# Patient Record
Sex: FEMALE | Race: BLACK OR AFRICAN AMERICAN | NOT HISPANIC OR LATINO | Employment: OTHER | URBAN - METROPOLITAN AREA
[De-identification: names, ages, dates, MRNs, and addresses within clinical notes are randomized per-mention and may not be internally consistent; named-entity substitution may affect disease eponyms.]

---

## 2017-02-06 RX ORDER — MIRTAZAPINE 15 MG/1
TABLET, FILM COATED ORAL
Qty: 30 TABLET | Refills: 5 | OUTPATIENT
Start: 2017-02-06

## 2017-02-24 ENCOUNTER — DOCUMENTATION ONLY (OUTPATIENT)
Dept: FAMILY MEDICINE | Facility: CLINIC | Age: 82
End: 2017-02-24

## 2017-02-24 NOTE — PROGRESS NOTES
Pre-Visit Chart Review  For Appointment Scheduled on 2/27/2017    There are no preventive care reminders to display for this patient.

## 2017-02-27 ENCOUNTER — OFFICE VISIT (OUTPATIENT)
Dept: FAMILY MEDICINE | Facility: CLINIC | Age: 82
End: 2017-02-27
Payer: MEDICARE

## 2017-02-27 VITALS
WEIGHT: 175.94 LBS | TEMPERATURE: 98 F | HEART RATE: 73 BPM | BODY MASS INDEX: 31.17 KG/M2 | SYSTOLIC BLOOD PRESSURE: 148 MMHG | DIASTOLIC BLOOD PRESSURE: 72 MMHG | HEIGHT: 63 IN

## 2017-02-27 DIAGNOSIS — G62.9 POLYNEUROPATHY: ICD-10-CM

## 2017-02-27 DIAGNOSIS — D75.89 MACROCYTOSIS: ICD-10-CM

## 2017-02-27 DIAGNOSIS — Z00.00 ENCOUNTER FOR PREVENTIVE HEALTH EXAMINATION: ICD-10-CM

## 2017-02-27 DIAGNOSIS — Z86.010 HX OF COLONIC POLYPS: Primary | ICD-10-CM

## 2017-02-27 DIAGNOSIS — E78.1 HYPERTRIGLYCERIDEMIA: ICD-10-CM

## 2017-02-27 DIAGNOSIS — Z85.038 HISTORY OF COLON CANCER: ICD-10-CM

## 2017-02-27 DIAGNOSIS — I70.0 ABDOMINAL AORTIC ATHEROSCLEROSIS: Primary | ICD-10-CM

## 2017-02-27 PROCEDURE — 99999 PR PBB SHADOW E&M-EST. PATIENT-LVL IV: CPT | Mod: PBBFAC,,, | Performed by: PHYSICIAN ASSISTANT

## 2017-02-27 PROCEDURE — 3078F DIAST BP <80 MM HG: CPT | Mod: S$GLB,,, | Performed by: PHYSICIAN ASSISTANT

## 2017-02-27 PROCEDURE — 99499 UNLISTED E&M SERVICE: CPT | Mod: S$GLB,,, | Performed by: PHYSICIAN ASSISTANT

## 2017-02-27 PROCEDURE — G0439 PPPS, SUBSEQ VISIT: HCPCS | Mod: S$GLB,,, | Performed by: PHYSICIAN ASSISTANT

## 2017-02-27 PROCEDURE — 3077F SYST BP >= 140 MM HG: CPT | Mod: S$GLB,,, | Performed by: PHYSICIAN ASSISTANT

## 2017-02-27 NOTE — MR AVS SNAPSHOT
[facility]  [address]  [place] [address]  Phone: [phone]  Fax: [phone]                  Kenyetta Andersen   2017 8:00 AM   Office Visit    Description:  Female : 1935   Provider:  TEOFILO Euceda   Department:  [facility]           Reason for Visit     Health Risk Assessment           Diagnoses this Visit        Comments    Abdominal aortic atherosclerosis    -  Primary     Encounter for preventive health examination         History of colon cancer                To Do List           Future Appointments        Provider Department Dept Phone    3/27/2017 8:00 AM Maty Sawant MD [facility] - Ophthalmology [phone]    2017 9:40 AM LAB, [facility] [phone]    2017 10:00 AM [facility] CT1 LIMIT 400 LBS [facility] [phone]    2017 10:00 AM Yessica Granado MD [place] - Hematology Oncology [phone]      Goals (5 Years of Data)     None      [facility] On Call     [facility] On Call Nurse Care Line -  Assistance  Registered nurses in the [facility] provide clinical advisement, health education, appointment booking, and other advisory services.  Call for this free service at [phone].             Medications           Message regarding Medications     Verify the changes and/or additions to your medication regime listed below are the same as discussed with your clinician today.  If any of these changes or additions are incorrect, please notify your healthcare provider.        STOP taking these medications     meclizine (ANTIVERT) 25 mg tablet Take 1 tablet (25 mg total) by mouth nightly as needed for Dizziness.    benzonatate (TESSALON) 100 MG capsule Take 1 capsule (100 mg total) by mouth 3 (three) times daily as needed for Cough.           Verify that the below list of medications is an accurate representation of the medications you are  currently taking.  If none reported, the list may be blank. If incorrect, please contact your healthcare provider. Carry this list with you in case of emergency.           Current Medications     alendronate (FOSAMAX) 35 MG tablet Take 1 tablet (35 mg total) by mouth every 7 days.    ascorbic acid (VITAMIN C) 1000 MG tablet Take 1,000 mg by mouth once daily.    b complex vitamins capsule Take 1 capsule by mouth once daily.    dorzolamide-timolol 2-0.5% (COSOPT) 22.3-6.8 mg/mL ophthalmic solution PLACE 1 DROP INTO BOTH EYES 2 (TWO) TIMES DAILY. TWICE A DAY    gabapentin (NEURONTIN) 300 MG capsule TAKE 6 CAPSULES (1,800 MG TOTAL) BY MOUTH NIGHTLY.    latanoprost 0.005 % ophthalmic solution PLACE 1 DROP INTO BOTH EYES EVERY EVENING.    mirtazapine (REMERON) 15 MG tablet TAKE ONE TABLET BY MOUTH AT BEDTIME TO HELP APPETITE    omeprazole (PRILOSEC) 40 MG capsule Take 40 mg by mouth once daily.    VITAMIN D2 50,000 unit capsule TAKE ONE CAPSULE BY MOUTH ONCE A WEEK           Clinical Reference Information           Your Vitals Were     BP                   148/72 (BP Location: Left arm, Patient Position: Sitting, BP Method: Automatic)           Blood Pressure          Most Recent Value    BP  (!)  148/72      Allergies as of 2/27/2017     Ace Inhibitors    Codeine      Immunizations Administered on Date of Encounter - 2/27/2017     None      Orders Placed During Today's Visit      Normal Orders This Visit    Ambulatory referral to Gastroenterology       Instructions      Walking for Fitness  Fitness walking has something for everyone, even people who are already fit. Walking is one of the safest ways to condition your body aerobically. It can boost energy, help you lose weight, and reduce stress.    Physical benefits  · Walking strengthens your heart and lungs, and tones your muscles.  · When walking, your feet land with less impact than in other sports. This reduces chances of muscle, bone, and joint injury.  · Regular  walking improves your cholesterol levels and lowers your risk of heart disease. And it helps you control your blood sugar if you have diabetes.  · Walking is a weight-bearing activity, which helps maintain bone density. This can help prevent osteoporosis.  Personal rewards  · Taking walks can help you relax and manage stress. And fitness walking may make you feel better about yourself.  · Walking can help you sleep better at night and make you less likely to be depressed.  · Regular walking may help maintain your memory as you get older.  · Walking is a great way to spend extra time with friends and family members. Be sure to invite your dog along!  Q&A about fitness walking  Q: Will walking keep me fit?  A: Yes. Regular walking at the right pace gives you all the benefits of other aerobic activities, such as jogging and swimming.  Q: Will walking help me lose weight and keep it off?  A: Yes. Per mile, walking can burn as many calories as jogging. Your health care provider can help work walking into your weight-loss plan.  Q: Is walking safe for my health?  A: Yes. Walking is safe if you have high blood pressure, diabetes, heart disease, or other conditions. Talk to your health care provider before you start.  Date Last Reviewed: 5/9/2015  © 1903-0633 [x+1]. [address] [place] [address], [place] [address]. All rights reserved. This information is not intended as a substitute for professional medical care. Always follow your healthcare professional's instructions.        Weight Management: Getting Started  Healthy bodies come in all shapes and sizes. Not all bodies are made to be thin. For some people, a healthy weight is higher than the average weight listed on weight charts. Your healthcare provider can help you decide on a healthy weight for you.    Reasons to lose weight  Losing weight can help with some health problems, such as high blood pressure, heart disease, diabetes, sleep apnea, and  arthritis. You may also feel more energy.  Set your long-term goal  Your goal doesn't even have to be a specific weight. You may decide on a fitness goal (such as being able to walk 10 miles a week), or a health goal (such as lowering your blood pressure). Choose a goal that is measurable and reasonable, so you know when you've reached it. A goal of reaching a BMI of less than 25 is not always reasonable (or possible).   Make an action plan  Habits dont change overnight. Setting your goals too high can leave you feeling discouraged if you cant reach them. Be realistic. Choose one or two small changes you can make now. Set an action plan for how you are going to make these changes. When you can stick to this plan, keep making a few more small changes. Taking small steps will help you stay on the path to success.  Track your progress  Write down your goals. Then, keep a daily record of your progress. Write down what you eat and how active you are. This record lets you look back on how much youve done. It may also help when youre feeling frustrated. Reward yourself for success. Even if you dont reach every goal, give yourself credit for what you do get done.  Get support  Encouragement from others can help make losing weight easier. Ask your family members and friends for support. They may even want to join you. Also look to your healthcare provider, registered dietitian, and  for help. Your local hospital can give you more information about nutrition, exercise, and weight loss.  Date Last Reviewed: 1/31/2016 © 2000-2016 [employer]. [address] [place] [address], [place] [address]. All rights reserved. This information is not intended as a substitute for professional medical care. Always follow your healthcare professional's instructions.        Counseling and Referral of Other Preventative  (Italic type indicates deductible and co-insurance are waived)    Patient Name: Kenyetta  Nathaly  Today's Date: 2/27/2017      SERVICE LIMITATIONS RECOMMENDATION    Vaccines    · Pneumococcal (once after 65)    · Influenza (annually)    · Hepatitis B (if medium/high risk)    · Prevnar 13      Hepatitis B medium/high risk factors:       - End-stage renal disease       - Hemophiliacs who received Factor VII or         IX concentrates       - Clients of institutions for the mentally             retarded       - Persons who live in the same house as          a HepB carrier       - Homosexual men       - Illicit injectable drug abusers     Pneumococcal: Done, no repeat necessary     Influenza: Done, repeat in one year     Hepatitis B: N/A .     Prevnar 13: N/A completed    Mammogram (biennial age 50-74)  Annually (age 40 or over)  Recommended to patient, declined    Pap (up to age 70 and after 70 if unknown history or abnormal study last 10 years)    N/A .     The USPSTF recommends against screening for cervical cancer in women who have had a hysterectomy with removal of the cervix and who do not have a history of a high-grade precancerous lesion (cervical intraepithelial neoplasia [MYRTLE] grade 2 or 3) or cervical cancer.     Colorectal cancer screening (to age 75)    · Fecal occult blood test (annual)  · Flexible sigmoidoscopy (5y)  · Screening colonoscopy (10y)  · Barium enema   Last done 3/24/14, recommend to repeat every 3  years    Diabetes self-management training (no USPSTF recommendations)  Requires referral by treating physician for patient with diabetes or renal disease. 10 hours of initial DSMT sessions of no less than 30 minutes each in a continuous 12-month period. 2 hours of follow-up DSMT in subsequent years.  Done this year, repeat every year    Bone mass measurements (age 65 & older, biennial)  Requires diagnosis related to osteoporosis or estrogen deficiency. Biennial benefit unless patient has history of long-term glucocorticoid  Recommended to patient, declined    Glaucoma screening (no  USPSTF recommendation)  Diabetes mellitus, family history   , age 50 or over    American, age 65 or over  Done this year, repeat every year    Medical nutrition therapy for diabetes or renal disease (no recommended schedule)  Requires referral by treating physician for patient with diabetes or renal disease or kidney transplant within the past 3 years.  Can be provided in same year as diabetes self-management training (DSMT), and CMS recommends medical nutrition therapy take place after DSMT. Up to 3 hours for initial year and 2 hours in subsequent years.  N/A .    Cardiovascular screening blood tests (every 5 years)  · Fasting lipid panel  Order as a panel if possible  Done this year, repeat every year    Diabetes screening tests (at least every 3 years, Medicare covers annually or at 6-month intervals for prediabetic patients)  · Fasting blood sugar (FBS) or glucose tolerance test (GTT)  Patient must be diagnosed with one of the following:       - Hypertension       - Dyslipidemia       - Obesity (BMI 30kg/m2)       - Previous elevated impaired FBS or GTT       ... or any two of the following:       - Overweight (BMI 25 but <30)       - Family history of diabetes       - Age 65 or older       - History of gestational diabetes or birth of baby weighing more than 9 pounds  Done this year, repeat every year    Abdominal aortic aneurysm screening (once)  · Sonogram   Limited to patients who meet one of the following criteria:       - Men who are 65-75 years old and have smoked more than 100 cigarette in their lifetime       - Anyone with a family history of abdominal aortic aneurysm       - Anyone recommended for screening by the USPSTF  N/A completed    HIV screening (annually for increased risk patients)  · HIV-1 and HIV-2 by EIA, or RON, rapid antibody test or oral mucosa transudate  Patients must be at increased risk for HIV infection per USPSTF guidelines or pregnant. Tests covered  annually for patient at increased risk or as requested by the patient. Pregnant patients may receive up to 3 tests during pregnancy.  Risks discussed, screening is not recommended    Smoking cessation counseling (up to 8 sessions per year)  Patients must be asymptomatic of tobacco-related conditions to receive as a preventative service.  patient wished to defer    Subsequent annual wellness visit  At least 12 months since last AWV  Return in one year     The following information is provided to all patients.  This information is to help you find resources for any of the problems found today that may be affecting your health:                Living healthy guide: www.[facility].[place].[facility]      Understanding Diabetes: www.diabetes.org      Eating healthy: www.cdc.gov/healthyweight      [facility][note 3] home safety checklist: www.cdc.gov/steadi/patient.html      Agency on Aging: www.goea.[place].[facility]      Alcoholics anonymous (AA): www.aa.org      Physical Activity: www.zulma.nih.gov/ew8mxeu      Tobacco use: www.quitwithusla.org          Smoking Cessation     If you would like to quit smoking:   You may be eligible for free services if you are a [place] resident and started smoking cigarettes before September 1, 1988.  Call the Smoking Cessation Trust (SCT) toll free at (228) 576-0524 or (332) 596-0344.   Call 1-800-QUIT-NOW if you do not meet the above criteria.            Language Assistance Services     ATTENTION: Language assistance services are available, free of charge. Please call [phone].      ATENCIÓN: Si habla español, tiene a corbin disposición servicios gratuitos de asistencia lingüística. Llame al [phone].     CHÚ Ý: N?u b?n nói Ti?ng Vi?t, có các d?ch v? h? tr? ngôn ng? mi?n phí dành cho b?n. G?i s? [phone].         [facility] complies with applicable Federal civil rights laws and does not discriminate on the basis of race, color, national origin, age, disability, or sex.

## 2017-02-27 NOTE — PATIENT INSTRUCTIONS
Walking for Fitness  Fitness walking has something for everyone, even people who are already fit. Walking is one of the safest ways to condition your body aerobically. It can boost energy, help you lose weight, and reduce stress.    Physical benefits  · Walking strengthens your heart and lungs, and tones your muscles.  · When walking, your feet land with less impact than in other sports. This reduces chances of muscle, bone, and joint injury.  · Regular walking improves your cholesterol levels and lowers your risk of heart disease. And it helps you control your blood sugar if you have diabetes.  · Walking is a weight-bearing activity, which helps maintain bone density. This can help prevent osteoporosis.  Personal rewards  · Taking walks can help you relax and manage stress. And fitness walking may make you feel better about yourself.  · Walking can help you sleep better at night and make you less likely to be depressed.  · Regular walking may help maintain your memory as you get older.  · Walking is a great way to spend extra time with friends and family members. Be sure to invite your dog along!  Q&A about fitness walking  Q: Will walking keep me fit?  A: Yes. Regular walking at the right pace gives you all the benefits of other aerobic activities, such as jogging and swimming.  Q: Will walking help me lose weight and keep it off?  A: Yes. Per mile, walking can burn as many calories as jogging. Your health care provider can help work walking into your weight-loss plan.  Q: Is walking safe for my health?  A: Yes. Walking is safe if you have high blood pressure, diabetes, heart disease, or other conditions. Talk to your health care provider before you start.  Date Last Reviewed: 5/9/2015  © 1457-8628 [employer]. [address] [place] [address], [place] [address]. All rights reserved. This information is not intended as a substitute for professional medical care. Always follow your healthcare professional's  instructions.        Weight Management: Getting Started  Healthy bodies come in all shapes and sizes. Not all bodies are made to be thin. For some people, a healthy weight is higher than the average weight listed on weight charts. Your healthcare provider can help you decide on a healthy weight for you.    Reasons to lose weight  Losing weight can help with some health problems, such as high blood pressure, heart disease, diabetes, sleep apnea, and arthritis. You may also feel more energy.  Set your long-term goal  Your goal doesn't even have to be a specific weight. You may decide on a fitness goal (such as being able to walk 10 miles a week), or a health goal (such as lowering your blood pressure). Choose a goal that is measurable and reasonable, so you know when you've reached it. A goal of reaching a BMI of less than 25 is not always reasonable (or possible).   Make an action plan  Habits dont change overnight. Setting your goals too high can leave you feeling discouraged if you cant reach them. Be realistic. Choose one or two small changes you can make now. Set an action plan for how you are going to make these changes. When you can stick to this plan, keep making a few more small changes. Taking small steps will help you stay on the path to success.  Track your progress  Write down your goals. Then, keep a daily record of your progress. Write down what you eat and how active you are. This record lets you look back on how much youve done. It may also help when youre feeling frustrated. Reward yourself for success. Even if you dont reach every goal, give yourself credit for what you do get done.  Get support  Encouragement from others can help make losing weight easier. Ask your family members and friends for support. They may even want to join you. Also look to your healthcare provider, registered dietitian, and  for help. Your local hospital can give you more information about  nutrition, exercise, and weight loss.  Date Last Reviewed: 1/31/2016  © 9157-8436 [employer]. [address] [place] [address], [place] [address]. All rights reserved. This information is not intended as a substitute for professional medical care. Always follow your healthcare professional's instructions.        Counseling and Referral of Other Preventative  (Italic type indicates deductible and co-insurance are waived)    Patient Name: Kenyetta Andersen  Today's Date: 2/27/2017      SERVICE LIMITATIONS RECOMMENDATION    Vaccines    · Pneumococcal (once after 65)    · Influenza (annually)    · Hepatitis B (if medium/high risk)    · Prevnar 13      Hepatitis B medium/high risk factors:       - End-stage renal disease       - Hemophiliacs who received Factor VII or         IX concentrates       - Clients of institutions for the mentally             retarded       - Persons who live in the same house as          a HepB carrier       - Homosexual men       - Illicit injectable drug abusers     Pneumococcal: Done, no repeat necessary     Influenza: Done, repeat in one year     Hepatitis B: N/A .     Prevnar 13: N/A completed    Mammogram (biennial age 50-74)  Annually (age 40 or over)  Recommended to patient, declined    Pap (up to age 70 and after 70 if unknown history or abnormal study last 10 years)    N/A .     The USPSTF recommends against screening for cervical cancer in women who have had a hysterectomy with removal of the cervix and who do not have a history of a high-grade precancerous lesion (cervical intraepithelial neoplasia [MYRTLE] grade 2 or 3) or cervical cancer.     Colorectal cancer screening (to age 75)    · Fecal occult blood test (annual)  · Flexible sigmoidoscopy (5y)  · Screening colonoscopy (10y)  · Barium enema   Last done 3/24/14, recommend to repeat every 3  years    Diabetes self-management training (no USPSTF recommendations)  Requires referral by treating physician for patient with diabetes  or renal disease. 10 hours of initial DSMT sessions of no less than 30 minutes each in a continuous 12-month period. 2 hours of follow-up DSMT in subsequent years.  Done this year, repeat every year    Bone mass measurements (age 65 & older, biennial)  Requires diagnosis related to osteoporosis or estrogen deficiency. Biennial benefit unless patient has history of long-term glucocorticoid  Recommended to patient, declined    Glaucoma screening (no USPSTF recommendation)  Diabetes mellitus, family history   , age 50 or over    American, age 65 or over  Done this year, repeat every year    Medical nutrition therapy for diabetes or renal disease (no recommended schedule)  Requires referral by treating physician for patient with diabetes or renal disease or kidney transplant within the past 3 years.  Can be provided in same year as diabetes self-management training (DSMT), and CMS recommends medical nutrition therapy take place after DSMT. Up to 3 hours for initial year and 2 hours in subsequent years.  N/A .    Cardiovascular screening blood tests (every 5 years)  · Fasting lipid panel  Order as a panel if possible  Done this year, repeat every year    Diabetes screening tests (at least every 3 years, Medicare covers annually or at 6-month intervals for prediabetic patients)  · Fasting blood sugar (FBS) or glucose tolerance test (GTT)  Patient must be diagnosed with one of the following:       - Hypertension       - Dyslipidemia       - Obesity (BMI 30kg/m2)       - Previous elevated impaired FBS or GTT       ... or any two of the following:       - Overweight (BMI 25 but <30)       - Family history of diabetes       - Age 65 or older       - History of gestational diabetes or birth of baby weighing more than 9 pounds  Done this year, repeat every year    Abdominal aortic aneurysm screening (once)  · Sonogram   Limited to patients who meet one of the following criteria:       - Men who are 65-75  years old and have smoked more than 100 cigarette in their lifetime       - Anyone with a family history of abdominal aortic aneurysm       - Anyone recommended for screening by the USPSTF  N/A completed    HIV screening (annually for increased risk patients)  · HIV-1 and HIV-2 by EIA, or RON, rapid antibody test or oral mucosa transudate  Patients must be at increased risk for HIV infection per USPSTF guidelines or pregnant. Tests covered annually for patient at increased risk or as requested by the patient. Pregnant patients may receive up to 3 tests during pregnancy.  Risks discussed, screening is not recommended    Smoking cessation counseling (up to 8 sessions per year)  Patients must be asymptomatic of tobacco-related conditions to receive as a preventative service.  patient wished to defer    Subsequent annual wellness visit  At least 12 months since last AWV  Return in one year     The following information is provided to all patients.  This information is to help you find resources for any of the problems found today that may be affecting your health:                Living healthy guide: www.[facility].[place].[facility]      Understanding Diabetes: www.diabetes.org      Eating healthy: www.cdc.gov/healthyweight      [facility][note 3] home safety checklist: www.cdc.gov/steadi/patient.html      Agency on Aging: www.goea.[place].[facility]      Alcoholics anonymous (AA): www.aa.org      Physical Activity: www.zulma.nih.gov/bu0kcin      Tobacco use: www.quitwithusla.org

## 2017-02-27 NOTE — Clinical Note
Primary Care Providers: Hever Arreola MD, MD (General)  Your patient was seen today for a HRA visit. Gap(s) in care (HEDIS gaps) have been identified during this visit that require additional testing and possible follow up.  Orders Placed This Encounter     Ambulatory referral to Gastroenterology         Referral Priority:Routine         Referral Type:Consultation         Referral Reason:Specialty Services Required         Referred to Provider:Killian Guerrier MD         Requested Specialty:Gastroenterology         Number of Visits Requested:1   These orders were placed using [facility] approved protocol and any results will be forwarded to your office for appropriate follow up. I have included a copy of my visit note; please review the note and feel free to contact me with any questions.   Thank you for allowing me to participate in the care of your patients. TEOFILO Khan

## 2017-02-27 NOTE — PROGRESS NOTES
"Kenyetta Andersen presented for a  Medicare AWV and comprehensive Health Risk Assessment today. The following components were reviewed and updated:    · Medical history  · Family History  · Social history  · Allergies and Current Medications  · Health Risk Assessment  · Health Maintenance  · Care Team     ** See Completed Assessments for Annual Wellness Visit within the encounter summary.**       The following assessments were completed:  · Living Situation  · CAGE  · Depression Screening  · Timed Get Up and Go  · Whisper Test  · Cognitive Function Screening  · Nutrition Screening  · ADL Screening  · PAQ Screening    Vitals:    02/27/17 0759   BP: (!) 148/72   BP Location: Left arm   Patient Position: Sitting   BP Method: Automatic   Pulse: 73   Temp: 98.3 °F (36.8 °C)   TempSrc: Oral   Weight: 79.8 kg (175 lb 14.8 oz)   Height: 5' 3" (1.6 m)     Body mass index is 31.16 kg/(m^2).  Physical Exam   Constitutional: She is oriented to person, place, and time. She appears well-developed and well-nourished.   HENT:   Head: Normocephalic and atraumatic.   Eyes: Conjunctivae are normal. Pupils are equal, round, and reactive to light.   Neck: Normal range of motion. Neck supple. No JVD present.   Cardiovascular: Normal rate and regular rhythm.  Exam reveals no gallop and no friction rub.    No murmur heard.  Pulmonary/Chest: Effort normal and breath sounds normal. No respiratory distress. She has no wheezes. She has no rales.   Neurological: She is alert and oriented to person, place, and time.   Skin: Skin is warm and dry.   Psychiatric: She has a normal mood and affect. Her behavior is normal. Judgment and thought content normal.               Diagnoses and health risks identified today and associated recommendations/orders:    Kenyetta was seen today for health risk assessment.    Diagnoses and all orders for this visit:    Abdominal aortic atherosclerosis  Comments:  stable, continue to monitor    Encounter for " preventive health examination    History of colon cancer  Comments:  patient due for follow up colonoscopy next month, referral placed  Orders:  -     Ambulatory referral to Gastroenterology    Hypertriglyceridemia  Comments:  controlled, continue meds    Macrocytosis  Comments:  stable, followed by hematology    Polyneuropathy  Comments:  stable, continue to monitor        Provided Kenyetta with a 5-10 year written screening schedule and personal prevention plan. Recommendations were developed using the USPSTF age appropriate recommendations. Education, counseling, and referrals were provided as needed. After Visit Summary printed and given to patient which includes a list of additional screenings\tests needed.    No Follow-up on file.    TEOFILO Kahn

## 2017-03-14 ENCOUNTER — HOSPITAL ENCOUNTER (OUTPATIENT)
Facility: HOSPITAL | Age: 82
Discharge: HOME | End: 2017-03-14
Attending: INTERNAL MEDICINE | Admitting: INTERNAL MEDICINE
Payer: MEDICARE

## 2017-03-14 ENCOUNTER — ANESTHESIA EVENT (OUTPATIENT)
Dept: ENDOSCOPY | Facility: HOSPITAL | Age: 82
End: 2017-03-14
Payer: MEDICARE

## 2017-03-14 ENCOUNTER — SURGERY (OUTPATIENT)
Age: 82
End: 2017-03-14

## 2017-03-14 ENCOUNTER — ANESTHESIA (OUTPATIENT)
Dept: ENDOSCOPY | Facility: HOSPITAL | Age: 82
End: 2017-03-14
Payer: MEDICARE

## 2017-03-14 VITALS
HEART RATE: 66 BPM | WEIGHT: 162 LBS | BODY MASS INDEX: 28.7 KG/M2 | SYSTOLIC BLOOD PRESSURE: 144 MMHG | RESPIRATION RATE: 20 BRPM | HEIGHT: 63 IN | TEMPERATURE: 97 F | OXYGEN SATURATION: 99 % | DIASTOLIC BLOOD PRESSURE: 67 MMHG

## 2017-03-14 VITALS — RESPIRATION RATE: 8 BRPM

## 2017-03-14 DIAGNOSIS — K63.5 COLON POLYPS: ICD-10-CM

## 2017-03-14 PROCEDURE — 63600175 PHARM REV CODE 636 W HCPCS: Performed by: NURSE ANESTHETIST, CERTIFIED REGISTERED

## 2017-03-14 PROCEDURE — D9220A PRA ANESTHESIA: Mod: PT,CRNA,, | Performed by: NURSE ANESTHETIST, CERTIFIED REGISTERED

## 2017-03-14 PROCEDURE — D9220A PRA ANESTHESIA: Mod: PT,ANES,, | Performed by: ANESTHESIOLOGY

## 2017-03-14 PROCEDURE — 37000008 HC ANESTHESIA 1ST 15 MINUTES: Performed by: INTERNAL MEDICINE

## 2017-03-14 PROCEDURE — 37000009 HC ANESTHESIA EA ADD 15 MINS: Performed by: INTERNAL MEDICINE

## 2017-03-14 PROCEDURE — 45380 COLONOSCOPY AND BIOPSY: CPT | Performed by: INTERNAL MEDICINE

## 2017-03-14 PROCEDURE — 27201012 HC FORCEPS, HOT/COLD, DISP: Performed by: INTERNAL MEDICINE

## 2017-03-14 PROCEDURE — 25000003 PHARM REV CODE 250: Performed by: INTERNAL MEDICINE

## 2017-03-14 PROCEDURE — 88305 TISSUE EXAM BY PATHOLOGIST: CPT | Performed by: PATHOLOGY

## 2017-03-14 PROCEDURE — 25000003 PHARM REV CODE 250: Performed by: NURSE ANESTHETIST, CERTIFIED REGISTERED

## 2017-03-14 PROCEDURE — 45380 COLONOSCOPY AND BIOPSY: CPT | Mod: PT,,, | Performed by: INTERNAL MEDICINE

## 2017-03-14 RX ORDER — PROPOFOL 10 MG/ML
INJECTION, EMULSION INTRAVENOUS
Status: DISCONTINUED
Start: 2017-03-14 | End: 2017-03-14 | Stop reason: HOSPADM

## 2017-03-14 RX ORDER — LIDOCAINE HYDROCHLORIDE 20 MG/ML
INJECTION, SOLUTION EPIDURAL; INFILTRATION; INTRACAUDAL; PERINEURAL
Status: DISCONTINUED
Start: 2017-03-14 | End: 2017-03-14 | Stop reason: HOSPADM

## 2017-03-14 RX ORDER — LIDOCAINE HCL/PF 100 MG/5ML
SYRINGE (ML) INTRAVENOUS
Status: DISCONTINUED | OUTPATIENT
Start: 2017-03-14 | End: 2017-03-14

## 2017-03-14 RX ORDER — PROPOFOL 10 MG/ML
VIAL (ML) INTRAVENOUS
Status: DISCONTINUED | OUTPATIENT
Start: 2017-03-14 | End: 2017-03-14

## 2017-03-14 RX ORDER — DEXTROMETHORPHAN/PSEUDOEPHED 2.5-7.5/.8
DROPS ORAL
Status: DISCONTINUED
Start: 2017-03-14 | End: 2017-03-14 | Stop reason: HOSPADM

## 2017-03-14 RX ORDER — SODIUM CHLORIDE 9 MG/ML
INJECTION, SOLUTION INTRAVENOUS CONTINUOUS
Status: DISCONTINUED | OUTPATIENT
Start: 2017-03-14 | End: 2017-03-14 | Stop reason: HOSPADM

## 2017-03-14 RX ADMIN — PROPOFOL 50 MG: 10 INJECTION, EMULSION INTRAVENOUS at 09:03

## 2017-03-14 RX ADMIN — LIDOCAINE HYDROCHLORIDE 100 MG: 20 INJECTION, SOLUTION INTRAVENOUS at 09:03

## 2017-03-14 RX ADMIN — SODIUM CHLORIDE 1000 ML: 0.9 INJECTION, SOLUTION INTRAVENOUS at 09:03

## 2017-03-14 NOTE — H&P
"[facility] Note    CC: history of colon cancer    HPI 82 y.o. female presents for surveillance colonoscopy due to a personal history of colon cancer.    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 No  BMI 28.7 kg/m2  General appearance: alert, cooperative, no distress  HENT: Normocephalic, atraumatic, neck symmetrical, no nasal discharge   CV: Normal heart sounds.  Abdomen: soft, non tender, non distended BS present  Extremities: extremities symmetric; no clubbing, cyanosis, or edema    Assessment:   82 y.o. female presents for surveillance colonoscopy due to a personal history of colon cancer..    Plan:  Surveillance colonoscopy today    Killian Guerrier MD  [facility]  [address], [address]  [place] [address]  Office: [phone]  Fax: [phone]      "

## 2017-03-14 NOTE — IP AVS SNAPSHOT
[address] [place] [address]  Phone: [phone]           Patient Discharge Instructions     Our goal is to set you up for success. This packet includes information on your condition, medications, and your home care. It will help you to care for yourself so you don't get sicker and need to go back to the hospital.     Please ask your nurse if you have any questions.        There are many details to remember when preparing to leave the hospital. Here is what you will need to do:    1. Take your medicine. If you are prescribed medications, review your Medication List in the following pages. You may have new medications to  at the pharmacy and others that you'll need to stop taking. Review the instructions for how and when to take your medications. Talk with your doctor or nurses if you are unsure of what to do.     2. Go to your follow-up appointments. Specific follow-up information is listed in the following pages. Your may be contacted by a transition nurse or clinical provider about future appointments. Be sure we have all of the phone numbers to reach you, if needed. Please contact your provider's office if you are unable to make an appointment.     3. Watch for warning signs. Your doctor or nurse will give you detailed warning signs to watch for and when to call for assistance. These instructions may also include educational information about your condition. If you experience any of warning signs to your health, call your doctor.               [facility]  Unless otherwise directed by your provider, please contact [facility], our nurse care line that is available for 24/7 assistance.     [phone] (toll-free)    Registered nurses in the [facility] provide clinical advisement, health education, appointment booking, and other advisory services.                    ** Verify the list of medication(s) below is accurate and up to date.  Carry this with you in case of emergency. If your medications have changed, please notify your healthcare provider.             Medication List      CONTINUE taking these medications        Additional Info                      alendronate 35 MG tablet   Commonly known as:  FOSAMAX   Quantity:  4 tablet   Refills:  11   Dose:  35 mg    Instructions:  Take 1 tablet (35 mg total) by mouth every 7 days.     Begin Date    AM    Noon    PM    Bedtime       b complex vitamins capsule   Refills:  0   Dose:  1 capsule    Instructions:  Take 1 capsule by mouth once daily.     Begin Date    AM    Noon    PM    Bedtime       dorzolamide-timolol 2-0.5% 22.3-6.8 mg/mL ophthalmic solution   Commonly known as:  COSOPT   Quantity:  10 mL   Refills:  11   Comments:  PATIENT IS REQUESTING MORE REFILLS    Instructions:  PLACE 1 DROP INTO BOTH EYES 2 (TWO) TIMES DAILY. TWICE A DAY     Begin Date    AM    Noon    PM    Bedtime       gabapentin 300 MG capsule   Commonly known as:  NEURONTIN   Quantity:  180 capsule   Refills:  6    Instructions:  TAKE 6 CAPSULES (1,800 MG TOTAL) BY MOUTH NIGHTLY.     Begin Date    AM    Noon    PM    Bedtime       latanoprost 0.005 % ophthalmic solution   Quantity:  2.5 mL   Refills:  11    Instructions:  PLACE 1 DROP INTO BOTH EYES EVERY EVENING.     Begin Date    AM    Noon    PM    Bedtime       mirtazapine 15 MG tablet   Commonly known as:  REMERON   Quantity:  30 tablet   Refills:  5    Instructions:  TAKE ONE TABLET BY MOUTH AT BEDTIME TO HELP APPETITE     Begin Date    AM    Noon    PM    Bedtime       omeprazole 40 MG capsule   Commonly known as:  PRILOSEC   Refills:  0   Dose:  40 mg    Instructions:  Take 40 mg by mouth once daily.     Begin Date    AM    Noon    PM    Bedtime       VITAMIN C 1000 MG tablet   Refills:  0   Dose:  1000 mg   Generic drug:  ascorbic acid (vitamin C)    Instructions:  Take 1,000 mg by mouth once daily.     Begin Date    AM    Noon    PM    Bedtime       VITAMIN D2  50,000 unit Cap   Quantity:  4 capsule   Refills:  0   Generic drug:  ergocalciferol    Instructions:  TAKE ONE CAPSULE BY MOUTH ONCE A WEEK     Begin Date    AM    Noon    PM    Bedtime                  Please bring to all follow up appointments:    1. A copy of your discharge instructions.  2. All medicines you are currently taking in their original bottles.  3. Identification and insurance card.    Please arrive 15 minutes ahead of scheduled appointment time.    Please call 24 hours in advance if you must reschedule your appointment and/or time.        Your Scheduled Appointments     Mar 27, 2017  8:00 AM CDT   [facility]    [address] [place] [address] [address]  [facility] [address]   [phone]            Apr 12, 2017  9:40 AM CDT   Non-Fasting Lab with LAB, [facility]   [facility] ([place] [facility]) [address] [place] [address]   [phone]            Apr 12, 2017 10:00 AM CDT   CT Chest with [facility] CT1 LIMIT 400 LBS   [facility] ([facility]) [address] [place] [address]   [phone]            Apr 19, 2017 10:00 AM CDT   Established Patient Visit with Yessica Granado MD   [place] - Hematology Oncology ([place] [address])    [address] [place] [address] [address]  [facility] [address]   [phone]                Discharge Instructions     Future Orders    Diet general     Questions:    Total calories:      Fat restriction, if any:      Protein restriction, if any:      Na restriction, if any:      Fluid restriction:      Additional restrictions:          Discharge Instructions       Discharge Instructions: After Your Surgery/Procedure  Youve just had surgery. During surgery you were given medicine called anesthesia to keep you relaxed and free of pain. After surgery you may have some pain or  "nausea. This is common. Here are some tips for feeling better and getting well after surgery.     Stay on schedule with your medication.   Going home  Your doctor or nurse will show you how to take care of yourself when you go home. He or she will also answer your questions. Have an adult family member or friend drive you home.      For your safety we recommend these precaution for the first 24 hours after your procedure:  · Do not drive or use heavy equipment.  · Do not make important decisions or sign legal papers.  · Do not drink alcohol.  · Have someone stay with you, if needed. He or she can watch for problems and help keep you safe.  · Your concentration, balance, coordination, and judgement may be impaired for many hours after anesthesia.  Use caution when ambulating or standing up.     · You may feel weak and "washed out" after anesthesia and surgery.      Subtle residual effects of general anesthesia or sedation with regional / local anesthesia can last more than 24 hours.  Rest for the remainder of the day or longer if your Doctor/Surgeon has advised you to do so.  Although you may feel normal within the first 24 hours, your reflexes and mental ability may be impaired without you realizing it.  You may feel dizzy, lightheaded or sleepy for 24 hours or longer.      Be sure to go to all follow-up visits with your doctor. And rest after your surgery for as long as your doctor tells you to.  Coping with pain  If you have pain after surgery, pain medicine will help you feel better. Take it as told, before pain becomes severe. Also, ask your doctor or pharmacist about other ways to control pain. This might be with heat, ice, or relaxation. And follow any other instructions your surgeon or nurse gives you.  Tips for taking pain medicine  To get the best relief possible, remember these points:  · Pain medicines can upset your stomach. Taking them with a little food may help.  · Most pain relievers taken by mouth " need at least 20 to 30 minutes to start to work.  · Taking medicine on a schedule can help you remember to take it. Try to time your medicine so that you can take it before starting an activity. This might be before you get dressed, go for a walk, or sit down for dinner.  · Constipation is a common side effect of pain medicines. Call your doctor before taking any medicines such as laxatives or stool softeners to help ease constipation. Also ask if you should skip any foods. Drinking lots of fluids and eating foods such as fruits and vegetables that are high in fiber can also help. Remember, do not take laxatives unless your surgeon has prescribed them.  · Drinking alcohol and taking pain medicine can cause dizziness and slow your breathing. It can even be deadly. Do not drink alcohol while taking pain medicine.  · Pain medicine can make you react more slowly to things. Do not drive or run machinery while taking pain medicine.  Your health care provider may tell you to take acetaminophen to help ease your pain. Ask him or her how much you are supposed to take each day. Acetaminophen or other pain relievers may interact with your prescription medicines or other over-the-counter (OTC) drugs. Some prescription medicines have acetaminophen and other ingredients. Using both prescription and OTC acetaminophen for pain can cause you to overdose. Read the labels on your OTC medicines with care. This will help you to clearly know the list of ingredients, how much to take, and any warnings. It may also help you not take too much acetaminophen. If you have questions or do not understand the information, ask your pharmacist or health care provider to explain it to you before you take the OTC medicine.  Managing nausea  Some people have an upset stomach after surgery. This is often because of anesthesia, pain, or pain medicine, or the stress of surgery. These tips will help you handle nausea and eat healthy foods as you get  better. If you were on a special food plan before surgery, ask your doctor if you should follow it while you get better. These tips may help:  · Do not push yourself to eat. Your body will tell you when to eat and how much.  · Start off with clear liquids and soup. They are easier to digest.  · Next try semi-solid foods, such as mashed potatoes, applesauce, and gelatin, as you feel ready.  · Slowly move to solid foods. Dont eat fatty, rich, or spicy foods at first.  · Do not force yourself to have 3 large meals a day. Instead eat smaller amounts more often.  · Take pain medicines with a small amount of solid food, such as crackers or toast, to avoid nausea.     Call your surgeon if  · You still have pain an hour after taking medicine. The medicine may not be strong enough.  · You feel too sleepy, dizzy, or groggy. The medicine may be too strong.  · You have side effects like nausea, vomiting, or skin changes, such as rash, itching, or hives.       If you have obstructive sleep apnea  You were given anesthesia medicine during surgery to keep you comfortable and free of pain. After surgery, you may have more apnea spells because of this medicine and other medicines you were given. The spells may last longer than usual.   At home:  · Keep using the continuous positive airway pressure (CPAP) device when you sleep. Unless your health care provider tells you not to, use it when you sleep, day or night. CPAP is a common device used to treat obstructive sleep apnea.  · Talk with your provider before taking any pain medicine, muscle relaxants, or sedatives. Your provider will tell you about the possible dangers of taking these medicines.  © 9872-3955 [employer]. [address] [place] [address], [place] [address]. All rights reserved. This information is not intended as a substitute for professional medical care. Always follow your healthcare professional's instructions.        Admission Information     Date & Time  "Provider Department CSN    3/14/2017  8:26 AM Killian Guerrier MD [facility] [id number]      Care Providers     Provider Role Specialty Primary office phone    Killian Guerrier MD Attending Provider Gastroenterology [phone]    Killian Guerrier MD Surgeon  Gastroenterology [phone]      Your Vitals Were     BP Pulse Temp Resp Height Weight    130/81 (Patient Position: Sitting) 79 97 °F (36.1 °C) 20 5' 3" (1.6 m) 73.5 kg (162 lb)    SpO2 BMI             95% 28.7 kg/m2         Recent Lab Values        3/19/2010                           9:01 AM           A1C 5.5                       Allergies as of 3/14/2017        Reactions    Ace Inhibitors     Other reaction(s): Angioedema    Codeine Hives      Advance Directives     An advance directive is a document which, in the event you are no longer able to make decisions for yourself, tells your healthcare team what kind of treatment you do or do not want to receive, or who you would like to make those decisions for you.  If you do not currently have an advance directive, [facility] encourages you to create one.  For more information call:  [phone]), [phone] ([phone]),  or log on to [email or web address] Just Shared.        Language Assistance Services     ATTENTION: Language assistance services are available, free of charge. Please call [phone].      ATENCIÓN: Si habla español, tiene a corbin disposición servicios gratuitos de asistencia lingüística. Llame al [phone].     CHÚ Ý: N?u b?n nói Ti?ng Vi?t, có các d?ch v? h? tr? ngôn ng? mi?n phí dành cho b?n. G?i s? [phone].         [facility] complies with applicable Federal civil rights laws and does not discriminate on the basis of race, color, national origin, age, disability, or sex.        "

## 2017-03-14 NOTE — ANESTHESIA POSTPROCEDURE EVALUATION
"Anesthesia Post Evaluation    Patient: Kenyetta Andersen    Procedure(s) Performed: Procedure(s) (LRB):  COLONOSCOPY (N/A)    Final Anesthesia Type: general  Patient location during evaluation: PACU  Patient participation: Yes- Able to Participate  Level of consciousness: awake and alert  Post-procedure vital signs: reviewed and stable  Pain management: adequate  Airway patency: patent  PONV status at discharge: No PONV  Anesthetic complications: no      Cardiovascular status: blood pressure returned to baseline and hemodynamically stable  Respiratory status: unassisted  Hydration status: euvolemic  Follow-up not needed.        Visit Vitals    BP (!) 113/54    Pulse 76    Temp 36.1 °C (97 °F)    Resp 20    Ht 5' 3" (1.6 m)    Wt 73.5 kg (162 lb)    SpO2 100%    Breastfeeding No    BMI 28.7 kg/m2       Pain/Amanda Score: Pain Assessment Performed: Yes (3/14/2017  8:54 AM)  Presence of Pain: denies (3/14/2017  8:54 AM)  Pain Rating Prior to Med Admin: 0 (3/14/2017  8:54 AM)      "

## 2017-03-14 NOTE — TELEPHONE ENCOUNTER
Taking 1800mg at bedtime.  Maximum single dose in 1200mg.  Meant to take three times a day.  I cannot refill this with this sig.  Would suggest 600mg tid

## 2017-03-14 NOTE — DISCHARGE INSTRUCTIONS

## 2017-03-14 NOTE — OR NURSING
Have you had a colonoscopy LESS THAN 3 years ago?   * If YES, answer these questions*: YES    1. Did patient have a prior colonic polyp in a previous surveillance/diagnostic colonoscopy and is 18 years or older on date of encounter? YES    2. Documentation of < 3 year interval since the patients last colonoscopy due to medical reasons (eg., last colonoscopy incomplete, last colonoscopy had inadequate prep, piecemeal removal of adenomas, or last colonoscopy found > 10 adenomas) ? Colon cancer

## 2017-03-14 NOTE — PLAN OF CARE
Pt AAO, passing flatus, states no pain at this time. Pt tolerated procedure well. Vital signs meets WDL. Pt ready for discharge, meets discharge criteria.

## 2017-03-14 NOTE — TRANSFER OF CARE
"Anesthesia Transfer of Care Note    Patient: Kenyetta Andersen    Procedure(s) Performed: Procedure(s) (LRB):  COLONOSCOPY (N/A)    Patient location: PACU    Anesthesia Type: general    Transport from OR: Transported from OR on room air with adequate spontaneous ventilation    Post pain: adequate analgesia    Post assessment: no apparent anesthetic complications and tolerated procedure well    Post vital signs: stable    Level of consciousness: sedated    Nausea/Vomiting: no nausea/vomiting    Complications: none          Last vitals:   Visit Vitals    BP (!) 113/54    Pulse 76    Temp 36.1 °C (97 °F)    Resp 20    Ht 5' 3" (1.6 m)    Wt 73.5 kg (162 lb)    SpO2 100%    Breastfeeding No    BMI 28.7 kg/m2     "

## 2017-03-14 NOTE — ANESTHESIA PREPROCEDURE EVALUATION
03/14/2017  Kenyetta Andersen is a 82 y.o., female.    [facility] Anesthesia Evaluation    I have reviewed the Patient Summary Reports.    I have reviewed the Nursing Notes.      Review of Systems  Anesthesia Hx:  No problems with previous Anesthesia    Hepatic/GI:   GERD, well controlled        Physical Exam  General:  Well nourished                 Anesthesia Plan  Type of Anesthesia, risks & benefits discussed:  Anesthesia Type:  general  Patient's Preference:   Intra-op Monitoring Plan:   Intra-op Monitoring Plan Comments:   Post Op Pain Control Plan:   Post Op Pain Control Plan Comments:   Induction:   IV  Beta Blocker:  Patient is not currently on a Beta-Blocker (No further documentation required).       Informed Consent: Patient understands risks and agrees with Anesthesia plan.  Questions answered. Anesthesia consent signed with patient.  ASA Score: 2     Day of Surgery Review of History & Physical:    H&P update referred to the surgeon.         Ready For Surgery From Anesthesia Perspective.

## 2017-03-15 RX ORDER — GABAPENTIN 300 MG/1
600 CAPSULE ORAL 3 TIMES DAILY
Qty: 180 CAPSULE | Refills: 6 | Status: SHIPPED | OUTPATIENT
Start: 2017-03-15 | End: 2017-11-10 | Stop reason: SDUPTHER

## 2017-03-21 ENCOUNTER — TELEPHONE (OUTPATIENT)
Dept: GASTROENTEROLOGY | Facility: CLINIC | Age: 82
End: 2017-03-21

## 2017-03-21 NOTE — TELEPHONE ENCOUNTER
----- Message from Killian Guerrier MD sent at 3/17/2017 10:10 AM CDT -----  Please let this patient know that the colon polyp(s) was benign and that this patient will need a repeat colonoscopy in 3 years.

## 2017-03-27 ENCOUNTER — OFFICE VISIT (OUTPATIENT)
Dept: OPHTHALMOLOGY | Facility: CLINIC | Age: 82
End: 2017-03-27
Payer: MEDICARE

## 2017-03-27 DIAGNOSIS — H40.2230 CHRONIC ANGLE-CLOSURE GLAUCOMA OF BOTH EYES, UNSPECIFIED GLAUCOMA STAGE: Primary | ICD-10-CM

## 2017-03-27 DIAGNOSIS — Z96.1 PSEUDOPHAKIA: ICD-10-CM

## 2017-03-27 DIAGNOSIS — H25.11 NUCLEAR SCLEROSIS, RIGHT: ICD-10-CM

## 2017-03-27 DIAGNOSIS — H40.2232 CHRONIC ANGLE-CLOSURE GLAUCOMA OF BOTH EYES, MODERATE STAGE: ICD-10-CM

## 2017-03-27 DIAGNOSIS — H52.7 REFRACTIVE ERROR: ICD-10-CM

## 2017-03-27 DIAGNOSIS — H40.033 ANATOMICAL NARROW ANGLE GLAUCOMA OF BOTH EYES WITH BORDERLINE INTRAOCULAR PRESSURE: ICD-10-CM

## 2017-03-27 PROCEDURE — 92014 COMPRE OPH EXAM EST PT 1/>: CPT | Mod: S$GLB,,, | Performed by: OPHTHALMOLOGY

## 2017-03-27 PROCEDURE — 92134 CPTRZ OPH DX IMG PST SGM RTA: CPT | Mod: S$GLB,,, | Performed by: OPHTHALMOLOGY

## 2017-03-27 PROCEDURE — 99499 UNLISTED E&M SERVICE: CPT | Mod: S$GLB,,, | Performed by: OPHTHALMOLOGY

## 2017-03-27 PROCEDURE — 99999 PR PBB SHADOW E&M-EST. PATIENT-LVL II: CPT | Mod: PBBFAC,,, | Performed by: OPHTHALMOLOGY

## 2017-03-27 RX ORDER — OMEPRAZOLE 40 MG/1
1 CAPSULE, DELAYED RELEASE ORAL DAILY
Refills: 1 | COMMUNITY
Start: 2017-03-01 | End: 2017-04-29 | Stop reason: SDUPTHER

## 2017-03-27 RX ORDER — DORZOLAMIDE HYDROCHLORIDE AND TIMOLOL MALEATE 20; 5 MG/ML; MG/ML
SOLUTION/ DROPS OPHTHALMIC
Qty: 10 ML | Refills: 11 | Status: SHIPPED | OUTPATIENT
Start: 2017-03-27 | End: 2018-02-13 | Stop reason: SDUPTHER

## 2017-03-27 NOTE — MR AVS SNAPSHOT
[facility] - [facility]hthal[facility]  [address] [place] [address] Drive [address]  [place] [address]  Phone: [phone]                  Kenyetta Andersen   3/27/2017 8:00 AM   Office Visit    Description:  Female : 1935   Provider:  Maty Sawant MD   Department:  [place] [facility] - Ophthalmology           Reason for Visit     Glaucoma           Diagnoses this Visit        Comments    Chronic angle-closure glaucoma of both eyes, unspecified glaucoma stage    -  Primary     Chronic angle-closure glaucoma of both eyes, moderate stage         Anatomical narrow angle glaucoma of both eyes with borderline intraocular pressure         Nuclear sclerosis, right         Pseudophakia         Refractive error                To Do List           Future Appointments        Provider Department Dept Phone    2017 9:40 AM LAB, [facility] [phone]    2017 10:00 AM [facility] CT1 LIMIT 400 LBS [facility] [phone]    2017 10:00 AM Yessica Granado MD [facility] - Hematology Oncology [phone]      Goals (5 Years of Data)     None      Follow-Up and Disposition     Return in about 3 months (around 2017) for IOP check.       These Medications        Disp Refills Start End    dorzolamide-timolol 2-0.5% (COSOPT) 22.3-6.8 mg/mL ophthalmic solution 10 mL 11 3/27/2017     PLACE 1 DROP INTO BOTH EYES 2 (TWO) TIMES DAILY. TWICE A DAY    Pharmacy: [facility]/pharmacy #[id number] - [place] - [address] Ph #: [phone]         [facility]     [facility] -  Assistance  Registered nurses in the [facility] provide clinical advisement, health education, appointment booking, and other advisory services.  Call for this free service at [phone].             Medications           Message regarding Medications     Verify the changes and/or additions to your medication regime listed below are the  same as discussed with your clinician today.  If any of these changes or additions are incorrect, please notify your healthcare provider.             Verify that the below list of medications is an accurate representation of the medications you are currently taking.  If none reported, the list may be blank. If incorrect, please contact your healthcare provider. Carry this list with you in case of emergency.           Current Medications     alendronate (FOSAMAX) 35 MG tablet Take 1 tablet (35 mg total) by mouth every 7 days.    ascorbic acid (VITAMIN C) 1000 MG tablet Take 1,000 mg by mouth once daily.    b complex vitamins capsule Take 1 capsule by mouth once daily.    dorzolamide-timolol 2-0.5% (COSOPT) 22.3-6.8 mg/mL ophthalmic solution PLACE 1 DROP INTO BOTH EYES 2 (TWO) TIMES DAILY. TWICE A DAY    gabapentin (NEURONTIN) 300 MG capsule Take 2 capsules (600 mg total) by mouth 3 (three) times daily.    latanoprost 0.005 % ophthalmic solution PLACE 1 DROP INTO BOTH EYES EVERY EVENING.    mirtazapine (REMERON) 15 MG tablet TAKE ONE TABLET BY MOUTH AT BEDTIME TO HELP APPETITE    omeprazole (PRILOSEC) 40 MG capsule Take 40 mg by mouth once daily.    omeprazole (PRILOSEC) 40 MG capsule Take 1 capsule by mouth once daily.    VITAMIN D2 50,000 unit capsule TAKE ONE CAPSULE BY MOUTH ONCE A WEEK           Clinical Reference Information           Allergies as of 3/27/2017     Ace Inhibitors    Codeine      Immunizations Administered on Date of Encounter - 3/27/2017     None      Orders Placed During Today's Visit      Normal Orders This Visit    [place] Retina Tomography (HRT) - OU - Both Eyes     Recurring Lab Work Interval Expires    [place] [facility] Tom[facility] (HRT) - OU - Both Eyes  Every 12 Months until 3/24/2018 3/24/2018      Instructions      What Are Cataracts?  A clear lens in the eye focuses light. This lets the eye see images sharply. With age, the lens slowly becomes cloudy. The cloudy lens is a cataract. A  cataract scatters light and makes it hard for the eye to focus. Cataracts often form in both eyes. But one lens may cloud faster than the other.      The aging of your lens  Your lens may cloud so slowly that you don`t notice any vision changes at first. But as the cataract gets worse, the eye has a harder time focusing. In early stages, glasses may help you see better. As the lens gets more cloudy, your doctor may recommend surgery to restore your vision.  Date Last Reviewed: 6/14/2015  © 5730-0575 [employer]. [address] [place] [address]. All rights reserved. This information is not intended as a substitute for professional medical care. Always follow your healthcare professional's instructions.        Small-Incision Cataract Surgery: Removing the Old Lens  You may be surprised by how little time small-incision cataract surgery takes.  The procedure  Your doctor uses a microscope and tiny tools to make the incision and remove the old lens. A special tool breaks apart the old lens with sound waves (ultrasound) and then takes out the pieces. This process is called phacoemulsification. The natural membrane (capsule) that held your lens is left in place.  Incision size  A smaller incision (top) means a shorter recovery time for you. The location of the incision will vary.         Date Last Reviewed: 6/14/2015  © 8740-6769 [employer]. [address] [place] [address]. All rights reserved. This information is not intended as a substitute for professional medical care. Always follow your healthcare professional's instructions.        Small-Incision Cataract Surgery: Implanting the New Lens  Once your old lens has been removed, your doctor slips the new lens (IOL or intraocular lens) in through the incision. The IOL is then placed in the capsule that held your old lens. With the new lens in place, your doctor is ready to close the incision. Some incisions may be closed  with stitches. Others are self-sealing. That means they will stay closed on their own without stitches. The IOL does much the same thing as your old lens did before it became cloudy. It focuses light, letting you see sharp images and vivid colors. The IOL normally lasts a lifetime.  How small is an IOL?        Flexible tabs hold the IOL in place in the eye's natural capsule.     Date Last Reviewed: 6/14/2015 © 2000-2016 [employer]. [address] [place] [address], [place] [address]. All rights reserved. This information is not intended as a substitute for professional medical care. Always follow your healthcare professional's instructions.        Before Small-Incision Cataract Surgery    Like any operation, small-incision cataract surgery requires preparation.  Your health history  Your eye doctor will review your health history. Based on that, he or she may order tests or talk to your other health care providers. Tell your eye doctor which medicines you take. That includes over-the-counter medicine such as aspirin.  Your eye exam  You will have a complete eye exam. Your eye doctor or a technician will use devices that measure the length and curve of your eye. These measurements let your doctor select the proper new lens (IOL or intraocular lens) for you.  The night before surgery  Dont eat or drink anything after midnight the night before your surgery. This includes water, coffee, chewing gum, and mints. If you have been told to continue your daily medicine, take it only with small sips of water. Make sure you follow any other instructions your doctor gives you.  The day of surgery  Have someone you know drive you to and from the outpatient surgery clinic or hospital. Plan to be there for about 2 to 3 hours. When you arrive, youll sign a consent form if you havent done so already. This form explains the risks of surgery. Just before surgery, your doctor will give you medicine that will relax you and keep  you from feeling pain. You may sleep lightly.  Risks and complications  · Your doctor may have to shift from a small incision to a larger incision.  · There is a small chance of bleeding, infection, or swelling.   Date Last Reviewed: 6/14/2015 © 2000-2016 [employer]. [address] [place] [address]. All rights reserved. This information is not intended as a substitute for professional medical care. Always follow your healthcare professional's instructions.        After Small-Incision Cataract Surgery: The First 24 Hours    After surgery, youll rest in a recovery area for about an hour. Even though you may feel fine, you should take it easy. Your doctor will let you know what you should and shouldnt do once you get home. You may need to wear eye protection the first day. Also remember to take any eye drops or other medicine your doctor prescribes.  Back at home  Spend your first day relaxing at home. Watch TV, read, or talk to a friend. Be careful to:  · Not rub your eye  · Not lift anything that makes you strain  · Not drink alcohol within the first 24 hours  What to expect  Its normal for your eye to be bruised or bloodshot at first. You may also feel itching or mild discomfort. You may have some short-term (temporary) fluid discharge. These wont last long.   Getting back in action  You may be able to get back to much of your routine on the first day. But with some tasks, your doctor may ask you to wait. Always follow your doctor's recommendations.  Here are some general guidelines:  · You can shower again in 2 to 3 days.  · You can cook again in 2 to 3 days.  · You can drive again in 5 to 7 days.  · You can exercise again in 14 days.  When to call your doctor  Call your doctor if:  · Your pain is not relieved by over-the-counter medicine.  · You have nausea or vomiting.  · Your vision suddenly becomes worse.   Date Last Reviewed: 6/14/2015 © 2000-2016 [employer]. [facility] [place] [address]. All rights reserved. This information is not intended as a substitute for professional medical care. Always follow your healthcare professional's instructions.             Smoking Cessation     If you would like to quit smoking:   You may be eligible for free services if you are a [place] resident and started smoking cigarettes before September 1, 1988.  Call the Smoking Cessation Trust (SCT) toll free at (879) 182-4133 or (690) 317-8835.   Call 1-800-QUIT-NOW if you do not meet the above criteria.            Language Assistance Services     ATTENTION: Language assistance services are available, free of charge. Please call [phone].      ATENCIÓN: Si habla espdavid, tiene a corbin disposición servicios gratuitos de asistencia lingüística. Llame al [phone].     CHÚ Ý: N?u b?n nói Ti?ng Vi?t, có các d?ch v? h? tr? ngôn ng? mi?n phí dành cho b?n. G?i s? [phone].         [facility] complies with applicable Federal civil rights laws and does not discriminate on the basis of race, color, national origin, age, disability, or sex.

## 2017-03-27 NOTE — PROGRESS NOTES
HPI     Glaucoma    Additional comments: 4 month iop ck with HRT           Comments   DLS: 11/21/16    Pt states has been having trouble seeing with present glasses since last   visit.     Gtts: Cosopt BID, Latanoprost QHS OU    Agree with above. Used drops this am. Not much trouble seeing TV, but   trouble with small print at near, occasionally needs to use magnifying   glass. Does not go out at night, has not noticed significant trouble with   glare.        Last edited by Maty Sawant MD on 3/27/2017  9:13 AM.   (History)        ROS     Positive for: Neurological (neuropathy since cancer surgery),   Musculoskeletal (rotator cuff repair, history of broken shoulder blade;   bilateral knee replacement), Eyes (CE OS, ERM OS, glaucoma OU),   Respiratory (history of bronchitis, denies SOB), Heme/Lymph (history of   colon cancer; denies blood thinners)    Negative for: Endocrine (denies DM or thyroid problems), Cardiovascular   (denies HTN)    Last edited by Maty Sawant MD on 3/27/2017  9:11 AM.   (History)        Assessment /Plan     For exam results, see Encounter Report.    Chronic angle-closure glaucoma of both eyes, moderate stage  -     [place] Retina Tomography (HRT) - OU - Both Eyes; Standing    Chronic angle-closure glaucoma of both eyes, moderate stage   -     [facility] Retina Tomography (HRT) - OU - Both Eyes; Standing    Anatomical narrow angle glaucoma of both eyes with borderline intraocular pressure    Nuclear sclerosis, right    Pseudophakia - Left Eye    Refractive error    Other orders  -     dorzolamide-timolol 2-0.5% (COSOPT) 22.3-6.8 mg/mL ophthalmic solution; PLACE 1 DROP INTO BOTH EYES 2 (TWO) TIMES DAILY. TWICE A DAY  Dispense: 10 mL; Refill: 11            1. Anatomical narrow angle glaucoma   IOP up to around 20 OU today, previously in teens. Possible sight changes on clinical exam on DFE today 3/27/17. HRT possible slight progression OS> OD.     IOP had improved  with Latanoprost added QHS OU (6/2013) in addition to Cosopt BID OU. Last HVF appeared to have hemianopsia - not present on repeat test - appears stable with priors - stable inferior arc OD, OS inferior arc seems to come and go - not present 2014, but was present exam before that.  Patent LPI OD.  History of post-CE iritis OS. Continue Cosopt BID OU and latanoprost QHS OU. Consider brimonidine if IOP remains ~20's. Last Gonio remains narrow but PAS stable from 2011. Observe closely. RTC 3 months for IOP check and gonio.     Occasional pain OD - check gonio next visit.      hemianopsia Last HVF unreliable - extremely high false negatives, also possible right debra defect? Not present on repeat exam.   2. Nuclear sclerosis  Approaching visual significance but stable. Pt denies complaints. History of post-CE iritis OS. May also be contributing to narrow angles, also cortical changes on inferior lens may be interfering with reading. Recommend Durezol with CE OD.   3. Pseudophakia  OS - stable   4. Astigmatism with presbyopia MRx given - do not fill if wanting cataract surgery.

## 2017-03-27 NOTE — PATIENT INSTRUCTIONS
What Are Cataracts?  A clear lens in the eye focuses light. This lets the eye see images sharply. With age, the lens slowly becomes cloudy. The cloudy lens is a cataract. A cataract scatters light and makes it hard for the eye to focus. Cataracts often form in both eyes. But one lens may cloud faster than the other.      The aging of your lens  Your lens may cloud so slowly that you don`t notice any vision changes at first. But as the cataract gets worse, the eye has a harder time focusing. In early stages, glasses may help you see better. As the lens gets more cloudy, your doctor may recommend surgery to restore your vision.  Date Last Reviewed: 6/14/2015  © 1371-7636 [employer]. [address] [place] [address]. All rights reserved. This information is not intended as a substitute for professional medical care. Always follow your healthcare professional's instructions.        Small-Incision Cataract Surgery: Removing the Old Lens  You may be surprised by how little time small-incision cataract surgery takes.  The procedure  Your doctor uses a microscope and tiny tools to make the incision and remove the old lens. A special tool breaks apart the old lens with sound waves (ultrasound) and then takes out the pieces. This process is called phacoemulsification. The natural membrane (capsule) that held your lens is left in place.  Incision size  A smaller incision (top) means a shorter recovery time for you. The location of the incision will vary.         Date Last Reviewed: 6/14/2015  © 4698-9250 [employer]. [address] [place] [address]. All rights reserved. This information is not intended as a substitute for professional medical care. Always follow your healthcare professional's instructions.        Small-Incision Cataract Surgery: Implanting the New Lens  Once your old lens has been removed, your doctor slips the new lens (IOL or intraocular lens) in through the  incision. The IOL is then placed in the capsule that held your old lens. With the new lens in place, your doctor is ready to close the incision. Some incisions may be closed with stitches. Others are self-sealing. That means they will stay closed on their own without stitches. The IOL does much the same thing as your old lens did before it became cloudy. It focuses light, letting you see sharp images and vivid colors. The IOL normally lasts a lifetime.  How small is an IOL?        Flexible tabs hold the IOL in place in the eye's natural capsule.     Date Last Reviewed: 6/14/2015  © 0240-8803 [employer]. [address] [place] [address], [place] [address]. All rights reserved. This information is not intended as a substitute for professional medical care. Always follow your healthcare professional's instructions.        Before Small-Incision Cataract Surgery    Like any operation, small-incision cataract surgery requires preparation.  Your health history  Your eye doctor will review your health history. Based on that, he or she may order tests or talk to your other health care providers. Tell your eye doctor which medicines you take. That includes over-the-counter medicine such as aspirin.  Your eye exam  You will have a complete eye exam. Your eye doctor or a technician will use devices that measure the length and curve of your eye. These measurements let your doctor select the proper new lens (IOL or intraocular lens) for you.  The night before surgery  Dont eat or drink anything after midnight the night before your surgery. This includes water, coffee, chewing gum, and mints. If you have been told to continue your daily medicine, take it only with small sips of water. Make sure you follow any other instructions your doctor gives you.  The day of surgery  Have someone you know drive you to and from the outpatient surgery clinic or hospital. Plan to be there for about 2 to 3 hours. When you arrive, youll sign  a consent form if you havent done so already. This form explains the risks of surgery. Just before surgery, your doctor will give you medicine that will relax you and keep you from feeling pain. You may sleep lightly.  Risks and complications  · Your doctor may have to shift from a small incision to a larger incision.  · There is a small chance of bleeding, infection, or swelling.   Date Last Reviewed: 6/14/2015 © 2000-2016 [employer]. [address] [place] [address], [place] [address]. All rights reserved. This information is not intended as a substitute for professional medical care. Always follow your healthcare professional's instructions.        After Small-Incision Cataract Surgery: The First 24 Hours    After surgery, youll rest in a recovery area for about an hour. Even though you may feel fine, you should take it easy. Your doctor will let you know what you should and shouldnt do once you get home. You may need to wear eye protection the first day. Also remember to take any eye drops or other medicine your doctor prescribes.  Back at home  Spend your first day relaxing at home. Watch TV, read, or talk to a friend. Be careful to:  · Not rub your eye  · Not lift anything that makes you strain  · Not drink alcohol within the first 24 hours  What to expect  Its normal for your eye to be bruised or bloodshot at first. You may also feel itching or mild discomfort. You may have some short-term (temporary) fluid discharge. These wont last long.   Getting back in action  You may be able to get back to much of your routine on the first day. But with some tasks, your doctor may ask you to wait. Always follow your doctor's recommendations.  Here are some general guidelines:  · You can shower again in 2 to 3 days.  · You can cook again in 2 to 3 days.  · You can drive again in 5 to 7 days.  · You can exercise again in 14 days.  When to call your doctor  Call your doctor if:  · Your pain is not relieved by  over-the-counter medicine.  · You have nausea or vomiting.  · Your vision suddenly becomes worse.   Date Last Reviewed: 6/14/2015  © 8882-9590 [employer]. [address] [place] [address], [place] [address]. All rights reserved. This information is not intended as a substitute for professional medical care. Always follow your healthcare professional's instructions.

## 2017-04-12 ENCOUNTER — HOSPITAL ENCOUNTER (OUTPATIENT)
Dept: RADIOLOGY | Facility: HOSPITAL | Age: 82
Discharge: HOME | End: 2017-04-12
Attending: INTERNAL MEDICINE
Payer: MEDICARE

## 2017-04-12 DIAGNOSIS — E53.8 B12 DEFICIENCY: ICD-10-CM

## 2017-04-12 DIAGNOSIS — C18.7 MALIGNANT NEOPLASM OF SIGMOID COLON: ICD-10-CM

## 2017-04-12 PROCEDURE — 71260 CT THORAX DX C+: CPT | Mod: TC

## 2017-04-12 PROCEDURE — 71260 CT THORAX DX C+: CPT | Mod: 26,,, | Performed by: RADIOLOGY

## 2017-04-12 PROCEDURE — 25500020 PHARM REV CODE 255

## 2017-04-12 RX ADMIN — IOHEXOL 75 ML: 350 INJECTION, SOLUTION INTRAVENOUS at 11:04

## 2017-04-25 ENCOUNTER — OFFICE VISIT (OUTPATIENT)
Dept: OPTOMETRY | Facility: CLINIC | Age: 82
End: 2017-04-25
Payer: MEDICARE

## 2017-04-25 DIAGNOSIS — H11.31 SUBCONJUNCTIVAL HEMORRHAGE, RIGHT: ICD-10-CM

## 2017-04-25 DIAGNOSIS — H57.89 EYE IRRITATION: Primary | ICD-10-CM

## 2017-04-25 PROCEDURE — 99999 PR PBB SHADOW E&M-EST. PATIENT-LVL I: CPT | Mod: PBBFAC,,, | Performed by: OPTOMETRIST

## 2017-04-25 PROCEDURE — 92012 INTRM OPH EXAM EST PATIENT: CPT | Mod: S$GLB,,, | Performed by: OPTOMETRIST

## 2017-04-25 NOTE — PROGRESS NOTES
HPI     CC: Pt here today for red irritated right eye x 3 days. Pt states woke up   this morning and eye feels scratched. Pt denies crust. Pt states pain   comes and go. Pain only last a second.     Gtts: Cosopt BID, Latanoprost QHS OU  (-) pain / (+) discomfort  (-) headaches  (-) diplopia   (-) flashes / (-) floaters          Last edited by Higinio Gross on 4/25/2017 10:12 AM.     ROS     Positive for: Eyes    Negative for: Constitutional, Gastrointestinal, Neurological, Skin,   Genitourinary, Musculoskeletal, HENT, Endocrine, Cardiovascular,   Respiratory, Psychiatric, Allergic/Imm, Heme/Lymph    Last edited by Dave Contreras, OD on 4/25/2017 11:57 AM. (History)        Assessment /Plan     For exam results, see Encounter Report.    Eye irritation    Subconjunctival hemorrhage, right      Eye irritation OD with subconj heme. Negative foreign body on upper and lower lid eversion. Discussed findings and treatment options. Start otc refresh gel drop four times daily OD, caution transient blurring of vision with gel drops. Continue glc medication as directed by Dr. Sawant. Continue f/u as directed by Dr. Sawant. Return with me if any worsening of symptoms.

## 2017-04-26 ENCOUNTER — OFFICE VISIT (OUTPATIENT)
Dept: HEMATOLOGY/ONCOLOGY | Facility: CLINIC | Age: 82
End: 2017-04-26
Payer: MEDICARE

## 2017-04-26 VITALS
RESPIRATION RATE: 18 BRPM | SYSTOLIC BLOOD PRESSURE: 131 MMHG | TEMPERATURE: 99 F | BODY MASS INDEX: 28.75 KG/M2 | HEIGHT: 63 IN | HEART RATE: 79 BPM | WEIGHT: 162.25 LBS | DIASTOLIC BLOOD PRESSURE: 59 MMHG

## 2017-04-26 DIAGNOSIS — D75.89 MACROCYTOSIS: ICD-10-CM

## 2017-04-26 DIAGNOSIS — E53.8 B12 DEFICIENCY: ICD-10-CM

## 2017-04-26 DIAGNOSIS — E78.1 HYPERTRIGLYCERIDEMIA: ICD-10-CM

## 2017-04-26 DIAGNOSIS — C18.2 MALIGNANT NEOPLASM OF ASCENDING COLON: Primary | ICD-10-CM

## 2017-04-26 PROBLEM — C18.9 COLON CANCER: Status: ACTIVE | Noted: 2017-04-26

## 2017-04-26 PROCEDURE — 99214 OFFICE O/P EST MOD 30 MIN: CPT | Mod: S$GLB,,, | Performed by: INTERNAL MEDICINE

## 2017-04-26 PROCEDURE — 3078F DIAST BP <80 MM HG: CPT | Mod: S$GLB,,, | Performed by: INTERNAL MEDICINE

## 2017-04-26 PROCEDURE — 1159F MED LIST DOCD IN RCRD: CPT | Mod: S$GLB,,, | Performed by: INTERNAL MEDICINE

## 2017-04-26 PROCEDURE — 1160F RVW MEDS BY RX/DR IN RCRD: CPT | Mod: S$GLB,,, | Performed by: INTERNAL MEDICINE

## 2017-04-26 PROCEDURE — 99499 UNLISTED E&M SERVICE: CPT | Mod: S$GLB,,, | Performed by: INTERNAL MEDICINE

## 2017-04-26 PROCEDURE — 99999 PR PBB SHADOW E&M-EST. PATIENT-LVL III: CPT | Mod: PBBFAC,,, | Performed by: INTERNAL MEDICINE

## 2017-04-26 PROCEDURE — 3075F SYST BP GE 130 - 139MM HG: CPT | Mod: S$GLB,,, | Performed by: INTERNAL MEDICINE

## 2017-04-26 NOTE — MR AVS SNAPSHOT
[facility]  [address], [address]  [place] [address]  Phone: [phone]                  Kenyetta ALVA Vilonia   2017 11:00 AM   Office Visit    Description:  Female : 1935   Provider:  Yessica Granado MD   Department:  [facility]           Diagnoses this Visit        Comments    Malignant neoplasm of ascending colon    -  Primary            To Do List           Future Appointments        Provider Department Dept Phone    2017 10:30 AM Maty Sawant MD [facility] - Ophthalmology [phone]      Goals (5 Years of Data)     None      [facility]     [facility] On [facility] Nurse Care Line -  Assistance  Unless otherwise directed by your provider, please contact [facility], our nurse care line that is available for  assistance.     Registered nurses in the [facility] provide: appointment scheduling, clinical advisement, health education, and other advisory services.  Call: [phone] (toll free)               Medications           Message regarding Medications     Verify the changes and/or additions to your medication regime listed below are the same as discussed with your clinician today.  If any of these changes or additions are incorrect, please notify your healthcare provider.             Verify that the below list of medications is an accurate representation of the medications you are currently taking.  If none reported, the list may be blank. If incorrect, please contact your healthcare provider. Carry this list with you in case of emergency.           Current Medications     alendronate (FOSAMAX) 35 MG tablet Take 1 tablet (35 mg total) by mouth every 7 days.    ascorbic acid (VITAMIN C) 1000 MG tablet Take 1,000 mg by mouth once daily.    b complex vitamins capsule Take 1 capsule by mouth once daily.    dorzolamide-timolol 2-0.5% (COSOPT) 22.3-6.8 mg/mL ophthalmic  "solution PLACE 1 DROP INTO BOTH EYES 2 (TWO) TIMES DAILY. TWICE A DAY    gabapentin (NEURONTIN) 300 MG capsule Take 2 capsules (600 mg total) by mouth 3 (three) times daily.    latanoprost 0.005 % ophthalmic solution PLACE 1 DROP INTO BOTH EYES EVERY EVENING.    mirtazapine (REMERON) 15 MG tablet TAKE ONE TABLET BY MOUTH AT BEDTIME TO HELP APPETITE    omeprazole (PRILOSEC) 40 MG capsule Take 40 mg by mouth once daily.    omeprazole (PRILOSEC) 40 MG capsule Take 1 capsule by mouth once daily.    VITAMIN D2 50,000 unit capsule TAKE ONE CAPSULE BY MOUTH ONCE A WEEK           Clinical Reference Information           Your Vitals Were     BP Pulse Temp Resp Height Weight    131/59 79 98.8 °F (37.1 °C) 18 5' 3" (1.6 m) 73.6 kg (162 lb 4.1 oz)    BMI                28.74 kg/m2          Blood Pressure          Most Recent Value    BP  (!)  131/59      Allergies as of 4/26/2017     Ace Inhibitors    Codeine      Immunizations Administered on Date of Encounter - 4/26/2017     None      Orders Placed During Today's Visit     Future Labs/Procedures Expected by Expires    CBC w/ DIFF  4/26/2017 6/25/2018    CEA  4/26/2017 6/25/2018    CMP  4/26/2017 6/25/2018      [facility] Sign-Up     Activating your [facility] account is as easy as 1-2-3!     1) Visit [email or web address], select Sign Up Now, enter this activation code and your date of birth, then select Next.  Activation code not generated  Current Patient Portal Status: Account disabled      2) Create a username and password to use when you visit [facility] in the future and select a security question in case you lose your password and select Next.    3) Enter your e-mail address and click Sign Up!    Additional Information  If you have questions, please e-mail [email or web address] or call [phone] to talk to our [facility] staff. Remember, [facility] is NOT to be used for urgent needs. For medical emergencies, dial 911.         Smoking Cessation     If you would like to quit " smoking:   You may be eligible for free services if you are a [place] resident and started smoking cigarettes before September 1, 1988.  Call the Smoking Cessation Trust ([employer]) toll free at (660) 354-0374 or (763) 129-1536.   Call 1-800-QUIT-NOW if you do not meet the above criteria.   Contact us via email: [email or web address]   View our website for more information: [email or web address]        Language Assistance Services     ATTENTION: Language assistance services are available, free of charge. Please call [phone].      ATENCIÓN: Si habla español, tiene a corbin disposición servicios gratuitos de asistencia lingüística. Llame al [phone].     CHÚ Ý: N?u b?n nói Ti?ng Vi?t, có các d?ch v? h? tr? ngôn ng? mi?n phí dành cho b?n. G?i s? [phone].         [facility] complies with applicable Federal civil rights laws and does not discriminate on the basis of race, color, national origin, age, disability, or sex.

## 2017-04-26 NOTE — PROGRESS NOTES
Subjective:       Patient ID: Kenyetta Andersen is a 82 y.o. female.    Chief Complaint: f/u  HPI:   Kenyetta Andersen, the patient known to me, comes for followup. The patient has    known macrocytosis. No other issues. The patient was treated with colorectal    carcinoma, FOLFOX therapy for stage III. She comes in with lab evaluations.    Scans have been postponed to once a year because of 2009 diagnosis. Voices no    Complaints.she had macrocytosis, and B!2 was in the 380 range , she is more compliant using b12 now has htn and is under good control. pcp is Dr. pedraza    REVIEW OF SYSTEMS:     CONSTITUTIONAL: The patient denies any weight change. There is no apparent    change in appetite, fever, night sweats, headaches, fatigue, dizziness, or    weakness.      SKIN: Denies rash, issues with nails, non-healing sores, bleeding, blotching    skin or abnormal bruising. Denies new moles or changes to existing moles.      BREASTS: There is no swelling around breasts or nipple discharge.    EYES: Denies eye pain, blurred vision, swelling, redness or discharge.      ENT AND MOUTH: Denies runny nose, stuffiness, sinus trouble or sores. Denies    nosebleeds. Denies, hoarseness, change in voice or swelling in front of the    neck.      CARDIOVASCULAR: Denies chest pain, discomfort or palpitations. Denies neck    swelling or episodes of passing out.      RESPIRATORY: Denies cough, sputum production, blood in sputum, and denies    shortness of breath.      GI: Denies trouble swallowing, indigestion, heartburn, abdominal pain, nausea,    vomiting, diarrhea, altered bowel habits, blood in stool, discoloration of    stools, change in nature of stool, bloating, increased abdominal girth.      GENITOURINARY: No discharge. No pelvic pain or lumps. No rash around groin or  lesions. No urinary frequency, hesitation, painful urination or blood in    urine. Denies incontinence. No problems with intercourse.      MUSCULOSKELETAL: Denies  neck or back pain. Denies weakness in arms or legs,    joint problems or distended inflamed veins in legs. Denies swelling or abnormal  glands.      NEUROLOGICAL: Denies tingling, numbness, altered mentation changes to nerve    function in the face, weakness to one or both of the body. Denies changes to    gait and denies multiple falls or accidents.      PHYSICAL EXAM:     Vitals:    04/26/17 1257   BP: (!) 131/59   Pulse: 79   Resp: 18   Temp: 98.8 °F (37.1 °C)       GENERAL: Comfortable looking patient. Patient is in no distress.  Awake, alert and oriented to time, person and place.  No anxiety, or agitation.      HEENT: Normal conjunctivae and eyelids. WNL.  PERRLA 3 to 4 mm. No icterus, no pallor, no congestion, and no discharge noted.     NECK:  Supple. Trachea is central.  No crepitus.  No JVD or masses.    RESPIRATORY:  No intercostal retractions.  No dullness to percussion.  Chest is clear to auscultation.  No rales, rhonchi or wheezes.  No crepitus.  Good air entry bilaterally.    CARDIOVASCULAR:  S1 and S2 are normally heard without murmurs or gallops.  All peripheral pulses are present.    ABDOMEN:  Normal abdomen.  No hepatosplenomegaly.  No free fluid.  Bowel sounds are present.  No hernia noted. No masses.  No rebound or tenderness.  No guarding or rigidity.  Umbilicus is midline.    LYMPHATICS:  No axillary, cervical, supraclavicular, submental, or inguinal lymphadenopathy.    SKIN/MUSCULOSKELETAL:  There is no evidence of excoriation marks or ecchmosis.  No rashes.  No cyanosis.  No clubbing.  No joint or skeletal deformities noted.  Normal range of motion.    NEUROLOGIC:  Higher functions are appropriate.  No cranial nerve deficits.  Normal ciara.  Normal strength.  Motor and sensory functions are normal.  Deep tendon reflexes are normal.    GENITAL/RECTAL:  Exams are deferred.      Laboratory:     CBC:  Lab Results   Component Value Date    WBC 7.60 04/12/2017    RBC 4.56 04/12/2017    HGB 14.1  04/12/2017    HCT 43.3 04/12/2017    MCV 95 04/12/2017    MCH 30.9 04/12/2017    MCHC 32.6 04/12/2017    RDW 12.5 04/12/2017     04/12/2017    MPV 8.2 (L) 04/12/2017    GRAN 4.4 04/12/2017    GRAN 57.8 04/12/2017    LYMPH 2.5 04/12/2017    LYMPH 32.4 04/12/2017    MONO 0.6 04/12/2017    MONO 8.0 04/12/2017    EOS 0.1 04/12/2017    BASO 0.10 04/12/2017    EOSINOPHIL 1.1 04/12/2017    BASOPHIL 0.7 04/12/2017       BMP: BMP  Lab Results   Component Value Date     04/12/2017    K 3.9 04/12/2017     04/12/2017    CO2 27 04/12/2017    BUN 12 04/12/2017    CREATININE 1.0 04/12/2017    CALCIUM 9.7 04/12/2017    ANIONGAP 8 04/12/2017    ESTGFRAFRICA >60 04/12/2017    EGFRNONAA 53 (A) 04/12/2017       LFT:   Lab Results   Component Value Date    ALT 25 04/12/2017    AST 25 04/12/2017    ALKPHOS 79 04/12/2017    BILITOT 0.3 04/12/2017     Lab Results   Component Value Date    XEEEKBHJ90 >2000 (H) 04/12/2017     Most recent colonoscopy 3/2017  Assessment/Plan:     colon ca stage III dx 2009. Ding well will repeat scans if kidney fn improves in 10/2017   small 4mm lung nodule has disppeared  1. Cont supplements daily  2. Scans doing well cont f/u small area in lung that needs f/u  4. Macrocytosis resolved with b12 supplements  Rpt chest ct cbc, b12, cmp and rtc 6 months   cont htn mx with Dr. Martinez

## 2017-04-29 ENCOUNTER — OFFICE VISIT (OUTPATIENT)
Dept: FAMILY MEDICINE | Facility: CLINIC | Age: 82
End: 2017-04-29
Payer: MEDICARE

## 2017-04-29 VITALS
WEIGHT: 162.25 LBS | SYSTOLIC BLOOD PRESSURE: 140 MMHG | BODY MASS INDEX: 28.75 KG/M2 | TEMPERATURE: 98 F | HEART RATE: 74 BPM | OXYGEN SATURATION: 95 % | RESPIRATION RATE: 16 BRPM | DIASTOLIC BLOOD PRESSURE: 79 MMHG | HEIGHT: 63 IN

## 2017-04-29 DIAGNOSIS — K21.00 GERD WITH ESOPHAGITIS: Primary | ICD-10-CM

## 2017-04-29 PROCEDURE — 1126F AMNT PAIN NOTED NONE PRSNT: CPT | Mod: S$GLB,,, | Performed by: FAMILY MEDICINE

## 2017-04-29 PROCEDURE — 99213 OFFICE O/P EST LOW 20 MIN: CPT | Mod: S$GLB,,, | Performed by: FAMILY MEDICINE

## 2017-04-29 PROCEDURE — 3078F DIAST BP <80 MM HG: CPT | Mod: S$GLB,,, | Performed by: FAMILY MEDICINE

## 2017-04-29 PROCEDURE — 1159F MED LIST DOCD IN RCRD: CPT | Mod: S$GLB,,, | Performed by: FAMILY MEDICINE

## 2017-04-29 PROCEDURE — 99999 PR PBB SHADOW E&M-EST. PATIENT-LVL III: CPT | Mod: PBBFAC,,, | Performed by: FAMILY MEDICINE

## 2017-04-29 PROCEDURE — 99499 UNLISTED E&M SERVICE: CPT | Mod: S$GLB,,, | Performed by: FAMILY MEDICINE

## 2017-04-29 PROCEDURE — 3077F SYST BP >= 140 MM HG: CPT | Mod: S$GLB,,, | Performed by: FAMILY MEDICINE

## 2017-04-29 PROCEDURE — 1160F RVW MEDS BY RX/DR IN RCRD: CPT | Mod: S$GLB,,, | Performed by: FAMILY MEDICINE

## 2017-04-29 RX ORDER — SUCRALFATE 1 G/1
1 TABLET ORAL
Qty: 60 TABLET | Refills: 1 | Status: SHIPPED | OUTPATIENT
Start: 2017-04-29 | End: 2017-06-02

## 2017-04-29 RX ORDER — PANTOPRAZOLE SODIUM 40 MG/1
40 TABLET, DELAYED RELEASE ORAL DAILY
Qty: 30 TABLET | Refills: 2 | Status: SHIPPED | OUTPATIENT
Start: 2017-04-29 | End: 2017-06-02 | Stop reason: SDUPTHER

## 2017-04-29 NOTE — MR AVS SNAPSHOT
[facility]  [address]  [place] [address]  Phone: [phone]  Fax: [phone]                  Kenyetta Andersen   2017 8:00 AM   Office Visit    Description:  Female : 1935   Provider:  Boom Duran Jr., MD   Department:  [facility]           Reason for Visit     Cough     Gastroesophageal Reflux           Diagnoses this Visit        Comments    GERD with esophagitis    -  Primary            To Do List           Future Appointments        Provider Department Dept Phone    5/15/2017 7:20 AM TEOFILO [facility] [phone]    2017 10:30 AM Maty Sawant MD [place] [id number] - Ophthalmology [phone]      Goals (5 Years of Data)     None      Follow-Up and Disposition     Return in about 2 weeks (around 2017).       These Medications        Disp Refills Start End    pantoprazole (PROTONIX) 40 MG tablet 30 tablet 2 2017    Take 1 tablet (40 mg total) by mouth once daily. - Oral    Pharmacy: [facility]/pharmacy #[id number] - [place], [place] - [address] Ph #: [phone]       sucralfate (CARAFATE) 1 gram tablet 60 tablet 1 2017     Take 1 tablet (1 g total) by mouth 4 (four) times daily before meals and nightly. - Oral    Pharmacy: [facility]/pharmacy #[id number] - [place], [place] - [address] Ph #: [phone]         [facility]     [facility] Nurse Care Line -  Assistance  Unless otherwise directed by your provider, please contact [facility], our nurse care line that is available for  assistance.     Registered nurses in the [facility] provide: appointment scheduling, clinical advisement, health education, and other advisory services.  Call: [phone] (toll free)               Medications           Message regarding Medications     Verify the changes and/or additions to your medication regime listed below are the same as discussed with your clinician today.   If any of these changes or additions are incorrect, please notify your healthcare provider.        START taking these NEW medications        Refills    pantoprazole (PROTONIX) 40 MG tablet 2    Sig: Take 1 tablet (40 mg total) by mouth once daily.    Class: Normal    Route: Oral    sucralfate (CARAFATE) 1 gram tablet 1    Sig: Take 1 tablet (1 g total) by mouth 4 (four) times daily before meals and nightly.    Class: Normal    Route: Oral      STOP taking these medications     omeprazole (PRILOSEC) 40 MG capsule Take 1 capsule by mouth once daily.    omeprazole (PRILOSEC) 40 MG capsule Take 40 mg by mouth once daily.           Verify that the below list of medications is an accurate representation of the medications you are currently taking.  If none reported, the list may be blank. If incorrect, please contact your healthcare provider. Carry this list with you in case of emergency.           Current Medications     alendronate (FOSAMAX) 35 MG tablet Take 1 tablet (35 mg total) by mouth every 7 days.    ascorbic acid (VITAMIN C) 1000 MG tablet Take 1,000 mg by mouth once daily.    b complex vitamins capsule Take 1 capsule by mouth once daily.    CARBOXYMETHYLCELLULOSE SODIUM (REFRESH LIQUIGEL OPHT) Apply to eye as needed.    dorzolamide-timolol 2-0.5% (COSOPT) 22.3-6.8 mg/mL ophthalmic solution PLACE 1 DROP INTO BOTH EYES 2 (TWO) TIMES DAILY. TWICE A DAY    gabapentin (NEURONTIN) 300 MG capsule Take 2 capsules (600 mg total) by mouth 3 (three) times daily.    latanoprost 0.005 % ophthalmic solution PLACE 1 DROP INTO BOTH EYES EVERY EVENING.    mirtazapine (REMERON) 15 MG tablet TAKE ONE TABLET BY MOUTH AT BEDTIME TO HELP APPETITE    VITAMIN D2 50,000 unit capsule TAKE ONE CAPSULE BY MOUTH ONCE A WEEK    pantoprazole (PROTONIX) 40 MG tablet Take 1 tablet (40 mg total) by mouth once daily.    sucralfate (CARAFATE) 1 gram tablet Take 1 tablet (1 g total) by mouth 4 (four) times daily before meals and nightly.          "  Clinical Reference Information           Your Vitals Were     BP Pulse Temp Resp Height Weight    140/79 (BP Location: Right arm, Patient Position: Sitting, BP Method: Automatic) 74 98.3 °F (36.8 °C) (Oral) 16 5' 3" (1.6 m) 73.6 kg (162 lb 4.1 oz)    SpO2 BMI             95% 28.74 kg/m2         Blood Pressure          Most Recent Value    BP  (!)  140/79      Allergies as of 4/29/2017     Ace Inhibitors    Codeine      Immunizations Administered on Date of Encounter - 4/29/2017     None      [facility] Sign-Up     Activating your [facility] account is as easy as 1-2-3!     1) Visit [email or web address], select Sign Up Now, enter this activation code and your date of birth, then select Next.  Activation code not generated  Current Patient Portal Status: Account disabled      2) Create a username and password to use when you visit [facility] in the future and select a security question in case you lose your password and select Next.    3) Enter your e-mail address and click Sign Up!    Additional Information  If you have questions, please e-mail [email or web address] or call [phone] to talk to our [facility] staff. Remember, [facility] is NOT to be used for urgent needs. For medical emergencies, dial 911.         Smoking Cessation     If you would like to quit smoking:   You may be eligible for free services if you are a [place] resident and started smoking cigarettes before September 1, 1988.  Call the Smoking Cessation Trust ([facility]) toll free at (105) 384-5094 or (247) 606-5094.   Call -350-QUIT-NOW if you do not meet the above criteria.   Contact us via email: [email or web address]   View our website for more information: [email or web address]        Language Assistance Services     ATTENTION: Language assistance services are available, free of charge. Please call [phone].      ATENCIÓN: Si habla español, tiene a corbin disposición servicios gratuitos de asistencia lingüística. Llame al " [phone].     CASSY Ý: N?u b?n nói Ti?ng Vi?t, có các d?ch v? h? tr? ngôn ng? mi?n phí dành cho b?n. G?i s? [phone].         [facility] complies with applicable Federal civil rights laws and does not discriminate on the basis of race, color, national origin, age, disability, or sex.

## 2017-04-29 NOTE — PROGRESS NOTES
Subjective:       Patient ID: Kenyetta Andersen is a 82 y.o. female.    Chief Complaint: Cough and Gastroesophageal Reflux (states medication is no longer working)    HPI Comments: Patient presents here with complaint of increased problems with reflux and a cough.  She states that she has been having some increased problems with indigestion and reflux for the last month.  She has been compliant with her dose of omeprazole 40 mg daily, but this does not completely control.  It helps during the day but she has increased indigestion during the night and in the morning when she wakes up.  She will take Tums and eventually this will help.  She is also having a cough which she is not sure if this is related to the reflux or not.  She denies any fever or chills and the cough is productive only of clear sputum.  She denies any shortness of breath.  She also denies any nausea, vomiting, abdominal pain, or blood in her stool.  She is not taking any anti-inflammatories but she is presently on alendronate for her osteoporosis.  She has been on this for some time.    Review of Systems   HENT: Positive for congestion and postnasal drip. Negative for sinus pressure and sore throat.    Respiratory: Positive for cough. Negative for shortness of breath and wheezing.    Cardiovascular: Negative for chest pain and palpitations.   Gastrointestinal: Positive for abdominal pain. Negative for blood in stool, diarrhea, nausea and vomiting.       Objective:      Physical Exam   HENT:   Head: Normocephalic.   Right Ear: External ear normal.   Left Ear: External ear normal.   Nose: Nose normal.   Mouth/Throat: Oropharynx is clear and moist.   Neck: Normal range of motion. Neck supple. No thyromegaly present.   Cardiovascular: Normal rate, regular rhythm and normal heart sounds.    No murmur heard.  Pulmonary/Chest: Effort normal and breath sounds normal. She has no wheezes. She has no rales.   Abdominal: Soft. There is tenderness (Epigastric  tenderness). There is no rebound and no guarding.   Lymphadenopathy:     She has no cervical adenopathy.   Vitals reviewed.      Assessment:       1. GERD with esophagitis        Plan:       1.  Discontinue omeprazole  2.  Pantoprazole 40 mg daily  3.  Sucralfate 1 g before meals and at bedtime for the next 2 weeks  4.  Hold alendronate for the time being as this may be aggravating the reflux  5.  Follow-up with Dr. Arreola or NANCY in 2 weeks for reevaluation

## 2017-05-12 ENCOUNTER — DOCUMENTATION ONLY (OUTPATIENT)
Dept: FAMILY MEDICINE | Facility: CLINIC | Age: 82
End: 2017-05-12

## 2017-05-12 NOTE — PROGRESS NOTES
Pre-Visit Chart Review  For Appointment Scheduled on 05/15/2017      There are no preventive care reminders to display for this patient.

## 2017-05-15 ENCOUNTER — OFFICE VISIT (OUTPATIENT)
Dept: FAMILY MEDICINE | Facility: CLINIC | Age: 82
End: 2017-05-15
Payer: MEDICARE

## 2017-05-15 VITALS
WEIGHT: 164.69 LBS | DIASTOLIC BLOOD PRESSURE: 69 MMHG | HEART RATE: 76 BPM | BODY MASS INDEX: 29.18 KG/M2 | HEIGHT: 63 IN | TEMPERATURE: 98 F | SYSTOLIC BLOOD PRESSURE: 138 MMHG

## 2017-05-15 DIAGNOSIS — K21.00 GERD WITH ESOPHAGITIS: Primary | ICD-10-CM

## 2017-05-15 PROBLEM — C18.9 COLON CANCER: Status: RESOLVED | Noted: 2017-04-26 | Resolved: 2017-05-15

## 2017-05-15 PROBLEM — K63.5 COLON POLYPS: Status: RESOLVED | Noted: 2017-03-14 | Resolved: 2017-05-15

## 2017-05-15 PROCEDURE — 99999 PR PBB SHADOW E&M-EST. PATIENT-LVL IV: CPT | Mod: PBBFAC,,, | Performed by: PHYSICIAN ASSISTANT

## 2017-05-15 PROCEDURE — 1159F MED LIST DOCD IN RCRD: CPT | Mod: S$GLB,,, | Performed by: PHYSICIAN ASSISTANT

## 2017-05-15 PROCEDURE — 3078F DIAST BP <80 MM HG: CPT | Mod: S$GLB,,, | Performed by: PHYSICIAN ASSISTANT

## 2017-05-15 PROCEDURE — 3075F SYST BP GE 130 - 139MM HG: CPT | Mod: S$GLB,,, | Performed by: PHYSICIAN ASSISTANT

## 2017-05-15 PROCEDURE — 99499 UNLISTED E&M SERVICE: CPT | Mod: S$GLB,,, | Performed by: PHYSICIAN ASSISTANT

## 2017-05-15 PROCEDURE — 1160F RVW MEDS BY RX/DR IN RCRD: CPT | Mod: S$GLB,,, | Performed by: PHYSICIAN ASSISTANT

## 2017-05-15 PROCEDURE — 99213 OFFICE O/P EST LOW 20 MIN: CPT | Mod: S$GLB,,, | Performed by: PHYSICIAN ASSISTANT

## 2017-05-15 PROCEDURE — 1126F AMNT PAIN NOTED NONE PRSNT: CPT | Mod: S$GLB,,, | Performed by: PHYSICIAN ASSISTANT

## 2017-05-15 NOTE — PROGRESS NOTES
Subjective:       Patient ID: Kenyetta Andersen is a 82 y.o. female.    Chief Complaint: Gastroesophageal Reflux    Gastroesophageal Reflux   She complains of abdominal pain, belching, dysphagia, heartburn and nausea. She reports no chest pain, no choking, no coughing, no early satiety, no globus sensation, no hoarse voice, no sore throat, no stridor, no tooth decay, no water brash or no wheezing. This is a chronic problem. The current episode started more than 1 year ago. The problem occurs constantly. The problem has been gradually worsening. The heartburn duration is more than one hour. The heartburn is located in the abdomen and substernum. The heartburn is of moderate intensity. The heartburn wakes her from sleep. The heartburn does not limit her activity. The heartburn doesn't change with position. The symptoms are aggravated by certain foods, lying down and tight clothes. Pertinent negatives include no anemia, fatigue, melena, muscle weakness, orthopnea or weight loss. Risk factors include smoking/tobacco exposure. She has tried a PPI, a diet change, an antacid, a histamine-2 antagonist and medication cessation for the symptoms. The treatment provided mild relief.     Review of Systems   Constitutional: Negative for activity change, appetite change, fatigue, fever, unexpected weight change and weight loss.   HENT: Negative for congestion, dental problem, hearing loss, hoarse voice, postnasal drip, rhinorrhea, sinus pressure, sore throat and trouble swallowing.    Eyes: Negative for photophobia, discharge and visual disturbance.   Respiratory: Negative for cough, choking, chest tightness, shortness of breath and wheezing.    Cardiovascular: Negative for chest pain, palpitations and leg swelling.   Gastrointestinal: Positive for abdominal pain, dysphagia, heartburn and nausea. Negative for blood in stool, constipation, diarrhea, melena and vomiting.   Genitourinary: Negative for difficulty urinating,  dyspareunia, dysuria, hematuria, menstrual problem, pelvic pain, vaginal bleeding, vaginal discharge and vaginal pain.   Musculoskeletal: Negative for arthralgias, back pain, joint swelling, muscle weakness and neck pain.   Skin: Negative for color change and rash.   Neurological: Negative for dizziness, seizures, weakness, light-headedness, numbness and headaches.   Hematological: Does not bruise/bleed easily.   Psychiatric/Behavioral: Negative for sleep disturbance and suicidal ideas. The patient is not nervous/anxious.        Objective:      Physical Exam   Constitutional: She is oriented to person, place, and time. She appears well-developed and well-nourished.   HENT:   Head: Normocephalic and atraumatic.   Eyes: Conjunctivae are normal. Pupils are equal, round, and reactive to light.   Neck: Normal range of motion. Neck supple. No JVD present.   Cardiovascular: Normal rate and regular rhythm.  Exam reveals no gallop and no friction rub.    No murmur heard.  Pulmonary/Chest: Effort normal and breath sounds normal. No respiratory distress. She has no wheezes. She has no rales.   Abdominal: Soft. Bowel sounds are normal. She exhibits no distension. There is tenderness in the epigastric area.   Neurological: She is alert and oriented to person, place, and time.   Skin: Skin is warm and dry.   Psychiatric: She has a normal mood and affect. Her behavior is normal. Judgment and thought content normal.       Assessment:       1. GERD with esophagitis        Plan:       Kenyetta was seen today for gastroesophageal reflux.    Diagnoses and all orders for this visit:    GERD with esophagitis  -     Ambulatory referral to Gastroenterology    Continue current meds, patient did not need refills

## 2017-05-15 NOTE — MR AVS SNAPSHOT
[facility]  [address]  [place] [address]  Phone: [phone]  Fax: [phone]                  Kenyetta Andersen   5/15/2017 7:20 AM   Office Visit    Description:  Female : 1935   Provider:  TEOFILO Euceda   Department:  [facility]           Reason for Visit     Gastroesophageal Reflux           Diagnoses this Visit        Comments    GERD with esophagitis    -  Primary            To Do List           Future Appointments        Provider Department Dept Phone    2017 8:30 AM Killian Guerrier MD [facility] - Gastroenterology [phone]    2017 10:30 AM Maty Sawant MD [place] [address] - Ophthalmology [phone]    10/20/2017 9:00 AM [facility] [phone]    10/25/2017 9:40 AM Yessica Granado MD [facility] - Hematology Oncology [phone]      Goals (5 Years of Data)     None      [facility]     [facility] Nurse Care Line -  Assistance  Unless otherwise directed by your provider, please contact [facility], our nurse care line that is available for  assistance.     Registered nurses in the [facility] provide: appointment scheduling, clinical advisement, health education, and other advisory services.  Call: [phone] (toll free)               Medications           Message regarding Medications     Verify the changes and/or additions to your medication regime listed below are the same as discussed with your clinician today.  If any of these changes or additions are incorrect, please notify your healthcare provider.             Verify that the below list of medications is an accurate representation of the medications you are currently taking.  If none reported, the list may be blank. If incorrect, please contact your healthcare provider. Carry this list with you in case of emergency.           Current Medications     alendronate (FOSAMAX)  "35 MG tablet Take 1 tablet (35 mg total) by mouth every 7 days.    ascorbic acid (VITAMIN C) 1000 MG tablet Take 1,000 mg by mouth once daily.    b complex vitamins capsule Take 1 capsule by mouth once daily.    CARBOXYMETHYLCELLULOSE SODIUM (REFRESH LIQUIGEL OPHT) Apply to eye as needed.    dorzolamide-timolol 2-0.5% (COSOPT) 22.3-6.8 mg/mL ophthalmic solution PLACE 1 DROP INTO BOTH EYES 2 (TWO) TIMES DAILY. TWICE A DAY    gabapentin (NEURONTIN) 300 MG capsule Take 2 capsules (600 mg total) by mouth 3 (three) times daily.    latanoprost 0.005 % ophthalmic solution PLACE 1 DROP INTO BOTH EYES EVERY EVENING.    mirtazapine (REMERON) 15 MG tablet TAKE ONE TABLET BY MOUTH AT BEDTIME TO HELP APPETITE    pantoprazole (PROTONIX) 40 MG tablet Take 1 tablet (40 mg total) by mouth once daily.    sucralfate (CARAFATE) 1 gram tablet Take 1 tablet (1 g total) by mouth 4 (four) times daily before meals and nightly.    VITAMIN D2 50,000 unit capsule TAKE ONE CAPSULE BY MOUTH ONCE A WEEK           Clinical Reference Information           Your Vitals Were     BP Pulse Temp Height Weight BMI    138/69 (BP Location: Left arm, Patient Position: Sitting, BP Method: Automatic) 76 97.7 °F (36.5 °C) (Oral) 5' 3" (1.6 m) 74.7 kg (164 lb 10.9 oz) 29.17 kg/m2      Blood Pressure          Most Recent Value    BP  138/69      Allergies as of 5/15/2017     Ace Inhibitors    Codeine      Immunizations Administered on Date of Encounter - 5/15/2017     None      Orders Placed During Today's Visit      Normal Orders This Visit    Ambulatory referral to Gastroenterology       [facility] Sign-Up     Activating your [facility] account is as easy as 1-2-3!     1) Visit [email or web address], select Sign Up Now, enter this activation code and your date of birth, then select Next.  Activation code not generated  Current Patient Portal Status: Account disabled      2) Create a username and password to use when you visit [facility] in the future and select a " security question in case you lose your password and select Next.    3) Enter your e-mail address and click Sign Up!    Additional Information  If you have questions, please e-mail [email or web address] or call [phone] to talk to our [facility] staff. Remember, [facility] is NOT to be used for urgent needs. For medical emergencies, dial 911.         Smoking Cessation     If you would like to quit smoking:   You may be eligible for free services if you are a [place] resident and started smoking cigarettes before September 1, 1988.  Call the Smoking Cessation Trust ([facility]) toll free at (880) 267-0769 or (838) 302-4680.   Call 2-714-QUIT-NOW if you do not meet the above criteria.   Contact us via email: [email or web address]   View our website for more information: [email or web address]        Language Assistance Services     ATTENTION: Language assistance services are available, free of charge. Please call [phone].      ATENCIÓN: Si habla marjdavid, tiene a corbin disposición servicios gratuitos de asistencia lingüística. Llame al [phone].     CHÚ Ý: N?u b?n nói Ti?ng Vi?t, có các d?ch v? h? tr? ngôn ng? mi?n phí dành cho b?n. G?i s? [phone].         [facility] complies with applicable Federal civil rights laws and does not discriminate on the basis of race, color, national origin, age, disability, or sex.

## 2017-06-02 ENCOUNTER — INITIAL CONSULT (OUTPATIENT)
Dept: GASTROENTEROLOGY | Facility: CLINIC | Age: 82
End: 2017-06-02
Payer: MEDICARE

## 2017-06-02 VITALS
SYSTOLIC BLOOD PRESSURE: 129 MMHG | RESPIRATION RATE: 20 BRPM | BODY MASS INDEX: 28.52 KG/M2 | HEIGHT: 63 IN | DIASTOLIC BLOOD PRESSURE: 70 MMHG | WEIGHT: 160.94 LBS | HEART RATE: 71 BPM

## 2017-06-02 DIAGNOSIS — Z85.038 HISTORY OF COLON CANCER: Primary | ICD-10-CM

## 2017-06-02 DIAGNOSIS — K21.9 GASTROESOPHAGEAL REFLUX DISEASE, ESOPHAGITIS PRESENCE NOT SPECIFIED: Primary | ICD-10-CM

## 2017-06-02 DIAGNOSIS — K21.00 GERD WITH ESOPHAGITIS: ICD-10-CM

## 2017-06-02 PROCEDURE — 99214 OFFICE O/P EST MOD 30 MIN: CPT | Mod: S$GLB,,, | Performed by: INTERNAL MEDICINE

## 2017-06-02 PROCEDURE — 99499 UNLISTED E&M SERVICE: CPT | Mod: S$GLB,,, | Performed by: INTERNAL MEDICINE

## 2017-06-02 PROCEDURE — 1159F MED LIST DOCD IN RCRD: CPT | Mod: S$GLB,,, | Performed by: INTERNAL MEDICINE

## 2017-06-02 PROCEDURE — 99999 PR PBB SHADOW E&M-EST. PATIENT-LVL III: CPT | Mod: PBBFAC,,, | Performed by: INTERNAL MEDICINE

## 2017-06-02 PROCEDURE — 1126F AMNT PAIN NOTED NONE PRSNT: CPT | Mod: S$GLB,,, | Performed by: INTERNAL MEDICINE

## 2017-06-02 RX ORDER — PANTOPRAZOLE SODIUM 40 MG/1
40 TABLET, DELAYED RELEASE ORAL
Qty: 60 TABLET | Refills: 2 | Status: SHIPPED | OUTPATIENT
Start: 2017-06-02 | End: 2018-01-12 | Stop reason: SDUPTHER

## 2017-06-02 NOTE — LETTER
June 2, 2017      Boom Duran Jr., MD  [address] [facility] [address]           [facility] - Gastroenterology  [address]  [facility] [address]  Phone: [phone]          Patient: Kenyetta Andersen   MR Number: [id number]   YOB: 1935   Date of Visit: 6/2/2017       Dear Dr. Boom Duran Jr.:    Thank you for referring Kenyetta Andersen to me for evaluation. Attached you will find relevant portions of my assessment and plan of care.    If you have questions, please do not hesitate to call me. I look forward to following Kenyetta Andersen along with you.    Sincerely,    Killian Guerrier MD    Enclosure  CC:  No Recipients    If you would like to receive this communication electronically, please contact [email or web address] or [phone] to request more information on [facility] access.    For providers and/or their staff who would like to refer a patient to [facility], please contact us through our one-stop-shop provider referral line, [facility] [place], at [phone].    If you feel you have received this communication in error or would no longer like to receive these types of communications, please e-mail [email or web address]

## 2017-06-02 NOTE — PROGRESS NOTES
"[facility]    CC: GERD    HPI 82 y.o. female presents to discuss her chronic moderate GERD.  She states that she has heartburn on most days - symptoms are worse at night and in the morning.  They are worse with certain food triggers.  She denies any dysphagia or odynophagia.  She has had GERD for several years - previously better controlled with once daily omeprazole.  Over the last few weeks her symptoms have worsened - she was started on protonix 40 mg daily and carafate 1 gram qid with some improvement.  She takes the protonix correctly first thing in AM on empty stomach.  No GI bleeding noted.  She has had an egd last in 3/16 with a mild benign appearing esophageal stenosis that was dilated.  Otherwise unremarkable exam.      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     Physical Examination  /70   Pulse 71   Resp 20   Ht 5' 3" (1.6 m)   Wt 73 kg (160 lb 15 oz)   BMI 28.51 kg/m²   General appearance: alert, cooperative, no distress  HENT: Normocephalic, atraumatic, neck symmetrical, no nasal discharge   Abdomen: soft, NT ND BS present; no organomegaly    Labs:  H/H 14/43    Imaging:  CT abdomen/pelvis 4/18/16 -    Prior right hemicolectomy without present evidence of recurrent tumor.  Prior sphincteroplasty.  Small cyst of each kidney.  Prior hysterectomy.         Assessment and Plan:  1. GERD  - Increase protonix to 40 mg BID  - Stop carafate  - Schedule EGD " to evaluate for worsening GERD    2. History of Colon cancer  - Repeat colonoscopy due 2020  - Continue to follow up with oncology as scheduled.    Killian Guerrier MD  [facility]  [address], [address]  [place] [address]  Office: [phone]  Fax: [phone]

## 2017-06-12 ENCOUNTER — ANESTHESIA EVENT (OUTPATIENT)
Dept: ENDOSCOPY | Facility: HOSPITAL | Age: 82
End: 2017-06-12
Payer: MEDICARE

## 2017-06-12 ENCOUNTER — HOSPITAL ENCOUNTER (OUTPATIENT)
Facility: HOSPITAL | Age: 82
Discharge: HOME | End: 2017-06-12
Attending: INTERNAL MEDICINE | Admitting: INTERNAL MEDICINE
Payer: MEDICARE

## 2017-06-12 ENCOUNTER — TELEPHONE (OUTPATIENT)
Dept: GASTROENTEROLOGY | Facility: CLINIC | Age: 82
End: 2017-06-12

## 2017-06-12 ENCOUNTER — ANESTHESIA (OUTPATIENT)
Dept: ENDOSCOPY | Facility: HOSPITAL | Age: 82
End: 2017-06-12
Payer: MEDICARE

## 2017-06-12 VITALS
TEMPERATURE: 98 F | DIASTOLIC BLOOD PRESSURE: 73 MMHG | WEIGHT: 156 LBS | RESPIRATION RATE: 20 BRPM | HEIGHT: 63 IN | HEART RATE: 73 BPM | SYSTOLIC BLOOD PRESSURE: 150 MMHG | BODY MASS INDEX: 27.64 KG/M2 | OXYGEN SATURATION: 94 % | RESPIRATION RATE: 74 BRPM

## 2017-06-12 DIAGNOSIS — K21.9 GERD (GASTROESOPHAGEAL REFLUX DISEASE): ICD-10-CM

## 2017-06-12 PROCEDURE — 37000008 HC ANESTHESIA 1ST 15 MINUTES: Performed by: INTERNAL MEDICINE

## 2017-06-12 PROCEDURE — 43239 EGD BIOPSY SINGLE/MULTIPLE: CPT | Mod: ,,, | Performed by: INTERNAL MEDICINE

## 2017-06-12 PROCEDURE — D9220A PRA ANESTHESIA: Mod: CRNA,,, | Performed by: NURSE ANESTHETIST, CERTIFIED REGISTERED

## 2017-06-12 PROCEDURE — 88342 IMHCHEM/IMCYTCHM 1ST ANTB: CPT | Mod: 26,,, | Performed by: PATHOLOGY

## 2017-06-12 PROCEDURE — 88305 TISSUE EXAM BY PATHOLOGIST: CPT | Performed by: PATHOLOGY

## 2017-06-12 PROCEDURE — 43239 EGD BIOPSY SINGLE/MULTIPLE: CPT | Performed by: INTERNAL MEDICINE

## 2017-06-12 PROCEDURE — 25000003 PHARM REV CODE 250: Performed by: NURSE ANESTHETIST, CERTIFIED REGISTERED

## 2017-06-12 PROCEDURE — 25000003 PHARM REV CODE 250: Performed by: INTERNAL MEDICINE

## 2017-06-12 PROCEDURE — 63600175 PHARM REV CODE 636 W HCPCS: Performed by: NURSE ANESTHETIST, CERTIFIED REGISTERED

## 2017-06-12 PROCEDURE — D9220A PRA ANESTHESIA: Mod: ANES,,, | Performed by: ANESTHESIOLOGY

## 2017-06-12 PROCEDURE — 27201012 HC FORCEPS, HOT/COLD, DISP: Performed by: INTERNAL MEDICINE

## 2017-06-12 PROCEDURE — 37000009 HC ANESTHESIA EA ADD 15 MINS: Performed by: INTERNAL MEDICINE

## 2017-06-12 RX ORDER — PROPOFOL 10 MG/ML
INJECTION, EMULSION INTRAVENOUS
Status: DISCONTINUED
Start: 2017-06-12 | End: 2017-06-12 | Stop reason: HOSPADM

## 2017-06-12 RX ORDER — LIDOCAINE HCL/PF 100 MG/5ML
SYRINGE (ML) INTRAVENOUS
Status: DISCONTINUED | OUTPATIENT
Start: 2017-06-12 | End: 2017-06-12

## 2017-06-12 RX ORDER — LIDOCAINE HYDROCHLORIDE 20 MG/ML
INJECTION, SOLUTION EPIDURAL; INFILTRATION; INTRACAUDAL; PERINEURAL
Status: DISCONTINUED
Start: 2017-06-12 | End: 2017-06-12 | Stop reason: HOSPADM

## 2017-06-12 RX ORDER — PROPOFOL 10 MG/ML
VIAL (ML) INTRAVENOUS
Status: DISCONTINUED | OUTPATIENT
Start: 2017-06-12 | End: 2017-06-12

## 2017-06-12 RX ORDER — SODIUM CHLORIDE 9 MG/ML
INJECTION, SOLUTION INTRAVENOUS CONTINUOUS
Status: DISCONTINUED | OUTPATIENT
Start: 2017-06-12 | End: 2017-06-12 | Stop reason: HOSPADM

## 2017-06-12 RX ADMIN — PROPOFOL 40 MG: 10 INJECTION, EMULSION INTRAVENOUS at 08:06

## 2017-06-12 RX ADMIN — SODIUM CHLORIDE 1000 ML: 0.9 INJECTION, SOLUTION INTRAVENOUS at 07:06

## 2017-06-12 RX ADMIN — PROPOFOL 50 MG: 10 INJECTION, EMULSION INTRAVENOUS at 08:06

## 2017-06-12 RX ADMIN — LIDOCAINE HYDROCHLORIDE 100 MG: 20 INJECTION, SOLUTION INTRAVENOUS at 08:06

## 2017-06-12 NOTE — DISCHARGE INSTRUCTIONS
Esophagitis     With esophagitis, the lining of the esophagus is inflamed.   Do you often have burning pain in your chest? You may have esophagitis. This is when the lining of the esophagus becomes red and swollen (inflamed). The esophagus is the tube that connects your throat to your stomach. This sheet tells you more about esophagitis. It also explains your treatment options.  Main types of esophagitis  Reflux esophagitis. This is the more common type. It is caused by GERD (gastroesophageal reflux disease). Stomach contents with stomach acid flow back up into the esophagus. This happens over and over. It leads to inflammation. Risk factors can include:  · Being overweight  · Asthma  · Smoking  · Pregnancy  · Frequent vomiting  · Certain medicines (such as aspirin and other anti-inflammatories)  · Hiatal hernia  Infectious esophagitis. This is caused by an infection. You are more at risk for this if you have a weakened immune system and poor nutrition. Antibiotic use can also be a factor. The infection is often due to the following:  · A type of fungus (typically candida)  · A virus, such as herpes simplex virus 1 (HSV-1) or cytomegalovirus (CMV)  Eosinophilic esophagitis. Foods or other things around you can give you an allergic reaction. This triggers an immune response and leads to esophagitis.  Pill-induced esophagitis. Certain types of medicines can cause inflammation and ulcers in the esophagus. These include doxycycline, aspirin, NSAIDs, alendronate, potassium, quinidine, iron.  Symptoms of esophagitis  The following symptoms can occur with esophagitis:  · Pain when swallowing, or trouble swallowing  · Pain behind your breastbone (heartburn)  · Acid regurgitation  · Chronic sore throat  · Gum Inflammation  · Cavities  · Bad breath  · Nausea  · Pain in your upper belly (abdomen)  · Bleeding (indicated by bright red vomit or black, tarry stool)  These symptoms occur more often with reflux  esophagitis:  · Coughing, wheezing, or asthma  · Hoarseness  Diagnosis of esophagitis  Your healthcare provider will ask about your health history and symptoms. Youll also be examined. Sometimes certain tests are needed. These may include:  · Upper endoscopy. A thin, flexible tube with a tiny light and camera is used. It is inserted through the mouth down into the esophagus. This lets the provider look for damage. A small sample of tissue (biopsy) may also be removed. The sample is sent to a lab for testing.  · Upper GI X-ray with barium. An X-ray is done after you drink a substance called barium. Barium may make problems in the esophagus easier to see on an x-ray.  · Esophageal pH. A soft, thin tube is passed into the esophagus through the nose or mouth for 24 hours. It measures the acid level in the esophagus.  · Esophageal manometry. A soft, thin tube is passed into the esophagus through the nose or mouth. It measures muscle contractions in the esophagus.  Treatment of esophagitis  Medicines. Different medicines can help treat esophagitis. The medicine used will depend on the type of esophagitis you have. Talk with your healthcare provider.  Lifestyle changes. Making the following changes can help reduce irritation and ease your symptoms:  · Avoid spicy foods (pepper, chili powder, fuller). Also avoid hard foods (nuts, crackers, raw vegetables) and acidic foods and drinks (tomatoes, citrus fruits and juices). Other problem foods include chocolate, peppermint, nutmeg, and foods high in fat.  · Until you can swallow without pain, follow a combined liquid and soft diet. Try foods such as cooked cereals, mashed potatoes, and soups.  · Take small bites and chew your food thoroughly.  · Avoid large meals and heavy evening meals. Don't lie down within 2 to 3 hours of eating.  · Get to or stay at a healthy weight.  · Avoid alcohol, caffeine, and smoking or tobacco products.  · Brush and floss your teeth  · Raise your  upper body by 4 to 6 inches when lying in bed. This can be done using a foam wedge. Or put blocks under the legs at the head of your bed.  Surgery. This may be needed for severe reflux esophagitis. Other noninvasive procedures to treat GERD and esophagitis are being studied. Your provider can tell you more.  Why treatment Is important  Without treatment, esophagitis can get worse. This is especially true with severe reflux esophagitis. For instance, continued symptoms can cause scarring of the esophagus. Over time, this can cause a narrowing the esophagus (stricture). This can make it hard to pass food down to the stomach. As symptoms go on they can also cause changes in the lining of the esophagus. These changes can put you at a slightly higher risk of cancer of the esophagus.   Date Last Reviewed: 7/1/2016 © 2000-2016 [employer]. [address] [place] [address]. All rights reserved. This information is not intended as a substitute for professional medical care. Always follow your healthcare professional's instructions.        Gastritis (Adult)    Gastritis is inflammation and irritation of the stomach lining. It can be present for a short time (acute) or be long lasting (chronic). Gastritis is often caused by infection with bacteria called H pylori. More than a third of people in the [place] have this bacteria in their bodies. In many cases, H pylori causes no problems or symptoms. In some people, though, the infection irritates the stomach lining and causes gastritis. Other causes of stomach irritation include drinking alcohol or taking pain-relieving medicines called NSAIDs (such as aspirin or ibuprofen).   Symptoms of gastritis can include:  · Abdominal pain or bloating  · Loss of appetite  · Nausea or vomiting  · Vomiting blood or having black stools  · Feeling more tired than usual  An inflamed and irritated stomach lining is more likely to develop a sore called an ulcer. To help prevent  this, gastritis should be treated.  Home care  If needed, medicines may be prescribed. If you have H pylori infection, treating it will likely relieve your symptoms. Other changes can help reduce stomach irritation and help it heal.  · If you have been prescribed medicines for H pylori infection, take them as directed. Take all of the medicine until it is finished or your healthcare provider tells you to stop, even if you feel better.  · Your healthcare provider may recommend avoiding NSAIDs. If you take daily aspirin for your heart or other medical reasons, do not stop without talking to your healthcare provider first.  · Avoid drinking alcohol.  · Stop smoking. Smoking can irritate the stomach and delay healing. As much as possible, stay away from second hand smoke.  Follow-up care  Follow up with your healthcare provider, or as advised by our staff. Testing may be needed to check for inflammation or an ulcer.  When to seek medical advice  Call your healthcare provider for any of the following:  · Stomach pain that gets worse or moves to the lower right abdomen (appendix area)  · Chest pain that appears or gets worse, or spreads to the back, neck, shoulder, or arm  · Frequent vomiting (cant keep down liquids)  · Blood in the stool or vomit (red or black in color)  · Feeling weak or dizzy  · Fever of 100.4ºF (38ºC) or higher, or as directed by your healthcare provider  Date Last Reviewed: 6/22/2015 © 2000-2016 [facility] [facility]. [address] [place] [address], [place] [address]. All rights reserved. This information is not intended as a substitute for professional medical care. Always follow your healthcare professional's instructions.

## 2017-06-12 NOTE — TRANSFER OF CARE
"Anesthesia Transfer of Care Note    Patient: Kenyetta Andersen    Procedure(s) Performed: Procedure(s) (LRB):  ESOPHAGOGASTRODUODENOSCOPY (EGD) (N/A)    Patient location: GI    Anesthesia Type: general    Transport from OR: Transported from OR on room air with adequate spontaneous ventilation    Post pain: adequate analgesia    Post vital signs: stable    Level of consciousness: awake    Nausea/Vomiting: no nausea/vomiting    Complications: none    Transfer of care protocol was followed      Last vitals:   Visit Vitals  BP (!) 187/103   Pulse 108   Temp 36.5 °C (97.7 °F)   Resp 17   Ht 5' 3" (1.6 m)   Wt 70.8 kg (156 lb)   SpO2 99%   Breastfeeding? No   BMI 27.63 kg/m²     "

## 2017-06-12 NOTE — ANESTHESIA POSTPROCEDURE EVALUATION
"Anesthesia Post Evaluation    Patient: Kenyetta Andersen    Procedure(s) Performed: Procedure(s) (LRB):  ESOPHAGOGASTRODUODENOSCOPY (EGD) (N/A)    Final Anesthesia Type: general  Patient location during evaluation: PACU  Patient participation: Yes- Able to Participate  Level of consciousness: awake and alert and oriented  Post-procedure vital signs: reviewed and stable  Pain management: adequate  Airway patency: patent  PONV status at discharge: No PONV  Anesthetic complications: no      Cardiovascular status: blood pressure returned to baseline  Respiratory status: unassisted, spontaneous ventilation and room air  Hydration status: euvolemic  Follow-up not needed.        Visit Vitals  BP (!) 150/73   Pulse 73   Temp 36.7 °C (98.1 °F) (Oral)   Resp 20   Ht 5' 3" (1.6 m)   Wt 70.8 kg (156 lb)   SpO2 (!) 94%   Breastfeeding? No   BMI 27.63 kg/m²       Pain/Amanda Score: Pain Assessment Performed: Yes (6/12/2017  8:40 AM)  Presence of Pain: denies (6/12/2017  8:40 AM)  Amanda Score: 10 (6/12/2017  8:40 AM)      "

## 2017-06-12 NOTE — ANESTHESIA PREPROCEDURE EVALUATION
06/12/2017  Kenyetta Andersen is a 82 y.o., female.    Anesthesia Evaluation    I have reviewed the Patient Summary Reports.    I have reviewed the Nursing Notes.   I have reviewed the Medications.     Review of Systems  Anesthesia Hx:  No problems with previous Anesthesia    Social:  Non-Smoker    Hepatic/GI:   GERD, poorly controlled    Neurological:   Neuromuscular Disease,        Physical Exam  General:  Well nourished    Airway/Jaw/Neck:  Airway Findings: Mouth Opening: Normal Tongue: Normal  General Airway Assessment: Adult, Good  Mallampati: I  TM Distance: 4 - 6 cm       Chest/Lungs:  Chest/Lungs Findings: Clear to auscultation, Normal Respiratory Rate     Heart/Vascular:  Heart Findings: Rate: Normal  Rhythm: Regular Rhythm  Sounds: Normal        Mental Status:  Mental Status Findings:  Alert and Oriented, Cooperative         Anesthesia Plan  Type of Anesthesia, risks & benefits discussed:  Anesthesia Type:  general  Patient's Preference:   Intra-op Monitoring Plan:   Intra-op Monitoring Plan Comments:   Post Op Pain Control Plan:   Post Op Pain Control Plan Comments:   Induction:   IV  Beta Blocker:  Patient is not currently on a Beta-Blocker (No further documentation required).       Informed Consent: Patient understands risks and agrees with Anesthesia plan.  Questions answered. Anesthesia consent signed with patient.  ASA Score: 2     Day of Surgery Review of History & Physical: I have interviewed and examined the patient. I have reviewed the patient's H&P dated:  There are no significant changes.          Ready For Surgery From Anesthesia Perspective.

## 2017-06-12 NOTE — TELEPHONE ENCOUNTER
----- Message from Arminda Clay sent at 6/12/2017  9:55 AM CDT -----  Contact: pt [phone]  Patient called and states she received a letter for a Follow Colonoscopy.

## 2017-06-14 NOTE — H&P (VIEW-ONLY)
"[facility]    CC: GERD    HPI 82 y.o. female presents to discuss her chronic moderate GERD.  She states that she has heartburn on most days - symptoms are worse at night and in the morning.  They are worse with certain food triggers.  She denies any dysphagia or odynophagia.  She has had GERD for several years - previously better controlled with once daily omeprazole.  Over the last few weeks her symptoms have worsened - she was started on protonix 40 mg daily and carafate 1 gram qid with some improvement.  She takes the protonix correctly first thing in AM on empty stomach.  No GI bleeding noted.  She has had an egd last in 3/16 with a mild benign appearing esophageal stenosis that was dilated.  Otherwise unremarkable exam.      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     Physical Examination  /70   Pulse 71   Resp 20   Ht 5' 3" (1.6 m)   Wt 73 kg (160 lb 15 oz)   BMI 28.51 kg/m²   General appearance: alert, cooperative, no distress  HENT: Normocephalic, atraumatic, neck symmetrical, no nasal discharge   Abdomen: soft, NT ND BS present; no organomegaly    Labs:  H/H 14/43    Imaging:  CT abdomen/pelvis 4/18/16 -    Prior right hemicolectomy without present evidence of recurrent tumor.  Prior sphincteroplasty.  Small cyst of each kidney.  Prior hysterectomy.         Assessment and Plan:  1. GERD  - Increase protonix to 40 mg BID  - Stop carafate  - Schedule EGD " to evaluate for worsening GERD    2. History of Colon cancer  - Repeat colonoscopy due 2020  - Continue to follow up with oncology as scheduled.    Killian Guerrier MD  [facility]  [address], [address]  [place] [address]  Office: [phone]  Fax: [phone]

## 2017-06-19 ENCOUNTER — TELEPHONE (OUTPATIENT)
Dept: GASTROENTEROLOGY | Facility: CLINIC | Age: 82
End: 2017-06-19

## 2017-06-19 DIAGNOSIS — K21.00 GERD WITH ESOPHAGITIS: Primary | ICD-10-CM

## 2017-06-19 NOTE — TELEPHONE ENCOUNTER
----- Message from Killian Guerrier MD sent at 6/14/2017 12:31 AM CDT -----  Please let this patient know that their gastric biopsies were negative for H. Pylori infection.  Her esophageal biopsies showed some inflammation.  She should take PPI twice daily as discussed and needs a repeat EGD in 8 weeks to assess healing of her esophagitis.

## 2017-06-20 DIAGNOSIS — K20.90 ESOPHAGITIS: ICD-10-CM

## 2017-06-20 DIAGNOSIS — K21.9 GASTROESOPHAGEAL REFLUX DISEASE, ESOPHAGITIS PRESENCE NOT SPECIFIED: Primary | ICD-10-CM

## 2017-07-06 ENCOUNTER — OFFICE VISIT (OUTPATIENT)
Dept: OPHTHALMOLOGY | Facility: CLINIC | Age: 82
End: 2017-07-06
Payer: MEDICARE

## 2017-07-06 DIAGNOSIS — H40.2230 CHRONIC ANGLE-CLOSURE GLAUCOMA OF BOTH EYES, UNSPECIFIED GLAUCOMA STAGE: ICD-10-CM

## 2017-07-06 DIAGNOSIS — Z96.1 PSEUDOPHAKIA: ICD-10-CM

## 2017-07-06 DIAGNOSIS — H40.033 ANATOMICAL NARROW ANGLE GLAUCOMA OF BOTH EYES WITH BORDERLINE INTRAOCULAR PRESSURE: ICD-10-CM

## 2017-07-06 DIAGNOSIS — H25.11 NUCLEAR SCLEROSIS, RIGHT: Primary | ICD-10-CM

## 2017-07-06 DIAGNOSIS — H52.7 REFRACTIVE ERROR: ICD-10-CM

## 2017-07-06 PROCEDURE — 92136 OPHTHALMIC BIOMETRY: CPT | Mod: RT,S$GLB,, | Performed by: OPHTHALMOLOGY

## 2017-07-06 PROCEDURE — 92012 INTRM OPH EXAM EST PATIENT: CPT | Mod: S$GLB,,, | Performed by: OPHTHALMOLOGY

## 2017-07-06 PROCEDURE — 92020 GONIOSCOPY: CPT | Mod: S$GLB,,, | Performed by: OPHTHALMOLOGY

## 2017-07-06 PROCEDURE — 99999 PR PBB SHADOW E&M-EST. PATIENT-LVL IV: CPT | Mod: PBBFAC,,, | Performed by: OPHTHALMOLOGY

## 2017-07-06 RX ORDER — BRIMONIDINE TARTRATE 2 MG/ML
1 SOLUTION/ DROPS OPHTHALMIC 3 TIMES DAILY
Qty: 10 ML | Refills: 11 | Status: SHIPPED | OUTPATIENT
Start: 2017-07-06 | End: 2018-06-07 | Stop reason: SDUPTHER

## 2017-07-06 RX ORDER — PROPARACAINE HYDROCHLORIDE 5 MG/ML
1 SOLUTION/ DROPS OPHTHALMIC
Status: CANCELLED | OUTPATIENT
Start: 2017-07-06

## 2017-07-06 RX ORDER — CYCLOPENTOLATE HYDROCHLORIDE 10 MG/ML
1 SOLUTION/ DROPS OPHTHALMIC
Status: CANCELLED | OUTPATIENT
Start: 2017-07-06

## 2017-07-06 RX ORDER — LEVOFLOXACIN 5 MG/ML
1 SOLUTION OPHTHALMIC 4 TIMES DAILY
Qty: 5 ML | Refills: 0 | Status: SHIPPED | OUTPATIENT
Start: 2017-08-15 | End: 2017-08-27

## 2017-07-06 RX ORDER — MOXIFLOXACIN 5 MG/ML
1 SOLUTION/ DROPS OPHTHALMIC
Status: CANCELLED | OUTPATIENT
Start: 2017-07-06 | End: 2017-07-06

## 2017-07-06 RX ORDER — PHENYLEPHRINE HYDROCHLORIDE 25 MG/ML
1 SOLUTION/ DROPS OPHTHALMIC
Status: CANCELLED | OUTPATIENT
Start: 2017-07-06

## 2017-07-06 RX ORDER — LIDOCAINE HYDROCHLORIDE 40 MG/ML
1 INJECTION, SOLUTION RETROBULBAR
Status: CANCELLED | OUTPATIENT
Start: 2017-07-06 | End: 2017-07-06

## 2017-07-06 RX ORDER — PHENYLEPHRINE HYDROCHLORIDE 100 MG/ML
1 SOLUTION/ DROPS OPHTHALMIC ONCE
Status: CANCELLED | OUTPATIENT
Start: 2017-07-06 | End: 2017-07-06

## 2017-07-06 RX ORDER — DIFLUPREDNATE OPHTHALMIC 0.5 MG/ML
1 EMULSION OPHTHALMIC 4 TIMES DAILY
Qty: 5 ML | Refills: 2 | Status: SHIPPED | OUTPATIENT
Start: 2017-08-16 | End: 2017-09-15

## 2017-07-06 RX ORDER — KETOROLAC TROMETHAMINE 5 MG/ML
1 SOLUTION OPHTHALMIC 4 TIMES DAILY
Qty: 5 ML | Refills: 2 | Status: SHIPPED | OUTPATIENT
Start: 2017-08-13 | End: 2017-09-25

## 2017-07-06 RX ORDER — SUCRALFATE 1 G/1
TABLET ORAL
Refills: 1 | COMMUNITY
Start: 2017-06-12 | End: 2017-07-17 | Stop reason: SDUPTHER

## 2017-07-06 RX ORDER — LIDOCAINE HYDROCHLORIDE 40 MG/ML
1 INJECTION, SOLUTION RETROBULBAR
Status: CANCELLED | OUTPATIENT
Start: 2017-07-06

## 2017-07-06 RX ORDER — TROPICAMIDE 10 MG/ML
1 SOLUTION/ DROPS OPHTHALMIC
Status: CANCELLED | OUTPATIENT
Start: 2017-07-06

## 2017-07-06 RX ORDER — PROPARACAINE HYDROCHLORIDE 5 MG/ML
1 SOLUTION/ DROPS OPHTHALMIC
Status: CANCELLED | OUTPATIENT
Start: 2017-07-06 | End: 2017-07-06

## 2017-07-06 NOTE — PATIENT INSTRUCTIONS
What Are Cataracts?  A clear lens in the eye focuses light. This lets the eye see images sharply. With age, the lens slowly becomes cloudy. The cloudy lens is a cataract. A cataract scatters light and makes it hard for the eye to focus. Cataracts often form in both eyes. But one lens may cloud faster than the other.      The aging of your lens  Your lens may cloud so slowly that you don`t notice any vision changes at first. But as the cataract gets worse, the eye has a harder time focusing. In early stages, glasses may help you see better. As the lens gets more cloudy, your doctor may recommend surgery to restore your vision.  Date Last Reviewed: 6/14/2015  © 6979-4872 [employer]. [address] [place] [address]. All rights reserved. This information is not intended as a substitute for professional medical care. Always follow your healthcare professional's instructions.        Small-Incision Cataract Surgery: Removing the Old Lens  You may be surprised by how little time small-incision cataract surgery takes.  The procedure  Your doctor uses a microscope and tiny tools to make the incision and remove the old lens. A special tool breaks apart the old lens with sound waves (ultrasound) and then takes out the pieces. This process is called phacoemulsification. The natural membrane (capsule) that held your lens is left in place.  Incision size  A smaller incision (top) means a shorter recovery time for you. The location of the incision will vary.         Date Last Reviewed: 6/14/2015  © 4017-3799 [employer]. [address] [place] [address]. All rights reserved. This information is not intended as a substitute for professional medical care. Always follow your healthcare professional's instructions.        Small-Incision Cataract Surgery: Implanting the New Lens  Once your old lens has been removed, your doctor slips the new lens (IOL or intraocular lens) in through the  incision. The IOL is then placed in the capsule that held your old lens. With the new lens in place, your doctor is ready to close the incision. Some incisions may be closed with stitches. Others are self-sealing. That means they will stay closed on their own without stitches. The IOL does much the same thing as your old lens did before it became cloudy. It focuses light, letting you see sharp images and vivid colors. The IOL normally lasts a lifetime.  How small is an IOL?        Flexible tabs hold the IOL in place in the eye's natural capsule.     Date Last Reviewed: 6/14/2015  © 6839-3600 [employer]. [address] [place] [address], [place] [address]. All rights reserved. This information is not intended as a substitute for professional medical care. Always follow your healthcare professional's instructions.        Before Small-Incision Cataract Surgery    Like any operation, small-incision cataract surgery requires preparation.  Your health history  Your eye doctor will review your health history. Based on that, he or she may order tests or talk to your other health care providers. Tell your eye doctor which medicines you take. That includes over-the-counter medicine such as aspirin.  Your eye exam  You will have a complete eye exam. Your eye doctor or a technician will use devices that measure the length and curve of your eye. These measurements let your doctor select the proper new lens (IOL or intraocular lens) for you.  The night before surgery  Dont eat or drink anything after midnight the night before your surgery. This includes water, coffee, chewing gum, and mints. If you have been told to continue your daily medicine, take it only with small sips of water. Make sure you follow any other instructions your doctor gives you.  The day of surgery  Have someone you know drive you to and from the outpatient surgery clinic or hospital. Plan to be there for about 2 to 3 hours. When you arrive, youll sign  a consent form if you havent done so already. This form explains the risks of surgery. Just before surgery, your doctor will give you medicine that will relax you and keep you from feeling pain. You may sleep lightly.  Risks and complications  · Your doctor may have to shift from a small incision to a larger incision.  · There is a small chance of bleeding, infection, or swelling.   Date Last Reviewed: 6/14/2015 © 2000-2016 [employer]. [address] [place] [address], [place] [address]. All rights reserved. This information is not intended as a substitute for professional medical care. Always follow your healthcare professional's instructions.        After Small-Incision Cataract Surgery: The First 24 Hours    After surgery, youll rest in a recovery area for about an hour. Even though you may feel fine, you should take it easy. Your doctor will let you know what you should and shouldnt do once you get home. You may need to wear eye protection the first day. Also remember to take any eye drops or other medicine your doctor prescribes.  Back at home  Spend your first day relaxing at home. Watch TV, read, or talk to a friend. Be careful to:  · Not rub your eye  · Not lift anything that makes you strain  · Not drink alcohol within the first 24 hours  What to expect  Its normal for your eye to be bruised or bloodshot at first. You may also feel itching or mild discomfort. You may have some short-term (temporary) fluid discharge. These wont last long.   Getting back in action  You may be able to get back to much of your routine on the first day. But with some tasks, your doctor may ask you to wait. Always follow your doctor's recommendations.  Here are some general guidelines:  · You can shower again in 2 to 3 days.  · You can cook again in 2 to 3 days.  · You can drive again in 5 to 7 days.  · You can exercise again in 14 days.  When to call your doctor  Call your doctor if:  · Your pain is not relieved by  over-the-counter medicine.  · You have nausea or vomiting.  · Your vision suddenly becomes worse.   Date Last Reviewed: 6/14/2015  © 5442-9260 [employer]. [address] [place] [address], [place] [address]. All rights reserved. This information is not intended as a substitute for professional medical care. Always follow your healthcare professional's instructions.

## 2017-07-06 NOTE — PROGRESS NOTES
Assessment /Plan     For exam results, see Encounter Report.    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 Anatomical narrow angle glaucoma   IOP still around 20 OU today, previously in teens. Possible sight changes on clinical exam on last DFE  3/27/17. HRT possible slight progression OS> OD.     IOP had improved with Latanoprost added QHS OU (6/2013) in addition to Cosopt BID OU. Last HVF appeared to have hemianopsia - not present on repeat test - appears stable with priors - stable inferior arc OD, OS inferior arc seems to come and go - not present 2014, but was present exam before that.  Patent LPI OD.  History of post-CE iritis OS.   Continue Cosopt BID OU and latanoprost QHS OU. Start brimonidine TID OU today 7/6/17 - re-evaluate after CE.   Gonio - CHARMAINE now OD with occasional pain, PAS stable from 2011.       hemianopsia Last HVF unreliable - extremely high false negatives, also possible right debra defect? Not present on repeat exam.   2. Nuclear sclerosis  Difficulty improving past 20/40 OD, angle very narrow, intermittent pain. Recommend CE OD  History of post-CE iritis OS. May also be contributing to narrow angles, also cortical changes on inferior lens may be interfering with reading. Recommend Durezol with CE OD.   3. Pseudophakia  OS - stable   4. Astigmatism with presbyopia Repeat MRx after CE OD

## 2017-07-15 DIAGNOSIS — K21.00 GERD WITH ESOPHAGITIS: ICD-10-CM

## 2017-07-17 RX ORDER — SUCRALFATE 1 G/1
1 TABLET ORAL
Qty: 60 TABLET | Refills: 1 | Status: SHIPPED | OUTPATIENT
Start: 2017-07-17 | End: 2018-01-12

## 2017-07-27 ENCOUNTER — ANESTHESIA (OUTPATIENT)
Dept: ENDOSCOPY | Facility: HOSPITAL | Age: 82
End: 2017-07-27
Payer: MEDICARE

## 2017-07-27 ENCOUNTER — SURGERY (OUTPATIENT)
Age: 82
End: 2017-07-27

## 2017-07-27 ENCOUNTER — HOSPITAL ENCOUNTER (OUTPATIENT)
Facility: HOSPITAL | Age: 82
Discharge: HOME | End: 2017-07-27
Attending: INTERNAL MEDICINE | Admitting: INTERNAL MEDICINE
Payer: MEDICARE

## 2017-07-27 ENCOUNTER — ANESTHESIA EVENT (OUTPATIENT)
Dept: ENDOSCOPY | Facility: HOSPITAL | Age: 82
End: 2017-07-27
Payer: MEDICARE

## 2017-07-27 VITALS
OXYGEN SATURATION: 94 % | RESPIRATION RATE: 21 BRPM | SYSTOLIC BLOOD PRESSURE: 166 MMHG | RESPIRATION RATE: 18 BRPM | WEIGHT: 160 LBS | TEMPERATURE: 98 F | DIASTOLIC BLOOD PRESSURE: 88 MMHG | BODY MASS INDEX: 28.35 KG/M2 | HEART RATE: 72 BPM | HEIGHT: 63 IN

## 2017-07-27 DIAGNOSIS — K21.9 GERD (GASTROESOPHAGEAL REFLUX DISEASE): ICD-10-CM

## 2017-07-27 PROCEDURE — 43235 EGD DIAGNOSTIC BRUSH WASH: CPT | Mod: ,,, | Performed by: INTERNAL MEDICINE

## 2017-07-27 PROCEDURE — 25000003 PHARM REV CODE 250: Performed by: INTERNAL MEDICINE

## 2017-07-27 PROCEDURE — 37000008 HC ANESTHESIA 1ST 15 MINUTES: Performed by: INTERNAL MEDICINE

## 2017-07-27 PROCEDURE — 43235 EGD DIAGNOSTIC BRUSH WASH: CPT | Performed by: INTERNAL MEDICINE

## 2017-07-27 PROCEDURE — D9220A PRA ANESTHESIA: Mod: CRNA,,, | Performed by: NURSE ANESTHETIST, CERTIFIED REGISTERED

## 2017-07-27 PROCEDURE — D9220A PRA ANESTHESIA: Mod: ANES,,, | Performed by: ANESTHESIOLOGY

## 2017-07-27 PROCEDURE — 63600175 PHARM REV CODE 636 W HCPCS: Performed by: NURSE ANESTHETIST, CERTIFIED REGISTERED

## 2017-07-27 RX ORDER — SODIUM CHLORIDE 9 MG/ML
INJECTION, SOLUTION INTRAVENOUS CONTINUOUS
Status: DISCONTINUED | OUTPATIENT
Start: 2017-07-27 | End: 2017-07-27 | Stop reason: HOSPADM

## 2017-07-27 RX ORDER — PROPOFOL 10 MG/ML
INJECTION, EMULSION INTRAVENOUS
Status: DISCONTINUED
Start: 2017-07-27 | End: 2017-07-27 | Stop reason: HOSPADM

## 2017-07-27 RX ORDER — LIDOCAINE HYDROCHLORIDE 20 MG/ML
INJECTION, SOLUTION EPIDURAL; INFILTRATION; INTRACAUDAL; PERINEURAL
Status: DISCONTINUED
Start: 2017-07-27 | End: 2017-07-27 | Stop reason: HOSPADM

## 2017-07-27 RX ORDER — LIDOCAINE HCL/PF 100 MG/5ML
SYRINGE (ML) INTRAVENOUS
Status: DISCONTINUED | OUTPATIENT
Start: 2017-07-27 | End: 2017-07-27

## 2017-07-27 RX ORDER — PROPOFOL 10 MG/ML
VIAL (ML) INTRAVENOUS
Status: DISCONTINUED | OUTPATIENT
Start: 2017-07-27 | End: 2017-07-27

## 2017-07-27 RX ADMIN — PROPOFOL 100 MG: 10 INJECTION, EMULSION INTRAVENOUS at 10:07

## 2017-07-27 RX ADMIN — SODIUM CHLORIDE 1000 ML: 0.9 INJECTION, SOLUTION INTRAVENOUS at 08:07

## 2017-07-27 RX ADMIN — LIDOCAINE HYDROCHLORIDE 100 MG: 20 INJECTION, SOLUTION INTRAVENOUS at 10:07

## 2017-07-27 NOTE — TRANSFER OF CARE
"Anesthesia Transfer of Care Note    Patient: Kenyetta Andersen    Procedure(s) Performed: Procedure(s) (LRB):  ESOPHAGOGASTRODUODENOSCOPY (EGD) (N/A)    Patient location: GI    Anesthesia Type: general    Transport from OR: Transported from OR on 2-3 L/min O2 by NC with adequate spontaneous ventilation    Post pain: adequate analgesia    Post assessment: no apparent anesthetic complications    Post vital signs: stable    Level of consciousness: awake, alert and oriented    Nausea/Vomiting: no nausea/vomiting    Complications: none    Transfer of care protocol was followed      Last vitals:   Visit Vitals  BP (!) 177/84   Pulse 96   Temp 36.5 °C (97.7 °F)   Resp 19   Ht 5' 3" (1.6 m)   Wt 72.6 kg (160 lb)   SpO2 (!) 93%   Breastfeeding? No   BMI 28.34 kg/m²     "

## 2017-07-27 NOTE — H&P
Endo HP    Patient here for follow up evaluation of esophagitis.  She has been taking PPI daily.  No complaints.    Past Medical History:   Diagnosis Date    Anxiety     Chronic hepatitis C     HTN (hypertension)     Hypothyroid     Spinal muscular atrophy 8/5/2016       /77   Pulse (!) 59   Temp 97 °F (36.1 °C) (Oral)   Resp 16   Ht 6' (1.829 m)   Wt 82.6 kg (182 lb)   SpO2 100%   BMI 24.68 kg/m²   Abdomen soft NT ND   Lungs clear  RRR      A/P    EGD today to re-evaluate esophagitis.  Further recs following endoscopy.

## 2017-07-27 NOTE — ANESTHESIA POSTPROCEDURE EVALUATION
"Anesthesia Post Evaluation    Patient: Kenyetta Andersen    Procedure(s) Performed: Procedure(s) (LRB):  ESOPHAGOGASTRODUODENOSCOPY (EGD) (N/A)    Final Anesthesia Type: general  Patient location during evaluation: PACU  Patient participation: Yes- Able to Participate  Level of consciousness: awake and alert and oriented  Post-procedure vital signs: reviewed and stable  Pain management: adequate  Airway patency: patent  PONV status at discharge: No PONV  Anesthetic complications: no      Cardiovascular status: blood pressure returned to baseline  Respiratory status: unassisted, spontaneous ventilation and room air  Hydration status: euvolemic  Follow-up not needed.        Visit Vitals  BP (!) 141/94   Pulse 89   Temp 36.5 °C (97.7 °F)   Resp 20   Ht 5' 3" (1.6 m)   Wt 72.6 kg (160 lb)   SpO2 95%   Breastfeeding? No   BMI 28.34 kg/m²       Pain/Amanda Score: Pain Assessment Performed: Yes (7/27/2017 10:25 AM)  Presence of Pain: denies (7/27/2017 10:25 AM)  Amanda Score: 10 (7/27/2017 10:25 AM)      "

## 2017-07-27 NOTE — ANESTHESIA PREPROCEDURE EVALUATION
07/27/2017  Kenyetta Andersen is a 82 y.o., female.    Pre-op Assessment    I have reviewed the Patient Summary Reports.     I have reviewed the Nursing Notes.   I have reviewed the Medications.     Review of Systems  Anesthesia Hx:  No problems with previous Anesthesia Denies Hx of Anesthetic complications    Social:  Non-Smoker    Cardiovascular:   Denies Hypertension.  Denies Valvular problems/Murmurs.  Denies Dysrhythmias.   Denies Angina.    Pulmonary:   Denies Asthma.  Denies Recent URI.    Renal/:   Denies Chronic Renal Disease.     Hepatic/GI:   GERD, poorly controlled Denies Liver Disease.    Neurological:   Neuromuscular Disease, Denies Seizures.    Endocrine:   Denies Diabetes. Denies Hypothyroidism.    Psych:   Denies Psychiatric History.          Physical Exam  General:  Well nourished    Airway/Jaw/Neck:  Airway Findings: Mouth Opening: Normal Tongue: Normal  General Airway Assessment: Adult, Good  Mallampati: I  TM Distance: 4 - 6 cm       Chest/Lungs:  Chest/Lungs Findings: Clear to auscultation, Normal Respiratory Rate     Heart/Vascular:  Heart Findings: Rate: Normal  Rhythm: Regular Rhythm  Sounds: Normal        Mental Status:  Mental Status Findings:  Alert and Oriented, Cooperative         Anesthesia Plan  Type of Anesthesia, risks & benefits discussed:  Anesthesia Type:  general  Patient's Preference:   Intra-op Monitoring Plan:   Intra-op Monitoring Plan Comments:   Post Op Pain Control Plan:   Post Op Pain Control Plan Comments:   Induction:   IV  Beta Blocker:  Patient is not currently on a Beta-Blocker (No further documentation required).       Informed Consent: Patient understands risks and agrees with Anesthesia plan.  Questions answered. Anesthesia consent signed with patient.  ASA Score: 2     Day of Surgery Review of History & Physical: I have interviewed and examined the  patient. I have reviewed the patient's H&P dated:  There are no significant changes.          Ready For Surgery From Anesthesia Perspective.

## 2017-07-27 NOTE — PLAN OF CARE
Pt AAO, states no pain or nausea. Pt IV removed, tip WNL. D/C instructions given to pt and niece. Both verbalized understanding. Pt meets criteria to discharge.

## 2017-08-04 ENCOUNTER — DOCUMENTATION ONLY (OUTPATIENT)
Dept: FAMILY MEDICINE | Facility: CLINIC | Age: 82
End: 2017-08-04

## 2017-08-04 NOTE — PROGRESS NOTES
Pre-Visit Chart Review  For Appointment Scheduled on 08/07/2017      Health Maintenance Due   Topic Date Due    Influenza Vaccine  08/01/2017

## 2017-08-07 ENCOUNTER — OFFICE VISIT (OUTPATIENT)
Dept: FAMILY MEDICINE | Facility: CLINIC | Age: 82
End: 2017-08-07
Payer: MEDICARE

## 2017-08-07 VITALS
TEMPERATURE: 98 F | WEIGHT: 159.63 LBS | HEIGHT: 63 IN | HEART RATE: 76 BPM | DIASTOLIC BLOOD PRESSURE: 68 MMHG | SYSTOLIC BLOOD PRESSURE: 127 MMHG | BODY MASS INDEX: 28.29 KG/M2

## 2017-08-07 DIAGNOSIS — Z01.818 PRE-OP EXAMINATION: Primary | ICD-10-CM

## 2017-08-07 PROCEDURE — 1126F AMNT PAIN NOTED NONE PRSNT: CPT | Mod: S$GLB,,, | Performed by: PHYSICIAN ASSISTANT

## 2017-08-07 PROCEDURE — 99999 PR PBB SHADOW E&M-EST. PATIENT-LVL IV: CPT | Mod: PBBFAC,,, | Performed by: PHYSICIAN ASSISTANT

## 2017-08-07 PROCEDURE — 1159F MED LIST DOCD IN RCRD: CPT | Mod: S$GLB,,, | Performed by: PHYSICIAN ASSISTANT

## 2017-08-07 PROCEDURE — 3008F BODY MASS INDEX DOCD: CPT | Mod: S$GLB,,, | Performed by: PHYSICIAN ASSISTANT

## 2017-08-07 PROCEDURE — 99214 OFFICE O/P EST MOD 30 MIN: CPT | Mod: S$GLB,,, | Performed by: PHYSICIAN ASSISTANT

## 2017-08-07 NOTE — PROGRESS NOTES
Subjective:       Patient ID: Kenyetta Andersen is a 82 y.o. female.    Chief Complaint: Pre-op Exam (cataract/Dr. Sawant)    Patient is here for pre-op clearance for Cataract Surgery.  Surgery is scheduled for 8/16/17 on the right eye. Denies any chest pain, shortness of breath, history of bleeding problems, or eye pain. No other complaints.      Review of Systems   Constitutional: Negative for activity change and appetite change.   HENT: Negative for nosebleeds.    Eyes: Negative for pain and visual disturbance.   Respiratory: Negative for chest tightness and shortness of breath.    Cardiovascular: Negative for chest pain, palpitations and leg swelling.   Genitourinary: Negative for difficulty urinating, dysuria and frequency.   Musculoskeletal: Negative for arthralgias, back pain, gait problem, joint swelling and neck pain.   Neurological: Positive for headaches. Negative for dizziness, tremors, weakness, light-headedness and numbness.       Objective:      Physical Exam   Constitutional: She is oriented to person, place, and time. She appears well-developed and well-nourished.   HENT:   Head: Normocephalic and atraumatic.   Eyes: Conjunctivae are normal. Pupils are equal, round, and reactive to light.   Neck: Normal range of motion. Neck supple. No JVD present.   Cardiovascular: Normal rate and regular rhythm.  Exam reveals no gallop and no friction rub.    No murmur heard.  Pulmonary/Chest: Effort normal and breath sounds normal. No respiratory distress. She has no wheezes. She has no rales.   Neurological: She is alert and oriented to person, place, and time.   Skin: Skin is warm and dry.   Psychiatric: She has a normal mood and affect. Her behavior is normal. Judgment and thought content normal.       Assessment:       1. Pre-op examination        Plan:     Patient is cleared for surgery.

## 2017-08-15 ENCOUNTER — ANESTHESIA EVENT (OUTPATIENT)
Dept: SURGERY | Facility: AMBULARY SURGERY CENTER | Age: 82
End: 2017-08-15
Payer: MEDICARE

## 2017-08-16 ENCOUNTER — HOSPITAL ENCOUNTER (OUTPATIENT)
Facility: AMBULARY SURGERY CENTER | Age: 82
Discharge: HOME | End: 2017-08-16
Attending: OPHTHALMOLOGY | Admitting: OPHTHALMOLOGY
Payer: MEDICARE

## 2017-08-16 ENCOUNTER — SURGERY (OUTPATIENT)
Age: 82
End: 2017-08-16

## 2017-08-16 ENCOUNTER — ANESTHESIA (OUTPATIENT)
Dept: SURGERY | Facility: AMBULARY SURGERY CENTER | Age: 82
End: 2017-08-16
Payer: MEDICARE

## 2017-08-16 DIAGNOSIS — H25.11 NUCLEAR SCLEROSIS, RIGHT: ICD-10-CM

## 2017-08-16 DIAGNOSIS — H40.2230 CHRONIC ANGLE-CLOSURE GLAUCOMA OF BOTH EYES, UNSPECIFIED GLAUCOMA STAGE: ICD-10-CM

## 2017-08-16 PROCEDURE — D9220A PRA ANESTHESIA: Mod: ANES,,, | Performed by: ANESTHESIOLOGY

## 2017-08-16 PROCEDURE — 66984 XCAPSL CTRC RMVL W/O ECP: CPT | Mod: RT,,, | Performed by: OPHTHALMOLOGY

## 2017-08-16 PROCEDURE — 66984 XCAPSL CTRC RMVL W/O ECP: CPT | Performed by: OPHTHALMOLOGY

## 2017-08-16 PROCEDURE — D9220A PRA ANESTHESIA: Mod: CRNA,,, | Performed by: NURSE ANESTHETIST, CERTIFIED REGISTERED

## 2017-08-16 DEVICE — LENS 20.5 ACRYSOF: Type: IMPLANTABLE DEVICE | Site: EYE | Status: FUNCTIONAL

## 2017-08-16 RX ORDER — ACETAMINOPHEN 325 MG/1
650 TABLET ORAL EVERY 6 HOURS PRN
Status: DISCONTINUED | OUTPATIENT
Start: 2017-08-16 | End: 2017-08-16 | Stop reason: HOSPADM

## 2017-08-16 RX ORDER — ONDANSETRON 2 MG/ML
INJECTION INTRAMUSCULAR; INTRAVENOUS
Status: DISCONTINUED | OUTPATIENT
Start: 2017-08-16 | End: 2017-08-16

## 2017-08-16 RX ORDER — MIDAZOLAM HYDROCHLORIDE 1 MG/ML
INJECTION, SOLUTION INTRAMUSCULAR; INTRAVENOUS
Status: DISCONTINUED | OUTPATIENT
Start: 2017-08-16 | End: 2017-08-16

## 2017-08-16 RX ORDER — PHENYLEPHRINE HYDROCHLORIDE 25 MG/ML
1 SOLUTION/ DROPS OPHTHALMIC
Status: DISCONTINUED | OUTPATIENT
Start: 2017-08-16 | End: 2017-08-16 | Stop reason: HOSPADM

## 2017-08-16 RX ORDER — PROPARACAINE HYDROCHLORIDE 5 MG/ML
1 SOLUTION/ DROPS OPHTHALMIC
Status: COMPLETED | OUTPATIENT
Start: 2017-08-16 | End: 2017-08-16

## 2017-08-16 RX ORDER — MIDAZOLAM HYDROCHLORIDE 1 MG/ML
INJECTION INTRAMUSCULAR; INTRAVENOUS
Status: DISCONTINUED
Start: 2017-08-16 | End: 2017-08-16 | Stop reason: HOSPADM

## 2017-08-16 RX ORDER — FENTANYL CITRATE 50 UG/ML
INJECTION, SOLUTION INTRAMUSCULAR; INTRAVENOUS
Status: DISCONTINUED | OUTPATIENT
Start: 2017-08-16 | End: 2017-08-16

## 2017-08-16 RX ORDER — SODIUM CHLORIDE 0.9 % (FLUSH) 0.9 %
3 SYRINGE (ML) INJECTION EVERY 8 HOURS
Status: DISCONTINUED | OUTPATIENT
Start: 2017-08-16 | End: 2017-08-16 | Stop reason: HOSPADM

## 2017-08-16 RX ORDER — TROPICAMIDE 10 MG/ML
1 SOLUTION/ DROPS OPHTHALMIC
Status: DISCONTINUED | OUTPATIENT
Start: 2017-08-16 | End: 2017-08-16 | Stop reason: HOSPADM

## 2017-08-16 RX ORDER — LIDOCAINE HYDROCHLORIDE 10 MG/ML
1 INJECTION, SOLUTION EPIDURAL; INFILTRATION; INTRACAUDAL; PERINEURAL ONCE
Status: COMPLETED | OUTPATIENT
Start: 2017-08-16 | End: 2017-08-16

## 2017-08-16 RX ORDER — LIDOCAINE HYDROCHLORIDE 40 MG/ML
SOLUTION TOPICAL
Status: DISCONTINUED | OUTPATIENT
Start: 2017-08-16 | End: 2017-08-16 | Stop reason: HOSPADM

## 2017-08-16 RX ORDER — LIDOCAINE HYDROCHLORIDE 10 MG/ML
INJECTION, SOLUTION EPIDURAL; INFILTRATION; INTRACAUDAL; PERINEURAL
Status: DISCONTINUED | OUTPATIENT
Start: 2017-08-16 | End: 2017-08-16 | Stop reason: HOSPADM

## 2017-08-16 RX ORDER — MOXIFLOXACIN 5 MG/ML
SOLUTION/ DROPS OPHTHALMIC
Status: DISCONTINUED | OUTPATIENT
Start: 2017-08-16 | End: 2017-08-16 | Stop reason: HOSPADM

## 2017-08-16 RX ORDER — PROPARACAINE HYDROCHLORIDE 5 MG/ML
SOLUTION/ DROPS OPHTHALMIC
Status: DISCONTINUED | OUTPATIENT
Start: 2017-08-16 | End: 2017-08-16 | Stop reason: HOSPADM

## 2017-08-16 RX ORDER — MOXIFLOXACIN 5 MG/ML
1 SOLUTION/ DROPS OPHTHALMIC
Status: COMPLETED | OUTPATIENT
Start: 2017-08-16 | End: 2017-08-16

## 2017-08-16 RX ORDER — SODIUM CHLORIDE 0.9 % (FLUSH) 0.9 %
3 SYRINGE (ML) INJECTION
Status: DISCONTINUED | OUTPATIENT
Start: 2017-08-16 | End: 2017-08-16 | Stop reason: HOSPADM

## 2017-08-16 RX ORDER — FENTANYL CITRATE 50 UG/ML
INJECTION, SOLUTION INTRAMUSCULAR; INTRAVENOUS
Status: DISCONTINUED
Start: 2017-08-16 | End: 2017-08-16 | Stop reason: HOSPADM

## 2017-08-16 RX ORDER — SODIUM CHLORIDE, SODIUM LACTATE, POTASSIUM CHLORIDE, CALCIUM CHLORIDE 600; 310; 30; 20 MG/100ML; MG/100ML; MG/100ML; MG/100ML
INJECTION, SOLUTION INTRAVENOUS CONTINUOUS
Status: DISCONTINUED | OUTPATIENT
Start: 2017-08-16 | End: 2017-08-16 | Stop reason: HOSPADM

## 2017-08-16 RX ORDER — PROPARACAINE HYDROCHLORIDE 5 MG/ML
1 SOLUTION/ DROPS OPHTHALMIC
Status: DISCONTINUED | OUTPATIENT
Start: 2017-08-16 | End: 2017-08-16 | Stop reason: HOSPADM

## 2017-08-16 RX ORDER — EPINEPHRINE 1 MG/ML
INJECTION, SOLUTION INTRACARDIAC; INTRAMUSCULAR; INTRAVENOUS; SUBCUTANEOUS
Status: DISCONTINUED | OUTPATIENT
Start: 2017-08-16 | End: 2017-08-16 | Stop reason: HOSPADM

## 2017-08-16 RX ORDER — LIDOCAINE HYDROCHLORIDE 40 MG/ML
1 INJECTION, SOLUTION RETROBULBAR
Status: COMPLETED | OUTPATIENT
Start: 2017-08-16 | End: 2017-08-16

## 2017-08-16 RX ORDER — PHENYLEPHRINE HYDROCHLORIDE 100 MG/ML
1 SOLUTION/ DROPS OPHTHALMIC ONCE
Status: DISCONTINUED | OUTPATIENT
Start: 2017-08-16 | End: 2017-08-16 | Stop reason: HOSPADM

## 2017-08-16 RX ORDER — LIDOCAINE HYDROCHLORIDE 40 MG/ML
1 INJECTION, SOLUTION RETROBULBAR
Status: DISCONTINUED | OUTPATIENT
Start: 2017-08-16 | End: 2017-08-16 | Stop reason: HOSPADM

## 2017-08-16 RX ORDER — ONDANSETRON 2 MG/ML
INJECTION INTRAMUSCULAR; INTRAVENOUS
Status: DISCONTINUED
Start: 2017-08-16 | End: 2017-08-16 | Stop reason: HOSPADM

## 2017-08-16 RX ORDER — CYCLOPENTOLATE HYDROCHLORIDE 10 MG/ML
1 SOLUTION/ DROPS OPHTHALMIC
Status: DISCONTINUED | OUTPATIENT
Start: 2017-08-16 | End: 2017-08-16 | Stop reason: HOSPADM

## 2017-08-16 RX ADMIN — PROPARACAINE HYDROCHLORIDE 1 DROP: 5 SOLUTION/ DROPS OPHTHALMIC at 07:08

## 2017-08-16 RX ADMIN — EPINEPHRINE 0.3 MG: 1 INJECTION, SOLUTION INTRACARDIAC; INTRAMUSCULAR; INTRAVENOUS; SUBCUTANEOUS at 08:08

## 2017-08-16 RX ADMIN — CYCLOPENTOLATE HYDROCHLORIDE 1 DROP: 10 SOLUTION/ DROPS OPHTHALMIC at 07:08

## 2017-08-16 RX ADMIN — MOXIFLOXACIN 1 DROP: 5 SOLUTION/ DROPS OPHTHALMIC at 08:08

## 2017-08-16 RX ADMIN — SODIUM CHLORIDE, SODIUM LACTATE, POTASSIUM CHLORIDE, CALCIUM CHLORIDE: 600; 310; 30; 20 INJECTION, SOLUTION INTRAVENOUS at 07:08

## 2017-08-16 RX ADMIN — ONDANSETRON 4 MG: 2 INJECTION INTRAMUSCULAR; INTRAVENOUS at 08:08

## 2017-08-16 RX ADMIN — MIDAZOLAM HYDROCHLORIDE 1 MG: 1 INJECTION, SOLUTION INTRAMUSCULAR; INTRAVENOUS at 08:08

## 2017-08-16 RX ADMIN — MOXIFLOXACIN 1 DROP: 5 SOLUTION/ DROPS OPHTHALMIC at 07:08

## 2017-08-16 RX ADMIN — LIDOCAINE HYDROCHLORIDE 4 MG: 40 INJECTION, SOLUTION RETROBULBAR at 08:08

## 2017-08-16 RX ADMIN — PROPARACAINE HYDROCHLORIDE 1 DROP: 5 SOLUTION/ DROPS OPHTHALMIC at 08:08

## 2017-08-16 RX ADMIN — LIDOCAINE HYDROCHLORIDE 4 ML: 40 SOLUTION TOPICAL at 08:08

## 2017-08-16 RX ADMIN — MIDAZOLAM HYDROCHLORIDE 2 MG: 1 INJECTION, SOLUTION INTRAMUSCULAR; INTRAVENOUS at 08:08

## 2017-08-16 RX ADMIN — PHENYLEPHRINE HYDROCHLORIDE 1 DROP: 25 SOLUTION/ DROPS OPHTHALMIC at 07:08

## 2017-08-16 RX ADMIN — FENTANYL CITRATE 25 MCG: 50 INJECTION, SOLUTION INTRAMUSCULAR; INTRAVENOUS at 08:08

## 2017-08-16 RX ADMIN — TROPICAMIDE 1 DROP: 10 SOLUTION/ DROPS OPHTHALMIC at 07:08

## 2017-08-16 RX ADMIN — LIDOCAINE HYDROCHLORIDE 10 MG: 10 INJECTION, SOLUTION EPIDURAL; INFILTRATION; INTRACAUDAL; PERINEURAL at 07:08

## 2017-08-16 RX ADMIN — LIDOCAINE HYDROCHLORIDE 1 ML: 10 INJECTION, SOLUTION EPIDURAL; INFILTRATION; INTRACAUDAL; PERINEURAL at 08:08

## 2017-08-16 NOTE — ANESTHESIA POSTPROCEDURE EVALUATION
"Anesthesia Post Evaluation    Patient: Kenyetta Andersen    Procedure(s) Performed: Procedure(s) (LRB):  phaco/pciol (Right)    Final Anesthesia Type: MAC  Patient location during evaluation: PACU  Patient participation: Yes- Able to Participate  Level of consciousness: awake and alert  Post-procedure vital signs: reviewed and stable  Pain management: adequate  Airway patency: patent  PONV status at discharge: No PONV  Anesthetic complications: no      Cardiovascular status: blood pressure returned to baseline  Respiratory status: unassisted  Hydration status: euvolemic  Follow-up not needed.        Visit Vitals  BP (!) 169/71   Pulse 63   Temp 36.3 °C (97.3 °F) (Skin)   Resp 16   Ht 5' 3" (1.6 m)   Wt 72.1 kg (159 lb)   SpO2 95%   Breastfeeding? No   BMI 28.17 kg/m²       Pain/Amanda Score: Pain Assessment Performed: Yes (8/16/2017  9:20 AM)  Presence of Pain: non-verbal indicators absent (resting) (8/16/2017  9:20 AM)  Amanda Score: 8 (8/16/2017  9:20 AM)      "

## 2017-08-16 NOTE — PROGRESS NOTES
Assessment /Plan     For exam results, see Encounter Report.    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 IOP up a bit, otherwise doing well. Continue drops QID, continue glaucoma drops, add Diamox PO TID until Sunday then stop. Precautions reviewed. RTC 1 week    1. Anatomical narrow angle glaucoma   IOP still around 20 OU last visit, previously in teens. Possible sight changes on clinical exam on last DFE  3/27/17. HRT possible slight progression OS> OD.     IOP had improved with Latanoprost added QHS OU (6/2013) in addition to Cosopt BID OU. Last HVF appeared to have hemianopsia - not present on repeat test - appears stable with priors - stable inferior arc OD, OS inferior arc seems to come and go - not present 2014, but was present exam before that.  Patent LPI OD.  History of post-CE iritis OS.   Continue Cosopt BID OU and latanoprost QHS OU. Started brimonidine TID OU on 7/6/17 - re-evaluate after CE.   Gonio - CHARMAINE pre-CE OD with occasional pain, PAS stable from 2011.       hemianopsia Last HVF unreliable - extremely high false negatives, also possible right debra defect? Not present on repeat exam.   2. Nuclear sclerosis  Difficulty improving past 20/40 OD, angle very narrow, intermittent pain. Recommend CE OD  History of post-CE iritis OS. May also be contributing to narrow angles, also cortical changes on inferior lens may be interfering with reading. Recommend Durezol with CE OD.   3. Pseudophakia  OS - stable   4. Astigmatism with presbyopia Repeat MRx after CE OD

## 2017-08-16 NOTE — BRIEF OP NOTE
Operative Note     SUMMARY     Surgery Date: 8/16/2017     Surgeon(s) and Role:     * Maty Sawant MD - Primary    Pre-op Diagnosis:  Nuclear sclerosis, right [H25.11]    Post-op Diagnosis:  Nuclear sclerosis, right [H25.11]    Procedure(s) (LRB):  phaco/pciol (Right)    Anesthesia: Local MAC    Findings/Key Components:  Same as post op diagnosis    Estimated Blood Loss: * No values recorded between 8/16/2017  8:25 AM and 8/16/2017  8:48 AM *         Specimens     None            Discharge Note      SUMMARY     Admit Date: 8/16/2017    Attending Physician: Maty Sawant*     Discharge Physician: Maty Sawant*    Discharge Date: 8/16/2017     Final Diagnosis: Nuclear sclerosis, right [H25.11]    Hospital Course: The patient was admitted for outpatient surgery and tolerated the procedure well. The patient was discharged home in stable condition the same day.    Diet: Advance as tolerated    Follow-Up: Follow up with Dr. Sawant, next day, as previously scheduled  Activity as tolerated.      Activity: Per Handout Instructions    Disposition: Home or self care    Patient Instructions:   Current Discharge Medication List      CONTINUE these medications which have NOT CHANGED    Details   alendronate (FOSAMAX) 35 MG tablet Take 1 tablet (35 mg total) by mouth every 7 days.  Qty: 4 tablet, Refills: 11    Associated Diagnoses: Osteopenia      ascorbic acid (VITAMIN C) 1000 MG tablet Take 1,000 mg by mouth once daily.      b complex vitamins capsule Take 1 capsule by mouth once daily.      brimonidine 0.2% (ALPHAGAN) 0.2 % Drop Place 1 drop into both eyes 3 (three) times daily.  Qty: 10 mL, Refills: 11      difluprednate (DUREZOL) 0.05 % Drop ophthalmic solution Place 1 drop into the right eye 4 (four) times daily. Start on day of surgery.  Qty: 5 mL, Refills: 2      dorzolamide-timolol 2-0.5% (COSOPT) 22.3-6.8 mg/mL ophthalmic solution PLACE 1 DROP INTO BOTH EYES 2 (TWO) TIMES DAILY.  TWICE A DAY  Qty: 10 mL, Refills: 11      gabapentin (NEURONTIN) 300 MG capsule Take 2 capsules (600 mg total) by mouth 3 (three) times daily.  Qty: 180 capsule, Refills: 6      latanoprost 0.005 % ophthalmic solution PLACE 1 DROP INTO BOTH EYES EVERY EVENING.  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 Start 1 day before cataract surgery.  Qty: 5 mL, Refills: 0      pantoprazole (PROTONIX) 40 MG tablet Take 1 tablet (40 mg total) by mouth 2 (two) times daily before meals.  Qty: 60 tablet, Refills: 2    Associated Diagnoses: GERD with esophagitis      sucralfate (CARAFATE) 1 gram tablet TAKE 1 TABLET (1 G TOTAL) BY MOUTH 4 (FOUR) TIMES DAILY BEFORE MEALS AND NIGHTLY.  Qty: 60 tablet, Refills: 1    Associated Diagnoses: GERD with esophagitis      VITAMIN D2 50,000 unit capsule TAKE ONE CAPSULE BY MOUTH ONCE A WEEK  Qty: 4 capsule, Refills: 0      ketorolac 0.5% (ACULAR) 0.5 % Drop Place 1 drop into the right eye 4 (four) times daily. Start 3 days before surgery.  Qty: 5 mL, Refills: 2             Discharge Procedure Orders (must include Diet, Follow-up, Activity)  No discharge procedures on file.

## 2017-08-16 NOTE — OP NOTE
Date of Surgery: 8/16/2017    Operative Report     Preoperative Diagnosis: Nuclear sclerosis, right eye    Postoperative Diagnosis: Nuclear sclerosis, right eye    Procedure: Phacoemulsification with posterior chamber intraocular lens, right eye    Surgeon: Maty Sawant M.D.    Anesthesia: MAC with topical    Procedure in detail:   Informed consent was obtained. Indications, risks and alternatives to the procedure were explained to the patient. The patient was given the opportunity to ask questions and consented to the procedure in writing. In the preoperative holding area, the patients identity and operative site were confirmed.  Topical anesthetic was administered, consisting of alternating 0.5% Proparacaine and 4% preservative-free Lidocaine Q 5 minutes times 3.  The patient was taken to the operative suite.  A time-out was performed.  The right eye was prepped with 5% Betadine and draped in sterile fashion.  A lid speculum was placed.  A paracentesis was made at the 10 oclock position. 1% preservative-free lidocaine was placed in the anterior chamber, followed by Viscoat.  A bi-planar clear corneal incision was made at the 8 oclock position.  The cystotome was used to begin the continuous curvilinear capsulorrhexis, which was completed with the [employer]trata forceps.  BSS on a blunt-tipped cannula was used to hydrodissect and hydrodelineate the lens nucleus until it was noted to rotate freely.  Phacoemulsification was used in a divide-and-conquer technique to remove the lens nucleus, followed by irrigation and aspiration of the lens cortex.  The capsular bag was inflated with Healon.  The lens, which was an [employer] Acrysoft IQ IOL, model SN60WF, power 20.5, was placed in the capsular bag and rotated into position.  The remaining viscoelastic material was removed by I&A.  The wound and paracentesis were hydrated.  The lens was centered.  The anterior chamber was deep.  The eye pressure was good.  The wounds were  checked and watertight.  The lid speculum and drape were removed.  A drop of Vigamox was placed in the eye.  A clear shield was taped in place over the eye.    TOTAL ULTRASOUND TIME:  52.8 seconds    PERCENTAGE OF TIME IN POSITION 3: 14.1 %    Estimated blood loss:  none    Specimens:   none  Complications:  none    Disposition:   stable to PACU

## 2017-08-16 NOTE — TRANSFER OF CARE
"Anesthesia Transfer of Care Note    Patient: Kenyetta Andersen    Procedure(s) Performed: Procedure(s) (LRB):  phaco/pciol (Right)    Patient location: PACU    Anesthesia Type: MAC    Transport from OR: Transported from OR on room air with adequate spontaneous ventilation    Post pain: adequate analgesia    Post assessment: no apparent anesthetic complications    Post vital signs: stable    Level of consciousness: awake, alert and oriented    Nausea/Vomiting: no nausea/vomiting    Complications: none    Transfer of care protocol was followed      Last vitals:   Visit Vitals  BP (!) 186/81 (BP Location: Right arm, Patient Position: Lying)   Pulse 81   Temp 36.6 °C (97.9 °F) (Oral)   Resp 18   Ht 5' 3" (1.6 m)   Wt 72.1 kg (159 lb)   SpO2 96%   Breastfeeding? No   BMI 28.17 kg/m²     "

## 2017-08-16 NOTE — DISCHARGE INSTRUCTIONS
Discharge Instructions for Cataract Surgery    DR. KERNS:  OFFICE [phone]  CELL [phone] PAGE [phone]  USE DROPS AS INSTRUCTED:    DUREZOL  ONE DROP 4 TIMES A DAY  LEVOFLOXACIN ONE DROP 4 TIMES A DAY  KETEROLAC ONE DROP 4 TIMES A DAY   WAIT 5 MINUTES BETWEEN DROPS     BRING ALL EYE DROPS TO YOUR CLINIC VISITS  YOU MAY TAKE TYLENOL EXTRA STRENGTH FOR PAIN. IF PAIN IS SEVERE AND TYLENOL DOES NOT HELP CALL THE DR.  If you use eye drops for glaucoma continue to use them.    A surgeon removed the cloudy lens in your eye and replaced it with a clear manmade lens. Be sure to have an adult family member or friend drive you home after surgery. Heres what you can expect following surgery and tips for a healthy recovery.  It is normal to have the following:  · Bruised or bloodshot eye for 7 days  · Scratchy, sandlike feeling in the eye for 2 weeks  · Tired feeling, especially during the first 24 hours  Activity  · No driving and Do not drink alcohol for at least 24 hours.  · No bending over past your waist,no strenuous activity, no straining, or lifting more than 10 pounds for the first 2 weeks.  · Relax for the first 24 hours after surgery. Watching TV and reading are OK and wont harm your eye but best if done in a sitting position  Eye Protection  · Do not rub your eye.  · Keep water out of the eye for the first 2 weeks.  · Sleep on your back or on your unoperated side and with head elevated on at least 2 pillows  · You may remove eye shield when you get home, but Wear your eye shield when sleeping for 2 weeks  · Wear sunglasses for comfort as needed.  Using Eyedrops  You may be given special eyedrops or ointment. Here is one way to use eyedrops:  · Tilt your head back.  · Pull your bottom eyelid down.  · Squeeze one drop into your eye. Do not touch your eye with the bottle tip.  · Close your eyes for a few seconds.  · If you need more than one drop, wait a few minutes before adding the next one.   Call  your doctor right away if you have any of the following:  · Bleeding or discharge from the eye  · Your vision suddenly becomes worse  · Pain not relieved by the pain reliever youre told to take  · Significant coughing, or Nausea/ vomiting  · Chills or fever over 101°F   © [id number] [employer] \[facility]\"", [address] [place] [address], [place] [address]. All rights reserved. This information is not intended as a substitute for professional medical care. Always follow your healthcare professional's instructions.

## 2017-08-16 NOTE — INTERVAL H&P NOTE
"  See PCP H&P. No changes noted. Heart and lungs per anesthesia. "This patient has been cleared for surgery in an ambulatory surgery center/facility."    Vitals:    08/16/17 0751   BP: (!) 186/81   Pulse:    Resp:    Temp:        "

## 2017-08-16 NOTE — ANESTHESIA PREPROCEDURE EVALUATION
08/16/2017  Kenyetta Andersen is a 82 y.o., female.    Pre-op Assessment    I have reviewed the Patient Summary Reports.     I have reviewed the Nursing Notes.   I have reviewed the Medications.     Review of Systems  Anesthesia Hx:  No problems with previous Anesthesia Denies Hx of Anesthetic complications    Social:  Non-Smoker    Cardiovascular:   Denies Hypertension.  Denies Valvular problems/Murmurs.  Denies Dysrhythmias.   Denies Angina.    Pulmonary:   Denies Asthma.  Denies Recent URI.    Renal/:   Denies Chronic Renal Disease.     Hepatic/GI:   GERD, poorly controlled Denies Liver Disease.    Neurological:   Neuromuscular Disease, Denies Seizures.    Endocrine:   Denies Diabetes. Denies Hypothyroidism.    Psych:   Denies Psychiatric History.          Physical Exam  General:  Well nourished    Airway/Jaw/Neck:  Airway Findings: Mouth Opening: Normal Tongue: Normal  General Airway Assessment: Adult, Good  Mallampati: I  TM Distance: 4 - 6 cm       Chest/Lungs:  Chest/Lungs Findings: Clear to auscultation, Normal Respiratory Rate     Heart/Vascular:  Heart Findings: Rate: Normal  Rhythm: Regular Rhythm  Sounds: Normal        Mental Status:  Mental Status Findings:  Alert and Oriented, Cooperative         Anesthesia Plan  Type of Anesthesia, risks & benefits discussed:  Anesthesia Type:  MAC  Patient's Preference:   Intra-op Monitoring Plan:   Intra-op Monitoring Plan Comments:   Post Op Pain Control Plan:   Post Op Pain Control Plan Comments:   Induction:   IV  Beta Blocker:  Patient is not currently on a Beta-Blocker (No further documentation required).       Informed Consent: Patient understands risks and agrees with Anesthesia plan.  Questions answered. Anesthesia consent signed with patient.  ASA Score: 2     Day of Surgery Review of History & Physical:    H&P update referred to the surgeon.          Ready For Surgery From Anesthesia Perspective.

## 2017-08-17 ENCOUNTER — OFFICE VISIT (OUTPATIENT)
Dept: OPHTHALMOLOGY | Facility: CLINIC | Age: 82
End: 2017-08-17
Payer: MEDICARE

## 2017-08-17 VITALS
SYSTOLIC BLOOD PRESSURE: 169 MMHG | TEMPERATURE: 97 F | HEART RATE: 86 BPM | HEIGHT: 63 IN | DIASTOLIC BLOOD PRESSURE: 79 MMHG | RESPIRATION RATE: 18 BRPM | WEIGHT: 159 LBS | OXYGEN SATURATION: 97 % | BODY MASS INDEX: 28.17 KG/M2

## 2017-08-17 DIAGNOSIS — H40.2232 CHRONIC ANGLE-CLOSURE GLAUCOMA OF BOTH EYES, MODERATE STAGE: ICD-10-CM

## 2017-08-17 DIAGNOSIS — Z98.890 POST-OPERATIVE STATE: Primary | ICD-10-CM

## 2017-08-17 DIAGNOSIS — Z96.1 PSEUDOPHAKIA, BOTH EYES: ICD-10-CM

## 2017-08-17 DIAGNOSIS — H52.7 REFRACTIVE ERROR: ICD-10-CM

## 2017-08-17 DIAGNOSIS — H40.033 ANATOMICAL NARROW ANGLE GLAUCOMA OF BOTH EYES WITH BORDERLINE INTRAOCULAR PRESSURE: ICD-10-CM

## 2017-08-17 PROCEDURE — 99024 POSTOP FOLLOW-UP VISIT: CPT | Mod: S$GLB,,, | Performed by: OPHTHALMOLOGY

## 2017-08-17 PROCEDURE — 99999 PR PBB SHADOW E&M-EST. PATIENT-LVL III: CPT | Mod: PBBFAC,,, | Performed by: OPHTHALMOLOGY

## 2017-08-17 RX ORDER — ACETAZOLAMIDE 250 MG/1
250 TABLET ORAL 3 TIMES DAILY
Qty: 30 TABLET | Refills: 1 | Status: SHIPPED | OUTPATIENT
Start: 2017-08-17 | End: 2018-01-12

## 2017-08-17 NOTE — PATIENT INSTRUCTIONS
Continue drops 4x per day (Levofloxacin, Prednisolone, Ketorolac).    Take the ACETAZOLAMIDE PILLS 3x per day until Sunday morning then stop.     Call MD immediately if signs of infection occur (pain, redness, blurred vision). Do not wait for next appointment.    Call MD immediately if you experience new floaters/shadows in your vision, flashes of light, or a curtain or veil appearing to in your vision.    Cell number [phone].

## 2017-08-21 ENCOUNTER — OFFICE VISIT (OUTPATIENT)
Dept: OPHTHALMOLOGY | Facility: CLINIC | Age: 82
End: 2017-08-21
Payer: MEDICARE

## 2017-08-21 DIAGNOSIS — H40.033 ANATOMICAL NARROW ANGLE GLAUCOMA OF BOTH EYES WITH BORDERLINE INTRAOCULAR PRESSURE: ICD-10-CM

## 2017-08-21 DIAGNOSIS — H52.7 REFRACTIVE ERROR: ICD-10-CM

## 2017-08-21 DIAGNOSIS — Z96.1 PSEUDOPHAKIA, BOTH EYES: ICD-10-CM

## 2017-08-21 DIAGNOSIS — H40.2232 CHRONIC ANGLE-CLOSURE GLAUCOMA OF BOTH EYES, MODERATE STAGE: ICD-10-CM

## 2017-08-21 DIAGNOSIS — Z98.890 POST-OPERATIVE STATE: Primary | ICD-10-CM

## 2017-08-21 PROCEDURE — 99024 POSTOP FOLLOW-UP VISIT: CPT | Mod: S$GLB,,, | Performed by: OPHTHALMOLOGY

## 2017-08-21 PROCEDURE — 99999 PR PBB SHADOW E&M-EST. PATIENT-LVL III: CPT | Mod: PBBFAC,,, | Performed by: OPHTHALMOLOGY

## 2017-08-21 NOTE — PATIENT INSTRUCTIONS
Decrease Durezol to 3x per day for 1 week, then 2x per day for 2 WEEKS, then once daily for 2 WEEKS.     Continue Ketorolac 4x per day.     Continue glaucoma drops.     Use Vigamox until gone.

## 2017-08-21 NOTE — PROGRESS NOTES
HPI     Post-op Evaluation    Additional comments: 1 wk s/p phaco iol OD 8/18           Comments   Pt states doing well no pain but occasional scratchy feeling. No flashes   or floaters.    Gtts: Ketorolac, Durezol, Levofloxacin QID OD, Latanoprost QHS,   Brimonidine TID, Cosopt BID OU        Last edited by Cheryle Quintana on 8/21/2017  8:09 AM. (History)            Assessment /Plan     For exam results, see Encounter Report.    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 IOP coming down a bit off diamox, otherwise doing well. Decrease Durezol to TID for 1 week, then BID for 2 WEEKS, then QD for 2 WEEKS. Continue Ketorolac QID, continue glaucoma drops. Use Vigamox until gone.    1. Anatomical narrow angle glaucoma   IOP still around 20 OU prior to CE, previously in teens. Possible sight changes on clinical exam on last DFE  3/27/17. HRT possible slight progression OS> OD.     IOP had improved with Latanoprost added QHS OU (6/2013) in addition to Cosopt BID OU. Last HVF appeared to have hemianopsia - not present on repeat test - appears stable with priors - stable inferior arc OD, OS inferior arc seems to come and go - not present 2014, but was present exam before that.  Patent LPI OD.  History of post-CE iritis OS.   Continue Cosopt BID OU and latanoprost QHS OU. Started brimonidine TID OU on 7/6/17 - re-evaluate after CE.   Gonio - CHARMAINE pre-CE OD with occasional pain, PAS stable from 2011.       hemianopsia Last HVF unreliable - extremely high false negatives, also possible right debra defect? Not present on repeat exam.   2. Nuclear sclerosis  Difficulty improving past 20/40 OD, angle very narrow, intermittent pain. Recommend CE OD  History of post-CE iritis OS. May also be contributing to narrow  angles, also cortical changes on inferior lens may be interfering with reading. Recommend Durezol with CE OD.   3. Pseudophakia  OS - stable   4. Astigmatism with presbyopia Repeat MRx after CE OD

## 2017-09-25 ENCOUNTER — OFFICE VISIT (OUTPATIENT)
Dept: OPHTHALMOLOGY | Facility: CLINIC | Age: 82
End: 2017-09-25
Payer: MEDICARE

## 2017-09-25 DIAGNOSIS — H40.033 ANATOMICAL NARROW ANGLE GLAUCOMA OF BOTH EYES WITH BORDERLINE INTRAOCULAR PRESSURE: ICD-10-CM

## 2017-09-25 DIAGNOSIS — Z96.1 PSEUDOPHAKIA, BOTH EYES: ICD-10-CM

## 2017-09-25 DIAGNOSIS — H35.372 EPIRETINAL MEMBRANE, LEFT: ICD-10-CM

## 2017-09-25 DIAGNOSIS — H52.7 REFRACTIVE ERROR: ICD-10-CM

## 2017-09-25 DIAGNOSIS — Z98.890 POST-OPERATIVE STATE: Primary | ICD-10-CM

## 2017-09-25 DIAGNOSIS — K21.00 GERD WITH ESOPHAGITIS: ICD-10-CM

## 2017-09-25 PROCEDURE — 99024 POSTOP FOLLOW-UP VISIT: CPT | Mod: S$GLB,,, | Performed by: OPHTHALMOLOGY

## 2017-09-25 PROCEDURE — 92134 CPTRZ OPH DX IMG PST SGM RTA: CPT | Mod: S$GLB,,, | Performed by: OPHTHALMOLOGY

## 2017-09-25 PROCEDURE — 99999 PR PBB SHADOW E&M-EST. PATIENT-LVL III: CPT | Mod: PBBFAC,,, | Performed by: OPHTHALMOLOGY

## 2017-09-25 RX ORDER — PANTOPRAZOLE SODIUM 40 MG/1
40 TABLET, DELAYED RELEASE ORAL DAILY
Qty: 30 TABLET | Refills: 2 | Status: SHIPPED | OUTPATIENT
Start: 2017-09-25 | End: 2018-03-10 | Stop reason: SDUPTHER

## 2017-09-25 NOTE — PROGRESS NOTES
HPI     Post-op Evaluation    Additional comments: od           Comments   1 month IOP check, denies eye pain , finished eye gtt taper as directed.   Using COSOPT, BRIMONIDINE ou BID, Latanoprost ou hs,     Every once in a while a little scratchy.        Last edited by Maty Sawant MD on 9/25/2017 10:07 AM.   (History)            Assessment /Plan     For exam results, see Encounter Report.    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 IOP 17 OU today, doing well off Durezol/Ketorolac.     1. Anatomical narrow angle glaucoma   IOP still around 20 OU prior to CE, previously in teens. Possible sight changes on clinical exam on last DFE  3/27/17. HRT possible slight progression OS> OD.     IOP had improved with Latanoprost added QHS OU (6/2013) in addition to Cosopt BID OU. Last HVF appeared to have hemianopsia - not present on repeat test - appears stable with priors - stable inferior arc OD, OS inferior arc seems to come and go - not present 2014, but was present exam before that.  Patent LPI OD.  History of post-CE iritis OS.   Continue Cosopt BID OU and latanoprost QHS OU. Started brimonidine TID OU on 7/6/17 - re-evaluate after CE.   Gonio - CHARMAINE pre-CE OD with occasional pain, PAS stable from 2011.       hemianopsia Last HVF unreliable - extremely high false negatives, also possible right debra defect? Not present on repeat exam.   2. Nuclear sclerosis  Now pseudopakic OU - OD 8/16/17, OS 11/6/08   3. Pseudophakia  OS - stable    Epiretinal membrane OS VA not as good today at 1 month post op OD as OS, however OCT shows ERM OS, OD WNL.    4. Astigmatism with presbyopia MRx given

## 2017-10-20 ENCOUNTER — LAB VISIT (OUTPATIENT)
Dept: LAB | Facility: HOSPITAL | Age: 82
End: 2017-10-20
Attending: INTERNAL MEDICINE
Payer: MEDICARE

## 2017-10-20 DIAGNOSIS — C18.2 MALIGNANT NEOPLASM OF ASCENDING COLON: ICD-10-CM

## 2017-10-20 LAB
ALBUMIN SERPL BCP-MCNC: 3.1 G/DL
ALP SERPL-CCNC: 91 U/L
ALT SERPL W/O P-5'-P-CCNC: 13 U/L
ANION GAP SERPL CALC-SCNC: 7 MMOL/L
AST SERPL-CCNC: 18 U/L
BASOPHILS # BLD AUTO: 0 K/UL
BASOPHILS NFR BLD: 0.4 %
BILIRUB SERPL-MCNC: 0.3 MG/DL
BUN SERPL-MCNC: 10 MG/DL
CALCIUM SERPL-MCNC: 9 MG/DL
CEA SERPL-MCNC: 2.5 NG/ML
CHLORIDE SERPL-SCNC: 106 MMOL/L
CO2 SERPL-SCNC: 30 MMOL/L
CREAT SERPL-MCNC: 0.9 MG/DL
DIFFERENTIAL METHOD: ABNORMAL
EOSINOPHIL # BLD AUTO: 0.2 K/UL
EOSINOPHIL NFR BLD: 2.3 %
ERYTHROCYTE [DISTWIDTH] IN BLOOD BY AUTOMATED COUNT: 12.9 %
EST. GFR  (AFRICAN AMERICAN): >60 ML/MIN/1.73 M^2
EST. GFR  (NON AFRICAN AMERICAN): >60 ML/MIN/1.73 M^2
GLUCOSE SERPL-MCNC: 95 MG/DL
HCT VFR BLD AUTO: 40.4 %
HGB BLD-MCNC: 13.6 G/DL
LYMPHOCYTES # BLD AUTO: 3.6 K/UL
LYMPHOCYTES NFR BLD: 38.2 %
MCH RBC QN AUTO: 32.2 PG
MCHC RBC AUTO-ENTMCNC: 33.6 G/DL
MCV RBC AUTO: 96 FL
MONOCYTES # BLD AUTO: 0.8 K/UL
MONOCYTES NFR BLD: 8.3 %
NEUTROPHILS # BLD AUTO: 4.8 K/UL
NEUTROPHILS NFR BLD: 50.8 %
PLATELET # BLD AUTO: 225 K/UL
PMV BLD AUTO: 7.7 FL
POTASSIUM SERPL-SCNC: 4 MMOL/L
PROT SERPL-MCNC: 6.4 G/DL
RBC # BLD AUTO: 4.22 M/UL
SODIUM SERPL-SCNC: 143 MMOL/L
WBC # BLD AUTO: 9.5 K/UL

## 2017-10-20 PROCEDURE — 82378 CARCINOEMBRYONIC ANTIGEN: CPT

## 2017-10-20 PROCEDURE — 80053 COMPREHEN METABOLIC PANEL: CPT

## 2017-10-20 PROCEDURE — 85025 COMPLETE CBC W/AUTO DIFF WBC: CPT

## 2017-10-20 PROCEDURE — 36415 COLL VENOUS BLD VENIPUNCTURE: CPT

## 2017-10-25 ENCOUNTER — OFFICE VISIT (OUTPATIENT)
Dept: HEMATOLOGY/ONCOLOGY | Facility: CLINIC | Age: 82
End: 2017-10-25
Payer: MEDICARE

## 2017-10-25 VITALS
DIASTOLIC BLOOD PRESSURE: 79 MMHG | WEIGHT: 162.69 LBS | HEIGHT: 63 IN | TEMPERATURE: 98 F | RESPIRATION RATE: 18 BRPM | BODY MASS INDEX: 28.82 KG/M2 | HEART RATE: 75 BPM | SYSTOLIC BLOOD PRESSURE: 177 MMHG

## 2017-10-25 DIAGNOSIS — D75.89 MACROCYTOSIS: ICD-10-CM

## 2017-10-25 DIAGNOSIS — E53.8 B12 DEFICIENCY: ICD-10-CM

## 2017-10-25 DIAGNOSIS — C18.2 MALIGNANT NEOPLASM OF ASCENDING COLON: Primary | ICD-10-CM

## 2017-10-25 DIAGNOSIS — C18.8 OVERLAPPING MALIGNANT NEOPLASM OF COLON: ICD-10-CM

## 2017-10-25 PROCEDURE — 99214 OFFICE O/P EST MOD 30 MIN: CPT | Mod: S$GLB,,, | Performed by: INTERNAL MEDICINE

## 2017-10-25 PROCEDURE — 99499 UNLISTED E&M SERVICE: CPT | Mod: S$GLB,,, | Performed by: INTERNAL MEDICINE

## 2017-10-25 PROCEDURE — 99999 PR PBB SHADOW E&M-EST. PATIENT-LVL IV: CPT | Mod: PBBFAC,,, | Performed by: INTERNAL MEDICINE

## 2017-10-25 NOTE — PROGRESS NOTES
Subjective:       Patient ID: Kenyetta Andersen is a 82 y.o. female.    Chief Complaint: f/u  HPI:   Kenyetta Andersen, the patient known to me, comes for followup. The patient has    known macrocytosis. No other issues. The patient was treated with colorectal    carcinoma, FOLFOX therapy for stage III. She comes in with lab evaluations.    Scans have been postponed to once a year because of 2009 diagnosis. Voices no    Complaints.she had macrocytosis, and B!2 was in the 380 range , she is more compliant using b12 now has htn and is under good control. pcp is Dr. pedraza    REVIEW OF SYSTEMS:     CONSTITUTIONAL: The patient denies any weight change. There is no apparent    change in appetite, fever, night sweats, headaches, fatigue, dizziness, or    weakness.      SKIN: Denies rash, issues with nails, non-healing sores, bleeding, blotching    skin or abnormal bruising. Denies new moles or changes to existing moles.      BREASTS: There is no swelling around breasts or nipple discharge.    EYES: Denies eye pain, blurred vision, swelling, redness or discharge.      ENT AND MOUTH: Denies runny nose, stuffiness, sinus trouble or sores. Denies    nosebleeds. Denies, hoarseness, change in voice or swelling in front of the    neck.      CARDIOVASCULAR: Denies chest pain, discomfort or palpitations. Denies neck    swelling or episodes of passing out.      RESPIRATORY: Denies cough, sputum production, blood in sputum, and denies    shortness of breath.      GI: Denies trouble swallowing, indigestion, heartburn, abdominal pain, nausea,    vomiting, diarrhea, altered bowel habits, blood in stool, discoloration of    stools, change in nature of stool, bloating, increased abdominal girth.      GENITOURINARY: No discharge. No pelvic pain or lumps. No rash around groin or  lesions. No urinary frequency, hesitation, painful urination or blood in    urine. Denies incontinence. No problems with intercourse.      MUSCULOSKELETAL: Denies  neck or back pain. Denies weakness in arms or legs,    joint problems or distended inflamed veins in legs. Denies swelling or abnormal  glands.      NEUROLOGICAL: Denies tingling, numbness, altered mentation changes to nerve    function in the face, weakness to one or both of the body. Denies changes to    gait and denies multiple falls or accidents.      PHYSICAL EXAM:     Wt Readings from Last 3 Encounters:   10/25/17 73.8 kg (162 lb 11.2 oz)   08/15/17 72.1 kg (159 lb)   08/07/17 72.4 kg (159 lb 9.8 oz)     Temp Readings from Last 3 Encounters:   10/25/17 97.8 °F (36.6 °C) (Oral)   08/16/17 97.3 °F (36.3 °C) (Skin)   08/07/17 98.3 °F (36.8 °C) (Oral)     BP Readings from Last 3 Encounters:   10/25/17 (!) 177/79   08/16/17 (!) 169/79   08/07/17 127/68     Pulse Readings from Last 3 Encounters:   10/25/17 75   08/16/17 86   08/07/17 76     GENERAL: Comfortable looking patient. Patient is in no distress.  Awake, alert and oriented to time, person and place.  No anxiety, or agitation.      HEENT: Normal conjunctivae and eyelids. WNL.  PERRLA 3 to 4 mm. No icterus, no pallor, no congestion, and no discharge noted.     NECK:  Supple. Trachea is central.  No crepitus.  No JVD or masses.    RESPIRATORY:  No intercostal retractions.  No dullness to percussion.  Chest is clear to auscultation.  No rales, rhonchi or wheezes.  No crepitus.  Good air entry bilaterally.    CARDIOVASCULAR:  S1 and S2 are normally heard without murmurs or gallops.  All peripheral pulses are present.    ABDOMEN:  Normal abdomen.  No hepatosplenomegaly.  No free fluid.  Bowel sounds are present.  No hernia noted. No masses.  No rebound or tenderness.  No guarding or rigidity.  Umbilicus is midline.    LYMPHATICS:  No axillary, cervical, supraclavicular, submental, or inguinal lymphadenopathy.    SKIN/MUSCULOSKELETAL:  There is no evidence of excoriation marks or ecchmosis.  No rashes.  No cyanosis.  No clubbing.  No joint or skeletal deformities  noted.  Normal range of motion.    NEUROLOGIC:  Higher functions are appropriate.  No cranial nerve deficits.  Normal ciara.  Normal strength.  Motor and sensory functions are normal.  Deep tendon reflexes are normal.    GENITAL/RECTAL:  Exams are deferred.      Laboratory:     CBC:  Lab Results   Component Value Date    WBC 9.50 10/20/2017    RBC 4.22 10/20/2017    HGB 13.6 10/20/2017    HCT 40.4 10/20/2017    MCV 96 10/20/2017    MCH 32.2 (H) 10/20/2017    MCHC 33.6 10/20/2017    RDW 12.9 10/20/2017     10/20/2017    MPV 7.7 (L) 10/20/2017    GRAN 4.8 10/20/2017    GRAN 50.8 10/20/2017    LYMPH 3.6 10/20/2017    LYMPH 38.2 10/20/2017    MONO 0.8 10/20/2017    MONO 8.3 10/20/2017    EOS 0.2 10/20/2017    BASO 0.00 10/20/2017    EOSINOPHIL 2.3 10/20/2017    BASOPHIL 0.4 10/20/2017       BMP: BMP  Lab Results   Component Value Date     10/20/2017    K 4.0 10/20/2017     10/20/2017    CO2 30 (H) 10/20/2017    BUN 10 10/20/2017    CREATININE 0.9 10/20/2017    CALCIUM 9.0 10/20/2017    ANIONGAP 7 (L) 10/20/2017    ESTGFRAFRICA >60 10/20/2017    EGFRNONAA >60 10/20/2017       LFT:   Lab Results   Component Value Date    ALT 13 10/20/2017    AST 18 10/20/2017    ALKPHOS 91 10/20/2017    BILITOT 0.3 10/20/2017     Lab Results   Component Value Date    HHHYJHYB39 >2000 (H) 04/12/2017     Most recent colonoscopy 3/2017  Assessment/Plan:     colon ca stage III dx 2009.  small 4mm lung nodule has disppeared  1. Cont supplements daily  2. Scans doing well cont f/u small area in lung that needs f/u  4. Macrocytosis resolved with b12 supplements  5.cbc, cmp, cea and ct of abd and pevis and chest  In 4/2018 then q yr if all stays the same add b12

## 2017-11-05 DIAGNOSIS — M85.80 OSTEOPENIA: ICD-10-CM

## 2017-11-06 RX ORDER — ALENDRONATE SODIUM 35 MG/1
35 TABLET ORAL
Qty: 4 TABLET | Refills: 0 | Status: SHIPPED | OUTPATIENT
Start: 2017-11-06 | End: 2017-12-30 | Stop reason: SDUPTHER

## 2017-11-10 RX ORDER — GABAPENTIN 300 MG/1
600 CAPSULE ORAL 3 TIMES DAILY
Qty: 540 CAPSULE | Refills: 1 | Status: SHIPPED | OUTPATIENT
Start: 2017-11-10 | End: 2018-06-02 | Stop reason: SDUPTHER

## 2017-12-21 ENCOUNTER — OFFICE VISIT (OUTPATIENT)
Dept: OPHTHALMOLOGY | Facility: CLINIC | Age: 82
End: 2017-12-21
Payer: MEDICARE

## 2017-12-21 DIAGNOSIS — H35.372 EPIRETINAL MEMBRANE, LEFT: ICD-10-CM

## 2017-12-21 DIAGNOSIS — H20.9 IRITIS OF RIGHT EYE: ICD-10-CM

## 2017-12-21 DIAGNOSIS — H40.033 ANATOMICAL NARROW ANGLE GLAUCOMA OF BOTH EYES WITH BORDERLINE INTRAOCULAR PRESSURE: ICD-10-CM

## 2017-12-21 DIAGNOSIS — Z96.1 PSEUDOPHAKIA, BOTH EYES: Primary | ICD-10-CM

## 2017-12-21 DIAGNOSIS — H52.7 REFRACTIVE ERROR: ICD-10-CM

## 2017-12-21 PROCEDURE — 99999 PR PBB SHADOW E&M-EST. PATIENT-LVL III: CPT | Mod: PBBFAC,,, | Performed by: OPHTHALMOLOGY

## 2017-12-21 PROCEDURE — 92012 INTRM OPH EXAM EST PATIENT: CPT | Mod: S$GLB,,, | Performed by: OPHTHALMOLOGY

## 2017-12-21 RX ORDER — LATANOPROST 50 UG/ML
SOLUTION/ DROPS OPHTHALMIC
Qty: 2.5 ML | Refills: 11 | Status: SHIPPED | OUTPATIENT
Start: 2017-12-21 | End: 2018-11-30 | Stop reason: SDUPTHER

## 2017-12-21 RX ORDER — PREDNISOLONE ACETATE 10 MG/ML
1 SUSPENSION/ DROPS OPHTHALMIC 3 TIMES DAILY
Qty: 5 ML | Refills: 0 | Status: SHIPPED | OUTPATIENT
Start: 2017-12-21 | End: 2018-05-07

## 2017-12-21 NOTE — PROGRESS NOTES
HPI     Presenting Complaint: Pt here today for 3 month IOP check.   Ophthalmic medication / drops:  (+) Cosopt 1 drop twice a day both eyes  (+) Latanoprost 1 drop at bedtime both eyes    (+) Pt states getting pain in right eye over the last week. Pt states    Bright lights hurt the right eye. Pt states both eyes have been tearing.   (-) headaches  (-) diplopia   (-) flashes / (-) floaters      Agree with above. OD tender to touch up top.    Last edited by Maty Sawant MD on 12/21/2017 11:01 AM.   (History)            Assessment /Plan     For exam results, see Encounter Report.    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 Through 12/25, then 2x per day until next visit.  Dispense: 5 mL; Refill: 0            4 month s/p Phaco/PCIOL OD on 8/16/17. IOP good, but now with some recent rebound iritis. Re-start Prednisolone TID, wean down on Monday to BID until next visit.     1. Anatomical narrow angle glaucoma   IOP great today, was still around 20 OU prior to CE, previously in teens. Possible sight changes on clinical exam on last DFE  3/27/17. Last HRT possible slight progression OS> OD.     IOP had improved with Latanoprost added QHS OU (6/2013) in addition to Cosopt BID OU. Last HVF appeared to have hemianopsia - not present on repeat test - appears stable with priors - stable inferior arc OD, OS inferior arc seems to come and go - not present 2014, but was present exam before that.  Patent LPI OD.  History of post-CE iritis OS.   Continue Cosopt BID OU and latanoprost QHS OU. Started brimonidine TID OU on 7/6/17 - re-evaluate after CE.   Gonio - CHARMAINE pre-CE OD with occasional pain, PAS stable from 2011.       hemianopsia Last HVF unreliable -  extremely high false negatives, also possible right debra defect? Not present on repeat exam.   2. Nuclear sclerosis  Now pseudopakic OU - OD 8/16/17, OS 11/6/08   3. Pseudophakia  OS - stable    Epiretinal membrane OS VA good today at 1 month post op OD as OS, however OCT shows ERM OS, OD WNL.    4. Astigmatism with presbyopia MRx given last viist

## 2017-12-30 DIAGNOSIS — M85.80 OSTEOPENIA: ICD-10-CM

## 2018-01-02 RX ORDER — ALENDRONATE SODIUM 35 MG/1
35 TABLET ORAL
Qty: 4 TABLET | Refills: 5 | Status: SHIPPED | OUTPATIENT
Start: 2018-01-02 | End: 2018-02-28

## 2018-01-09 ENCOUNTER — DOCUMENTATION ONLY (OUTPATIENT)
Dept: FAMILY MEDICINE | Facility: CLINIC | Age: 83
End: 2018-01-09

## 2018-01-09 NOTE — PROGRESS NOTES
Pre-Visit Chart Review  For Appointment Scheduled on 1-12-18    There are no preventive care reminders to display for this patient.

## 2018-01-12 ENCOUNTER — HOSPITAL ENCOUNTER (OUTPATIENT)
Dept: RADIOLOGY | Facility: CLINIC | Age: 83
Discharge: HOME | End: 2018-01-12
Attending: FAMILY MEDICINE
Payer: MEDICARE

## 2018-01-12 ENCOUNTER — OFFICE VISIT (OUTPATIENT)
Dept: FAMILY MEDICINE | Facility: CLINIC | Age: 83
End: 2018-01-12
Attending: FAMILY MEDICINE
Payer: MEDICARE

## 2018-01-12 VITALS
BODY MASS INDEX: 28.32 KG/M2 | OXYGEN SATURATION: 93 % | HEIGHT: 63 IN | WEIGHT: 159.81 LBS | RESPIRATION RATE: 20 BRPM | TEMPERATURE: 98 F | SYSTOLIC BLOOD PRESSURE: 128 MMHG | HEART RATE: 75 BPM | DIASTOLIC BLOOD PRESSURE: 78 MMHG

## 2018-01-12 DIAGNOSIS — J18.9 PNEUMONIA OF LEFT UPPER LOBE DUE TO INFECTIOUS ORGANISM: Primary | ICD-10-CM

## 2018-01-12 DIAGNOSIS — J18.9 PNEUMONIA OF LEFT UPPER LOBE DUE TO INFECTIOUS ORGANISM: ICD-10-CM

## 2018-01-12 DIAGNOSIS — J45.41 MODERATE PERSISTENT REACTIVE AIRWAY DISEASE WITH ACUTE EXACERBATION: ICD-10-CM

## 2018-01-12 PROCEDURE — 71046 X-RAY EXAM CHEST 2 VIEWS: CPT | Mod: 26,,, | Performed by: RADIOLOGY

## 2018-01-12 PROCEDURE — 99214 OFFICE O/P EST MOD 30 MIN: CPT | Mod: S$GLB,,, | Performed by: FAMILY MEDICINE

## 2018-01-12 PROCEDURE — 99999 PR PBB SHADOW E&M-EST. PATIENT-LVL IV: CPT | Mod: PBBFAC,,, | Performed by: FAMILY MEDICINE

## 2018-01-12 PROCEDURE — 71046 X-RAY EXAM CHEST 2 VIEWS: CPT | Mod: TC,FY,PO

## 2018-01-12 PROCEDURE — 99499 UNLISTED E&M SERVICE: CPT | Mod: S$GLB,,, | Performed by: FAMILY MEDICINE

## 2018-01-12 RX ORDER — ALBUTEROL SULFATE 90 UG/1
2 AEROSOL, METERED RESPIRATORY (INHALATION) EVERY 6 HOURS PRN
Qty: 8 G | Refills: 0 | Status: SHIPPED | OUTPATIENT
Start: 2018-01-12 | End: 2018-02-01

## 2018-01-12 RX ORDER — BUDESONIDE AND FORMOTEROL FUMARATE DIHYDRATE 160; 4.5 UG/1; UG/1
2 AEROSOL RESPIRATORY (INHALATION) EVERY 12 HOURS
Qty: 6 G | Refills: 0 | Status: SHIPPED | OUTPATIENT
Start: 2018-01-12 | End: 2018-02-08 | Stop reason: SDUPTHER

## 2018-01-12 RX ORDER — AZITHROMYCIN 250 MG/1
TABLET, FILM COATED ORAL
Qty: 6 TABLET | Refills: 0 | Status: SHIPPED | OUTPATIENT
Start: 2018-01-12 | End: 2018-01-17

## 2018-01-12 NOTE — PROGRESS NOTES
Subjective:       Patient ID: Kenyetta Andersen is a 82 y.o. female.    Chief Complaint: Cough (chest congestion) and Dizziness    82-year-old female comes in with a three-week history of persistent cold mainly describing chest congestion with some tightness and a persistent cough bringing up somewhat dark yellow sputum.  She has had some night sweats but has not noted any fever or chills.  She has some soreness in the right chest associated with cough but no pleuritic pain.  She did have some mild head congestion which persists.  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  No date: Anxiety  No date: Arthritis  No date: Blood transfusion  No date: Cataract      Comment: Done OU  No date: Colon cancer  3/14/2017: Colon polyps  No date: GERD (gastroesophageal reflux disease)  2/24/2012: Glaucoma  8/27/2012: Nuclear sclerosis  11/17/2015: Personal history of colon cancer  2/24/2012: Pseudophakia - Left Eye    Past Surgical History:  No date: APPENDECTOMY  No date: CATARACT EXTRACTION W/  INTRAOCULAR LENS IMPLA* Left  No date: CATARACT EXTRACTION W/  INTRAOCULAR LENS IMPLA* Right      Comment: Dr Sawant  No date: COLON SURGERY      Comment: resection  3/14/2017: COLONOSCOPY N/A      Comment: Procedure: COLONOSCOPY;  Surgeon: Killian Guerrier MD;  Location: [facility];  Service:                Endoscopy;  Laterality: N/A;  No date: ECTOPIC PREGNANCY SURGERY  No date: EYE SURGERY  No date: HYSTERECTOMY      Comment: complete  No date: JOINT REPLACEMENT      Comment: Liban Knee  No date: ROTATOR CUFF REPAIR Right  No date: TOE SURGERY      Comment: 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      Current Outpatient Prescriptions:     alendronate (FOSAMAX) 35 MG tablet, TAKE 1 TABLET (35 MG TOTAL) BY MOUTH EVERY 7 DAYS., Disp: 4 tablet, Rfl: 5    ascorbic acid (VITAMIN C) 1000 MG tablet, Take 1,000 mg by mouth once daily., Disp: ,  Rfl:     b complex vitamins capsule, Take 1 capsule by mouth once daily., Disp: , Rfl:     brimonidine 0.2% (ALPHAGAN) 0.2 % Drop, Place 1 drop into both eyes 3 (three) times daily., Disp: 10 mL, Rfl: 11    dorzolamide-timolol 2-0.5% (COSOPT) 22.3-6.8 mg/mL ophthalmic solution, PLACE 1 DROP INTO BOTH EYES 2 (TWO) TIMES DAILY. TWICE A DAY, Disp: 10 mL, Rfl: 11    gabapentin (NEURONTIN) 300 MG capsule, Take 2 capsules (600 mg total) by mouth 3 (three) times daily., Disp: 540 capsule, Rfl: 1    latanoprost 0.005 % ophthalmic solution, PLACE 1 DROP INTO BOTH EYES EVERY EVENING., Disp: 2.5 mL, Rfl: 11    pantoprazole (PROTONIX) 40 MG tablet, TAKE 1 TABLET (40 MG TOTAL) BY MOUTH ONCE DAILY., Disp: 30 tablet, Rfl: 2    prednisoLONE acetate (PRED FORTE) 1 % DrpS, Place 1 drop into the right eye 3 (three) times daily. Through 12/25, then 2x per day until next visit., Disp: 5 mL, Rfl: 0    VITAMIN D2 50,000 unit capsule, TAKE ONE CAPSULE BY MOUTH ONCE A WEEK, Disp: 4 capsule, Rfl: 0      There are no preventive care reminders to display for this patient.        Review of Systems   Constitutional: Positive for diaphoresis. Negative for chills and fatigue.   HENT: Positive for congestion, postnasal drip and rhinorrhea. Negative for ear pain, sinus pain and sinus pressure.    Respiratory: Positive for cough and stridor. Negative for chest tightness, shortness of breath and wheezing.    Cardiovascular: Negative for chest pain and palpitations.   Gastrointestinal: Negative for nausea and vomiting.       Objective:      Physical Exam   HENT:   Head: Normocephalic and atraumatic.   Right Ear: Hearing, tympanic membrane, external ear and ear canal normal.   Left Ear: Hearing, tympanic membrane, external ear and ear canal normal.   Nose: Mucosal edema and rhinorrhea present. Right sinus exhibits no maxillary sinus tenderness and no frontal sinus tenderness. Left sinus exhibits no maxillary sinus tenderness and no frontal sinus  tenderness.   Mouth/Throat: Posterior oropharyngeal erythema present. No oropharyngeal exudate, posterior oropharyngeal edema or tonsillar abscesses.   Eyes: EOM are normal. Pupils are equal, round, and reactive to light. No scleral icterus.   Neck: Normal range of motion. Neck supple. No JVD present. No tracheal deviation present. No thyromegaly present.   Cardiovascular: Normal rate, regular rhythm and normal heart sounds.  Exam reveals no gallop and no friction rub.    No murmur heard.  Pulmonary/Chest: No accessory muscle usage. Tachypnea noted. She has no decreased breath sounds. She has wheezes in the left upper field, the left middle field and the left lower field. She has rhonchi in the left upper field, the left middle field and the left lower field. She has no rales. Chest wall is dull to percussion (Slight dullness left scapular area relative to right). She exhibits crepitus (left scapular area). She exhibits no tenderness, no deformity and no retraction.   Lymphadenopathy:     She has no cervical adenopathy.       Assessment:       1. Pneumonia of left upper lobe due to infectious organism    2. Moderate persistent reactive airway disease with acute exacerbation        Plan:       1. Pneumonia of left upper lobe due to infectious organism  Chest x-ray negative per radiology and reviewed by me also is clear of infiltrates.  - X-Ray Chest PA And Lateral; Future  - azithromycin (Z-VASILE) 250 MG tablet; Take 2 tablets by mouth on day 1; Take 1 tablet by mouth on days 2-5  Dispense: 6 tablet; Refill: 0    2. Moderate persistent reactive airway disease with acute exacerbation  - azithromycin (Z-VASILE) 250 MG tablet; Take 2 tablets by mouth on day 1; Take 1 tablet by mouth on days 2-5  Dispense: 6 tablet; Refill: 0  - budesonide-formoterol 160-4.5 mcg (SYMBICORT) 160-4.5 mcg/actuation HFAA; Inhale 2 puffs into the lungs every 12 (twelve) hours. Controller  Dispense: 6 g; Refill: 0  - albuterol 90 mcg/actuation  inhaler; Inhale 2 puffs into the lungs every 6 (six) hours as needed (wheezing or coughing). Rescue  Dispense: 8 g; Refill: 0

## 2018-02-01 ENCOUNTER — OFFICE VISIT (OUTPATIENT)
Dept: OPHTHALMOLOGY | Facility: CLINIC | Age: 83
End: 2018-02-01
Payer: MEDICARE

## 2018-02-01 DIAGNOSIS — H52.7 REFRACTIVE ERROR: ICD-10-CM

## 2018-02-01 DIAGNOSIS — H40.033 ANATOMICAL NARROW ANGLE GLAUCOMA OF BOTH EYES WITH BORDERLINE INTRAOCULAR PRESSURE: ICD-10-CM

## 2018-02-01 DIAGNOSIS — Z96.1 PSEUDOPHAKIA, BOTH EYES: ICD-10-CM

## 2018-02-01 DIAGNOSIS — H20.9 IRITIS OF RIGHT EYE: Primary | ICD-10-CM

## 2018-02-01 DIAGNOSIS — H35.372 EPIRETINAL MEMBRANE, LEFT: ICD-10-CM

## 2018-02-01 PROCEDURE — 99999 PR PBB SHADOW E&M-EST. PATIENT-LVL III: CPT | Mod: PBBFAC,,, | Performed by: OPHTHALMOLOGY

## 2018-02-01 PROCEDURE — 92012 INTRM OPH EXAM EST PATIENT: CPT | Mod: S$GLB,,, | Performed by: OPHTHALMOLOGY

## 2018-02-01 NOTE — PROGRESS NOTES
HPI     F/u AC check, states she felt a ache in the right eye yesterday for a   while but it has since resolved. Using Pred OD BID, Latanoprost HS OU,   Brimonidine OU BID and Cosopt OU BID. States compliance. Having trouble   seeing to play cards with current specs.     Last edited by Jumana Cuba on 2/1/2018 10:28 AM. (History)            Assessment /Plan     For exam results, see Encounter Report.    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 IOP good, but with some recent rebound iritis, now resolved on Prednisolone BID. Decrease to QD for 1 month, then QOD for a bit. Call or RTC if symptoms recur.    1. Anatomical narrow angle glaucoma   IOP great today, was still around 20 OU prior to CE, previously in teens. Possible sight changes on clinical exam on last DFE  3/27/17. Last HRT possible slight progression OS> OD.     IOP had improved with Latanoprost added QHS OU (6/2013) in addition to Cosopt BID OU. Last HVF appeared to have hemianopsia - not present on repeat test - appears stable with priors - stable inferior arc OD, OS inferior arc seems to come and go - not present 2014, but was present exam before that.  Patent LPI OD.  History of post-CE iritis OS.   Continue Cosopt BID OU and latanoprost QHS OU. Started brimonidine TID OU on 7/6/17 - re-evaluate after CE.   Gonio - CHARMAINE pre-CE OD with occasional pain, PAS stable from 2011.     RTC 3 months HVF/HRT/DFE/IOP check.    hemianopsia Last HVF unreliable - extremely high false negatives, also possible right debra defect? Not present on repeat exam.   2. Nuclear sclerosis  Now pseudopakic OU - OD 8/16/17, OS 11/6/08   3. Pseudophakia  OS - stable    Epiretinal membrane OS VA good today at 1 month post op OD as OS, however OCT shows ERM OS, OD WNL.    4. Astigmatism with  presbyopia MRx given prior viist

## 2018-02-08 DIAGNOSIS — J45.41 MODERATE PERSISTENT REACTIVE AIRWAY DISEASE WITH ACUTE EXACERBATION: ICD-10-CM

## 2018-02-08 RX ORDER — BUDESONIDE AND FORMOTEROL FUMARATE DIHYDRATE 160; 4.5 UG/1; UG/1
2 AEROSOL RESPIRATORY (INHALATION) EVERY 12 HOURS
Qty: 10.2 INHALER | Refills: 5 | Status: SHIPPED | OUTPATIENT
Start: 2018-02-08 | End: 2018-03-13

## 2018-02-09 ENCOUNTER — TELEPHONE (OUTPATIENT)
Dept: HEMATOLOGY/ONCOLOGY | Facility: CLINIC | Age: 83
End: 2018-02-09

## 2018-02-09 NOTE — TELEPHONE ENCOUNTER
Message left instructing patient to call [phone] ext [phone] to r/s appointment on 2/26/18 humberto to provider being out.

## 2018-02-12 ENCOUNTER — PES CALL (OUTPATIENT)
Dept: ADMINISTRATIVE | Facility: CLINIC | Age: 83
End: 2018-02-12

## 2018-02-12 ENCOUNTER — TELEPHONE (OUTPATIENT)
Dept: HEMATOLOGY/ONCOLOGY | Facility: CLINIC | Age: 83
End: 2018-02-12

## 2018-02-12 RX ORDER — ERGOCALCIFEROL 1.25 MG/1
CAPSULE ORAL
Qty: 4 CAPSULE | Refills: 0 | OUTPATIENT
Start: 2018-02-12

## 2018-02-12 NOTE — TELEPHONE ENCOUNTER
----- Message from Debbie Naidu sent at 2/12/2018 10:39 AM CST -----  Patient returning nurse's call concerning rescheduling appointments/please call back at [phone].

## 2018-02-14 RX ORDER — DORZOLAMIDE HYDROCHLORIDE AND TIMOLOL MALEATE 20; 5 MG/ML; MG/ML
SOLUTION/ DROPS OPHTHALMIC
Qty: 10 ML | Refills: 11 | Status: SHIPPED | OUTPATIENT
Start: 2018-02-14 | End: 2018-12-12 | Stop reason: SDUPTHER

## 2018-02-19 ENCOUNTER — HOSPITAL ENCOUNTER (OUTPATIENT)
Dept: RADIOLOGY | Facility: HOSPITAL | Age: 83
Discharge: HOME | End: 2018-02-19
Attending: INTERNAL MEDICINE
Payer: MEDICARE

## 2018-02-19 DIAGNOSIS — C18.2 MALIGNANT NEOPLASM OF ASCENDING COLON: ICD-10-CM

## 2018-02-19 PROCEDURE — 74177 CT ABD & PELVIS W/CONTRAST: CPT | Mod: TC

## 2018-02-19 PROCEDURE — 71260 CT THORAX DX C+: CPT | Mod: 26,,, | Performed by: RADIOLOGY

## 2018-02-19 PROCEDURE — 25500020 PHARM REV CODE 255

## 2018-02-19 PROCEDURE — 74177 CT ABD & PELVIS W/CONTRAST: CPT | Mod: 26,,, | Performed by: RADIOLOGY

## 2018-02-19 RX ORDER — SODIUM CHLORIDE 9 MG/ML
INJECTION, SOLUTION INTRAVENOUS
Status: DISPENSED
Start: 2018-02-19 | End: 2018-02-19

## 2018-02-19 RX ADMIN — IOHEXOL 30 ML: 350 INJECTION, SOLUTION INTRAVENOUS at 08:02

## 2018-02-19 RX ADMIN — IOHEXOL 100 ML: 350 INJECTION, SOLUTION INTRAVENOUS at 08:02

## 2018-02-28 ENCOUNTER — OFFICE VISIT (OUTPATIENT)
Dept: HEMATOLOGY/ONCOLOGY | Facility: CLINIC | Age: 83
End: 2018-02-28
Payer: MEDICARE

## 2018-02-28 VITALS
DIASTOLIC BLOOD PRESSURE: 73 MMHG | HEART RATE: 75 BPM | BODY MASS INDEX: 28.08 KG/M2 | HEIGHT: 63 IN | TEMPERATURE: 98 F | RESPIRATION RATE: 20 BRPM | SYSTOLIC BLOOD PRESSURE: 155 MMHG | WEIGHT: 158.5 LBS

## 2018-02-28 DIAGNOSIS — C18.2 MALIGNANT NEOPLASM OF ASCENDING COLON: Primary | ICD-10-CM

## 2018-02-28 DIAGNOSIS — E53.8 B12 DEFICIENCY: ICD-10-CM

## 2018-02-28 DIAGNOSIS — D49.89 NEOPLASM OF ABDOMEN: ICD-10-CM

## 2018-02-28 DIAGNOSIS — G62.9 POLYNEUROPATHY: ICD-10-CM

## 2018-02-28 DIAGNOSIS — D75.89 MACROCYTOSIS: ICD-10-CM

## 2018-02-28 PROCEDURE — 99214 OFFICE O/P EST MOD 30 MIN: CPT | Mod: S$GLB,,, | Performed by: INTERNAL MEDICINE

## 2018-02-28 PROCEDURE — 1125F AMNT PAIN NOTED PAIN PRSNT: CPT | Mod: S$GLB,,, | Performed by: INTERNAL MEDICINE

## 2018-02-28 PROCEDURE — 99999 PR PBB SHADOW E&M-EST. PATIENT-LVL IV: CPT | Mod: PBBFAC,,, | Performed by: INTERNAL MEDICINE

## 2018-02-28 PROCEDURE — 3008F BODY MASS INDEX DOCD: CPT | Mod: S$GLB,,, | Performed by: INTERNAL MEDICINE

## 2018-02-28 PROCEDURE — 1159F MED LIST DOCD IN RCRD: CPT | Mod: S$GLB,,, | Performed by: INTERNAL MEDICINE

## 2018-02-28 NOTE — PROGRESS NOTES
Subjective:       Patient ID: Kenyetta Andersen is a 83 y.o. female.    Chief Complaint: f/u  HPI:   Kenyetta Andersen, the patient known to me, comes for followup. The patient has    known macrocytosis. No other issues. The patient was treated with colorectal    carcinoma, FOLFOX therapy for stage III. She comes in with lab evaluations.    Scans have been postponed to once a year because of 2009 diagnosis. Voices no    Complaints.she had macrocytosis, and B!2 was in the 380 range , she is more compliant using b12 now has htn and is under good control. pcp is Dr. pedraza    REVIEW OF SYSTEMS:     CONSTITUTIONAL: The patient denies any weight change. There is no apparent    change in appetite, fever, night sweats, headaches, fatigue, dizziness, or    weakness.      SKIN: Denies rash, issues with nails, non-healing sores, bleeding, blotching    skin or abnormal bruising. Denies new moles or changes to existing moles.      BREASTS: There is no swelling around breasts or nipple discharge.    EYES: Denies eye pain, blurred vision, swelling, redness or discharge.      ENT AND MOUTH: Denies runny nose, stuffiness, sinus trouble or sores. Denies    nosebleeds. Denies, hoarseness, change in voice or swelling in front of the    neck.      CARDIOVASCULAR: Denies chest pain, discomfort or palpitations. Denies neck    swelling or episodes of passing out.      RESPIRATORY: Denies cough, sputum production, blood in sputum, and denies    shortness of breath.      GI: Denies trouble swallowing, indigestion, heartburn, abdominal pain, nausea,    vomiting, diarrhea, altered bowel habits, blood in stool, discoloration of    stools, change in nature of stool, bloating, increased abdominal girth.      GENITOURINARY: No discharge. No pelvic pain or lumps. No rash around groin or  lesions. No urinary frequency, hesitation, painful urination or blood in    urine. Denies incontinence. No problems with intercourse.      MUSCULOSKELETAL: Denies  neck or back pain. Denies weakness in arms or legs,    joint problems or distended inflamed veins in legs. Denies swelling or abnormal  glands.      NEUROLOGICAL: Denies tingling, numbness, altered mentation changes to nerve    function in the face, weakness to one or both of the body. Denies changes to    gait and denies multiple falls or accidents.      PHYSICAL EXAM:     Wt Readings from Last 3 Encounters:   01/12/18 72.5 kg (159 lb 13.3 oz)   10/25/17 73.8 kg (162 lb 11.2 oz)   08/15/17 72.1 kg (159 lb)     Temp Readings from Last 3 Encounters:   01/12/18 98.4 °F (36.9 °C) (Oral)   10/25/17 97.8 °F (36.6 °C) (Oral)   08/16/17 97.3 °F (36.3 °C) (Skin)     BP Readings from Last 3 Encounters:   01/12/18 128/78   10/25/17 (!) 177/79   08/16/17 (!) 169/79     Pulse Readings from Last 3 Encounters:   01/12/18 75   10/25/17 75   08/16/17 86     GENERAL: Comfortable looking patient. Patient is in no distress.  Awake, alert and oriented to time, person and place.  No anxiety, or agitation.      HEENT: Normal conjunctivae and eyelids. WNL.  PERRLA 3 to 4 mm. No icterus, no pallor, no congestion, and no discharge noted.     NECK:  Supple. Trachea is central.  No crepitus.  No JVD or masses.    RESPIRATORY:  No intercostal retractions.  No dullness to percussion.  Chest is clear to auscultation.  No rales, rhonchi or wheezes.  No crepitus.  Good air entry bilaterally.    CARDIOVASCULAR:  S1 and S2 are normally heard without murmurs or gallops.  All peripheral pulses are present.    ABDOMEN:  Normal abdomen.  No hepatosplenomegaly.  No free fluid.  Bowel sounds are present.  No hernia noted. No masses.  No rebound or tenderness.  No guarding or rigidity.  Umbilicus is midline.    LYMPHATICS:  No axillary, cervical, supraclavicular, submental, or inguinal lymphadenopathy.    SKIN/MUSCULOSKELETAL:  There is no evidence of excoriation marks or ecchmosis.  No rashes.  No cyanosis.  No clubbing.  No joint or skeletal deformities  noted.  Normal range of motion.    NEUROLOGIC:  Higher functions are appropriate.  No cranial nerve deficits.  Normal ciara.  Normal strength.  Motor and sensory functions are normal.  Deep tendon reflexes are normal. Neuropathy+  GENITAL/RECTAL:  Exams are deferred.      Laboratory:     CBC:  Lab Results   Component Value Date    WBC 8.90 02/19/2018    RBC 4.26 02/19/2018    HGB 13.4 02/19/2018    HCT 40.9 02/19/2018    MCV 96 02/19/2018    MCH 31.5 (H) 02/19/2018    MCHC 32.8 02/19/2018    RDW 13.1 02/19/2018     02/19/2018    MPV 8.0 (L) 02/19/2018    GRAN 4.5 02/19/2018    GRAN 51.1 02/19/2018    LYMPH 3.4 02/19/2018    LYMPH 38.5 02/19/2018    MONO 0.7 02/19/2018    MONO 7.9 02/19/2018    EOS 0.2 02/19/2018    BASO 0.00 02/19/2018    EOSINOPHIL 2.1 02/19/2018    BASOPHIL 0.4 02/19/2018       BMP: BMP  Lab Results   Component Value Date     02/19/2018    K 4.1 02/19/2018     02/19/2018    CO2 28 02/19/2018    BUN 12 02/19/2018    CREATININE 1.1 02/19/2018    CREATININE 1.1 02/19/2018    CALCIUM 9.2 02/19/2018    ANIONGAP 5 (L) 02/19/2018    ESTGFRAFRICA 54 (A) 02/19/2018    ESTGFRAFRICA 54 (A) 02/19/2018    EGFRNONAA 47 (A) 02/19/2018    EGFRNONAA 47 (A) 02/19/2018       LFT:   Lab Results   Component Value Date    ALT 14 02/19/2018    AST 16 02/19/2018    ALKPHOS 74 02/19/2018    BILITOT 0.4 02/19/2018     Lab Results   Component Value Date    AEBOIBPE45 >2000 (H) 02/19/2018   Impression CT abd /pelvis/chest 2/2018        No findings suggesting metastatic disease within the chest, abdomen or pelvis.  Findings as detailed above including chronic interstitial basilar change in the chest, old granulomatous disease in the chest, renal cysts, pneumobilia suggesting prior sphincterotomy, prior right hemicolectomy.  Osseous degenerative change.         Most recent colonoscopy 3/2017  Assessment/Plan:     colon ca stage III dx 2009.  small 4mm lung nodule has disppeared  1. Cont supplements daily  2.  Scans doing well cont f/u small area in lung that needs f/u  4. Macrocytosis resolved with b12 supplements  5.cbc, cmp, cea and ct of abd and pevis and chest  In  then q yr if all stays the same add b12

## 2018-03-01 ENCOUNTER — TELEPHONE (OUTPATIENT)
Dept: HEMATOLOGY/ONCOLOGY | Facility: CLINIC | Age: 83
End: 2018-03-01

## 2018-03-01 DIAGNOSIS — N28.1 RENAL CYST: Primary | ICD-10-CM

## 2018-03-10 DIAGNOSIS — K21.00 GERD WITH ESOPHAGITIS: ICD-10-CM

## 2018-03-10 RX ORDER — PANTOPRAZOLE SODIUM 40 MG/1
40 TABLET, DELAYED RELEASE ORAL DAILY
Qty: 30 TABLET | Refills: 2 | Status: SHIPPED | OUTPATIENT
Start: 2018-03-10 | End: 2018-06-07 | Stop reason: SDUPTHER

## 2018-03-13 ENCOUNTER — OFFICE VISIT (OUTPATIENT)
Dept: NEPHROLOGY | Facility: CLINIC | Age: 83
End: 2018-03-13
Payer: MEDICARE

## 2018-03-13 VITALS
HEART RATE: 79 BPM | DIASTOLIC BLOOD PRESSURE: 78 MMHG | WEIGHT: 158.31 LBS | OXYGEN SATURATION: 95 % | HEIGHT: 63 IN | SYSTOLIC BLOOD PRESSURE: 152 MMHG | BODY MASS INDEX: 28.05 KG/M2

## 2018-03-13 DIAGNOSIS — R03.0 ELEVATED BLOOD PRESSURE READING: ICD-10-CM

## 2018-03-13 DIAGNOSIS — E78.1 HYPERTRIGLYCERIDEMIA: ICD-10-CM

## 2018-03-13 DIAGNOSIS — C18.8 OVERLAPPING MALIGNANT NEOPLASM OF COLON: ICD-10-CM

## 2018-03-13 DIAGNOSIS — N18.30 CKD (CHRONIC KIDNEY DISEASE) STAGE 3, GFR 30-59 ML/MIN: Primary | ICD-10-CM

## 2018-03-13 PROCEDURE — 3077F SYST BP >= 140 MM HG: CPT | Mod: CPTII,S$GLB,, | Performed by: INTERNAL MEDICINE

## 2018-03-13 PROCEDURE — 99203 OFFICE O/P NEW LOW 30 MIN: CPT | Mod: S$GLB,,, | Performed by: INTERNAL MEDICINE

## 2018-03-13 PROCEDURE — 3078F DIAST BP <80 MM HG: CPT | Mod: CPTII,S$GLB,, | Performed by: INTERNAL MEDICINE

## 2018-03-13 PROCEDURE — 99499 UNLISTED E&M SERVICE: CPT | Mod: S$GLB,,, | Performed by: INTERNAL MEDICINE

## 2018-03-13 PROCEDURE — 99999 PR PBB SHADOW E&M-EST. PATIENT-LVL III: CPT | Mod: PBBFAC,,, | Performed by: INTERNAL MEDICINE

## 2018-03-13 NOTE — LETTER
April 6, 2018      Yessica Granado MD  [address] [facility] [address]           [facility] [address] [facility] [address] [place] [address]  Phone: [phone]          Patient: Kenyetta Andersen   MR Number: [id number]   YOB: 1935   Date of Visit: 3/13/2018       Dear Dr. Yessica Granado:    Thank you for referring Kenyetta Andersen to me for evaluation. Attached you will find relevant portions of my assessment and plan of care.    If you have questions, please do not hesitate to call me. I look forward to following Kenyetta Andersen along with you.    Sincerely,    Michael Nieto MD    Enclosure  CC:  No Recipients    If you would like to receive this communication electronically, please contact [email or web address] or [phone] to request more information on [employer] access.    For providers and/or their staff who would like to refer a patient to [facility], please contact us through our one-stop-shop provider referral line, [facility], at [phone].    If you feel you have received this communication in error or would no longer like to receive these types of communications, please e-mail [email or web address]

## 2018-04-04 ENCOUNTER — OFFICE VISIT (OUTPATIENT)
Dept: FAMILY MEDICINE | Facility: CLINIC | Age: 83
End: 2018-04-04
Payer: MEDICARE

## 2018-04-04 ENCOUNTER — DOCUMENTATION ONLY (OUTPATIENT)
Dept: FAMILY MEDICINE | Facility: CLINIC | Age: 83
End: 2018-04-04

## 2018-04-04 ENCOUNTER — TELEPHONE (OUTPATIENT)
Dept: FAMILY MEDICINE | Facility: CLINIC | Age: 83
End: 2018-04-04

## 2018-04-04 VITALS
DIASTOLIC BLOOD PRESSURE: 79 MMHG | HEART RATE: 78 BPM | BODY MASS INDEX: 28.67 KG/M2 | WEIGHT: 161.81 LBS | HEIGHT: 63 IN | SYSTOLIC BLOOD PRESSURE: 192 MMHG

## 2018-04-04 DIAGNOSIS — Z00.00 ENCOUNTER FOR PREVENTIVE HEALTH EXAMINATION: Primary | ICD-10-CM

## 2018-04-04 DIAGNOSIS — E78.1 HYPERTRIGLYCERIDEMIA: ICD-10-CM

## 2018-04-04 DIAGNOSIS — H40.2230 CHRONIC ANGLE-CLOSURE GLAUCOMA OF BOTH EYES, UNSPECIFIED GLAUCOMA STAGE: ICD-10-CM

## 2018-04-04 DIAGNOSIS — G62.9 POLYNEUROPATHY: ICD-10-CM

## 2018-04-04 DIAGNOSIS — C18.8 OVERLAPPING MALIGNANT NEOPLASM OF COLON: ICD-10-CM

## 2018-04-04 DIAGNOSIS — R03.0 ELEVATED BLOOD PRESSURE READING: ICD-10-CM

## 2018-04-04 DIAGNOSIS — N18.30 CKD (CHRONIC KIDNEY DISEASE) STAGE 3, GFR 30-59 ML/MIN: ICD-10-CM

## 2018-04-04 DIAGNOSIS — I70.0 ABDOMINAL AORTIC ATHEROSCLEROSIS: ICD-10-CM

## 2018-04-04 PROCEDURE — 99999 PR PBB SHADOW E&M-EST. PATIENT-LVL V: CPT | Mod: PBBFAC,,, | Performed by: NURSE PRACTITIONER

## 2018-04-04 PROCEDURE — 99499 UNLISTED E&M SERVICE: CPT | Mod: S$PBB,,, | Performed by: NURSE PRACTITIONER

## 2018-04-04 PROCEDURE — G0439 PPPS, SUBSEQ VISIT: HCPCS | Mod: S$GLB,,, | Performed by: NURSE PRACTITIONER

## 2018-04-04 NOTE — Clinical Note
Primary Care Providers: Hever Arreola MD, MD (General) **Pt had abnormal cognitive screening**  Your patient was seen today for a HRA visit. Gap(s) in care (HEDIS gaps) have been identified during this visit that require additional testing and possible follow up.  No orders of the defined types were placed in this encounter.   These orders were placed using [facility] approved protocol and any results will be forwarded to your office for appropriate follow up. I have included a copy of my visit note; please review the note and feel free to contact me with any questions.   Thank you for allowing me to participate in the care of your patients. Lizzie Fairbanks, MY

## 2018-04-04 NOTE — PROGRESS NOTES
Patient presented in clinic today with elevated BP. Patient reports feeling very stressed over her car, which ran hot and started smoking on her way here this morning. She denies having any symptoms. Dr. Arreola is unavailable. I spoke with TEOFILO Manzanares and she advised that since patient is asymptomatic, to schedule her a follow up appointment to address her BP. Patient only wanted to see Dr. Arreola, so she has been scheduled for 4/16/18 with him. She was advised that is she begins to develop any symptoms that we discussed, she is to go straight to the ED. She verbalized understanding.

## 2018-04-04 NOTE — PROGRESS NOTES
Pre-Visit Chart Review  For Appointment Scheduled on 4/4/18    There are no preventive care reminders to display for this patient.

## 2018-04-04 NOTE — PATIENT INSTRUCTIONS
Counseling and Referral of Other Preventative  (Italic type indicates deductible and co-insurance are waived)    Patient Name: Kenyetta Andersen  Today's Date: 4/4/2018    Health Maintenance       Date Due Completion Date    DEXA SCAN 10/17/2019 10/17/2016    Colonoscopy 03/14/2020 3/14/2017    Override on 3/14/2017: Done (Dr. Chey Daily copy sent to scanning)    Lipid Panel 10/17/2021 10/17/2016    TETANUS VACCINE 09/13/2022 9/13/2012        No orders of the defined types were placed in this encounter.    The following information is provided to all patients.  This information is to help you find resources for any of the problems found today that may be affecting your health:                Living healthy guide: www.[facility].[place].gov      Understanding Diabetes: www.diabetes.org      Eating healthy: www.cdc.gov/healthyweight      [facility] home safety checklist: www.cdc.gov/steadi/patient.html      Agency on Aging: www.goea.[place].gov      Alcoholics anonymous (AA): www.aa.org      Physical Activity: www.zulma.nih.gov/ay4ptms      Tobacco use: www.quitwithusla.org

## 2018-04-04 NOTE — PROGRESS NOTES
"Kenyetta Andersen presented for a  Medicare AWV and comprehensive Health Risk Assessment today. The following components were reviewed and updated:    · Medical history  · Family History  · Social history  · Allergies and Current Medications  · Health Risk Assessment  · Health Maintenance  · Care Team     ** See Completed Assessments for Annual Wellness Visit within the encounter summary.**       The following assessments were completed:  · Living Situation  · CAGE  · Depression Screening  · Timed Get Up and Go  · Whisper Test  · Cognitive Function Screening  · Nutrition Screening  · ADL Screening  · PAQ Screening    Vitals:    04/04/18 0944 04/04/18 1000 04/04/18 1026   BP: (!) 188/87 (!) 192/89 (!) 192/79   Pulse: 78     Weight: 73.4 kg (161 lb 13.1 oz)     Height: 5' 3" (1.6 m)       Body mass index is 28.66 kg/m².  Physical Exam   Constitutional: She is oriented to person, place, and time. She appears well-developed and well-nourished.   HENT:   Head: Normocephalic and atraumatic.   Eyes: Pupils are equal, round, and reactive to light.   Neck: Normal range of motion.   Cardiovascular: Normal rate, regular rhythm and normal heart sounds.    No murmur heard.  Pulmonary/Chest: Effort normal and breath sounds normal.   Abdominal: Soft. Bowel sounds are normal. She exhibits no mass.   Musculoskeletal: Normal range of motion. She exhibits no edema.   Neurological: She is alert and oriented to person, place, and time. She exhibits normal muscle tone.   Psychiatric: She has a normal mood and affect. Her behavior is normal.   Nursing note and vitals reviewed.            Diagnoses and health risks identified today and associated recommendations/orders:    Encounter for preventive health examination    Abdominal aortic atherosclerosis  Comments:  stable; continue to monitor    CKD (chronic kidney disease) stage 3, GFR 30-59 ml/min  Comments:  stable; continue to monitor    Elevated blood pressure " reading  Comments:  uncontrolled; following up with PCP next week per PCP; pt asymptomatic    Overlapping malignant neoplasm of colon  Comments:  stable; pt followed by oncology    Hypertriglyceridemia  Comments:  stable; continue to monitor    Chronic angle-closure glaucoma of both eyes, unspecified glaucoma stage  Comments:  stable; continue to monitor    Polyneuropathy  Comments:  stable; continue to monitor        Provided Kenyetta with a 5-10 year written screening schedule and personal prevention plan. Recommendations were developed using the USPSTF age appropriate recommendations. Education, counseling, and referrals were provided as needed. After Visit Summary printed and given to patient which includes a list of additional screenings\tests needed.    Pt has appt with Dr. Arreola on 4/16/18 for BP evaluation.    Lizzie Fairbanks NP     Patient readiness: acceptance and barriers:none    During the course of the visit the patient was educated and counseled about the following:         Goals: Obesity: Reduce calorie intake and BMI    Did patient meet goals/outcomes: Yes    The following self management tools provided: declined    Patient Instructions (the written plan) was given to the patient/family.     Time spent with patient: 55 minutes    Barriers to medications present (no )    Adverse reactions to current medications (no)    Over the counter medications reviewed (Yes)

## 2018-04-04 NOTE — TELEPHONE ENCOUNTER
Patient in clinic this morning for her HRA. Initial BP at 0937 was 188/87, at 0945 it was 192/89, and at 1000 it is 192/79. Patient has no history of HTN and is not on any BP meds. Please advise on what you would like for patient to do. She is still here in clinic.

## 2018-04-10 ENCOUNTER — DOCUMENTATION ONLY (OUTPATIENT)
Dept: FAMILY MEDICINE | Facility: CLINIC | Age: 83
End: 2018-04-10

## 2018-04-10 NOTE — PROGRESS NOTES
Pre-Visit Chart Review  For Appointment Scheduled on 4-16-18    There are no preventive care reminders to display for this patient.

## 2018-04-16 ENCOUNTER — OFFICE VISIT (OUTPATIENT)
Dept: FAMILY MEDICINE | Facility: CLINIC | Age: 83
End: 2018-04-16
Attending: FAMILY MEDICINE
Payer: MEDICARE

## 2018-04-16 VITALS
TEMPERATURE: 98 F | BODY MASS INDEX: 28.16 KG/M2 | WEIGHT: 158.94 LBS | SYSTOLIC BLOOD PRESSURE: 126 MMHG | DIASTOLIC BLOOD PRESSURE: 74 MMHG | HEIGHT: 63 IN | HEART RATE: 79 BPM | RESPIRATION RATE: 18 BRPM

## 2018-04-16 DIAGNOSIS — R03.0 ELEVATED BLOOD PRESSURE READING IN OFFICE WITHOUT DIAGNOSIS OF HYPERTENSION: Primary | ICD-10-CM

## 2018-04-16 DIAGNOSIS — G62.9 POLYNEUROPATHY: ICD-10-CM

## 2018-04-16 DIAGNOSIS — G47.00 INSOMNIA, UNSPECIFIED TYPE: ICD-10-CM

## 2018-04-16 PROCEDURE — 99214 OFFICE O/P EST MOD 30 MIN: CPT | Mod: S$GLB,,, | Performed by: FAMILY MEDICINE

## 2018-04-16 PROCEDURE — 99999 PR PBB SHADOW E&M-EST. PATIENT-LVL III: CPT | Mod: PBBFAC,,, | Performed by: FAMILY MEDICINE

## 2018-04-16 PROCEDURE — 3078F DIAST BP <80 MM HG: CPT | Mod: CPTII,S$GLB,, | Performed by: FAMILY MEDICINE

## 2018-04-16 PROCEDURE — 3074F SYST BP LT 130 MM HG: CPT | Mod: CPTII,S$GLB,, | Performed by: FAMILY MEDICINE

## 2018-04-16 PROCEDURE — 99499 UNLISTED E&M SERVICE: CPT | Mod: S$PBB,,, | Performed by: FAMILY MEDICINE

## 2018-04-16 RX ORDER — TRAZODONE HYDROCHLORIDE 50 MG/1
50 TABLET ORAL NIGHTLY PRN
Qty: 30 TABLET | Refills: 5 | Status: SHIPPED | OUTPATIENT
Start: 2018-04-16 | End: 2019-04-16

## 2018-04-16 NOTE — PATIENT INSTRUCTIONS
Established High Blood Pressure    High blood pressure (hypertension) is a chronic disease. Often, healthcare providers dont know what causes it. But it can be caused by certain health conditions and medicines.  If you have high blood pressure, you may not have any symptoms. If you do have symptoms, they may include headache, dizziness, changes in your vision, chest pain, and shortness of breath. But even without symptoms, high blood pressure thats not treated raises your risk for heart attack and stroke. High blood pressure is a serious health risk and shouldnt be ignored.  A blood pressure reading is made up of two numbers: a higher number over a lower number. The top number is the systolic pressure. The bottom number is the diastolic pressure. A normal blood pressure is a systolic pressure of  less than 120 over a diastolic pressure of less than 80. You will see your blood pressure readings written together. For example, a person with a systolic pressure of 188 and a diastolic pressure of 78 will have 118/78 written in the medical record.  High blood pressure is when either the top number is 140 or higher, or the bottom number is 90 or higher. This must be the result when taking your blood pressure a number of times. The blood pressures between normal and high are called prehypertension.  Home care  If you have high blood pressure, you should do what is listed below to lower your blood pressure. If you are taking medicines for high blood pressure, these methods may reduce or end your need for medicines in the future.  · Begin a weight-loss program if you are overweight.  · Cut back on how much salt you get in your diet. Heres how to do this:  ¨ Dont eat foods that have a lot of salt. These include olives, pickles, smoked meats, and salted potato chips.  ¨ Dont add salt to your food at the table.  ¨ Use only small amounts of salt when cooking.  · Start an exercise program. Talk with your healthcare  provider about the type of exercise program that would be best for you. It doesn't have to be hard. Even brisk walking for 20 minutes 3 times a week is a good form of exercise.  · Dont take medicines that stimulate the heart. This includes many over-the-counter cold and sinus decongestant pills and sprays, as well as diet pills. Check the warnings about hypertension on the label. Before buying any over-the-counter medicines or supplements, always ask the pharmacist about the product's potential interaction with your high blood pressure and your high blood pressure medicines.  · Stimulants such as amphetamine or cocaine could be deadly for someone with high blood pressure. Never take these.  · Limit how much caffeine you get in your diet. Switch to caffeine-free products.  · Stop smoking. If you are a long-time smoker, this can be hard. Talk to your healthcare provider about medicines and nicotine replacement options to help you. Also, enroll in a stop-smoking program to make it more likely that you will quit for good.  · Learn how to handle stress. This is an important part of any program to lower blood pressure. Learn about relaxation methods like meditation, yoga, or biofeedback.  · If your provider prescribed medicines, take them exactly as directed. Missing doses may cause your blood pressure get out of control.  · If you miss a dose or doses, check with your healthcare provider or pharmacist about what to do.  · Consider buying an automatic blood pressure machine. Ask your provider for a recommendation. You can get one of these at most pharmacies.     The American Heart Association recommends the following guidelines for home blood pressure monitoring:  · Don't smoke or drink coffee for 30 minutes before taking your blood pressure.  · Go to the bathroom before the test.  · Relax for 5 minutes before taking the measurement.  · Sit with your back supported (don't sit on a couch or soft chair); keep your feet on  the floor uncrossed. Place your arm on a solid flat surface (like a table) with the upper part of the arm at heart level. Place the middle of the cuff directly above the eye of the elbow. Check the monitor's instruction manual for an illustration.  · Take multiple readings. When you measure, take 2 to 3 readings one minute apart and record all of the results.  · Take your blood pressure at the same time every day, or as your healthcare provider recommends.  · Record the date, time, and blood pressure reading.  · Take the record with you to your next medical appointment. If your blood pressure monitor has a built-in memory, simply take the monitor with you to your next appointment.  · Call your provider if you have several high readings. Don't be frightened by a single high blood pressure reading, but if you get several high readings, check in with your healthcare provider.  · Note: When blood pressure reaches a systolic (top number) of 180 or higher OR diastolic (bottom number) of 110 or higher, seek emergency medical treatment.  Follow-up care  You will need to see your healthcare provider regularly. This is to check your blood pressure and to make changes to your medicines. Make a follow-up appointment as directed. Bring the record of your home blood pressure readings to the appointment.  When to seek medical advice  Call your healthcare provider right away if any of these occur:  · Blood pressure reaches a systolic (upper number) of 180 or higher OR a diastolic (bottom number) of 110 or higher  · Chest pain or shortness of breath  · Severe headache  · Throbbing or rushing sound in the ears  · Nosebleed  · Sudden severe pain in your belly (abdomen)  · Extreme drowsiness, confusion, or fainting  · Dizziness or spinning sensation (vertigo)  · Weakness of an arm or leg or one side of the face  · You have problems speaking or seeing   Date Last Reviewed: 12/1/2016  © 9046-9090 [employer] [address] [place] [address]  [place] [address]. All rights reserved. This information is not intended as a substitute for professional medical care. Always follow your healthcare professional's instructions.

## 2018-04-16 NOTE — PROGRESS NOTES
Subjective:       Patient ID: Kenyetta Andersen is a 83 y.o. female.    Chief Complaint: Follow-up (elevated BP)    83-year-old female coming in to recheck blood pressure which apparently was found to be elevated on a recent screen.  She's been checking it at home and systolic rarely goes above 1:30 and diastolics usually in the 70s.  Her only complaint is insomnia which is been an ongoing problem since her   about 3 years ago.  She'll go to bed about 11:00 and fall asleep and wake up again at 2 AM and usually remain awake until 5 AM at which point she falls asleep again.  She is interested in a sleep aid.    Past Medical History:  2012: Anatomical narrow angle  No date: Anxiety  No date: Arthritis  No date: Blood transfusion  No date: Cataract      Comment: Done OU  No date: Colon cancer  3/14/2017: Colon polyps  No date: GERD (gastroesophageal reflux disease)  2012: Glaucoma  2012: Nuclear sclerosis  2015: Personal history of colon cancer  2012: Pseudophakia - Left Eye    Past Surgical History:  No date: APPENDECTOMY  No date: CATARACT EXTRACTION W/  INTRAOCULAR LENS IMPLA* Left  No date: CATARACT EXTRACTION W/  INTRAOCULAR LENS IMPLA* Right      Comment: Dr Sawant  No date: COLON SURGERY      Comment: resection  3/14/2017: COLONOSCOPY N/A      Comment: Procedure: COLONOSCOPY;  Surgeon: Killian Guerrier MD;  Location: [facility];  Service:                Endoscopy;  Laterality: N/A;  No date: ECTOPIC PREGNANCY SURGERY  No date: EYE SURGERY  No date: HYSTERECTOMY      Comment: complete  No date: JOINT REPLACEMENT      Comment: Liban Knee  No date: ROTATOR CUFF REPAIR Right  No date: TOE SURGERY      Comment: 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      Current Outpatient Prescriptions:     ascorbic acid (VITAMIN C) 1000 MG tablet, Take 1,000 mg by mouth once daily., Disp: , Rfl:     b complex vitamins capsule, Take 1 capsule by mouth once daily., Disp: ,  Rfl:     brimonidine 0.2% (ALPHAGAN) 0.2 % Drop, Place 1 drop into both eyes 3 (three) times daily., Disp: 10 mL, Rfl: 11    dorzolamide-timolol 2-0.5% (COSOPT) 22.3-6.8 mg/mL ophthalmic solution, PLACE 1 DROP INTO BOTH EYES 2 (TWO) TIMES DAILY. TWICE A DAY, Disp: 10 mL, Rfl: 11    gabapentin (NEURONTIN) 300 MG capsule, Take 2 capsules (600 mg total) by mouth 3 (three) times daily., Disp: 540 capsule, Rfl: 1    latanoprost 0.005 % ophthalmic solution, PLACE 1 DROP INTO BOTH EYES EVERY EVENING., Disp: 2.5 mL, Rfl: 11    pantoprazole (PROTONIX) 40 MG tablet, TAKE 1 TABLET (40 MG TOTAL) BY MOUTH ONCE DAILY., Disp: 30 tablet, Rfl: 2    prednisoLONE acetate (PRED FORTE) 1 % DrpS, Place 1 drop into the right eye 3 (three) times daily. Through 12/25, then 2x per day until next visit., Disp: 5 mL, Rfl: 0        Review of Systems   Respiratory: Negative for chest tightness and shortness of breath.    Cardiovascular: Negative for chest pain and palpitations.   Neurological: Negative for dizziness, light-headedness and headaches.   Psychiatric/Behavioral: Positive for sleep disturbance.       Objective:      Physical Exam   Constitutional: She is oriented to person, place, and time. She appears well-developed and well-nourished. No distress.   Initial blood pressure readings were elevated after a few minutes to sit and relax and with some relaxation exercises she dropped down to 126/74  Weight in the normal range for her age at BMI of 28.2.  She is down 9/10 of a pound from her previous visit January 12, 2018   Cardiovascular: Normal rate, regular rhythm and normal heart sounds.  Exam reveals no gallop and no friction rub.    No murmur heard.  Pulmonary/Chest: Effort normal and breath sounds normal.   Neurological: She is alert and oriented to person, place, and time.   Skin: She is not diaphoretic.   Psychiatric: She has a normal mood and affect. Her behavior is normal. Judgment and thought content normal.   Nursing  note and vitals reviewed.      Assessment:       1. Elevated blood pressure reading in office without diagnosis of hypertension    2. Insomnia, unspecified type    3. Polyneuropathy    4. BMI 28.0-28.9,adult        Plan:       1. Elevated blood pressure reading in office without diagnosis of hypertension  Continue to monitor but she does not need antihypertensives at this time    2. Insomnia, unspecified type  Suggested she try one half of a trazodone 50 mg, 25 mg.  Also suggested she get a body pillow and see if that helps her relax at night  - traZODone (DESYREL) 50 MG tablet; Take 1 tablet (50 mg total) by mouth nightly as needed for Insomnia.  Dispense: 30 tablet; Refill: 5    3. Polyneuropathy  Neuropathic pain controlled with gabapentin, she still has significant numbness    4. BMI 28.0-28.9,adult

## 2018-05-07 ENCOUNTER — CLINICAL SUPPORT (OUTPATIENT)
Dept: OPHTHALMOLOGY | Facility: CLINIC | Age: 83
End: 2018-05-07
Attending: OPHTHALMOLOGY
Payer: MEDICARE

## 2018-05-07 ENCOUNTER — OFFICE VISIT (OUTPATIENT)
Dept: OPHTHALMOLOGY | Facility: CLINIC | Age: 83
End: 2018-05-07
Payer: MEDICARE

## 2018-05-07 DIAGNOSIS — H40.033 ANATOMICAL NARROW ANGLE GLAUCOMA OF BOTH EYES WITH BORDERLINE INTRAOCULAR PRESSURE: ICD-10-CM

## 2018-05-07 DIAGNOSIS — H52.7 REFRACTIVE ERROR: ICD-10-CM

## 2018-05-07 DIAGNOSIS — H40.2232 CHRONIC ANGLE-CLOSURE GLAUCOMA OF BOTH EYES, MODERATE STAGE: ICD-10-CM

## 2018-05-07 DIAGNOSIS — H40.033 ANATOMICAL NARROW ANGLE GLAUCOMA OF BOTH EYES WITH BORDERLINE INTRAOCULAR PRESSURE: Primary | ICD-10-CM

## 2018-05-07 DIAGNOSIS — Z96.1 PSEUDOPHAKIA, BOTH EYES: ICD-10-CM

## 2018-05-07 DIAGNOSIS — H35.372 EPIRETINAL MEMBRANE, LEFT: ICD-10-CM

## 2018-05-07 DIAGNOSIS — H40.2230 CHRONIC ANGLE-CLOSURE GLAUCOMA OF BOTH EYES: ICD-10-CM

## 2018-05-07 PROCEDURE — 99999 PR PBB SHADOW E&M-EST. PATIENT-LVL II: CPT | Mod: PBBFAC,,, | Performed by: OPHTHALMOLOGY

## 2018-05-07 PROCEDURE — 92133 CPTRZD OPH DX IMG PST SGM ON: CPT | Mod: S$GLB,,, | Performed by: OPHTHALMOLOGY

## 2018-05-07 PROCEDURE — 92014 COMPRE OPH EXAM EST PT 1/>: CPT | Mod: S$GLB,,, | Performed by: OPHTHALMOLOGY

## 2018-05-07 PROCEDURE — 92083 EXTENDED VISUAL FIELD XM: CPT | Mod: S$GLB,,, | Performed by: OPHTHALMOLOGY

## 2018-06-04 RX ORDER — GABAPENTIN 300 MG/1
600 CAPSULE ORAL 3 TIMES DAILY
Qty: 540 CAPSULE | Refills: 1 | Status: SHIPPED | OUTPATIENT
Start: 2018-06-04 | End: 2019-02-06 | Stop reason: SDUPTHER

## 2018-06-07 DIAGNOSIS — K21.00 GERD WITH ESOPHAGITIS: ICD-10-CM

## 2018-06-07 RX ORDER — BRIMONIDINE TARTRATE 2 MG/ML
1 SOLUTION/ DROPS OPHTHALMIC 3 TIMES DAILY
Qty: 10 ML | Refills: 6 | Status: SHIPPED | OUTPATIENT
Start: 2018-06-07 | End: 2019-08-20 | Stop reason: SDUPTHER

## 2018-06-07 RX ORDER — PANTOPRAZOLE SODIUM 40 MG/1
40 TABLET, DELAYED RELEASE ORAL DAILY
Qty: 30 TABLET | Refills: 2 | Status: SHIPPED | OUTPATIENT
Start: 2018-06-07 | End: 2018-09-01 | Stop reason: SDUPTHER

## 2018-09-01 DIAGNOSIS — K21.00 GERD WITH ESOPHAGITIS: ICD-10-CM

## 2018-09-01 RX ORDER — PANTOPRAZOLE SODIUM 40 MG/1
40 TABLET, DELAYED RELEASE ORAL DAILY
Qty: 30 TABLET | Refills: 2 | Status: SHIPPED | OUTPATIENT
Start: 2018-09-01 | End: 2018-11-30 | Stop reason: SDUPTHER

## 2018-09-10 ENCOUNTER — OFFICE VISIT (OUTPATIENT)
Dept: OPHTHALMOLOGY | Facility: CLINIC | Age: 83
End: 2018-09-10
Payer: MEDICARE

## 2018-09-10 DIAGNOSIS — H52.7 REFRACTIVE ERROR: ICD-10-CM

## 2018-09-10 DIAGNOSIS — H35.372 EPIRETINAL MEMBRANE, LEFT: Primary | ICD-10-CM

## 2018-09-10 DIAGNOSIS — H40.033 ANATOMICAL NARROW ANGLE GLAUCOMA OF BOTH EYES WITH BORDERLINE INTRAOCULAR PRESSURE: ICD-10-CM

## 2018-09-10 DIAGNOSIS — Z96.1 PSEUDOPHAKIA, BOTH EYES: ICD-10-CM

## 2018-09-10 PROCEDURE — 99499 UNLISTED E&M SERVICE: CPT | Mod: S$GLB,,, | Performed by: OPHTHALMOLOGY

## 2018-09-10 PROCEDURE — 92134 CPTRZ OPH DX IMG PST SGM RTA: CPT | Mod: PBBFAC,PO | Performed by: OPHTHALMOLOGY

## 2018-09-10 PROCEDURE — 99999 PR PBB SHADOW E&M-EST. PATIENT-LVL III: CPT | Mod: PBBFAC,,, | Performed by: OPHTHALMOLOGY

## 2018-09-10 PROCEDURE — 92012 INTRM OPH EXAM EST PATIENT: CPT | Mod: S$PBB,,, | Performed by: OPHTHALMOLOGY

## 2018-09-10 PROCEDURE — 92015 DETERMINE REFRACTIVE STATE: CPT | Mod: ,,, | Performed by: OPHTHALMOLOGY

## 2018-09-10 PROCEDURE — 99213 OFFICE O/P EST LOW 20 MIN: CPT | Mod: PBBFAC,25,PO | Performed by: OPHTHALMOLOGY

## 2018-09-10 NOTE — PROGRESS NOTES
HPI     Follow-up      Additional comments: IOP check and OCT              Comments     84 YO female presents today for an IOP check and OCT mac. C/O vision   blurred when using glasses, thinking she may need a new rx as the one she   has is pre-cataract surgery.     Cosopt OU BID  Brimonidine OU TID  Latanoprost OU QHS           Last edited by CLAUDIA Song on 9/10/2018  1:04 PM. (History)            Assessment /Plan     For exam results, see Encounter Report.    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 Pt states she is using all gtts as directed, Cosopt ou BID,   Brimonidine OU TID, Latanoprost OU HS.     Feels like a skim over her eye. Better when glasses are off. Reports she   did take gtts last night and this morning.     Last edited by Maty Sawant MD on 5/7/2018  4:06 PM.   (History)        ROS     Positive for: Neurological (neuropathy since cancer surgery),   Musculoskeletal (rotator cuff repair, history of broken shoulder blade;   bilateral knee replacement), Eyes (CE OS, ERM OS, glaucoma OU),   Respiratory (history of bronchitis, denies SOB/orthopnea), Heme/Lymph   (history of colon cancer; denies blood thinners)    Negative for: Genitourinary (denies flomax), Endocrine (denies DM or   thyroid problems), Cardiovascular (denies HTN)    Last edited by Maty Sawant MD on 5/7/2018  3:57 PM.   (History)        Assessment /Plan     For exam results, see Encounter Report.    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 Anatomical narrow  angle glaucoma   IOP mid teens today, was around 20 OU prior to CE, previously in teens. Appears stable on DFE  3/27/17. HRT stable OD, OS within range of priors. HVF defects appear stable as well lots of fixation losses last test 5/2018.    IOP had improved with Latanoprost added QHS OU (6/2013) in addition to Cosopt BID OU. Last HVF appeared to have hemianopsia - not present on repeat test - appears stable with priors - stable inferior arc OD, OS inferior arc seems to come and go - not present 2014, but was present exam before that.  Patent LPI OD.  History of post-CE iritis OS.     Continue Cosopt BID OU and latanoprost QHS OU. Started brimonidine TID OU on 7/6/17 - continue this as well.  Gonio - CHARMAINE pre-CE OD with occasional pain, PAS stable from 2011.     RTC 4 months IOP check.         hemianopsia Last HVF unreliable - extremely high false negatives, also possible right debra defect? Not present on repeat exam.   2. Nuclear sclerosis  Now pseudopakic OU - OD 8/16/17, OS 11/6/08   3. Pseudophakia  OS - stable    Epiretinal membrane OS VA good despite OCT shows ERM OS, OD WNL.    4. Astigmatism with presbyopia MRx given - stable, but did not fill post-CE Rx

## 2018-09-11 ENCOUNTER — LAB VISIT (OUTPATIENT)
Dept: LAB | Facility: HOSPITAL | Age: 83
End: 2018-09-11
Attending: INTERNAL MEDICINE
Payer: MEDICARE

## 2018-09-11 DIAGNOSIS — N18.30 CKD (CHRONIC KIDNEY DISEASE) STAGE 3, GFR 30-59 ML/MIN: ICD-10-CM

## 2018-09-11 LAB
ALBUMIN SERPL BCP-MCNC: 3 G/DL
ANION GAP SERPL CALC-SCNC: 6 MMOL/L
BUN SERPL-MCNC: 12 MG/DL
CALCIUM SERPL-MCNC: 8.9 MG/DL
CHLORIDE SERPL-SCNC: 107 MMOL/L
CO2 SERPL-SCNC: 27 MMOL/L
CREAT SERPL-MCNC: 0.9 MG/DL
EST. GFR  (AFRICAN AMERICAN): >60 ML/MIN/1.73 M^2
EST. GFR  (NON AFRICAN AMERICAN): >60 ML/MIN/1.73 M^2
GLUCOSE SERPL-MCNC: 111 MG/DL
HBV SURFACE AG SERPL QL IA: NEGATIVE
HCV AB SERPL QL IA: NEGATIVE
PHOSPHATE SERPL-MCNC: 3.5 MG/DL
POTASSIUM SERPL-SCNC: 3.8 MMOL/L
SODIUM SERPL-SCNC: 140 MMOL/L

## 2018-09-11 PROCEDURE — 86803 HEPATITIS C AB TEST: CPT

## 2018-09-11 PROCEDURE — 80069 RENAL FUNCTION PANEL: CPT

## 2018-09-11 PROCEDURE — 36415 COLL VENOUS BLD VENIPUNCTURE: CPT | Mod: PO

## 2018-09-11 PROCEDURE — 87340 HEPATITIS B SURFACE AG IA: CPT

## 2018-09-25 ENCOUNTER — TELEPHONE (OUTPATIENT)
Dept: NEPHROLOGY | Facility: CLINIC | Age: 83
End: 2018-09-25

## 2018-09-25 ENCOUNTER — OFFICE VISIT (OUTPATIENT)
Dept: NEPHROLOGY | Facility: CLINIC | Age: 83
End: 2018-09-25
Payer: MEDICARE

## 2018-09-25 VITALS
SYSTOLIC BLOOD PRESSURE: 160 MMHG | BODY MASS INDEX: 27.39 KG/M2 | HEART RATE: 82 BPM | WEIGHT: 154.56 LBS | OXYGEN SATURATION: 97 % | HEIGHT: 63 IN | DIASTOLIC BLOOD PRESSURE: 84 MMHG

## 2018-09-25 DIAGNOSIS — E53.8 B12 DEFICIENCY: ICD-10-CM

## 2018-09-25 DIAGNOSIS — N28.1 BILATERAL RENAL CYSTS: ICD-10-CM

## 2018-09-25 DIAGNOSIS — C18.8 OVERLAPPING MALIGNANT NEOPLASM OF COLON: ICD-10-CM

## 2018-09-25 DIAGNOSIS — F17.200 SMOKER: ICD-10-CM

## 2018-09-25 DIAGNOSIS — N18.30 CKD (CHRONIC KIDNEY DISEASE) STAGE 3, GFR 30-59 ML/MIN: ICD-10-CM

## 2018-09-25 DIAGNOSIS — N18.2 CKD (CHRONIC KIDNEY DISEASE) STAGE 2, GFR 60-89 ML/MIN: Primary | ICD-10-CM

## 2018-09-25 DIAGNOSIS — R73.9 HYPERGLYCEMIA: ICD-10-CM

## 2018-09-25 PROCEDURE — 3077F SYST BP >= 140 MM HG: CPT | Mod: CPTII,,, | Performed by: INTERNAL MEDICINE

## 2018-09-25 PROCEDURE — 99999 PR PBB SHADOW E&M-EST. PATIENT-LVL III: CPT | Mod: PBBFAC,,, | Performed by: INTERNAL MEDICINE

## 2018-09-25 PROCEDURE — 1101F PT FALLS ASSESS-DOCD LE1/YR: CPT | Mod: CPTII,,, | Performed by: INTERNAL MEDICINE

## 2018-09-25 PROCEDURE — 99214 OFFICE O/P EST MOD 30 MIN: CPT | Mod: S$PBB,,, | Performed by: INTERNAL MEDICINE

## 2018-09-25 PROCEDURE — 3079F DIAST BP 80-89 MM HG: CPT | Mod: CPTII,,, | Performed by: INTERNAL MEDICINE

## 2018-09-25 PROCEDURE — 99213 OFFICE O/P EST LOW 20 MIN: CPT | Mod: PBBFAC,PO | Performed by: INTERNAL MEDICINE

## 2018-09-25 NOTE — TELEPHONE ENCOUNTER
Patient has a small lesion on her right face about ear level that came up a few months ago. SHe said it itches and is changing.  I gave her the first available which is 10.29.18.  Please call her if you have availability sooner.      Thanks!

## 2018-10-22 PROBLEM — F17.200 SMOKER: Status: ACTIVE | Noted: 2018-10-22

## 2018-10-22 PROBLEM — N28.1 BILATERAL RENAL CYSTS: Status: ACTIVE | Noted: 2018-10-22

## 2018-10-22 NOTE — PROGRESS NOTES
Subjective:       Patient ID: Kenyetta Andersen is a 83 y.o. Black or  female who presents for new evaluation of Chronic Kidney Disease    HPI     She is referred by her Oncologist for decreeded GFR  She denies foamy urine, gross hematuria and no flank pain. No LE edema and no SOB. She follows a low sodium diet and feels she stays hydrated. No NSAID use nor herbal medications. No known kidney disease in her family. She is still on a PPI    Interval history: She reports she is doing well.She remains active. She denies foamy urine, no hematuria and no flank pain. She follows a low sodium diet and feels she stays hydrated. No LE edema and no SOB. No NSAID use and no herbal medications.       Review of Systems   Constitutional: Negative for activity change, appetite change, fatigue and unexpected weight change.   HENT: Negative for facial swelling.    Eyes: Negative for visual disturbance.   Respiratory: Negative for shortness of breath.    Cardiovascular: Negative for chest pain and leg swelling.   Gastrointestinal: Negative for constipation and diarrhea.   Genitourinary: Negative for difficulty urinating, dysuria, flank pain, frequency and hematuria.   Musculoskeletal: Negative for arthralgias.   Neurological: Negative for weakness and headaches.       Objective:      Physical Exam   Constitutional: She is oriented to person, place, and time. She appears well-nourished. No distress.   HENT:   Mouth/Throat: Oropharynx is clear and moist.   Neck: No JVD present.   Cardiovascular: S1 normal and S2 normal. Exam reveals no friction rub.   Pulmonary/Chest: Breath sounds normal. She has no wheezes. She has no rales.   Abdominal: Soft.   Musculoskeletal: She exhibits no edema.   Neurological: She is alert and oriented to person, place, and time.   Skin: Skin is warm and dry.   Psychiatric: She has a normal mood and affect.   Vitals reviewed.      Assessment:       1. CKD (chronic kidney disease) stage 2, GFR  60-89 ml/min    2. Hyperglycemia    3. Bilateral renal cysts    4. CKD (chronic kidney disease) stage 3, GFR 30-59 ml/min    5. Overlapping malignant neoplasm of colon    6. B12 deficiency        Plan:             Mild CKD with stable kidney function.     Again increased BP today. She does not own a BP cuff and does not have a smart phone to participate in the Digital HTN program. Asker for her to purchase or borrow a BP monitor    Last DM screen was 8 years ago--check for next visit    She requires CT scan surveillance for her history of cancer and I advised for her to push fluids the day before, day of and day after CT scan with IV contrast.      RTC 6 months

## 2018-10-23 ENCOUNTER — TELEPHONE (OUTPATIENT)
Dept: OPHTHALMOLOGY | Facility: CLINIC | Age: 83
End: 2018-10-23

## 2018-10-29 ENCOUNTER — INITIAL CONSULT (OUTPATIENT)
Dept: DERMATOLOGY | Facility: CLINIC | Age: 83
End: 2018-10-29
Payer: MEDICARE

## 2018-10-29 VITALS — HEIGHT: 63 IN | WEIGHT: 154 LBS | BODY MASS INDEX: 27.29 KG/M2

## 2018-10-29 DIAGNOSIS — L82.1 SEBORRHEIC KERATOSES: Primary | ICD-10-CM

## 2018-10-29 PROCEDURE — 1101F PT FALLS ASSESS-DOCD LE1/YR: CPT | Mod: CPTII,,, | Performed by: DERMATOLOGY

## 2018-10-29 PROCEDURE — 99202 OFFICE O/P NEW SF 15 MIN: CPT | Mod: S$PBB,,, | Performed by: DERMATOLOGY

## 2018-10-29 PROCEDURE — 99999 PR PBB SHADOW E&M-EST. PATIENT-LVL II: CPT | Mod: PBBFAC,,, | Performed by: DERMATOLOGY

## 2018-10-29 PROCEDURE — 99212 OFFICE O/P EST SF 10 MIN: CPT | Mod: PBBFAC,PO | Performed by: DERMATOLOGY

## 2018-10-29 NOTE — PROGRESS NOTES
Subjective:       Patient ID:  Kenyetta Andersen is a 83 y.o. female who presents for   Chief Complaint   Patient presents with    Lesion     84 yo F presents for evaluation of a lesion on right cheek growing for 2 months, occasionally feels like it burns, not treated        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    Anxiety     Arthritis     Blood transfusion     Cataract     Done OU    Colon cancer     Colon polyps 3/14/2017    GERD (gastroesophageal reflux disease)     Glaucoma 2/24/2012    Nuclear sclerosis 8/27/2012    Personal history of colon cancer 11/17/2015    Pseudophakia - Left Eye 2/24/2012     Review of Systems   Skin: Negative for itching, rash, daily sunscreen use and tendency to form keloidal scars.   Hematologic/Lymphatic: Does not bruise/bleed easily.        Objective:    Physical Exam   Constitutional: She appears well-developed and well-nourished.   Neurological: She is alert and oriented to person, place, and time.   Psychiatric: She has a normal mood and affect.   Skin:   Areas Examined (abnormalities noted in diagram):   Head / Face Inspection Performed  Neck Inspection Performed  RUE Inspected  LUE Inspection Performed                   Diagram Legend     Erythematous scaling macule/papule c/w actinic keratosis       Vascular papule c/w angioma      Pigmented verrucoid papule/plaque c/w seborrheic keratosis      Yellow umbilicated papule c/w sebaceous hyperplasia      Irregularly shaped tan macule c/w lentigo     1-2 mm smooth white papules consistent with Milia      Movable subcutaneous cyst with punctum c/w epidermal inclusion cyst      Subcutaneous movable cyst c/w pilar cyst      Firm pink to brown papule c/w dermatofibroma      Pedunculated fleshy papule(s) c/w skin tag(s)      Evenly pigmented macule c/w junctional nevus     Mildly variegated pigmented, slightly irregular-bordered macule c/w mildly atypical nevus      Flesh colored to  evenly pigmented papule c/w intradermal nevus       Pink pearly papule/plaque c/w basal cell carcinoma      Erythematous hyperkeratotic cursted plaque c/w SCC      Surgical scar with no sign of skin cancer recurrence      Open and closed comedones      Inflammatory papules and pustules      Verrucoid papule consistent consistent with wart     Erythematous eczematous patches and plaques     Dystrophic onycholytic nail with subungual debris c/w onychomycosis     Umbilicated papule    Erythematous-base heme-crusted tan verrucoid plaque consistent with inflamed seborrheic keratosis     Erythematous Silvery Scaling Plaque c/w Psoriasis     See annotation      Assessment / Plan:        Seborrheic keratoses    These are benign inherited growths without a malignant potential. Reassurance given to patient. No treatment is necessary.   Discussed LN         Follow-up if symptoms worsen or fail to improve.

## 2018-10-29 NOTE — LETTER
October 29, 2018      Michael Nieto MD  [address] [facility] [address]  [address]  [facility] [address]           [facility]  [address] [facility] [address] [place] [address]  Phone: [phone]  Fax: [phone]          Patient: Kenyetta Andersen   MR Number: [id number]   YOB: 1935   Date of Visit: 10/29/2018       Dear Dr. Michael Nieto:    Thank you for referring Kenyetta Andersen to me for evaluation. Attached you will find relevant portions of my assessment and plan of care.    If you have questions, please do not hesitate to call me. I look forward to following Kenyetta Andersen along with you.    Sincerely,    Shawna Bridges MD    Enclosure  CC:  No Recipients    If you would like to receive this communication electronically, please contact [email or web address] or [phone] to request more information on [employer] Link access.    For providers and/or their staff who would like to refer a patient to [facility], please contact us through our one-stop-shop provider referral line, [facility], at [phone].    If you feel you have received this communication in error or would no longer like to receive these types of communications, please e-mail [email or web address]

## 2018-11-30 DIAGNOSIS — H40.033 ANATOMICAL NARROW ANGLE GLAUCOMA OF BOTH EYES WITH BORDERLINE INTRAOCULAR PRESSURE: ICD-10-CM

## 2018-11-30 DIAGNOSIS — K21.00 GERD WITH ESOPHAGITIS: ICD-10-CM

## 2018-11-30 RX ORDER — PANTOPRAZOLE SODIUM 40 MG/1
40 TABLET, DELAYED RELEASE ORAL DAILY
Qty: 30 TABLET | Refills: 2 | Status: SHIPPED | OUTPATIENT
Start: 2018-11-30 | End: 2019-02-03 | Stop reason: SDUPTHER

## 2018-11-30 RX ORDER — LATANOPROST 50 UG/ML
SOLUTION/ DROPS OPHTHALMIC
Qty: 2.5 ML | Refills: 11 | Status: SHIPPED | OUTPATIENT
Start: 2018-11-30 | End: 2019-08-27 | Stop reason: SDUPTHER

## 2018-11-30 NOTE — TELEPHONE ENCOUNTER
Last office visit was in April of 2018.  Patient should be scheduled for a six month follow-up.  I have filled her prescription.  Please schedule her to come in for an office visit.  Ruperto.  Magalys

## 2018-12-12 ENCOUNTER — TELEPHONE (OUTPATIENT)
Dept: OPHTHALMOLOGY | Facility: CLINIC | Age: 83
End: 2018-12-12

## 2018-12-12 RX ORDER — DORZOLAMIDE HYDROCHLORIDE AND TIMOLOL MALEATE 20; 5 MG/ML; MG/ML
1 SOLUTION/ DROPS OPHTHALMIC 2 TIMES DAILY
Qty: 10 ML | Refills: 11 | Status: SHIPPED | OUTPATIENT
Start: 2018-12-12 | End: 2019-08-27 | Stop reason: SDUPTHER

## 2018-12-12 NOTE — TELEPHONE ENCOUNTER
Spoke to patient, informed her that her eye drops were going to the [facility]. Called the pharmacy and asked them to mail them to the patient, they confirmed and will call patient.

## 2018-12-12 NOTE — TELEPHONE ENCOUNTER
----- Message from Pham Alvarado sent at 12/12/2018 11:18 AM CST -----  Contact: Self  Type:  RX Refill Request    Who Called:  Patient   Refill or New Rx:  Refill   RX Name and Strength:  dorzolamide-timolol 2-0.5% (COSOPT) 22.3-6.8 mg/mL ophthalmic solution   How is the patient currently taking it? (ex. 1XDay):  PLACE 1 DROP INTO BOTH EYES 2 (TWO) TIMES DAILY.    Preferred Pharmacy with phone number:      [id number] - [place] - [address]  [address]  [place] [address]  Phone: [phone] Fax: [phone]      Local or Mail Order:  Local   Ordering Provider:  Jese Bill Call Back Number:  [phone] (home)     Additional Information:

## 2019-01-14 ENCOUNTER — OFFICE VISIT (OUTPATIENT)
Dept: OPHTHALMOLOGY | Facility: CLINIC | Age: 84
End: 2019-01-14
Payer: MEDICARE

## 2019-01-14 DIAGNOSIS — H35.372 EPIRETINAL MEMBRANE, LEFT: ICD-10-CM

## 2019-01-14 DIAGNOSIS — Z96.1 PSEUDOPHAKIA, BOTH EYES: ICD-10-CM

## 2019-01-14 DIAGNOSIS — H40.033 ANATOMICAL NARROW ANGLE GLAUCOMA OF BOTH EYES WITH BORDERLINE INTRAOCULAR PRESSURE: Primary | ICD-10-CM

## 2019-01-14 DIAGNOSIS — H52.7 REFRACTIVE ERROR: ICD-10-CM

## 2019-01-14 PROCEDURE — 92133 POSTERIOR SEGMENT OCT OPTIC NERVE(OCULAR COHERENCE TOMOGRAPHY) - OU - BOTH EYES: ICD-10-PCS | Mod: S$GLB,,, | Performed by: OPHTHALMOLOGY

## 2019-01-14 PROCEDURE — 99999 PR PBB SHADOW E&M-EST. PATIENT-LVL III: CPT | Mod: PBBFAC,,, | Performed by: OPHTHALMOLOGY

## 2019-01-14 PROCEDURE — 92012 PR EYE EXAM, EST PATIENT,INTERMED: ICD-10-PCS | Mod: S$GLB,,, | Performed by: OPHTHALMOLOGY

## 2019-01-14 PROCEDURE — 99999 PR PBB SHADOW E&M-EST. PATIENT-LVL III: ICD-10-PCS | Mod: PBBFAC,,, | Performed by: OPHTHALMOLOGY

## 2019-01-14 PROCEDURE — 92133 CPTRZD OPH DX IMG PST SGM ON: CPT | Mod: S$GLB,,, | Performed by: OPHTHALMOLOGY

## 2019-01-14 PROCEDURE — 92012 INTRM OPH EXAM EST PATIENT: CPT | Mod: S$GLB,,, | Performed by: OPHTHALMOLOGY

## 2019-01-14 NOTE — PROGRESS NOTES
HPI     83 YO female presents today for an IOP check. She states she is doing   well, no problems or complaints.     Cosopt OU BID   Brimonidine OU TID   Latanoprost OU QHS     Last edited by Fannie Arreola, PCT on 1/14/2019  8:01 AM. (History)            Assessment /Plan     For exam results, see Encounter Report.    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 Pt states she is using all gtts as directed, Cosopt ou BID,   Brimonidine OU TID, Latanoprost OU HS.     Feels like a skim over her eye. Better when glasses are off. Reports she   did take gtts last night and this morning.     Last edited by Maty Sawant MD on 5/7/2018  4:06 PM.   (History)        ROS     Positive for: Neurological (neuropathy since cancer surgery),   Musculoskeletal (rotator cuff repair, history of broken shoulder blade;   bilateral knee replacement), Eyes (CE OS, ERM OS, glaucoma OU),   Respiratory (history of bronchitis, denies SOB/orthopnea), Heme/Lymph   (history of colon cancer; denies blood thinners)    Negative for: Genitourinary (denies flomax), Endocrine (denies DM or   thyroid problems), Cardiovascular (denies HTN)    Last edited by Maty Sawant MD on 5/7/2018  3:57 PM.   (History)        Assessment /Plan     For exam results, see Encounter Report.    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 Anatomical narrow angle glaucoma   IOP mid teens today, was around 22 OU prior to CE, previously in teens. Appears stable on DFE  3/27/17. HRT stable OD, OS within range of priors. HVF defects appear stable as well lots  of fixation losses last test 5/2018.    IOP had improved with Latanoprost added QHS OU (6/2013) in addition to Cosopt BID OU. Last HVF appeared to have hemianopsia - not present on repeat test - appears stable with priors - stable inferior arc OD, OS inferior arc seems to come and go - not present 2014, but was present exam before that.  Patent LPI OD.  History of post-CE iritis OS.     OCT nerve 1/14/19: OD - low TS, borderline G; OS WNL    Continue Cosopt BID OU and latanoprost QHS OU. Started brimonidine TID OU on 7/6/17 - continue this as well.  Gonio - CHARMAINE pre-CE OD with occasional pain, PAS stable from 2011.     RTC 4 months IOP check with HVF, HRT, DFE.         hemianopsia Last HVF unreliable - extremely high false negatives, also possible right debra defect? Not present on repeat exam.   2. Nuclear sclerosis  Now pseudopakic OU - OD 8/16/17, OS 11/6/08   3. Pseudophakia  OS - stable    Epiretinal membrane OS VA good despite OCT shows ERM OS, OD WNL.    4. Astigmatism with presbyopia MRx given prior visit - stable, but did not fill post-CE Rx

## 2019-02-03 DIAGNOSIS — K21.00 GERD WITH ESOPHAGITIS: ICD-10-CM

## 2019-02-04 RX ORDER — PANTOPRAZOLE SODIUM 40 MG/1
40 TABLET, DELAYED RELEASE ORAL DAILY
Qty: 30 TABLET | Refills: 0 | Status: SHIPPED | OUTPATIENT
Start: 2019-02-04 | End: 2019-03-26 | Stop reason: SDUPTHER

## 2019-02-04 NOTE — TELEPHONE ENCOUNTER
Last time I filled this in November I requested she be scheduled for a 6 month follow up. She has not been in for a visit and is not scheduled. What happened? Rx pended.   Thanks.  Magalys

## 2019-02-06 RX ORDER — GABAPENTIN 300 MG/1
600 CAPSULE ORAL 3 TIMES DAILY
Qty: 540 CAPSULE | Refills: 1 | Status: SHIPPED | OUTPATIENT
Start: 2019-02-06 | End: 2019-09-22 | Stop reason: SDUPTHER

## 2019-02-07 ENCOUNTER — OFFICE VISIT (OUTPATIENT)
Dept: FAMILY MEDICINE | Facility: CLINIC | Age: 84
End: 2019-02-07
Payer: MEDICARE

## 2019-02-07 ENCOUNTER — HOSPITAL ENCOUNTER (OUTPATIENT)
Dept: RADIOLOGY | Facility: CLINIC | Age: 84
Discharge: HOME | End: 2019-02-07
Attending: NURSE PRACTITIONER
Payer: MEDICARE

## 2019-02-07 VITALS
HEIGHT: 63 IN | WEIGHT: 152 LBS | TEMPERATURE: 99 F | DIASTOLIC BLOOD PRESSURE: 70 MMHG | BODY MASS INDEX: 26.93 KG/M2 | HEART RATE: 80 BPM | SYSTOLIC BLOOD PRESSURE: 136 MMHG

## 2019-02-07 DIAGNOSIS — G62.9 POLYNEUROPATHY: ICD-10-CM

## 2019-02-07 DIAGNOSIS — N18.30 CKD (CHRONIC KIDNEY DISEASE) STAGE 3, GFR 30-59 ML/MIN: ICD-10-CM

## 2019-02-07 DIAGNOSIS — K21.9 GASTROESOPHAGEAL REFLUX DISEASE WITHOUT ESOPHAGITIS: Primary | ICD-10-CM

## 2019-02-07 DIAGNOSIS — Z72.0 TOBACCO USE: ICD-10-CM

## 2019-02-07 DIAGNOSIS — Z85.038 HISTORY OF COLON CANCER: ICD-10-CM

## 2019-02-07 DIAGNOSIS — E53.8 B12 DEFICIENCY: ICD-10-CM

## 2019-02-07 DIAGNOSIS — M85.89 OSTEOPENIA OF MULTIPLE SITES: ICD-10-CM

## 2019-02-07 DIAGNOSIS — E78.1 HYPERTRIGLYCERIDEMIA: ICD-10-CM

## 2019-02-07 DIAGNOSIS — H40.2230 CHRONIC ANGLE-CLOSURE GLAUCOMA OF BOTH EYES, UNSPECIFIED GLAUCOMA STAGE: ICD-10-CM

## 2019-02-07 DIAGNOSIS — M26.622 TMJ TENDERNESS, LEFT: ICD-10-CM

## 2019-02-07 DIAGNOSIS — R03.0 ELEVATED BLOOD PRESSURE READING IN OFFICE WITHOUT DIAGNOSIS OF HYPERTENSION: ICD-10-CM

## 2019-02-07 DIAGNOSIS — I70.0 ABDOMINAL AORTIC ATHEROSCLEROSIS: ICD-10-CM

## 2019-02-07 PROBLEM — C18.8 OVERLAPPING MALIGNANT NEOPLASM OF COLON: Status: RESOLVED | Noted: 2017-10-25 | Resolved: 2019-02-07

## 2019-02-07 PROCEDURE — 99999 PR PBB SHADOW E&M-EST. PATIENT-LVL IV: ICD-10-PCS | Mod: PBBFAC,HCNC,, | Performed by: NURSE PRACTITIONER

## 2019-02-07 PROCEDURE — 99499 RISK ADDL DX/OHS AUDIT: ICD-10-PCS | Mod: HCNC,S$GLB,, | Performed by: NURSE PRACTITIONER

## 2019-02-07 PROCEDURE — 3078F PR MOST RECENT DIASTOLIC BLOOD PRESSURE < 80 MM HG: ICD-10-PCS | Mod: HCNC,CPTII,S$GLB, | Performed by: NURSE PRACTITIONER

## 2019-02-07 PROCEDURE — 3075F SYST BP GE 130 - 139MM HG: CPT | Mod: HCNC,CPTII,S$GLB, | Performed by: NURSE PRACTITIONER

## 2019-02-07 PROCEDURE — 99214 OFFICE O/P EST MOD 30 MIN: CPT | Mod: HCNC,S$GLB,, | Performed by: NURSE PRACTITIONER

## 2019-02-07 PROCEDURE — 1101F PT FALLS ASSESS-DOCD LE1/YR: CPT | Mod: HCNC,CPTII,S$GLB, | Performed by: NURSE PRACTITIONER

## 2019-02-07 PROCEDURE — 99999 PR PBB SHADOW E&M-EST. PATIENT-LVL IV: CPT | Mod: PBBFAC,HCNC,, | Performed by: NURSE PRACTITIONER

## 2019-02-07 PROCEDURE — 77080 DXA BONE DENSITY AXIAL: CPT | Mod: TC,HCNC,PO

## 2019-02-07 PROCEDURE — 3078F DIAST BP <80 MM HG: CPT | Mod: HCNC,CPTII,S$GLB, | Performed by: NURSE PRACTITIONER

## 2019-02-07 PROCEDURE — 77080 DEXA BONE DENSITY SPINE HIP: ICD-10-PCS | Mod: 26,HCNC,, | Performed by: RADIOLOGY

## 2019-02-07 PROCEDURE — 99214 PR OFFICE/OUTPT VISIT, EST, LEVL IV, 30-39 MIN: ICD-10-PCS | Mod: HCNC,S$GLB,, | Performed by: NURSE PRACTITIONER

## 2019-02-07 PROCEDURE — 1101F PR PT FALLS ASSESS DOC 0-1 FALLS W/OUT INJ PAST YR: ICD-10-PCS | Mod: HCNC,CPTII,S$GLB, | Performed by: NURSE PRACTITIONER

## 2019-02-07 PROCEDURE — 3075F PR MOST RECENT SYSTOLIC BLOOD PRESS GE 130-139MM HG: ICD-10-PCS | Mod: HCNC,CPTII,S$GLB, | Performed by: NURSE PRACTITIONER

## 2019-02-07 PROCEDURE — 99499 UNLISTED E&M SERVICE: CPT | Mod: HCNC,S$GLB,, | Performed by: NURSE PRACTITIONER

## 2019-02-07 PROCEDURE — 77080 DXA BONE DENSITY AXIAL: CPT | Mod: 26,HCNC,, | Performed by: RADIOLOGY

## 2019-02-07 RX ORDER — TIZANIDINE 4 MG/1
4 TABLET ORAL NIGHTLY PRN
Qty: 30 TABLET | Refills: 2 | Status: SHIPPED | OUTPATIENT
Start: 2019-02-07 | End: 2019-04-25 | Stop reason: SDUPTHER

## 2019-02-07 NOTE — PROGRESS NOTES
Subjective:       Patient ID: Kenyetta Andersen is a 84 y.o. female.    Chief Complaint: Follow-up    Chief Complaint  Chief Complaint   Patient presents with    Follow-up       HPI  Kenyetta Andersen is a 84 y.o. female with medical diagnoses as listed in the medical history and problem list that presents for regular scheduled follow up of GERD, idiopathic peripheral neuropathy, glaucoma, CKD 2-3, insomnia, and vitamin B12 deficiency with treatment with supplementation.  She has a history of colon cancer.  Blood pressure today in the right arm is 146/74, left arm 158/80. Blood pressure was retaken after 5 minutes 136/70.  Patient states that she does monitor her blood pressure at home and that her readings usually run systolic 120-130/diastolic 60-70. BMI today is 26.93 and patient has lost 6 pounds since last visit 4/16/2018. Today patient presents with left jaw pain just below and slightly anterior to left ear. Reports pain is 8 out of 0-10 pain scale, aching, intermittent. Reports wearing upper dentures. Reports pain started about a week ago , reports she was watching television when pain started. Taking tylenol otc without relief. Reports numbness to upper and lower extremities bilateral, on gabapentin with mild relief. Patient if being followed by a nephrologist, Dr. Nieto, for CKD 2-3. Colon cancer in 2009, patient reports remission and is being followed by an oncologist. No other complaints today.    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    Anxiety     Arthritis     Blood transfusion     Cataract     Done OU    Colon cancer     Colon polyps 3/14/2017    GERD (gastroesophageal reflux disease)     Glaucoma 2/24/2012    Nuclear sclerosis 8/27/2012    Personal history of colon cancer 11/17/2015    Pseudophakia - Left Eye 2/24/2012       PAST SURGICAL HISTORY:  Past Surgical History:   Procedure Laterality Date    APPENDECTOMY       CATARACT EXTRACTION W/  INTRAOCULAR LENS IMPLANT Left     CATARACT EXTRACTION W/  INTRAOCULAR LENS IMPLANT Right     Dr Sawant    COLON SURGERY      resection    COLONOSCOPY N/A 3/14/2017    [facility]    COLONOSCOPY N/A 3/24/2014    [facility]    ECTOPIC PREGNANCY SURGERY      EGD (ESOPHAGOGASTRODUODENOSCOPY) N/A 11/19/2013    [facility]    ESOPHAGOGASTRODUODENOSCOPY (EGD) N/A 7/27/2017    [facility]    ESOPHAGOGASTRODUODENOSCOPY (EGD) N/A 6/12/2017    [facility]    ESOPHAGOGASTRODUODENOSCOPY (EGD) N/A 3/15/2016    [facility]    EYE SURGERY      HYSTERECTOMY      complete    JOINT REPLACEMENT      Liban Knee    phaco/pciol Right 8/16/2017    [facility]    ROTATOR CUFF REPAIR Right     TOE SURGERY      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       SOCIAL HISTORY:  Social History     Socioeconomic History    Marital status:      Spouse name: Not on file    Number of children: Not on file    Years of education: Not on file    Highest education level: Not on file   Social Needs    Financial resource strain: Not on file    Food insecurity - worry: Not on file    Food insecurity - inability: Not on file    Transportation needs - medical: Not on file    Transportation needs - non-medical: Not on file   Occupational History    Not on file   Tobacco Use    Smoking status: Current Some Day Smoker     Packs/day: 0.25     Years: 65.00     Pack years: 16.25     Types: Cigarettes    Smokeless tobacco: Never Used    Tobacco comment: [phone]   Substance and Sexual Activity    Alcohol use: Yes     Alcohol/week: 0.0 oz     Comment: occasional (crown)    Drug use: No    Sexual activity: Not Currently   Other Topics Concern     Not on file   Social History Narrative    Not on file       FAMILY HISTORY:  Family History   Problem Relation Age of Onset    Heart disease Mother         heart attack    Heart disease Father     Heart disease Brother         MI    Parkinsonism Sister     Arthritis Sister     No Known Problems Brother     No Known Problems Brother     Amblyopia Neg Hx     Blindness Neg Hx     Cancer Neg Hx     Cataracts Neg Hx     Glaucoma Neg Hx     Hypertension Neg Hx     Macular degeneration Neg Hx     Retinal detachment Neg Hx     Strabismus Neg Hx     Stroke Neg Hx     Thyroid disease Neg Hx     Diabetes Neg Hx        ALLERGIES AND MEDICATIONS: updated and reviewed.  Review of patient's allergies indicates:   Allergen Reactions    Ace inhibitors Edema     Other reaction(s): Angioedema    Codeine Hives     Current Outpatient Medications   Medication Sig Dispense Refill    ascorbic acid (VITAMIN C) 1000 MG tablet Take 1,000 mg by mouth once daily.      b complex vitamins capsule Take 1 capsule by mouth once daily.      brimonidine 0.2% (ALPHAGAN) 0.2 % Drop Place 1 drop into both eyes 3 (three) times daily. 10 mL 6    dorzolamide-timolol 2-0.5% (COSOPT) 22.3-6.8 mg/mL ophthalmic solution Place 1 drop into both eyes 2 (two) times daily. 10 mL 11    gabapentin (NEURONTIN) 300 MG capsule TAKE 2 CAPSULES (600 MG TOTAL) BY MOUTH 3 (THREE) TIMES DAILY. 540 capsule 1    latanoprost 0.005 % ophthalmic solution PLACE 1 DROP INTO BOTH EYES EVERY EVENING. 2.5 mL 11    pantoprazole (PROTONIX) 40 MG tablet Take 1 tablet (40 mg total) by mouth once daily. 30 tablet 0    traZODone (DESYREL) 50 MG tablet Take 1 tablet (50 mg total) by mouth nightly as needed for Insomnia. 30 tablet 5    ranitidine (ZANTAC) 300 MG tablet Take 1 tablet (300 mg total) by mouth every evening. 30 tablet 3    tiZANidine (ZANAFLEX) 4 MG tablet Take 1 tablet (4 mg total) by mouth nightly as needed. Muscle relaxant for left jaw pain 30  tablet 2     No current facility-administered medications for this visit.        I have reviewed the patient's medical history in detail and updated the computerized patient record.    Review of Systems   Constitutional: Negative for activity change, appetite change, chills, fatigue and fever.        No regular exercise, stays active.   HENT: Negative for congestion, dental problem, drooling, ear discharge, ear pain, hearing loss, nosebleeds, postnasal drip, rhinorrhea, sinus pain, sneezing, sore throat and trouble swallowing.         Left jaw pain below ear. When asked, patient states that she does believe she clenches her jaw at night and even notes the clenching on and off throughout the day.    Eyes: Negative for visual disturbance.        Bilateral cataract surgery   Respiratory: Positive for cough. Negative for chest tightness, shortness of breath and wheezing.         C/O occasional non-productive cough   Cardiovascular: Negative for chest pain, palpitations and leg swelling.   Gastrointestinal: Positive for abdominal pain. Negative for constipation, diarrhea, nausea and vomiting.        C/O heartburn in the evening despite use of daily protonix   Endocrine: Negative for polyuria.   Genitourinary: Negative for difficulty urinating, dysuria, frequency, hematuria, menstrual problem and urgency.        Nocturia x 3-4    Musculoskeletal: Positive for arthralgias. Negative for back pain, myalgias and neck pain.        Right shoulder pain, reports having rotator cuff surgery,  relief with tylenol   Skin: Negative for rash and wound.   Neurological: Positive for numbness. Negative for dizziness, tremors, weakness, light-headedness and headaches.        Lower and upper extremity numbness bilaterally   Hematological: Does not bruise/bleed easily.   Psychiatric/Behavioral: Positive for sleep disturbance. Negative for dysphoric mood and suicidal ideas. The patient is not nervous/anxious.         Reports problem  "sleeping, no relief with trazodone. Sleep disturbed by nocturia.         Objective:      Vitals:    02/07/19 1001 02/07/19 1005 02/07/19 1017   BP: (!) 146/74 (!) 158/80 136/70   BP Location: Right arm Left arm Left arm   Patient Position: Sitting Sitting Sitting   BP Method: Large (Automatic)  Medium (Manual)   Pulse: 80     Temp: 98.7 °F (37.1 °C)     TempSrc: Oral     Weight: 68.9 kg (152 lb)     Height: 5' 3" (1.6 m)       Physical Exam   Constitutional: She is oriented to person, place, and time. Vital signs are normal. She appears well-developed and well-nourished. No distress.   Blood pressure 136/70 in the left arm with repeat measure at end of visit   HENT:   Head: Normocephalic and atraumatic.   Right Ear: Hearing, tympanic membrane, external ear and ear canal normal. No tenderness. No mastoid tenderness.   Left Ear: Hearing, tympanic membrane, external ear and ear canal normal. No tenderness. No mastoid tenderness.   Nose: Nose normal. No mucosal edema.   Mouth/Throat: Oropharynx is clear and moist and mucous membranes are normal. She has dentures.   Eyes: Conjunctivae, EOM and lids are normal. Pupils are equal, round, and reactive to light. Right eye exhibits no discharge. Left eye exhibits no discharge.   Neck: Trachea normal and normal range of motion. Neck supple. Normal carotid pulses present. No muscular tenderness present. Carotid bruit is not present.   Cardiovascular: Normal rate, regular rhythm, S1 normal, S2 normal, normal heart sounds, intact distal pulses and normal pulses.   No murmur heard.  Pulses:       Carotid pulses are 2+ on the right side, and 2+ on the left side.       Radial pulses are 2+ on the right side, and 2+ on the left side.        Dorsalis pedis pulses are 2+ on the right side, and 2+ on the left side.        Posterior tibial pulses are 2+ on the right side, and 2+ on the left side.   No edema   Pulmonary/Chest: Effort normal and breath sounds normal. No respiratory " distress. She has no decreased breath sounds. She has no wheezes. She has no rhonchi. She has no rales.   Abdominal: Soft. Normal appearance and bowel sounds are normal. There is no tenderness.   Musculoskeletal: Normal range of motion.   No crepitus to bilateral TMJs with palpation, pain with palpation to area just below left ear and slightly anterior to ear lobe. No pain with palpation over bilateral mastoid areas.   Lymphadenopathy:     She has no cervical adenopathy.        Right: No supraclavicular adenopathy present.        Left: No supraclavicular adenopathy present.   Neurological: She is alert and oriented to person, place, and time. She has normal strength.   Skin: Skin is warm, dry and intact. No lesion and no rash noted. She is not diaphoretic. No pallor.   Psychiatric: She has a normal mood and affect. Her speech is normal and behavior is normal. Judgment and thought content normal. Cognition and memory are normal.   Nursing note and vitals reviewed.        Assessment:       1. Gastroesophageal reflux disease without esophagitis    2. Chronic angle-closure glaucoma of both eyes, unspecified glaucoma stage    3. 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 Elevated blood pressure reading in office without diagnosis of hypertension    5. B12 deficiency    6. Polyneuropathy    7. Hypertriglyceridemia    8. Abdominal aortic atherosclerosis    9. History of colon cancer    10. TMJ tenderness, left    11. Osteopenia of multiple sites    12. Tobacco use    13. BMI 26.0-26.9,adult          Plan:       Kenyetta was seen today for follow-up.    Diagnoses and all orders for this visit:    Gastroesophageal reflux disease without esophagitis  -     ranitidine (ZANTAC) 300 MG tablet; Take 1 tablet (300 mg total) by mouth every evening.  - patient to continue current dose of Protonix 40 mg daily  - Educational handouts provided.  Tips for controlling acid reflux    Chronic angle-closure glaucoma of both eyes,  unspecified glaucoma stage   Continue current eye drops and follow up with ophthalmologist Dr. 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 Nieto  Elevated blood pressure reading in office without diagnosis of hypertension   Blood pressure initially elevated 146/74, 158/80.  Recheck manually in left arm at end of office visit with a reading of 136/70.  Patient reports that she does check her blood pressure at home and readings range 120 -130/60-70   No medication is prescribed to treat high blood pressure   Educational handouts provided.  Controlling high blood pressure  B12 deficiency   Vitamin B12 level >2000 on 2/19/18   Patient maintained on oral B complex vitamin capsule   Continue follow-up with Hematology-Oncology Dr. Granado as instructed  Polyneuropathy   Gabapentin effective in providing relief at current dose, continue as is  Hypertriglyceridemia     Lab Results   Component Value Date    CHOL 179 10/17/2016    CHOL 198 03/21/2014    CHOL 181 07/12/2012     Lab Results   Component Value Date    HDL 70 10/17/2016    HDL 73 03/21/2014    HDL 55 07/12/2012     Lab Results   Component Value Date    LDLCALC 88.6 10/17/2016    LDLCALC 93.0 03/21/2014    LDLCALC 102.0 07/12/2012     Lab Results   Component Value Date    TRIG 102 10/17/2016    TRIG 160 (H) 03/21/2014    TRIG 122 07/12/2012     Lab Results   Component Value Date    CHOLHDL 39.1 10/17/2016    CHOLHDL 36.9 03/21/2014    CHOLHDL 30.4 07/12/2012    The ASCVD Risk score (Wyalusingjoy COATES Jr., et al., 2013) failed to calculate for the following reasons:    The 2013 ASCVD risk score is only valid for ages 40 to  79    No treatment is recommended at this time  Abdominal aortic atherosclerosis   Stable, asymptomatic chronic condition.  Will continue to maximize risk factor reduction and adjust medication as needed.  History of colon cancer   Patient is currently in remission, followed with yearly CT scan and blood work by oncology Dr. Granado  TMJ tenderness, left  -     tiZANidine (ZANAFLEX) 4 MG tablet; Take 1 tablet (4 mg total) by mouth nightly as needed. Muscle relaxant for left jaw pain  - pain to area be low in just anterior to left ear felt to be musculoskeletal probably related to some TMJ arthritis or clenching of the teeth, will try muscle relaxant at bedtime, reassess for relief of symptoms at next office visit, or if pain worsens or changes patient will call office or schedule another visit    Osteopenia of multiple sites  -     DXA Bone Density Spine And Hip; Future  - Dexa Bone density scan 10/17/16: Osteopenia of the hip, osteopenia of the lumbar spine. The FRAX 10 year probability of major osteoporotic fracture is 6.7 percent and the 10 year probability of hip fracture is 2.5 percent.   - will reassess need for treatment of osteopenia when new DEXA bone density scan results are available    Tobacco use   Smoking cessation strongly encouraged, patient refuses at this time, refuses referral to smoking cessation program  BMI 26.0-26.9,adult   BMI today is 26.93 in the patient has lost 6 lb since her last visit on 4/16/2018   Maintain healthy weight with heathy diet and exercise/active lifestyle   Weight loss was not encouraged for this patient due to advanced age    Follow-up in about 6 months (around 8/7/2019) for regular follow up.

## 2019-02-07 NOTE — PATIENT INSTRUCTIONS
Controlling High Blood Pressure  High blood pressure (hypertension) is often called the silent killer. This is because many people who have it dont know it. High blood pressure is defined as 140/90 mm Hg or higher. Know your blood pressure and remember to check it regularly. Doing so can save your life. Here are some things you can do to help control your blood pressure.    Choose heart-healthy foods  · Select low-salt, low-fat foods. Limit sodium intake to 2,400 mg per day or the amount suggested by your healthcare provider.  · Limit canned, dried, cured, packaged, and fast foods. These can contain a lot of salt.  · Eat 8 to 10 servings of fruits and vegetables every day.  · Choose lean meats, fish, or chicken.  · Eat whole-grain pasta, brown rice, and beans.  · Eat 2 to 3 servings of low-fat or fat-free dairy products.  · Ask your doctor about the DASH eating plan. This plan helps reduce blood pressure.  · When you go to a restaurant, ask that your meal be prepared with no added salt.  Maintain a healthy weight  · Ask your healthcare provider how many calories to eat a day. Then stick to that number.  · Ask your healthcare provider what weight range is healthiest for you. If you are overweight, a weight loss of only 3% to 5% of your body weight can help lower blood pressure. Generally, a good weight loss goal is to lose 10% of your body weight in a year.  · Limit snacks and sweets.  · Get regular exercise.  Get up and get active  · Choose activities you enjoy. Find ones you can do with friends or family. This includes bicycling, dancing, walking, and jogging.  · Park farther away from building entrances.  · Use stairs instead of the elevator.  · When you can, walk or bike instead of driving.  · [place] leaves, garden, or do household repairs.  · Be active at a moderate to vigorous level of physical activity for at least 40 minutes for a minimum of 3 to 4 days a week.   Manage stress  · Make time to relax and enjoy  life. Find time to laugh.  · Communicate your concerns with your loved ones and your healthcare provider.  · Visit with family and friends, and keep up with hobbies.  Limit alcohol and quit smoking  · Men should have no more than 2 drinks per day.  · Women should have no more than 1 drink per day.  · Talk with your healthcare provider about quitting smoking. Smoking significantly increases your risk for heart disease and stroke. Ask your healthcare provider about community smoking cessation programs and other options.  Medicines  If lifestyle changes arent enough, your healthcare provider may prescribe high blood pressure medicine. Take all medicines as prescribed. If you have any questions about your medicines, ask your healthcare provider before stopping or changing them.   Date Last Reviewed: 4/27/2016 © 2000-2017 [employer]. [address] [place] [address], [place] [address]. All rights reserved. This information is not intended as a substitute for professional medical care. Always follow your healthcare professional's instructions.        Tips to Control Acid Reflux    To control acid reflux, youll need to make some basic diet and lifestyle changes. The simple steps outlined below may be all youll need to ease discomfort.  Watch what you eat  · Avoid fatty foods and spicy foods.  · Eat fewer acidic foods, such as citrus and tomato-based foods. These can increase symptoms.  · Limit drinking alcohol, caffeine, and fizzy beverages. All increase acid reflux.  · Try limiting chocolate, peppermint, and spearmint. These can worsen acid reflux in some people.  Watch when you eat  · Avoid lying down for 3 hours after eating.  · Do not snack before going to bed.  Raise your head  Raising your head and upper body by 4 to 6 inches helps limit reflux when youre lying down. Put blocks under the head of your bed frame to raise it.  Other changes  · Lose weight, if you need to  · Dont exercise near bedtime  · Avoid  tight-fitting clothes  · Limit aspirin and ibuprofen  · Stop smoking   Date Last Reviewed: 7/1/2016  © 9427-7666 [employer]. [address] [place] [address], [place] [address]. All rights reserved. This information is not intended as a substitute for professional medical care. Always follow your healthcare professional's instructions.

## 2019-02-08 RX ORDER — ALENDRONATE SODIUM 70 MG/1
70 TABLET ORAL
Qty: 4 TABLET | Refills: 11 | Status: SHIPPED | OUTPATIENT
Start: 2019-02-08 | End: 2020-03-09 | Stop reason: SDUPTHER

## 2019-02-08 NOTE — TELEPHONE ENCOUNTER
Patient agrees to begin medication.       ----- Message from Magalys Whelan NP sent at 2/7/2019  5:59 PM CST -----  Please call the patient and let her know that I have reviewed the results of her DEXA bone density scan.  The bone density scan shows osteopenia at the lumbar spine, femur neck, and the total hip.  Since her last bone density scan there has been a decrease in the bone density of the femur.  The risk of fracture calculation shows a slightly increased risk of hip fracture in the next 10 years.  Her risk is calculated at 3.2% and whenever risk is greater than 3%,  medication to treat decreased bone density is recommended.  At this time, I would recommend that she start Fosamax 70 mg once a week to strengthen the bones.  Which she like to start this medication?  If so I will send to her pharmacy.  The DEXA bone density scan should be repeated in two years.  Thanks  Magalys

## 2019-02-22 ENCOUNTER — DOCUMENTATION ONLY (OUTPATIENT)
Dept: FAMILY MEDICINE | Facility: CLINIC | Age: 84
End: 2019-02-22

## 2019-02-22 ENCOUNTER — OFFICE VISIT (OUTPATIENT)
Dept: FAMILY MEDICINE | Facility: CLINIC | Age: 84
End: 2019-02-22
Attending: FAMILY MEDICINE
Payer: MEDICARE

## 2019-02-22 ENCOUNTER — HOSPITAL ENCOUNTER (OUTPATIENT)
Dept: RADIOLOGY | Facility: CLINIC | Age: 84
Discharge: HOME | End: 2019-02-22
Attending: FAMILY MEDICINE
Payer: MEDICARE

## 2019-02-22 VITALS
HEART RATE: 88 BPM | SYSTOLIC BLOOD PRESSURE: 140 MMHG | RESPIRATION RATE: 24 BRPM | TEMPERATURE: 98 F | HEIGHT: 63 IN | WEIGHT: 153.69 LBS | DIASTOLIC BLOOD PRESSURE: 74 MMHG | BODY MASS INDEX: 27.23 KG/M2

## 2019-02-22 DIAGNOSIS — S46.002A INJURY OF LEFT ROTATOR CUFF, INITIAL ENCOUNTER: ICD-10-CM

## 2019-02-22 DIAGNOSIS — S46.002A INJURY OF LEFT ROTATOR CUFF, INITIAL ENCOUNTER: Primary | ICD-10-CM

## 2019-02-22 PROCEDURE — 1101F PR PT FALLS ASSESS DOC 0-1 FALLS W/OUT INJ PAST YR: ICD-10-PCS | Mod: HCNC,CPTII,S$GLB, | Performed by: FAMILY MEDICINE

## 2019-02-22 PROCEDURE — 99999 PR PBB SHADOW E&M-EST. PATIENT-LVL IV: ICD-10-PCS | Mod: PBBFAC,HCNC,, | Performed by: FAMILY MEDICINE

## 2019-02-22 PROCEDURE — 99213 OFFICE O/P EST LOW 20 MIN: CPT | Mod: HCNC,S$GLB,, | Performed by: FAMILY MEDICINE

## 2019-02-22 PROCEDURE — 73030 XR SHOULDER COMPLETE 2 OR MORE VIEWS LEFT: ICD-10-PCS | Mod: 26,HCNC,LT,S$GLB | Performed by: RADIOLOGY

## 2019-02-22 PROCEDURE — 3077F PR MOST RECENT SYSTOLIC BLOOD PRESSURE >= 140 MM HG: ICD-10-PCS | Mod: HCNC,CPTII,S$GLB, | Performed by: FAMILY MEDICINE

## 2019-02-22 PROCEDURE — 99213 PR OFFICE/OUTPT VISIT, EST, LEVL III, 20-29 MIN: ICD-10-PCS | Mod: HCNC,S$GLB,, | Performed by: FAMILY MEDICINE

## 2019-02-22 PROCEDURE — 3077F SYST BP >= 140 MM HG: CPT | Mod: HCNC,CPTII,S$GLB, | Performed by: FAMILY MEDICINE

## 2019-02-22 PROCEDURE — 99999 PR PBB SHADOW E&M-EST. PATIENT-LVL IV: CPT | Mod: PBBFAC,HCNC,, | Performed by: FAMILY MEDICINE

## 2019-02-22 PROCEDURE — 1101F PT FALLS ASSESS-DOCD LE1/YR: CPT | Mod: HCNC,CPTII,S$GLB, | Performed by: FAMILY MEDICINE

## 2019-02-22 PROCEDURE — 73030 X-RAY EXAM OF SHOULDER: CPT | Mod: TC,HCNC,FY,PO,LT

## 2019-02-22 PROCEDURE — 3078F PR MOST RECENT DIASTOLIC BLOOD PRESSURE < 80 MM HG: ICD-10-PCS | Mod: HCNC,CPTII,S$GLB, | Performed by: FAMILY MEDICINE

## 2019-02-22 PROCEDURE — 73030 X-RAY EXAM OF SHOULDER: CPT | Mod: 26,HCNC,LT,S$GLB | Performed by: RADIOLOGY

## 2019-02-22 PROCEDURE — 3078F DIAST BP <80 MM HG: CPT | Mod: HCNC,CPTII,S$GLB, | Performed by: FAMILY MEDICINE

## 2019-02-22 RX ORDER — TIZANIDINE 4 MG/1
4 TABLET ORAL NIGHTLY PRN
COMMUNITY
End: 2019-04-25 | Stop reason: SDUPTHER

## 2019-02-22 RX ORDER — ASCORBIC ACID 500 MG
500 TABLET ORAL DAILY
COMMUNITY

## 2019-02-22 RX ORDER — LANOLIN ALCOHOL/MO/W.PET/CERES
100 CREAM (GRAM) TOPICAL DAILY
Status: ON HOLD | COMMUNITY
End: 2023-07-31 | Stop reason: ALTCHOICE

## 2019-02-22 NOTE — PROGRESS NOTES
Subjective:       Patient ID: Kenyetta Andersen is a 84 y.o. female.    Chief Complaint: Shoulder Pain (left)    84-year-old female coming in with less than one week history of pain in her left shoulder.  She does not recall any specific injury or overuse but on questioning she has been cleaning up her house preparing the cell it and has had a lot of light weight activities it did not produce pain but may have caused overuse injuries.  She has had a rotator cuff injury and surgery on the right shoulder in the past and this does feel somewhat similar.  Pain is mostly posterior to the shoulder and hurts with abduction or flexion approaching 90°.  She has no radicular symptoms and no pain in the neck.    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  No date: Anxiety  No date: Arthritis  No date: Blood transfusion  No date: Cataract      Comment:  Done OU  No date: Colon cancer  3/14/2017: Colon polyps  No date: GERD (gastroesophageal reflux disease)  2/24/2012: Glaucoma  8/27/2012: Nuclear sclerosis  11/17/2015: Personal history of colon cancer  2/24/2012: Pseudophakia - Left Eye\    Past Surgical History:  No date: APPENDECTOMY  No date: CATARACT EXTRACTION W/  INTRAOCULAR LENS IMPLANT; Left  No date: CATARACT EXTRACTION W/  INTRAOCULAR LENS IMPLANT; Right      Comment:  Dr Sawant  No date: COLON SURGERY      Comment:  resection  3/14/2017: COLONOSCOPY; N/A      Comment:  Performed by Killian Guerrier MD at [facility] ENDO  3/24/2014: COLONOSCOPY; N/A      Comment:  Performed by Keven Goff MD at [facility] ENDO  No date: ECTOPIC PREGNANCY SURGERY  11/19/2013: EGD (ESOPHAGOGASTRODUODENOSCOPY); N/A      Comment:  Performed by Keven Goff MD at [facility] ENDO  7/27/2017: ESOPHAGOGASTRODUODENOSCOPY (EGD); N/A      Comment:  Performed by Killian Guerrier MD at [facility] ENDO  6/12/2017: ESOPHAGOGASTRODUODENOSCOPY (EGD); N/A      Comment:  Performed by Killian Guerrier MD at [facility] ENDO  3/15/2016:  ESOPHAGOGASTRODUODENOSCOPY (EGD); N/A      Comment:  Performed by Keven Goff MD at [facility] ENDO  No date: EYE SURGERY  No date: HYSTERECTOMY      Comment:  complete  No date: JOINT REPLACEMENT      Comment:  Liban Knee  8/16/2017: phaco/pciol; Right      Comment:  Performed by Maty Sawant MD at [facility] OR  No date: ROTATOR CUFF REPAIR; Right  No date: TOE SURGERY      Comment:  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    Current Outpatient Medications on File Prior to Visit:  alendronate (FOSAMAX) 70 MG tablet, Take 1 tablet (70 mg total) by mouth every 7 days. On empty stomach in morning, remain upright for 30 minutes., Disp: 4 tablet, Rfl: 11  ascorbic acid, vitamin C, (VITAMIN C) 500 MG tablet, Take 500 mg by mouth once daily., Disp: , Rfl:   b complex vitamins capsule, Take 1 capsule by mouth once daily., Disp: , Rfl:   brimonidine 0.2% (ALPHAGAN) 0.2 % Drop, Place 1 drop into both eyes 3 (three) times daily., Disp: 10 mL, Rfl: 6  cyanocobalamin (VITAMIN B-12) 1000 MCG tablet, Take 100 mcg by mouth once daily., Disp: , Rfl:   dorzolamide-timolol 2-0.5% (COSOPT) 22.3-6.8 mg/mL ophthalmic solution, Place 1 drop into both eyes 2 (two) times daily., Disp: 10 mL, Rfl: 11  gabapentin (NEURONTIN) 300 MG capsule, TAKE 2 CAPSULES (600 MG TOTAL) BY MOUTH 3 (THREE) TIMES DAILY., Disp: 540 capsule, Rfl: 1  latanoprost 0.005 % ophthalmic solution, PLACE 1 DROP INTO BOTH EYES EVERY EVENING., Disp: 2.5 mL, Rfl: 11  pantoprazole (PROTONIX) 40 MG tablet, Take 1 tablet (40 mg total) by mouth once daily., Disp: 30 tablet, Rfl: 0  ranitidine (ZANTAC) 300 MG tablet, Take 1 tablet (300 mg total) by mouth every evening., Disp: 30 tablet, Rfl: 3  tiZANidine (ZANAFLEX) 4 MG tablet, Take 4 mg by mouth nightly as needed., Disp: , Rfl:   traZODone (DESYREL) 50 MG tablet, Take 1 tablet (50 mg total) by mouth nightly as needed for Insomnia., Disp: 30 tablet, Rfl: 5  (DISCONTINUED) ascorbic acid (VITAMIN C) 1000 MG tablet,  Take 1,000 mg by mouth once daily., Disp: , Rfl:     No current facility-administered medications on file prior to visit.           Review of Systems   Musculoskeletal: Positive for arthralgias and myalgias. Negative for back pain, joint swelling, neck pain and neck stiffness.   Skin: Negative for color change and rash.       Objective:      Physical Exam   Constitutional: She appears well-developed and well-nourished. No distress.   Good blood pressure control  Normal weight with a BMI of 27.2 she is down 5.2 lb from her April 16, 2018 visit   Musculoskeletal:        Left shoulder: She exhibits decreased range of motion, tenderness and bony tenderness. She exhibits no swelling, no effusion, no crepitus and no deformity.   She has pain with passive abduction at 90° and pain with passive flexion at 85° on the left shoulder.  She is tender over the AC joint with no palpable abnormality and also tender over the supraspinatus and over the posterior glenohumeral joint.  With resisted rotator cuff maneuvers she has most pain with resisted internal and external rotation with a relatively benign pour out test.  She is unable to get her left hand hand into the lumbar area.   Skin: She is not diaphoretic.   Nursing note and vitals reviewed.      Assessment:       1. Injury of left rotator cuff, initial encounter        Plan:       1. Injury of left rotator cuff, initial encounter  Suspect supraspinatus strain from overuse  - X-Ray Shoulder 2 or More Views Left; Future  - Ambulatory referral to Physical Medicine Rehab

## 2019-02-27 ENCOUNTER — TELEPHONE (OUTPATIENT)
Dept: HEMATOLOGY/ONCOLOGY | Facility: CLINIC | Age: 84
End: 2019-02-27

## 2019-02-27 DIAGNOSIS — I70.0 ABDOMINAL AORTIC ATHEROSCLEROSIS: Primary | ICD-10-CM

## 2019-02-27 DIAGNOSIS — D49.89 NEOPLASM OF ABDOMEN: ICD-10-CM

## 2019-02-27 DIAGNOSIS — D49.0 COLORECTAL NEOPLASM: ICD-10-CM

## 2019-02-27 NOTE — TELEPHONE ENCOUNTER
----- Message from Urbano Prather sent at 2/27/2019  8:21 AM CST -----  Contact: same  Patient called in and stated she overslept and had several test to do today 2/27/19.  Patient would like a call back to see if she can get those rescheduled for a later date.    Patient call back number is [phone]

## 2019-02-28 ENCOUNTER — TELEPHONE (OUTPATIENT)
Dept: FAMILY MEDICINE | Facility: CLINIC | Age: 84
End: 2019-02-28

## 2019-02-28 ENCOUNTER — OFFICE VISIT (OUTPATIENT)
Dept: FAMILY MEDICINE | Facility: CLINIC | Age: 84
End: 2019-02-28
Payer: MEDICARE

## 2019-02-28 VITALS
HEIGHT: 63 IN | HEART RATE: 94 BPM | TEMPERATURE: 101 F | DIASTOLIC BLOOD PRESSURE: 82 MMHG | BODY MASS INDEX: 26.91 KG/M2 | SYSTOLIC BLOOD PRESSURE: 182 MMHG | OXYGEN SATURATION: 92 % | RESPIRATION RATE: 22 BRPM | WEIGHT: 151.88 LBS

## 2019-02-28 DIAGNOSIS — R05.9 COUGH: ICD-10-CM

## 2019-02-28 DIAGNOSIS — J10.1 INFLUENZA A: ICD-10-CM

## 2019-02-28 DIAGNOSIS — M79.10 MYALGIA: ICD-10-CM

## 2019-02-28 DIAGNOSIS — R50.9 FEVER AND CHILLS: Primary | ICD-10-CM

## 2019-02-28 DIAGNOSIS — H65.193 ACUTE MEE (MIDDLE EAR EFFUSION), BILATERAL: ICD-10-CM

## 2019-02-28 LAB
INFLUENZA A, MOLECULAR: POSITIVE
INFLUENZA B, MOLECULAR: NEGATIVE
SPECIMEN SOURCE: ABNORMAL

## 2019-02-28 PROCEDURE — 87502 INFLUENZA DNA AMP PROBE: CPT | Mod: HCNC,PO

## 2019-02-28 PROCEDURE — 99999 PR PBB SHADOW E&M-EST. PATIENT-LVL III: CPT | Mod: PBBFAC,HCNC,, | Performed by: NURSE PRACTITIONER

## 2019-02-28 PROCEDURE — 99214 OFFICE O/P EST MOD 30 MIN: CPT | Mod: 25,HCNC,S$GLB, | Performed by: NURSE PRACTITIONER

## 2019-02-28 PROCEDURE — 3079F DIAST BP 80-89 MM HG: CPT | Mod: HCNC,CPTII,S$GLB, | Performed by: NURSE PRACTITIONER

## 2019-02-28 PROCEDURE — 3079F PR MOST RECENT DIASTOLIC BLOOD PRESSURE 80-89 MM HG: ICD-10-PCS | Mod: HCNC,CPTII,S$GLB, | Performed by: NURSE PRACTITIONER

## 2019-02-28 PROCEDURE — 3077F SYST BP >= 140 MM HG: CPT | Mod: HCNC,CPTII,S$GLB, | Performed by: NURSE PRACTITIONER

## 2019-02-28 PROCEDURE — 1101F PR PT FALLS ASSESS DOC 0-1 FALLS W/OUT INJ PAST YR: ICD-10-PCS | Mod: HCNC,CPTII,S$GLB, | Performed by: NURSE PRACTITIONER

## 2019-02-28 PROCEDURE — 1101F PT FALLS ASSESS-DOCD LE1/YR: CPT | Mod: HCNC,CPTII,S$GLB, | Performed by: NURSE PRACTITIONER

## 2019-02-28 PROCEDURE — 3077F PR MOST RECENT SYSTOLIC BLOOD PRESSURE >= 140 MM HG: ICD-10-PCS | Mod: HCNC,CPTII,S$GLB, | Performed by: NURSE PRACTITIONER

## 2019-02-28 PROCEDURE — 99999 PR PBB SHADOW E&M-EST. PATIENT-LVL III: ICD-10-PCS | Mod: PBBFAC,HCNC,, | Performed by: NURSE PRACTITIONER

## 2019-02-28 PROCEDURE — 96372 THER/PROPH/DIAG INJ SC/IM: CPT | Mod: HCNC,S$GLB,, | Performed by: NURSE PRACTITIONER

## 2019-02-28 PROCEDURE — 99214 PR OFFICE/OUTPT VISIT, EST, LEVL IV, 30-39 MIN: ICD-10-PCS | Mod: 25,HCNC,S$GLB, | Performed by: NURSE PRACTITIONER

## 2019-02-28 PROCEDURE — 96372 PR INJECTION,THERAP/PROPH/DIAG2ST, IM OR SUBCUT: ICD-10-PCS | Mod: HCNC,S$GLB,, | Performed by: NURSE PRACTITIONER

## 2019-02-28 RX ORDER — ACETAMINOPHEN 500 MG
500 TABLET ORAL
Status: COMPLETED | OUTPATIENT
Start: 2019-02-28 | End: 2019-02-28

## 2019-02-28 RX ORDER — BETAMETHASONE SODIUM PHOSPHATE AND BETAMETHASONE ACETATE 3; 3 MG/ML; MG/ML
6 INJECTION, SUSPENSION INTRA-ARTICULAR; INTRALESIONAL; INTRAMUSCULAR; SOFT TISSUE
Status: COMPLETED | OUTPATIENT
Start: 2019-02-28 | End: 2019-02-28

## 2019-02-28 RX ORDER — OSELTAMIVIR PHOSPHATE 75 MG/1
75 CAPSULE ORAL 2 TIMES DAILY
Qty: 14 CAPSULE | Refills: 0 | Status: SHIPPED | OUTPATIENT
Start: 2019-02-28 | End: 2019-03-07

## 2019-02-28 RX ORDER — CETIRIZINE HYDROCHLORIDE 5 MG/1
5 TABLET ORAL DAILY
Refills: 0 | COMMUNITY
Start: 2019-02-28 | End: 2022-03-18

## 2019-02-28 RX ORDER — ACETAMINOPHEN 325 MG/1
325 TABLET ORAL
Status: DISCONTINUED | OUTPATIENT
Start: 2019-02-28 | End: 2019-02-28

## 2019-02-28 RX ORDER — BENZONATATE 100 MG/1
100 CAPSULE ORAL 3 TIMES DAILY PRN
Qty: 30 CAPSULE | Refills: 1 | Status: SHIPPED | OUTPATIENT
Start: 2019-02-28 | End: 2019-03-07

## 2019-02-28 RX ADMIN — Medication 500 MG: at 03:02

## 2019-02-28 RX ADMIN — BETAMETHASONE SODIUM PHOSPHATE AND BETAMETHASONE ACETATE 6 MG: 3; 3 INJECTION, SUSPENSION INTRA-ARTICULAR; INTRALESIONAL; INTRAMUSCULAR; SOFT TISSUE at 03:02

## 2019-02-28 NOTE — PROGRESS NOTES
tylenolSubjective:       Patient ID: Kenyetta Andersen is a 84 y.o. female.    Chief Complaint: body aches, cough, dizziness    URI    This is a new problem. The current episode started yesterday. The problem has been unchanged. Associated symptoms include coughing (clear), headaches and sneezing. Pertinent negatives include no chest pain, congestion, diarrhea, rash, sinus pain or sore throat. She has tried acetaminophen (vitiams) for the symptoms. The treatment provided no relief.       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    Anxiety     Arthritis     Blood transfusion     Cataract     Done OU    Colon cancer     Colon polyps 3/14/2017    GERD (gastroesophageal reflux disease)     Glaucoma 2/24/2012    Nuclear sclerosis 8/27/2012    Personal history of colon cancer 11/17/2015    Pseudophakia - Left Eye 2/24/2012       Review of patient's allergies indicates:   Allergen Reactions    Ace inhibitors Edema     Other reaction(s): Angioedema    Codeine Hives         Current Outpatient Medications:     alendronate (FOSAMAX) 70 MG tablet, Take 1 tablet (70 mg total) by mouth every 7 days. On empty stomach in morning, remain upright for 30 minutes., Disp: 4 tablet, Rfl: 11    ascorbic acid, vitamin C, (VITAMIN C) 500 MG tablet, Take 500 mg by mouth once daily., Disp: , Rfl:     b complex vitamins capsule, Take 1 capsule by mouth once daily., Disp: , Rfl:     brimonidine 0.2% (ALPHAGAN) 0.2 % Drop, Place 1 drop into both eyes 3 (three) times daily., Disp: 10 mL, Rfl: 6    cyanocobalamin (VITAMIN B-12) 1000 MCG tablet, Take 100 mcg by mouth once daily., Disp: , Rfl:     dorzolamide-timolol 2-0.5% (COSOPT) 22.3-6.8 mg/mL ophthalmic solution, Place 1 drop into both eyes 2 (two) times daily., Disp: 10 mL, Rfl: 11    gabapentin (NEURONTIN) 300 MG capsule, TAKE 2 CAPSULES (600 MG TOTAL) BY MOUTH 3 (THREE) TIMES DAILY., Disp: 540 capsule, Rfl: 1    latanoprost 0.005 %  ophthalmic solution, PLACE 1 DROP INTO BOTH EYES EVERY EVENING., Disp: 2.5 mL, Rfl: 11    pantoprazole (PROTONIX) 40 MG tablet, Take 1 tablet (40 mg total) by mouth once daily., Disp: 30 tablet, Rfl: 0    ranitidine (ZANTAC) 300 MG tablet, Take 1 tablet (300 mg total) by mouth every evening., Disp: 30 tablet, Rfl: 3    tiZANidine (ZANAFLEX) 4 MG tablet, Take 4 mg by mouth nightly as needed., Disp: , Rfl:     traZODone (DESYREL) 50 MG tablet, Take 1 tablet (50 mg total) by mouth nightly as needed for Insomnia., Disp: 30 tablet, Rfl: 5    benzonatate (TESSALON) 100 MG capsule, Take 1 capsule (100 mg total) by mouth 3 (three) times daily as needed., Disp: 30 capsule, Rfl: 1    cetirizine (ZYRTEC) 5 MG tablet, Take 1 tablet (5 mg total) by mouth once daily., Disp: , Rfl: 0    oseltamivir (TAMIFLU) 75 MG capsule, Take 1 capsule (75 mg total) by mouth 2 (two) times daily. for 7 days, Disp: 14 capsule, Rfl: 0    Review of Systems   Constitutional: Positive for appetite change (decrease) and chills.   HENT: Positive for sneezing. Negative for congestion, postnasal drip, sinus pressure, sinus pain and sore throat.    Eyes: Positive for discharge and itching.   Respiratory: Positive for cough (clear). Negative for chest tightness.    Cardiovascular: Negative for chest pain and palpitations.   Gastrointestinal: Negative for constipation and diarrhea.   Endocrine: Positive for cold intolerance and heat intolerance.   Genitourinary: Negative for frequency and urgency.   Musculoskeletal: Positive for myalgias.   Skin: Negative for rash.   Allergic/Immunologic: Positive for environmental allergies.   Neurological: Positive for dizziness, weakness and headaches.   Hematological: Does not bruise/bleed easily.   Psychiatric/Behavioral: Negative for agitation. The patient is not nervous/anxious.        Objective:      BP (!) 182/82 (BP Location: Right arm, Patient Position: Sitting, BP Method: Medium (Automatic))   Pulse 94    "Temp (!) 100.5 °F (38.1 °C) (Oral)   Resp (!) 22   Ht 5' 3" (1.6 m)   Wt 68.9 kg (151 lb 14.4 oz)   SpO2 (!) 92%   BMI 26.91 kg/m²   Physical Exam   Constitutional: She is oriented to person, place, and time. She appears well-developed and well-nourished. She appears ill.   HENT:   Nose: Mucosal edema present.   Eyes: EOM are normal. Pupils are equal, round, and reactive to light.   Neck: Normal range of motion.   Cardiovascular: Normal rate, regular rhythm and normal heart sounds.   Pulmonary/Chest: Effort normal. She has wheezes in the right middle field and the right lower field.   Abdominal: Soft. Bowel sounds are normal.   Musculoskeletal: Normal range of motion.   Neurological: She is alert and oriented to person, place, and time.   Skin: Skin is warm and dry.   Psychiatric: She has a normal mood and affect. Her behavior is normal. Judgment and thought content normal.       Assessment:       1. Fever and chills    2. Myalgia    3. Acute RADHIKA (middle ear effusion), bilateral    4. Cough    5. Influenza A    6. BMI 26.0-26.9,adult        Plan:       Fever and chills  -     Discontinue: acetaminophen tablet 325 mg  -     betamethasone acetate-betamethasone sodium phosphate injection 6 mg  -     Influenza A & B by Molecular  -     acetaminophen tablet 500 mg    Myalgia  -     Influenza A & B by Molecular    Acute RADHIKA (middle ear effusion), bilateral  -     cetirizine (ZYRTEC) 5 MG tablet; Take 1 tablet (5 mg total) by mouth once daily.; Refill: 0    Cough  -     benzonatate (TESSALON) 100 MG capsule; Take 1 capsule (100 mg total) by mouth 3 (three) times daily as needed.  Dispense: 30 capsule; Refill: 1    Influenza A  -     oseltamivir (TAMIFLU) 75 MG capsule; Take 1 capsule (75 mg total) by mouth 2 (two) times daily. for 7 days  Dispense: 14 capsule; Refill: 0    BMI 26.0-26.9,adult  Counseled patient on his ideal body weight, health consequences of being obese and current recommendations including weekly " exercise and a heart healthy diet.       Patient readiness: acceptance and barriers:none    During the course of the visit the patient was educated and counseled about the following:     Hypertension:   Dietary sodium restriction.  Regular aerobic exercise.  Obesity:   General weight loss/lifestyle modification strategies discussed (elicit support from others; identify saboteurs; non-food rewards, etc).  Regular aerobic exercise program discussed.    Goals: Hypertension: Reduce Blood Pressure and Obesity: Reduce calorie intake and BMI    Did patient meet goals/outcomes: No    The following self management tools provided: declined    Patient Instructions (the written plan) was given to the patient/family.     Time spent with patient: 30 minutes    Barriers to medications present (no )    Adverse reactions to current medications (no)    Over the counter medications reviewed (Yes)    Time spent with patient: 30 minutes    Patient with be reevaluated in one week with me or sooner prn    Greater than 50% of this visit was spent counseling as described in above documentation:Yes

## 2019-02-28 NOTE — TELEPHONE ENCOUNTER
----- Message from Marcela Chen sent at 2/28/2019 10:39 AM CST -----  Type: Needs Medical Advice    Who Called:  sself  Symptoms (please be specific):  Night sweats / coughing / mucous / body aches  How long has patient had these symptoms:  2 x days   Pharmacy name and phone #:    [id number] - [place] - [address]  [address]  [place] [address]  Phone: [phone] Fax: [phone]    Best Call Back Number: [phone]   Additional Information: prefers to have a prescription called in

## 2019-02-28 NOTE — PATIENT INSTRUCTIONS
Viral Upper Respiratory Illness (Adult)  You have a viral upper respiratory illness (URI), which is another term for the common cold. This illness is contagious during the first few days. It is spread through the air by coughing and sneezing. It may also be spread by direct contact (touching the sick person and then touching your own eyes, nose, or mouth). Frequent handwashing will decrease risk of spread. Most viral illnesses go away within 7 to 10 days with rest and simple home remedies. Sometimes the illness may last for several weeks. Antibiotics will not kill a virus, and they are generally not prescribed for this condition.    Home care  · If symptoms are severe, rest at home for the first 2 to 3 days. When you resume activity, don't let yourself get too tired.  · Avoid being exposed to cigarette smoke (yours or others).  · You may use acetaminophen or ibuprofen to control pain and fever, unless another medicine was prescribed. (Note: If you have chronic liver or kidney disease, have ever had a stomach ulcer or gastrointestinal bleeding, or are taking blood-thinning medicines, talk with your healthcare provider before using these medicines.) Aspirin should never be given to anyone under 18 years of age who is ill with a viral infection or fever. It may cause severe liver or brain damage.  · Your appetite may be poor, so a light diet is fine. Avoid dehydration by drinking 6 to 8 glasses of fluids per day (water, soft drinks, juices, tea, or soup). Extra fluids will help loosen secretions in the nose and lungs.  · Over-the-counter cold medicines will not shorten the length of time youre sick, but they may be helpful for the following symptoms: cough, sore throat, and nasal and sinus congestion. (Note: Do not use decongestants if you have high blood pressure.)  Follow-up care  Follow up with your healthcare provider, or as advised.  When to seek medical advice  Call your healthcare provider right away if any  of these occur:  · Cough with lots of colored sputum (mucus)  · Severe headache; face, neck, or ear pain  · Difficulty swallowing due to throat pain  · Fever of 100.4°F (38°C)  Call 911, or get immediate medical care  Call emergency services right away if any of these occur:  · Chest pain, shortness of breath, wheezing, or difficulty breathing  · Coughing up blood  · Inability to swallow due to throat pain  Date Last Reviewed: 9/13/2015  © 3805-1565 [employer]. [address] [place] [address]. All rights reserved. This information is not intended as a substitute for professional medical care. Always follow your healthcare professional's instructions.

## 2019-03-06 ENCOUNTER — INITIAL CONSULT (OUTPATIENT)
Dept: PHYSICAL MEDICINE AND REHAB | Facility: CLINIC | Age: 84
End: 2019-03-06
Attending: FAMILY MEDICINE
Payer: MEDICARE

## 2019-03-06 VITALS
HEIGHT: 63 IN | HEART RATE: 81 BPM | SYSTOLIC BLOOD PRESSURE: 172 MMHG | BODY MASS INDEX: 26.75 KG/M2 | WEIGHT: 151 LBS | DIASTOLIC BLOOD PRESSURE: 76 MMHG

## 2019-03-06 DIAGNOSIS — G89.29 CHRONIC LEFT SHOULDER PAIN: Primary | ICD-10-CM

## 2019-03-06 DIAGNOSIS — M25.512 CHRONIC LEFT SHOULDER PAIN: Primary | ICD-10-CM

## 2019-03-06 DIAGNOSIS — M75.112 INCOMPLETE TEAR OF LEFT ROTATOR CUFF: ICD-10-CM

## 2019-03-06 PROCEDURE — 1101F PT FALLS ASSESS-DOCD LE1/YR: CPT | Mod: HCNC,CPTII,S$GLB, | Performed by: PHYSICAL MEDICINE & REHABILITATION

## 2019-03-06 PROCEDURE — 99204 OFFICE O/P NEW MOD 45 MIN: CPT | Mod: 25,HCNC,S$GLB, | Performed by: PHYSICAL MEDICINE & REHABILITATION

## 2019-03-06 PROCEDURE — 3078F PR MOST RECENT DIASTOLIC BLOOD PRESSURE < 80 MM HG: ICD-10-PCS | Mod: HCNC,CPTII,S$GLB, | Performed by: PHYSICAL MEDICINE & REHABILITATION

## 2019-03-06 PROCEDURE — 20611 LARGE JOINT ASPIRATION/INJECTION: L SUBACROMIAL BURSA: ICD-10-PCS | Mod: HCNC,LT,S$GLB, | Performed by: PHYSICAL MEDICINE & REHABILITATION

## 2019-03-06 PROCEDURE — 99999 PR PBB SHADOW E&M-EST. PATIENT-LVL III: ICD-10-PCS | Mod: PBBFAC,HCNC,, | Performed by: PHYSICAL MEDICINE & REHABILITATION

## 2019-03-06 PROCEDURE — 3078F DIAST BP <80 MM HG: CPT | Mod: HCNC,CPTII,S$GLB, | Performed by: PHYSICAL MEDICINE & REHABILITATION

## 2019-03-06 PROCEDURE — 1101F PR PT FALLS ASSESS DOC 0-1 FALLS W/OUT INJ PAST YR: ICD-10-PCS | Mod: HCNC,CPTII,S$GLB, | Performed by: PHYSICAL MEDICINE & REHABILITATION

## 2019-03-06 PROCEDURE — 3077F SYST BP >= 140 MM HG: CPT | Mod: HCNC,CPTII,S$GLB, | Performed by: PHYSICAL MEDICINE & REHABILITATION

## 2019-03-06 PROCEDURE — 3077F PR MOST RECENT SYSTOLIC BLOOD PRESSURE >= 140 MM HG: ICD-10-PCS | Mod: HCNC,CPTII,S$GLB, | Performed by: PHYSICAL MEDICINE & REHABILITATION

## 2019-03-06 PROCEDURE — 99204 PR OFFICE/OUTPT VISIT, NEW, LEVL IV, 45-59 MIN: ICD-10-PCS | Mod: 25,HCNC,S$GLB, | Performed by: PHYSICAL MEDICINE & REHABILITATION

## 2019-03-06 PROCEDURE — 20611 DRAIN/INJ JOINT/BURSA W/US: CPT | Mod: HCNC,LT,S$GLB, | Performed by: PHYSICAL MEDICINE & REHABILITATION

## 2019-03-06 PROCEDURE — 99999 PR PBB SHADOW E&M-EST. PATIENT-LVL III: CPT | Mod: PBBFAC,HCNC,, | Performed by: PHYSICAL MEDICINE & REHABILITATION

## 2019-03-06 RX ORDER — BETAMETHASONE SODIUM PHOSPHATE AND BETAMETHASONE ACETATE 3; 3 MG/ML; MG/ML
6 INJECTION, SUSPENSION INTRA-ARTICULAR; INTRALESIONAL; INTRAMUSCULAR; SOFT TISSUE
Status: DISCONTINUED | OUTPATIENT
Start: 2019-03-06 | End: 2019-03-06 | Stop reason: HOSPADM

## 2019-03-06 RX ADMIN — BETAMETHASONE SODIUM PHOSPHATE AND BETAMETHASONE ACETATE 6 MG: 3; 3 INJECTION, SUSPENSION INTRA-ARTICULAR; INTRALESIONAL; INTRAMUSCULAR; SOFT TISSUE at 02:03

## 2019-03-06 NOTE — PROCEDURES
Large Joint Aspiration/Injection: L subacromial bursa  Date/Time: 3/6/2019 2:41 PM  Performed by: Erasmo Vargas MD  Authorized by: Erasmo Vargas MD     Consent Done?:  Yes (Verbal)  Indications:  Pain  Procedure site marked: Yes    Timeout: Prior to procedure the correct patient, procedure, and site was verified      Location:  Shoulder  Site:  L subacromial bursa  Prep: Patient was prepped and draped in usual sterile fashion    Ultrasonic Guidance for needle placement: Yes  Images are saved and documented.  Needle size:  25 G  Approach: in plane lateral to medial.  Medications:  6 mg betamethasone acetate-betamethasone sodium phosphate 6 mg/mL  Patient tolerance:  Patient tolerated the procedure well with no immediate complications    Additional Comments: Ultrasound guidance was used for correct needle placement, the images were saved will be uploaded to EMR.

## 2019-03-06 NOTE — PROGRESS NOTES
[facility]    Consulting Provider: Hever Arreola MD    CHIEF COMPLAINT:   Chief Complaint   Patient presents with    Shoulder Pain     left rotator cuff      HISTORY OF PRESENT ILLNESS: Kenyetta Andersen is a 84 y.o. female who presents to me for evaluation for her left shoulder. The pain started 2-3 weeks ago. She reports that she was cleaning her house and noticed that she had increased pain in her shoulder that night.  The pain is located is the posterior aspect of the shoulder with radiation into the side of the arm. The pain is made worse by lying on the left side and made better by occasional tylenol. The pain comes and goes and occurs daily. She has not had any recent joint  injections or gone to physical therapy. She did try some home exercises that improved her strength.  The pain does not interfere with her sleep or her ADLs at this time.     She did have the confirmed influenza A and B diagnosed 2/28 and received Tamiflu 75 mg and betamethasone injection at that time.     Review of Systems   Constitutional: Negative for fever.   HENT: Negative for drooling.    Eyes: Negative for discharge.   Respiratory: + cough    Cardiovascular: Negative for chest pain.   Genitourinary: Negative for flank pain.   Skin: Negative for wound.   Allergic/Immunologic: Negative for immunocompromised state.   Neurological: Negative for tremors and syncope.   Psychiatric/Behavioral: Negative for behavioral problems.     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    Anxiety     Arthritis     Blood transfusion     Cataract     Done OU    Colon cancer     Colon polyps 3/14/2017    GERD (gastroesophageal reflux disease)     Glaucoma 2/24/2012    Nuclear sclerosis 8/27/2012    Personal history of colon cancer 11/17/2015    Pseudophakia - Left Eye 2/24/2012       Past Surgical History:   Past Surgical History:   Procedure Laterality Date     APPENDECTOMY      CATARACT EXTRACTION W/  INTRAOCULAR LENS IMPLANT Left     CATARACT EXTRACTION W/  INTRAOCULAR LENS IMPLANT Right     Dr Sawant    COLON SURGERY      resection    COLONOSCOPY N/A 3/14/2017    [facility]    COLONOSCOPY N/A 3/24/2014    [facility]    ECTOPIC PREGNANCY SURGERY      EGD (ESOPHAGOGASTRODUODENOSCOPY) N/A 11/19/2013    [facility]    ESOPHAGOGASTRODUODENOSCOPY (EGD) N/A 7/27/2017    [facility]    ESOPHAGOGASTRODUODENOSCOPY (EGD) N/A 6/12/2017    [facility]    ESOPHAGOGASTRODUODENOSCOPY (EGD) N/A 3/15/2016    [facility]    EYE SURGERY      HYSTERECTOMY      complete    JOINT REPLACEMENT      Liban Knee    phaco/pciol Right 8/16/2017    [facility]    ROTATOR CUFF REPAIR Right     TOE SURGERY      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       Family History:   Family History   Problem Relation Age of Onset    Heart disease Mother         heart attack    Heart disease Father     Heart disease Brother         MI    Parkinsonism Sister     Arthritis Sister     No Known Problems Brother     No Known Problems Brother     Amblyopia Neg Hx     Blindness Neg Hx     Cancer Neg Hx     Cataracts Neg Hx     Glaucoma Neg Hx     Hypertension Neg Hx     Macular degeneration Neg Hx     Retinal detachment Neg Hx     Strabismus Neg Hx     Stroke Neg Hx     Thyroid disease Neg Hx     Diabetes Neg Hx        Medications:   Current Outpatient Medications on File Prior to Visit   Medication Sig Dispense Refill    alendronate (FOSAMAX) 70 MG tablet Take 1 tablet (70 mg total) by mouth every 7 days. On empty stomach in morning, remain upright for 30 minutes. 4 tablet 11    ascorbic acid, vitamin C, (VITAMIN C) 500  MG tablet Take 500 mg by mouth once daily.      b complex vitamins capsule Take 1 capsule by mouth once daily.      benzonatate (TESSALON) 100 MG capsule Take 1 capsule (100 mg total) by mouth 3 (three) times daily as needed. 30 capsule 1    brimonidine 0.2% (ALPHAGAN) 0.2 % Drop Place 1 drop into both eyes 3 (three) times daily. 10 mL 6    cetirizine (ZYRTEC) 5 MG tablet Take 1 tablet (5 mg total) by mouth once daily.  0    cyanocobalamin (VITAMIN B-12) 1000 MCG tablet Take 100 mcg by mouth once daily.      dorzolamide-timolol 2-0.5% (COSOPT) 22.3-6.8 mg/mL ophthalmic solution Place 1 drop into both eyes 2 (two) times daily. 10 mL 11    gabapentin (NEURONTIN) 300 MG capsule TAKE 2 CAPSULES (600 MG TOTAL) BY MOUTH 3 (THREE) TIMES DAILY. 540 capsule 1    latanoprost 0.005 % ophthalmic solution PLACE 1 DROP INTO BOTH EYES EVERY EVENING. 2.5 mL 11    oseltamivir (TAMIFLU) 75 MG capsule Take 1 capsule (75 mg total) by mouth 2 (two) times daily. for 7 days 14 capsule 0    pantoprazole (PROTONIX) 40 MG tablet Take 1 tablet (40 mg total) by mouth once daily. 30 tablet 0    ranitidine (ZANTAC) 300 MG tablet Take 1 tablet (300 mg total) by mouth every evening. 30 tablet 3    tiZANidine (ZANAFLEX) 4 MG tablet Take 4 mg by mouth nightly as needed.      traZODone (DESYREL) 50 MG tablet Take 1 tablet (50 mg total) by mouth nightly as needed for Insomnia. 30 tablet 5     No current facility-administered medications on file prior to visit.        Allergies:   Review of patient's allergies indicates:   Allergen Reactions    Ace inhibitors Edema     Other reaction(s): Angioedema    Codeine Hives       Social History:   Social History     Socioeconomic History    Marital status:      Spouse name: None    Number of children: None    Years of education: None    Highest education level: None   Social Needs    Financial resource strain: None    Food insecurity - worry: None    Food insecurity - inability:  "None    Transportation needs - medical: None    Transportation needs - non-medical: None   Occupational History    None   Tobacco Use    Smoking status: Current Some Day Smoker     Packs/day: 0.25     Years: 65.00     Pack years: 16.25     Types: Cigarettes    Smokeless tobacco: Never Used    Tobacco comment: [phone]   Substance and Sexual Activity    Alcohol use: Yes     Alcohol/week: 0.0 oz     Comment: occasional (crown)    Drug use: No    Sexual activity: Not Currently   Other Topics Concern    None   Social History Narrative    None     PHYSICAL EXAMINATION:   General    Vitals:    03/06/19 1313   BP: (!) 172/76   Pulse: 81   Weight: 68.5 kg (151 lb)   Height: 5' 3" (1.6 m)     Constitutional: Oriented to person, place, and time. No apparent distress. Appears well-developed and well-nourished. Pleasant.  HENT:   Head: Normocephalic and atraumatic.   Eyes: Right eye exhibits no discharge. Left eye exhibits no discharge. No scleral icterus.   Pulmonary/Chest: Effort normal. No respiratory distress.   Abdominal: There is no guarding.   Neurological: Alert and oriented to person, place, and time.   Psychiatric: Behavior is normal.   Right Shoulder Exam     Tenderness   The patient is experiencing no tenderness.    Muscle Strength   The patient has normal right shoulder strength.  Abduction: 5/5   Internal rotation: 5/5   External rotation: 5/5   Supraspinatus: 5/5   Subscapularis: 5/5   Biceps: 5/5     Other   Erythema: absent  Scars: absent  Sensation: normal  Pulse: present      Left Shoulder Exam     Tenderness   Left shoulder tenderness location: posterior shoulder girdle.    Range of Motion   Active abduction:  150 normal   Passive abduction:  160 normal   Extension: normal   External rotation: 80   Forward flexion: 160   Internal rotation 0 degrees: T6   Internal rotation 90 degrees: 50     Muscle Strength   Abduction: 5/5   Internal rotation: 5/5   External rotation: 5/5   Biceps: 5/5 " "    Tests   Senior test: positive  Cross arm: negative  Impingement: positive  Drop arm: negative  Sulcus: absent    Other   Erythema: absent  Scars: absent  Sensation: normal  Pulse: present     Comments:  Pain with external rotation  + Neers  - O'Brians  - Speeds  - Yergasons        INSPECTION: There is no swelling, ecchymoses, erythema or gross deformity of the left shoulder.    Imaging  X-ray of the left shoulder from 2/22/19    EXAMINATION:  XR SHOULDER COMPLETE 2 OR MORE VIEWS LEFT    CLINICAL HISTORY:  Unspecified injury of muscle(s) and tendon(s) of the rotator cuff of left shoulder, initial encounter    TECHNIQUE:  Two or three views of the left shoulder were performed.    COMPARISON:  None    FINDINGS:  Bone density is normal.  There is mild degenerative change at the acromioclavicular joint.  There is no fracture or dislocation.  There is normal articulation at the glenohumeral joint.  The included left upper ribs are intact.:     Data Reviewed: X-ray, ultrasound    Supportive Actions: Independent visualization of images or test specimens    ASSESSMENT:   1. Chronic shoulder pain, unspecified laterality    2. Incomplete tear of left rotator cuff      PLAN:     1. Time was spent reviewing the above diagnosis in depth with Kenyetta today, including acute management and rehabilitation.     2.  Her symptoms and exam are consistent with left rotator cuff tendinopathy.  Considering her age, I recommended conservative treatment in the form of physical therapy and injection therapy.  The patient wished to proceed with this treatment plan.  See separate procedure note for ultrasound-guided injection of corticosteroid to the left subacromial bursa. Of note, ultrasound revealed bursal sided partial thickness defect of the supraspinatus tendon. Referral for PT in [place] today.    3. RTC in prn or 6-8 weeks if not improved.    This is a consult from Dr. Hever Arreola. Please see the "Communications" " "section of Epic to see how the consulting physician received the report of today's findings and recommendations. If it's an [facility] physician, it will be forwarded to his/her "in basket".    The above note was completed, in part, with the aid of Dragon dictation software/hardware. Translation errors may be present.  "

## 2019-03-06 NOTE — LETTER
March 6, 2019      Hever Arreola MD  [address]  [place] [address]           [facility]  [address] [place] [address]  [address]  [place] [address]  Phone: [phone]  Fax: [phone]          Patient: Kenyetta Andersen   MR Number: [id number]   YOB: 1935   Date of Visit: 3/6/2019       Dear Dr. Hever Arreola:    Thank you for referring Kenyetta Andersen to me for evaluation. Attached you will find relevant portions of my assessment and plan of care.    If you have questions, please do not hesitate to call me. I look forward to following Kenyetta Andersen along with you.    Sincerely,    Vipul Rico,     Enclosure  CC:  No Recipients    If you would like to receive this communication electronically, please contact [email or web address] or [phone] to request more information on [employer] access.    For providers and/or their staff who would like to refer a patient to [facility], please contact us through our one-stop-shop provider referral line, [facility], at [phone].    If you feel you have received this communication in error or would no longer like to receive these types of communications, please e-mail [email or web address]

## 2019-03-07 ENCOUNTER — OFFICE VISIT (OUTPATIENT)
Dept: FAMILY MEDICINE | Facility: CLINIC | Age: 84
End: 2019-03-07
Payer: MEDICARE

## 2019-03-07 VITALS
HEIGHT: 63 IN | HEART RATE: 69 BPM | BODY MASS INDEX: 27.7 KG/M2 | OXYGEN SATURATION: 94 % | TEMPERATURE: 99 F | DIASTOLIC BLOOD PRESSURE: 72 MMHG | WEIGHT: 156.31 LBS | SYSTOLIC BLOOD PRESSURE: 141 MMHG

## 2019-03-07 DIAGNOSIS — J10.1 INFLUENZA A: Primary | ICD-10-CM

## 2019-03-07 DIAGNOSIS — R05.9 COUGH: ICD-10-CM

## 2019-03-07 DIAGNOSIS — F17.200 SMOKER: ICD-10-CM

## 2019-03-07 PROCEDURE — 99214 PR OFFICE/OUTPT VISIT, EST, LEVL IV, 30-39 MIN: ICD-10-PCS | Mod: HCNC,S$GLB,, | Performed by: NURSE PRACTITIONER

## 2019-03-07 PROCEDURE — 3078F DIAST BP <80 MM HG: CPT | Mod: HCNC,CPTII,S$GLB, | Performed by: NURSE PRACTITIONER

## 2019-03-07 PROCEDURE — 99214 OFFICE O/P EST MOD 30 MIN: CPT | Mod: HCNC,S$GLB,, | Performed by: NURSE PRACTITIONER

## 2019-03-07 PROCEDURE — 1101F PT FALLS ASSESS-DOCD LE1/YR: CPT | Mod: HCNC,CPTII,S$GLB, | Performed by: NURSE PRACTITIONER

## 2019-03-07 PROCEDURE — 1101F PR PT FALLS ASSESS DOC 0-1 FALLS W/OUT INJ PAST YR: ICD-10-PCS | Mod: HCNC,CPTII,S$GLB, | Performed by: NURSE PRACTITIONER

## 2019-03-07 PROCEDURE — 3078F PR MOST RECENT DIASTOLIC BLOOD PRESSURE < 80 MM HG: ICD-10-PCS | Mod: HCNC,CPTII,S$GLB, | Performed by: NURSE PRACTITIONER

## 2019-03-07 PROCEDURE — 99999 PR PBB SHADOW E&M-EST. PATIENT-LVL IV: ICD-10-PCS | Mod: PBBFAC,HCNC,, | Performed by: NURSE PRACTITIONER

## 2019-03-07 PROCEDURE — 99999 PR PBB SHADOW E&M-EST. PATIENT-LVL IV: CPT | Mod: PBBFAC,HCNC,, | Performed by: NURSE PRACTITIONER

## 2019-03-07 PROCEDURE — 3077F SYST BP >= 140 MM HG: CPT | Mod: HCNC,CPTII,S$GLB, | Performed by: NURSE PRACTITIONER

## 2019-03-07 PROCEDURE — 3077F PR MOST RECENT SYSTOLIC BLOOD PRESSURE >= 140 MM HG: ICD-10-PCS | Mod: HCNC,CPTII,S$GLB, | Performed by: NURSE PRACTITIONER

## 2019-03-07 RX ORDER — PROMETHAZINE HYDROCHLORIDE AND DEXTROMETHORPHAN HYDROBROMIDE 6.25; 15 MG/5ML; MG/5ML
5 SYRUP ORAL 3 TIMES DAILY PRN
Qty: 240 ML | Refills: 1 | Status: SHIPPED | OUTPATIENT
Start: 2019-03-07 | End: 2019-03-17

## 2019-03-07 NOTE — PATIENT INSTRUCTIONS
Chronic Cough with Uncertain Cause (Child)    Coughs are one of the most common symptoms of childhood illness. They most often occur as part of the common cold, flu, or bronchitis. This kind of cough usually gets better in 2 to 3 weeks. A cough that persists longer than 3 to 4 weeks may be due to other causes.  If the cough does not improve over the next 2 weeks, further testing may be needed. Follow up with the healthcare provider as directed. Based on the exam today, the exact cause of your childs cough is not certain. Below are some common causes for persistent cough.  Postnasal drip  A cough that is worse at night may be due to postnasal drip. Excess mucus in the nose drains from the back of the nose to the throat and triggers the cough reflex. If postnasal drip has been present more than 3 weeks, it may be due to a sinus infection or allergy. Common allergens include dust, smoke, pollen, mold, pets, cleaning agents, room deodorizers, and chemical fumes. Over-the-counter antihistamines or decongestants may be helpful for allergies, but do not use these in children younger than 6 years of age. A sinus infection may require antibiotic treatment. See the healthcare provider if symptoms continue.  Asthma  A cough may be the only sign of mild asthma. Your childs healthcare provider may do tests to find out if asthma is causing the cough. Your child may also take asthma medicine on a trial basis.  Foreign object  Infants and young children who put small objects in their mouth can inhale them into the lungs. This may cause an initial severe coughing spell that becomes a chronic cough. Slight wheezing or shortness of breath may be present. This is a serious problem. If this is suspected, it must be checked by the healthcare provider.  Acid reflux (heartburn, GERD)  The esophagus is the tube that carries food from the mouth to the stomach. A valve at its lower end prevents the backward flow of stomach contents  (reflux). When the valve does not work correctly, food and stomach acid flow back into the esophagus. (This is also called gastroesophageal reflux disease, or GERD). When this flows as far as the mouth, it looks like spitup. This is not vomiting. It happens without any sign of retching. Signs of reflux in infants usually occur soon after eating. These signs include: spitting up, vomiting, poor weight gain, fast or difficult breathing, and unusual fussiness or irritability. In older children, signs of reflux may include belching, vomiting, heartburn, stomach pain, acid or bitter taste in the mouth, and painful swallowing. See the healthcare provider for further testing if these symptoms are present.  Vomiting  Strong coughing spells can cause gagging and vomiting during or right after the cough. When a cold is the cause of the cough, lots of mucus may be swallowed. This can cause nausea and vomiting. If repeated vomiting occurs, contact the healthcare provider.  Secondhand smoke  Young children who are exposed to tobacco smoke in their homes can have a chronic cough, as well as any of these symptoms:  · Stuffy nose, sore throat, or hoarseness  · Eye irritation, headache, or dizziness  · Fussiness, loss of appetite, or lack of energy  Infants and children younger than 2 years who are exposed to cigarette smoke have a higher risk of these conditions:  · Ear and sinus infections and hearing problems  · Colds, bronchitis, and pneumonia  · Croup, influenza, bronchiolitis, and asthma  In children who already have asthma, secondhand smoke increases the number and severity of asthma attacks. Secondhand smoke is a serious health risk for your child. You must do what you can to eliminate the exposure.  Follow-up care  Follow up with your childs healthcare provider, or as advised, if your childs cough does not improve. Further testing may be needed.  Note: If an X-ray was taken, a specialist will review it. You will be  notified of any new findings that may affect your childs care.  When to seek medical advice  For a usually healthy child, call your child's healthcare provider right away if any of these occur:  · Fever, as described below  ¨ Your child is 3 months old or younger and has a fever of 100.4°F (38°C) or higher (Get medical care right away. Fever in a young baby can be a sign of a dangerous infection.)  ¨ Your child is younger than 2 years of age and has a fever of 100.4°F (38°C) that continues for more than 1 day  ¨ Your child is 2 years old or older and has a fever of 100.4°F (38°C) that continues for more than 3 days  ¨ Your child is of any age and has repeated fevers above 104°F (40°C)  · Whooping sound when breathing in after a long coughing spell  · Coughing up dark-colored sputum (mucus)  · Noisy breathing  Call 911, or get immediate medical care  Contact emergency services right away if any of these occur:  · Coughing up blood  · Wheezing or difficulty breathing  · Fast breathing  ¨ Birth to 6 weeks, over 60 breaths per minute  ¨ 6 weeks to 2 years, over 45 breaths/minute  ¨ 3 to 6 years, over 35 breaths/minute  ¨ 7 to 10 years, over 30 breaths/minute  ¨ Older than 10 years, over 25 breaths/minute  Date Last Reviewed: 9/13/2015  © 0374-8968 [employer]. [address] [place] [address]. All rights reserved. This information is not intended as a substitute for professional medical care. Always follow your healthcare professional's instructions.        Cough, Chronic, Uncertain Cause (Adult)    Everyone has had a cough as part of the common cold, flu, or bronchitis. This kind of cough occurs along with an achy feeling, low-grade fever, nasal and sinus congestion, and a scratchy or sore throat. This usually gets better in 2 to 3 weeks. A cough that lasts longer than 3 weeks may be due to other causes.  If your cough does not improve over the next 2 weeks, further testing may be needed. Follow  up with your healthcare provider as advised. Cough suppressants may be recommended. Based on your exam today, the exact cause of your cough is not certain. Below are some common causes for persistent cough.  Smokers cough  Smokers cough doesnt go away. If you continue to smoke, it only gets worse. The cough is from irritation in the air passages. Talk to your healthcare provider about quitting. Medicines or nicotine-replacement products, like gum or the patch, may make quitting easier.  Postnasal drip  A cough that is worse at night may be due to postnasal drip. Excess mucus in the nose drains from the back of your nose to your throat. This triggers the cough reflex. Postnasal drip may be due to a sinus infection or allergy. Common allergens include dust, tobacco smoke (both inhaled and secondhand smoke), environmental pollutants, pollen, mold, pets, cleaning agents, room deodorizers, and chemical fumes. Over-the-counter antihistamines or decongestants may be helpful for allergies. A sinus infection may requires antibiotic treatment. See your healthcare provider if symptoms continue.  Medicines  Certain prescribed medicines can cause a chronic cough in some people:  · ACE inhibitors for high blood pressure. These include benazepril, captopril, enalapril, fosinopril, lisinopril, quinapril, ramipril, and others.  · Beta-blockers for high blood pressure and other conditions. These include propranolol, atenolol, metoprolol, nadolol, and others.  Let your healthcare provider know if you are taking any of these.  Asthma  Cough may be the only sign of mild asthma. You may have tests to find out if asthma is causing your cough. You may also take asthma medicine on a trial basis.  Acid reflux (heartburn, GERD)  The esophagus is the tube that carries food from the mouth to the stomach. A valve at its lower end prevents stomach acids from flowing upward. If this valve does not work properly, acid from the stomach enters the  esophagus. This may cause a burning pain in the upper abdomen or lower chest, belching, or cough. Symptoms are often worse when lying flat. Avoid eating or drinking before bedtime. Try using extra pillows to raise your upper body, or place 4-inch blocks under the head of your bed. You may try an over-the-counter antacid or an acid-blocking medicine such as famotidine, cimetidine, ranitidine, esomeprazole, lansoprazole, or omeprazole. Stronger medicines for this condition can be prescribed by your healthcare provider.  Follow-up care  Follow up with your healthcare provider, or as advised, if your cough does not improve. Further testing may be needed.  Note: If an X-ray was taken, a specialist will review it. You will be notified of any new findings that may affect your care.  When to seek medical advice  Call your healthcare provider right away if any of these occur:  · Mild wheezing or difficulty breathing  · Fever of 100.4ºF (38ºC) or higher, or as directed by your healthcare provider  · Unexpected weight loss  · Coughing up large amounts of colored sputum  · Night sweats (sheets and pajamas get soaking wet)  Call 911, or get immediate medical care  Contact emergency services right away if any of these occur:  · Coughing up blood  · Moderate to severe trouble breathing or wheezing  Date Last Reviewed: 9/13/2015  © 3905-1909 [employer]. [address] [place] [address], [place] [address]. All rights reserved. This information is not intended as a substitute for professional medical care. Always follow your healthcare professional's instructions.

## 2019-03-08 ENCOUNTER — TELEPHONE (OUTPATIENT)
Dept: NEPHROLOGY | Facility: CLINIC | Age: 84
End: 2019-03-08

## 2019-03-08 NOTE — TELEPHONE ENCOUNTER
----- Message from Victor Hugo Hahn sent at 3/8/2019  9:35 AM CST -----  Type:  Patient Call Back    Who Called:pt    Does the patient know what this is regarding?: please call pt jaye   [facility] Call Back Number:  [phone]  Additional Information:  Please call pt and leave a detailed message if there is no answer.

## 2019-03-08 NOTE — TELEPHONE ENCOUNTER
Spoke to pt rescheduled her apt with dr mccullough for in April. She is having lab work done 3/25.

## 2019-03-25 ENCOUNTER — HOSPITAL ENCOUNTER (OUTPATIENT)
Dept: RADIOLOGY | Facility: HOSPITAL | Age: 84
Discharge: HOME | End: 2019-03-25
Attending: INTERNAL MEDICINE
Payer: MEDICARE

## 2019-03-25 DIAGNOSIS — D49.0 COLORECTAL NEOPLASM: ICD-10-CM

## 2019-03-25 PROCEDURE — 25500020 PHARM REV CODE 255: Mod: HCNC | Performed by: INTERNAL MEDICINE

## 2019-03-25 PROCEDURE — 74177 CT ABD & PELVIS W/CONTRAST: CPT | Mod: TC,HCNC

## 2019-03-25 PROCEDURE — 71260 CT CHEST ABDOMEN PELVIS WITH CONTRAST (XPD): ICD-10-PCS | Mod: 26,HCNC,, | Performed by: RADIOLOGY

## 2019-03-25 PROCEDURE — 74177 CT CHEST ABDOMEN PELVIS WITH CONTRAST (XPD): ICD-10-PCS | Mod: 26,HCNC,, | Performed by: RADIOLOGY

## 2019-03-25 PROCEDURE — 74177 CT ABD & PELVIS W/CONTRAST: CPT | Mod: 26,HCNC,, | Performed by: RADIOLOGY

## 2019-03-25 PROCEDURE — 71260 CT THORAX DX C+: CPT | Mod: 26,HCNC,, | Performed by: RADIOLOGY

## 2019-03-25 RX ADMIN — IOHEXOL 100 ML: 350 INJECTION, SOLUTION INTRAVENOUS at 12:03

## 2019-03-26 ENCOUNTER — OFFICE VISIT (OUTPATIENT)
Dept: FAMILY MEDICINE | Facility: CLINIC | Age: 84
End: 2019-03-26
Payer: MEDICARE

## 2019-03-26 VITALS
TEMPERATURE: 98 F | SYSTOLIC BLOOD PRESSURE: 130 MMHG | DIASTOLIC BLOOD PRESSURE: 66 MMHG | HEART RATE: 96 BPM | BODY MASS INDEX: 27.39 KG/M2 | HEIGHT: 63 IN | WEIGHT: 154.56 LBS

## 2019-03-26 DIAGNOSIS — K21.9 GASTROESOPHAGEAL REFLUX DISEASE WITHOUT ESOPHAGITIS: ICD-10-CM

## 2019-03-26 DIAGNOSIS — I70.0 ABDOMINAL AORTIC ATHEROSCLEROSIS: ICD-10-CM

## 2019-03-26 DIAGNOSIS — Z00.00 ENCOUNTER FOR PREVENTIVE HEALTH EXAMINATION: Primary | ICD-10-CM

## 2019-03-26 DIAGNOSIS — E78.1 HYPERTRIGLYCERIDEMIA: ICD-10-CM

## 2019-03-26 DIAGNOSIS — N28.1 BILATERAL RENAL CYSTS: ICD-10-CM

## 2019-03-26 DIAGNOSIS — K21.00 GERD WITH ESOPHAGITIS: ICD-10-CM

## 2019-03-26 DIAGNOSIS — N18.30 CKD (CHRONIC KIDNEY DISEASE) STAGE 3, GFR 30-59 ML/MIN: ICD-10-CM

## 2019-03-26 DIAGNOSIS — M85.89 OSTEOPENIA OF MULTIPLE SITES: ICD-10-CM

## 2019-03-26 DIAGNOSIS — H40.2230 CHRONIC ANGLE-CLOSURE GLAUCOMA OF BOTH EYES, UNSPECIFIED GLAUCOMA STAGE: ICD-10-CM

## 2019-03-26 DIAGNOSIS — J84.10 CALCIFIED GRANULOMA OF LUNG: ICD-10-CM

## 2019-03-26 DIAGNOSIS — G62.9 POLYNEUROPATHY: ICD-10-CM

## 2019-03-26 PROBLEM — J98.4 CALCIFIED GRANULOMA OF LUNG: Status: ACTIVE | Noted: 2019-03-26

## 2019-03-26 PROBLEM — R03.0 ELEVATED BLOOD PRESSURE READING: Status: RESOLVED | Noted: 2018-04-04 | Resolved: 2019-03-26

## 2019-03-26 PROBLEM — M26.622 TMJ TENDERNESS, LEFT: Status: RESOLVED | Noted: 2019-02-07 | Resolved: 2019-03-26

## 2019-03-26 PROCEDURE — 3078F DIAST BP <80 MM HG: CPT | Mod: HCNC,CPTII,S$GLB, | Performed by: PHYSICIAN ASSISTANT

## 2019-03-26 PROCEDURE — 3075F PR MOST RECENT SYSTOLIC BLOOD PRESS GE 130-139MM HG: ICD-10-PCS | Mod: HCNC,CPTII,S$GLB, | Performed by: PHYSICIAN ASSISTANT

## 2019-03-26 PROCEDURE — 99999 PR PBB SHADOW E&M-EST. PATIENT-LVL IV: CPT | Mod: PBBFAC,HCNC,, | Performed by: PHYSICIAN ASSISTANT

## 2019-03-26 PROCEDURE — 99999 PR PBB SHADOW E&M-EST. PATIENT-LVL IV: ICD-10-PCS | Mod: PBBFAC,HCNC,, | Performed by: PHYSICIAN ASSISTANT

## 2019-03-26 PROCEDURE — 3078F PR MOST RECENT DIASTOLIC BLOOD PRESSURE < 80 MM HG: ICD-10-PCS | Mod: HCNC,CPTII,S$GLB, | Performed by: PHYSICIAN ASSISTANT

## 2019-03-26 PROCEDURE — 3075F SYST BP GE 130 - 139MM HG: CPT | Mod: HCNC,CPTII,S$GLB, | Performed by: PHYSICIAN ASSISTANT

## 2019-03-26 PROCEDURE — 99499 RISK ADDL DX/OHS AUDIT: ICD-10-PCS | Mod: HCNC,S$GLB,, | Performed by: PHYSICIAN ASSISTANT

## 2019-03-26 PROCEDURE — G0439 PPPS, SUBSEQ VISIT: HCPCS | Mod: HCNC,S$GLB,, | Performed by: PHYSICIAN ASSISTANT

## 2019-03-26 PROCEDURE — G0439 PR MEDICARE ANNUAL WELLNESS SUBSEQUENT VISIT: ICD-10-PCS | Mod: HCNC,S$GLB,, | Performed by: PHYSICIAN ASSISTANT

## 2019-03-26 PROCEDURE — 99499 UNLISTED E&M SERVICE: CPT | Mod: HCNC,S$GLB,, | Performed by: PHYSICIAN ASSISTANT

## 2019-03-26 RX ORDER — PANTOPRAZOLE SODIUM 40 MG/1
40 TABLET, DELAYED RELEASE ORAL DAILY
Qty: 30 TABLET | Refills: 0 | Status: SHIPPED | OUTPATIENT
Start: 2019-03-26 | End: 2019-04-21 | Stop reason: SDUPTHER

## 2019-03-26 NOTE — PATIENT INSTRUCTIONS
Counseling and Referral of Other Preventative  (Italic type indicates deductible and co-insurance are waived)    Patient Name: Kenyetta Andersen  Today's Date: 3/26/2019    Health Maintenance       Date Due Completion Date    Lipid Panel 10/17/2021 10/17/2016    DEXA SCAN 02/07/2022 2/7/2019    TETANUS VACCINE 09/13/2022 9/13/2012        No orders of the defined types were placed in this encounter.    The following information is provided to all patients.  This information is to help you find resources for any of the problems found today that may be affecting your health:                Living healthy guide: www.[facility].[place].[facility]      Understanding Diabetes: www.diabetes.org      Eating healthy: www.cdc.gov/healthyweight      CDC home safety checklist: www.cdc.gov/steadi/patient.html      Agency on Aging: www.goea.[place].[facility]      Alcoholics anonymous (AA): www.aa.org      Physical Activity: www.zulma.nih.gov/gf2ebsk      Tobacco use: www.quitwithusla.org

## 2019-03-26 NOTE — PROGRESS NOTES
"Kenyetta Andersen presented for a  Medicare AWV and comprehensive Health Risk Assessment today. The following components were reviewed and updated:    · Medical history  · Family History  · Social history  · Allergies and Current Medications  · Health Risk Assessment  · Health Maintenance  · Care Team     ** See Completed Assessments for Annual Wellness Visit within the encounter summary.**       The following assessments were completed:  · Living Situation  · CAGE  · Depression Screening  · Timed Get Up and Go  · Whisper Test  · Cognitive Function Screening  · Nutrition Screening  · ADL Screening  · PAQ Screening    Vitals:    03/26/19 0847   BP: 130/66   BP Location: Right arm   Patient Position: Sitting   BP Method: Small (Automatic)   Pulse: 96   Temp: 98 °F (36.7 °C)   TempSrc: Oral   Weight: 70.1 kg (154 lb 8.7 oz)   Height: 5' 3" (1.6 m)     Body mass index is 27.38 kg/m².  Physical Exam   Constitutional: She is oriented to person, place, and time. She appears well-developed and well-nourished.   Pulmonary/Chest: Effort normal.   Musculoskeletal:        Right foot: There is no deformity.   Neurological: She is alert and oriented to person, place, and time.   Psychiatric: She has a normal mood and affect. Her behavior is normal. Judgment and thought content normal.               Diagnoses and health risks identified today and associated recommendations/orders:    Kenyetta was seen today for annual exam.    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     Osteopenia of multiple sites  Comments:  Stable, continue to monitor    Bilateral renal cysts  Comments:  Stable, continue to monitor    GERD with esophagitis  Comments:  Controlled, continue current regimen  Orders:  -     pantoprazole (PROTONIX) 40 MG tablet; Take 1 tablet (40 mg total) by mouth once daily.            Provided Kenyetta with a 5-10 year written screening schedule and personal prevention plan. Recommendations were developed using the USPSTF age appropriate recommendations. Education, counseling, and referrals were provided as needed. After Visit Summary printed and given to patient which includes a list of additional screenings\tests needed.    No follow-ups on file.    TEOFILO Khan

## 2019-03-26 NOTE — Clinical Note
Primary Care Providers:Hever Arreola MD, MD (General)Your patient was seen today for a HRA visit. Gap(s) in care (HEDIS gaps) have been identified during this visit that require additional testing and possible follow up.No orders of the defined types were placed in this encounter.These orders were placed using [facility] approved protocol and any results will be forwarded to your office for appropriate follow up. I have included a copy of my visit note; please review the note and feel free to contact me with any questions. Thank you for allowing me to participate in the care of your patients.TEOFILO Khan

## 2019-04-01 ENCOUNTER — OFFICE VISIT (OUTPATIENT)
Dept: HEMATOLOGY/ONCOLOGY | Facility: CLINIC | Age: 84
End: 2019-04-01
Payer: MEDICARE

## 2019-04-01 ENCOUNTER — LAB VISIT (OUTPATIENT)
Dept: LAB | Facility: HOSPITAL | Age: 84
End: 2019-04-01
Attending: INTERNAL MEDICINE
Payer: MEDICARE

## 2019-04-01 VITALS
DIASTOLIC BLOOD PRESSURE: 71 MMHG | BODY MASS INDEX: 27.65 KG/M2 | SYSTOLIC BLOOD PRESSURE: 154 MMHG | RESPIRATION RATE: 18 BRPM | WEIGHT: 156.06 LBS | TEMPERATURE: 99 F | HEIGHT: 63 IN

## 2019-04-01 DIAGNOSIS — E78.1 HYPERTRIGLYCERIDEMIA: ICD-10-CM

## 2019-04-01 DIAGNOSIS — Z85.038 PERSONAL HISTORY OF COLON CANCER, STAGE III: ICD-10-CM

## 2019-04-01 DIAGNOSIS — D49.0 COLORECTAL NEOPLASM: ICD-10-CM

## 2019-04-01 DIAGNOSIS — I70.0 ABDOMINAL AORTIC ATHEROSCLEROSIS: ICD-10-CM

## 2019-04-01 DIAGNOSIS — D75.89 MACROCYTOSIS: ICD-10-CM

## 2019-04-01 DIAGNOSIS — D49.0 COLORECTAL NEOPLASM: Primary | ICD-10-CM

## 2019-04-01 DIAGNOSIS — C18.2 MALIGNANT NEOPLASM OF ASCENDING COLON: ICD-10-CM

## 2019-04-01 DIAGNOSIS — E53.8 B12 DEFICIENCY: ICD-10-CM

## 2019-04-01 PROCEDURE — 84165 PATHOLOGIST INTERPRETATION SPE: ICD-10-PCS | Mod: 26,HCNC,, | Performed by: PATHOLOGY

## 2019-04-01 PROCEDURE — 36415 COLL VENOUS BLD VENIPUNCTURE: CPT | Mod: HCNC

## 2019-04-01 PROCEDURE — 83520 IMMUNOASSAY QUANT NOS NONAB: CPT | Mod: 59,HCNC

## 2019-04-01 PROCEDURE — 86334 PATHOLOGIST INTERPRETATION IFE: ICD-10-PCS | Mod: 26,HCNC,, | Performed by: PATHOLOGY

## 2019-04-01 PROCEDURE — 99214 PR OFFICE/OUTPT VISIT, EST, LEVL IV, 30-39 MIN: ICD-10-PCS | Mod: HCNC,S$GLB,, | Performed by: INTERNAL MEDICINE

## 2019-04-01 PROCEDURE — 86334 IMMUNOFIX E-PHORESIS SERUM: CPT | Mod: HCNC

## 2019-04-01 PROCEDURE — 86334 IMMUNOFIX E-PHORESIS SERUM: CPT | Mod: 26,HCNC,, | Performed by: PATHOLOGY

## 2019-04-01 PROCEDURE — 3077F PR MOST RECENT SYSTOLIC BLOOD PRESSURE >= 140 MM HG: ICD-10-PCS | Mod: HCNC,CPTII,S$GLB, | Performed by: INTERNAL MEDICINE

## 2019-04-01 PROCEDURE — 3078F DIAST BP <80 MM HG: CPT | Mod: HCNC,CPTII,S$GLB, | Performed by: INTERNAL MEDICINE

## 2019-04-01 PROCEDURE — 99214 OFFICE O/P EST MOD 30 MIN: CPT | Mod: HCNC,S$GLB,, | Performed by: INTERNAL MEDICINE

## 2019-04-01 PROCEDURE — 1101F PT FALLS ASSESS-DOCD LE1/YR: CPT | Mod: HCNC,CPTII,S$GLB, | Performed by: INTERNAL MEDICINE

## 2019-04-01 PROCEDURE — 1101F PR PT FALLS ASSESS DOC 0-1 FALLS W/OUT INJ PAST YR: ICD-10-PCS | Mod: HCNC,CPTII,S$GLB, | Performed by: INTERNAL MEDICINE

## 2019-04-01 PROCEDURE — 84165 PROTEIN E-PHORESIS SERUM: CPT | Mod: 26,HCNC,, | Performed by: PATHOLOGY

## 2019-04-01 PROCEDURE — 3077F SYST BP >= 140 MM HG: CPT | Mod: HCNC,CPTII,S$GLB, | Performed by: INTERNAL MEDICINE

## 2019-04-01 PROCEDURE — 84165 PROTEIN E-PHORESIS SERUM: CPT | Mod: HCNC

## 2019-04-01 PROCEDURE — 99999 PR PBB SHADOW E&M-EST. PATIENT-LVL III: ICD-10-PCS | Mod: PBBFAC,HCNC,, | Performed by: INTERNAL MEDICINE

## 2019-04-01 PROCEDURE — 99999 PR PBB SHADOW E&M-EST. PATIENT-LVL III: CPT | Mod: PBBFAC,HCNC,, | Performed by: INTERNAL MEDICINE

## 2019-04-01 PROCEDURE — 3078F PR MOST RECENT DIASTOLIC BLOOD PRESSURE < 80 MM HG: ICD-10-PCS | Mod: HCNC,CPTII,S$GLB, | Performed by: INTERNAL MEDICINE

## 2019-04-01 NOTE — PROGRESS NOTES
Subjective:       Patient ID: Kenyetta Andersen is a 84 y.o. female.    Chief Complaint: f/u  HPI:   Kenyetta Andersen,  known to me, comes for followup. The patient has    known macrocytosis. No other issues. The patient was treated with colorectal    carcinoma, FOLFOX therapy for stage III. She comes in with lab evaluations.    Scans have been postponed to once a year because of 2009 diagnosis. Voices no    Complaints.she had macrocytosis, and B!2 was in the 380 range , she is more compliant using b12 now has htn and is under good control. pcp is Dr. pedraza    REVIEW OF SYSTEMS:     CONSTITUTIONAL: The patient denies any weight change. There is no apparent    change in appetite, fever, night sweats, headaches, fatigue, dizziness, or    weakness.      SKIN: Denies rash, issues with nails, non-healing sores, bleeding, blotching    skin or abnormal bruising. Denies new moles or changes to existing moles.      BREASTS: There is no swelling around breasts or nipple discharge.    EYES: Denies eye pain, blurred vision, swelling, redness or discharge.      ENT AND MOUTH: Denies runny nose, stuffiness, sinus trouble or sores. Denies    nosebleeds. Denies, hoarseness, change in voice or swelling in front of the    neck.      CARDIOVASCULAR: Denies chest pain, discomfort or palpitations. Denies neck    swelling or episodes of passing out.      RESPIRATORY: Denies cough, sputum production, blood in sputum, and denies    shortness of breath.      GI: Denies trouble swallowing, indigestion, heartburn, abdominal pain, nausea,    vomiting, diarrhea, altered bowel habits, blood in stool, discoloration of    stools, change in nature of stool, bloating, increased abdominal girth.      GENITOURINARY: No discharge. No pelvic pain or lumps. No rash around groin or  lesions. No urinary frequency, hesitation, painful urination or blood in    urine. Denies incontinence. No problems with intercourse.      MUSCULOSKELETAL: Denies neck   pain. Denies weakness in arms or legs,    joint problems or distended inflamed veins in legs. Denies swelling or abnormal  glands.  +occ back pain    NEUROLOGICAL: Denies tingling, numbness, altered mentation changes to nerve    function in the face, weakness to one or both of the body. Denies changes to    gait and denies multiple falls or accidents.      PHYSICAL EXAM:     Wt Readings from Last 3 Encounters:   03/26/19 70.1 kg (154 lb 8.7 oz)   03/07/19 70.9 kg (156 lb 4.9 oz)   03/06/19 68.5 kg (151 lb)     Temp Readings from Last 3 Encounters:   03/26/19 98 °F (36.7 °C) (Oral)   03/07/19 98.8 °F (37.1 °C) (Oral)   02/28/19 (!) 100.5 °F (38.1 °C) (Oral)     BP Readings from Last 3 Encounters:   03/26/19 130/66   03/07/19 (!) 141/72   03/06/19 (!) 172/76     Pulse Readings from Last 3 Encounters:   03/26/19 96   03/07/19 69   03/06/19 81     GENERAL: Comfortable looking patient. Patient is in no distress.  Awake, alert and oriented to time, person and place.  No anxiety, or agitation.      HEENT: Normal conjunctivae and eyelids. WNL.  PERRLA 3 to 4 mm. No icterus, no pallor, no congestion, and no discharge noted.     NECK:  Supple. Trachea is central.  No crepitus.  No JVD or masses.    RESPIRATORY:  No intercostal retractions.  No dullness to percussion.  Chest is clear to auscultation.  No rales, rhonchi or wheezes.  No crepitus.  Good air entry bilaterally.    CARDIOVASCULAR:  S1 and S2 are normally heard without murmurs or gallops.  All peripheral pulses are present.    ABDOMEN:  Normal abdomen.  No hepatosplenomegaly.  No free fluid.  Bowel sounds are present.  No hernia noted. No masses.  No rebound or tenderness.  No guarding or rigidity.  Umbilicus is midline.    LYMPHATICS:  No axillary, cervical, supraclavicular, submental, or inguinal lymphadenopathy.    SKIN/MUSCULOSKELETAL:  There is no evidence of excoriation marks or ecchmosis.  No rashes.  No cyanosis.  No clubbing.  No joint or skeletal  deformities noted.  Normal range of motion.    NEUROLOGIC:  Higher functions are appropriate.  No cranial nerve deficits.  Normal ciara.  Normal strength.  Motor and sensory functions are normal.  Deep tendon reflexes are normal. Neuropathy+  GENITAL/RECTAL:  Exams are deferred.      Laboratory:     CBC:  Lab Results   Component Value Date    WBC 7.40 03/25/2019    RBC 4.03 03/25/2019    HGB 12.7 03/25/2019    HCT 39.0 03/25/2019    MCV 97 03/25/2019    MCH 31.5 (H) 03/25/2019    MCHC 32.5 03/25/2019    RDW 13.1 03/25/2019     03/25/2019    MPV 8.0 (L) 03/25/2019    GRAN 3.5 03/25/2019    GRAN 47.4 03/25/2019    LYMPH 2.9 03/25/2019    LYMPH 39.4 03/25/2019    MONO 0.7 03/25/2019    MONO 9.7 03/25/2019    EOS 0.2 03/25/2019    BASO 0.10 03/25/2019    EOSINOPHIL 2.6 03/25/2019    BASOPHIL 0.9 03/25/2019       BMP: BMP  Lab Results   Component Value Date     03/25/2019     03/25/2019    K 4.7 03/25/2019    K 4.7 03/25/2019     03/25/2019     03/25/2019    CO2 29 03/25/2019    CO2 29 03/25/2019    BUN 11 03/25/2019    BUN 11 03/25/2019    CREATININE 0.9 03/25/2019    CREATININE 0.9 03/25/2019    CREATININE 0.9 03/25/2019    CALCIUM 9.2 03/25/2019    CALCIUM 9.2 03/25/2019    ANIONGAP 5 (L) 03/25/2019    ANIONGAP 5 (L) 03/25/2019    ESTGFRAFRICA >60 03/25/2019    ESTGFRAFRICA >60 03/25/2019    ESTGFRAFRICA >60 03/25/2019    EGFRNONAA 60 03/25/2019    EGFRNONAA 60 03/25/2019    EGFRNONAA 60 03/25/2019       LFT:   Lab Results   Component Value Date    ALT 16 03/25/2019    AST 19 03/25/2019    ALKPHOS 68 03/25/2019    BILITOT 0.3 03/25/2019     Lab Results   Component Value Date    GUHYNKUQ67 >2000 (H) 03/25/2019       Impression CT 3/2019      No evidence for residual, recurrent, or metastatic disease within the chest, abdomen, or pelvis.    Enlarged right debra thyroid, with probable underlying nodule.  Consider further evaluation with thyroid ultrasound.    Large duodenal diverticulum, 5  cm, without significant change.    Status post right hemicolectomy with ileocolic reanastomosis without complication.    Mild chronic compression fracture of T12.       Most recent colonoscopy 3/2017  Assessment/Plan:     colon ca stage III dx 2009.  small 4mm lung nodule has disppeared  1. Cont supplements daily  2. Scans doing well cont f/u small area in lung that needs f/u  4. Macrocytosis resolved with b12 supplements  5.cbc, cmp, cea and ct of abd and pevis and chest  In  then q yr if all stays the same    will ref to ortho for chronic compression fracture   will get spep / joel a nd free light chain assay as well   cont with psp for atherosclerosis, lipids, osteopenia

## 2019-04-02 LAB
ALBUMIN SERPL ELPH-MCNC: 3.71 G/DL (ref 3.35–5.55)
ALPHA1 GLOB SERPL ELPH-MCNC: 0.29 G/DL (ref 0.17–0.41)
ALPHA2 GLOB SERPL ELPH-MCNC: 0.72 G/DL (ref 0.43–0.99)
B-GLOBULIN SERPL ELPH-MCNC: 0.79 G/DL (ref 0.5–1.1)
GAMMA GLOB SERPL ELPH-MCNC: 1.19 G/DL (ref 0.67–1.58)
INTERPRETATION SERPL IFE-IMP: NORMAL
KAPPA LC SER QL IA: 2.65 MG/DL (ref 0.33–1.94)
KAPPA LC/LAMBDA SER IA: 1.64 (ref 0.26–1.65)
LAMBDA LC SER QL IA: 1.62 MG/DL (ref 0.57–2.63)
PATHOLOGIST INTERPRETATION IFE: NORMAL
PATHOLOGIST INTERPRETATION SPE: NORMAL
PROT SERPL-MCNC: 6.7 G/DL (ref 6–8.4)

## 2019-04-09 ENCOUNTER — OFFICE VISIT (OUTPATIENT)
Dept: NEPHROLOGY | Facility: CLINIC | Age: 84
End: 2019-04-09
Payer: MEDICARE

## 2019-04-09 VITALS
WEIGHT: 153 LBS | HEIGHT: 63 IN | SYSTOLIC BLOOD PRESSURE: 138 MMHG | HEART RATE: 76 BPM | DIASTOLIC BLOOD PRESSURE: 80 MMHG | RESPIRATION RATE: 16 BRPM | BODY MASS INDEX: 27.11 KG/M2 | OXYGEN SATURATION: 95 %

## 2019-04-09 DIAGNOSIS — K21.00 REFLUX ESOPHAGITIS: Primary | ICD-10-CM

## 2019-04-09 DIAGNOSIS — E78.1 HYPERTRIGLYCERIDEMIA: ICD-10-CM

## 2019-04-09 DIAGNOSIS — R63.4 WEIGHT LOSS: ICD-10-CM

## 2019-04-09 DIAGNOSIS — N28.1 BILATERAL RENAL CYSTS: ICD-10-CM

## 2019-04-09 DIAGNOSIS — F17.200 SMOKER: ICD-10-CM

## 2019-04-09 DIAGNOSIS — Z85.038 PERSONAL HISTORY OF COLON CANCER, STAGE III: ICD-10-CM

## 2019-04-09 DIAGNOSIS — N18.30 CKD (CHRONIC KIDNEY DISEASE) STAGE 3, GFR 30-59 ML/MIN: ICD-10-CM

## 2019-04-09 PROCEDURE — 99214 OFFICE O/P EST MOD 30 MIN: CPT | Mod: HCNC,S$GLB,, | Performed by: INTERNAL MEDICINE

## 2019-04-09 PROCEDURE — 99999 PR PBB SHADOW E&M-EST. PATIENT-LVL V: CPT | Mod: PBBFAC,HCNC,, | Performed by: INTERNAL MEDICINE

## 2019-04-09 PROCEDURE — 1101F PT FALLS ASSESS-DOCD LE1/YR: CPT | Mod: HCNC,CPTII,S$GLB, | Performed by: INTERNAL MEDICINE

## 2019-04-09 PROCEDURE — 99999 PR PBB SHADOW E&M-EST. PATIENT-LVL V: ICD-10-PCS | Mod: PBBFAC,HCNC,, | Performed by: INTERNAL MEDICINE

## 2019-04-09 PROCEDURE — 1101F PR PT FALLS ASSESS DOC 0-1 FALLS W/OUT INJ PAST YR: ICD-10-PCS | Mod: HCNC,CPTII,S$GLB, | Performed by: INTERNAL MEDICINE

## 2019-04-09 PROCEDURE — 3079F DIAST BP 80-89 MM HG: CPT | Mod: HCNC,CPTII,S$GLB, | Performed by: INTERNAL MEDICINE

## 2019-04-09 PROCEDURE — 3075F SYST BP GE 130 - 139MM HG: CPT | Mod: HCNC,CPTII,S$GLB, | Performed by: INTERNAL MEDICINE

## 2019-04-09 PROCEDURE — 99214 PR OFFICE/OUTPT VISIT, EST, LEVL IV, 30-39 MIN: ICD-10-PCS | Mod: HCNC,S$GLB,, | Performed by: INTERNAL MEDICINE

## 2019-04-09 PROCEDURE — 3079F PR MOST RECENT DIASTOLIC BLOOD PRESSURE 80-89 MM HG: ICD-10-PCS | Mod: HCNC,CPTII,S$GLB, | Performed by: INTERNAL MEDICINE

## 2019-04-09 PROCEDURE — 3075F PR MOST RECENT SYSTOLIC BLOOD PRESS GE 130-139MM HG: ICD-10-PCS | Mod: HCNC,CPTII,S$GLB, | Performed by: INTERNAL MEDICINE

## 2019-04-12 ENCOUNTER — TELEPHONE (OUTPATIENT)
Dept: ORTHOPEDICS | Facility: CLINIC | Age: 84
End: 2019-04-12

## 2019-04-12 NOTE — TELEPHONE ENCOUNTER
Called pt. She was on the schedule to see Dr. Wasserman on 4/15/19 for a spine fracture. Advised pt that our Ortho Department does not treat back and spine. Rescheduled her appt to 4/15/19 with Dr. Lozano. Thanks, Nena

## 2019-04-15 ENCOUNTER — OFFICE VISIT (OUTPATIENT)
Dept: SPINE | Facility: CLINIC | Age: 84
End: 2019-04-15
Payer: MEDICARE

## 2019-04-15 VITALS
WEIGHT: 153 LBS | HEART RATE: 73 BPM | HEIGHT: 63 IN | DIASTOLIC BLOOD PRESSURE: 69 MMHG | BODY MASS INDEX: 27.11 KG/M2 | SYSTOLIC BLOOD PRESSURE: 124 MMHG

## 2019-04-15 DIAGNOSIS — Z85.038 PERSONAL HISTORY OF COLON CANCER, STAGE III: Primary | ICD-10-CM

## 2019-04-15 DIAGNOSIS — S22.000A CLOSED COMPRESSION FRACTURE OF THORACIC VERTEBRA, INITIAL ENCOUNTER: Primary | ICD-10-CM

## 2019-04-15 PROCEDURE — 3074F PR MOST RECENT SYSTOLIC BLOOD PRESSURE < 130 MM HG: ICD-10-PCS | Mod: ,,, | Performed by: PHYSICAL MEDICINE & REHABILITATION

## 2019-04-15 PROCEDURE — 99204 OFFICE O/P NEW MOD 45 MIN: CPT | Mod: ,,, | Performed by: PHYSICAL MEDICINE & REHABILITATION

## 2019-04-15 PROCEDURE — 1101F PT FALLS ASSESS-DOCD LE1/YR: CPT | Mod: ,,, | Performed by: PHYSICAL MEDICINE & REHABILITATION

## 2019-04-15 PROCEDURE — 3078F PR MOST RECENT DIASTOLIC BLOOD PRESSURE < 80 MM HG: ICD-10-PCS | Mod: ,,, | Performed by: PHYSICAL MEDICINE & REHABILITATION

## 2019-04-15 PROCEDURE — 3074F SYST BP LT 130 MM HG: CPT | Mod: ,,, | Performed by: PHYSICAL MEDICINE & REHABILITATION

## 2019-04-15 PROCEDURE — 99204 PR OFFICE/OUTPT VISIT, NEW, LEVL IV, 45-59 MIN: ICD-10-PCS | Mod: ,,, | Performed by: PHYSICAL MEDICINE & REHABILITATION

## 2019-04-15 PROCEDURE — 3078F DIAST BP <80 MM HG: CPT | Mod: ,,, | Performed by: PHYSICAL MEDICINE & REHABILITATION

## 2019-04-15 PROCEDURE — 1101F PR PT FALLS ASSESS DOC 0-1 FALLS W/OUT INJ PAST YR: ICD-10-PCS | Mod: ,,, | Performed by: PHYSICAL MEDICINE & REHABILITATION

## 2019-04-15 NOTE — PROGRESS NOTES
SUBJECTIVE:    Patient ID: Kenyetta Andersen is a 84 y.o. female.    Chief Complaint: Back Pain    This is an 84-year-old woman who sees Dr. Arreola for her primary care.  She has history of osteoporosis and colon cancer status post chemotherapy.  She was referred for further evaluation of a T12 compression fracture seen on recent CT of the chest and abdomen.  The patient denies any history of fall.  She really does not have very much back pain unless she over exerts herself.  She denies any bowel or bladder dysfunction fever chills sweats or unexpected weight loss.  No leg pain or weakness.  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    Anxiety     Arthritis     Blood transfusion     Cataract     Done OU    Colon cancer     Colon polyps 3/14/2017    GERD (gastroesophageal reflux disease)     Glaucoma 2/24/2012    History of colon cancer 11/17/2015    Nuclear sclerosis 8/27/2012    Personal history of colon cancer 11/17/2015    Pseudophakia - Left Eye 2/24/2012     Social History     Socioeconomic History    Marital status:      Spouse name: Not on file    Number of children: Not on file    Years of education: Not on file    Highest education level: Not on file   Occupational History    Not on file   Social Needs    Financial resource strain: Not on file    Food insecurity:     Worry: Not on file     Inability: Not on file    Transportation needs:     Medical: Not on file     Non-medical: Not on file   Tobacco Use    Smoking status: Current Some Day Smoker     Packs/day: 0.25     Years: 65.00     Pack years: 16.25     Types: Cigarettes    Smokeless tobacco: Never Used    Tobacco comment: [phone]   Substance and Sexual Activity    Alcohol use: Yes     Alcohol/week: 0.0 oz     Comment: occasional (crown)    Drug use: No    Sexual activity:  Not Currently   Lifestyle    Physical activity:     Days per week: Not on file     Minutes per session: Not on file    Stress: Not on file   Relationships    Social connections:     Talks on phone: Not on file     Gets together: Not on file     Attends Restorationism service: Not on file     Active member of club or organization: Not on file     Attends meetings of clubs or organizations: Not on file     Relationship status: Not on file   Other Topics Concern    Not on file   Social History Narrative    Not on file     Past Surgical History:   Procedure Laterality Date    APPENDECTOMY      CATARACT EXTRACTION W/  INTRAOCULAR LENS IMPLANT Left     CATARACT EXTRACTION W/  INTRAOCULAR LENS IMPLANT Right     Dr Sawant    COLON SURGERY      resection    COLONOSCOPY N/A 3/14/2017    [facility]    COLONOSCOPY N/A 3/24/2014    [facility]    ECTOPIC PREGNANCY SURGERY      EGD (ESOPHAGOGASTRODUODENOSCOPY) N/A 11/19/2013    [facility]    ESOPHAGOGASTRODUODENOSCOPY (EGD) N/A 7/27/2017    [facility] END[facility]    ESOPHAGOGASTRODUODENOSCOPY (EGD) N/A 6/12/2017    [facility] END[facility]    ESOPHAGOGASTRODUODENOSCOPY (EGD) N/A 3/15/2016    [facility] END[facility]    EYE SURGERY      HYSTERECTOMY      complete    JOINT REPLACEMENT      Liban Knee    phaco/pciol Right 8/16/2017    [facility]    ROTATOR CUFF REPAIR Right     TOE SURGERY      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     Family History   Problem Relation Age of Onset    Heart disease Mother         heart attack    Heart disease Father     Heart disease Brother         MI    Parkinsonism Sister     Arthritis Sister     No Known Problems Brother     No Known Problems Brother     Amblyopia Neg Hx     Blindness Neg Hx      "Cancer Neg Hx     Cataracts Neg Hx     Glaucoma Neg Hx     Hypertension Neg Hx     Macular degeneration Neg Hx     Retinal detachment Neg Hx     Strabismus Neg Hx     Stroke Neg Hx     Thyroid disease Neg Hx     Diabetes Neg Hx      Vitals:    04/15/19 1433   BP: 124/69   Pulse: 73   Weight: 69.4 kg (153 lb)   Height: 5' 3" (1.6 m)       Review of Systems   Constitutional: Negative for chills, diaphoresis, fatigue, fever and unexpected weight change.   HENT: Negative for trouble swallowing.    Eyes: Negative for visual disturbance.   Respiratory: Negative for shortness of breath.    Cardiovascular: Negative for chest pain.   Gastrointestinal: Negative for abdominal pain, constipation, nausea and vomiting.   Genitourinary: Negative for difficulty urinating.   Musculoskeletal: Negative for arthralgias, back pain, gait problem, joint swelling, myalgias, neck pain and neck stiffness.   Neurological: Negative for dizziness, speech difficulty, weakness, light-headedness, numbness and headaches.          Objective:      Physical Exam   Constitutional: She is oriented to person, place, and time. She appears well-developed and well-nourished.   Neurological: She is alert and oriented to person, place, and time.   She is awake and in no acute distress  No point tenderness or pain on percussion over the thoracic or lumbar spine.  Deep tendon reflexes +1 at both knees and both ankles  Strength is normal in both lower extremities           Assessment:       1. Closed compression fracture of thoracic vertebra, initial encounter           Plan:     she has a nonfocal examination from a neurological standpoint and no historical red flags.  The compression fracture at T12 appears to be old.  The patient has minimal symptoms referable to her spine.  No further treatment is necessary at this time.  Follow-up p.r.n.      Closed compression fracture of thoracic vertebra, initial encounter        "

## 2019-04-20 NOTE — PROGRESS NOTES
Subjective:       Patient ID: Kenyetta Andersen is a 84 y.o. Black or  female who presents for new evaluation of Chronic Kidney Disease    HPI     She is referred by her Oncologist for decreeded GFR  She denies foamy urine, gross hematuria and no flank pain. No LE edema and no SOB. She follows a low sodium diet and feels she stays hydrated. No NSAID use nor herbal medications. No known kidney disease in her family. She is still on a PPI    Interval history April 2019: She reports she is doing well excpet poor appetitie and difficulty with swallowing (?)--poor historian. She denies foamy urine, no hematuria and no flank pain. She follows a low sodium diet and feels she stays hydrated. No LE edema and no SOB. No NSAID use and no herbal medications.       Review of Systems   Constitutional: Negative for activity change, appetite change, fatigue and unexpected weight change.   HENT: Negative for facial swelling.    Eyes: Negative for visual disturbance.   Respiratory: Negative for shortness of breath.    Cardiovascular: Negative for chest pain and leg swelling.   Gastrointestinal: Negative for constipation and diarrhea.   Genitourinary: Negative for difficulty urinating, dysuria, flank pain, frequency and hematuria.   Musculoskeletal: Negative for arthralgias.   Neurological: Negative for weakness and headaches.       Objective:      Physical Exam   Constitutional: She is oriented to person, place, and time. She appears well-nourished. No distress.   HENT:   Mouth/Throat: Oropharynx is clear and moist.   Neck: No JVD present.   Cardiovascular: S1 normal and S2 normal. Exam reveals no friction rub.   Pulmonary/Chest: Breath sounds normal. She has no wheezes. She has no rales.   Abdominal: Soft.   Musculoskeletal: She exhibits no edema.   Neurological: She is alert and oriented to person, place, and time.   Skin: Skin is warm and dry.   Psychiatric: She has a normal mood and affect.   Vitals reviewed.       Assessment:       1. Reflux esophagitis    2. Weight loss    3. 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 Bilateral renal cysts    5. Hypertriglyceridemia    6. Personal history of colon cancer, stage III    7. Smoker        Plan:             Mild CKD with stable kidney function.     HTN is controlled    DM screen --OK, barely preDiabetic     Daughter request yearly check ups. I suggested her PCP can monitor and if any worsening of kidney function he will let me know    Refer to GI for poor appetite and dysphagia    RTC prn

## 2019-04-21 DIAGNOSIS — K21.00 GERD WITH ESOPHAGITIS: ICD-10-CM

## 2019-04-22 RX ORDER — PANTOPRAZOLE SODIUM 40 MG/1
TABLET, DELAYED RELEASE ORAL
Qty: 30 TABLET | Refills: 10 | Status: SHIPPED | OUTPATIENT
Start: 2019-04-22 | End: 2019-05-07

## 2019-04-25 DIAGNOSIS — M26.622 TMJ TENDERNESS, LEFT: ICD-10-CM

## 2019-04-25 RX ORDER — TIZANIDINE 4 MG/1
4 TABLET ORAL NIGHTLY PRN
Qty: 30 TABLET | Refills: 2 | Status: SHIPPED | OUTPATIENT
Start: 2019-04-25 | End: 2019-05-05

## 2019-04-29 ENCOUNTER — CLINICAL SUPPORT (OUTPATIENT)
Dept: OPHTHALMOLOGY | Facility: CLINIC | Age: 84
End: 2019-04-29
Attending: OPHTHALMOLOGY
Payer: MEDICARE

## 2019-04-29 ENCOUNTER — OFFICE VISIT (OUTPATIENT)
Dept: OPHTHALMOLOGY | Facility: CLINIC | Age: 84
End: 2019-04-29
Payer: MEDICARE

## 2019-04-29 DIAGNOSIS — H40.033 ANATOMICAL NARROW ANGLE GLAUCOMA OF BOTH EYES WITH BORDERLINE INTRAOCULAR PRESSURE: ICD-10-CM

## 2019-04-29 DIAGNOSIS — H40.2232 CHRONIC ANGLE-CLOSURE GLAUCOMA OF BOTH EYES, MODERATE STAGE: ICD-10-CM

## 2019-04-29 DIAGNOSIS — H52.7 REFRACTIVE ERROR: ICD-10-CM

## 2019-04-29 DIAGNOSIS — Z96.1 PSEUDOPHAKIA, BOTH EYES: Primary | ICD-10-CM

## 2019-04-29 DIAGNOSIS — H40.2230 CHRONIC ANGLE-CLOSURE GLAUCOMA OF BOTH EYES: ICD-10-CM

## 2019-04-29 DIAGNOSIS — H35.372 EPIRETINAL MEMBRANE, LEFT: ICD-10-CM

## 2019-04-29 PROCEDURE — 99999 PR PBB SHADOW E&M-EST. PATIENT-LVL III: ICD-10-PCS | Mod: PBBFAC,HCNC,, | Performed by: OPHTHALMOLOGY

## 2019-04-29 PROCEDURE — 92014 PR EYE EXAM, EST PATIENT,COMPREHESV: ICD-10-PCS | Mod: HCNC,S$GLB,, | Performed by: OPHTHALMOLOGY

## 2019-04-29 PROCEDURE — 92133 HEIDELBERG RETINA TOMOGRAPHY (HRT) - OU - BOTH EYES: ICD-10-PCS | Mod: HCNC,S$GLB,, | Performed by: OPHTHALMOLOGY

## 2019-04-29 PROCEDURE — 92083 HUMPHREY VISUAL FIELD - OU - BOTH EYES: ICD-10-PCS | Mod: HCNC,S$GLB,, | Performed by: OPHTHALMOLOGY

## 2019-04-29 PROCEDURE — 99999 PR PBB SHADOW E&M-EST. PATIENT-LVL III: CPT | Mod: PBBFAC,HCNC,, | Performed by: OPHTHALMOLOGY

## 2019-04-29 PROCEDURE — 92083 EXTENDED VISUAL FIELD XM: CPT | Mod: HCNC,S$GLB,, | Performed by: OPHTHALMOLOGY

## 2019-04-29 PROCEDURE — 92133 CPTRZD OPH DX IMG PST SGM ON: CPT | Mod: HCNC,S$GLB,, | Performed by: OPHTHALMOLOGY

## 2019-04-29 PROCEDURE — 92014 COMPRE OPH EXAM EST PT 1/>: CPT | Mod: HCNC,S$GLB,, | Performed by: OPHTHALMOLOGY

## 2019-04-29 NOTE — PROGRESS NOTES
HPI     85 YO female presents today for an IOP check and testing. She states that   she is doing well, C/O itchy eyes. Occasional pain OS.     Cosopt OU BID  Brimonidine OU TID  Latanoprost OU QHS    Agree with above. Pain lasts few seconds, might come back 15-20 minutes   later. Happens maybe 2x per week. Denies photophobia. Denies associated   vision changes.     Last edited by Maty Sawant MD on 4/29/2019  9:01 AM.   (History)        ROS     Positive for: Neurological (neuropathy since cancer surgery),   Musculoskeletal (rotator cuff repair, history of broken shoulder blade;   bilateral knee replacement), Eyes (CE OS, ERM OS, glaucoma OU),   Respiratory (history of bronchitis, denies SOB/orthopnea), Heme/Lymph   (history of colon cancer; denies blood thinners)    Negative for: Genitourinary (denies flomax), Endocrine (denies DM or   thyroid problems), Cardiovascular (denies HTN)    Last edited by Maty Sawant MD on 4/29/2019  9:01 AM.   (History)        Assessment /Plan     For exam results, see Encounter Report.    Pseudophakia, both eyes    Chronic angle-closure glaucoma of both eyes, moderate stage   -     [place] Retina Tomography (HRT) - OU - Both Eyes    Chronic angle-closure glaucoma of both eyes  -     [place] [facility] Tom[facility] (HRT) - OU - Both Eyes    Chronic angle-closure glaucoma of both eyes, moderate stage  -     [place] [facility] Tomography (HRT) - OU - Both Eyes    Epiretinal membrane, left    Refractive error          1. Anatomical narrow angle glaucoma   IOP mid teens today, was around 20 OU prior to CE, previously in teens. Appears stable on DFE today 4/29/19.     IOP had improved with Latanoprost added QHS OU (6/2013) in addition to Cosopt BID OU.  Patent LPI OD.  History of post-CE iritis OS.     Last HVF -   Results for orders placed in visit on 04/29/19   [place] Visual Field - OU - Extended - Both Eyes    Narrative OD - new superior defect, looks more like  lid artifact than arcuate.   Single inferior miss. Low reliability - fixation loss  OS - enlarged blind spot, scattered defects, WNL  Overall appears stable OU.      Last OCT nerve -   Results for orders placed in visit on 01/14/19   Posterior Segment OCT Optic Nerve- Both eyes    Narrative Findings  Right Eye  Low. TS.     Left Eye  Normal.      Last HRT -   Results for orders placed in visit on 04/29/19   [place] Retina Tomography (HRT) - OU - Both Eyes    Narrative OD - poor alignment likely account for increase in LCD and TS low; SD 31  OS - stable with baseline and priors, no focal thinning. SD 20     Last color photos - No results found for this or any previous visit.    Continue Cosopt BID OU and latanoprost QHS OU. Started brimonidine TID OU on 7/6/17 - continue this as well.  Gonio - CHARMAINE pre-CE OD with occasional pain, PAS stable from 2011.     RTC 4 months IOP check with OCT macula.         hemianopsia Last HVF unreliable - extremely high false negatives, also possible right debra defect? Not present on repeat exam.   2. Nuclear sclerosis  Now pseudopakic OU - OD 8/16/17, OS 11/6/08   3. Pseudophakia  OS - stable    Epiretinal membrane OS VA good despite OCT shows ERM OS, OD WNL.    4. Astigmatism with presbyopia MRx given prior visit - stable, but did not fill post-CE Rx

## 2019-05-07 ENCOUNTER — OFFICE VISIT (OUTPATIENT)
Dept: GASTROENTEROLOGY | Facility: CLINIC | Age: 84
End: 2019-05-07
Payer: MEDICARE

## 2019-05-07 VITALS
RESPIRATION RATE: 18 BRPM | HEART RATE: 70 BPM | WEIGHT: 158.5 LBS | DIASTOLIC BLOOD PRESSURE: 73 MMHG | SYSTOLIC BLOOD PRESSURE: 135 MMHG | BODY MASS INDEX: 28.08 KG/M2 | HEIGHT: 63 IN

## 2019-05-07 DIAGNOSIS — Z90.49 H/O PARTIAL RESECTION OF COLON: ICD-10-CM

## 2019-05-07 DIAGNOSIS — R12 HEARTBURN: Primary | ICD-10-CM

## 2019-05-07 DIAGNOSIS — R05.9 COUGH: ICD-10-CM

## 2019-05-07 DIAGNOSIS — Z87.19 HISTORY OF GASTRITIS: ICD-10-CM

## 2019-05-07 DIAGNOSIS — R13.14 PHARYNGOESOPHAGEAL DYSPHAGIA: ICD-10-CM

## 2019-05-07 DIAGNOSIS — Z87.898 HISTORY OF WEIGHT LOSS: ICD-10-CM

## 2019-05-07 DIAGNOSIS — Z85.038 HISTORY OF COLON CANCER: ICD-10-CM

## 2019-05-07 PROCEDURE — 99999 PR PBB SHADOW E&M-EST. PATIENT-LVL IV: CPT | Mod: PBBFAC,HCNC,, | Performed by: NURSE PRACTITIONER

## 2019-05-07 PROCEDURE — 99999 PR PBB SHADOW E&M-EST. PATIENT-LVL IV: ICD-10-PCS | Mod: PBBFAC,HCNC,, | Performed by: NURSE PRACTITIONER

## 2019-05-07 PROCEDURE — 99214 PR OFFICE/OUTPT VISIT, EST, LEVL IV, 30-39 MIN: ICD-10-PCS | Mod: HCNC,S$GLB,, | Performed by: NURSE PRACTITIONER

## 2019-05-07 PROCEDURE — 1101F PT FALLS ASSESS-DOCD LE1/YR: CPT | Mod: HCNC,CPTII,S$GLB, | Performed by: NURSE PRACTITIONER

## 2019-05-07 PROCEDURE — 3078F DIAST BP <80 MM HG: CPT | Mod: HCNC,CPTII,S$GLB, | Performed by: NURSE PRACTITIONER

## 2019-05-07 PROCEDURE — 1101F PR PT FALLS ASSESS DOC 0-1 FALLS W/OUT INJ PAST YR: ICD-10-PCS | Mod: HCNC,CPTII,S$GLB, | Performed by: NURSE PRACTITIONER

## 2019-05-07 PROCEDURE — 3075F PR MOST RECENT SYSTOLIC BLOOD PRESS GE 130-139MM HG: ICD-10-PCS | Mod: HCNC,CPTII,S$GLB, | Performed by: NURSE PRACTITIONER

## 2019-05-07 PROCEDURE — 99499 UNLISTED E&M SERVICE: CPT | Mod: S$GLB,,, | Performed by: NURSE PRACTITIONER

## 2019-05-07 PROCEDURE — 99214 OFFICE O/P EST MOD 30 MIN: CPT | Mod: HCNC,S$GLB,, | Performed by: NURSE PRACTITIONER

## 2019-05-07 PROCEDURE — 99499 RISK ADDL DX/OHS AUDIT: ICD-10-PCS | Mod: S$GLB,,, | Performed by: NURSE PRACTITIONER

## 2019-05-07 PROCEDURE — 3078F PR MOST RECENT DIASTOLIC BLOOD PRESSURE < 80 MM HG: ICD-10-PCS | Mod: HCNC,CPTII,S$GLB, | Performed by: NURSE PRACTITIONER

## 2019-05-07 PROCEDURE — 3075F SYST BP GE 130 - 139MM HG: CPT | Mod: HCNC,CPTII,S$GLB, | Performed by: NURSE PRACTITIONER

## 2019-05-07 RX ORDER — RABEPRAZOLE SODIUM 20 MG/1
20 TABLET, DELAYED RELEASE ORAL
Qty: 30 TABLET | Refills: 6 | Status: ON HOLD | OUTPATIENT
Start: 2019-05-07 | End: 2019-05-16 | Stop reason: SDUPTHER

## 2019-05-07 RX ORDER — TRAZODONE HYDROCHLORIDE 50 MG/1
50 TABLET ORAL NIGHTLY
COMMUNITY
End: 2020-09-30 | Stop reason: SDUPTHER

## 2019-05-07 NOTE — PATIENT INSTRUCTIONS

## 2019-05-07 NOTE — LETTER
May 7, 2019      Michael Nieto MD  [address] [facility]  2nd Floor  [facility] [address]           [facility]  [address]  [facility] [address]  Phone: [phone]          Patient: Kenyetta Andersen   MR Number: [id number]   YOB: 1935   Date of Visit: 5/7/2019       Dear Dr. Michael Nieto:    Thank you for referring Kenyetta Andersen to me for evaluation. Attached you will find relevant portions of my assessment and plan of care.    If you have questions, please do not hesitate to call me. I look forward to following Kenyetta Andersen along with you.    Sincerely,    Ayah Mckeon, [facility]    Enclosure  CC:  No Recipients    If you would like to receive this communication electronically, please contact [email or web address] or [phone] to request more information on [facility] Mobile [employer] Link access.    For providers and/or their staff who would like to refer a patient to [facility], please contact us through our one-stop-shop provider referral line, [facility], at [phone].    If you feel you have received this communication in error or would no longer like to receive these types of communications, please e-mail [email or web address]

## 2019-05-07 NOTE — PROGRESS NOTES
Subjective:       Patient ID: Kenyetta Andersen is a 84 y.o. female Body mass index is 28.08 kg/m².    Chief Complaint: Gastroesophageal Reflux (weightloss)    This patient is new to me.  Referring Provider:  Dr. Nieto for GERD & weight loss.  Established patient of Dr. Guerrier.    Gastroesophageal Reflux   She complains of belching (frequent), coughing (occasional, productive of clear sputum; improving since she quit smoking a month ago), dysphagia (feels like certain foods get stuck in esophagus, such as bread and rice; denies problems with liquids or pills), globus sensation (occasional) and heartburn. She reports no abdominal pain, no chest pain, no choking, no hoarse voice, no nausea or no sore throat. This is a chronic problem. Episode onset: several years ago. The problem occurs frequently (mostly at night). The problem has been gradually worsening (~3/2019). Associated symptoms include weight loss (varies; reports since she quit smoking she has gained some weight). Pertinent negatives include no fatigue or melena. Risk factors include smoking/tobacco exposure and caffeine use (former smoker; denies nsaid use). She has tried a PPI, an antacid and a histamine-2 antagonist (protonix 40 mg once daily, zantac 300 mg nightly- these are not new; rolaids prn or pepcid complete PRN) for the symptoms. Past procedures include an EGD.     Review of Systems   Constitutional: Positive for weight loss (varies; reports since she quit smoking she has gained some weight). Negative for appetite change, chills, fatigue and fever.   HENT: Positive for trouble swallowing. Negative for hoarse voice and sore throat.    Respiratory: Positive for cough (occasional, productive of clear sputum; improving since she quit smoking a month ago). Negative for choking and shortness of breath.    Cardiovascular: Negative for chest pain.   Gastrointestinal: Positive for dysphagia (feels like certain foods get stuck in esophagus, such as bread  "and rice; denies problems with liquids or pills) and heartburn. Negative for abdominal pain, anal bleeding, blood in stool, constipation, diarrhea, melena, nausea, rectal pain and vomiting.   Neurological: Negative for weakness.       No LMP recorded. Patient has had a hysterectomy.  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 Guerrier: hemorrhoids, one colon polyp removed, "Patent functional end-to-end ileo-colonic anastomosis"with healthy mucosa; biopsy: hyperplastic polyp; repeat in 3 years for surveillance    COLONOSCOPY N/A 3/14/2017    [facility]    COLONOSCOPY N/A 3/24/2014    [facility]    ECTOPIC PREGNANCY SURGERY      EGD (ESOPHAGOGASTRODUODENOSCOPY) N/A 11/19/2013    [facility]    ESOPHAGOGASTRODUODENOSCOPY (EGD) N/A 7/27/2017    [facility] END[facility]    ESOPHAGOGASTRODUODENOSCOPY (EGD) N/A 6/12/2017    [facility]    ESOPHAGOGASTRODUODENOSCOPY (EGD) N/A 3/15/2016    [facility]    EYE SURGERY      HYSTERECTOMY      complete    JOINT REPLACEMENT      Liban Knee    phaco/pciol Right 8/16/2017    [facility]    ROTATOR CUFF REPAIR Right     TOE SURGERY      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    UPPER GASTROINTESTINAL ENDOSCOPY  06/12/2017    Dr. Guerrier: gastritis and esophagitis, biopsy: chronic gastritis, negative for h pylori, esophageal- positive eosinophils, negative for barretts; repeat in 2 months    UPPER GASTROINTESTINAL ENDOSCOPY  07/27/2017    Dr. Guerrier     Family History   Problem Relation Age of Onset    Heart disease Mother         heart attack    Heart disease Father     Heart disease Brother         MI    Parkinsonism Sister     Arthritis Sister     No Known Problems Brother     No Known Problems Brother     Amblyopia Neg Hx     Blindness Neg Hx     Cancer Neg Hx     Cataracts Neg Hx     Glaucoma Neg Hx     Hypertension Neg Hx     Macular degeneration Neg Hx     Retinal detachment Neg Hx     Strabismus Neg Hx     Stroke Neg Hx     Thyroid disease Neg Hx     Diabetes Neg Hx     Colon cancer Neg Hx     Crohn's disease Neg Hx     Esophageal cancer Neg Hx     Stomach cancer Neg Hx     Ulcerative colitis Neg Hx      Wt Readings from Last 30 Encounters:   05/07/19 71.9 kg (158 lb 8.2 oz)   04/15/19 69.4 kg (153 lb)   04/09/19 69.4 kg (153 lb)   04/01/19 70.8 kg (156 lb 1.4 oz)   03/26/19 70.1 kg (154 lb 8.7 oz)   03/07/19 70.9 kg (156 lb 4.9 oz)   03/06/19 68.5 kg (151 lb)   02/28/19 68.9 kg (151 lb 14.4 oz)   02/22/19 69.7 kg (153 lb 10.6 oz)   02/07/19 68.9 kg (152 lb)   10/29/18 69.9 kg (154 lb)   09/25/18 70.1 kg (154 lb 8.7 oz)   04/16/18 72.1 kg (158 lb 15.2 oz)   04/04/18 73.4 kg (161 lb 13.1 oz)   03/13/18 71.8 kg (158 lb 4.6 oz)   02/28/18 71.9 kg (158 lb 8.2 oz)   01/12/18 72.5 kg (159 lb 13.3 oz)   10/25/17 73.8 kg (162 lb 11.2 oz)   08/15/17 72.1 kg (159 lb)   08/07/17 72.4 kg (159 lb 9.8 oz)   07/27/17 72.6 kg (160 lb)   06/12/17 70.8 kg (156 lb)   06/02/17 73 kg (160 lb 15 oz)   05/15/17 74.7 kg (164 lb 10.9 oz)   04/29/17 73.6 kg (162 lb 4.1 oz)   04/26/17 73.6 kg (162 lb 4.1 oz)   03/14/17 73.5 kg (162 lb)   02/27/17 79.8 kg (175 lb 14.8 oz)    11/08/16 72.5 kg (159 lb 13.3 oz)   10/27/16 73.5 kg (162 lb 0.6 oz)     Lab Results   Component Value Date    WBC 7.40 03/25/2019    HGB 12.7 03/25/2019    HCT 39.0 03/25/2019    MCV 97 03/25/2019     03/25/2019     CMP  Sodium   Date Value Ref Range Status   03/25/2019 142 136 - 145 mmol/L Final   03/25/2019 142 136 - 145 mmol/L Final     Potassium   Date Value Ref Range Status   03/25/2019 4.7 3.5 - 5.1 mmol/L Final   03/25/2019 4.7 3.5 - 5.1 mmol/L Final     Chloride   Date Value Ref Range Status   03/25/2019 108 95 - 110 mmol/L Final   03/25/2019 108 95 - 110 mmol/L Final     CO2   Date Value Ref Range Status   03/25/2019 29 23 - 29 mmol/L Final   03/25/2019 29 23 - 29 mmol/L Final     Glucose   Date Value Ref Range Status   03/25/2019 104 70 - 110 mg/dL Final   03/25/2019 104 70 - 110 mg/dL Final     BUN, Bld   Date Value Ref Range Status   03/25/2019 11 8 - 23 mg/dL Final   03/25/2019 11 8 - 23 mg/dL Final     Creatinine   Date Value Ref Range Status   03/25/2019 0.9 0.5 - 1.4 mg/dL Final   03/25/2019 0.9 0.5 - 1.4 mg/dL Final   03/25/2019 0.9 0.5 - 1.4 mg/dL Final     Calcium   Date Value Ref Range Status   03/25/2019 9.2 8.7 - 10.5 mg/dL Final   03/25/2019 9.2 8.7 - 10.5 mg/dL Final     Total Protein   Date Value Ref Range Status   03/25/2019 6.4 6.0 - 8.4 g/dL Final     Albumin   Date Value Ref Range Status   03/25/2019 3.2 (L) 3.5 - 5.2 g/dL Final   03/25/2019 3.2 (L) 3.5 - 5.2 g/dL Final     Total Bilirubin   Date Value Ref Range Status   03/25/2019 0.3 0.1 - 1.0 mg/dL Final     Comment:     For infants and newborns, interpretation of results should be based  on gestational age, weight and in agreement with clinical  observations.  Premature Infant recommended reference ranges:  Up to 24 hours.............<8.0 mg/dL  Up to 48 hours............<12.0 mg/dL  3-5 days..................<15.0 mg/dL  6-29 days.................<15.0 mg/dL       Alkaline Phosphatase   Date Value Ref Range Status    03/25/2019 68 55 - 135 U/L Final     AST   Date Value Ref Range Status   03/25/2019 19 10 - 40 U/L Final     ALT   Date Value Ref Range Status   03/25/2019 16 10 - 44 U/L Final     Anion Gap   Date Value Ref Range Status   03/25/2019 5 (L) 8 - 16 mmol/L Final   03/25/2019 5 (L) 8 - 16 mmol/L Final     eGFR if    Date Value Ref Range Status   03/25/2019 >60 >60 mL/min/1.73 m^2 Final   03/25/2019 >60 >60 mL/min/1.73 m^2 Final   03/25/2019 >60 >60 mL/min/1.73 m^2 Final     eGFR if non    Date Value Ref Range Status   03/25/2019 60 >60 mL/min/1.73 m^2 Final     Comment:     Calculation used to obtain the estimated glomerular filtration  rate (eGFR) is the CKD-EPI equation.      03/25/2019 60 >60 mL/min/1.73 m^2 Final     Comment:     Calculation used to obtain the estimated glomerular filtration  rate (eGFR) is the CKD-EPI equation.      03/25/2019 60 >60 mL/min/1.73 m^2 Final     Comment:     Calculation used to obtain the estimated glomerular filtration  rate (eGFR) is the CKD-EPI equation.        Lab Results   Component Value Date    TSH 0.857 04/11/2016     Reviewed prior medical records including radiology report of 3/25/19 ct chest abdomen pelvis & endoscopy history (see surgical history).    Objective:      Physical Exam   Constitutional: She is oriented to person, place, and time. She appears well-developed and well-nourished. No distress.   HENT:   Mouth/Throat: Oropharynx is clear and moist and mucous membranes are normal. No oral lesions. No oropharyngeal exudate.   Eyes: Pupils are equal, round, and reactive to light. Conjunctivae are normal. No scleral icterus.   Pulmonary/Chest: Effort normal and breath sounds normal. No respiratory distress. She has no wheezes.   Abdominal: Soft. Normal appearance and bowel sounds are normal. She exhibits no distension, no abdominal bruit and no mass. There is no tenderness. There is no rigidity, no rebound, no guarding, no tenderness at  McBurney's point and negative Johnson's sign.   Neurological: She is alert and oriented to person, place, and time.   Skin: Skin is warm and dry. No rash noted. She is not diaphoretic. No erythema. No pallor.   Non-jaundiced   Psychiatric: She has a normal mood and affect. Her behavior is normal. Judgment and thought content normal.   Nursing note and vitals reviewed.      Assessment:       1. Heartburn    2. Pharyngoesophageal dysphagia    3. History of gastritis    4. History of weight loss    5. History of colon cancer    6. H/O partial resection of colon    7. Cough        Plan:       Heartburn & History of gastritis  - DISCONTINUE PROTONIX DUE TO INEFFECTIVE THERAPY  - CONTINUE ZANTAC 300 MG NIGHTLY  -  START   RABEprazole (ACIPHEX) 20 mg tablet; Take 1 tablet (20 mg total) by mouth before breakfast.  Dispense: 30 tablet; Refill: 6, take in the morning 30-60 minutes before breakfast, discussed about possible long term use of medication (prefer to use lowest effective dose or discontinuing if possible), pt verbalized understanding  -discussed about the different types of medications used to treat reflux and how to use them, antacids can be used PRN for breakthrough heartburn symptoms by reducing stomach acid that is already produced, H2 blockers (zantac) work by limiting the amount acid production, & PPI's work to block acid production and are taken daily, patient verbalized understanding.  -Educated patient on lifestyle modifications to help control/reduce reflux/abdominal pain including: avoid large meals, avoid eating within 2-3 hours of bedtime (avoid late night eating & lying down soon after eating), elevate head of bed if nocturnal symptoms are present, smoking cessation (if current smoker), & weight loss (if overweight).   -Educated to avoid known foods which trigger reflux symptoms & to minimize/avoid high-fat foods, chocolate, caffeine, citrus, alcohol, & tomato products.  -Advised to avoid/limit use of  NSAID's, since they can cause GI upset, bleeding, and/or ulcers. If needed, take with food.   - schedule EGD, discussed procedure with patient, patient verbalized understanding    Pharyngoesophageal dysphagia  -  START   RABEprazole (ACIPHEX) 20 mg tablet; Take 1 tablet (20 mg total) by mouth before breakfast.  Dispense: 30 tablet; Refill: 6  - schedule EGD, discussed procedure with patient and possible esophageal dilation may be performed during procedure if indicated, patient verbalized understanding  - educated patient to eat smaller more frequent meals and to eat slowly and advised to eat a soft diet.  - possible UGI/esophagram/esophageal manometry if symptoms persist    History of weight loss  - schedule EGD, discussed procedure with patient, patient verbalized understanding  - encouraged PO intake and daily calorie counts to ensure adequate nutrition is taken in, recommend at least 1,800-2,000 calories a day; patient has gained weight since last visit to [facility] when reviewing weights in chart  - recommend nutritional drinks, such as Boost, Ensure or Glucerna, to supplement nutrition needs    History of colon cancer & H/O partial resection of colon  Recommend follow-up with Dr. Granado, hem/onc, for continued evaluation and management.  - next surveillance colonoscopy due 3/2020    Cough  Recommend follow-up with Primary Care Provider for continued evaluation and management.    Follow up in about 1 month (around 6/7/2019), or if symptoms worsen or fail to improve.      If no improvement in symptoms or symptoms worsen, call/follow-up at clinic or go to ER.

## 2019-05-07 NOTE — H&P (VIEW-ONLY)
Subjective:       Patient ID: Kenyetta Andersen is a 84 y.o. female Body mass index is 28.08 kg/m².    Chief Complaint: Gastroesophageal Reflux (weightloss)    This patient is new to me.  Referring Provider:  Dr. Nieto for GERD & weight loss.  Established patient of Dr. Guerrier.    Gastroesophageal Reflux   She complains of belching (frequent), coughing (occasional, productive of clear sputum; improving since she quit smoking a month ago), dysphagia (feels like certain foods get stuck in esophagus, such as bread and rice; denies problems with liquids or pills), globus sensation (occasional) and heartburn. She reports no abdominal pain, no chest pain, no choking, no hoarse voice, no nausea or no sore throat. This is a chronic problem. Episode onset: several years ago. The problem occurs frequently (mostly at night). The problem has been gradually worsening (~3/2019). Associated symptoms include weight loss (varies; reports since she quit smoking she has gained some weight). Pertinent negatives include no fatigue or melena. Risk factors include smoking/tobacco exposure and caffeine use (former smoker; denies nsaid use). She has tried a PPI, an antacid and a histamine-2 antagonist (protonix 40 mg once daily, zantac 300 mg nightly- these are not new; rolaids prn or pepcid complete PRN) for the symptoms. Past procedures include an EGD.     Review of Systems   Constitutional: Positive for weight loss (varies; reports since she quit smoking she has gained some weight). Negative for appetite change, chills, fatigue and fever.   HENT: Positive for trouble swallowing. Negative for hoarse voice and sore throat.    Respiratory: Positive for cough (occasional, productive of clear sputum; improving since she quit smoking a month ago). Negative for choking and shortness of breath.    Cardiovascular: Negative for chest pain.   Gastrointestinal: Positive for dysphagia (feels like certain foods get stuck in esophagus, such as bread  "and rice; denies problems with liquids or pills) and heartburn. Negative for abdominal pain, anal bleeding, blood in stool, constipation, diarrhea, melena, nausea, rectal pain and vomiting.   Neurological: Negative for weakness.       No LMP recorded. Patient has had a hysterectomy.  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 Guerrier: hemorrhoids, one colon polyp removed, "Patent functional end-to-end ileo-colonic anastomosis"with healthy mucosa; biopsy: hyperplastic polyp; repeat in 3 years for surveillance    COLONOSCOPY N/A 3/14/2017    [facility]    COLONOSCOPY N/A 3/24/2014    Performed by [facility]even Goff [facility] at [facility]    ECTOPIC PREGNANCY SURGERY      EGD (ESOPHAGOGASTRODUODENOSCOPY) N/A 11/19/2013    [facility] at [facility] END[facility]    ESOPHAGOGASTRODUODENOSCOPY (EGD) N/A 7/27/2017    Per[facility] by [facility] at [facility] END[facility]    ESOPHAGOGASTRODUODENOSCOPY (EGD) N/A 6/12/2017    [facility] at [facility]    ESOPHAGOGASTRODUODENOSCOPY (EGD) N/A 3/15/2016    [facility] at [facility]    EYE SURGERY      HYSTERECTOMY      complete    JOINT REPLACEMENT      Liban Knee    phaco/pciol Right 8/16/2017    [facility]    ROTATOR CUFF REPAIR Right     TOE SURGERY      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    UPPER GASTROINTESTINAL ENDOSCOPY  06/12/2017    Dr. Guerrier: gastritis and esophagitis, biopsy: chronic gastritis, negative for h pylori, esophageal- positive eosinophils, negative for barretts; repeat in 2 months    UPPER GASTROINTESTINAL ENDOSCOPY  07/27/2017    Dr. Guerrier     Family History   Problem Relation Age of Onset    Heart disease Mother         heart attack    Heart disease Father     Heart disease Brother         MI    Parkinsonism Sister     Arthritis Sister     No Known Problems Brother     No Known Problems Brother     Amblyopia Neg Hx     Blindness Neg Hx     Cancer Neg Hx     Cataracts Neg Hx     Glaucoma Neg Hx     Hypertension Neg Hx     Macular degeneration Neg Hx     Retinal detachment Neg Hx     Strabismus Neg Hx     Stroke Neg Hx     Thyroid disease Neg Hx     Diabetes Neg Hx     Colon cancer Neg Hx     Crohn's disease Neg Hx     Esophageal cancer Neg Hx     Stomach cancer Neg Hx     Ulcerative colitis Neg Hx      Wt Readings from Last 30 Encounters:   05/07/19 71.9 kg (158 lb 8.2 oz)   04/15/19 69.4 kg (153 lb)   04/09/19 69.4 kg (153 lb)   04/01/19 70.8 kg (156 lb 1.4 oz)   03/26/19 70.1 kg (154 lb 8.7 oz)   03/07/19 70.9 kg (156 lb 4.9 oz)   03/06/19 68.5 kg (151 lb)   02/28/19 68.9 kg (151 lb 14.4 oz)   02/22/19 69.7 kg (153 lb 10.6 oz)   02/07/19 68.9 kg (152 lb)   10/29/18 69.9 kg (154 lb)   09/25/18 70.1 kg (154 lb 8.7 oz)   04/16/18 72.1 kg (158 lb 15.2 oz)   04/04/18 73.4 kg (161 lb 13.1 oz)   03/13/18 71.8 kg (158 lb 4.6 oz)   02/28/18 71.9 kg (158 lb 8.2 oz)   01/12/18 72.5 kg (159 lb 13.3 oz)   10/25/17 73.8 kg (162 lb 11.2 oz)   08/15/17 72.1 kg (159 lb)   08/07/17 72.4 kg (159 lb 9.8 oz)   07/27/17 72.6 kg (160 lb)   06/12/17 70.8 kg (156 lb)   06/02/17 73 kg (160 lb 15 oz)   05/15/17 74.7 kg (164 lb 10.9 oz)   04/29/17 73.6 kg (162 lb 4.1 oz)   04/26/17 73.6 kg (162 lb 4.1 oz)   03/14/17 73.5 kg (162 lb)   02/27/17 79.8 kg (175 lb 14.8 oz)    11/08/16 72.5 kg (159 lb 13.3 oz)   10/27/16 73.5 kg (162 lb 0.6 oz)     Lab Results   Component Value Date    WBC 7.40 03/25/2019    HGB 12.7 03/25/2019    HCT 39.0 03/25/2019    MCV 97 03/25/2019     03/25/2019     CMP  Sodium   Date Value Ref Range Status   03/25/2019 142 136 - 145 mmol/L Final   03/25/2019 142 136 - 145 mmol/L Final     Potassium   Date Value Ref Range Status   03/25/2019 4.7 3.5 - 5.1 mmol/L Final   03/25/2019 4.7 3.5 - 5.1 mmol/L Final     Chloride   Date Value Ref Range Status   03/25/2019 108 95 - 110 mmol/L Final   03/25/2019 108 95 - 110 mmol/L Final     CO2   Date Value Ref Range Status   03/25/2019 29 23 - 29 mmol/L Final   03/25/2019 29 23 - 29 mmol/L Final     Glucose   Date Value Ref Range Status   03/25/2019 104 70 - 110 mg/dL Final   03/25/2019 104 70 - 110 mg/dL Final     BUN, Bld   Date Value Ref Range Status   03/25/2019 11 8 - 23 mg/dL Final   03/25/2019 11 8 - 23 mg/dL Final     Creatinine   Date Value Ref Range Status   03/25/2019 0.9 0.5 - 1.4 mg/dL Final   03/25/2019 0.9 0.5 - 1.4 mg/dL Final   03/25/2019 0.9 0.5 - 1.4 mg/dL Final     Calcium   Date Value Ref Range Status   03/25/2019 9.2 8.7 - 10.5 mg/dL Final   03/25/2019 9.2 8.7 - 10.5 mg/dL Final     Total Protein   Date Value Ref Range Status   03/25/2019 6.4 6.0 - 8.4 g/dL Final     Albumin   Date Value Ref Range Status   03/25/2019 3.2 (L) 3.5 - 5.2 g/dL Final   03/25/2019 3.2 (L) 3.5 - 5.2 g/dL Final     Total Bilirubin   Date Value Ref Range Status   03/25/2019 0.3 0.1 - 1.0 mg/dL Final     Comment:     For infants and newborns, interpretation of results should be based  on gestational age, weight and in agreement with clinical  observations.  Premature Infant recommended reference ranges:  Up to 24 hours.............<8.0 mg/dL  Up to 48 hours............<12.0 mg/dL  3-5 days..................<15.0 mg/dL  6-29 days.................<15.0 mg/dL       Alkaline Phosphatase   Date Value Ref Range Status    03/25/2019 68 55 - 135 U/L Final     AST   Date Value Ref Range Status   03/25/2019 19 10 - 40 U/L Final     ALT   Date Value Ref Range Status   03/25/2019 16 10 - 44 U/L Final     Anion Gap   Date Value Ref Range Status   03/25/2019 5 (L) 8 - 16 mmol/L Final   03/25/2019 5 (L) 8 - 16 mmol/L Final     eGFR if    Date Value Ref Range Status   03/25/2019 >60 >60 mL/min/1.73 m^2 Final   03/25/2019 >60 >60 mL/min/1.73 m^2 Final   03/25/2019 >60 >60 mL/min/1.73 m^2 Final     eGFR if non    Date Value Ref Range Status   03/25/2019 60 >60 mL/min/1.73 m^2 Final     Comment:     Calculation used to obtain the estimated glomerular filtration  rate (eGFR) is the CKD-EPI equation.      03/25/2019 60 >60 mL/min/1.73 m^2 Final     Comment:     Calculation used to obtain the estimated glomerular filtration  rate (eGFR) is the CKD-EPI equation.      03/25/2019 60 >60 mL/min/1.73 m^2 Final     Comment:     Calculation used to obtain the estimated glomerular filtration  rate (eGFR) is the CKD-EPI equation.        Lab Results   Component Value Date    TSH 0.857 04/11/2016     Reviewed prior medical records including radiology report of 3/25/19 ct chest abdomen pelvis & endoscopy history (see surgical history).    Objective:      Physical Exam   Constitutional: She is oriented to person, place, and time. She appears well-developed and well-nourished. No distress.   HENT:   Mouth/Throat: Oropharynx is clear and moist and mucous membranes are normal. No oral lesions. No oropharyngeal exudate.   Eyes: Pupils are equal, round, and reactive to light. Conjunctivae are normal. No scleral icterus.   Pulmonary/Chest: Effort normal and breath sounds normal. No respiratory distress. She has no wheezes.   Abdominal: Soft. Normal appearance and bowel sounds are normal. She exhibits no distension, no abdominal bruit and no mass. There is no tenderness. There is no rigidity, no rebound, no guarding, no tenderness at  McBurney's point and negative Johnson's sign.   Neurological: She is alert and oriented to person, place, and time.   Skin: Skin is warm and dry. No rash noted. She is not diaphoretic. No erythema. No pallor.   Non-jaundiced   Psychiatric: She has a normal mood and affect. Her behavior is normal. Judgment and thought content normal.   Nursing note and vitals reviewed.      Assessment:       1. Heartburn    2. Pharyngoesophageal dysphagia    3. History of gastritis    4. History of weight loss    5. History of colon cancer    6. H/O partial resection of colon    7. Cough        Plan:       Heartburn & History of gastritis  - DISCONTINUE PROTONIX DUE TO INEFFECTIVE THERAPY  - CONTINUE ZANTAC 300 MG NIGHTLY  -  START   RABEprazole (ACIPHEX) 20 mg tablet; Take 1 tablet (20 mg total) by mouth before breakfast.  Dispense: 30 tablet; Refill: 6, take in the morning 30-60 minutes before breakfast, discussed about possible long term use of medication (prefer to use lowest effective dose or discontinuing if possible), pt verbalized understanding  -discussed about the different types of medications used to treat reflux and how to use them, antacids can be used PRN for breakthrough heartburn symptoms by reducing stomach acid that is already produced, H2 blockers (zantac) work by limiting the amount acid production, & PPI's work to block acid production and are taken daily, patient verbalized understanding.  -Educated patient on lifestyle modifications to help control/reduce reflux/abdominal pain including: avoid large meals, avoid eating within 2-3 hours of bedtime (avoid late night eating & lying down soon after eating), elevate head of bed if nocturnal symptoms are present, smoking cessation (if current smoker), & weight loss (if overweight).   -Educated to avoid known foods which trigger reflux symptoms & to minimize/avoid high-fat foods, chocolate, caffeine, citrus, alcohol, & tomato products.  -Advised to avoid/limit use of  NSAID's, since they can cause GI upset, bleeding, and/or ulcers. If needed, take with food.   - schedule EGD, discussed procedure with patient, patient verbalized understanding    Pharyngoesophageal dysphagia  -  START   RABEprazole (ACIPHEX) 20 mg tablet; Take 1 tablet (20 mg total) by mouth before breakfast.  Dispense: 30 tablet; Refill: 6  - schedule EGD, discussed procedure with patient and possible esophageal dilation may be performed during procedure if indicated, patient verbalized understanding  - educated patient to eat smaller more frequent meals and to eat slowly and advised to eat a soft diet.  - possible UGI/esophagram/esophageal manometry if symptoms persist    History of weight loss  - schedule EGD, discussed procedure with patient, patient verbalized understanding  - encouraged PO intake and daily calorie counts to ensure adequate nutrition is taken in, recommend at least 1,800-2,000 calories a day; patient has gained weight since last visit to [facility] when reviewing weights in chart  - recommend nutritional drinks, such as Boost, Ensure or Glucerna, to supplement nutrition needs    History of colon cancer & H/O partial resection of colon  Recommend follow-up with Dr. Granado, hem/onc, for continued evaluation and management.  - next surveillance colonoscopy due 3/2020    Cough  Recommend follow-up with Primary Care Provider for continued evaluation and management.    Follow up in about 1 month (around 6/7/2019), or if symptoms worsen or fail to improve.      If no improvement in symptoms or symptoms worsen, call/follow-up at clinic or go to ER.

## 2019-05-16 ENCOUNTER — ANESTHESIA (OUTPATIENT)
Dept: ENDOSCOPY | Facility: HOSPITAL | Age: 84
End: 2019-05-16
Payer: MEDICARE

## 2019-05-16 ENCOUNTER — ANESTHESIA EVENT (OUTPATIENT)
Dept: ENDOSCOPY | Facility: HOSPITAL | Age: 84
End: 2019-05-16
Payer: MEDICARE

## 2019-05-16 ENCOUNTER — HOSPITAL ENCOUNTER (OUTPATIENT)
Facility: HOSPITAL | Age: 84
Discharge: HOME | End: 2019-05-16
Attending: INTERNAL MEDICINE | Admitting: INTERNAL MEDICINE
Payer: MEDICARE

## 2019-05-16 DIAGNOSIS — K21.9 GERD (GASTROESOPHAGEAL REFLUX DISEASE): ICD-10-CM

## 2019-05-16 DIAGNOSIS — E04.9 THYROID ENLARGEMENT: Primary | ICD-10-CM

## 2019-05-16 DIAGNOSIS — Z87.19 HISTORY OF GASTRITIS: ICD-10-CM

## 2019-05-16 DIAGNOSIS — R12 HEARTBURN: ICD-10-CM

## 2019-05-16 DIAGNOSIS — R13.14 PHARYNGOESOPHAGEAL DYSPHAGIA: ICD-10-CM

## 2019-05-16 PROCEDURE — D9220A PRA ANESTHESIA: Mod: HCNC,ANES,, | Performed by: ANESTHESIOLOGY

## 2019-05-16 PROCEDURE — 43235 EGD DIAGNOSTIC BRUSH WASH: CPT | Mod: HCNC | Performed by: INTERNAL MEDICINE

## 2019-05-16 PROCEDURE — 43235 EGD DIAGNOSTIC BRUSH WASH: CPT | Mod: HCNC,,, | Performed by: INTERNAL MEDICINE

## 2019-05-16 PROCEDURE — 63600175 PHARM REV CODE 636 W HCPCS: Mod: HCNC | Performed by: NURSE ANESTHETIST, CERTIFIED REGISTERED

## 2019-05-16 PROCEDURE — 37000008 HC ANESTHESIA 1ST 15 MINUTES: Mod: HCNC | Performed by: INTERNAL MEDICINE

## 2019-05-16 PROCEDURE — 43235 PR EGD, FLEX, DIAGNOSTIC: ICD-10-PCS | Mod: HCNC,,, | Performed by: INTERNAL MEDICINE

## 2019-05-16 PROCEDURE — 25000003 PHARM REV CODE 250: Mod: HCNC | Performed by: INTERNAL MEDICINE

## 2019-05-16 PROCEDURE — D9220A PRA ANESTHESIA: ICD-10-PCS | Mod: HCNC,ANES,, | Performed by: ANESTHESIOLOGY

## 2019-05-16 PROCEDURE — 37000009 HC ANESTHESIA EA ADD 15 MINS: Mod: HCNC | Performed by: INTERNAL MEDICINE

## 2019-05-16 RX ORDER — SODIUM CHLORIDE 9 MG/ML
INJECTION, SOLUTION INTRAVENOUS CONTINUOUS
Status: DISCONTINUED | OUTPATIENT
Start: 2019-05-16 | End: 2019-05-16 | Stop reason: HOSPADM

## 2019-05-16 RX ORDER — LIDOCAINE HCL/PF 100 MG/5ML
SYRINGE (ML) INTRAVENOUS
Status: DISCONTINUED | OUTPATIENT
Start: 2019-05-16 | End: 2019-05-16

## 2019-05-16 RX ORDER — RABEPRAZOLE SODIUM 20 MG/1
20 TABLET, DELAYED RELEASE ORAL 2 TIMES DAILY
COMMUNITY
End: 2019-08-12 | Stop reason: SDUPTHER

## 2019-05-16 RX ORDER — PROPOFOL 10 MG/ML
VIAL (ML) INTRAVENOUS
Status: DISCONTINUED | OUTPATIENT
Start: 2019-05-16 | End: 2019-05-16

## 2019-05-16 RX ORDER — RABEPRAZOLE SODIUM 20 MG/1
20 TABLET, DELAYED RELEASE ORAL 2 TIMES DAILY
Qty: 60 TABLET | Refills: 6 | Status: SHIPPED | OUTPATIENT
Start: 2019-05-16 | End: 2019-07-15

## 2019-05-16 RX ADMIN — SODIUM CHLORIDE: 0.9 INJECTION, SOLUTION INTRAVENOUS at 09:05

## 2019-05-16 RX ADMIN — PROPOFOL 100 MG: 10 INJECTION, EMULSION INTRAVENOUS at 10:05

## 2019-05-16 RX ADMIN — LIDOCAINE HYDROCHLORIDE 100 MG: 20 INJECTION, SOLUTION INTRAVENOUS at 10:05

## 2019-05-16 NOTE — [employer] PATIENT INSTRUCTIONS
Discharge Summary/Instructions after an Endoscopic Procedure  Patient Name: Kenyetta Andersen  Patient MRN: [id number]  Patient YOB: 1935  Thursday, May 16, 2019  Anaid Washington MD  RESTRICTIONS:  During your procedure today, you received medications for sedation.  These   medications may affect your judgment, balance and coordination.  Therefore,   for 24 hours, you have the following restrictions:   - DO NOT drive a car, operate machinery, make legal/financial decisions,   sign important papers or drink alcohol.    ACTIVITY:  Today: no heavy lifting, straining or running due to procedural   sedation/anesthesia.  The following day: return to full activity including work.  DIET:  Eat and drink normally unless instructed otherwise.     TREATMENT FOR COMMON SIDE EFFECTS:  - Mild abdominal pain, nausea, belching, bloating or excessive gas:  rest,   eat lightly and use a heating pad.  - Sore Throat: treat with throat lozenges and/or gargle with warm salt   water.  - Because air was used during the procedure, expelling large amounts of air   from your rectum or belching is normal.  - If a bowel prep was taken, you may not have a bowel movement for 1-3 days.    This is normal.  SYMPTOMS TO WATCH FOR AND REPORT TO YOUR PHYSICIAN:  1. Abdominal pain or bloating, other than gas cramps.  2. Chest pain.  3. Back pain.  4. Signs of infection such as: chills or fever occurring within 24 hours   after the procedure.  5. Rectal bleeding, which would show as bright red, maroon, or black stools.   (A tablespoon of blood from the rectum is not serious, especially if   hemorrhoids are present.)  6. Vomiting.  7. Weakness or dizziness.  GO DIRECTLY TO THE NEAREST EMERGENCY ROOM IF YOU HAVE ANY OF THE FOLLOWING:      Difficulty breathing              Chills and/or fever over 101 F   Persistent vomiting and/or vomiting blood   Severe abdominal pain   Severe chest pain   Black, tarry stools   Bleeding- more than  one tablespoon   Any other symptom or condition that you feel may need urgent attention  Your doctor recommends these additional instructions:  If any biopsies were taken, your doctors clinic will contact you in 1 to 2   weeks with any results.  - Discharge patient to home (with escort).   - Patient has a contact number available for emergencies.  The signs and   symptoms of potential delayed complications were discussed with the   patient.  Return to normal activities tomorrow.  Written discharge   instructions were provided to the patient.   - Resume previous diet.   - Increase PPI to BID x 8 weeks  -Take small bites, chew food thoroughly  - Repeat upper endoscopy in 8 weeks to evaluate the response to therapy.   Consider dilation at that time  -Order thyroid ultrasound for ? underlying nodule seen on CT  - Return to nurse practitioner after studies are complete.  For questions, problems or results please call your physician - Anaid Washington MD at Work:  [phone].  [facility], EMERGENCY ROOM PHONE NUMBER: [phone]  IF A COMPLICATION OR EMERGENCY SITUATION ARISES AND YOU ARE UNABLE TO REACH   YOUR PHYSICIAN - GO DIRECTLY TO THE EMERGENCY ROOM.  Anaid Washington MD  5/16/2019 10:49:01 AM  This report has been verified and signed electronically.  [employer]

## 2019-05-16 NOTE — DISCHARGE INSTRUCTIONS
"Discharge Instructions: After Your Surgery/Procedure  Youve just had surgery. During surgery you were given medicine called anesthesia to keep you relaxed and free of pain. After surgery you may have some pain or nausea. This is common. Here are some tips for feeling better and getting well after surgery.     Stay on schedule with your medication.   Going home  Your doctor or nurse will show you how to take care of yourself when you go home. He or she will also answer your questions. Have an adult family member or friend drive you home.      For your safety we recommend these precaution for the first 24 hours after your procedure:  · Do not drive or use heavy equipment.  · Do not make important decisions or sign legal papers.  · Do not drink alcohol.  · Have someone stay with you, if needed. He or she can watch for problems and help keep you safe.  · Your concentration, balance, coordination, and judgement may be impaired for many hours after anesthesia.  Use caution when ambulating or standing up.     · You may feel weak and "washed out" after anesthesia and surgery.      Subtle residual effects of general anesthesia or sedation with regional / local anesthesia can last more than 24 hours.  Rest for the remainder of the day or longer if your Doctor/Surgeon has advised you to do so.  Although you may feel normal within the first 24 hours, your reflexes and mental ability may be impaired without you realizing it.  You may feel dizzy, lightheaded or sleepy for 24 hours or longer.      Be sure to go to all follow-up visits with your doctor. And rest after your surgery for as long as your doctor tells you to.  Coping with pain  If you have pain after surgery, pain medicine will help you feel better. Take it as told, before pain becomes severe. Also, ask your doctor or pharmacist about other ways to control pain. This might be with heat, ice, or relaxation. And follow any other instructions your surgeon or nurse gives " you.  Tips for taking pain medicine  To get the best relief possible, remember these points:  · Pain medicines can upset your stomach. Taking them with a little food may help.  · Most pain relievers taken by mouth need at least 20 to 30 minutes to start to work.  · Taking medicine on a schedule can help you remember to take it. Try to time your medicine so that you can take it before starting an activity. This might be before you get dressed, go for a walk, or sit down for dinner.  · Constipation is a common side effect of pain medicines. Call your doctor before taking any medicines such as laxatives or stool softeners to help ease constipation. Also ask if you should skip any foods. Drinking lots of fluids and eating foods such as fruits and vegetables that are high in fiber can also help. Remember, do not take laxatives unless your surgeon has prescribed them.  · Drinking alcohol and taking pain medicine can cause dizziness and slow your breathing. It can even be deadly. Do not drink alcohol while taking pain medicine.  · Pain medicine can make you react more slowly to things. Do not drive or run machinery while taking pain medicine.  Your health care provider may tell you to take acetaminophen to help ease your pain. Ask him or her how much you are supposed to take each day. Acetaminophen or other pain relievers may interact with your prescription medicines or other over-the-counter (OTC) drugs. Some prescription medicines have acetaminophen and other ingredients. Using both prescription and OTC acetaminophen for pain can cause you to overdose. Read the labels on your OTC medicines with care. This will help you to clearly know the list of ingredients, how much to take, and any warnings. It may also help you not take too much acetaminophen. If you have questions or do not understand the information, ask your pharmacist or health care provider to explain it to you before you take the OTC medicine.  Managing  nausea  Some people have an upset stomach after surgery. This is often because of anesthesia, pain, or pain medicine, or the stress of surgery. These tips will help you handle nausea and eat healthy foods as you get better. If you were on a special food plan before surgery, ask your doctor if you should follow it while you get better. These tips may help:  · Do not push yourself to eat. Your body will tell you when to eat and how much.  · Start off with clear liquids and soup. They are easier to digest.  · Next try semi-solid foods, such as mashed potatoes, applesauce, and gelatin, as you feel ready.  · Slowly move to solid foods. Dont eat fatty, rich, or spicy foods at first.  · Do not force yourself to have 3 large meals a day. Instead eat smaller amounts more often.  · Take pain medicines with a small amount of solid food, such as crackers or toast, to avoid nausea.     Call your surgeon if  · You still have pain an hour after taking medicine. The medicine may not be strong enough.  · You feel too sleepy, dizzy, or groggy. The medicine may be too strong.  · You have side effects like nausea, vomiting, or skin changes, such as rash, itching, or hives.       If you have obstructive sleep apnea  You were given anesthesia medicine during surgery to keep you comfortable and free of pain. After surgery, you may have more apnea spells because of this medicine and other medicines you were given. The spells may last longer than usual.   At home:  · Keep using the continuous positive airway pressure (CPAP) device when you sleep. Unless your health care provider tells you not to, use it when you sleep, day or night. CPAP is a common device used to treat obstructive sleep apnea.  · Talk with your provider before taking any pain medicine, muscle relaxants, or sedatives. Your provider will tell you about the possible dangers of taking these medicines.  © 4746-8685 [employer]. [address] [place] [address]  [place] [address]. All rights reserved. This information is not intended as a substitute for professional medical care. Always follow your healthcare professional's instructions.    Gastritis (Adult)    Gastritis is inflammation and irritation of the stomach lining. It can be present for a short time (acute) or be long lasting (chronic). Gastritis is often caused by infection with bacteria called H pylori. More than a third of people in the [place] have this bacteria in their bodies. In many cases, H pylori causes no problems or symptoms. In some people, though, the infection irritates the stomach lining and causes gastritis. Other causes of stomach irritation include drinking alcohol or taking pain-relieving medicines called NSAIDs (such as aspirin or ibuprofen).   Symptoms of gastritis can include:  · Abdominal pain or bloating  · Loss of appetite  · Nausea or vomiting  · Vomiting blood or having black stools  · Feeling more tired than usual  An inflamed and irritated stomach lining is more likely to develop a sore called an ulcer. To help prevent this, gastritis should be treated.  Home care  If needed, medicines may be prescribed. If you have H pylori infection, treating it will likely relieve your symptoms. Other changes can help reduce stomach irritation and help it heal.  · If you have been prescribed medicines for H pylori infection, take them as directed. Take all of the medicine until it is finished or your healthcare provider tells you to stop, even if you feel better.  · Your healthcare provider may recommend avoiding NSAIDs. If you take daily aspirin for your heart or other medical reasons, do not stop without talking to your healthcare provider first.  · Avoid drinking alcohol.  · Stop smoking. Smoking can irritate the stomach and delay healing. As much as possible, stay away from second hand smoke.  Follow-up care  Follow up with your healthcare provider, or as advised by our staff. Testing may be needed to check  for inflammation or an ulcer.  When to seek medical advice  Call your healthcare provider for any of the following:  · Stomach pain that gets worse or moves to the lower right abdomen (appendix area)  · Chest pain that appears or gets worse, or spreads to the back, neck, shoulder, or arm  · Frequent vomiting (cant keep down liquids)  · Blood in the stool or vomit (red or black in color)  · Feeling weak or dizzy  · Fever of 100.4ºF (38ºC) or higher, or as directed by your healthcare provider  Date Last Reviewed: 6/22/2015 © 2000-2017 [employer]. [address] [place] [address]. All rights reserved. This information is not intended as a substitute for professional medical care. Always follow your healthcare professional's instructions.        Esophagitis     With esophagitis, the lining of the esophagus is inflamed.   Do you often have burning pain in your chest? You may have esophagitis. This is when the lining of the esophagus becomes red and swollen (inflamed). The esophagus is the tube that connects your throat to your stomach. This sheet tells you more about esophagitis. It also explains your treatment options.  Main types of esophagitis  Reflux esophagitis. This is the more common type. It is caused by GERD (gastroesophageal reflux disease). Stomach contents with stomach acid flow back up into the esophagus. This happens over and over. It leads to inflammation. Risk factors can include:  · Being overweight  · Asthma  · Smoking  · Pregnancy  · Frequent vomiting  · Certain medicines (such as aspirin and other anti-inflammatories)  · Hiatal hernia  Infectious esophagitis. This is caused by an infection. You are more at risk for this if you have a weakened immune system and poor nutrition. Antibiotic use can also be a factor. The infection is often due to the following:  · A type of fungus (typically candida)  · A virus, such as herpes simplex virus 1 (HSV-1) or cytomegalovirus  (CMV)  Eosinophilic esophagitis. Foods or other things around you can give you an allergic reaction. This triggers an immune response and leads to esophagitis.  Pill-induced esophagitis. Certain types of medicines can cause inflammation and ulcers in the esophagus. These include doxycycline, aspirin, NSAIDs, alendronate, potassium, quinidine, iron.  Symptoms of esophagitis  The following symptoms can occur with esophagitis:  · Pain when swallowing, or trouble swallowing  · Pain behind your breastbone (heartburn)  · Acid regurgitation  · Chronic sore throat  · Gum Inflammation  · Cavities  · Bad breath  · Nausea  · Pain in your upper belly (abdomen)  · Bleeding (indicated by bright red vomit or black, tarry stool)  These symptoms occur more often with reflux esophagitis:  · Coughing, wheezing, or asthma  · Hoarseness  Diagnosis of esophagitis  Your healthcare provider will ask about your health history and symptoms. Youll also be examined. Sometimes certain tests are needed. These may include:  · Upper endoscopy. A thin, flexible tube with a tiny light and camera is used. It is inserted through the mouth down into the esophagus. This lets the provider look for damage. A small sample of tissue (biopsy) may also be removed. The sample is sent to a lab for testing.  · Upper GI X-ray with barium. An X-ray is done after you drink a substance called barium. Barium may make problems in the esophagus easier to see on an x-ray.  · Esophageal pH. A soft, thin tube is passed into the esophagus through the nose or mouth for 24 hours. It measures the acid level in the esophagus.  · Esophageal manometry. A soft, thin tube is passed into the esophagus through the nose or mouth. It measures muscle contractions in the esophagus.  Treatment of esophagitis  Medicines. Different medicines can help treat esophagitis. The medicine used will depend on the type of esophagitis you have. Talk with your healthcare provider.  Lifestyle  changes. Making the following changes can help reduce irritation and ease your symptoms:  · Avoid spicy foods (pepper, chili powder, fuller). Also avoid hard foods (nuts, crackers, raw vegetables) and acidic foods and drinks (tomatoes, citrus fruits and juices). Other problem foods include chocolate, peppermint, nutmeg, and foods high in fat.  · Until you can swallow without pain, follow a combined liquid and soft diet. Try foods such as cooked cereals, mashed potatoes, and soups.  · Take small bites and chew your food thoroughly.  · Avoid large meals and heavy evening meals. Don't lie down within 2 to 3 hours of eating.  · Get to or stay at a healthy weight.  · Avoid alcohol, caffeine, and smoking or tobacco products.  · Brush and floss your teeth  · Raise your upper body by 4 to 6 inches when lying in bed. This can be done using a foam wedge. Or put blocks under the legs at the head of your bed.  Surgery. This may be needed for severe reflux esophagitis. Other noninvasive procedures to treat GERD and esophagitis are being studied. Your provider can tell you more.  Why treatment Is important  Without treatment, esophagitis can get worse. This is especially true with severe reflux esophagitis. For instance, continued symptoms can cause scarring of the esophagus. Over time, this can cause a narrowing the esophagus (stricture). This can make it hard to pass food down to the stomach. As symptoms go on they can also cause changes in the lining of the esophagus. These changes can put you at a slightly higher risk of cancer of the esophagus.   Date Last Reviewed: 7/1/2016 © 2000-2017 [employer]. [address] [place] [address], [place] [address]. All rights reserved. This information is not intended as a substitute for professional medical care. Always follow your healthcare professional's instructions.        Upper GI Endoscopy     During endoscopy, a long, flexible tube is used to view the inside of your upper GI  tract.      Upper GI endoscopy allows your healthcare provider to look directly into the beginning of your gastrointestinal (GI) tract. The esophagus, stomach, and duodenum (the first part of the small intestine) make up the upper GI tract.   Before the exam  Follow these and any other instructions you are given before your endoscopy. If you dont follow the healthcare providers instructions carefully, the test may need to be canceled or done over:  · Don't eat or drink anything after midnight the night before your exam. If your exam is in the afternoon, drink only clear liquids in the morning. Don't eat or drink anything for 8 hours before the exam. In some cases, you may be able to take medicines with sips of water until 2 hours before the procedure. Speak with your healthcare provider about this.   · Bring your X-rays and any other test results you have.  · Because you will be sedated, arrange for an adult to drive you home after the exam.  · Tell your healthcare provider before the exam if you are taking any medicines or have any medical problems.  The procedure  Here is what to expect:  · You will lie on the endoscopy table. Usually patients lie on the left side.  · You will be monitored and given oxygen.  · Your throat may be numbed with a spray or gargle. You are given medicine through an intravenous (IV) line that will help you relax and remain comfortable. You may be awake or asleep during the procedure.  · The healthcare provider will put the endoscope in your mouth and down your esophagus. It is thinner than most pieces of food that you swallow. It will not affect your breathing. The medicine helps keep you from gagging.  · Air is put into your GI tract to expand it. It can make you burp.  · During the procedure, the healthcare provider can take biopsies (tissue samples), remove abnormalities, such as polyps, or treat abnormalities through a variety of devices placed through the endoscope. You will not  feel this.   · The endoscope carries images of your upper GI tract to a video screen. If you are awake, you may be able to look at the images.  · After the procedure is done, you will rest for a time. An adult must drive you home.  When to call your healthcare provider  Contact your healthcare provider if you have:  · Black or tarry stools, or blood in your stool  · Fever  · Pain in your belly that does not go away  · Nausea and vomiting, or vomiting blood   Date Last Reviewed: 7/1/2016 © 2000-2017 [employer]. [address] [place] [address]. All rights reserved. This information is not intended as a substitute for professional medical care. Always follow your healthcare professional's instructions.        Tips to Control Acid Reflux    To control acid reflux, youll need to make some basic diet and lifestyle changes. The simple steps outlined below may be all youll need to ease discomfort.  Watch what you eat  · Avoid fatty foods and spicy foods.  · Eat fewer acidic foods, such as citrus and tomato-based foods. These can increase symptoms.  · Limit drinking alcohol, caffeine, and fizzy beverages. All increase acid reflux.  · Try limiting chocolate, peppermint, and spearmint. These can worsen acid reflux in some people.  Watch when you eat  · Avoid lying down for 3 hours after eating.  · Do not snack before going to bed.  Raise your head  Raising your head and upper body by 4 to 6 inches helps limit reflux when youre lying down. Put blocks under the head of your bed frame to raise it.  Other changes  · Lose weight, if you need to  · Dont exercise near bedtime  · Avoid tight-fitting clothes  · Limit aspirin and ibuprofen  · Stop smoking   Date Last Reviewed: 7/1/2016 © 2000-2017 [employer]. [address] [place] [address]. All rights reserved. This information is not intended as a substitute for professional medical care. Always follow your healthcare  professional's instructions.

## 2019-05-16 NOTE — OR NURSING
Patient with desaturation to 46%;  Anesthesia at bedside; Dr. Cole called and at bedside.  Ambu used with slow recovery.  Suction of secretions in back of throat.  Patient coughing up own secreations and saturation returned to 99% on simple face mask

## 2019-05-16 NOTE — ANESTHESIA PREPROCEDURE EVALUATION
05/16/2019  Kenyetta Andersen is a 84 y.o., female.    Pre-op Assessment    I have reviewed the Patient Summary Reports.     I have reviewed the Nursing Notes.   I have reviewed the Medications.     Review of Systems  Anesthesia Hx:  No problems with previous Anesthesia Denies Hx of Anesthetic complications    Social:  Former Smoker Smoking Status: Former Smoker - 16.25 pack years  Quit Smoking:   Smokeless Tobacco Status: Never Used  Alcohol use: Yes; 1.2 oz per week  Drug use: No       Hematology/Oncology:         Colon Current/Recent Cancer. Oncology Comments: Personal history of colon cancer, stage III    Cardiovascular:   Denies Hypertension.  Denies Valvular problems/Murmurs.  Denies Dysrhythmias.   Denies Angina.    Pulmonary:   Denies Asthma.  Denies Recent URI.    Renal/:   Denies Chronic Renal Disease.     Hepatic/GI:   GERD, poorly controlled Denies Liver Disease.    Neurological:   Neuromuscular Disease, Denies Seizures.    Endocrine:   Denies Diabetes. Denies Hypothyroidism.    Psych:   Denies Psychiatric History.          Physical Exam  General:  Well nourished    Airway/Jaw/Neck:  Airway Findings: Mouth Opening: Normal Tongue: Normal  General Airway Assessment: Adult, Good  Mallampati: I  TM Distance: 4 - 6 cm       Chest/Lungs:  Chest/Lungs Findings: Clear to auscultation, Normal Respiratory Rate     Heart/Vascular:  Heart Findings: Rate: Normal  Rhythm: Regular Rhythm  Sounds: Normal        Mental Status:  Mental Status Findings:  Alert and Oriented, Cooperative         Anesthesia Plan  Type of Anesthesia, risks & benefits discussed:  Anesthesia Type:  general  Patient's Preference:   Intra-op Monitoring Plan: standard ASA monitors  Intra-op Monitoring Plan Comments:   Post Op Pain Control Plan:   Post Op Pain Control Plan Comments:   Induction:   IV  Beta Blocker:  Patient is not  currently on a Beta-Blocker (No further documentation required).       Informed Consent: Patient understands risks and agrees with Anesthesia plan.  Questions answered. Anesthesia consent signed with patient.  ASA Score: 3     Day of Surgery Review of History & Physical: I have interviewed and examined the patient. I have reviewed the patient's H&P dated:  There are no significant changes.          Ready For Surgery From Anesthesia Perspective.

## 2019-05-16 NOTE — TRANSFER OF CARE
"Anesthesia Transfer of Care Note    Patient: Kenyetta Andersen    Procedure(s) Performed: Procedure(s) (LRB):  EGD (ESOPHAGOGASTRODUODENOSCOPY) (N/A)    Patient location: PACU    Anesthesia Type: general    Transport from OR: Transported from OR on 6-10 L/min O2 by face mask with adequate spontaneous ventilation    Post pain: adequate analgesia    Post assessment: no apparent anesthetic complications    Post vital signs: stable    Level of consciousness: awake    Nausea/Vomiting: no nausea/vomiting    Complications: desaturation during procedure- reqwuiring ambu.  Copious amount secretions.  CXR ordered    Transfer of care protocol was followed      Last vitals:   Visit Vitals  BP (!) 181/74 (BP Location: Left arm, Patient Position: Lying)   Pulse 74   Temp 36.6 °C (97.9 °F) (Skin)   Resp 18   Ht 5' 3" (1.6 m)   Wt 71.7 kg (158 lb)   SpO2 96%   Breastfeeding? No   BMI 27.99 kg/m²     "

## 2019-05-16 NOTE — ANESTHESIA POSTPROCEDURE EVALUATION
Anesthesia Post Evaluation    Patient: Kenyetta Andersen    Procedure(s) Performed: Procedure(s) (LRB):  EGD (ESOPHAGOGASTRODUODENOSCOPY) (N/A)    Final Anesthesia Type: general  Patient location during evaluation: PACU  Patient participation: Yes- Able to Participate  Level of consciousness: awake and alert and oriented  Post-procedure vital signs: reviewed and stable  Pain management: adequate  Airway patency: patent  PONV status at discharge: No PONV  Anesthetic complications: no      Cardiovascular status: blood pressure returned to baseline  Respiratory status: unassisted, spontaneous ventilation and room air  Hydration status: euvolemic  Follow-up not needed.          Vitals Value Taken Time   /74 5/16/2019 11:05 AM   Temp 36.4 °C (97.5 °F) 5/16/2019 11:05 AM   Pulse 87 5/16/2019 11:05 AM   Resp 18 5/16/2019 11:05 AM   SpO2 99 % 5/16/2019 11:05 AM         No case tracking events are documented in the log.      Pain/Amanda Score: Amanda Score: 10 (5/16/2019 11:05 AM)

## 2019-05-17 VITALS
HEART RATE: 73 BPM | HEIGHT: 63 IN | TEMPERATURE: 98 F | RESPIRATION RATE: 19 BRPM | DIASTOLIC BLOOD PRESSURE: 73 MMHG | OXYGEN SATURATION: 96 % | WEIGHT: 158 LBS | SYSTOLIC BLOOD PRESSURE: 175 MMHG | BODY MASS INDEX: 28 KG/M2

## 2019-05-22 DIAGNOSIS — Z87.19 HX OF ESOPHAGITIS: Primary | ICD-10-CM

## 2019-06-03 ENCOUNTER — HOSPITAL ENCOUNTER (OUTPATIENT)
Dept: RADIOLOGY | Facility: HOSPITAL | Age: 84
Discharge: HOME | End: 2019-06-03
Attending: INTERNAL MEDICINE
Payer: MEDICARE

## 2019-06-03 DIAGNOSIS — E04.9 THYROID ENLARGEMENT: ICD-10-CM

## 2019-06-03 PROCEDURE — 76536 US SOFT TISSUE HEAD NECK THYROID: ICD-10-PCS | Mod: 26,HCNC,, | Performed by: RADIOLOGY

## 2019-06-03 PROCEDURE — 76536 US EXAM OF HEAD AND NECK: CPT | Mod: TC,HCNC

## 2019-06-03 PROCEDURE — 76536 US EXAM OF HEAD AND NECK: CPT | Mod: 26,HCNC,, | Performed by: RADIOLOGY

## 2019-06-04 ENCOUNTER — TELEPHONE (OUTPATIENT)
Dept: FAMILY MEDICINE | Facility: CLINIC | Age: 84
End: 2019-06-04

## 2019-06-04 DIAGNOSIS — E04.1 THYROID NODULE: Primary | ICD-10-CM

## 2019-06-05 NOTE — TELEPHONE ENCOUNTER
----- Message from TEOFILO Euceda sent at 6/4/2019  3:45 PM CDT -----      ----- Message -----  From: Anaid Washington MD  Sent: 6/4/2019   1:41 PM  To: TEOFILO Euceda, Padmini Worrell Staff    Please let patient know her thyroid ultrasound shows a nodule that likely needs to be biopsied. She should follow up with her PCP for further evaluation (I have cc'd them result). Also, she should continue BID PPI x 8 weeks total and we should repeat EGD in 8 weeks

## 2019-06-10 ENCOUNTER — HOSPITAL ENCOUNTER (OUTPATIENT)
Dept: RADIOLOGY | Facility: HOSPITAL | Age: 84
Discharge: HOME | End: 2019-06-10
Attending: INTERNAL MEDICINE
Payer: MEDICARE

## 2019-06-10 DIAGNOSIS — I70.0 ABDOMINAL AORTIC ATHEROSCLEROSIS: ICD-10-CM

## 2019-06-10 DIAGNOSIS — D49.89 NEOPLASM OF ABDOMEN: ICD-10-CM

## 2019-06-10 PROCEDURE — 71260 CT THORAX DX C+: CPT | Mod: 26,HCNC,, | Performed by: RADIOLOGY

## 2019-06-10 PROCEDURE — 25500020 PHARM REV CODE 255: Mod: HCNC | Performed by: INTERNAL MEDICINE

## 2019-06-10 PROCEDURE — 71260 CT THORAX DX C+: CPT | Mod: TC,HCNC

## 2019-06-10 PROCEDURE — 71260 CT CHEST WITH CONTRAST: ICD-10-PCS | Mod: 26,HCNC,, | Performed by: RADIOLOGY

## 2019-06-10 RX ADMIN — IOHEXOL 75 ML: 350 INJECTION, SOLUTION INTRAVENOUS at 08:06

## 2019-07-10 ENCOUNTER — ANESTHESIA EVENT (OUTPATIENT)
Dept: ENDOSCOPY | Facility: HOSPITAL | Age: 84
End: 2019-07-10
Payer: MEDICARE

## 2019-07-11 ENCOUNTER — HOSPITAL ENCOUNTER (OUTPATIENT)
Facility: HOSPITAL | Age: 84
Discharge: HOME | End: 2019-07-11
Attending: INTERNAL MEDICINE | Admitting: INTERNAL MEDICINE
Payer: MEDICARE

## 2019-07-11 ENCOUNTER — ANESTHESIA (OUTPATIENT)
Dept: ENDOSCOPY | Facility: HOSPITAL | Age: 84
End: 2019-07-11
Payer: MEDICARE

## 2019-07-11 DIAGNOSIS — K21.9 ACID REFLUX: ICD-10-CM

## 2019-07-11 PROCEDURE — 88342 IMHCHEM/IMCYTCHM 1ST ANTB: CPT | Mod: 26,HCNC,, | Performed by: PATHOLOGY

## 2019-07-11 PROCEDURE — 37000008 HC ANESTHESIA 1ST 15 MINUTES: Mod: HCNC | Performed by: INTERNAL MEDICINE

## 2019-07-11 PROCEDURE — 43248 EGD GUIDE WIRE INSERTION: CPT | Mod: HCNC | Performed by: INTERNAL MEDICINE

## 2019-07-11 PROCEDURE — 37000009 HC ANESTHESIA EA ADD 15 MINS: Mod: HCNC | Performed by: INTERNAL MEDICINE

## 2019-07-11 PROCEDURE — 43248 EGD GUIDE WIRE INSERTION: CPT | Mod: HCNC,,, | Performed by: INTERNAL MEDICINE

## 2019-07-11 PROCEDURE — D9220A PRA ANESTHESIA: ICD-10-PCS | Mod: HCNC,ANES,, | Performed by: ANESTHESIOLOGY

## 2019-07-11 PROCEDURE — 43239 PR EGD, FLEX, W/BIOPSY, SGL/MULTI: ICD-10-PCS | Mod: 59,HCNC,, | Performed by: INTERNAL MEDICINE

## 2019-07-11 PROCEDURE — 88305 TISSUE SPECIMEN TO PATHOLOGY - SURGERY: ICD-10-PCS | Mod: 26,HCNC,, | Performed by: PATHOLOGY

## 2019-07-11 PROCEDURE — 25000003 PHARM REV CODE 250: Mod: HCNC | Performed by: INTERNAL MEDICINE

## 2019-07-11 PROCEDURE — 27201012 HC FORCEPS, HOT/COLD, DISP: Mod: HCNC | Performed by: INTERNAL MEDICINE

## 2019-07-11 PROCEDURE — 88305 TISSUE EXAM BY PATHOLOGIST: CPT | Mod: HCNC | Performed by: PATHOLOGY

## 2019-07-11 PROCEDURE — 43248 PR EGD, FLEX, W/DILATION OVER GUIDEWIRE: ICD-10-PCS | Mod: HCNC,,, | Performed by: INTERNAL MEDICINE

## 2019-07-11 PROCEDURE — 88342 TISSUE SPECIMEN TO PATHOLOGY - SURGERY: ICD-10-PCS | Mod: 26,HCNC,, | Performed by: PATHOLOGY

## 2019-07-11 PROCEDURE — 63600175 PHARM REV CODE 636 W HCPCS: Mod: HCNC | Performed by: NURSE ANESTHETIST, CERTIFIED REGISTERED

## 2019-07-11 PROCEDURE — 25000003 PHARM REV CODE 250: Mod: HCNC | Performed by: NURSE ANESTHETIST, CERTIFIED REGISTERED

## 2019-07-11 PROCEDURE — D9220A PRA ANESTHESIA: Mod: HCNC,ANES,, | Performed by: ANESTHESIOLOGY

## 2019-07-11 PROCEDURE — 43239 EGD BIOPSY SINGLE/MULTIPLE: CPT | Mod: HCNC | Performed by: INTERNAL MEDICINE

## 2019-07-11 PROCEDURE — 43239 EGD BIOPSY SINGLE/MULTIPLE: CPT | Mod: 59,HCNC,, | Performed by: INTERNAL MEDICINE

## 2019-07-11 RX ORDER — PROPOFOL 10 MG/ML
VIAL (ML) INTRAVENOUS
Status: DISCONTINUED | OUTPATIENT
Start: 2019-07-11 | End: 2019-07-11

## 2019-07-11 RX ORDER — LIDOCAINE HCL/PF 100 MG/5ML
SYRINGE (ML) INTRAVENOUS
Status: DISCONTINUED | OUTPATIENT
Start: 2019-07-11 | End: 2019-07-11

## 2019-07-11 RX ORDER — SODIUM CHLORIDE 9 MG/ML
INJECTION, SOLUTION INTRAVENOUS CONTINUOUS
Status: DISCONTINUED | OUTPATIENT
Start: 2019-07-11 | End: 2019-07-11 | Stop reason: HOSPADM

## 2019-07-11 RX ORDER — GLYCOPYRROLATE 0.2 MG/ML
INJECTION INTRAMUSCULAR; INTRAVENOUS
Status: DISCONTINUED | OUTPATIENT
Start: 2019-07-11 | End: 2019-07-11

## 2019-07-11 RX ADMIN — SODIUM CHLORIDE: 0.9 INJECTION, SOLUTION INTRAVENOUS at 09:07

## 2019-07-11 RX ADMIN — PROPOFOL 100 MG: 10 INJECTION, EMULSION INTRAVENOUS at 11:07

## 2019-07-11 RX ADMIN — LIDOCAINE HYDROCHLORIDE 100 MG: 20 INJECTION, SOLUTION INTRAVENOUS at 11:07

## 2019-07-11 RX ADMIN — GLYCOPYRROLATE 0.2 MG: 0.2 INJECTION, SOLUTION INTRAMUSCULAR; INTRAVENOUS at 11:07

## 2019-07-11 RX ADMIN — PROPOFOL 50 MG: 10 INJECTION, EMULSION INTRAVENOUS at 11:07

## 2019-07-11 NOTE — TRANSFER OF CARE
"Anesthesia Transfer of Care Note    Patient: Kenyetta Andersen    Procedure(s) Performed: Procedure(s) (LRB):  EGD (ESOPHAGOGASTRODUODENOSCOPY) (N/A)    Patient location: PACU    Anesthesia Type: general    Transport from OR: Transported from OR on 2-3 L/min O2 by NC with adequate spontaneous ventilation    Post pain: adequate analgesia    Post assessment: no apparent anesthetic complications and tolerated procedure well    Post vital signs: stable    Level of consciousness: awake and sedated    Nausea/Vomiting: no nausea/vomiting    Complications: none    Transfer of care protocol was followed      Last vitals:   Visit Vitals  /66   Pulse 110   Temp 36.7 °C (98.1 °F) (Skin)   Resp (!) 21   Ht 5' 3" (1.6 m)   Wt 72.1 kg (159 lb)   SpO2 (!) 93%   Breastfeeding? Unknown   BMI 28.17 kg/m²     "

## 2019-07-11 NOTE — PLAN OF CARE
Pt. Awake, alert and oriented. No complaints of pain.  Passing air.  Vitals within defined limits.  Ready for discharge.  Meets discharge criteria.

## 2019-07-11 NOTE — DISCHARGE INSTRUCTIONS
Esophageal Dilation     A balloon dilator may be used to widen a stricture in the esophagus.   An esophageal dilation is a procedure used to widen a narrowed section of your esophagus. This is the tube that leads from your throat to your stomach. Narrowing (stricture) of the esophagus can cause problems. These include trouble swallowing. This sheet explains what to expect with esophageal dilation.  Why esophageal dilation is needed  Several problems can be treated with esophageal dilation. They include:  · Peptic stricture. This is caused by reflux esophagitis. With this problem, the esophagus is irritated by acid reflux (heartburn). This occurs when acid from your stomach flows back up into the esophagus. Stomach acid damages the lining of the esophagus. This leads to a buildup of scar tissue. As a result, the esophagus is narrowed.  · Schatzkis ring. This is an abnormal ring of tissue. It forms where the esophagus meets the stomach. It can cause trouble swallowing. It can also cause food to get stuck in the esophagus. The cause of this condition is not known.  · Achalasia. This condition stops food and liquids from moving into your stomach from the esophagus. It affects the lower esophageal sphincter (LES). The LES is a muscular ring that opens (relaxes) when you swallow. With achalasia, the LES does not relax. This condition can also cause problems with peristalsis. This is the normal muscular action of the esophagus that moves food into the stomach.  · Eosinophilic esophagitis. This is a redness and swelling (inflammation) in the esophagus. It is caused by an environmental trigger such as a food allergy. It can lead to pain, trouble swallowing, and strictures.  · Less common causes of stricture. Other causes of stricture include radiation treatment and cancer.  Before you have esophageal dilation  · Tell your provider about any medicines you take. This includes prescription medicines, over-the-counter  medicines, herbs, vitamins, and other supplements. Be sure to mention aspirin or any blood thinners youre taking.  · Let your provider know if you need to take antibiotics before dental procedures. You may need to take them before esophageal dilation as well.  · Tell your provider about any health conditions you have, such as heart or lung disease. Also mention any allergies to medicines.  · Youll need to have an empty stomach for the procedure. Follow your providers instructions for not eating and drinking before the procedure.  · Arrange to have a family member or friend drive you home after the procedure.  During the procedure  · You may be given local anesthesia to numb your throat. Youll also likely be given medicine to relax you. The procedure takes about 15 minutes. It does not cause trouble breathing.  · A tube called an endoscope (scope) is used. This is a narrow tube with a tiny light and camera at the end. The scope is inserted through your mouth and into your esophagus. It lets your provider see inside your esophagus. To help guide your provider, an imaging method called fluoroscopy may also be used. This creates a moving X-ray image on a computer screen.   · Next, special tiny tools are carefully guided through your mouth and down into the esophagus. They widen the stricture and are then removed. Different types of instruments are used. The type used depends on the size and cause of the stricture. Types include:  ¨ Balloon dilator. A tiny empty balloon is put into the stricture using an endoscope. The balloon is slowly filled with air. The air is removed from the balloon when the stricture is widened to the right size. Balloon dilators are used to treat many types of strictures.  ¨ Guided wire dilator. A thin wire is eased down the esophagus. A small tube thats wider on one end is guided down the wire. It is put into the stricture to stretch it. These dilators are used to treat all kinds of  strictures.  ¨ Bougies. These are weighted, cone-shaped tubes. Starting with smaller cones, your provider uses increasingly larger cones until the stricture is stretched the right amount. Bougies are often used to treat strictures that are simple (short, straight, and not very narrow).  After the procedure  · Youll be watched closely until your provider says youre ready to go home. Youll need to have a friend or family member drive you home.  · You may have a sore throat for the rest of the day.  · You may have pain behind your breastbone for a short time afterwards.  · You can start drinking fluids again after the numbness in your throat goes away. You can resume eating the same day or the next day.  Risks and possible complications  Risks and possible complications for esophageal dilation include:  · Infection  · A tear or hole in the esophagus lining, causing bleeding and possibly needing surgery to fix  · Risks of anesthesia  Follow-up  You may need to have the procedure repeated one or more times. This depends on the cause and extent of the narrowing. Repeat procedures can allow the dilation to take place more slowly. This reduces the risks of the procedure.  If your stricture was caused by reflux esophagitis, youll likely need to take medicine to treat that condition. Your provider will tell you more.  When to call your provider  Call your healthcare provider right away if you have any of the following after the procedure:  · Fever of 100.4°F (38.0°C)  · Chest pain  · Trouble swallowing  · Vomiting blood or material that looks like coffee grounds  · Bleeding  · Black, tarry, or bloody stools   Date Last Reviewed: 7/1/2016 © 2000-2017 [employer]. [address] [place] [address], [place] [address]. All rights reserved. This information is not intended as a substitute for professional medical care. Always follow your healthcare professional's instructions.        Gastritis (Adult)    Gastritis  is inflammation and irritation of the stomach lining. It can be present for a short time (acute) or be long lasting (chronic). Gastritis is often caused by infection with bacteria called H pylori. More than a third of people in the [place] have this bacteria in their bodies. In many cases, H pylori causes no problems or symptoms. In some people, though, the infection irritates the stomach lining and causes gastritis. Other causes of stomach irritation include drinking alcohol or taking pain-relieving medicines called NSAIDs (such as aspirin or ibuprofen).   Symptoms of gastritis can include:  · Abdominal pain or bloating  · Loss of appetite  · Nausea or vomiting  · Vomiting blood or having black stools  · Feeling more tired than usual  An inflamed and irritated stomach lining is more likely to develop a sore called an ulcer. To help prevent this, gastritis should be treated.  Home care  If needed, medicines may be prescribed. If you have H pylori infection, treating it will likely relieve your symptoms. Other changes can help reduce stomach irritation and help it heal.  · If you have been prescribed medicines for H pylori infection, take them as directed. Take all of the medicine until it is finished or your healthcare provider tells you to stop, even if you feel better.  · Your healthcare provider may recommend avoiding NSAIDs. If you take daily aspirin for your heart or other medical reasons, do not stop without talking to your healthcare provider first.  · Avoid drinking alcohol.  · Stop smoking. Smoking can irritate the stomach and delay healing. As much as possible, stay away from second hand smoke.  Follow-up care  Follow up with your healthcare provider, or as advised by our staff. Testing may be needed to check for inflammation or an ulcer.  When to seek medical advice  Call your healthcare provider for any of the following:  · Stomach pain that gets worse or moves to the lower right abdomen (appendix  area)  · Chest pain that appears or gets worse, or spreads to the back, neck, shoulder, or arm  · Frequent vomiting (cant keep down liquids)  · Blood in the stool or vomit (red or black in color)  · Feeling weak or dizzy  · Fever of 100.4ºF (38ºC) or higher, or as directed by your healthcare provider  Date Last Reviewed: 6/22/2015  © 3198-9605 [employer]. [address] [place] [address], [place] [address]. All rights reserved. This information is not intended as a substitute for professional medical care. Always follow your healthcare professional's instructions.        What Is Escalante Esophagus?          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 This means that there have been changes to the lining of the esophagus near the stomach. The changes may have been caused by the acid reflux that happens with GERD (gastroesophageal reflux disease). The changed lining is not cancerous, but may increase your chances of developing cancer later on.      When you have GERD  The esophagus is the tube that carries food and liquid from the mouth to the stomach. Your lower esophageal sphincter (LES) is a one-way valve at the top of the stomach. It keeps food and stomach acid from flowing backward. If the LES is weakened, food and stomach acid flow back (reflux) into your esophagus. If this happens often, the condition is called GERD.  Changes in the lining  The stomach is kept safe from its own acid by a special lining. The esophagus isnt meant to contact stomach acid. With GERD, acid flows back into the esophagus often. This damages the esophagus. In response to the damage, new tissue forms that is not normal. This is Escalante esophagus. The new tissue may keep changing. This is why it is more likely to become cancer in the future.  Preventing further damage  Your healthcare provider may suggest regular tests to keep track of changes in the esophagus. This usually includes an endoscopy, when a flexible lighted scope is placed through the  "mouth into the esophagus. Biopsies (tissue samples) can be taken of the abnormal areas. You are usually sedated with an IV medicine for comfort. He or she may also suggest ways for you to control GERD. This includes lifestyle changes, medicine, or even surgery. This should help keep your Escalante esophagus from getting worse.  Symptoms of GERD  Symptoms include the following:  · Heartburn  · Sour-tasting fluid backing up into your mouth  · Frequent burping or belching  · Symptoms that get worse after you eat, bend over, or lie down  · Coughing repeatedly to clear your throat  · Hoarseness   Date Last Reviewed: 6/1/2016 © 2000-2017 [employer]. [address] [place] [address], [place] [address]. All rights reserved. This information is not intended as a substitute for professional medical care. Always follow your healthcare professional's instructions.      Discharge Instructions: After Your Surgery/Procedure  Youve just had surgery. During surgery you were given medicine called anesthesia to keep you relaxed and free of pain. After surgery you may have some pain or nausea. This is common. Here are some tips for feeling better and getting well after surgery.     Stay on schedule with your medication.   Going home  Your doctor or nurse will show you how to take care of yourself when you go home. He or she will also answer your questions. Have an adult family member or friend drive you home.      For your safety we recommend these precaution for the first 24 hours after your procedure:  · Do not drive or use heavy equipment.  · Do not make important decisions or sign legal papers.  · Do not drink alcohol.  · Have someone stay with you, if needed. He or she can watch for problems and help keep you safe.  · Your concentration, balance, coordination, and judgement may be impaired for many hours after anesthesia.  Use caution when ambulating or standing up.     · You may feel weak and "washed out" after anesthesia " and surgery.      Subtle residual effects of general anesthesia or sedation with regional / local anesthesia can last more than 24 hours.  Rest for the remainder of the day or longer if your Doctor/Surgeon has advised you to do so.  Although you may feel normal within the first 24 hours, your reflexes and mental ability may be impaired without you realizing it.  You may feel dizzy, lightheaded or sleepy for 24 hours or longer.      Be sure to go to all follow-up visits with your doctor. And rest after your surgery for as long as your doctor tells you to.  Coping with pain  If you have pain after surgery, pain medicine will help you feel better. Take it as told, before pain becomes severe. Also, ask your doctor or pharmacist about other ways to control pain. This might be with heat, ice, or relaxation. And follow any other instructions your surgeon or nurse gives you.  Tips for taking pain medicine  To get the best relief possible, remember these points:  · Pain medicines can upset your stomach. Taking them with a little food may help.  · Most pain relievers taken by mouth need at least 20 to 30 minutes to start to work.  · Taking medicine on a schedule can help you remember to take it. Try to time your medicine so that you can take it before starting an activity. This might be before you get dressed, go for a walk, or sit down for dinner.  · Constipation is a common side effect of pain medicines. Call your doctor before taking any medicines such as laxatives or stool softeners to help ease constipation. Also ask if you should skip any foods. Drinking lots of fluids and eating foods such as fruits and vegetables that are high in fiber can also help. Remember, do not take laxatives unless your surgeon has prescribed them.  · Drinking alcohol and taking pain medicine can cause dizziness and slow your breathing. It can even be deadly. Do not drink alcohol while taking pain medicine.  · Pain medicine can make you react  more slowly to things. Do not drive or run machinery while taking pain medicine.  Your health care provider may tell you to take acetaminophen to help ease your pain. Ask him or her how much you are supposed to take each day. Acetaminophen or other pain relievers may interact with your prescription medicines or other over-the-counter (OTC) drugs. Some prescription medicines have acetaminophen and other ingredients. Using both prescription and OTC acetaminophen for pain can cause you to overdose. Read the labels on your OTC medicines with care. This will help you to clearly know the list of ingredients, how much to take, and any warnings. It may also help you not take too much acetaminophen. If you have questions or do not understand the information, ask your pharmacist or health care provider to explain it to you before you take the OTC medicine.  Managing nausea  Some people have an upset stomach after surgery. This is often because of anesthesia, pain, or pain medicine, or the stress of surgery. These tips will help you handle nausea and eat healthy foods as you get better. If you were on a special food plan before surgery, ask your doctor if you should follow it while you get better. These tips may help:  · Do not push yourself to eat. Your body will tell you when to eat and how much.  · Start off with clear liquids and soup. They are easier to digest.  · Next try semi-solid foods, such as mashed potatoes, applesauce, and gelatin, as you feel ready.  · Slowly move to solid foods. Dont eat fatty, rich, or spicy foods at first.  · Do not force yourself to have 3 large meals a day. Instead eat smaller amounts more often.  · Take pain medicines with a small amount of solid food, such as crackers or toast, to avoid nausea.     Call your surgeon if  · You still have pain an hour after taking medicine. The medicine may not be strong enough.  · You feel too sleepy, dizzy, or groggy. The medicine may be too  strong.  · You have side effects like nausea, vomiting, or skin changes, such as rash, itching, or hives.       If you have obstructive sleep apnea  You were given anesthesia medicine during surgery to keep you comfortable and free of pain. After surgery, you may have more apnea spells because of this medicine and other medicines you were given. The spells may last longer than usual.   At home:  · Keep using the continuous positive airway pressure (CPAP) device when you sleep. Unless your health care provider tells you not to, use it when you sleep, day or night. CPAP is a common device used to treat obstructive sleep apnea.  · Talk with your provider before taking any pain medicine, muscle relaxants, or sedatives. Your provider will tell you about the possible dangers of taking these medicines.  © 4074-2838 [employer]. [address] [place] [address], [place] [address]. All rights reserved. This information is not intended as a substitute for professional medical care. Always follow your healthcare professional's instructions.

## 2019-07-11 NOTE — H&P
"[facility] Note    CC: Esophagitis  HPI 84 y.o. 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    Anxiety     Arthritis     Blood transfusion     Cataract     Done OU    Colon cancer 2009    Colon polyps 3/14/2017    GERD (gastroesophageal reflux disease)     Glaucoma 2/24/2012    Nuclear sclerosis 8/27/2012    Pseudophakia - Left Eye 2/24/2012         Review of Systems  General ROS: negative for - chills, fever or weight loss  Cardiovascular ROS: no chest pain or dyspnea on exertion  Gastrointestinal ROS: + GERD    Physical Examination  BP (!) 165/74 (BP Location: Left arm, Patient Position: Lying)   Pulse 77   Temp 98.1 °F (36.7 °C) (Skin)   Resp 18   Ht 5' 3" (1.6 m)   Wt 72.1 kg (159 lb)   Breastfeeding? Unknown   BMI 28.17 kg/m²   General appearance: alert, cooperative, no distress  HENT: Normocephalic, atraumatic, neck symmetrical, no nasal discharge, sclera anicteric   Lungs: clear to auscultation bilaterally, symmetric chest wall expansion bilaterally  Heart: regular rate and rhythm without rub; no displacement of the PMI   Abdomen: soft, nontender  Extremities: extremities symmetric; no clubbing, cyanosis, or edema          Assessment:   84 y.o. female presents to follow up erosive esophagitis    Plan:  -Proceed to EGD    Anaid Washington MD  [facility]  [address], [address]  [place] [address]  Office: [phone]  Fax: [phone]  "

## 2019-07-11 NOTE — ANESTHESIA PREPROCEDURE EVALUATION
07/11/2019  Kenyetta Andersen is a 84 y.o., female.    Pre-op Assessment    I have reviewed the Patient Summary Reports.     I have reviewed the Nursing Notes.   I have reviewed the Medications.     Review of Systems  Anesthesia Hx:  No problems with previous Anesthesia Denies Hx of Anesthetic complications    Social:  Smoker      Hematology/Oncology:         Colon Current/Recent Cancer.   Renal/:   Chronic Renal Disease, CRI    Hepatic/GI:   GERD, poorly controlled    Musculoskeletal:   Arthritis     Neurological:   Neuromuscular Disease,        Physical Exam  General:  Well nourished    Airway/Jaw/Neck:  Airway Findings: Mouth Opening: Normal Tongue: Normal  General Airway Assessment: Adult, Good  Mallampati: I  TM Distance: 4 - 6 cm       Chest/Lungs:  Chest/Lungs Findings: Clear to auscultation, Normal Respiratory Rate     Heart/Vascular:  Heart Findings: Rate: Normal  Rhythm: Regular Rhythm  Sounds: Normal        Mental Status:  Mental Status Findings:  Alert and Oriented, Cooperative         Anesthesia Plan  Type of Anesthesia, risks & benefits discussed:  Anesthesia Type:  general  Patient's Preference:   Intra-op Monitoring Plan: standard ASA monitors  Intra-op Monitoring Plan Comments:   Post Op Pain Control Plan:   Post Op Pain Control Plan Comments:   Induction:   IV  Beta Blocker:  Patient is not currently on a Beta-Blocker (No further documentation required).       Informed Consent: Patient understands risks and agrees with Anesthesia plan.  Questions answered. Anesthesia consent signed with patient.  ASA Score: 3     Day of Surgery Review of History & Physical: I have interviewed and examined the patient. I have reviewed the patient's H&P dated:  There are no significant changes.          Ready For Surgery From Anesthesia Perspective.

## 2019-07-11 NOTE — [employer] PATIENT INSTRUCTIONS
Discharge Summary/Instructions after an Endoscopic Procedure  Patient Name: Kenyetta Andersen  Patient MRN: [id number]  Patient YOB: 1935 Thursday, July 11, 2019  Anaid Washington MD  RESTRICTIONS:  During your procedure today, you received medications for sedation.  These   medications may affect your judgment, balance and coordination.  Therefore,   for 24 hours, you have the following restrictions:   - DO NOT drive a car, operate machinery, make legal/financial decisions,   sign important papers or drink alcohol.    ACTIVITY:  Today: no heavy lifting, straining or running due to procedural   sedation/anesthesia.  The following day: return to full activity including work.  DIET:  Eat and drink normally unless instructed otherwise.     TREATMENT FOR COMMON SIDE EFFECTS:  - Mild abdominal pain, nausea, belching, bloating or excessive gas:  rest,   eat lightly and use a heating pad.  - Sore Throat: treat with throat lozenges and/or gargle with warm salt   water.  - Because air was used during the procedure, expelling large amounts of air   from your rectum or belching is normal.  - If a bowel prep was taken, you may not have a bowel movement for 1-3 days.    This is normal.  SYMPTOMS TO WATCH FOR AND REPORT TO YOUR PHYSICIAN:  1. Abdominal pain or bloating, other than gas cramps.  2. Chest pain.  3. Back pain.  4. Signs of infection such as: chills or fever occurring within 24 hours   after the procedure.  5. Rectal bleeding, which would show as bright red, maroon, or black stools.   (A tablespoon of blood from the rectum is not serious, especially if   hemorrhoids are present.)  6. Vomiting.  7. Weakness or dizziness.  GO DIRECTLY TO THE NEAREST EMERGENCY ROOM IF YOU HAVE ANY OF THE FOLLOWING:      Difficulty breathing              Chills and/or fever over 101 F   Persistent vomiting and/or vomiting blood   Severe abdominal pain   Severe chest pain   Black, tarry stools   Bleeding- more than  one tablespoon   Any other symptom or condition that you feel may need urgent attention  Your doctor recommends these additional instructions:  If any biopsies were taken, your doctors clinic will contact you in 1 to 2   weeks with any results.  - Discharge patient to home (with escort).   - Patient has a contact number available for emergencies.  The signs and   symptoms of potential delayed complications were discussed with the   patient.  Return to normal activities tomorrow.  Written discharge   instructions were provided to the patient.   - Resume previous diet.   - Continue present medications including BID PPI for now  - Await pathology results.  For questions, problems or results please call your physician - Anaid Washington MD at Work:  [phone].  [facility], EMERGENCY ROOM PHONE NUMBER: [phone]  IF A COMPLICATION OR EMERGENCY SITUATION ARISES AND YOU ARE UNABLE TO REACH   YOUR PHYSICIAN - GO DIRECTLY TO THE EMERGENCY ROOM.  Anaid Washington MD  7/11/2019 11:16:31 AM  This report has been verified and signed electronically.  [employer]

## 2019-07-11 NOTE — ANESTHESIA POSTPROCEDURE EVALUATION
Anesthesia Post Evaluation    Patient: Kenyetta Andersen    Procedure(s) Performed: Procedure(s) (LRB):  EGD (ESOPHAGOGASTRODUODENOSCOPY) (N/A)    Final Anesthesia Type: general  Patient location during evaluation: PACU  Patient participation: Yes- Able to Participate  Level of consciousness: awake and alert  Post-procedure vital signs: reviewed and stable  Pain management: adequate  Airway patency: patent  PONV status at discharge: No PONV  Anesthetic complications: no      Cardiovascular status: hemodynamically stable  Respiratory status: unassisted and room air  Hydration status: euvolemic  Follow-up not needed.          Vitals Value Taken Time   /74 7/11/2019 11:45 AM   Temp 36.8 °C (98.2 °F) 7/11/2019 11:25 AM   Pulse 101 7/11/2019 11:45 AM   Resp 15 7/11/2019 11:45 AM   SpO2 94 % 7/11/2019 11:45 AM         No case tracking events are documented in the log.      Pain/Amanda Score: Amanda Score: 10 (7/11/2019 11:45 AM)

## 2019-07-12 VITALS
OXYGEN SATURATION: 94 % | DIASTOLIC BLOOD PRESSURE: 74 MMHG | SYSTOLIC BLOOD PRESSURE: 169 MMHG | HEART RATE: 101 BPM | TEMPERATURE: 98 F | RESPIRATION RATE: 15 BRPM | WEIGHT: 159 LBS | BODY MASS INDEX: 28.17 KG/M2 | HEIGHT: 63 IN

## 2019-07-18 ENCOUNTER — DOCUMENTATION ONLY (OUTPATIENT)
Dept: FAMILY MEDICINE | Facility: CLINIC | Age: 84
End: 2019-07-18

## 2019-07-18 NOTE — PROGRESS NOTES
Pre-Visit Chart Review  For Appointment Scheduled on 8-7-19    There are no preventive care reminders to display for this patient.

## 2019-08-07 ENCOUNTER — OFFICE VISIT (OUTPATIENT)
Dept: FAMILY MEDICINE | Facility: CLINIC | Age: 84
End: 2019-08-07
Attending: FAMILY MEDICINE
Payer: MEDICARE

## 2019-08-07 VITALS
WEIGHT: 160.69 LBS | OXYGEN SATURATION: 95 % | BODY MASS INDEX: 28.47 KG/M2 | HEART RATE: 74 BPM | HEIGHT: 63 IN | SYSTOLIC BLOOD PRESSURE: 134 MMHG | TEMPERATURE: 98 F | DIASTOLIC BLOOD PRESSURE: 66 MMHG

## 2019-08-07 DIAGNOSIS — H81.10 BENIGN POSITIONAL VERTIGO, UNSPECIFIED LATERALITY: ICD-10-CM

## 2019-08-07 DIAGNOSIS — M94.0 COSTOCHONDRITIS, ACUTE: ICD-10-CM

## 2019-08-07 DIAGNOSIS — N18.30 CKD (CHRONIC KIDNEY DISEASE) STAGE 3, GFR 30-59 ML/MIN: ICD-10-CM

## 2019-08-07 DIAGNOSIS — R07.89 CHEST WALL PAIN: Primary | ICD-10-CM

## 2019-08-07 PROCEDURE — 99214 PR OFFICE/OUTPT VISIT, EST, LEVL IV, 30-39 MIN: ICD-10-PCS | Mod: HCNC,S$GLB,, | Performed by: FAMILY MEDICINE

## 2019-08-07 PROCEDURE — 3078F PR MOST RECENT DIASTOLIC BLOOD PRESSURE < 80 MM HG: ICD-10-PCS | Mod: HCNC,CPTII,S$GLB, | Performed by: FAMILY MEDICINE

## 2019-08-07 PROCEDURE — 99499 RISK ADDL DX/OHS AUDIT: ICD-10-PCS | Mod: S$GLB,,, | Performed by: FAMILY MEDICINE

## 2019-08-07 PROCEDURE — 1101F PT FALLS ASSESS-DOCD LE1/YR: CPT | Mod: HCNC,CPTII,S$GLB, | Performed by: FAMILY MEDICINE

## 2019-08-07 PROCEDURE — 99999 PR PBB SHADOW E&M-EST. PATIENT-LVL III: CPT | Mod: PBBFAC,HCNC,, | Performed by: FAMILY MEDICINE

## 2019-08-07 PROCEDURE — 3075F PR MOST RECENT SYSTOLIC BLOOD PRESS GE 130-139MM HG: ICD-10-PCS | Mod: HCNC,CPTII,S$GLB, | Performed by: FAMILY MEDICINE

## 2019-08-07 PROCEDURE — 99499 UNLISTED E&M SERVICE: CPT | Mod: S$GLB,,, | Performed by: FAMILY MEDICINE

## 2019-08-07 PROCEDURE — 99999 PR PBB SHADOW E&M-EST. PATIENT-LVL III: ICD-10-PCS | Mod: PBBFAC,HCNC,, | Performed by: FAMILY MEDICINE

## 2019-08-07 PROCEDURE — 1101F PR PT FALLS ASSESS DOC 0-1 FALLS W/OUT INJ PAST YR: ICD-10-PCS | Mod: HCNC,CPTII,S$GLB, | Performed by: FAMILY MEDICINE

## 2019-08-07 PROCEDURE — 99214 OFFICE O/P EST MOD 30 MIN: CPT | Mod: HCNC,S$GLB,, | Performed by: FAMILY MEDICINE

## 2019-08-07 PROCEDURE — 3078F DIAST BP <80 MM HG: CPT | Mod: HCNC,CPTII,S$GLB, | Performed by: FAMILY MEDICINE

## 2019-08-07 PROCEDURE — 3075F SYST BP GE 130 - 139MM HG: CPT | Mod: HCNC,CPTII,S$GLB, | Performed by: FAMILY MEDICINE

## 2019-08-07 NOTE — PROGRESS NOTES
"Subjective:       Patient ID: Kenyetta Adnersen is a 84 y.o. female.    Chief Complaint: Diabetes and Hypertension    84-year-old female brought in for a six month check for diabetes and hypertension.  She is neither diabetic nor hypertensive.  She has recently sold her house and has been packing up to move and complains of some chest wall discomfort in the parasternal areas bilaterally. She has not had any coughing or sneezing, no palpitations, no exertional angina or exertional shortness of breath.  The pain will sometimes cut her breath off but she is not short winded.  She has no nausea or vomiting and no diaphoresis has been noted. The patient also complains of some vertigo associated with closing her eyes.  This does occur with orthostatic changes but also occurs one sitting or lying in bed.  Head movement particularly when the eyes closed will bring on the vertigo.    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  No date: Anxiety  No date: Arthritis  No date: Blood transfusion  No date: Cataract      Comment:  Done OU  2009: Colon cancer  3/14/2017: Colon polyps  No date: GERD (gastroesophageal reflux disease)  2/24/2012: Glaucoma  8/27/2012: Nuclear sclerosis  2/24/2012: Pseudophakia - Left Eye    Past Surgical History:  No date: APPENDECTOMY  No date: CATARACT EXTRACTION W/  INTRAOCULAR LENS IMPLANT; Left  No date: CATARACT EXTRACTION W/  INTRAOCULAR LENS IMPLANT; Right      Comment:  Dr Sawant  No date: COLON SURGERY      Comment:  resection  03/14/2017: COLONOSCOPY      Comment:  Dr. Guerrier: hemorrhoids, one colon polyp removed, "Patent               functional end-to-end ileo-colonic anastomosis"with                healthy mucosa; biopsy: hyperplastic polyp; repeat in 3                years for surveillance  3/14/2017: COLONOSCOPY; N/A      Comment:  Performed by Killian Guerrier MD at [facility] ENDO  3/24/2014: COLONOSCOPY; N/A      Comment:  Performed by Keven Goff MD at [facility] " ENDO  No date: ECTOPIC PREGNANCY SURGERY  7/11/2019: EGD (ESOPHAGOGASTRODUODENOSCOPY); N/A      Comment:  Performed by Anaid Washington MD at [facility] ENDO  5/16/2019: EGD (ESOPHAGOGASTRODUODENOSCOPY); N/A      Comment:  Performed by Anaid Washington MD at [facility] ENDO  11/19/2013: EGD (ESOPHAGOGASTRODUODENOSCOPY); N/A      Comment:  Performed by Keven Goff MD at [facility] ENDO  7/27/2017: ESOPHAGOGASTRODUODENOSCOPY (EGD); N/A      Comment:  Performed by Killian Guerrier MD at [facility] ENDO  6/12/2017: ESOPHAGOGASTRODUODENOSCOPY (EGD); N/A      Comment:  Performed by Killian Guerrier MD at [facility] ENDO  3/15/2016: ESOPHAGOGASTRODUODENOSCOPY (EGD); N/A      Comment:  Performed by Keven Goff MD at [facility] ENDO  No date: EYE SURGERY      Comment:  bilateral cataracts  No date: HYSTERECTOMY      Comment:  complete  No date: JOINT REPLACEMENT      Comment:  Liban Knee  8/16/2017: phaco/pciol; Right      Comment:  Performed by Maty Sawant MD at [facility] OR  No date: ROTATOR CUFF REPAIR; Right  No date: TOE SURGERY      Comment:  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  06/12/2017: UPPER GASTROINTESTINAL ENDOSCOPY      Comment:  Dr. Guerrier: gastritis and esophagitis, biopsy: chronic                gastritis, negative for h pylori, esophageal- positive                eosinophils, negative for barretts; repeat in 2 months  07/27/2017: UPPER GASTROINTESTINAL ENDOSCOPY      Comment:  Dr. Guerrier    Current Outpatient Medications on File Prior to Visit:  alendronate (FOSAMAX) 70 MG tablet, Take 1 tablet (70 mg total) by mouth every 7 days. On empty stomach in morning, remain upright for 30 minutes., Disp: 4 tablet, Rfl: 11  ascorbic acid, vitamin C, (VITAMIN C) 500 MG tablet, Take 500 mg by mouth once daily., Disp: , Rfl:   b complex vitamins capsule, Take 1 capsule by mouth once daily., Disp: , Rfl:   brimonidine 0.2% (ALPHAGAN) 0.2 % Drop, Place 1 drop into both eyes 3 (three) times daily., Disp: 10 mL, Rfl:  6  cetirizine (ZYRTEC) 5 MG tablet, Take 1 tablet (5 mg total) by mouth once daily., Disp: , Rfl: 0  cyanocobalamin (VITAMIN B-12) 1000 MCG tablet, Take 100 mcg by mouth once daily., Disp: , Rfl:   dorzolamide-timolol 2-0.5% (COSOPT) 22.3-6.8 mg/mL ophthalmic solution, Place 1 drop into both eyes 2 (two) times daily., Disp: 10 mL, Rfl: 11  famotidine-calcium carbonate-magnesium hydroxide (PEPCID COMPLETE) chewable tablet, Take 1 tablet by mouth daily as needed., Disp: , Rfl:   gabapentin (NEURONTIN) 300 MG capsule, TAKE 2 CAPSULES (600 MG TOTAL) BY MOUTH 3 (THREE) TIMES DAILY., Disp: 540 capsule, Rfl: 1  latanoprost 0.005 % ophthalmic solution, PLACE 1 DROP INTO BOTH EYES EVERY EVENING., Disp: 2.5 mL, Rfl: 11  RABEprazole (ACIPHEX) 20 mg tablet, Take 20 mg by mouth 2 (two) times daily., Disp: , Rfl:   ranitidine (ZANTAC) 300 MG tablet, Take 1 tablet (300 mg total) by mouth every evening., Disp: 30 tablet, Rfl: 3  traZODone (DESYREL) 50 MG tablet, Take 50 mg by mouth every evening., Disp: , Rfl:   (DISCONTINUED) calcium carbonate-magnesium hydroxide (ROLAIDS) 550-110 mg Chew, Take 1 tablet by mouth 2 (two) times daily as needed., Disp: , Rfl:     No current facility-administered medications on file prior to visit.         Review of Systems   Constitutional: Negative for chills, diaphoresis and fever.   HENT: Negative for ear discharge, ear pain, hearing loss and tinnitus.    Eyes: Negative for visual disturbance.   Respiratory: Negative for cough, chest tightness, shortness of breath and wheezing.    Cardiovascular: Positive for chest pain. Negative for palpitations and leg swelling.   Gastrointestinal: Negative for constipation, diarrhea, nausea and vomiting.   Musculoskeletal: Positive for arthralgias. Negative for joint swelling.   Neurological: Positive for dizziness. Negative for tremors, seizures, syncope, facial asymmetry, speech difficulty, weakness, light-headedness, numbness and headaches.    Psychiatric/Behavioral: Negative for dysphoric mood and sleep disturbance. The patient is not nervous/anxious.        Objective:      Physical Exam   Constitutional: She is oriented to person, place, and time. She appears well-developed and well-nourished. No distress.   Good blood pressure with no significant orthostatic change  Normal weight with a BMI of 28.5 she is up 7 lb from her last visit with me February 22, 2019   HENT:   Head: Normocephalic and atraumatic.   Right Ear: External ear normal.   Left Ear: External ear normal.   Nose: Nose normal.   Mouth/Throat: Oropharynx is clear and moist. No oropharyngeal exudate.   Eyes: Pupils are equal, round, and reactive to light. EOM are normal. No scleral icterus.   Cardiovascular: Normal rate and regular rhythm. Exam reveals no gallop and no friction rub.   No murmur heard.  No carotid bruit audible   Pulmonary/Chest: Effort normal and breath sounds normal. No stridor. No respiratory distress. She has no wheezes. She has no rales. She exhibits tenderness (Chest wall tenderness at the lower costochondral junctions reproduces her discomfort).   Abdominal: Soft. Bowel sounds are normal. She exhibits no distension and no mass. There is no tenderness. There is no rebound and no guarding. No hernia.   Musculoskeletal:   No intrascapular tenderness   Neurological: She is alert and oriented to person, place, and time.   Closing her eyes does elicit a sensation of vertigo which is aggravated by standing up from sitting with the eyes closed or open.  Somewhat aggravated by side to side movement of the head but worse with the eyes closed  No nystagmus noted   Skin: Skin is dry. She is not diaphoretic. No erythema.   Psychiatric: She has a normal mood and affect. Her behavior is normal. Judgment and thought content normal.   Nursing note and vitals reviewed.      Assessment:       1. Chest wall pain    2. Costochondritis, acute    3. Benign positional vertigo, unspecified  laterality    4. CKD (chronic kidney disease) stage 3, GFR 30-59 ml/min        Plan:       1. 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  She has been in this house for 50 years and she is doing all of the packing herself.  We discussed some options for her to get some help.  She will contact her [employer] group and will also check with the senior citizens group in her hometown and see if they may have some resource is    2. Costochondritis, acute  May use ibuprofen sparingly but should not use it unless absolutely necessary due to her kidney function    3. Benign positional vertigo, unspecified laterality  Discussed exercises she can do in bed which should help    4. CKD (chronic kidney disease) stage 3, GFR 30-59 ml/min

## 2019-08-12 RX ORDER — RABEPRAZOLE SODIUM 20 MG/1
20 TABLET, DELAYED RELEASE ORAL 2 TIMES DAILY
Qty: 60 TABLET | Refills: 5 | Status: SHIPPED | OUTPATIENT
Start: 2019-08-12 | End: 2020-02-10

## 2019-08-12 NOTE — TELEPHONE ENCOUNTER
----- Message from Jasmine Butt sent at 8/12/2019 11:41 AM CDT -----  Type:  RX Refill Request    Who Called:  patient  Refill or New Rx:  refill  RX Name and Strength:  RABEprazole (ACIPHEX) 20 mg tablet - Take 20 mg by mouth 2 (two) times daily. - Oral  How is the patient currently taking it? (ex. 1XDay):  See above  Is this a 30 day or 90 day RX:  15 day supply but is requesting 30 day supply (she is only getting 30 tablets but the directions are to take twice daily)  Preferred Pharmacy with phone number:    [id number] - [place] - [address]  [address]  [place] [address]  Phone: [phone] Fax: [phone]  Local or Mail Order:  Local  Ordering Provider:  Dr Hever Bill call back number: [phone] or [phone]  Additional Information:  Please advise patient

## 2019-08-14 ENCOUNTER — TELEPHONE (OUTPATIENT)
Dept: FAMILY MEDICINE | Facility: CLINIC | Age: 84
End: 2019-08-14

## 2019-08-14 PROBLEM — K22.70 BARRETT'S ESOPHAGUS: Status: ACTIVE | Noted: 2019-08-14

## 2019-08-14 NOTE — TELEPHONE ENCOUNTER
----- Message from Jasmine Butt sent at 8/14/2019 11:07 AM CDT -----  Patient [phone] is calling/ [facility] [place] on [address] is telling patient that the Aciphex is needing a prior authorization/please call

## 2019-08-20 ENCOUNTER — TELEPHONE (OUTPATIENT)
Dept: FAMILY MEDICINE | Facility: CLINIC | Age: 84
End: 2019-08-20

## 2019-08-20 RX ORDER — BRIMONIDINE TARTRATE 2 MG/ML
1 SOLUTION/ DROPS OPHTHALMIC 3 TIMES DAILY
Qty: 10 ML | Refills: 3 | OUTPATIENT
Start: 2019-08-20 | End: 2020-08-19

## 2019-08-20 RX ORDER — BRIMONIDINE TARTRATE 2 MG/ML
1 SOLUTION/ DROPS OPHTHALMIC 3 TIMES DAILY
Qty: 10 ML | Refills: 6 | Status: SHIPPED | OUTPATIENT
Start: 2019-08-20 | End: 2019-08-27 | Stop reason: SDUPTHER

## 2019-08-26 ENCOUNTER — TELEPHONE (OUTPATIENT)
Dept: OPHTHALMOLOGY | Facility: CLINIC | Age: 84
End: 2019-08-26

## 2019-08-27 ENCOUNTER — OFFICE VISIT (OUTPATIENT)
Dept: OPHTHALMOLOGY | Facility: CLINIC | Age: 84
End: 2019-08-27
Payer: MEDICARE

## 2019-08-27 DIAGNOSIS — H40.2232 CHRONIC ANGLE-CLOSURE GLAUCOMA OF BOTH EYES, MODERATE STAGE: Primary | ICD-10-CM

## 2019-08-27 DIAGNOSIS — H40.033 ANATOMICAL NARROW ANGLE GLAUCOMA OF BOTH EYES WITH BORDERLINE INTRAOCULAR PRESSURE: ICD-10-CM

## 2019-08-27 DIAGNOSIS — Z96.1 PSEUDOPHAKIA, BOTH EYES: ICD-10-CM

## 2019-08-27 DIAGNOSIS — H35.372 EPIRETINAL MEMBRANE, LEFT: ICD-10-CM

## 2019-08-27 DIAGNOSIS — H52.7 REFRACTIVE ERROR: ICD-10-CM

## 2019-08-27 PROCEDURE — 92134 POSTERIOR SEGMENT OCT RETINA (OCULAR COHERENCE TOMOGRAPHY)-BOTH EYES: ICD-10-PCS | Mod: HCNC,S$GLB,, | Performed by: OPHTHALMOLOGY

## 2019-08-27 PROCEDURE — 92012 PR EYE EXAM, EST PATIENT,INTERMED: ICD-10-PCS | Mod: HCNC,S$GLB,, | Performed by: OPHTHALMOLOGY

## 2019-08-27 PROCEDURE — 99999 PR PBB SHADOW E&M-EST. PATIENT-LVL III: ICD-10-PCS | Mod: PBBFAC,HCNC,, | Performed by: OPHTHALMOLOGY

## 2019-08-27 PROCEDURE — 92012 INTRM OPH EXAM EST PATIENT: CPT | Mod: HCNC,S$GLB,, | Performed by: OPHTHALMOLOGY

## 2019-08-27 PROCEDURE — 99999 PR PBB SHADOW E&M-EST. PATIENT-LVL III: CPT | Mod: PBBFAC,HCNC,, | Performed by: OPHTHALMOLOGY

## 2019-08-27 PROCEDURE — 99499 RISK ADDL DX/OHS AUDIT: ICD-10-PCS | Mod: S$GLB,,, | Performed by: OPHTHALMOLOGY

## 2019-08-27 PROCEDURE — 99499 UNLISTED E&M SERVICE: CPT | Mod: S$GLB,,, | Performed by: OPHTHALMOLOGY

## 2019-08-27 PROCEDURE — 92134 CPTRZ OPH DX IMG PST SGM RTA: CPT | Mod: HCNC,S$GLB,, | Performed by: OPHTHALMOLOGY

## 2019-08-27 RX ORDER — BRIMONIDINE TARTRATE 2 MG/ML
1 SOLUTION/ DROPS OPHTHALMIC 3 TIMES DAILY
Qty: 10 ML | Refills: 6 | Status: SHIPPED | OUTPATIENT
Start: 2019-08-27 | End: 2020-07-14 | Stop reason: SDUPTHER

## 2019-08-27 RX ORDER — LATANOPROST 50 UG/ML
1 SOLUTION/ DROPS OPHTHALMIC NIGHTLY
Qty: 2.5 ML | Refills: 11 | Status: SHIPPED | OUTPATIENT
Start: 2019-08-27 | End: 2020-07-14 | Stop reason: SDUPTHER

## 2019-08-27 RX ORDER — DORZOLAMIDE HYDROCHLORIDE AND TIMOLOL MALEATE 20; 5 MG/ML; MG/ML
1 SOLUTION/ DROPS OPHTHALMIC 2 TIMES DAILY
Qty: 10 ML | Refills: 11 | Status: SHIPPED | OUTPATIENT
Start: 2019-08-27 | End: 2019-12-05 | Stop reason: SDUPTHER

## 2019-08-27 NOTE — PROGRESS NOTES
HPI     4 month IOP check, OCT MAC today. Denies eye pain today states occasional   eye pain does not last long no rhyme or reason.   Using   Latanoprost OU HS - ran out last Friday  Cosopt OU BID  Brimonidine OU BID needs refills sent to [facility]     Last edited by Jumana Cuba on 8/27/2019  2:03 PM. (History)            Assessment /Plan     For exam results, see Encounter Report.    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  Dispense: 2.5 mL; Refill: 11  -     dorzolamide-timolol 2-0.5% (COSOPT) 22.3-6.8 mg/mL ophthalmic solution; Place 1 drop into both eyes 2 (two) times daily.  Dispense: 10 mL; Refill: 11  -     brimonidine 0.2% (ALPHAGAN) 0.2 % Drop; Place 1 drop into both eyes 3 (three) times daily.  Dispense: 10 mL; Refill: 6          1. Anatomical narrow angle glaucoma   IOP mid teens today, was around 20 OU prior to CE, previously in teens. Appears stable on last DFE 4/29/19.     IOP had improved with Latanoprost added QHS OU (6/2013) in addition to Cosopt BID OU.  Patent LPI OD.  History of post-CE iritis OS.     Last HVF -   Results for orders placed in visit on 04/29/19   [place] Visual Field - OU - Extended - Both Eyes    Narrative OD - new superior defect, looks more like lid artifact than arcuate.   Single inferior miss. Low reliability - fixation loss  OS - enlarged blind spot, scattered defects, WNL  Overall appears stable OU.      Last OCT nerve -   Results for orders placed in visit on 01/14/19   Posterior Segment OCT Optic Nerve- Both eyes    Narrative Findings  Right Eye  Low. TS.     Left Eye  Normal.      Last HRT -   Results for orders placed in visit on 04/29/19   [place]  Retina Tomography (HRT) - OU - Both Eyes    Narrative OD - poor alignment likely account for increase in LCD and TS low; SD 31  OS - stable with baseline and priors, no focal thinning. SD 20     Last color photos - No results found for this or any previous visit.    Continue Cosopt BID OU and latanoprost QHS OU. Started brimonidine TID OU on 7/6/17 - continue this as well.  Gonio - CHARMAINE pre-CE OD with occasional pain, PAS stable from 2011.     Follow up in about 4 months (around 12/27/2019) for IOP check, OCT optic nerve.        hemianopsia Last HVF unreliable - extremely high false negatives, also possible right debra defect? Not present on repeat exam.   2. Nuclear sclerosis  Now pseudopakic OU - OD 8/16/17, OS 11/6/08   3. Pseudophakia  OS - stable    Epiretinal membrane OS VA good despite OCT shows ERM OS, OD WNL. Stable on today's OCT 8/27/19   4. Astigmatism with presbyopia MRx given prior visit - stable, but did not fill post-CE Rx

## 2019-09-23 RX ORDER — GABAPENTIN 300 MG/1
600 CAPSULE ORAL 3 TIMES DAILY
Qty: 540 CAPSULE | Refills: 1 | Status: SHIPPED | OUTPATIENT
Start: 2019-09-23 | End: 2020-03-21

## 2019-12-05 DIAGNOSIS — H40.033 ANATOMICAL NARROW ANGLE GLAUCOMA OF BOTH EYES WITH BORDERLINE INTRAOCULAR PRESSURE: ICD-10-CM

## 2019-12-05 RX ORDER — DORZOLAMIDE HYDROCHLORIDE AND TIMOLOL MALEATE 20; 5 MG/ML; MG/ML
SOLUTION/ DROPS OPHTHALMIC
Qty: 20 ML | Refills: 4 | Status: SHIPPED | OUTPATIENT
Start: 2019-12-05 | End: 2020-07-14 | Stop reason: SDUPTHER

## 2020-01-06 ENCOUNTER — DOCUMENTATION ONLY (OUTPATIENT)
Dept: FAMILY MEDICINE | Facility: CLINIC | Age: 85
End: 2020-01-06

## 2020-01-06 NOTE — PROGRESS NOTES
Pre-Visit Chart Review  For Appointment Scheduled on 1-7-20    There are no preventive care reminders to display for this patient.

## 2020-01-07 ENCOUNTER — OFFICE VISIT (OUTPATIENT)
Dept: FAMILY MEDICINE | Facility: CLINIC | Age: 85
End: 2020-01-07
Attending: FAMILY MEDICINE
Payer: MEDICARE

## 2020-01-07 VITALS
HEIGHT: 63 IN | BODY MASS INDEX: 29.57 KG/M2 | TEMPERATURE: 98 F | OXYGEN SATURATION: 95 % | WEIGHT: 166.88 LBS | HEART RATE: 81 BPM | RESPIRATION RATE: 20 BRPM | DIASTOLIC BLOOD PRESSURE: 64 MMHG | SYSTOLIC BLOOD PRESSURE: 138 MMHG

## 2020-01-07 DIAGNOSIS — I70.0 ABDOMINAL AORTIC ATHEROSCLEROSIS: ICD-10-CM

## 2020-01-07 DIAGNOSIS — D49.0 COLORECTAL NEOPLASM: ICD-10-CM

## 2020-01-07 DIAGNOSIS — J84.10 CALCIFIED GRANULOMA OF LUNG: ICD-10-CM

## 2020-01-07 DIAGNOSIS — Z85.038 PERSONAL HISTORY OF COLON CANCER, STAGE III: Primary | ICD-10-CM

## 2020-01-07 DIAGNOSIS — N18.30 CKD (CHRONIC KIDNEY DISEASE) STAGE 3, GFR 30-59 ML/MIN: ICD-10-CM

## 2020-01-07 DIAGNOSIS — R10.31 RLQ ABDOMINAL PAIN: ICD-10-CM

## 2020-01-07 LAB
BILIRUB SERPL-MCNC: NORMAL MG/DL
BLOOD URINE, POC: NORMAL
COLOR, POC UA: NORMAL
GLUCOSE UR QL STRIP: NORMAL
KETONES UR QL STRIP: NORMAL
LEUKOCYTE ESTERASE URINE, POC: NORMAL
NITRITE, POC UA: NORMAL
PH, POC UA: 5
PROTEIN, POC: NORMAL
SPECIFIC GRAVITY, POC UA: 1.02
UROBILINOGEN, POC UA: NORMAL

## 2020-01-07 PROCEDURE — 99999 PR PBB SHADOW E&M-EST. PATIENT-LVL III: CPT | Mod: PBBFAC,HCNC,, | Performed by: FAMILY MEDICINE

## 2020-01-07 PROCEDURE — 1159F PR MEDICATION LIST DOCUMENTED IN MEDICAL RECORD: ICD-10-PCS | Mod: HCNC,S$GLB,, | Performed by: FAMILY MEDICINE

## 2020-01-07 PROCEDURE — 81002 POCT URINE DIPSTICK WITHOUT MICROSCOPE: ICD-10-PCS | Mod: HCNC,S$GLB,, | Performed by: FAMILY MEDICINE

## 2020-01-07 PROCEDURE — 3075F PR MOST RECENT SYSTOLIC BLOOD PRESS GE 130-139MM HG: ICD-10-PCS | Mod: HCNC,CPTII,S$GLB, | Performed by: FAMILY MEDICINE

## 2020-01-07 PROCEDURE — 99999 PR PBB SHADOW E&M-EST. PATIENT-LVL III: ICD-10-PCS | Mod: PBBFAC,HCNC,, | Performed by: FAMILY MEDICINE

## 2020-01-07 PROCEDURE — 99214 OFFICE O/P EST MOD 30 MIN: CPT | Mod: 25,HCNC,S$GLB, | Performed by: FAMILY MEDICINE

## 2020-01-07 PROCEDURE — 81002 URINALYSIS NONAUTO W/O SCOPE: CPT | Mod: HCNC,S$GLB,, | Performed by: FAMILY MEDICINE

## 2020-01-07 PROCEDURE — 1159F MED LIST DOCD IN RCRD: CPT | Mod: HCNC,S$GLB,, | Performed by: FAMILY MEDICINE

## 2020-01-07 PROCEDURE — 1101F PR PT FALLS ASSESS DOC 0-1 FALLS W/OUT INJ PAST YR: ICD-10-PCS | Mod: HCNC,CPTII,S$GLB, | Performed by: FAMILY MEDICINE

## 2020-01-07 PROCEDURE — 3078F DIAST BP <80 MM HG: CPT | Mod: HCNC,CPTII,S$GLB, | Performed by: FAMILY MEDICINE

## 2020-01-07 PROCEDURE — 3075F SYST BP GE 130 - 139MM HG: CPT | Mod: HCNC,CPTII,S$GLB, | Performed by: FAMILY MEDICINE

## 2020-01-07 PROCEDURE — 1125F PR PAIN SEVERITY QUANTIFIED, PAIN PRESENT: ICD-10-PCS | Mod: HCNC,S$GLB,, | Performed by: FAMILY MEDICINE

## 2020-01-07 PROCEDURE — 1101F PT FALLS ASSESS-DOCD LE1/YR: CPT | Mod: HCNC,CPTII,S$GLB, | Performed by: FAMILY MEDICINE

## 2020-01-07 PROCEDURE — 3078F PR MOST RECENT DIASTOLIC BLOOD PRESSURE < 80 MM HG: ICD-10-PCS | Mod: HCNC,CPTII,S$GLB, | Performed by: FAMILY MEDICINE

## 2020-01-07 PROCEDURE — 99214 PR OFFICE/OUTPT VISIT, EST, LEVL IV, 30-39 MIN: ICD-10-PCS | Mod: 25,HCNC,S$GLB, | Performed by: FAMILY MEDICINE

## 2020-01-07 PROCEDURE — 1125F AMNT PAIN NOTED PAIN PRSNT: CPT | Mod: HCNC,S$GLB,, | Performed by: FAMILY MEDICINE

## 2020-01-07 NOTE — PROGRESS NOTES
"Subjective:       Patient ID: Kenyetta Andersen is a 84 y.o. female.    Chief Complaint: Abdominal Pain    84-year-old female comes in with several months history of progressively worsening right lower quadrant abdominal pain, much worse over the last 1 to 2 weeks describing a sharp and stabbing intermittent but recurring pain in the right lower abdomen.  This is the area where she had colon cancer stage III with a hemicolectomy in 2009.  Her last colonoscopy was in 2017 and showed a healthy anastomosis and no suspicious findings.  Her last CT scan was in February of 2019 with no sign of recurrence at that time.  The patient has had no change in stool caliber, no blood in the stool, no melanotic stool.  It is occasionally a little loose.  She has no blood in the urine, cloudy or smoky colored urine but she does note some mild urgency.  She has had no fever chills no nausea or vomiting.  She is actually scheduled for a CT of abdomen pelvis and chest in March for Dr. Granado in follow-up of the colon cancer.    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  No date: Anxiety  No date: Arthritis  No date: Blood transfusion  No date: Cataract      Comment:  Done OU  2009: Colon cancer  3/14/2017: Colon polyps  No date: GERD (gastroesophageal reflux disease)  2/24/2012: Glaucoma  8/27/2012: Nuclear sclerosis  2/24/2012: Pseudophakia - Left Eye    Past Surgical History:  No date: APPENDECTOMY  No date: CATARACT EXTRACTION W/  INTRAOCULAR LENS IMPLANT; Left  No date: CATARACT EXTRACTION W/  INTRAOCULAR LENS IMPLANT; Right      Comment:  Dr Sawant  No date: COLON SURGERY      Comment:  resection  3/14/2017: COLONOSCOPY; N/A      Comment:  Procedure: COLONOSCOPY;  Surgeon: Killian Guerrier MD;                 Location: [facility];  Service: Endoscopy;  Laterality:                N/A;  03/14/2017: COLONOSCOPY      Comment:  Dr. Guerrier: hemorrhoids, one colon polyp removed, "Patent               functional end-to-end " "ileo-colonic anastomosis"with                healthy mucosa; biopsy: hyperplastic polyp; repeat in 3                years for surveillance  No date: ECTOPIC PREGNANCY SURGERY  5/16/2019: ESOPHAGOGASTRODUODENOSCOPY; N/A      Comment:  Procedure: EGD (ESOPHAGOGASTRODUODENOSCOPY);  Surgeon:                Anaid Washington MD;  Location: [facility] ENDO;  Service:                Endoscopy;  Laterality: N/A;  7/11/2019: ESOPHAGOGASTRODUODENOSCOPY; N/A      Comment:  Procedure: EGD (ESOPHAGOGASTRODUODENOSCOPY);  Surgeon:                Anaid Washington MD;  Location: [facility] ENDO;  Service:                Endoscopy;  Laterality: N/A;  No date: EYE SURGERY      Comment:  bilateral cataracts  No date: HYSTERECTOMY      Comment:  complete  No date: JOINT REPLACEMENT      Comment:  Liban Knee  No date: ROTATOR CUFF REPAIR; Right  No date: TOE SURGERY      Comment:  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  06/12/2017: UPPER GASTROINTESTINAL ENDOSCOPY      Comment:  Dr. Guerrier: gastritis and esophagitis, biopsy: chronic                gastritis, negative for h pylori, esophageal- positive                eosinophils, negative for barretts; repeat in 2 months  07/27/2017: UPPER GASTROINTESTINAL ENDOSCOPY      Comment:  Dr. Guerrier    Current Outpatient Medications on File Prior to Visit:  alendronate (FOSAMAX) 70 MG tablet, Take 1 tablet (70 mg total) by mouth every 7 days. On empty stomach in morning, remain upright for 30 minutes., Disp: 4 tablet, Rfl: 11  ascorbic acid, vitamin C, (VITAMIN C) 500 MG tablet, Take 500 mg by mouth once daily., Disp: , Rfl:   b complex vitamins capsule, Take 1 capsule by mouth once daily., Disp: , Rfl:   brimonidine 0.2% (ALPHAGAN) 0.2 % Drop, Place 1 drop into both eyes 3 (three) times daily., Disp: 10 mL, Rfl: 6  cetirizine (ZYRTEC) 5 MG tablet, Take 1 tablet (5 mg total) by mouth once daily., Disp: , Rfl: 0  cyanocobalamin (VITAMIN B-12) 1000 MCG tablet, Take 100 mcg by mouth once daily., Disp: , " Rfl:   dorzolamide-timolol 2-0.5% (COSOPT) 22.3-6.8 mg/mL ophthalmic solution, INSTILL 1 DROP INTO BOTH EYES TWICE A DAY, Disp: 20 mL, Rfl: 4  famotidine-calcium carbonate-magnesium hydroxide (PEPCID COMPLETE) chewable tablet, Take 1 tablet by mouth daily as needed., Disp: , Rfl:   gabapentin (NEURONTIN) 300 MG capsule, TAKE 2 CAPSULES (600 MG TOTAL) BY MOUTH 3 (THREE) TIMES DAILY., Disp: 540 capsule, Rfl: 1  latanoprost 0.005 % ophthalmic solution, Place 1 drop into both eyes every evening., Disp: 2.5 mL, Rfl: 11  RABEprazole (ACIPHEX) 20 mg tablet, Take 1 tablet (20 mg total) by mouth 2 (two) times daily., Disp: 60 tablet, Rfl: 5  traZODone (DESYREL) 50 MG tablet, Take 50 mg by mouth every evening., Disp: , Rfl:     No current facility-administered medications on file prior to visit.         Review of Systems   Constitutional: Negative for activity change, appetite change, chills, diaphoresis, fatigue, fever and unexpected weight change.   HENT: Positive for congestion. Negative for sinus pressure and sinus pain.    Respiratory: Negative for chest tightness and shortness of breath.    Cardiovascular: Negative for chest pain and palpitations.   Gastrointestinal: Positive for abdominal pain. Negative for anal bleeding, blood in stool, constipation, diarrhea, nausea and vomiting.   Genitourinary: Positive for urgency. Negative for dysuria, frequency and hematuria.   Neurological: Positive for light-headedness (Worsening balance but not vertigo). Negative for dizziness and headaches.       Objective:      Physical Exam   Constitutional: She is oriented to person, place, and time. She appears well-developed and well-nourished. No distress.   Normal blood pressure  Normal weight with a BMI of 29.6 she is up 6.2 lb from her August 7, 2019 visit   HENT:   Head: Normocephalic and atraumatic.   Right Ear: External ear normal.   Left Ear: External ear normal.   Nose: Nose normal.   Mouth/Throat: Oropharynx is clear and  moist. No oropharyngeal exudate.   Eyes: EOM are normal. No scleral icterus.   Neck: Normal range of motion. Neck supple. No JVD present. No tracheal deviation present. No thyromegaly present.   Cardiovascular: Normal rate, regular rhythm and normal heart sounds. Exam reveals no gallop and no friction rub.   No murmur heard.  Pulmonary/Chest: Effort normal and breath sounds normal. No stridor. No respiratory distress. She has no wheezes. She has no rales. She exhibits no tenderness.   Abdominal: Soft. Bowel sounds are normal. She exhibits no distension, no pulsatile midline mass and no mass. There is no hepatosplenomegaly. There is tenderness in the right upper quadrant, right lower quadrant, epigastric area, periumbilical area and suprapubic area. There is no rigidity, no rebound, no guarding, no CVA tenderness, no tenderness at McBurney's point and negative Johnson's sign. No hernia.   Lymphadenopathy:     She has no cervical adenopathy.   Neurological: She is alert and oriented to person, place, and time.   Skin: Skin is warm and dry. No rash noted. She is not diaphoretic. No erythema.   Psychiatric: She has a normal mood and affect. Her behavior is normal. Judgment and thought content normal.   Nursing note and vitals reviewed.      Assessment:       1. Personal history of colon cancer, stage III    2. Colorectal neoplasm    3. 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 RLQ abdominal pain    5. Abdominal aortic atherosclerosis    6. Calcified granuloma of lung        Plan:       1. Colorectal neoplasm  Abdominal pain could be related to recurrence, possibly related to adhesion disease although the sharp nature is a little atypical  - CT Chest Abdomen Pelvis W W/O Contrast (XPD); Future    2. Personal history of colon cancer, stage III  - CT Chest Abdomen Pelvis W W/O Contrast (XPD); Future  - Comprehensive metabolic panel; Future  - CBC auto differential; Future    3. CKD (chronic kidney disease) stage 2/3  -  Comprehensive metabolic panel; Future    4. RLQ abdominal pain  - CT Chest Abdomen Pelvis W W/O Contrast (XPD); Future  - Comprehensive metabolic panel; Future  - CBC auto differential; Future  - Amylase; Future  - Lipase; Future  - POCT URINE DIPSTICK WITHOUT MICROSCOPE    5. Abdominal aortic atherosclerosis  - CT Chest Abdomen Pelvis W W/O Contrast (XPD); Future    6. Calcified granuloma of lung  - CT Chest Abdomen Pelvis W W/O Contrast (XPD); Future

## 2020-01-08 ENCOUNTER — HOSPITAL ENCOUNTER (OUTPATIENT)
Dept: RADIOLOGY | Facility: HOSPITAL | Age: 85
Discharge: HOME | End: 2020-01-08
Attending: FAMILY MEDICINE
Payer: MEDICARE

## 2020-01-08 ENCOUNTER — OFFICE VISIT (OUTPATIENT)
Dept: OPHTHALMOLOGY | Facility: CLINIC | Age: 85
End: 2020-01-08
Payer: MEDICARE

## 2020-01-08 DIAGNOSIS — H40.033 ANATOMICAL NARROW ANGLE GLAUCOMA OF BOTH EYES WITH BORDERLINE INTRAOCULAR PRESSURE: ICD-10-CM

## 2020-01-08 DIAGNOSIS — Z96.1 PSEUDOPHAKIA, BOTH EYES: ICD-10-CM

## 2020-01-08 DIAGNOSIS — R10.31 RLQ ABDOMINAL PAIN: ICD-10-CM

## 2020-01-08 DIAGNOSIS — H35.372 EPIRETINAL MEMBRANE, LEFT: ICD-10-CM

## 2020-01-08 DIAGNOSIS — J84.10 CALCIFIED GRANULOMA OF LUNG: ICD-10-CM

## 2020-01-08 DIAGNOSIS — Z85.038 PERSONAL HISTORY OF COLON CANCER, STAGE III: ICD-10-CM

## 2020-01-08 DIAGNOSIS — D49.0 COLORECTAL NEOPLASM: ICD-10-CM

## 2020-01-08 DIAGNOSIS — H40.2232 CHRONIC ANGLE-CLOSURE GLAUCOMA OF BOTH EYES, MODERATE STAGE: Primary | ICD-10-CM

## 2020-01-08 DIAGNOSIS — H52.7 REFRACTIVE ERROR: ICD-10-CM

## 2020-01-08 DIAGNOSIS — I70.0 ABDOMINAL AORTIC ATHEROSCLEROSIS: ICD-10-CM

## 2020-01-08 PROCEDURE — 99999 PR PBB SHADOW E&M-EST. PATIENT-LVL III: CPT | Mod: PBBFAC,HCNC,, | Performed by: OPHTHALMOLOGY

## 2020-01-08 PROCEDURE — 92012 INTRM OPH EXAM EST PATIENT: CPT | Mod: HCNC,S$GLB,, | Performed by: OPHTHALMOLOGY

## 2020-01-08 PROCEDURE — 74177 CT ABD & PELVIS W/CONTRAST: CPT | Mod: TC,HCNC

## 2020-01-08 PROCEDURE — 92012 PR EYE EXAM, EST PATIENT,INTERMED: ICD-10-PCS | Mod: HCNC,S$GLB,, | Performed by: OPHTHALMOLOGY

## 2020-01-08 PROCEDURE — 92133 POSTERIOR SEGMENT OCT OPTIC NERVE(OCULAR COHERENCE TOMOGRAPHY) - OU - BOTH EYES: ICD-10-PCS | Mod: HCNC,S$GLB,, | Performed by: OPHTHALMOLOGY

## 2020-01-08 PROCEDURE — 92133 CPTRZD OPH DX IMG PST SGM ON: CPT | Mod: HCNC,S$GLB,, | Performed by: OPHTHALMOLOGY

## 2020-01-08 PROCEDURE — 71260 CT CHEST ABDOMEN PELVIS WITH CONTRAST (XPD): ICD-10-PCS | Mod: 26,HCNC,, | Performed by: RADIOLOGY

## 2020-01-08 PROCEDURE — 25500020 PHARM REV CODE 255: Mod: HCNC | Performed by: FAMILY MEDICINE

## 2020-01-08 PROCEDURE — 74177 CT ABD & PELVIS W/CONTRAST: CPT | Mod: 26,HCNC,, | Performed by: RADIOLOGY

## 2020-01-08 PROCEDURE — 74177 CT CHEST ABDOMEN PELVIS WITH CONTRAST (XPD): ICD-10-PCS | Mod: 26,HCNC,, | Performed by: RADIOLOGY

## 2020-01-08 PROCEDURE — 71260 CT THORAX DX C+: CPT | Mod: 26,HCNC,, | Performed by: RADIOLOGY

## 2020-01-08 PROCEDURE — 99999 PR PBB SHADOW E&M-EST. PATIENT-LVL III: ICD-10-PCS | Mod: PBBFAC,HCNC,, | Performed by: OPHTHALMOLOGY

## 2020-01-08 RX ADMIN — IOHEXOL 75 ML: 350 INJECTION, SOLUTION INTRAVENOUS at 12:01

## 2020-01-08 RX ADMIN — IOHEXOL 1000 ML: 9 SOLUTION ORAL at 12:01

## 2020-01-08 NOTE — PROGRESS NOTES
Assessment /Plan     For exam results, see Encounter Report.    Chronic angle-closure glaucoma of both eyes, moderate stage     Epiretinal membrane, left    Pseudophakia, both eyes    Refractive error          1. Anatomical narrow angle glaucoma   IOP mid teens today, was around 20 OU prior to CE, previously in teens. Appears stable on last DFE 4/29/19.     IOP had improved with Latanoprost added QHS OU (6/2013) in addition to Cosopt BID OU.  Patent LPI OD.  History of post-CE iritis OS.     Last HVF -   Results for orders placed in visit on 04/29/19   Gracia Visual Field - OU - Extended - Both Eyes    Narrative OD - new superior defect, looks more like lid artifact than arcuate.   Single inferior miss. Low reliability - fixation loss  OS - enlarged blind spot, scattered defects, WNL  Overall appears stable OU.      Last OCT nerve -   Results for orders placed in visit on 01/08/20   Posterior Segment OCT Optic Nerve- Both eyes    Narrative OD remains low TS, borderline G. Stable.  OS remains WNL all segments - stable     Last HRT -   Results for orders placed in visit on 04/29/19   Lares Retina Tomography (HRT) - OU - Both Eyes    Narrative OD - poor alignment likely account for increase in LCD and TS low; SD 31  OS - stable with baseline and priors, no focal thinning. SD 20     Last color photos - No results found for this or any previous visit.    Continue Cosopt BID OU and latanoprost QHS OU. Started brimonidine TID OU on 7/6/17 - continue this as well.  Gonio - CHARMAINE pre-CE OD with occasional pain, PAS stable from 2011.     Follow up in about 4 months (around 5/8/2020) for 24-2 HVF, IOP check, DFE, HRT.          hemianopsia Last HVF unreliable - extremely high false negatives, also possible right debra defect? Not present on repeat exam.   2. Nuclear sclerosis  Now pseudopakic OU - OD 8/16/17, OS 11/6/08   3. Pseudophakia  OS - stable    Epiretinal membrane OS VA good despite OCT shows ERM OS, OD  WNL. Stable on today's OCT 8/27/19   4. Astigmatism with presbyopia MRx given prior visit - stable, but did not fill post-CE Rx

## 2020-01-09 ENCOUNTER — TELEPHONE (OUTPATIENT)
Dept: FAMILY MEDICINE | Facility: CLINIC | Age: 85
End: 2020-01-09

## 2020-01-09 DIAGNOSIS — R10.9 ABDOMINAL PAIN, UNSPECIFIED ABDOMINAL LOCATION: ICD-10-CM

## 2020-01-09 DIAGNOSIS — E04.1 THYROID NODULE: Primary | ICD-10-CM

## 2020-01-09 NOTE — TELEPHONE ENCOUNTER
----- Message from Hever Arreola MD sent at 1/8/2020  7:55 PM CST -----  The chemistry panel looks good with a high normal blood sugar in the prediabetic range, normal kidney function, normal electrolytes, and normal liver functions.    There is a mild increase in the amylase level with a normal lipase.  This is not consistent with pancreatitis, it could represent other bowel problems such as a vascular blockage, blockage of a duct, or many other conditions.    The blood count is normal and increased from the previous one in June with no anemia to suggest GI bleeding.  The white blood cells are normal with no sign of infection and the platelets are normal.    A nodule previously seen in the right lobe of the thyroid has enlarged.  The radiologist recommends further evaluation.  On a previous scan they mentioned that this nodule was in a part of the thyroid that was down in the chest behind part of the sternum but they did not give that detail on this report.  There is some air in the gallbladder and bile duct probably due to an old sphincterotomy.  This has been seen on previous scans and may or may not have anything to do with the discomfort.  It is possible it is the cause of the mild increase in amylase level.    I would recommend a follow-up with GI for the abdominal pain and abnormal findings on CT scan.  I would also suggest a consult with Endocrinology.

## 2020-01-10 ENCOUNTER — LAB VISIT (OUTPATIENT)
Dept: LAB | Facility: HOSPITAL | Age: 85
End: 2020-01-10
Attending: PHYSICIAN ASSISTANT
Payer: MEDICARE

## 2020-01-10 ENCOUNTER — OFFICE VISIT (OUTPATIENT)
Dept: ENDOCRINOLOGY | Facility: CLINIC | Age: 85
End: 2020-01-10
Attending: FAMILY MEDICINE
Payer: MEDICARE

## 2020-01-10 VITALS
HEART RATE: 80 BPM | HEIGHT: 63 IN | DIASTOLIC BLOOD PRESSURE: 70 MMHG | BODY MASS INDEX: 29.3 KG/M2 | TEMPERATURE: 99 F | SYSTOLIC BLOOD PRESSURE: 132 MMHG | WEIGHT: 165.38 LBS

## 2020-01-10 DIAGNOSIS — E04.1 THYROID NODULE: ICD-10-CM

## 2020-01-10 DIAGNOSIS — Z78.0 POSTMENOPAUSAL: ICD-10-CM

## 2020-01-10 DIAGNOSIS — E55.9 HYPOVITAMINOSIS D: ICD-10-CM

## 2020-01-10 DIAGNOSIS — E04.1 THYROID NODULE: Primary | ICD-10-CM

## 2020-01-10 PROCEDURE — 1125F AMNT PAIN NOTED PAIN PRSNT: CPT | Mod: HCNC,S$GLB,, | Performed by: PHYSICIAN ASSISTANT

## 2020-01-10 PROCEDURE — 82306 VITAMIN D 25 HYDROXY: CPT | Mod: HCNC

## 2020-01-10 PROCEDURE — 84439 ASSAY OF FREE THYROXINE: CPT | Mod: HCNC

## 2020-01-10 PROCEDURE — 80053 COMPREHEN METABOLIC PANEL: CPT | Mod: HCNC

## 2020-01-10 PROCEDURE — 1101F PT FALLS ASSESS-DOCD LE1/YR: CPT | Mod: HCNC,CPTII,S$GLB, | Performed by: PHYSICIAN ASSISTANT

## 2020-01-10 PROCEDURE — 3078F PR MOST RECENT DIASTOLIC BLOOD PRESSURE < 80 MM HG: ICD-10-PCS | Mod: HCNC,CPTII,S$GLB, | Performed by: PHYSICIAN ASSISTANT

## 2020-01-10 PROCEDURE — 3078F DIAST BP <80 MM HG: CPT | Mod: HCNC,CPTII,S$GLB, | Performed by: PHYSICIAN ASSISTANT

## 2020-01-10 PROCEDURE — 1159F PR MEDICATION LIST DOCUMENTED IN MEDICAL RECORD: ICD-10-PCS | Mod: HCNC,S$GLB,, | Performed by: PHYSICIAN ASSISTANT

## 2020-01-10 PROCEDURE — 36415 COLL VENOUS BLD VENIPUNCTURE: CPT | Mod: HCNC,PO

## 2020-01-10 PROCEDURE — 3075F SYST BP GE 130 - 139MM HG: CPT | Mod: HCNC,CPTII,S$GLB, | Performed by: PHYSICIAN ASSISTANT

## 2020-01-10 PROCEDURE — 86800 THYROGLOBULIN ANTIBODY: CPT | Mod: HCNC

## 2020-01-10 PROCEDURE — 84443 ASSAY THYROID STIM HORMONE: CPT | Mod: HCNC

## 2020-01-10 PROCEDURE — 1125F PR PAIN SEVERITY QUANTIFIED, PAIN PRESENT: ICD-10-PCS | Mod: HCNC,S$GLB,, | Performed by: PHYSICIAN ASSISTANT

## 2020-01-10 PROCEDURE — 99203 PR OFFICE/OUTPT VISIT, NEW, LEVL III, 30-44 MIN: ICD-10-PCS | Mod: HCNC,S$GLB,, | Performed by: PHYSICIAN ASSISTANT

## 2020-01-10 PROCEDURE — 1159F MED LIST DOCD IN RCRD: CPT | Mod: HCNC,S$GLB,, | Performed by: PHYSICIAN ASSISTANT

## 2020-01-10 PROCEDURE — 1101F PR PT FALLS ASSESS DOC 0-1 FALLS W/OUT INJ PAST YR: ICD-10-PCS | Mod: HCNC,CPTII,S$GLB, | Performed by: PHYSICIAN ASSISTANT

## 2020-01-10 PROCEDURE — 99203 OFFICE O/P NEW LOW 30 MIN: CPT | Mod: HCNC,S$GLB,, | Performed by: PHYSICIAN ASSISTANT

## 2020-01-10 PROCEDURE — 99999 PR PBB SHADOW E&M-EST. PATIENT-LVL IV: ICD-10-PCS | Mod: PBBFAC,HCNC,, | Performed by: PHYSICIAN ASSISTANT

## 2020-01-10 PROCEDURE — 99999 PR PBB SHADOW E&M-EST. PATIENT-LVL IV: CPT | Mod: PBBFAC,HCNC,, | Performed by: PHYSICIAN ASSISTANT

## 2020-01-10 PROCEDURE — 3075F PR MOST RECENT SYSTOLIC BLOOD PRESS GE 130-139MM HG: ICD-10-PCS | Mod: HCNC,CPTII,S$GLB, | Performed by: PHYSICIAN ASSISTANT

## 2020-01-10 NOTE — LETTER
January 19, 2020      Hever Arreola MD  [address]  [place] [address]           [place] [facility]  [address]  [place] [address]  Phone: [phone]          Patient: Kenyetta Andersen   MR Number: [id number]   YOB: 1935   Date of Visit: 1/10/2020       Dear Dr. Hever Arreola:    Thank you for referring Kenyetta Andersen to me for evaluation. Attached you will find relevant portions of my assessment and plan of care.    If you have questions, please do not hesitate to call me. I look forward to following Kenyetta Andersen along with you.    Sincerely,    BRIJESH Whittaker PA-C    Enclosure  CC:  No Recipients    If you would like to receive this communication electronically, please contact [email or web address] or [phone] to request more information on [employer] access.    For providers and/or their staff who would like to refer a patient to [facility], please contact us through our one-stop-shop provider referral line, [facility], at [phone].    If you feel you have received this communication in error or would no longer like to receive these types of communications, please e-mail [email or web address]

## 2020-01-10 NOTE — PROGRESS NOTES
"CC: Thyroid Nodule    HPI: Kenyetta Andersen is a 84 y.o. female here for nodular thyroid disease along with pending conditions listed in the Visit Diagnosis. Diagnosed in 2009. Last seen by Dr. Mccall in 2012. No fhx of DM or thyroid disease. No hx of neck radiation. Diet is low in soy, cabbage, seafood.       Recent CT showed a 2.5 cm nodule in the right thyroid. FNA in 2012 and in 2005 that were benign.   No palpations, tremors, hairs, diarrhea, wt changes. No sob. + dysphagia (she has her esophagus dilated). No voice changes.     Osteopenia-2/19-Fractured three bones in her foot after stepping in a hole. No falls or steriod injections. No taking calcium or Vitamin d. Taking fosamax 70 mg weekly since 2/19.     PMHx, PSHx: reviewed in epic.  Social Hx: no ETOH/tobacco use.     ROS:   Constitutional: No recent significant weight change  Eyes: No recent visual changes  Cardiovascular: Denies current anginal symptoms  Respiratory: Denies current respiratory difficulty  Gastrointestinal: Denies recent bowel disturbances  GenitoUrinary - No dysuria  Skin: No new skin rash  Neurologic: No focal neurologic complaints  Musculoskeletal: no joint pain  Endocrine: no polyphagia, polydipsia or polyuria  Remainder ROS negative       /70 (BP Location: Left arm, Patient Position: Sitting, BP Method: Medium (Manual))   Pulse 80   Temp 98.7 °F (37.1 °C) (Oral)   Ht 5' 3" (1.6 m)   Wt 75 kg (165 lb 5.5 oz)   BMI 29.29 kg/m²      Personally reviewed labs below:    Lab Results   Component Value Date    TSH 0.857 04/11/2016    FREET4 0.92 11/17/2015          Chemistry        Component Value Date/Time     01/08/2020 1036    K 4.2 01/08/2020 1036     01/08/2020 1036    CO2 29 01/08/2020 1036    BUN 11 01/08/2020 1036    CREATININE 1.0 01/08/2020 1036     01/08/2020 1036        Component Value Date/Time    CALCIUM 9.2 01/08/2020 1036    ALKPHOS 79 01/08/2020 1036    AST 23 01/08/2020 1036    ALT 18 " 01/08/2020 1036    BILITOT 0.4 01/08/2020 1036    ESTGFRAFRICA >60 01/08/2020 1036    EGFRNONAA 53 (A) 01/08/2020 1036           Lab Results   Component Value Date    HGBA1C 5.7 (H) 03/25/2019    HGBA1C 5.5 03/19/2010        PE:  GENERAL: Well developed, well nourished  NECK: Supple neck, normal thyroid. No bruit. Nuchal skin tags.  LYMPHATIC: No cervical or supraclavicular lymphadenopathy  CARDIOVASCULAR: Normal heart sounds, no pedal edema  RESPIRATORY: Normal effort, clear to auscultation  ABDOMEN: soft, non-tender, non-distended.  MUSC: 2+ DTR UE/LE  NEURO: steady gait, CN ll-Xll grossly intact  PSYCH: normal mood and affect  FEET: appropriate footwear.     Assessment/Plan:   1. Thyroid nodule  Ambulatory referral/consult to Endocrinology    TSH    T4, free    Thyroglobulin    Comprehensive metabolic panel    US Soft Tissue Head Neck Thyroid   2. Postmenopausal     3. Hypovitaminosis D  Vitamin D        Nodular thyroid disease-stable-Thyroid u/s 12/20. Thyroid nodule has been stable in size since 2012. TFTs wnl.  Postemenopausal-DEXA 2/21. Continue Fosamax.  Hypovitaminosis d-low-start 5,000 IU of vitamin d daily.    F/u in 1 year

## 2020-01-11 LAB
25(OH)D3+25(OH)D2 SERPL-MCNC: 14 NG/ML (ref 30–96)
ALBUMIN SERPL BCP-MCNC: 3.4 G/DL (ref 3.5–5.2)
ALP SERPL-CCNC: 75 U/L (ref 55–135)
ALT SERPL W/O P-5'-P-CCNC: 18 U/L (ref 10–44)
ANION GAP SERPL CALC-SCNC: 9 MMOL/L (ref 8–16)
AST SERPL-CCNC: 26 U/L (ref 10–40)
BILIRUB SERPL-MCNC: 0.3 MG/DL (ref 0.1–1)
BUN SERPL-MCNC: 11 MG/DL (ref 8–23)
CALCIUM SERPL-MCNC: 9.4 MG/DL (ref 8.7–10.5)
CHLORIDE SERPL-SCNC: 108 MMOL/L (ref 95–110)
CO2 SERPL-SCNC: 25 MMOL/L (ref 23–29)
CREAT SERPL-MCNC: 1 MG/DL (ref 0.5–1.4)
EST. GFR  (AFRICAN AMERICAN): >60 ML/MIN/1.73 M^2
EST. GFR  (NON AFRICAN AMERICAN): 52.6 ML/MIN/1.73 M^2
GLUCOSE SERPL-MCNC: 85 MG/DL (ref 70–110)
POTASSIUM SERPL-SCNC: 4.4 MMOL/L (ref 3.5–5.1)
PROT SERPL-MCNC: 6.8 G/DL (ref 6–8.4)
SODIUM SERPL-SCNC: 142 MMOL/L (ref 136–145)
T4 FREE SERPL-MCNC: 0.81 NG/DL (ref 0.71–1.51)
TSH SERPL DL<=0.005 MIU/L-ACNC: 0.49 UIU/ML (ref 0.4–4)

## 2020-01-13 ENCOUNTER — OFFICE VISIT (OUTPATIENT)
Dept: GASTROENTEROLOGY | Facility: CLINIC | Age: 85
End: 2020-01-13
Attending: FAMILY MEDICINE
Payer: MEDICARE

## 2020-01-13 VITALS
BODY MASS INDEX: 28.08 KG/M2 | DIASTOLIC BLOOD PRESSURE: 77 MMHG | HEART RATE: 84 BPM | SYSTOLIC BLOOD PRESSURE: 157 MMHG | RESPIRATION RATE: 16 BRPM | WEIGHT: 158.5 LBS | HEIGHT: 63 IN

## 2020-01-13 DIAGNOSIS — K22.70 BARRETT'S ESOPHAGUS WITHOUT DYSPLASIA: ICD-10-CM

## 2020-01-13 DIAGNOSIS — Z85.038 HISTORY OF COLON CANCER: Primary | ICD-10-CM

## 2020-01-13 LAB
THRYOGLOBULIN INTERPRETATION: ABNORMAL
THYROGLOB AB SERPL-ACNC: <1.8 IU/ML
THYROGLOB SERPL-MCNC: 17 NG/ML

## 2020-01-13 PROCEDURE — 1101F PR PT FALLS ASSESS DOC 0-1 FALLS W/OUT INJ PAST YR: ICD-10-PCS | Mod: HCNC,CPTII,S$GLB, | Performed by: INTERNAL MEDICINE

## 2020-01-13 PROCEDURE — 1101F PT FALLS ASSESS-DOCD LE1/YR: CPT | Mod: HCNC,CPTII,S$GLB, | Performed by: INTERNAL MEDICINE

## 2020-01-13 PROCEDURE — 99214 PR OFFICE/OUTPT VISIT, EST, LEVL IV, 30-39 MIN: ICD-10-PCS | Mod: HCNC,S$GLB,, | Performed by: INTERNAL MEDICINE

## 2020-01-13 PROCEDURE — 99214 OFFICE O/P EST MOD 30 MIN: CPT | Mod: HCNC,S$GLB,, | Performed by: INTERNAL MEDICINE

## 2020-01-13 PROCEDURE — 3078F PR MOST RECENT DIASTOLIC BLOOD PRESSURE < 80 MM HG: ICD-10-PCS | Mod: HCNC,CPTII,S$GLB, | Performed by: INTERNAL MEDICINE

## 2020-01-13 PROCEDURE — 1159F MED LIST DOCD IN RCRD: CPT | Mod: HCNC,S$GLB,, | Performed by: INTERNAL MEDICINE

## 2020-01-13 PROCEDURE — 3077F PR MOST RECENT SYSTOLIC BLOOD PRESSURE >= 140 MM HG: ICD-10-PCS | Mod: HCNC,CPTII,S$GLB, | Performed by: INTERNAL MEDICINE

## 2020-01-13 PROCEDURE — 1159F PR MEDICATION LIST DOCUMENTED IN MEDICAL RECORD: ICD-10-PCS | Mod: HCNC,S$GLB,, | Performed by: INTERNAL MEDICINE

## 2020-01-13 PROCEDURE — 1125F PR PAIN SEVERITY QUANTIFIED, PAIN PRESENT: ICD-10-PCS | Mod: HCNC,S$GLB,, | Performed by: INTERNAL MEDICINE

## 2020-01-13 PROCEDURE — 1125F AMNT PAIN NOTED PAIN PRSNT: CPT | Mod: HCNC,S$GLB,, | Performed by: INTERNAL MEDICINE

## 2020-01-13 PROCEDURE — 99999 PR PBB SHADOW E&M-EST. PATIENT-LVL III: ICD-10-PCS | Mod: PBBFAC,HCNC,, | Performed by: INTERNAL MEDICINE

## 2020-01-13 PROCEDURE — 3077F SYST BP >= 140 MM HG: CPT | Mod: HCNC,CPTII,S$GLB, | Performed by: INTERNAL MEDICINE

## 2020-01-13 PROCEDURE — 3078F DIAST BP <80 MM HG: CPT | Mod: HCNC,CPTII,S$GLB, | Performed by: INTERNAL MEDICINE

## 2020-01-13 PROCEDURE — 99999 PR PBB SHADOW E&M-EST. PATIENT-LVL III: CPT | Mod: PBBFAC,HCNC,, | Performed by: INTERNAL MEDICINE

## 2020-01-13 NOTE — LETTER
January 13, 2020      Hever Arreola MD  [address]  [place] [address]           [facility]  [address]  [place] [address]  Phone: [phone]          Patient: Kenyetta Andersen   MR Number: [id number]   YOB: 1935   Date of Visit: 1/13/2020       Dear Dr. Hever Arreola:    Thank you for referring Kenyetta Andersen to me for evaluation. Attached you will find relevant portions of my assessment and plan of care.    If you have questions, please do not hesitate to call me. I look forward to following Kenyetta Andersen along with you.    Sincerely,    Timo Darling MD    Enclosure  CC:  No Recipients    If you would like to receive this communication electronically, please contact [email or web address] or [phone] to request more information on [employer] Link access.    For providers and/or their staff who would like to refer a patient to [facility], please contact us through our one-stop-shop provider referral line, [facility], at [phone].    If you feel you have received this communication in error or would no longer like to receive these types of communications, please e-mail [email or web address]

## 2020-01-13 NOTE — PROGRESS NOTES
"CC: colon cancer    HPI 84 y.o. female with GERD, non-dysplastic Escalante's esophagus who presents for evaluation of history of incidental right sided colon cancer (diagnosed 2009) s/p right hemicolectomy and FOLFOX chemotherapy. She states that she has been having right sided lower quadrant abdominal pain. She denies blood in stool, constipation or diarrhea. She is due for colonoscopy. Last colonoscopy performed 3/14/17 found one small polyp and she was recommended repeat colonoscopy in 3 years. She believes the abdominal pain has been present since surgery and could be related to surgery. No changes in abdominal pain with bowel movements. Medical records reviewed. Additional history supplemented by nursing.     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    Anxiety     Arthritis     Blood transfusion     Cataract     Done OU    Colon cancer 2009    Colon polyps 3/14/2017    GERD (gastroesophageal reflux disease)     Glaucoma 2/24/2012    Nuclear sclerosis 8/27/2012    Pseudophakia - Left Eye 2/24/2012     Review of Systems  General ROS: negative for chills, fever or weight loss  Cardiovascular ROS: no chest pain or dyspnea on exertion  Gastrointestinal ROS: +RLQ abdominal pain, change in bowel habits, or black/ bloody stools    Physical Examination  BP (!) 157/77 (BP Location: Right arm, Patient Position: Sitting, BP Method: Large (Automatic))   Pulse 84   Resp 16   Ht 5' 3" (1.6 m)   Wt 71.9 kg (158 lb 8.2 oz)   BMI 28.08 kg/m²   General appearance: alert, cooperative, no distress  HENT: Normocephalic, atraumatic, neck symmetrical, no nasal discharge   Lungs: clear to auscultation bilaterally, symmetric chest wall expansion bilaterally  Heart: regular rate and rhythm without rub; no displacement of the PMI   Abdomen: soft, non-tender; bowel sounds normoactive; no organomegaly  Extremities: extremities symmetric; no clubbing, cyanosis, or edema  Neurologic: Alert and " oriented X 3, normal strength, normal coordination and gait    Labs:  Lab Results   Component Value Date    WBC 8.26 01/08/2020    HGB 13.7 01/08/2020    HCT 42.5 01/08/2020    MCV 99 (H) 01/08/2020     01/08/2020   CEA 2.5    Imaging:  CT Chest/Abd/Pelvis  Right thyroid nodule measures 2.5 cm a mild increase in size and further evaluation of the thyroid is recommended.  Prior granulomatous disease with a single granuloma in the left upper lobe and calcified lymph nodes at both carrillo.  Few granulomas in the spleen.  There is reticulonodular infiltrate identified in the left lower lobe however bronchiectasis is not seen on today's study.    Pneumobilia is noted which may be related to prior sphincteroplasty.  1.8 cm cyst of the right kidney and 1 cm cyst of the left kidney. Atherosclerosis. Mild diverticulosis coli without CT evidence of diverticulitis. Prior partial right colectomy  Independently reviewed    Assessment:   1. History of colon cancer s/p right hemicolectomy and FOLFOX  2. Non-dysplastic Escalante's esophagus    Plan:  -Schedule colonoscopy for surveillance given history of colon cancer  -Further recommendations to follow endoscopy  -Plan repeat EGD in 3-5 years for evaluation of non-dysplastic Escalante's esophagus      Timo Darling MD  [facility]  [address] [facility] [address] [place] [address]  Office: [phone]  Fax: [phone]

## 2020-01-21 ENCOUNTER — ANESTHESIA (OUTPATIENT)
Dept: ENDOSCOPY | Facility: HOSPITAL | Age: 85
End: 2020-01-21
Payer: MEDICARE

## 2020-01-21 ENCOUNTER — HOSPITAL ENCOUNTER (OUTPATIENT)
Facility: HOSPITAL | Age: 85
Discharge: HOME | End: 2020-01-21
Attending: INTERNAL MEDICINE | Admitting: INTERNAL MEDICINE
Payer: MEDICARE

## 2020-01-21 ENCOUNTER — ANESTHESIA EVENT (OUTPATIENT)
Dept: ENDOSCOPY | Facility: HOSPITAL | Age: 85
End: 2020-01-21
Payer: MEDICARE

## 2020-01-21 DIAGNOSIS — Z85.038 HISTORY OF COLON CANCER: Primary | ICD-10-CM

## 2020-01-21 PROCEDURE — 45380 COLONOSCOPY AND BIOPSY: CPT | Mod: 59,HCNC,, | Performed by: INTERNAL MEDICINE

## 2020-01-21 PROCEDURE — 45380 PR COLONOSCOPY,BIOPSY: ICD-10-PCS | Mod: 59,HCNC,, | Performed by: INTERNAL MEDICINE

## 2020-01-21 PROCEDURE — 37000009 HC ANESTHESIA EA ADD 15 MINS: Mod: HCNC | Performed by: INTERNAL MEDICINE

## 2020-01-21 PROCEDURE — 88305 TISSUE EXAM BY PATHOLOGIST: CPT | Mod: HCNC | Performed by: PATHOLOGY

## 2020-01-21 PROCEDURE — 63600175 PHARM REV CODE 636 W HCPCS: Mod: HCNC | Performed by: NURSE ANESTHETIST, CERTIFIED REGISTERED

## 2020-01-21 PROCEDURE — 27201089 HC SNARE, DISP (ANY): Mod: HCNC | Performed by: INTERNAL MEDICINE

## 2020-01-21 PROCEDURE — 37000008 HC ANESTHESIA 1ST 15 MINUTES: Mod: HCNC | Performed by: INTERNAL MEDICINE

## 2020-01-21 PROCEDURE — 45385 COLONOSCOPY W/LESION REMOVAL: CPT | Mod: PT,HCNC,, | Performed by: INTERNAL MEDICINE

## 2020-01-21 PROCEDURE — D9220A PRA ANESTHESIA: Mod: HCNC,PT,CRNA, | Performed by: NURSE ANESTHETIST, CERTIFIED REGISTERED

## 2020-01-21 PROCEDURE — 27201012 HC FORCEPS, HOT/COLD, DISP: Mod: HCNC | Performed by: INTERNAL MEDICINE

## 2020-01-21 PROCEDURE — 45385 COLONOSCOPY W/LESION REMOVAL: CPT | Mod: HCNC | Performed by: INTERNAL MEDICINE

## 2020-01-21 PROCEDURE — 88305 TISSUE EXAM BY PATHOLOGIST: CPT | Mod: 26,HCNC,, | Performed by: PATHOLOGY

## 2020-01-21 PROCEDURE — D9220A PRA ANESTHESIA: ICD-10-PCS | Mod: HCNC,PT,CRNA, | Performed by: NURSE ANESTHETIST, CERTIFIED REGISTERED

## 2020-01-21 PROCEDURE — 45380 COLONOSCOPY AND BIOPSY: CPT | Mod: HCNC | Performed by: INTERNAL MEDICINE

## 2020-01-21 PROCEDURE — 63600175 PHARM REV CODE 636 W HCPCS: Mod: HCNC | Performed by: INTERNAL MEDICINE

## 2020-01-21 PROCEDURE — D9220A PRA ANESTHESIA: ICD-10-PCS | Mod: HCNC,PT,ANES, | Performed by: ANESTHESIOLOGY

## 2020-01-21 PROCEDURE — D9220A PRA ANESTHESIA: Mod: HCNC,PT,ANES, | Performed by: ANESTHESIOLOGY

## 2020-01-21 PROCEDURE — 88305 TISSUE EXAM BY PATHOLOGIST: ICD-10-PCS | Mod: 26,HCNC,, | Performed by: PATHOLOGY

## 2020-01-21 PROCEDURE — 45385 PR COLONOSCOPY,REMV LESN,SNARE: ICD-10-PCS | Mod: PT,HCNC,, | Performed by: INTERNAL MEDICINE

## 2020-01-21 RX ORDER — PROPOFOL 10 MG/ML
VIAL (ML) INTRAVENOUS
Status: DISCONTINUED | OUTPATIENT
Start: 2020-01-21 | End: 2020-01-21

## 2020-01-21 RX ORDER — LIDOCAINE HCL/PF 100 MG/5ML
SYRINGE (ML) INTRAVENOUS
Status: DISCONTINUED | OUTPATIENT
Start: 2020-01-21 | End: 2020-01-21

## 2020-01-21 RX ORDER — SODIUM CHLORIDE 9 MG/ML
INJECTION, SOLUTION INTRAVENOUS CONTINUOUS
Status: DISCONTINUED | OUTPATIENT
Start: 2020-01-21 | End: 2020-01-21 | Stop reason: HOSPADM

## 2020-01-21 RX ADMIN — SODIUM CHLORIDE: 0.9 INJECTION, SOLUTION INTRAVENOUS at 08:01

## 2020-01-21 RX ADMIN — PROPOFOL 50 MG: 10 INJECTION, EMULSION INTRAVENOUS at 08:01

## 2020-01-21 RX ADMIN — PROPOFOL 50 MG: 10 INJECTION, EMULSION INTRAVENOUS at 09:01

## 2020-01-21 RX ADMIN — LIDOCAINE HYDROCHLORIDE 100 MG: 20 INJECTION, SOLUTION INTRAVENOUS at 08:01

## 2020-01-21 NOTE — DISCHARGE INSTRUCTIONS
"Discharge Instructions: After Your Surgery/Procedure  Youve just had surgery. During surgery you were given medicine called anesthesia to keep you relaxed and free of pain. After surgery you may have some pain or nausea. This is common. Here are some tips for feeling better and getting well after surgery.     Stay on schedule with your medication.   Going home  Your doctor or nurse will show you how to take care of yourself when you go home. He or she will also answer your questions. Have an adult family member or friend drive you home.      For your safety we recommend these precaution for the first 24 hours after your procedure:  · Do not drive or use heavy equipment.  · Do not make important decisions or sign legal papers.  · Do not drink alcohol.  · Have someone stay with you, if needed. He or she can watch for problems and help keep you safe.  · Your concentration, balance, coordination, and judgement may be impaired for many hours after anesthesia.  Use caution when ambulating or standing up.     · You may feel weak and "washed out" after anesthesia and surgery.      Subtle residual effects of general anesthesia or sedation with regional / local anesthesia can last more than 24 hours.  Rest for the remainder of the day or longer if your Doctor/Surgeon has advised you to do so.  Although you may feel normal within the first 24 hours, your reflexes and mental ability may be impaired without you realizing it.  You may feel dizzy, lightheaded or sleepy for 24 hours or longer.      Be sure to go to all follow-up visits with your doctor. And rest after your surgery for as long as your doctor tells you to.  Coping with pain  If you have pain after surgery, pain medicine will help you feel better. Take it as told, before pain becomes severe. Also, ask your doctor or pharmacist about other ways to control pain. This might be with heat, ice, or relaxation. And follow any other instructions your surgeon or nurse gives " you.  Tips for taking pain medicine  To get the best relief possible, remember these points:  · Pain medicines can upset your stomach. Taking them with a little food may help.  · Most pain relievers taken by mouth need at least 20 to 30 minutes to start to work.  · Taking medicine on a schedule can help you remember to take it. Try to time your medicine so that you can take it before starting an activity. This might be before you get dressed, go for a walk, or sit down for dinner.  · Constipation is a common side effect of pain medicines. Call your doctor before taking any medicines such as laxatives or stool softeners to help ease constipation. Also ask if you should skip any foods. Drinking lots of fluids and eating foods such as fruits and vegetables that are high in fiber can also help. Remember, do not take laxatives unless your surgeon has prescribed them.  · Drinking alcohol and taking pain medicine can cause dizziness and slow your breathing. It can even be deadly. Do not drink alcohol while taking pain medicine.  · Pain medicine can make you react more slowly to things. Do not drive or run machinery while taking pain medicine.  Your health care provider may tell you to take acetaminophen to help ease your pain. Ask him or her how much you are supposed to take each day. Acetaminophen or other pain relievers may interact with your prescription medicines or other over-the-counter (OTC) drugs. Some prescription medicines have acetaminophen and other ingredients. Using both prescription and OTC acetaminophen for pain can cause you to overdose. Read the labels on your OTC medicines with care. This will help you to clearly know the list of ingredients, how much to take, and any warnings. It may also help you not take too much acetaminophen. If you have questions or do not understand the information, ask your pharmacist or health care provider to explain it to you before you take the OTC medicine.  Managing  nausea  Some people have an upset stomach after surgery. This is often because of anesthesia, pain, or pain medicine, or the stress of surgery. These tips will help you handle nausea and eat healthy foods as you get better. If you were on a special food plan before surgery, ask your doctor if you should follow it while you get better. These tips may help:  · Do not push yourself to eat. Your body will tell you when to eat and how much.  · Start off with clear liquids and soup. They are easier to digest.  · Next try semi-solid foods, such as mashed potatoes, applesauce, and gelatin, as you feel ready.  · Slowly move to solid foods. Dont eat fatty, rich, or spicy foods at first.  · Do not force yourself to have 3 large meals a day. Instead eat smaller amounts more often.  · Take pain medicines with a small amount of solid food, such as crackers or toast, to avoid nausea.     Call your surgeon if  · You still have pain an hour after taking medicine. The medicine may not be strong enough.  · You feel too sleepy, dizzy, or groggy. The medicine may be too strong.  · You have side effects like nausea, vomiting, or skin changes, such as rash, itching, or hives.       If you have obstructive sleep apnea  You were given anesthesia medicine during surgery to keep you comfortable and free of pain. After surgery, you may have more apnea spells because of this medicine and other medicines you were given. The spells may last longer than usual.   At home:  · Keep using the continuous positive airway pressure (CPAP) device when you sleep. Unless your health care provider tells you not to, use it when you sleep, day or night. CPAP is a common device used to treat obstructive sleep apnea.  · Talk with your provider before taking any pain medicine, muscle relaxants, or sedatives. Your provider will tell you about the possible dangers of taking these medicines.  © 9394-5342 [employer]. [address] [place] [address]  [place] [address]. All rights reserved. This information is not intended as a substitute for professional medical care. Always follow your healthcare professional's instructions.      Understanding Colon and Rectal Polyps    The colon (also called the large intestine) is a muscular tube that forms the last part of the digestive tract. It absorbs water and stores food waste. The colon is about 4 to 6 feet long. The rectum is the last 6 inches of the colon. The colon and rectum have a smooth lining composed of millions of cells. Changes in these cells can lead to growths in the colon that can become cancerous and should be removed. Multiple tests are available to screen for colon cancer, but the colonoscopy is the most recommended test. During colonoscopy, these polyps can be removed. How often you need this test depends on many things including your condition, your family history, symptoms, and what the findings were at the previous colonoscopy.   When the colon lining changes  Changes that happen in the cells that line the colon or rectum can lead to growths called polyps. Over a period of years, polyps can turn cancerous. Removing polyps early may prevent cancer from ever forming.  Polyps  Polyps are fleshy clumps of tissue that form on the lining of the colon or rectum. Small polyps are usually benign (not cancerous). However, over time, cells in a polyp can change and become cancerous. Certain types of polyps known as adenomatous polyps are premalignant. The risk for invasive cancer increases with the size of the polyp and certain cell and gene features. This means that they can become cancerous if they're not removed. Hyperplastic polyps are benign. They can grow quite large and not turn cancerous.   Cancer  Almost all colorectal cancers start when polyp cells begin growing abnormally. As a cancerous tumor grows, it may involve more and more of the colon or rectum. In time, cancer can also grow beyond the colon or rectum  and spread to nearby organs or to glands called lymph nodes. The cells can also travel to other parts of the body. This is known as metastasis. The earlier a cancerous tumor is removed, the better the chance of preventing its spread.    Date Last Reviewed: 8/1/2016  © 7611-7403 [employer]. [address] [place] [address], [place] [address]. All rights reserved. This information is not intended as a substitute for professional medical care. Always follow your healthcare professional's instructions.

## 2020-01-21 NOTE — H&P
"CC: colon cancer    HPI 85 y.o. female with GERD, non-dysplastic Escalante's esophagus who presents for evaluation of history of incidental right sided colon cancer (diagnosed 2009) s/p right hemicolectomy and FOLFOX chemotherapy. She states that she has been having right sided lower quadrant abdominal pain. She denies blood in stool, constipation or diarrhea. She is due for colonoscopy. Last colonoscopy performed 3/14/17 found one small polyp and she was recommended repeat colonoscopy in 3 years. She believes the abdominal pain has been present since surgery and could be related to surgery. No changes in abdominal pain with bowel movements. Medical records reviewed. Additional history supplemented by nursing.     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    Anxiety     Arthritis     Blood transfusion     Cataract     Done OU    Colon cancer 2009    Colon polyps 3/14/2017    GERD (gastroesophageal reflux disease)     Glaucoma 2/24/2012    Nuclear sclerosis 8/27/2012    Pseudophakia - Left Eye 2/24/2012     Review of Systems  General ROS: negative for chills, fever or weight loss  Cardiovascular ROS: no chest pain or dyspnea on exertion  Gastrointestinal ROS: +RLQ abdominal pain, change in bowel habits, or black/ bloody stools    Physical Examination  /72 (BP Location: Left arm, Patient Position: Lying)   Pulse 83   Temp 97.7 °F (36.5 °C) (Skin)   Resp 18   Ht 5' 3" (1.6 m)   Wt 69.4 kg (153 lb)   SpO2 98%   BMI 27.10 kg/m²   General appearance: alert, cooperative, no distress  HENT: Normocephalic, atraumatic, neck symmetrical, no nasal discharge   Lungs: clear to auscultation bilaterally, symmetric chest wall expansion bilaterally  Heart: regular rate and rhythm without rub; no displacement of the PMI   Abdomen: soft, non-tender; bowel sounds normoactive; no organomegaly  Extremities: extremities symmetric; no clubbing, cyanosis, or edema  Neurologic: Alert and " oriented X 3, normal strength, normal coordination and gait    Assessment:   1. History of colon cancer s/p right hemicolectomy and FOLFOX    Plan:  -Colonoscopy for surveillance given history of colon cancer      Timo Darling MD  [facility]  [address] [place] [address]  Office: [phone]  Fax: [phone]

## 2020-01-21 NOTE — PROVATION PATIENT INSTRUCTIONS
Discharge Summary/Instructions after an Endoscopic Procedure  Patient Name: Kenyetta Andersen  Patient MRN: [id number]  Patient YOB: 1935 Tuesday, January 21, 2020  Timo Darling MD  RESTRICTIONS:  During your procedure today, you received medications for sedation.  These   medications may affect your judgment, balance and coordination.  Therefore,   for 24 hours, you have the following restrictions:   - DO NOT drive a car, operate machinery, make legal/financial decisions,   sign important papers or drink alcohol.    ACTIVITY:  Today: no heavy lifting, straining or running due to procedural   sedation/anesthesia.  The following day: return to full activity including work.  DIET:  Eat and drink normally unless instructed otherwise.     TREATMENT FOR COMMON SIDE EFFECTS:  - Mild abdominal pain, nausea, belching, bloating or excessive gas:  rest,   eat lightly and use a heating pad.  - Sore Throat: treat with throat lozenges and/or gargle with warm salt   water.  - Because air was used during the procedure, expelling large amounts of air   from your rectum or belching is normal.  - If a bowel prep was taken, you may not have a bowel movement for 1-3 days.    This is normal.  SYMPTOMS TO WATCH FOR AND REPORT TO YOUR PHYSICIAN:  1. Abdominal pain or bloating, other than gas cramps.  2. Chest pain.  3. Back pain.  4. Signs of infection such as: chills or fever occurring within 24 hours   after the procedure.  5. Rectal bleeding, which would show as bright red, maroon, or black stools.   (A tablespoon of blood from the rectum is not serious, especially if   hemorrhoids are present.)  6. Vomiting.  7. Weakness or dizziness.  GO DIRECTLY TO THE NEAREST EMERGENCY ROOM IF YOU HAVE ANY OF THE FOLLOWING:      Difficulty breathing              Chills and/or fever over 101 F   Persistent vomiting and/or vomiting blood   Severe abdominal pain   Severe chest pain   Black, tarry stools   Bleeding- more than one  tablespoon   Any other symptom or condition that you feel may need urgent attention  Your doctor recommends these additional instructions:  If any biopsies were taken, your doctors clinic will contact you in 1 to 2   weeks with any results.  - Discharge patient to home.   - Advance diet as tolerated.   - Continue present medications.   - Await pathology results.   - Repeat colonoscopy in 5 years for surveillance.   - Written discharge instructions were provided to the patient.  For questions, problems or results please call your physician - Timo Darling MD at Work:  [phone].  [facility], EMERGENCY ROOM PHONE NUMBER: [phone]  IF A COMPLICATION OR EMERGENCY SITUATION ARISES AND YOU ARE UNABLE TO REACH   YOUR PHYSICIAN - GO DIRECTLY TO THE EMERGENCY ROOM.  Timo Darling MD  1/21/2020 9:32:19 AM  This report has been verified and signed electronically.  [employer]

## 2020-01-21 NOTE — ANESTHESIA POSTPROCEDURE EVALUATION
Anesthesia Post Evaluation    Patient: Kenyetta Andersen    Procedure(s) Performed: Procedure(s) (LRB):  COLONOSCOPY (N/A)    Final Anesthesia Type: general    Patient location during evaluation: PACU  Patient participation: Yes- Able to Participate  Level of consciousness: awake and alert  Post-procedure vital signs: reviewed and stable  Pain management: adequate  Airway patency: patent    PONV status at discharge: No PONV  Anesthetic complications: no      Cardiovascular status: blood pressure returned to baseline and hemodynamically stable  Respiratory status: unassisted  Hydration status: euvolemic  Follow-up not needed.          Vitals Value Taken Time   /76 1/21/2020  9:25 AM   Temp 36.5 °C (97.7 °F) 1/21/2020  9:25 AM   Pulse 68 1/21/2020  9:25 AM   Resp 18 1/21/2020  9:25 AM   SpO2 100 % 1/21/2020  9:25 AM         No case tracking events are documented in the log.      Pain/Amanda Score: Amanda Score: 9 (1/21/2020  9:28 AM)

## 2020-01-21 NOTE — TRANSFER OF CARE
"Anesthesia Transfer of Care Note    Patient: Kenyetta Andersen    Procedure(s) Performed: Procedure(s) (LRB):  COLONOSCOPY (N/A)    Patient location: PACU    Anesthesia Type: general    Transport from OR: Transported from OR on 2-3 L/min O2 by NC with adequate spontaneous ventilation    Post pain: adequate analgesia    Post assessment: no apparent anesthetic complications and tolerated procedure well    Post vital signs: stable    Level of consciousness: awake, alert and oriented    Nausea/Vomiting: no nausea/vomiting    Complications: none    Transfer of care protocol was followed      Last vitals:   Visit Vitals  BP (!) 155/77   Pulse 70   Temp 36.5 °C (97.7 °F) (Skin)   Resp 18   Ht 5' 3" (1.6 m)   Wt 69.4 kg (153 lb)   SpO2 100%   BMI 27.10 kg/m²     "

## 2020-01-21 NOTE — PLAN OF CARE
Have you had a colonoscopy LESS THAN 3 years ago? yes  * If YES, answer these questions*:    1. Did patient have a prior colonic polyp in a previous surveillance/diagnostic colonoscopy and is 18 years or older on date of encounter? yes    2. Documentation of < 3 year interval since the patients last colonoscopy due to medical reasons (eg., last colonoscopy incomplete, last colonoscopy had inadequate prep, piecemeal removal of adenomas, or last colonoscopy found > 10 adenomas) ?   History of colon cancer      VSS in NAD, denies pain. Pt oriented to perioperative routine, verbalized understanding. Pt resting comfortably awaiting procedure

## 2020-01-22 VITALS
RESPIRATION RATE: 18 BRPM | SYSTOLIC BLOOD PRESSURE: 154 MMHG | HEIGHT: 63 IN | BODY MASS INDEX: 27.11 KG/M2 | HEART RATE: 72 BPM | WEIGHT: 153 LBS | DIASTOLIC BLOOD PRESSURE: 71 MMHG | TEMPERATURE: 98 F | OXYGEN SATURATION: 98 %

## 2020-01-30 LAB
FINAL PATHOLOGIC DIAGNOSIS: NORMAL
GROSS: NORMAL

## 2020-02-10 RX ORDER — RABEPRAZOLE SODIUM 20 MG/1
TABLET, DELAYED RELEASE ORAL
Qty: 180 TABLET | Refills: 1 | Status: SHIPPED | OUTPATIENT
Start: 2020-02-10 | End: 2020-08-05

## 2020-02-21 ENCOUNTER — OFFICE VISIT (OUTPATIENT)
Dept: HOME HEALTH SERVICES | Facility: CLINIC | Age: 85
End: 2020-02-21
Payer: MEDICARE

## 2020-02-21 VITALS
DIASTOLIC BLOOD PRESSURE: 80 MMHG | BODY MASS INDEX: 27.99 KG/M2 | TEMPERATURE: 99 F | HEART RATE: 94 BPM | WEIGHT: 158 LBS | RESPIRATION RATE: 16 BRPM | SYSTOLIC BLOOD PRESSURE: 132 MMHG | OXYGEN SATURATION: 98 %

## 2020-02-21 DIAGNOSIS — I70.0 ABDOMINAL AORTIC ATHEROSCLEROSIS: ICD-10-CM

## 2020-02-21 DIAGNOSIS — Z00.00 ENCOUNTER FOR PREVENTIVE HEALTH EXAMINATION: ICD-10-CM

## 2020-02-21 DIAGNOSIS — M85.89 OSTEOPENIA OF MULTIPLE SITES: ICD-10-CM

## 2020-02-21 DIAGNOSIS — J84.10 CALCIFIED GRANULOMA OF LUNG: ICD-10-CM

## 2020-02-21 DIAGNOSIS — H40.2230 CHRONIC ANGLE-CLOSURE GLAUCOMA OF BOTH EYES, UNSPECIFIED GLAUCOMA STAGE: Primary | ICD-10-CM

## 2020-02-21 DIAGNOSIS — G62.0 CHEMOTHERAPY-INDUCED PERIPHERAL NEUROPATHY: ICD-10-CM

## 2020-02-21 DIAGNOSIS — Z85.038 PERSONAL HISTORY OF COLON CANCER, STAGE III: ICD-10-CM

## 2020-02-21 DIAGNOSIS — F17.200 SMOKER: ICD-10-CM

## 2020-02-21 DIAGNOSIS — F51.01 PRIMARY INSOMNIA: ICD-10-CM

## 2020-02-21 DIAGNOSIS — E55.9 VITAMIN D DEFICIENCY: ICD-10-CM

## 2020-02-21 DIAGNOSIS — N39.3 STRESS INCONTINENCE OF URINE: ICD-10-CM

## 2020-02-21 DIAGNOSIS — K21.9 GASTROESOPHAGEAL REFLUX DISEASE WITHOUT ESOPHAGITIS: ICD-10-CM

## 2020-02-21 DIAGNOSIS — K22.70 BARRETT'S ESOPHAGUS WITHOUT DYSPLASIA: ICD-10-CM

## 2020-02-21 DIAGNOSIS — T45.1X5A CHEMOTHERAPY-INDUCED PERIPHERAL NEUROPATHY: ICD-10-CM

## 2020-02-21 PROCEDURE — 3079F DIAST BP 80-89 MM HG: CPT | Mod: CPTII,S$GLB,, | Performed by: NURSE PRACTITIONER

## 2020-02-21 PROCEDURE — 3079F PR MOST RECENT DIASTOLIC BLOOD PRESSURE 80-89 MM HG: ICD-10-PCS | Mod: CPTII,S$GLB,, | Performed by: NURSE PRACTITIONER

## 2020-02-21 PROCEDURE — 3075F SYST BP GE 130 - 139MM HG: CPT | Mod: CPTII,S$GLB,, | Performed by: NURSE PRACTITIONER

## 2020-02-21 PROCEDURE — 3075F PR MOST RECENT SYSTOLIC BLOOD PRESS GE 130-139MM HG: ICD-10-PCS | Mod: CPTII,S$GLB,, | Performed by: NURSE PRACTITIONER

## 2020-02-21 PROCEDURE — G0439 PPPS, SUBSEQ VISIT: HCPCS | Mod: S$GLB,,, | Performed by: NURSE PRACTITIONER

## 2020-02-21 PROCEDURE — G0439 PR MEDICARE ANNUAL WELLNESS SUBSEQUENT VISIT: ICD-10-PCS | Mod: S$GLB,,, | Performed by: NURSE PRACTITIONER

## 2020-02-21 NOTE — PROGRESS NOTES
Kenyetta Andersen presented for a follow-up Medicare AWV today. The following components were reviewed and updated:    · Medical history  · Family History  · Social history  · Allergies and Current Medications  · Health Risk Assessment  · Health Maintenance  · Care Team    **See Completed Assessments for Annual Wellness visit with in the encounter summary    The following assessments were completed:  · Depression Screening  · Cognitive function Screening  ·   ·   ·   · Timed Get Up Test  · Whisper Test    Vitals:    02/21/20 1406   BP: 132/80   Pulse: 94   Resp: 16   Temp: 98.6 °F (37 °C)   TempSrc: Temporal   SpO2: 98%   Weight: 71.7 kg (158 lb)     Body mass index is 27.99 kg/m².   ]  General appearance: alert, cooperative, no distress  HENT: Normocephalic, atraumatic, neck symmetrical, no nasal discharge   Lungs: clear to auscultation bilaterally, symmetric chest wall expansion bilaterally  Heart: regular rate and rhythm without rub; no displacement of the PMI   Abdomen: soft, non-tender; bowel sounds normoactive; no organomegaly  Extremities: extremities symmetric; no clubbing, cyanosis, or edema  Neurologic: Alert and oriented X 3, normal strength, normal coordination and gait      Diagnoses and health risks identified today and associated recommendations/orders:  1. Encounter for preventive health examination  Assessment performed and preventive measures reviewed with patient.    2. Chronic angle-closure glaucoma of both eyes, unspecified glaucoma stage  Stable. Followed by Ophthalmology.    3. Calcified granuloma of lung  Stable. Followed by PCP.     4. Abdominal aortic atherosclerosis  Stable. Followed by PCP.     5. Personal history of colon cancer, stage III  Stable. Followed by GI.    6. BMI 27.0-27.9,adult  Stable.    7. Gastroesophageal reflux disease without esophagitis  Stable. Followed by GI.     8. Escalante's esophagus without dysplasia  Stable. Followed by GI.     9. Osteopenia of multiple  sites  Stable. Followed by PCP. Last DEXA 2019.    10. Smoker  Active. Encouraged smoking cessation. Discussed effects of tobacco on body systems.    11. Chemotherapy-induced peripheral neuropathy  Active and reports related to chemotherapy for colon cancer in 2009.     12. Vitamin D deficiency  Active. Followed by PCP.     13. Primary insomnia  Stable. Followed by PCP.     14. Stress incontinence of urine  Stable. Followed by PCP.       Provided Kenyetta with a 5-10 year written screening schedule and personal prevention plan. Recommendations were developed using the USPSTF age appropriate recommendations. Education, counseling, and referrals were provided as needed.  After Visit Summary printed and given to patient which includes a list of additional screenings\tests needed.    Follow up in about 1 year (around 2/21/2021).    Consent for treatment obtained from patient on visit today.     Dyana Nicole NP  I offered to discuss end of life issues, including information on how to make advance directives that the patient could use to name someone who would make medical decisions on their behalf if they became too ill to make themselves.    _X_Patient declined  ___Patient is interested, I provided paper work and offered to discuss.

## 2020-02-21 NOTE — PATIENT INSTRUCTIONS
Counseling and Referral of Other Preventative  (Italic type indicates deductible and co-insurance are waived)    Patient Name: Kenyetta Andersen  Today's Date: 2/21/2020    Health Maintenance       Date Due Completion Date    Shingles Vaccine (2 of 3) 01/14/2016 11/19/2015    Lipid Panel 10/17/2021 10/17/2016    DEXA SCAN 02/07/2022 2/7/2019    TETANUS VACCINE 09/13/2022 9/13/2012        No orders of the defined types were placed in this encounter.    The following information is provided to all patients.  This information is to help you find resources for any of the problems found today that may be affecting your health:                Living healthy guide: www.[facility].[place].[facility]      Understanding Diabetes: www.diabetes.org      Eating healthy: www.cdc.gov/healthyweight      [facility] home safety checklist: www.cdc.gov/steadi/patient.html      Agency on Aging: www.goea.[place].[facility]      Alcoholics anonymous (AA): www.aa.org      Physical Activity: www.zulma.nih.gov/wj4nasj      Tobacco use: www.quitwithusla.org     How to Quit Smoking  Smoking is one of the hardest habits to break. About half of all people who have ever smoked have been able to quit. Most people who still smoke want to quit. Here are some of the best ways to stop smoking.    Keep trying  Most smokers make many attempts at quitting before they are successful. Its important not to give up.  Go cold turkey  Most former smokers quit cold turkey (all at once). Trying to cut back gradually doesn't seem to work as well, perhaps because it continues the smoking habit. Also, it is possible to inhale more while smoking fewer cigarettes. This results in the same amount of nicotine in your body.  Get support  Support programs can be a big help, especially for heavy smokers. These groups offer lectures, ways to change behavior, and peer support. Here are some ways to find a support program:  · Free national quitline: 800-QUIT-NOW (852-485-7568).  · [facility]  "quit-smoking programs.  · American Lung Association: (530.742.4830).  · American Cancer Society (911-474-7601).  Support at home is important too. Nonsmokers can offer praise and encouragement. If the smoker in your life finds it hard to quit, encourage them to keep trying.  Over-the-counter medicines  Nicotine replacement therapy may make quitting easier. Certain aids, such as the nicotine patch, gum, and lozenges, are available without a prescription. It is best to use these under a doctors care, though. The skin patch provides a steady supply of nicotine. Nicotine gum and lozenges give temporary bursts of low levels of nicotine. Both methods reduce the craving for cigarettes. Warning: If you have nausea, vomiting, dizziness, weakness, or a fast heartbeat, stop using these products and see your doctor.  Prescription medicines  After reviewing your smoking patterns and past attempts to quit, your doctor may offer a prescription medicine such as bupropion, varenicline, a nicotine inhaler, or nasal spray. Each has advantages and side effects. Your doctor can review these with you.  Health benefits of quitting  The benefits of quitting start right away and keep improving the longer you go without smoking. These benefits occur at any age.  So whether you are 17 or 70, quitting is a good decision. Some of the benefits include:  · 20 minutes: Blood pressure and pulse return to normal.  · 8 hours: Oxygen levels return to normal.  · 2 days: Ability to smell and taste begin to improve as damaged nerves regrow.  · 2 to 3 weeks: Circulation and lung function improve.  · 1 to 9 months: Coughing, congestion, and shortness of breath decrease; tiredness decreases.  · 1 year: Risk of heart attack decreases by half.  · 5 years: Risk of lung cancer decreases by half; risk of stroke becomes the same as a nonsmokers.  For more on how to quit smoking, try these online resources:   · Smokefree.gov  · "Clearing the Air" booklet from " the National Cancer [place]: smokefree.gov/sites/default/files/pdf/clearing-the-air-accessible.pdf  Date Last Reviewed: 3/1/2017  © 8013-9362 [employer]. [address] [place] [address], [place] [address]. All rights reserved. This information is not intended as a substitute for professional medical care. Always follow your healthcare professional's instructions.

## 2020-02-23 PROBLEM — T45.1X5A CHEMOTHERAPY-INDUCED PERIPHERAL NEUROPATHY: Status: ACTIVE | Noted: 2020-02-23

## 2020-02-23 PROBLEM — Z00.00 ENCOUNTER FOR PREVENTIVE HEALTH EXAMINATION: Status: ACTIVE | Noted: 2020-02-23

## 2020-02-23 PROBLEM — F51.01 PRIMARY INSOMNIA: Status: ACTIVE | Noted: 2020-02-23

## 2020-02-23 PROBLEM — N18.30 CKD (CHRONIC KIDNEY DISEASE) STAGE 3, GFR 30-59 ML/MIN: Status: RESOLVED | Noted: 2018-04-04 | Resolved: 2020-02-23

## 2020-02-23 PROBLEM — N39.3 STRESS INCONTINENCE OF URINE: Status: ACTIVE | Noted: 2020-02-23

## 2020-02-23 PROBLEM — E03.9 PRIMARY HYPOTHYROIDISM: Status: ACTIVE | Noted: 2020-02-23

## 2020-02-23 PROBLEM — E55.9 VITAMIN D DEFICIENCY: Status: ACTIVE | Noted: 2020-02-23

## 2020-02-23 PROBLEM — G62.0 CHEMOTHERAPY-INDUCED PERIPHERAL NEUROPATHY: Status: ACTIVE | Noted: 2020-02-23

## 2020-03-09 RX ORDER — ALENDRONATE SODIUM 70 MG/1
70 TABLET ORAL
Qty: 4 TABLET | Refills: 11 | Status: SHIPPED | OUTPATIENT
Start: 2020-03-09 | End: 2021-01-22

## 2020-03-21 RX ORDER — GABAPENTIN 300 MG/1
600 CAPSULE ORAL 3 TIMES DAILY
Qty: 540 CAPSULE | Refills: 1 | Status: SHIPPED | OUTPATIENT
Start: 2020-03-21 | End: 2020-09-08

## 2020-03-23 ENCOUNTER — TELEPHONE (OUTPATIENT)
Dept: HEMATOLOGY/ONCOLOGY | Facility: CLINIC | Age: 85
End: 2020-03-23

## 2020-03-23 NOTE — TELEPHONE ENCOUNTER
----- Message from Trina Ricci sent at 3/23/2020  3:20 PM CDT -----  Due to concerns associated with Covid19 the radiology team is seeking to reschedule outpatient diagnostic exams between 3/21  4/21 that have been ordered prior to 3/19.  Pt above is scheduled for CT on 03/30/20 at [facility].  Please respond to this message if you believe it is safe to postpone this exam and the radiology team will reschedule and update you on the patients response.  If you have concerns that postponement is not safe (urgent or time sensitive diagnostic study), then reply and it will be performed as ordered.   If further discussion needed, please provide a contact number and a Radiologist will reach out to you shortly.

## 2020-03-25 ENCOUNTER — TELEPHONE (OUTPATIENT)
Dept: HEMATOLOGY/ONCOLOGY | Facility: CLINIC | Age: 85
End: 2020-03-25

## 2020-03-25 ENCOUNTER — TELEPHONE (OUTPATIENT)
Dept: RADIOLOGY | Facility: HOSPITAL | Age: 85
End: 2020-03-25

## 2020-03-25 DIAGNOSIS — Z85.038 PERSONAL HISTORY OF COLON CANCER, STAGE III: Primary | ICD-10-CM

## 2020-03-25 DIAGNOSIS — D49.89 NEOPLASM OF ABDOMEN: ICD-10-CM

## 2020-03-25 NOTE — TELEPHONE ENCOUNTER
----- Message from Trina Ricci sent at 3/23/2020  3:20 PM CDT -----  Due to concerns associated with Covid19 the radiology team is seeking to reschedule outpatient diagnostic exams between 3/21  4/21 that have been ordered prior to 3/19.  Pt above is scheduled for CT on 03/30/20 at [facility].  Please respond to this message if you believe it is safe to postpone this exam and the radiology team will reschedule and update you on the patients response.  If you have concerns that postponement is not safe (urgent or time sensitive diagnostic study), then reply and it will be performed as ordered.   If further discussion needed, please provide a contact number and a Radiologist will reach out to you shortly.

## 2020-03-25 NOTE — TELEPHONE ENCOUNTER
Pt apt r/s for 2mths out per Dr. Granado due to covid19 precautions. Informed pt I will schedule apt and mail the reminders to her. Pt verbalized understanding.

## 2020-05-20 ENCOUNTER — HOSPITAL ENCOUNTER (OUTPATIENT)
Dept: RADIOLOGY | Facility: HOSPITAL | Age: 85
Discharge: HOME | End: 2020-05-20
Attending: INTERNAL MEDICINE
Payer: MEDICARE

## 2020-05-20 ENCOUNTER — TELEPHONE (OUTPATIENT)
Dept: HEMATOLOGY/ONCOLOGY | Facility: CLINIC | Age: 85
End: 2020-05-20

## 2020-05-20 DIAGNOSIS — Z85.038 PERSONAL HISTORY OF COLON CANCER, STAGE III: ICD-10-CM

## 2020-05-20 PROCEDURE — 74177 CT CHEST ABDOMEN PELVIS WITH CONTRAST (XPD): ICD-10-PCS | Mod: 26,HCNC,, | Performed by: RADIOLOGY

## 2020-05-20 PROCEDURE — 74177 CT ABD & PELVIS W/CONTRAST: CPT | Mod: TC,HCNC

## 2020-05-20 PROCEDURE — 71260 CT CHEST ABDOMEN PELVIS WITH CONTRAST (XPD): ICD-10-PCS | Mod: 26,HCNC,, | Performed by: RADIOLOGY

## 2020-05-20 PROCEDURE — 74177 CT ABD & PELVIS W/CONTRAST: CPT | Mod: 26,HCNC,, | Performed by: RADIOLOGY

## 2020-05-20 PROCEDURE — 71260 CT THORAX DX C+: CPT | Mod: 26,HCNC,, | Performed by: RADIOLOGY

## 2020-05-20 PROCEDURE — 25500020 PHARM REV CODE 255: Mod: HCNC

## 2020-05-20 RX ADMIN — IOHEXOL 1000 ML: 9 SOLUTION ORAL at 11:05

## 2020-05-20 RX ADMIN — IOHEXOL 100 ML: 350 INJECTION, SOLUTION INTRAVENOUS at 11:05

## 2020-05-20 NOTE — TELEPHONE ENCOUNTER
Pt apt with Dr. Granado r/s to a phone call visit due to covid19 concerns. Pt confirmed apt date/time of apt scheduled and verbalized understanding.

## 2020-05-20 NOTE — TELEPHONE ENCOUNTER
Left message requesting tp call back to discuss 5/27/2020 appt. Attempting to switch to virtual per CDC guidelines.

## 2020-05-20 NOTE — TELEPHONE ENCOUNTER
----- Message from Nannette Rasmussen sent at 5/20/2020  4:41 PM CDT -----  Contact: Pt  Type:  Patient Returning Call    Who Called:  Pt  Who Left Message for Patient:  nurse  Does the patient know what this is regarding?:  appt  Best Call Back Number:  [phone]  Additional Information:  Please Advise ---Thank you

## 2020-05-25 PROBLEM — Z00.00 ENCOUNTER FOR PREVENTIVE HEALTH EXAMINATION: Status: RESOLVED | Noted: 2020-02-23 | Resolved: 2020-05-25

## 2020-05-27 ENCOUNTER — OFFICE VISIT (OUTPATIENT)
Dept: HEMATOLOGY/ONCOLOGY | Facility: CLINIC | Age: 85
End: 2020-05-27
Payer: MEDICARE

## 2020-05-27 DIAGNOSIS — E53.8 B12 DEFICIENCY: Primary | ICD-10-CM

## 2020-05-27 DIAGNOSIS — Z85.038 PERSONAL HISTORY OF COLON CANCER, STAGE III: ICD-10-CM

## 2020-05-27 DIAGNOSIS — E78.1 HYPERTRIGLYCERIDEMIA: ICD-10-CM

## 2020-05-27 DIAGNOSIS — I70.0 ABDOMINAL AORTIC ATHEROSCLEROSIS: ICD-10-CM

## 2020-05-27 DIAGNOSIS — E03.9 PRIMARY HYPOTHYROIDISM: ICD-10-CM

## 2020-05-27 DIAGNOSIS — D53.1 OTHER MEGALOBLASTIC ANEMIAS, NOT ELSEWHERE CLASSIFIED: ICD-10-CM

## 2020-05-27 PROCEDURE — 99443 PR PHYSICIAN TELEPHONE EVALUATION 21-30 MIN: ICD-10-PCS | Mod: HCNC,95,, | Performed by: INTERNAL MEDICINE

## 2020-05-27 PROCEDURE — 1159F MED LIST DOCD IN RCRD: CPT | Mod: HCNC,95,, | Performed by: INTERNAL MEDICINE

## 2020-05-27 PROCEDURE — 99443 PR PHYSICIAN TELEPHONE EVALUATION 21-30 MIN: CPT | Mod: HCNC,95,, | Performed by: INTERNAL MEDICINE

## 2020-05-27 PROCEDURE — 1159F PR MEDICATION LIST DOCUMENTED IN MEDICAL RECORD: ICD-10-PCS | Mod: HCNC,95,, | Performed by: INTERNAL MEDICINE

## 2020-05-27 PROCEDURE — 1101F PR PT FALLS ASSESS DOC 0-1 FALLS W/OUT INJ PAST YR: ICD-10-PCS | Mod: HCNC,CPTII,95, | Performed by: INTERNAL MEDICINE

## 2020-05-27 PROCEDURE — 1101F PT FALLS ASSESS-DOCD LE1/YR: CPT | Mod: HCNC,CPTII,95, | Performed by: INTERNAL MEDICINE

## 2020-05-28 NOTE — PROGRESS NOTES
Subjective:    The patient location is: home  Visit type: phone  Total time spent with patient: 25 minutes  Each patient I provide medical services by telemedicine is:  (1) informed of the relationship between the physician and patient and the respective role of any other health care provider with respect to management of the patient; and (2) notified that he or she may decline to receive medical services by telemedicine and may withdraw from such care at any time.       Patient ID: Kenyetta Andersen is a 85 y.o. female.    Chief Complaint: f/u  HPI:   Kenyetta Andersen,  known to me, The patient has    known macrocytosis. No other issues. The patient was treated with colorectal    carcinoma, FOLFOX therapy for stage III.  During COVID pandemic crisis patient is making phone visit with me for follow-up  Scans have been postponed to once a year because of 2009 diagnosis. Voices no    Complaints.she had macrocytosis, and B!2 for slightly low, she is more compliant using b12 now has htn and is under good control. pcp is Dr. pedraza    REVIEW OF SYSTEMS:     CONSTITUTIONAL: The patient denies any weight change. There is no apparent    change in appetite, fever, night sweats, headaches, fatigue, dizziness, or    weakness.      SKIN: Denies rash, issues with nails, non-healing sores, bleeding, blotching    skin or abnormal bruising. Denies new moles or changes to existing moles.      BREASTS: There is no swelling around breasts or nipple discharge.    EYES: Denies eye pain, blurred vision, swelling, redness or discharge.      ENT AND MOUTH: Denies runny nose, stuffiness, sinus trouble or sores. Denies    nosebleeds. Denies, hoarseness, change in voice or swelling in front of the    neck.      CARDIOVASCULAR: Denies chest pain, discomfort or palpitations. Denies neck    swelling or episodes of passing out.      RESPIRATORY: Denies cough, sputum production, blood in sputum, and denies    shortness of breath.      GI: Denies  trouble swallowing, indigestion, heartburn, abdominal pain, nausea,    vomiting, diarrhea, altered bowel habits, blood in stool, discoloration of    stools, change in nature of stool, bloating, increased abdominal girth.      GENITOURINARY: No discharge. No pelvic pain or lumps. No rash around groin or  lesions. No urinary frequency, hesitation, painful urination or blood in    urine. Denies incontinence. No problems with intercourse.      MUSCULOSKELETAL: Denies neck  pain. Denies weakness in arms or legs,    joint problems or distended inflamed veins in legs. Denies swelling or abnormal  glands.  +occ back pain    NEUROLOGICAL: Denies tingling, numbness, altered mentation changes to nerve    function in the face, weakness to one or both of the body. Denies changes to    gait and denies multiple falls or accidents.      PHYSICAL EXAM:     Wt Readings from Last 3 Encounters:   02/21/20 71.7 kg (158 lb)   01/21/20 69.4 kg (153 lb)   01/13/20 71.9 kg (158 lb 8.2 oz)     Temp Readings from Last 3 Encounters:   02/21/20 98.6 °F (37 °C) (Temporal)   01/21/20 97.7 °F (36.5 °C) (Temporal)   01/10/20 98.7 °F (37.1 °C) (Oral)     BP Readings from Last 3 Encounters:   02/21/20 132/80   01/21/20 (!) 154/71   01/13/20 (!) 157/77     Pulse Readings from Last 3 Encounters:   02/21/20 94   01/21/20 72   01/13/20 84     Patient sounds alert, oriented x3  Patient is coherent and cooperative.speech is normal  There are no audible signs of distress. No congestion or  hoarseness to voice noted No wheezes heard.no respiratory distress or dyspnea detected  No altered mental status or agitation noted  No tangential thinking, or neuro psychiatric distress noted by me during our conversation     Laboratory:     CBC:  Lab Results   Component Value Date    WBC 8.81 05/20/2020    RBC 4.18 05/20/2020    HGB 13.2 05/20/2020    HCT 40.9 05/20/2020    MCV 98 05/20/2020    MCH 31.6 (H) 05/20/2020    MCHC 32.3 05/20/2020    RDW 12.3 05/20/2020      05/20/2020    MPV 9.2 05/20/2020    GRAN 4.5 05/20/2020    GRAN 51.1 05/20/2020    LYMPH 3.2 05/20/2020    LYMPH 36.8 05/20/2020    MONO 0.8 05/20/2020    MONO 9.2 05/20/2020    EOS 0.2 05/20/2020    BASO 0.03 05/20/2020    EOSINOPHIL 2.4 05/20/2020    BASOPHIL 0.3 05/20/2020       BMP: BMP  Lab Results   Component Value Date     05/20/2020    K 4.2 05/20/2020     05/20/2020    CO2 29 05/20/2020    BUN 13 05/20/2020    CREATININE 1.1 05/20/2020    CALCIUM 9.1 05/20/2020    ANIONGAP 7 (L) 05/20/2020    ESTGFRAFRICA 54 (A) 05/20/2020    EGFRNONAA 47 (A) 05/20/2020       LFT:   Lab Results   Component Value Date    ALT 20 05/20/2020    AST 24 05/20/2020    ALKPHOS 70 05/20/2020    BILITOT 0.3 05/20/2020     Lab Results   Component Value Date    ITBAJQNN40 >2000 (H) 05/20/2020     CEA 2.5 May 2020  Impression CT chest abdomen pelvis May 2020      No evidence for metastatic disease in the chest, abdomen, or pelvis.    Chronic fibrotic change at the lung bases, left greater than right.  Evidence for old granulomatous process.    Generalized thickening of the pylorus, for which chronic peptic ulcer disease is a consideration.    Large duodenal diverticulum.  Atherosclerosis.  Enlarged thyroid    Right debra colectomy.  Hysterectomy.     SPEP and IEFE May 2020 within normal range         May 2020 B12 over 2000  Most recent colonoscopy 07011  Assessment/Plan:     colon ca stage III dx 2009.  small 4mm lung nodule has disppeared  1. Cont supplements daily  2. Scans stable cont f/u small area in lung that needs f/u annual4. Macrocytosis resolved with b12 supplements  5.cbc, cmp, cea and ct of abd and pevis and chest  In  then q yr if all stays the same    will ref to ortho for chronic compression fracture     cont with psp for atherosclerosis, lipids, osteopenia  Mild renal insufficiency continue to monitor continue atherosclerosis follow-up ,hypothyroidism, lipid issues, calcified granuloma of the lung with  PCP  COVID isolation, prevention, hand washing, face mask use discussed at length with patient

## 2020-07-14 ENCOUNTER — CLINICAL SUPPORT (OUTPATIENT)
Dept: OPHTHALMOLOGY | Facility: CLINIC | Age: 85
End: 2020-07-14
Payer: MEDICARE

## 2020-07-14 ENCOUNTER — OFFICE VISIT (OUTPATIENT)
Dept: OPTOMETRY | Facility: CLINIC | Age: 85
End: 2020-07-14
Payer: MEDICARE

## 2020-07-14 DIAGNOSIS — H40.1131 PRIMARY OPEN ANGLE GLAUCOMA (POAG) OF BOTH EYES, MILD STAGE: Primary | ICD-10-CM

## 2020-07-14 DIAGNOSIS — H04.123 DRY EYE SYNDROME, BILATERAL: ICD-10-CM

## 2020-07-14 DIAGNOSIS — H52.7 REFRACTIVE ERROR: ICD-10-CM

## 2020-07-14 DIAGNOSIS — Z96.1 PSEUDOPHAKIA: ICD-10-CM

## 2020-07-14 PROCEDURE — 92133 POSTERIOR SEGMENT OCT OPTIC NERVE(OCULAR COHERENCE TOMOGRAPHY) - OU - BOTH EYES: ICD-10-PCS | Mod: HCNC,S$GLB,, | Performed by: OPTOMETRIST

## 2020-07-14 PROCEDURE — 92133 CPTRZD OPH DX IMG PST SGM ON: CPT | Mod: HCNC,S$GLB,, | Performed by: OPTOMETRIST

## 2020-07-14 PROCEDURE — 99499 UNLISTED E&M SERVICE: CPT | Mod: HCNC,S$GLB,, | Performed by: OPTOMETRIST

## 2020-07-14 PROCEDURE — 92083 HUMPHREY VISUAL FIELD - OU - BOTH EYES: ICD-10-PCS | Mod: HCNC,S$GLB,, | Performed by: OPTOMETRIST

## 2020-07-14 PROCEDURE — 92014 PR EYE EXAM, EST PATIENT,COMPREHESV: ICD-10-PCS | Mod: HCNC,S$GLB,, | Performed by: OPTOMETRIST

## 2020-07-14 PROCEDURE — 99999 PR PBB SHADOW E&M-EST. PATIENT-LVL III: ICD-10-PCS | Mod: PBBFAC,HCNC,, | Performed by: OPTOMETRIST

## 2020-07-14 PROCEDURE — 92014 COMPRE OPH EXAM EST PT 1/>: CPT | Mod: HCNC,S$GLB,, | Performed by: OPTOMETRIST

## 2020-07-14 PROCEDURE — 92083 EXTENDED VISUAL FIELD XM: CPT | Mod: HCNC,S$GLB,, | Performed by: OPTOMETRIST

## 2020-07-14 PROCEDURE — 99999 PR PBB SHADOW E&M-EST. PATIENT-LVL III: CPT | Mod: PBBFAC,HCNC,, | Performed by: OPTOMETRIST

## 2020-07-14 PROCEDURE — 99499 RISK ADDL DX/OHS AUDIT: ICD-10-PCS | Mod: HCNC,S$GLB,, | Performed by: OPTOMETRIST

## 2020-07-14 RX ORDER — DORZOLAMIDE HYDROCHLORIDE AND TIMOLOL MALEATE 20; 5 MG/ML; MG/ML
1 SOLUTION/ DROPS OPHTHALMIC 2 TIMES DAILY
Qty: 20 ML | Refills: 1 | Status: SHIPPED | OUTPATIENT
Start: 2020-07-14 | End: 2020-10-28

## 2020-07-14 RX ORDER — BRIMONIDINE TARTRATE 2 MG/ML
1 SOLUTION/ DROPS OPHTHALMIC 3 TIMES DAILY
Qty: 10 ML | Refills: 6 | Status: SHIPPED | OUTPATIENT
Start: 2020-07-14 | End: 2021-02-26

## 2020-07-14 RX ORDER — LATANOPROST 50 UG/ML
1 SOLUTION/ DROPS OPHTHALMIC NIGHTLY
Qty: 3 BOTTLE | Refills: 4 | Status: SHIPPED | OUTPATIENT
Start: 2020-07-14 | End: 2021-08-06 | Stop reason: SDUPTHER

## 2020-07-14 NOTE — PROGRESS NOTES
HPI     4 month IOP check, OCT NERVE & HVF done today. Denies eye pain c/o ou   tearing a lot. Using Latanoprost OU HS,  Brimonidine OU TID & Cosopt OU   BID states compliance.     Last edited by Jumana Cuba on 7/14/2020  8:21 AM. (History)            Assessment /Plan     For exam results, see Encounter Report.    Primary open angle glaucoma (POAG) of both eyes, mild stage  -     latanoprost 0.005 % ophthalmic solution; Place 1 drop into both eyes every evening.  Dispense: 3 Bottle; Refill: 4  -     dorzolamide-timolol 2-0.5% (COSOPT) 22.3-6.8 mg/mL ophthalmic solution; Place 1 drop into both eyes 2 (two) times daily.  Dispense: 20 mL; Refill: 1  -     brimonidine 0.2% (ALPHAGAN) 0.2 % Drop; Place 1 drop into both eyes 3 (three) times daily.  Dispense: 10 mL; Refill: 6  -     Posterior Segment OCT Optic Nerve- Both eyes  -     Gracia Visual Field - OU - Extended - Both Eyes    Pseudophakia    Refractive error    Dry eye syndrome, bilateral      POAG OU, mild stage. Reviewed OCT  HVF results today. iop stable with use of drops, refilled today. Continue latanoprost qPM OU  alphagan tid OU  cosopt bid OU. Return in 4 months for iop check  pachy.       IOP 7/14//20:  OD 16 mmHg    OS 14 mmHg     OCT 7/14/20:  OD borderline G  ONL TS      OS no rnfl thinning     HVF 7/14/20:  OD inferior arcuate defect      OS WNL, nonspecific defects    S/p cataract extraction with good results. Pt happy with current specs, declines refraction. Return as desired for updated spec Rx.    Mild WALLY OU. Discussed ocular affects of dry eyes. Recommend OTC artificial tears two to four times a day in both eyes. Discussed chronicity of WALLY. RTC if symptoms not alleviated by continued use of artificial tears.       RTC in 4 months for iop check.

## 2020-09-08 RX ORDER — GABAPENTIN 300 MG/1
600 CAPSULE ORAL 3 TIMES DAILY
Qty: 540 CAPSULE | Refills: 1 | Status: SHIPPED | OUTPATIENT
Start: 2020-09-08 | End: 2021-01-22

## 2020-09-08 NOTE — PROGRESS NOTES
Refill Routing Note   Medication(s) are not appropriate for processing by [facility] for the following reason(s)   - Outside of Protocol    Medication reconciliation completed: No   Automatic Epic Protocol Generated Data:    Requested Prescriptions   Pending Prescriptions Disp Refills    gabapentin (NEURONTIN) 300 MG capsule [Pharmacy Med Name: GABAPENTIN 300 MG CAPSULE] 540 capsule 1     Sig: TAKE 2 CAPSULES (600 MG TOTAL) BY MOUTH 3 (THREE) TIMES DAILY.       Anticonvulsants Protocol Passed - 9/8/2020 12:36 AM        Passed - Visit with Authorizing provider in past 9 months or upcoming 90 days        Passed - No active pregnancy on record              Appointments     Date Provider   Last Visit   1/7/2020 Hever Arreola MD   Next Visit   Visit date not found Hever Arreola MD   ED visits in past 90 days: 0    Note composed:12:34 PM 09/08/2020

## 2020-09-30 ENCOUNTER — OFFICE VISIT (OUTPATIENT)
Dept: FAMILY MEDICINE | Facility: CLINIC | Age: 85
End: 2020-09-30
Payer: MEDICARE

## 2020-09-30 ENCOUNTER — HOSPITAL ENCOUNTER (OUTPATIENT)
Dept: RADIOLOGY | Facility: CLINIC | Age: 85
Discharge: HOME | End: 2020-09-30
Attending: NURSE PRACTITIONER
Payer: MEDICARE

## 2020-09-30 VITALS
BODY MASS INDEX: 29.92 KG/M2 | OXYGEN SATURATION: 95 % | WEIGHT: 168.88 LBS | SYSTOLIC BLOOD PRESSURE: 148 MMHG | TEMPERATURE: 97 F | HEART RATE: 78 BPM | DIASTOLIC BLOOD PRESSURE: 80 MMHG | HEIGHT: 63 IN

## 2020-09-30 DIAGNOSIS — M25.571 ACUTE RIGHT ANKLE PAIN: ICD-10-CM

## 2020-09-30 DIAGNOSIS — M25.474 BILATERAL SWELLING OF FEET AND ANKLES: Primary | ICD-10-CM

## 2020-09-30 DIAGNOSIS — E66.3 OVERWEIGHT (BMI 25.0-29.9): ICD-10-CM

## 2020-09-30 DIAGNOSIS — M25.471 BILATERAL SWELLING OF FEET AND ANKLES: Primary | ICD-10-CM

## 2020-09-30 DIAGNOSIS — M25.472 BILATERAL SWELLING OF FEET AND ANKLES: Primary | ICD-10-CM

## 2020-09-30 DIAGNOSIS — G47.00 INSOMNIA, UNSPECIFIED TYPE: ICD-10-CM

## 2020-09-30 DIAGNOSIS — N18.30 CKD (CHRONIC KIDNEY DISEASE) STAGE 3, GFR 30-59 ML/MIN: ICD-10-CM

## 2020-09-30 DIAGNOSIS — K21.9 GASTROESOPHAGEAL REFLUX DISEASE WITHOUT ESOPHAGITIS: ICD-10-CM

## 2020-09-30 DIAGNOSIS — M25.475 BILATERAL SWELLING OF FEET AND ANKLES: Primary | ICD-10-CM

## 2020-09-30 PROCEDURE — 1101F PT FALLS ASSESS-DOCD LE1/YR: CPT | Mod: CPTII,S$GLB,, | Performed by: NURSE PRACTITIONER

## 2020-09-30 PROCEDURE — 3077F SYST BP >= 140 MM HG: CPT | Mod: CPTII,S$GLB,, | Performed by: NURSE PRACTITIONER

## 2020-09-30 PROCEDURE — 3077F PR MOST RECENT SYSTOLIC BLOOD PRESSURE >= 140 MM HG: ICD-10-PCS | Mod: CPTII,S$GLB,, | Performed by: NURSE PRACTITIONER

## 2020-09-30 PROCEDURE — 73610 XR ANKLE COMPLETE 3 VIEW RIGHT: ICD-10-PCS | Mod: 26,RT,S$GLB, | Performed by: RADIOLOGY

## 2020-09-30 PROCEDURE — 73610 X-RAY EXAM OF ANKLE: CPT | Mod: 26,RT,S$GLB, | Performed by: RADIOLOGY

## 2020-09-30 PROCEDURE — 99214 OFFICE O/P EST MOD 30 MIN: CPT | Mod: S$GLB,,, | Performed by: NURSE PRACTITIONER

## 2020-09-30 PROCEDURE — 1159F MED LIST DOCD IN RCRD: CPT | Mod: S$GLB,,, | Performed by: NURSE PRACTITIONER

## 2020-09-30 PROCEDURE — 99999 PR PBB SHADOW E&M-EST. PATIENT-LVL IV: ICD-10-PCS | Mod: PBBFAC,HCNC,, | Performed by: NURSE PRACTITIONER

## 2020-09-30 PROCEDURE — 99999 PR PBB SHADOW E&M-EST. PATIENT-LVL IV: CPT | Mod: PBBFAC,HCNC,, | Performed by: NURSE PRACTITIONER

## 2020-09-30 PROCEDURE — 3079F PR MOST RECENT DIASTOLIC BLOOD PRESSURE 80-89 MM HG: ICD-10-PCS | Mod: CPTII,S$GLB,, | Performed by: NURSE PRACTITIONER

## 2020-09-30 PROCEDURE — 3079F DIAST BP 80-89 MM HG: CPT | Mod: CPTII,S$GLB,, | Performed by: NURSE PRACTITIONER

## 2020-09-30 PROCEDURE — 99214 PR OFFICE/OUTPT VISIT, EST, LEVL IV, 30-39 MIN: ICD-10-PCS | Mod: S$GLB,,, | Performed by: NURSE PRACTITIONER

## 2020-09-30 PROCEDURE — 1159F PR MEDICATION LIST DOCUMENTED IN MEDICAL RECORD: ICD-10-PCS | Mod: S$GLB,,, | Performed by: NURSE PRACTITIONER

## 2020-09-30 PROCEDURE — 73610 X-RAY EXAM OF ANKLE: CPT | Mod: TC,HCNC,FY,PO,RT

## 2020-09-30 PROCEDURE — 1101F PR PT FALLS ASSESS DOC 0-1 FALLS W/OUT INJ PAST YR: ICD-10-PCS | Mod: CPTII,S$GLB,, | Performed by: NURSE PRACTITIONER

## 2020-09-30 RX ORDER — A/SINGAPORE/GP1908/2015 IVR-180 (AN A/MICHIGAN/45/2015 (H1N1)PDM09-LIKE VIRUS, A/HONG KONG/4801/2014, NYMC X-263B (H3N2) (AN A/HONG KONG/4801/2014-LIKE VIRUS), AND B/BRISBANE/60/2008, WILD TYPE (A B/BRISBANE/60/2008-LIKE VIRUS) 15; 15; 15 UG/.5ML; UG/.5ML; UG/.5ML
INJECTION, SUSPENSION INTRAMUSCULAR
COMMUNITY
Start: 2020-09-10 | End: 2021-04-19

## 2020-09-30 RX ORDER — TRAZODONE HYDROCHLORIDE 50 MG/1
50 TABLET ORAL NIGHTLY
Qty: 90 TABLET | Refills: 1 | Status: SHIPPED | OUTPATIENT
Start: 2020-09-30 | End: 2021-03-22 | Stop reason: SDUPTHER

## 2020-09-30 NOTE — PROGRESS NOTES
Subjective:       Patient ID: Kenyetta Andersen is a 85 y.o. female.    Chief Complaint: Joint Swelling    Edema  This is a new problem. The current episode started 1 to 4 weeks ago. The problem occurs intermittently. The problem has been waxing and waning. Associated symptoms include arthralgias, congestion and a sore throat. Pertinent negatives include no abdominal pain, chest pain, headaches, neck pain, rash, visual change or weakness. The symptoms are aggravated by twisting. She has tried acetaminophen for the symptoms. The treatment provided no relief.       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 On empty stomach in morning, remain upright for 30 minutes., Disp: 4 tablet, Rfl: 11    ascorbic acid, vitamin C, (VITAMIN C) 500 MG tablet, Take 500 mg by mouth once daily., Disp: , Rfl:     b complex vitamins capsule, Take 1 capsule by mouth once daily., Disp: , Rfl:     brimonidine 0.2% (ALPHAGAN) 0.2 % Drop, Place 1 drop into both eyes 3 (three) times daily., Disp: 10 mL, Rfl: 6    cyanocobalamin (VITAMIN B-12) 1000 MCG tablet, Take 100 mcg by mouth once daily., Disp: , Rfl:     dorzolamide-timolol 2-0.5% (COSOPT) 22.3-6.8 mg/mL ophthalmic solution, Place 1 drop into both eyes 2 (two) times daily., Disp: 20 mL, Rfl: 1    famotidine-calcium carbonate-magnesium hydroxide (PEPCID COMPLETE)  "chewable tablet, Take 1 tablet by mouth daily as needed., Disp: , Rfl:     FLUAD QUAD 2020-21,65Y UP,,PF, 60 mcg (15 mcg x 4)/0.5 mL Syrg, PHARMACY ADMINISTERED, Disp: , Rfl:     gabapentin (NEURONTIN) 300 MG capsule, TAKE 2 CAPSULES (600 MG TOTAL) BY MOUTH 3 (THREE) TIMES DAILY., Disp: 540 capsule, Rfl: 1    latanoprost 0.005 % ophthalmic solution, Place 1 drop into both eyes every evening., Disp: 3 Bottle, Rfl: 4    RABEprazole (ACIPHEX) 20 mg tablet, TAKE 1 TABLET BY MOUTH TWICE A DAY, Disp: 180 tablet, Rfl: 1    traZODone (DESYREL) 50 MG tablet, Take 1 tablet (50 mg total) by mouth every evening., Disp: 90 tablet, Rfl: 1    cetirizine (ZYRTEC) 5 MG tablet, Take 1 tablet (5 mg total) by mouth once daily., Disp: , Rfl: 0    Review of Systems   Constitutional: Negative for unexpected weight change.   HENT: Positive for congestion and sore throat. Negative for trouble swallowing.    Eyes: Negative for visual disturbance.   Respiratory: Negative for shortness of breath.    Cardiovascular: Positive for leg swelling. Negative for chest pain and palpitations.   Gastrointestinal: Negative for abdominal pain and blood in stool.   Genitourinary: Negative for hematuria.   Musculoskeletal: Positive for arthralgias. Negative for neck pain.   Skin: Negative for rash.   Allergic/Immunologic: Negative for immunocompromised state.   Neurological: Negative for weakness and headaches.   Hematological: Does not bruise/bleed easily.   Psychiatric/Behavioral: Negative for agitation. The patient is not nervous/anxious.        Objective:      BP (!) 148/80   Pulse 78   Temp 96.9 °F (36.1 °C)   Ht 5' 3" (1.6 m)   Wt 76.6 kg (168 lb 14 oz)   SpO2 95%   BMI 29.91 kg/m²   Physical Exam  Constitutional:       Appearance: She is well-developed.   Eyes:      Pupils: Pupils are equal, round, and reactive to light.   Neck:      Musculoskeletal: Normal range of motion.   Cardiovascular:      Rate and Rhythm: Normal rate and regular " rhythm.      Heart sounds: Normal heart sounds.   Pulmonary:      Effort: Pulmonary effort is normal.      Breath sounds: Normal breath sounds.   Abdominal:      General: Bowel sounds are normal.      Palpations: Abdomen is soft.   Musculoskeletal: Normal range of motion.      Right lower leg: Edema present.      Left lower leg: Edema present.   Skin:     General: Skin is warm and dry.   Neurological:      Mental Status: She is alert and oriented to person, place, and time.   Psychiatric:         Behavior: Behavior normal.         Thought Content: Thought content normal.         Judgment: Judgment normal.         Assessment:       1. Bilateral swelling of feet and ankles    2. Acute right ankle pain    3. Insomnia, unspecified type    4. 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 Gastroesophageal reflux disease without esophagitis    6. Overweight (BMI 25.0-29.9)        Plan:       Bilateral swelling of feet and ankles  -     US Lower Extremity Veins Bilateral Insufficiency; Future; Expected date: 09/30/2020    Acute right ankle pain  -     X-Ray Ankle Complete Right; Future; Expected date: 09/30/2020    Insomnia, unspecified type  -     traZODone (DESYREL) 50 MG tablet; Take 1 tablet (50 mg total) by mouth every evening.  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  Will continue to monitor q3-6 months and control chronic conditions as optimally as possible to preserve function.  Please avoid or minimize all NSAIDS (ibuprofen, motrin, aleve, indocin, naprosyn) to minimize the risk to your kidneys.  Aspirin in a dose of 325 mg or less a day is likely the safest with regards to kidney function.  If you are able to take aspirin and do not have any bleeding problems or ulcers, this may be your best therapy.  Alternatively, acetaminophen (Tylenol) is the safer than NSAIDs with regards to kidney function.  I would ask you take this as directed on the bottle.  It is best  "to stay under 2 grams a day (4-500 mg tablets a day maximum).    Gastroesophageal reflux disease without esophagitis  Stable, continue medicaiton  Overweight (BMI 25.0-29.9)  Counseled patient on his ideal body weight, health consequences of being obese and current recommendations including weekly exercise and a heart healthy diet.  Current BMI is:Estimated body mass index is 29.91 kg/m² as calculated from the following:    Height as of this encounter: 5' 3" (1.6 m).    Weight as of this encounter: 76.6 kg (168 lb 14 oz)..  Patient is aware that ideal BMI < 25 or Weight in (lb) to have BMI = 25: 140.8.        Time spent with patient: 20 minutes    Patient with be reevaluated in 3 months or sooner prn    Greater than 50% of this visit was spent counseling as described in above documentation:Yes  "

## 2020-09-30 NOTE — PATIENT INSTRUCTIONS
Leg Swelling in Both Legs    Swelling of the feet, ankles, and legs is called edema. It is caused by excess fluid that has collected in the tissues. Extra fluid in the body settles in the lowest part because of gravity. This is why the legs and feet are most affected.  Some of the causes for edema include:  · Disease of the heart like congestive heart failure  · Standing or sitting for long periods of time  · Infection of the feet or legs  · Blood pooling in the veins of your legs (venous insufficiency)  · Dilated veins in your lower leg (varicose veins)  · Garters or other clothing that is tight on your legs. This will cause blood to pool in your legs because the clothing limits blood flow.  · Some medicines such as hormones like birth control pills, some blood pressure medicines like calcium channel blockers (amlodipine) and steroids, some antidepressants like MAO inhibitors and tricyclics  · Menstrual periods that cause you to retain fluids  · Many types of renal disease  · Liver failure or cirrhosis  · Pregnancy, some swelling is normal, but a sudden increase in leg swelling or weight gain can be a sign of a dangerous complication of pregnancy  · Poor nutrition  · Thyroid disease  Medical treatment will depend on what is causing the swelling in your legs. Your healthcare provider may prescribe water pills (diuretics) to get rid of the extra fluid.  Home care  Follow these guidelines when caring for yourself at home:  · Don't wear clothing like garters that is tight on your legs.  · Keep your legs up while lying or sitting.  · If infection, injury, or recent surgery is causing the swelling, stay off your legs as much as possible until symptoms get better.  · If your healthcare provider says that your leg swelling is caused by venous insufficiency or varicose veins, don't sit or  one place for long periods of time. Take breaks and walk about every few hours. Brisk walking is a good exercise. It helps  circulate the blood that has collected in your leg. Talk with your provider about using support stockings to stop daytime leg swelling.  · If your provider says that heart disease is causing your leg swelling, follow a low-salt diet to stop extra fluid from staying in your body. You may also need medicine.  Follow-up care  Follow up with your healthcare provider, or as advised.  When to seek medical advice  Call your healthcare provider right away if any of these occur:  · New shortness of breath or chest pain  · Shortness of breath or chest pain that gets worse  · Swelling in both legs or ankles that gets worse  · Swelling of the abdomen  · Redness, warmth, or swelling in one leg  · Fever of 100.4ºF (38ºC) or higher, or as directed by your healthcare provider  · Yellow color to your skin or eyes  · Rapid, unexplained weight gain  · Having to sleep upright or use an increased number of pillows  Date Last Reviewed: 3/31/2016  © 7205-3785 [employer]. [address] [place] [address], [place] [address]. All rights reserved. This information is not intended as a substitute for professional medical care. Always follow your healthcare professional's instructions.

## 2020-10-01 ENCOUNTER — HOSPITAL ENCOUNTER (OUTPATIENT)
Dept: RADIOLOGY | Facility: HOSPITAL | Age: 85
Discharge: HOME | End: 2020-10-01
Attending: NURSE PRACTITIONER
Payer: MEDICARE

## 2020-10-01 DIAGNOSIS — M25.472 BILATERAL SWELLING OF FEET AND ANKLES: ICD-10-CM

## 2020-10-01 DIAGNOSIS — M25.474 BILATERAL SWELLING OF FEET AND ANKLES: ICD-10-CM

## 2020-10-01 DIAGNOSIS — M25.475 BILATERAL SWELLING OF FEET AND ANKLES: ICD-10-CM

## 2020-10-01 DIAGNOSIS — M25.471 BILATERAL SWELLING OF FEET AND ANKLES: ICD-10-CM

## 2020-10-01 PROCEDURE — 93970 EXTREMITY STUDY: CPT | Mod: 26,,, | Performed by: RADIOLOGY

## 2020-10-01 PROCEDURE — 93970 EXTREMITY STUDY: CPT | Mod: TC

## 2020-10-01 PROCEDURE — 93970 US LOWER EXTREMITY VEINS BILATERAL INSUFFICIENCY: ICD-10-PCS | Mod: 26,,, | Performed by: RADIOLOGY

## 2020-10-02 ENCOUNTER — TELEPHONE (OUTPATIENT)
Dept: FAMILY MEDICINE | Facility: CLINIC | Age: 85
End: 2020-10-02

## 2020-10-02 NOTE — TELEPHONE ENCOUNTER
----- Message from [employer] sent at 10/2/2020  2:54 PM CDT -----  Regarding: returning call  Contact: pt  Type:  Patient Returning Call    Who Called:  pt  Who Left Message for Patient:    Does the patient know what this is regarding?:  results  Best Call Back Number:    Additional Information: pt called back

## 2020-10-02 NOTE — TELEPHONE ENCOUNTER
Spoke to patient in regards to test results. Patient verbalized understanding. Patient has no questions or concerns at this time. Letter printed and mailed to pt per pt's request

## 2020-11-03 ENCOUNTER — OFFICE VISIT (OUTPATIENT)
Dept: FAMILY MEDICINE | Facility: CLINIC | Age: 85
End: 2020-11-03
Payer: MEDICARE

## 2020-11-03 VITALS
RESPIRATION RATE: 18 BRPM | HEART RATE: 90 BPM | DIASTOLIC BLOOD PRESSURE: 74 MMHG | BODY MASS INDEX: 30.66 KG/M2 | WEIGHT: 173.06 LBS | OXYGEN SATURATION: 96 % | HEIGHT: 63 IN | SYSTOLIC BLOOD PRESSURE: 136 MMHG | TEMPERATURE: 98 F

## 2020-11-03 DIAGNOSIS — M25.512 TRIGGER POINT OF LEFT SHOULDER REGION: ICD-10-CM

## 2020-11-03 DIAGNOSIS — M17.10 KNEE ARTHROPATHY: ICD-10-CM

## 2020-11-03 DIAGNOSIS — M54.2 NECK AND SHOULDER PAIN: Primary | ICD-10-CM

## 2020-11-03 DIAGNOSIS — M25.519 NECK AND SHOULDER PAIN: Primary | ICD-10-CM

## 2020-11-03 DIAGNOSIS — Z74.09 MOBILITY IMPAIRED: ICD-10-CM

## 2020-11-03 PROCEDURE — 3078F PR MOST RECENT DIASTOLIC BLOOD PRESSURE < 80 MM HG: ICD-10-PCS | Mod: HCNC,CPTII,S$GLB, | Performed by: PHYSICIAN ASSISTANT

## 2020-11-03 PROCEDURE — 1159F MED LIST DOCD IN RCRD: CPT | Mod: HCNC,S$GLB,, | Performed by: PHYSICIAN ASSISTANT

## 2020-11-03 PROCEDURE — 1125F PR PAIN SEVERITY QUANTIFIED, PAIN PRESENT: ICD-10-PCS | Mod: HCNC,S$GLB,, | Performed by: PHYSICIAN ASSISTANT

## 2020-11-03 PROCEDURE — 1125F AMNT PAIN NOTED PAIN PRSNT: CPT | Mod: HCNC,S$GLB,, | Performed by: PHYSICIAN ASSISTANT

## 2020-11-03 PROCEDURE — 99214 OFFICE O/P EST MOD 30 MIN: CPT | Mod: HCNC,S$GLB,, | Performed by: PHYSICIAN ASSISTANT

## 2020-11-03 PROCEDURE — 99999 PR PBB SHADOW E&M-EST. PATIENT-LVL IV: ICD-10-PCS | Mod: PBBFAC,HCNC,, | Performed by: PHYSICIAN ASSISTANT

## 2020-11-03 PROCEDURE — 1101F PT FALLS ASSESS-DOCD LE1/YR: CPT | Mod: HCNC,CPTII,S$GLB, | Performed by: PHYSICIAN ASSISTANT

## 2020-11-03 PROCEDURE — 3078F DIAST BP <80 MM HG: CPT | Mod: HCNC,CPTII,S$GLB, | Performed by: PHYSICIAN ASSISTANT

## 2020-11-03 PROCEDURE — 99214 PR OFFICE/OUTPT VISIT, EST, LEVL IV, 30-39 MIN: ICD-10-PCS | Mod: HCNC,S$GLB,, | Performed by: PHYSICIAN ASSISTANT

## 2020-11-03 PROCEDURE — 3075F PR MOST RECENT SYSTOLIC BLOOD PRESS GE 130-139MM HG: ICD-10-PCS | Mod: HCNC,CPTII,S$GLB, | Performed by: PHYSICIAN ASSISTANT

## 2020-11-03 PROCEDURE — 1159F PR MEDICATION LIST DOCUMENTED IN MEDICAL RECORD: ICD-10-PCS | Mod: HCNC,S$GLB,, | Performed by: PHYSICIAN ASSISTANT

## 2020-11-03 PROCEDURE — 3075F SYST BP GE 130 - 139MM HG: CPT | Mod: HCNC,CPTII,S$GLB, | Performed by: PHYSICIAN ASSISTANT

## 2020-11-03 PROCEDURE — 1101F PR PT FALLS ASSESS DOC 0-1 FALLS W/OUT INJ PAST YR: ICD-10-PCS | Mod: HCNC,CPTII,S$GLB, | Performed by: PHYSICIAN ASSISTANT

## 2020-11-03 PROCEDURE — 99999 PR PBB SHADOW E&M-EST. PATIENT-LVL IV: CPT | Mod: PBBFAC,HCNC,, | Performed by: PHYSICIAN ASSISTANT
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
## 2020-11-17 ENCOUNTER — TELEPHONE (OUTPATIENT)
Dept: OPTOMETRY | Facility: CLINIC | Age: 85
End: 2020-11-17

## 2020-11-17 NOTE — TELEPHONE ENCOUNTER
RANI on home phone attempted to reach patient on mobile as well no answer full VM. Patient needs to reschedule Dr. Contreras out 11/20.

## 2020-12-04 ENCOUNTER — PES CALL (OUTPATIENT)
Dept: ADMINISTRATIVE | Facility: CLINIC | Age: 85
End: 2020-12-04

## 2020-12-09 ENCOUNTER — OFFICE VISIT (OUTPATIENT)
Dept: OPTOMETRY | Facility: CLINIC | Age: 85
End: 2020-12-09
Payer: MEDICARE

## 2020-12-09 DIAGNOSIS — H40.1131 PRIMARY OPEN ANGLE GLAUCOMA (POAG) OF BOTH EYES, MILD STAGE: Primary | ICD-10-CM

## 2020-12-09 PROCEDURE — 3288F PR FALLS RISK ASSESSMENT DOCUMENTED: ICD-10-PCS | Mod: HCNC,CPTII,S$GLB, | Performed by: OPTOMETRIST

## 2020-12-09 PROCEDURE — 1126F AMNT PAIN NOTED NONE PRSNT: CPT | Mod: HCNC,S$GLB,, | Performed by: OPTOMETRIST

## 2020-12-09 PROCEDURE — 99999 PR PBB SHADOW E&M-EST. PATIENT-LVL III: ICD-10-PCS | Mod: PBBFAC,HCNC,, | Performed by: OPTOMETRIST

## 2020-12-09 PROCEDURE — 1101F PT FALLS ASSESS-DOCD LE1/YR: CPT | Mod: HCNC,CPTII,S$GLB, | Performed by: OPTOMETRIST

## 2020-12-09 PROCEDURE — 99999 PR PBB SHADOW E&M-EST. PATIENT-LVL III: CPT | Mod: PBBFAC,HCNC,, | Performed by: OPTOMETRIST

## 2020-12-09 PROCEDURE — 92012 INTRM OPH EXAM EST PATIENT: CPT | Mod: HCNC,S$GLB,, | Performed by: OPTOMETRIST

## 2020-12-09 PROCEDURE — 92012 PR EYE EXAM, EST PATIENT,INTERMED: ICD-10-PCS | Mod: HCNC,S$GLB,, | Performed by: OPTOMETRIST

## 2020-12-09 PROCEDURE — 1101F PR PT FALLS ASSESS DOC 0-1 FALLS W/OUT INJ PAST YR: ICD-10-PCS | Mod: HCNC,CPTII,S$GLB, | Performed by: OPTOMETRIST

## 2020-12-09 PROCEDURE — 1126F PR PAIN SEVERITY QUANTIFIED, NO PAIN PRESENT: ICD-10-PCS | Mod: HCNC,S$GLB,, | Performed by: OPTOMETRIST

## 2020-12-09 PROCEDURE — 3288F FALL RISK ASSESSMENT DOCD: CPT | Mod: HCNC,CPTII,S$GLB, | Performed by: OPTOMETRIST

## 2020-12-09 NOTE — PROGRESS NOTES
HPI     DLS: 7/14/2020- IOP ck . Denies eye pain, eyes are still watery. Denies   F/F.      Using Latanoprost OU HS,  Brimonidine OU TID & Cosopt OU BID states   compliance.     Last edited by Dave Contreras, OD on 12/9/2020 11:10 AM. (History)            Assessment /Plan     For exam results, see Encounter Report.    Primary open angle glaucoma (POAG) of both eyes, mild stage      iop stable with use of drops. Continue brimonidine tid OU  cosopt bid OU  latanoprost qPM OU. Return in 4 months for iop check, sooner prn.

## 2021-01-05 ENCOUNTER — OFFICE VISIT (OUTPATIENT)
Dept: FAMILY MEDICINE | Facility: CLINIC | Age: 86
End: 2021-01-05
Payer: MEDICARE

## 2021-01-05 VITALS
RESPIRATION RATE: 18 BRPM | BODY MASS INDEX: 32.66 KG/M2 | OXYGEN SATURATION: 97 % | SYSTOLIC BLOOD PRESSURE: 130 MMHG | HEART RATE: 77 BPM | HEIGHT: 63 IN | WEIGHT: 184.31 LBS | DIASTOLIC BLOOD PRESSURE: 82 MMHG | TEMPERATURE: 97 F

## 2021-01-05 DIAGNOSIS — E66.9 OBESITY, CLASS I, BMI 30-34.9: ICD-10-CM

## 2021-01-05 DIAGNOSIS — M25.472 BILATERAL SWELLING OF FEET AND ANKLES: ICD-10-CM

## 2021-01-05 DIAGNOSIS — M25.475 BILATERAL SWELLING OF FEET AND ANKLES: ICD-10-CM

## 2021-01-05 DIAGNOSIS — J06.9 UPPER RESPIRATORY TRACT INFECTION, UNSPECIFIED TYPE: ICD-10-CM

## 2021-01-05 DIAGNOSIS — R05.9 COUGH: Primary | ICD-10-CM

## 2021-01-05 DIAGNOSIS — M25.471 BILATERAL SWELLING OF FEET AND ANKLES: ICD-10-CM

## 2021-01-05 DIAGNOSIS — M77.30 CALCANEAL SPUR, UNSPECIFIED LATERALITY: ICD-10-CM

## 2021-01-05 DIAGNOSIS — M25.474 BILATERAL SWELLING OF FEET AND ANKLES: ICD-10-CM

## 2021-01-05 PROCEDURE — 1125F AMNT PAIN NOTED PAIN PRSNT: CPT | Mod: HCNC,S$GLB,, | Performed by: FAMILY MEDICINE

## 2021-01-05 PROCEDURE — 3075F SYST BP GE 130 - 139MM HG: CPT | Mod: HCNC,CPTII,S$GLB, | Performed by: FAMILY MEDICINE

## 2021-01-05 PROCEDURE — 99214 OFFICE O/P EST MOD 30 MIN: CPT | Mod: 25,HCNC,S$GLB, | Performed by: FAMILY MEDICINE

## 2021-01-05 PROCEDURE — 3075F PR MOST RECENT SYSTOLIC BLOOD PRESS GE 130-139MM HG: ICD-10-PCS | Mod: HCNC,CPTII,S$GLB, | Performed by: FAMILY MEDICINE

## 2021-01-05 PROCEDURE — 1101F PR PT FALLS ASSESS DOC 0-1 FALLS W/OUT INJ PAST YR: ICD-10-PCS | Mod: HCNC,CPTII,S$GLB, | Performed by: FAMILY MEDICINE

## 2021-01-05 PROCEDURE — 3288F FALL RISK ASSESSMENT DOCD: CPT | Mod: HCNC,CPTII,S$GLB, | Performed by: FAMILY MEDICINE

## 2021-01-05 PROCEDURE — 3079F DIAST BP 80-89 MM HG: CPT | Mod: HCNC,CPTII,S$GLB, | Performed by: FAMILY MEDICINE

## 2021-01-05 PROCEDURE — 99214 PR OFFICE/OUTPT VISIT, EST, LEVL IV, 30-39 MIN: ICD-10-PCS | Mod: 25,HCNC,S$GLB, | Performed by: FAMILY MEDICINE

## 2021-01-05 PROCEDURE — 99999 PR PBB SHADOW E&M-EST. PATIENT-LVL V: CPT | Mod: PBBFAC,HCNC,, | Performed by: FAMILY MEDICINE

## 2021-01-05 PROCEDURE — 1159F PR MEDICATION LIST DOCUMENTED IN MEDICAL RECORD: ICD-10-PCS | Mod: HCNC,S$GLB,, | Performed by: FAMILY MEDICINE

## 2021-01-05 PROCEDURE — 1125F PR PAIN SEVERITY QUANTIFIED, PAIN PRESENT: ICD-10-PCS | Mod: HCNC,S$GLB,, | Performed by: FAMILY MEDICINE

## 2021-01-05 PROCEDURE — 94640 AIRWAY INHALATION TREATMENT: CPT | Mod: HCNC,S$GLB,, | Performed by: FAMILY MEDICINE

## 2021-01-05 PROCEDURE — 3288F PR FALLS RISK ASSESSMENT DOCUMENTED: ICD-10-PCS | Mod: HCNC,CPTII,S$GLB, | Performed by: FAMILY MEDICINE

## 2021-01-05 PROCEDURE — 94640 PR INHAL RX, AIRWAY OBST/DX SPUTUM INDUCT: ICD-10-PCS | Mod: HCNC,S$GLB,, | Performed by: FAMILY MEDICINE

## 2021-01-05 PROCEDURE — 1159F MED LIST DOCD IN RCRD: CPT | Mod: HCNC,S$GLB,, | Performed by: FAMILY MEDICINE

## 2021-01-05 PROCEDURE — 1101F PT FALLS ASSESS-DOCD LE1/YR: CPT | Mod: HCNC,CPTII,S$GLB, | Performed by: FAMILY MEDICINE

## 2021-01-05 PROCEDURE — 3079F PR MOST RECENT DIASTOLIC BLOOD PRESSURE 80-89 MM HG: ICD-10-PCS | Mod: HCNC,CPTII,S$GLB, | Performed by: FAMILY MEDICINE

## 2021-01-05 PROCEDURE — 99999 PR PBB SHADOW E&M-EST. PATIENT-LVL V: ICD-10-PCS | Mod: PBBFAC,HCNC,, | Performed by: FAMILY MEDICINE

## 2021-01-05 RX ORDER — IPRATROPIUM BROMIDE AND ALBUTEROL SULFATE 2.5; .5 MG/3ML; MG/3ML
3 SOLUTION RESPIRATORY (INHALATION)
Status: COMPLETED | OUTPATIENT
Start: 2021-01-05 | End: 2021-01-05

## 2021-01-05 RX ORDER — DOXYCYCLINE HYCLATE 100 MG
100 TABLET ORAL 2 TIMES DAILY
Qty: 20 TABLET | Refills: 0 | Status: SHIPPED | OUTPATIENT
Start: 2021-01-05 | End: 2021-01-15

## 2021-01-05 RX ADMIN — IPRATROPIUM BROMIDE AND ALBUTEROL SULFATE 3 ML: 2.5; .5 SOLUTION RESPIRATORY (INHALATION) at 04:01

## 2021-01-11 ENCOUNTER — OFFICE VISIT (OUTPATIENT)
Dept: ENDOCRINOLOGY | Facility: CLINIC | Age: 86
End: 2021-01-11
Attending: FAMILY MEDICINE
Payer: MEDICARE

## 2021-01-11 VITALS
HEIGHT: 63 IN | DIASTOLIC BLOOD PRESSURE: 72 MMHG | BODY MASS INDEX: 32.07 KG/M2 | SYSTOLIC BLOOD PRESSURE: 146 MMHG | WEIGHT: 181 LBS | HEART RATE: 77 BPM | TEMPERATURE: 98 F | OXYGEN SATURATION: 94 %

## 2021-01-11 DIAGNOSIS — R73.03 PRE-DIABETES: ICD-10-CM

## 2021-01-11 DIAGNOSIS — E04.1 THYROID NODULE: Primary | ICD-10-CM

## 2021-01-11 DIAGNOSIS — R60.9 EDEMA, UNSPECIFIED TYPE: ICD-10-CM

## 2021-01-11 DIAGNOSIS — E53.8 VITAMIN B12 DEFICIENCY: ICD-10-CM

## 2021-01-11 DIAGNOSIS — Z78.0 POSTMENOPAUSAL: ICD-10-CM

## 2021-01-11 DIAGNOSIS — Z13.6 ENCOUNTER FOR SCREENING FOR CARDIOVASCULAR DISORDERS: ICD-10-CM

## 2021-01-11 DIAGNOSIS — E55.9 HYPOVITAMINOSIS D: ICD-10-CM

## 2021-01-11 PROCEDURE — 3077F PR MOST RECENT SYSTOLIC BLOOD PRESSURE >= 140 MM HG: ICD-10-PCS | Mod: CPTII,S$GLB,, | Performed by: PHYSICIAN ASSISTANT

## 2021-01-11 PROCEDURE — 99214 OFFICE O/P EST MOD 30 MIN: CPT | Mod: S$GLB,,, | Performed by: PHYSICIAN ASSISTANT

## 2021-01-11 PROCEDURE — 1159F PR MEDICATION LIST DOCUMENTED IN MEDICAL RECORD: ICD-10-PCS | Mod: S$GLB,,, | Performed by: PHYSICIAN ASSISTANT

## 2021-01-11 PROCEDURE — 99214 PR OFFICE/OUTPT VISIT, EST, LEVL IV, 30-39 MIN: ICD-10-PCS | Mod: S$GLB,,, | Performed by: PHYSICIAN ASSISTANT

## 2021-01-11 PROCEDURE — 99999 PR PBB SHADOW E&M-EST. PATIENT-LVL V: ICD-10-PCS | Mod: PBBFAC,,, | Performed by: PHYSICIAN ASSISTANT

## 2021-01-11 PROCEDURE — 99999 PR PBB SHADOW E&M-EST. PATIENT-LVL V: CPT | Mod: PBBFAC,,, | Performed by: PHYSICIAN ASSISTANT

## 2021-01-11 PROCEDURE — 1125F PR PAIN SEVERITY QUANTIFIED, PAIN PRESENT: ICD-10-PCS | Mod: S$GLB,,, | Performed by: PHYSICIAN ASSISTANT

## 2021-01-11 PROCEDURE — 1125F AMNT PAIN NOTED PAIN PRSNT: CPT | Mod: S$GLB,,, | Performed by: PHYSICIAN ASSISTANT

## 2021-01-11 PROCEDURE — 1101F PT FALLS ASSESS-DOCD LE1/YR: CPT | Mod: CPTII,S$GLB,, | Performed by: PHYSICIAN ASSISTANT

## 2021-01-11 PROCEDURE — 3077F SYST BP >= 140 MM HG: CPT | Mod: CPTII,S$GLB,, | Performed by: PHYSICIAN ASSISTANT

## 2021-01-11 PROCEDURE — 1159F MED LIST DOCD IN RCRD: CPT | Mod: S$GLB,,, | Performed by: PHYSICIAN ASSISTANT

## 2021-01-11 PROCEDURE — 3288F FALL RISK ASSESSMENT DOCD: CPT | Mod: CPTII,S$GLB,, | Performed by: PHYSICIAN ASSISTANT

## 2021-01-11 PROCEDURE — 1101F PR PT FALLS ASSESS DOC 0-1 FALLS W/OUT INJ PAST YR: ICD-10-PCS | Mod: CPTII,S$GLB,, | Performed by: PHYSICIAN ASSISTANT

## 2021-01-11 PROCEDURE — 3288F PR FALLS RISK ASSESSMENT DOCUMENTED: ICD-10-PCS | Mod: CPTII,S$GLB,, | Performed by: PHYSICIAN ASSISTANT

## 2021-01-11 PROCEDURE — 3078F DIAST BP <80 MM HG: CPT | Mod: CPTII,S$GLB,, | Performed by: PHYSICIAN ASSISTANT

## 2021-01-11 PROCEDURE — 3078F PR MOST RECENT DIASTOLIC BLOOD PRESSURE < 80 MM HG: ICD-10-PCS | Mod: CPTII,S$GLB,, | Performed by: PHYSICIAN ASSISTANT

## 2021-01-11 RX ORDER — HYDROCHLOROTHIAZIDE 12.5 MG/1
12.5 TABLET ORAL DAILY
Qty: 90 TABLET | Refills: 3 | Status: SHIPPED | OUTPATIENT
Start: 2021-01-11 | End: 2022-02-01

## 2021-01-13 ENCOUNTER — LAB VISIT (OUTPATIENT)
Dept: LAB | Facility: HOSPITAL | Age: 86
End: 2021-01-13
Attending: PHYSICIAN ASSISTANT
Payer: MEDICARE

## 2021-01-13 DIAGNOSIS — E55.9 HYPOVITAMINOSIS D: ICD-10-CM

## 2021-01-13 DIAGNOSIS — E04.1 THYROID NODULE: ICD-10-CM

## 2021-01-13 DIAGNOSIS — Z78.0 POSTMENOPAUSAL: ICD-10-CM

## 2021-01-13 PROCEDURE — 84439 ASSAY OF FREE THYROXINE: CPT

## 2021-01-13 PROCEDURE — 80053 COMPREHEN METABOLIC PANEL: CPT

## 2021-01-13 PROCEDURE — 84443 ASSAY THYROID STIM HORMONE: CPT

## 2021-01-13 PROCEDURE — 36415 COLL VENOUS BLD VENIPUNCTURE: CPT | Mod: PO

## 2021-01-13 PROCEDURE — 82306 VITAMIN D 25 HYDROXY: CPT

## 2021-01-14 LAB
25(OH)D3+25(OH)D2 SERPL-MCNC: 37 NG/ML (ref 30–96)
ALBUMIN SERPL BCP-MCNC: 3.5 G/DL (ref 3.5–5.2)
ALP SERPL-CCNC: 79 U/L (ref 55–135)
ALT SERPL W/O P-5'-P-CCNC: 15 U/L (ref 10–44)
ANION GAP SERPL CALC-SCNC: 11 MMOL/L (ref 8–16)
AST SERPL-CCNC: 23 U/L (ref 10–40)
BILIRUB SERPL-MCNC: 0.4 MG/DL (ref 0.1–1)
BUN SERPL-MCNC: 21 MG/DL (ref 8–23)
CALCIUM SERPL-MCNC: 9.2 MG/DL (ref 8.7–10.5)
CHLORIDE SERPL-SCNC: 106 MMOL/L (ref 95–110)
CO2 SERPL-SCNC: 23 MMOL/L (ref 23–29)
CREAT SERPL-MCNC: 1.1 MG/DL (ref 0.5–1.4)
EST. GFR  (AFRICAN AMERICAN): 53.7 ML/MIN/1.73 M^2
EST. GFR  (NON AFRICAN AMERICAN): 46.5 ML/MIN/1.73 M^2
GLUCOSE SERPL-MCNC: 87 MG/DL (ref 70–110)
POTASSIUM SERPL-SCNC: 4.4 MMOL/L (ref 3.5–5.1)
PROT SERPL-MCNC: 6.8 G/DL (ref 6–8.4)
SODIUM SERPL-SCNC: 140 MMOL/L (ref 136–145)
T4 FREE SERPL-MCNC: 1.01 NG/DL (ref 0.71–1.51)
TSH SERPL DL<=0.005 MIU/L-ACNC: 0.47 UIU/ML (ref 0.4–4)

## 2021-01-26 ENCOUNTER — OFFICE VISIT (OUTPATIENT)
Dept: GASTROENTEROLOGY | Facility: CLINIC | Age: 86
End: 2021-01-26
Payer: MEDICARE

## 2021-01-26 VITALS — BODY MASS INDEX: 31.68 KG/M2 | RESPIRATION RATE: 16 BRPM | HEIGHT: 63 IN | WEIGHT: 178.81 LBS

## 2021-01-26 DIAGNOSIS — Z01.818 PRE-OP TESTING: ICD-10-CM

## 2021-01-26 DIAGNOSIS — R13.10 DYSPHAGIA, UNSPECIFIED TYPE: Primary | ICD-10-CM

## 2021-01-26 PROCEDURE — 1126F AMNT PAIN NOTED NONE PRSNT: CPT | Mod: S$GLB,,, | Performed by: INTERNAL MEDICINE

## 2021-01-26 PROCEDURE — 1159F PR MEDICATION LIST DOCUMENTED IN MEDICAL RECORD: ICD-10-PCS | Mod: S$GLB,,, | Performed by: INTERNAL MEDICINE

## 2021-01-26 PROCEDURE — 99214 OFFICE O/P EST MOD 30 MIN: CPT | Mod: S$GLB,,, | Performed by: INTERNAL MEDICINE

## 2021-01-26 PROCEDURE — 99499 UNLISTED E&M SERVICE: CPT | Mod: HCNC,S$GLB,, | Performed by: INTERNAL MEDICINE

## 2021-01-26 PROCEDURE — 99999 PR PBB SHADOW E&M-EST. PATIENT-LVL III: CPT | Mod: PBBFAC,,, | Performed by: INTERNAL MEDICINE

## 2021-01-26 PROCEDURE — 99214 PR OFFICE/OUTPT VISIT, EST, LEVL IV, 30-39 MIN: ICD-10-PCS | Mod: S$GLB,,, | Performed by: INTERNAL MEDICINE

## 2021-01-26 PROCEDURE — 1159F MED LIST DOCD IN RCRD: CPT | Mod: S$GLB,,, | Performed by: INTERNAL MEDICINE

## 2021-01-26 PROCEDURE — 99499 RISK ADDL DX/OHS AUDIT: ICD-10-PCS | Mod: HCNC,S$GLB,, | Performed by: INTERNAL MEDICINE

## 2021-01-26 PROCEDURE — 1126F PR PAIN SEVERITY QUANTIFIED, NO PAIN PRESENT: ICD-10-PCS | Mod: S$GLB,,, | Performed by: INTERNAL MEDICINE

## 2021-01-26 PROCEDURE — 99999 PR PBB SHADOW E&M-EST. PATIENT-LVL III: ICD-10-PCS | Mod: PBBFAC,,, | Performed by: INTERNAL MEDICINE

## 2021-01-27 ENCOUNTER — OFFICE VISIT (OUTPATIENT)
Dept: PODIATRY | Facility: CLINIC | Age: 86
End: 2021-01-27
Payer: MEDICARE

## 2021-01-27 VITALS — HEART RATE: 70 BPM | BODY MASS INDEX: 31.89 KG/M2 | OXYGEN SATURATION: 97 % | WEIGHT: 180 LBS | HEIGHT: 63 IN

## 2021-01-27 DIAGNOSIS — M79.672 PAIN OF LEFT HEEL: ICD-10-CM

## 2021-01-27 DIAGNOSIS — R60.0 BILATERAL EDEMA OF LOWER EXTREMITY: Primary | ICD-10-CM

## 2021-01-27 DIAGNOSIS — M21.41 PES PLANUS OF BOTH FEET: ICD-10-CM

## 2021-01-27 DIAGNOSIS — M79.671 PAIN OF RIGHT HEEL: ICD-10-CM

## 2021-01-27 DIAGNOSIS — M21.42 PES PLANUS OF BOTH FEET: ICD-10-CM

## 2021-01-27 PROCEDURE — 3288F PR FALLS RISK ASSESSMENT DOCUMENTED: ICD-10-PCS | Mod: CPTII,S$GLB,, | Performed by: PODIATRIST

## 2021-01-27 PROCEDURE — 3288F FALL RISK ASSESSMENT DOCD: CPT | Mod: CPTII,S$GLB,, | Performed by: PODIATRIST

## 2021-01-27 PROCEDURE — 99203 OFFICE O/P NEW LOW 30 MIN: CPT | Mod: S$GLB,,, | Performed by: PODIATRIST

## 2021-01-27 PROCEDURE — 1101F PR PT FALLS ASSESS DOC 0-1 FALLS W/OUT INJ PAST YR: ICD-10-PCS | Mod: CPTII,S$GLB,, | Performed by: PODIATRIST

## 2021-01-27 PROCEDURE — 1159F PR MEDICATION LIST DOCUMENTED IN MEDICAL RECORD: ICD-10-PCS | Mod: S$GLB,,, | Performed by: PODIATRIST

## 2021-01-27 PROCEDURE — 99203 PR OFFICE/OUTPT VISIT, NEW, LEVL III, 30-44 MIN: ICD-10-PCS | Mod: S$GLB,,, | Performed by: PODIATRIST

## 2021-01-27 PROCEDURE — 1125F AMNT PAIN NOTED PAIN PRSNT: CPT | Mod: S$GLB,,, | Performed by: PODIATRIST

## 2021-01-27 PROCEDURE — 1159F MED LIST DOCD IN RCRD: CPT | Mod: S$GLB,,, | Performed by: PODIATRIST

## 2021-01-27 PROCEDURE — 1125F PR PAIN SEVERITY QUANTIFIED, PAIN PRESENT: ICD-10-PCS | Mod: S$GLB,,, | Performed by: PODIATRIST

## 2021-01-27 PROCEDURE — 1101F PT FALLS ASSESS-DOCD LE1/YR: CPT | Mod: CPTII,S$GLB,, | Performed by: PODIATRIST

## 2021-02-02 ENCOUNTER — LAB VISIT (OUTPATIENT)
Dept: PRIMARY CARE CLINIC | Facility: CLINIC | Age: 86
End: 2021-02-02
Payer: MEDICARE

## 2021-02-02 DIAGNOSIS — Z01.818 PRE-OP TESTING: ICD-10-CM

## 2021-02-02 PROCEDURE — U0003 INFECTIOUS AGENT DETECTION BY NUCLEIC ACID (DNA OR RNA); SEVERE ACUTE RESPIRATORY SYNDROME CORONAVIRUS 2 (SARS-COV-2) (CORONAVIRUS DISEASE [COVID-19]), AMPLIFIED PROBE TECHNIQUE, MAKING USE OF HIGH THROUGHPUT TECHNOLOGIES AS DESCRIBED BY CMS-2020-01-R: HCPCS

## 2021-02-03 LAB — SARS-COV-2 RNA RESP QL NAA+PROBE: NOT DETECTED

## 2021-02-05 ENCOUNTER — HOSPITAL ENCOUNTER (OUTPATIENT)
Facility: HOSPITAL | Age: 86
Discharge: HOME | End: 2021-02-05
Attending: INTERNAL MEDICINE | Admitting: INTERNAL MEDICINE
Payer: MEDICARE

## 2021-02-05 ENCOUNTER — ANESTHESIA (OUTPATIENT)
Dept: ENDOSCOPY | Facility: HOSPITAL | Age: 86
End: 2021-02-05
Payer: MEDICARE

## 2021-02-05 ENCOUNTER — ANESTHESIA EVENT (OUTPATIENT)
Dept: ENDOSCOPY | Facility: HOSPITAL | Age: 86
End: 2021-02-05
Payer: MEDICARE

## 2021-02-05 VITALS
RESPIRATION RATE: 16 BRPM | SYSTOLIC BLOOD PRESSURE: 154 MMHG | OXYGEN SATURATION: 96 % | WEIGHT: 178 LBS | HEIGHT: 63 IN | DIASTOLIC BLOOD PRESSURE: 71 MMHG | TEMPERATURE: 98 F | HEART RATE: 84 BPM | BODY MASS INDEX: 31.54 KG/M2

## 2021-02-05 DIAGNOSIS — R13.10 DYSPHAGIA: ICD-10-CM

## 2021-02-05 DIAGNOSIS — K22.70 BARRETT'S ESOPHAGUS WITHOUT DYSPLASIA: Primary | ICD-10-CM

## 2021-02-05 PROCEDURE — 37000009 HC ANESTHESIA EA ADD 15 MINS: Performed by: INTERNAL MEDICINE

## 2021-02-05 PROCEDURE — 88342 CHG IMMUNOCYTOCHEMISTRY: ICD-10-PCS | Mod: 26,,, | Performed by: STUDENT IN AN ORGANIZED HEALTH CARE EDUCATION/TRAINING PROGRAM

## 2021-02-05 PROCEDURE — 88342 IMHCHEM/IMCYTCHM 1ST ANTB: CPT | Mod: 59 | Performed by: STUDENT IN AN ORGANIZED HEALTH CARE EDUCATION/TRAINING PROGRAM

## 2021-02-05 PROCEDURE — C1769 GUIDE WIRE: HCPCS | Performed by: INTERNAL MEDICINE

## 2021-02-05 PROCEDURE — 43248 EGD GUIDE WIRE INSERTION: CPT | Performed by: INTERNAL MEDICINE

## 2021-02-05 PROCEDURE — 25000003 PHARM REV CODE 250: Performed by: INTERNAL MEDICINE

## 2021-02-05 PROCEDURE — 88342 IMHCHEM/IMCYTCHM 1ST ANTB: CPT | Mod: 26,,, | Performed by: STUDENT IN AN ORGANIZED HEALTH CARE EDUCATION/TRAINING PROGRAM

## 2021-02-05 PROCEDURE — 88305 TISSUE EXAM BY PATHOLOGIST: CPT | Mod: 26,,, | Performed by: STUDENT IN AN ORGANIZED HEALTH CARE EDUCATION/TRAINING PROGRAM

## 2021-02-05 PROCEDURE — 88305 TISSUE EXAM BY PATHOLOGIST: CPT | Performed by: STUDENT IN AN ORGANIZED HEALTH CARE EDUCATION/TRAINING PROGRAM

## 2021-02-05 PROCEDURE — 88305 TISSUE EXAM BY PATHOLOGIST: ICD-10-PCS | Mod: 26,,, | Performed by: STUDENT IN AN ORGANIZED HEALTH CARE EDUCATION/TRAINING PROGRAM

## 2021-02-05 PROCEDURE — 43239 EGD BIOPSY SINGLE/MULTIPLE: CPT | Performed by: INTERNAL MEDICINE

## 2021-02-05 PROCEDURE — D9220A PRA ANESTHESIA: ICD-10-PCS | Mod: CRNA,,, | Performed by: REGISTERED NURSE

## 2021-02-05 PROCEDURE — 43239 PR EGD, FLEX, W/BIOPSY, SGL/MULTI: ICD-10-PCS | Mod: 59,,, | Performed by: INTERNAL MEDICINE

## 2021-02-05 PROCEDURE — D9220A PRA ANESTHESIA: ICD-10-PCS | Mod: ANES,,, | Performed by: ANESTHESIOLOGY

## 2021-02-05 PROCEDURE — 43239 EGD BIOPSY SINGLE/MULTIPLE: CPT | Mod: 59,,, | Performed by: INTERNAL MEDICINE

## 2021-02-05 PROCEDURE — 27201012 HC FORCEPS, HOT/COLD, DISP: Performed by: INTERNAL MEDICINE

## 2021-02-05 PROCEDURE — 63600175 PHARM REV CODE 636 W HCPCS: Performed by: REGISTERED NURSE

## 2021-02-05 PROCEDURE — 43248 EGD GUIDE WIRE INSERTION: CPT | Mod: ,,, | Performed by: INTERNAL MEDICINE

## 2021-02-05 PROCEDURE — D9220A PRA ANESTHESIA: Mod: ANES,,, | Performed by: ANESTHESIOLOGY

## 2021-02-05 PROCEDURE — 25000003 PHARM REV CODE 250: Performed by: REGISTERED NURSE

## 2021-02-05 PROCEDURE — 43248 PR EGD, FLEX, W/DILATION OVER GUIDEWIRE: ICD-10-PCS | Mod: ,,, | Performed by: INTERNAL MEDICINE

## 2021-02-05 PROCEDURE — 37000008 HC ANESTHESIA 1ST 15 MINUTES: Performed by: INTERNAL MEDICINE

## 2021-02-05 PROCEDURE — D9220A PRA ANESTHESIA: Mod: CRNA,,, | Performed by: REGISTERED NURSE

## 2021-02-05 RX ORDER — LIDOCAINE HYDROCHLORIDE 20 MG/ML
INJECTION INTRAVENOUS
Status: DISCONTINUED | OUTPATIENT
Start: 2021-02-05 | End: 2021-02-05

## 2021-02-05 RX ORDER — SODIUM CHLORIDE 9 MG/ML
INJECTION, SOLUTION INTRAVENOUS CONTINUOUS
Status: DISCONTINUED | OUTPATIENT
Start: 2021-02-05 | End: 2021-02-05 | Stop reason: HOSPADM

## 2021-02-05 RX ORDER — PROPOFOL 10 MG/ML
VIAL (ML) INTRAVENOUS
Status: DISCONTINUED | OUTPATIENT
Start: 2021-02-05 | End: 2021-02-05

## 2021-02-05 RX ADMIN — GLYCOPYRROLATE 0.2 MG: 0.2 INJECTION, SOLUTION INTRAMUSCULAR; INTRAVITREAL at 10:02

## 2021-02-05 RX ADMIN — SODIUM CHLORIDE: 0.9 INJECTION, SOLUTION INTRAVENOUS at 09:02

## 2021-02-05 RX ADMIN — LIDOCAINE HYDROCHLORIDE 100 MG: 20 INJECTION, SOLUTION INTRAVENOUS at 10:02

## 2021-02-05 RX ADMIN — PROPOFOL 50 MG: 10 INJECTION, EMULSION INTRAVENOUS at 10:02

## 2021-02-05 RX ADMIN — PROPOFOL 30 MG: 10 INJECTION, EMULSION INTRAVENOUS at 10:02

## 2021-02-10 ENCOUNTER — TELEPHONE (OUTPATIENT)
Dept: GASTROENTEROLOGY | Facility: CLINIC | Age: 86
End: 2021-02-10

## 2021-02-10 LAB
COMMENT: NORMAL
FINAL PATHOLOGIC DIAGNOSIS: NORMAL
GROSS: NORMAL
Lab: NORMAL

## 2021-02-11 ENCOUNTER — HOSPITAL ENCOUNTER (OUTPATIENT)
Dept: RADIOLOGY | Facility: HOSPITAL | Age: 86
Discharge: HOME | End: 2021-02-11
Attending: PHYSICIAN ASSISTANT
Payer: MEDICARE

## 2021-02-11 DIAGNOSIS — Z78.0 POSTMENOPAUSAL: ICD-10-CM

## 2021-02-11 DIAGNOSIS — E04.1 THYROID NODULE: ICD-10-CM

## 2021-02-11 PROCEDURE — 77080 DXA BONE DENSITY AXIAL: CPT | Mod: 26,,, | Performed by: RADIOLOGY

## 2021-02-11 PROCEDURE — 77080 DXA BONE DENSITY AXIAL: CPT | Mod: TC

## 2021-02-11 PROCEDURE — 76536 US SOFT TISSUE HEAD NECK THYROID: ICD-10-PCS | Mod: 26,,, | Performed by: RADIOLOGY

## 2021-02-11 PROCEDURE — 76536 US EXAM OF HEAD AND NECK: CPT | Mod: 26,,, | Performed by: RADIOLOGY

## 2021-02-11 PROCEDURE — 77080 DEXA BONE DENSITY SPINE HIP: ICD-10-PCS | Mod: 26,,, | Performed by: RADIOLOGY

## 2021-02-11 PROCEDURE — 76536 US EXAM OF HEAD AND NECK: CPT | Mod: TC

## 2021-03-01 RX ORDER — RABEPRAZOLE SODIUM 20 MG/1
TABLET, DELAYED RELEASE ORAL
Qty: 90 TABLET | Refills: 0 | Status: SHIPPED | OUTPATIENT
Start: 2021-03-01 | End: 2021-04-19

## 2021-03-03 ENCOUNTER — TELEPHONE (OUTPATIENT)
Dept: FAMILY MEDICINE | Facility: CLINIC | Age: 86
End: 2021-03-03

## 2021-03-10 ENCOUNTER — PES CALL (OUTPATIENT)
Dept: ADMINISTRATIVE | Facility: CLINIC | Age: 86
End: 2021-03-10

## 2021-03-12 ENCOUNTER — OFFICE VISIT (OUTPATIENT)
Dept: HOME HEALTH SERVICES | Facility: CLINIC | Age: 86
End: 2021-03-12
Payer: MEDICARE

## 2021-03-12 VITALS
BODY MASS INDEX: 30.48 KG/M2 | HEIGHT: 63 IN | DIASTOLIC BLOOD PRESSURE: 95 MMHG | WEIGHT: 172 LBS | TEMPERATURE: 98 F | SYSTOLIC BLOOD PRESSURE: 138 MMHG | HEART RATE: 92 BPM

## 2021-03-12 DIAGNOSIS — K21.9 GASTROESOPHAGEAL REFLUX DISEASE WITHOUT ESOPHAGITIS: ICD-10-CM

## 2021-03-12 DIAGNOSIS — F51.01 PRIMARY INSOMNIA: ICD-10-CM

## 2021-03-12 DIAGNOSIS — T45.1X5A CHEMOTHERAPY-INDUCED PERIPHERAL NEUROPATHY: ICD-10-CM

## 2021-03-12 DIAGNOSIS — I70.0 ABDOMINAL AORTIC ATHEROSCLEROSIS: ICD-10-CM

## 2021-03-12 DIAGNOSIS — J84.10 CALCIFIED GRANULOMA OF LUNG: ICD-10-CM

## 2021-03-12 DIAGNOSIS — M85.89 OSTEOPENIA OF MULTIPLE SITES: ICD-10-CM

## 2021-03-12 DIAGNOSIS — G62.0 CHEMOTHERAPY-INDUCED PERIPHERAL NEUROPATHY: ICD-10-CM

## 2021-03-12 DIAGNOSIS — H40.2230 CHRONIC ANGLE-CLOSURE GLAUCOMA OF BOTH EYES, UNSPECIFIED GLAUCOMA STAGE: ICD-10-CM

## 2021-03-12 DIAGNOSIS — Z00.00 ENCOUNTER FOR PREVENTIVE HEALTH EXAMINATION: Primary | ICD-10-CM

## 2021-03-12 DIAGNOSIS — N18.30 STAGE 3 CHRONIC KIDNEY DISEASE, UNSPECIFIED WHETHER STAGE 3A OR 3B CKD: ICD-10-CM

## 2021-03-12 DIAGNOSIS — F17.200 SMOKER: ICD-10-CM

## 2021-03-12 PROCEDURE — 3075F PR MOST RECENT SYSTOLIC BLOOD PRESS GE 130-139MM HG: ICD-10-PCS | Mod: CPTII,S$GLB,, | Performed by: NURSE PRACTITIONER

## 2021-03-12 PROCEDURE — 1101F PR PT FALLS ASSESS DOC 0-1 FALLS W/OUT INJ PAST YR: ICD-10-PCS | Mod: CPTII,S$GLB,, | Performed by: NURSE PRACTITIONER

## 2021-03-12 PROCEDURE — 1101F PT FALLS ASSESS-DOCD LE1/YR: CPT | Mod: CPTII,S$GLB,, | Performed by: NURSE PRACTITIONER

## 2021-03-12 PROCEDURE — 3080F DIAST BP >= 90 MM HG: CPT | Mod: CPTII,S$GLB,, | Performed by: NURSE PRACTITIONER

## 2021-03-12 PROCEDURE — 3288F PR FALLS RISK ASSESSMENT DOCUMENTED: ICD-10-PCS | Mod: CPTII,S$GLB,, | Performed by: NURSE PRACTITIONER

## 2021-03-12 PROCEDURE — G0439 PPPS, SUBSEQ VISIT: HCPCS | Mod: S$GLB,,, | Performed by: NURSE PRACTITIONER

## 2021-03-12 PROCEDURE — 3075F SYST BP GE 130 - 139MM HG: CPT | Mod: CPTII,S$GLB,, | Performed by: NURSE PRACTITIONER

## 2021-03-12 PROCEDURE — 3080F PR MOST RECENT DIASTOLIC BLOOD PRESSURE >= 90 MM HG: ICD-10-PCS | Mod: CPTII,S$GLB,, | Performed by: NURSE PRACTITIONER

## 2021-03-12 PROCEDURE — 3288F FALL RISK ASSESSMENT DOCD: CPT | Mod: CPTII,S$GLB,, | Performed by: NURSE PRACTITIONER

## 2021-03-12 PROCEDURE — G0439 PR MEDICARE ANNUAL WELLNESS SUBSEQUENT VISIT: ICD-10-PCS | Mod: S$GLB,,, | Performed by: NURSE PRACTITIONER

## 2021-03-22 DIAGNOSIS — G47.00 INSOMNIA, UNSPECIFIED TYPE: ICD-10-CM

## 2021-03-22 RX ORDER — TRAZODONE HYDROCHLORIDE 50 MG/1
50 TABLET ORAL NIGHTLY
Qty: 90 TABLET | Refills: 0 | Status: SHIPPED | OUTPATIENT
Start: 2021-03-22 | End: 2021-05-17

## 2021-04-09 ENCOUNTER — OFFICE VISIT (OUTPATIENT)
Dept: OPTOMETRY | Facility: CLINIC | Age: 86
End: 2021-04-09
Payer: MEDICARE

## 2021-04-09 DIAGNOSIS — H40.1131 PRIMARY OPEN ANGLE GLAUCOMA (POAG) OF BOTH EYES, MILD STAGE: Primary | ICD-10-CM

## 2021-04-09 PROCEDURE — 92012 PR EYE EXAM, EST PATIENT,INTERMED: ICD-10-PCS | Mod: HCNC,S$GLB,, | Performed by: OPTOMETRIST

## 2021-04-09 PROCEDURE — 3288F PR FALLS RISK ASSESSMENT DOCUMENTED: ICD-10-PCS | Mod: HCNC,CPTII,S$GLB, | Performed by: OPTOMETRIST

## 2021-04-09 PROCEDURE — 3288F FALL RISK ASSESSMENT DOCD: CPT | Mod: HCNC,CPTII,S$GLB, | Performed by: OPTOMETRIST

## 2021-04-09 PROCEDURE — 1101F PT FALLS ASSESS-DOCD LE1/YR: CPT | Mod: HCNC,CPTII,S$GLB, | Performed by: OPTOMETRIST

## 2021-04-09 PROCEDURE — 99999 PR PBB SHADOW E&M-EST. PATIENT-LVL III: CPT | Mod: PBBFAC,HCNC,, | Performed by: OPTOMETRIST

## 2021-04-09 PROCEDURE — 1126F PR PAIN SEVERITY QUANTIFIED, NO PAIN PRESENT: ICD-10-PCS | Mod: HCNC,S$GLB,, | Performed by: OPTOMETRIST

## 2021-04-09 PROCEDURE — 99999 PR PBB SHADOW E&M-EST. PATIENT-LVL III: ICD-10-PCS | Mod: PBBFAC,HCNC,, | Performed by: OPTOMETRIST

## 2021-04-09 PROCEDURE — 1126F AMNT PAIN NOTED NONE PRSNT: CPT | Mod: HCNC,S$GLB,, | Performed by: OPTOMETRIST

## 2021-04-09 PROCEDURE — 92012 INTRM OPH EXAM EST PATIENT: CPT | Mod: HCNC,S$GLB,, | Performed by: OPTOMETRIST

## 2021-04-09 PROCEDURE — 1101F PR PT FALLS ASSESS DOC 0-1 FALLS W/OUT INJ PAST YR: ICD-10-PCS | Mod: HCNC,CPTII,S$GLB, | Performed by: OPTOMETRIST

## 2021-04-19 ENCOUNTER — OFFICE VISIT (OUTPATIENT)
Dept: FAMILY MEDICINE | Facility: CLINIC | Age: 86
End: 2021-04-19
Attending: FAMILY MEDICINE
Payer: MEDICARE

## 2021-04-19 VITALS
WEIGHT: 169.31 LBS | HEIGHT: 63 IN | TEMPERATURE: 99 F | SYSTOLIC BLOOD PRESSURE: 130 MMHG | BODY MASS INDEX: 30 KG/M2 | OXYGEN SATURATION: 95 % | HEART RATE: 86 BPM | DIASTOLIC BLOOD PRESSURE: 72 MMHG

## 2021-04-19 DIAGNOSIS — E03.9 PRIMARY HYPOTHYROIDISM: ICD-10-CM

## 2021-04-19 DIAGNOSIS — N18.30 STAGE 3 CHRONIC KIDNEY DISEASE, UNSPECIFIED WHETHER STAGE 3A OR 3B CKD: ICD-10-CM

## 2021-04-19 DIAGNOSIS — E53.8 B12 DEFICIENCY: ICD-10-CM

## 2021-04-19 DIAGNOSIS — K22.70 BARRETT'S ESOPHAGUS WITHOUT DYSPLASIA: ICD-10-CM

## 2021-04-19 DIAGNOSIS — K21.9 GASTROESOPHAGEAL REFLUX DISEASE WITHOUT ESOPHAGITIS: ICD-10-CM

## 2021-04-19 DIAGNOSIS — H40.2230 CHRONIC ANGLE-CLOSURE GLAUCOMA OF BOTH EYES, UNSPECIFIED GLAUCOMA STAGE: ICD-10-CM

## 2021-04-19 DIAGNOSIS — G62.0 CHEMOTHERAPY-INDUCED PERIPHERAL NEUROPATHY: ICD-10-CM

## 2021-04-19 DIAGNOSIS — Z00.00 ENCOUNTER FOR PREVENTIVE HEALTH EXAMINATION: Primary | ICD-10-CM

## 2021-04-19 DIAGNOSIS — Z85.038 PERSONAL HISTORY OF COLON CANCER, STAGE III: ICD-10-CM

## 2021-04-19 DIAGNOSIS — T45.1X5A CHEMOTHERAPY-INDUCED PERIPHERAL NEUROPATHY: ICD-10-CM

## 2021-04-19 DIAGNOSIS — E55.9 VITAMIN D DEFICIENCY: ICD-10-CM

## 2021-04-19 DIAGNOSIS — M85.89 OSTEOPENIA OF MULTIPLE SITES: ICD-10-CM

## 2021-04-19 DIAGNOSIS — E78.1 HYPERTRIGLYCERIDEMIA: ICD-10-CM

## 2021-04-19 PROCEDURE — 1125F AMNT PAIN NOTED PAIN PRSNT: CPT | Mod: HCNC,S$GLB,, | Performed by: FAMILY MEDICINE

## 2021-04-19 PROCEDURE — 99999 PR PBB SHADOW E&M-EST. PATIENT-LVL IV: CPT | Mod: PBBFAC,HCNC,, | Performed by: FAMILY MEDICINE

## 2021-04-19 PROCEDURE — 3288F FALL RISK ASSESSMENT DOCD: CPT | Mod: HCNC,CPTII,S$GLB, | Performed by: FAMILY MEDICINE

## 2021-04-19 PROCEDURE — 99499 UNLISTED E&M SERVICE: CPT | Mod: HCNC,S$GLB,, | Performed by: FAMILY MEDICINE

## 2021-04-19 PROCEDURE — 3288F PR FALLS RISK ASSESSMENT DOCUMENTED: ICD-10-PCS | Mod: HCNC,CPTII,S$GLB, | Performed by: FAMILY MEDICINE

## 2021-04-19 PROCEDURE — 1101F PR PT FALLS ASSESS DOC 0-1 FALLS W/OUT INJ PAST YR: ICD-10-PCS | Mod: HCNC,CPTII,S$GLB, | Performed by: FAMILY MEDICINE

## 2021-04-19 PROCEDURE — 99499 RISK ADDL DX/OHS AUDIT: ICD-10-PCS | Mod: HCNC,S$GLB,, | Performed by: FAMILY MEDICINE

## 2021-04-19 PROCEDURE — 99397 PER PM REEVAL EST PAT 65+ YR: CPT | Mod: HCNC,S$GLB,, | Performed by: FAMILY MEDICINE

## 2021-04-19 PROCEDURE — 1101F PT FALLS ASSESS-DOCD LE1/YR: CPT | Mod: HCNC,CPTII,S$GLB, | Performed by: FAMILY MEDICINE

## 2021-04-19 PROCEDURE — 99397 PR PREVENTIVE VISIT,EST,65 & OVER: ICD-10-PCS | Mod: HCNC,S$GLB,, | Performed by: FAMILY MEDICINE

## 2021-04-19 PROCEDURE — 99999 PR PBB SHADOW E&M-EST. PATIENT-LVL IV: ICD-10-PCS | Mod: PBBFAC,HCNC,, | Performed by: FAMILY MEDICINE

## 2021-04-19 PROCEDURE — 1125F PR PAIN SEVERITY QUANTIFIED, PAIN PRESENT: ICD-10-PCS | Mod: HCNC,S$GLB,, | Performed by: FAMILY MEDICINE

## 2021-04-19 RX ORDER — RABEPRAZOLE SODIUM 20 MG/1
40 TABLET, DELAYED RELEASE ORAL DAILY
Qty: 90 TABLET | Refills: 0
Start: 2021-04-19 | End: 2021-05-14

## 2021-05-11 ENCOUNTER — TELEPHONE (OUTPATIENT)
Dept: HEMATOLOGY/ONCOLOGY | Facility: CLINIC | Age: 86
End: 2021-05-11

## 2021-05-12 DIAGNOSIS — G47.00 INSOMNIA, UNSPECIFIED TYPE: ICD-10-CM

## 2021-05-14 RX ORDER — RABEPRAZOLE SODIUM 20 MG/1
TABLET, DELAYED RELEASE ORAL
Qty: 90 TABLET | Refills: 3 | Status: ON HOLD | OUTPATIENT
Start: 2021-05-14 | End: 2021-11-11 | Stop reason: SDUPTHER

## 2021-05-17 RX ORDER — TRAZODONE HYDROCHLORIDE 50 MG/1
50 TABLET ORAL NIGHTLY
Qty: 90 TABLET | Refills: 1 | Status: SHIPPED | OUTPATIENT
Start: 2021-05-17 | End: 2022-06-21 | Stop reason: ALTCHOICE

## 2021-05-24 ENCOUNTER — HOSPITAL ENCOUNTER (OUTPATIENT)
Dept: RADIOLOGY | Facility: HOSPITAL | Age: 86
Discharge: HOME | End: 2021-05-24
Attending: INTERNAL MEDICINE
Payer: MEDICARE

## 2021-05-24 DIAGNOSIS — Z85.038 PERSONAL HISTORY OF COLON CANCER, STAGE III: ICD-10-CM

## 2021-05-24 DIAGNOSIS — E78.1 HYPERTRIGLYCERIDEMIA: ICD-10-CM

## 2021-05-24 DIAGNOSIS — I70.0 ABDOMINAL AORTIC ATHEROSCLEROSIS: ICD-10-CM

## 2021-05-24 DIAGNOSIS — E03.9 PRIMARY HYPOTHYROIDISM: ICD-10-CM

## 2021-05-24 PROCEDURE — A9698 NON-RAD CONTRAST MATERIALNOC: HCPCS | Mod: HCNC

## 2021-05-24 PROCEDURE — 74177 CT ABD & PELVIS W/CONTRAST: CPT | Mod: 26,HCNC,, | Performed by: RADIOLOGY

## 2021-05-24 PROCEDURE — 25500020 PHARM REV CODE 255: Mod: HCNC

## 2021-05-24 PROCEDURE — 71260 CT CHEST ABDOMEN PELVIS WITH CONTRAST (XPD): ICD-10-PCS | Mod: 26,HCNC,, | Performed by: RADIOLOGY

## 2021-05-24 PROCEDURE — 71260 CT THORAX DX C+: CPT | Mod: 26,HCNC,, | Performed by: RADIOLOGY

## 2021-05-24 PROCEDURE — 74177 CT CHEST ABDOMEN PELVIS WITH CONTRAST (XPD): ICD-10-PCS | Mod: 26,HCNC,, | Performed by: RADIOLOGY

## 2021-05-24 PROCEDURE — 74177 CT ABD & PELVIS W/CONTRAST: CPT | Mod: TC,HCNC

## 2021-05-24 RX ADMIN — IOHEXOL 1000 ML: 9 SOLUTION ORAL at 10:05

## 2021-05-24 RX ADMIN — IOHEXOL 75 ML: 350 INJECTION, SOLUTION INTRAVENOUS at 10:05

## 2021-05-26 ENCOUNTER — TELEPHONE (OUTPATIENT)
Dept: HEMATOLOGY/ONCOLOGY | Facility: CLINIC | Age: 86
End: 2021-05-26

## 2021-05-26 ENCOUNTER — OFFICE VISIT (OUTPATIENT)
Dept: HEMATOLOGY/ONCOLOGY | Facility: CLINIC | Age: 86
End: 2021-05-26
Payer: MEDICARE

## 2021-05-26 ENCOUNTER — TELEPHONE (OUTPATIENT)
Dept: GASTROENTEROLOGY | Facility: CLINIC | Age: 86
End: 2021-05-26

## 2021-05-26 VITALS
BODY MASS INDEX: 30.39 KG/M2 | HEART RATE: 74 BPM | SYSTOLIC BLOOD PRESSURE: 160 MMHG | DIASTOLIC BLOOD PRESSURE: 79 MMHG | HEIGHT: 63 IN | WEIGHT: 171.5 LBS | OXYGEN SATURATION: 96 % | TEMPERATURE: 98 F | RESPIRATION RATE: 16 BRPM

## 2021-05-26 DIAGNOSIS — Z85.038 HISTORY OF COLON CANCER: ICD-10-CM

## 2021-05-26 DIAGNOSIS — G62.0 CHEMOTHERAPY-INDUCED PERIPHERAL NEUROPATHY: ICD-10-CM

## 2021-05-26 DIAGNOSIS — Z85.038 PERSONAL HISTORY OF COLON CANCER, STAGE III: ICD-10-CM

## 2021-05-26 DIAGNOSIS — T45.1X5A CHEMOTHERAPY-INDUCED PERIPHERAL NEUROPATHY: ICD-10-CM

## 2021-05-26 DIAGNOSIS — E78.1 HYPERTRIGLYCERIDEMIA: ICD-10-CM

## 2021-05-26 DIAGNOSIS — E03.9 PRIMARY HYPOTHYROIDISM: ICD-10-CM

## 2021-05-26 DIAGNOSIS — E53.8 B12 DEFICIENCY: Primary | ICD-10-CM

## 2021-05-26 PROCEDURE — 99214 OFFICE O/P EST MOD 30 MIN: CPT | Mod: HCNC,S$GLB,, | Performed by: INTERNAL MEDICINE

## 2021-05-26 PROCEDURE — 1159F MED LIST DOCD IN RCRD: CPT | Mod: HCNC,S$GLB,, | Performed by: INTERNAL MEDICINE

## 2021-05-26 PROCEDURE — 99999 PR PBB SHADOW E&M-EST. PATIENT-LVL V: CPT | Mod: PBBFAC,HCNC,, | Performed by: INTERNAL MEDICINE

## 2021-05-26 PROCEDURE — 3288F FALL RISK ASSESSMENT DOCD: CPT | Mod: HCNC,CPTII,S$GLB, | Performed by: INTERNAL MEDICINE

## 2021-05-26 PROCEDURE — 99499 RISK ADDL DX/OHS AUDIT: ICD-10-PCS | Mod: HCNC,S$GLB,, | Performed by: INTERNAL MEDICINE

## 2021-05-26 PROCEDURE — 1126F AMNT PAIN NOTED NONE PRSNT: CPT | Mod: HCNC,S$GLB,, | Performed by: INTERNAL MEDICINE

## 2021-05-26 PROCEDURE — 1126F PR PAIN SEVERITY QUANTIFIED, NO PAIN PRESENT: ICD-10-PCS | Mod: HCNC,S$GLB,, | Performed by: INTERNAL MEDICINE

## 2021-05-26 PROCEDURE — 1159F PR MEDICATION LIST DOCUMENTED IN MEDICAL RECORD: ICD-10-PCS | Mod: HCNC,S$GLB,, | Performed by: INTERNAL MEDICINE

## 2021-05-26 PROCEDURE — 1101F PT FALLS ASSESS-DOCD LE1/YR: CPT | Mod: HCNC,CPTII,S$GLB, | Performed by: INTERNAL MEDICINE

## 2021-05-26 PROCEDURE — 99999 PR PBB SHADOW E&M-EST. PATIENT-LVL V: ICD-10-PCS | Mod: PBBFAC,HCNC,, | Performed by: INTERNAL MEDICINE

## 2021-05-26 PROCEDURE — 99499 UNLISTED E&M SERVICE: CPT | Mod: HCNC,S$GLB,, | Performed by: INTERNAL MEDICINE

## 2021-05-26 PROCEDURE — 99214 PR OFFICE/OUTPT VISIT, EST, LEVL IV, 30-39 MIN: ICD-10-PCS | Mod: HCNC,S$GLB,, | Performed by: INTERNAL MEDICINE

## 2021-05-26 PROCEDURE — 1101F PR PT FALLS ASSESS DOC 0-1 FALLS W/OUT INJ PAST YR: ICD-10-PCS | Mod: HCNC,CPTII,S$GLB, | Performed by: INTERNAL MEDICINE

## 2021-05-26 PROCEDURE — 3288F PR FALLS RISK ASSESSMENT DOCUMENTED: ICD-10-PCS | Mod: HCNC,CPTII,S$GLB, | Performed by: INTERNAL MEDICINE

## 2021-06-29 ENCOUNTER — OFFICE VISIT (OUTPATIENT)
Dept: GASTROENTEROLOGY | Facility: CLINIC | Age: 86
End: 2021-06-29
Payer: MEDICARE

## 2021-06-29 VITALS
HEART RATE: 78 BPM | SYSTOLIC BLOOD PRESSURE: 175 MMHG | HEIGHT: 63 IN | DIASTOLIC BLOOD PRESSURE: 88 MMHG | BODY MASS INDEX: 30.43 KG/M2 | WEIGHT: 171.75 LBS | RESPIRATION RATE: 18 BRPM

## 2021-06-29 DIAGNOSIS — E53.8 B12 DEFICIENCY: ICD-10-CM

## 2021-06-29 DIAGNOSIS — Z85.038 PERSONAL HISTORY OF COLON CANCER, STAGE III: ICD-10-CM

## 2021-06-29 DIAGNOSIS — R93.89 ABNORMAL CT SCAN: Primary | ICD-10-CM

## 2021-06-29 PROCEDURE — 1101F PT FALLS ASSESS-DOCD LE1/YR: CPT | Mod: HCNC,CPTII,S$GLB, | Performed by: INTERNAL MEDICINE

## 2021-06-29 PROCEDURE — 3288F PR FALLS RISK ASSESSMENT DOCUMENTED: ICD-10-PCS | Mod: HCNC,CPTII,S$GLB, | Performed by: INTERNAL MEDICINE

## 2021-06-29 PROCEDURE — 99999 PR PBB SHADOW E&M-EST. PATIENT-LVL IV: CPT | Mod: PBBFAC,HCNC,, | Performed by: INTERNAL MEDICINE

## 2021-06-29 PROCEDURE — 99213 PR OFFICE/OUTPT VISIT, EST, LEVL III, 20-29 MIN: ICD-10-PCS | Mod: HCNC,S$GLB,, | Performed by: INTERNAL MEDICINE

## 2021-06-29 PROCEDURE — 3288F FALL RISK ASSESSMENT DOCD: CPT | Mod: HCNC,CPTII,S$GLB, | Performed by: INTERNAL MEDICINE

## 2021-06-29 PROCEDURE — 99999 PR PBB SHADOW E&M-EST. PATIENT-LVL IV: ICD-10-PCS | Mod: PBBFAC,HCNC,, | Performed by: INTERNAL MEDICINE

## 2021-06-29 PROCEDURE — 1101F PR PT FALLS ASSESS DOC 0-1 FALLS W/OUT INJ PAST YR: ICD-10-PCS | Mod: HCNC,CPTII,S$GLB, | Performed by: INTERNAL MEDICINE

## 2021-06-29 PROCEDURE — 1126F PR PAIN SEVERITY QUANTIFIED, NO PAIN PRESENT: ICD-10-PCS | Mod: HCNC,S$GLB,, | Performed by: INTERNAL MEDICINE

## 2021-06-29 PROCEDURE — 99213 OFFICE O/P EST LOW 20 MIN: CPT | Mod: HCNC,S$GLB,, | Performed by: INTERNAL MEDICINE

## 2021-06-29 PROCEDURE — 1126F AMNT PAIN NOTED NONE PRSNT: CPT | Mod: HCNC,S$GLB,, | Performed by: INTERNAL MEDICINE

## 2021-06-29 PROCEDURE — 1159F PR MEDICATION LIST DOCUMENTED IN MEDICAL RECORD: ICD-10-PCS | Mod: HCNC,S$GLB,, | Performed by: INTERNAL MEDICINE

## 2021-06-29 PROCEDURE — 1159F MED LIST DOCD IN RCRD: CPT | Mod: HCNC,S$GLB,, | Performed by: INTERNAL MEDICINE

## 2021-07-12 ENCOUNTER — OFFICE VISIT (OUTPATIENT)
Dept: ENDOCRINOLOGY | Facility: CLINIC | Age: 86
End: 2021-07-12
Payer: MEDICARE

## 2021-07-12 ENCOUNTER — LAB VISIT (OUTPATIENT)
Dept: LAB | Facility: HOSPITAL | Age: 86
End: 2021-07-12
Attending: PHYSICIAN ASSISTANT
Payer: MEDICARE

## 2021-07-12 VITALS
DIASTOLIC BLOOD PRESSURE: 80 MMHG | OXYGEN SATURATION: 95 % | SYSTOLIC BLOOD PRESSURE: 142 MMHG | HEIGHT: 63 IN | TEMPERATURE: 99 F | HEART RATE: 90 BPM | WEIGHT: 168.19 LBS | BODY MASS INDEX: 29.8 KG/M2

## 2021-07-12 DIAGNOSIS — E55.9 HYPOVITAMINOSIS D: ICD-10-CM

## 2021-07-12 DIAGNOSIS — R73.03 PRE-DIABETES: ICD-10-CM

## 2021-07-12 DIAGNOSIS — E04.1 THYROID NODULE: Primary | ICD-10-CM

## 2021-07-12 DIAGNOSIS — Z13.6 ENCOUNTER FOR SCREENING FOR CARDIOVASCULAR DISORDERS: ICD-10-CM

## 2021-07-12 DIAGNOSIS — R60.9 EDEMA, UNSPECIFIED TYPE: ICD-10-CM

## 2021-07-12 DIAGNOSIS — Z78.0 POSTMENOPAUSAL: ICD-10-CM

## 2021-07-12 DIAGNOSIS — E04.1 THYROID NODULE: ICD-10-CM

## 2021-07-12 DIAGNOSIS — E53.8 VITAMIN B12 DEFICIENCY: ICD-10-CM

## 2021-07-12 LAB
ALBUMIN SERPL BCP-MCNC: 3.1 G/DL (ref 3.5–5.2)
ANION GAP SERPL CALC-SCNC: 8 MMOL/L (ref 8–16)
BUN SERPL-MCNC: 10 MG/DL (ref 8–23)
CALCIUM SERPL-MCNC: 9.1 MG/DL (ref 8.7–10.5)
CHLORIDE SERPL-SCNC: 109 MMOL/L (ref 95–110)
CHOLEST SERPL-MCNC: 186 MG/DL (ref 120–199)
CHOLEST/HDLC SERPL: 2.2 {RATIO} (ref 2–5)
CO2 SERPL-SCNC: 27 MMOL/L (ref 23–29)
CREAT SERPL-MCNC: 1 MG/DL (ref 0.5–1.4)
EST. GFR  (AFRICAN AMERICAN): 59.8 ML/MIN/1.73 M^2
EST. GFR  (NON AFRICAN AMERICAN): 51.9 ML/MIN/1.73 M^2
ESTIMATED AVG GLUCOSE: 114 MG/DL (ref 68–131)
GLUCOSE SERPL-MCNC: 96 MG/DL (ref 70–110)
HBA1C MFR BLD: 5.6 % (ref 4–5.6)
HDLC SERPL-MCNC: 85 MG/DL (ref 40–75)
HDLC SERPL: 45.7 % (ref 20–50)
LDLC SERPL CALC-MCNC: 84.4 MG/DL (ref 63–159)
NONHDLC SERPL-MCNC: 101 MG/DL
PHOSPHATE SERPL-MCNC: 3.1 MG/DL (ref 2.7–4.5)
POTASSIUM SERPL-SCNC: 4.3 MMOL/L (ref 3.5–5.1)
SODIUM SERPL-SCNC: 144 MMOL/L (ref 136–145)
T4 FREE SERPL-MCNC: 0.87 NG/DL (ref 0.71–1.51)
TRIGL SERPL-MCNC: 83 MG/DL (ref 30–150)
TSH SERPL DL<=0.005 MIU/L-ACNC: 0.57 UIU/ML (ref 0.4–4)
VIT B12 SERPL-MCNC: 1561 PG/ML (ref 210–950)

## 2021-07-12 PROCEDURE — 80061 LIPID PANEL: CPT | Mod: HCNC | Performed by: PHYSICIAN ASSISTANT

## 2021-07-12 PROCEDURE — 3288F FALL RISK ASSESSMENT DOCD: CPT | Mod: HCNC,CPTII,S$GLB, | Performed by: PHYSICIAN ASSISTANT

## 2021-07-12 PROCEDURE — 1159F PR MEDICATION LIST DOCUMENTED IN MEDICAL RECORD: ICD-10-PCS | Mod: HCNC,S$GLB,, | Performed by: PHYSICIAN ASSISTANT

## 2021-07-12 PROCEDURE — 84439 ASSAY OF FREE THYROXINE: CPT | Mod: HCNC | Performed by: PHYSICIAN ASSISTANT

## 2021-07-12 PROCEDURE — 82607 VITAMIN B-12: CPT | Mod: HCNC | Performed by: PHYSICIAN ASSISTANT

## 2021-07-12 PROCEDURE — 99999 PR PBB SHADOW E&M-EST. PATIENT-LVL IV: CPT | Mod: PBBFAC,HCNC,, | Performed by: PHYSICIAN ASSISTANT

## 2021-07-12 PROCEDURE — 1101F PT FALLS ASSESS-DOCD LE1/YR: CPT | Mod: HCNC,CPTII,S$GLB, | Performed by: PHYSICIAN ASSISTANT

## 2021-07-12 PROCEDURE — 80069 RENAL FUNCTION PANEL: CPT | Mod: HCNC | Performed by: PHYSICIAN ASSISTANT

## 2021-07-12 PROCEDURE — 1159F MED LIST DOCD IN RCRD: CPT | Mod: HCNC,S$GLB,, | Performed by: PHYSICIAN ASSISTANT

## 2021-07-12 PROCEDURE — 1125F PR PAIN SEVERITY QUANTIFIED, PAIN PRESENT: ICD-10-PCS | Mod: HCNC,S$GLB,, | Performed by: PHYSICIAN ASSISTANT

## 2021-07-12 PROCEDURE — 84443 ASSAY THYROID STIM HORMONE: CPT | Mod: HCNC | Performed by: PHYSICIAN ASSISTANT

## 2021-07-12 PROCEDURE — 1101F PR PT FALLS ASSESS DOC 0-1 FALLS W/OUT INJ PAST YR: ICD-10-PCS | Mod: HCNC,CPTII,S$GLB, | Performed by: PHYSICIAN ASSISTANT

## 2021-07-12 PROCEDURE — 99213 OFFICE O/P EST LOW 20 MIN: CPT | Mod: HCNC,S$GLB,, | Performed by: PHYSICIAN ASSISTANT

## 2021-07-12 PROCEDURE — 99999 PR PBB SHADOW E&M-EST. PATIENT-LVL IV: ICD-10-PCS | Mod: PBBFAC,HCNC,, | Performed by: PHYSICIAN ASSISTANT

## 2021-07-12 PROCEDURE — 83036 HEMOGLOBIN GLYCOSYLATED A1C: CPT | Mod: HCNC | Performed by: PHYSICIAN ASSISTANT

## 2021-07-12 PROCEDURE — 3288F PR FALLS RISK ASSESSMENT DOCUMENTED: ICD-10-PCS | Mod: HCNC,CPTII,S$GLB, | Performed by: PHYSICIAN ASSISTANT

## 2021-07-12 PROCEDURE — 36415 COLL VENOUS BLD VENIPUNCTURE: CPT | Mod: HCNC,PO | Performed by: PHYSICIAN ASSISTANT

## 2021-07-12 PROCEDURE — 1125F AMNT PAIN NOTED PAIN PRSNT: CPT | Mod: HCNC,S$GLB,, | Performed by: PHYSICIAN ASSISTANT

## 2021-07-12 PROCEDURE — 99213 PR OFFICE/OUTPT VISIT, EST, LEVL III, 20-29 MIN: ICD-10-PCS | Mod: HCNC,S$GLB,, | Performed by: PHYSICIAN ASSISTANT

## 2021-07-14 ENCOUNTER — HOSPITAL ENCOUNTER (OUTPATIENT)
Dept: RADIOLOGY | Facility: CLINIC | Age: 86
Discharge: HOME | End: 2021-07-14
Attending: NURSE PRACTITIONER
Payer: MEDICARE

## 2021-07-14 ENCOUNTER — OFFICE VISIT (OUTPATIENT)
Dept: FAMILY MEDICINE | Facility: CLINIC | Age: 86
End: 2021-07-14
Payer: MEDICARE

## 2021-07-14 VITALS
HEART RATE: 93 BPM | TEMPERATURE: 98 F | BODY MASS INDEX: 31.13 KG/M2 | WEIGHT: 175.69 LBS | HEIGHT: 63 IN | OXYGEN SATURATION: 90 % | DIASTOLIC BLOOD PRESSURE: 64 MMHG | SYSTOLIC BLOOD PRESSURE: 124 MMHG

## 2021-07-14 DIAGNOSIS — R05.3 COUGH, PERSISTENT: ICD-10-CM

## 2021-07-14 DIAGNOSIS — C18.2 MALIGNANT NEOPLASM OF ASCENDING COLON: ICD-10-CM

## 2021-07-14 DIAGNOSIS — G62.0 CHEMOTHERAPY-INDUCED PERIPHERAL NEUROPATHY: ICD-10-CM

## 2021-07-14 DIAGNOSIS — R06.2 WHEEZING: ICD-10-CM

## 2021-07-14 DIAGNOSIS — F17.200 SMOKER: ICD-10-CM

## 2021-07-14 DIAGNOSIS — M25.471 ANKLE EDEMA, BILATERAL: ICD-10-CM

## 2021-07-14 DIAGNOSIS — T45.1X5A CHEMOTHERAPY-INDUCED PERIPHERAL NEUROPATHY: ICD-10-CM

## 2021-07-14 DIAGNOSIS — M25.472 ANKLE EDEMA, BILATERAL: ICD-10-CM

## 2021-07-14 DIAGNOSIS — E66.9 OBESITY (BMI 30.0-34.9): ICD-10-CM

## 2021-07-14 DIAGNOSIS — R05.3 COUGH, PERSISTENT: Primary | ICD-10-CM

## 2021-07-14 PROCEDURE — 1126F AMNT PAIN NOTED NONE PRSNT: CPT | Mod: HCNC,S$GLB,, | Performed by: NURSE PRACTITIONER

## 2021-07-14 PROCEDURE — 3288F PR FALLS RISK ASSESSMENT DOCUMENTED: ICD-10-PCS | Mod: HCNC,CPTII,S$GLB, | Performed by: NURSE PRACTITIONER

## 2021-07-14 PROCEDURE — 71046 XR CHEST PA AND LATERAL: ICD-10-PCS | Mod: 26,HCNC,, | Performed by: RADIOLOGY

## 2021-07-14 PROCEDURE — 99214 OFFICE O/P EST MOD 30 MIN: CPT | Mod: 25,HCNC,S$GLB, | Performed by: NURSE PRACTITIONER

## 2021-07-14 PROCEDURE — 94640 PR INHAL RX, AIRWAY OBST/DX SPUTUM INDUCT: ICD-10-PCS | Mod: HCNC,S$GLB,, | Performed by: NURSE PRACTITIONER

## 2021-07-14 PROCEDURE — 99999 PR PBB SHADOW E&M-EST. PATIENT-LVL III: CPT | Mod: PBBFAC,HCNC,, | Performed by: NURSE PRACTITIONER

## 2021-07-14 PROCEDURE — 1101F PR PT FALLS ASSESS DOC 0-1 FALLS W/OUT INJ PAST YR: ICD-10-PCS | Mod: HCNC,CPTII,S$GLB, | Performed by: NURSE PRACTITIONER

## 2021-07-14 PROCEDURE — 71046 X-RAY EXAM CHEST 2 VIEWS: CPT | Mod: TC,HCNC,FY,PO

## 2021-07-14 PROCEDURE — 94640 AIRWAY INHALATION TREATMENT: CPT | Mod: HCNC,S$GLB,, | Performed by: NURSE PRACTITIONER

## 2021-07-14 PROCEDURE — 3288F FALL RISK ASSESSMENT DOCD: CPT | Mod: HCNC,CPTII,S$GLB, | Performed by: NURSE PRACTITIONER

## 2021-07-14 PROCEDURE — 99499 RISK ADDL DX/OHS AUDIT: ICD-10-PCS | Mod: HCNC,S$GLB,, | Performed by: NURSE PRACTITIONER

## 2021-07-14 PROCEDURE — 96372 THER/PROPH/DIAG INJ SC/IM: CPT | Mod: 59,HCNC,S$GLB, | Performed by: NURSE PRACTITIONER

## 2021-07-14 PROCEDURE — 99999 PR PBB SHADOW E&M-EST. PATIENT-LVL III: ICD-10-PCS | Mod: PBBFAC,HCNC,, | Performed by: NURSE PRACTITIONER

## 2021-07-14 PROCEDURE — 71046 X-RAY EXAM CHEST 2 VIEWS: CPT | Mod: 26,HCNC,, | Performed by: RADIOLOGY

## 2021-07-14 PROCEDURE — 1101F PT FALLS ASSESS-DOCD LE1/YR: CPT | Mod: HCNC,CPTII,S$GLB, | Performed by: NURSE PRACTITIONER

## 2021-07-14 PROCEDURE — 99214 PR OFFICE/OUTPT VISIT, EST, LEVL IV, 30-39 MIN: ICD-10-PCS | Mod: 25,HCNC,S$GLB, | Performed by: NURSE PRACTITIONER

## 2021-07-14 PROCEDURE — 1159F MED LIST DOCD IN RCRD: CPT | Mod: HCNC,S$GLB,, | Performed by: NURSE PRACTITIONER

## 2021-07-14 PROCEDURE — 1126F PR PAIN SEVERITY QUANTIFIED, NO PAIN PRESENT: ICD-10-PCS | Mod: HCNC,S$GLB,, | Performed by: NURSE PRACTITIONER

## 2021-07-14 PROCEDURE — 1159F PR MEDICATION LIST DOCUMENTED IN MEDICAL RECORD: ICD-10-PCS | Mod: HCNC,S$GLB,, | Performed by: NURSE PRACTITIONER

## 2021-07-14 PROCEDURE — 96372 PR INJECTION,THERAP/PROPH/DIAG2ST, IM OR SUBCUT: ICD-10-PCS | Mod: 59,HCNC,S$GLB, | Performed by: NURSE PRACTITIONER

## 2021-07-14 PROCEDURE — 99499 UNLISTED E&M SERVICE: CPT | Mod: HCNC,S$GLB,, | Performed by: NURSE PRACTITIONER

## 2021-07-14 RX ORDER — IPRATROPIUM BROMIDE AND ALBUTEROL SULFATE 2.5; .5 MG/3ML; MG/3ML
3 SOLUTION RESPIRATORY (INHALATION)
Status: COMPLETED | OUTPATIENT
Start: 2021-07-14 | End: 2021-07-14

## 2021-07-14 RX ORDER — DEXAMETHASONE SODIUM PHOSPHATE 4 MG/ML
4 INJECTION, SOLUTION INTRA-ARTICULAR; INTRALESIONAL; INTRAMUSCULAR; INTRAVENOUS; SOFT TISSUE
Status: COMPLETED | OUTPATIENT
Start: 2021-07-14 | End: 2021-07-14

## 2021-07-14 RX ORDER — PROPOFOL 10 MG/ML
INJECTION, EMULSION INTRAVENOUS
COMMUNITY
Start: 2021-02-05 | End: 2023-03-01

## 2021-07-14 RX ORDER — ZOSTER VACCINE RECOMBINANT, ADJUVANTED 50 MCG/0.5
KIT INTRAMUSCULAR
COMMUNITY
Start: 2021-04-17 | End: 2023-03-01

## 2021-07-14 RX ADMIN — DEXAMETHASONE SODIUM PHOSPHATE 4 MG: 4 INJECTION, SOLUTION INTRA-ARTICULAR; INTRALESIONAL; INTRAMUSCULAR; INTRAVENOUS; SOFT TISSUE at 10:07

## 2021-07-14 RX ADMIN — IPRATROPIUM BROMIDE AND ALBUTEROL SULFATE 3 ML: 2.5; .5 SOLUTION RESPIRATORY (INHALATION) at 10:07

## 2021-07-19 NOTE — PROGRESS NOTES
Subjective:       Patient ID: Kenyetta Andersen is a 83 y.o. Black or  female who presents for new evaluation of Chronic Kidney Disease    HPI     She is referred by her Oncologist for decreeded GFR    She denies foamy urine, gross hematuria and no flank pain. No LE edema and no SOB. She follows a low sodium diet and feels she stays hydrated. NO NSAID use nor herbal medications. No known kidney disease in her family. She is on a PPI    Review of Systems   Constitutional: Negative for activity change, appetite change, fatigue and unexpected weight change.   HENT: Negative for facial swelling.    Eyes: Negative for visual disturbance.   Respiratory: Negative for shortness of breath.    Cardiovascular: Negative for chest pain and leg swelling.   Gastrointestinal: Negative for constipation and diarrhea.   Genitourinary: Negative for difficulty urinating, dysuria, flank pain, frequency and hematuria.   Musculoskeletal: Negative for arthralgias.   Neurological: Negative for weakness and headaches.       Objective:      Physical Exam   Constitutional: She is oriented to person, place, and time. She appears well-nourished. No distress.   HENT:   Mouth/Throat: Oropharynx is clear and moist.   Neck: No JVD present.   Cardiovascular: S1 normal and S2 normal.  Exam reveals no friction rub.    Pulmonary/Chest: Breath sounds normal. She has no wheezes. She has no rales.   Abdominal: Soft.   Musculoskeletal: She exhibits no edema.   Neurological: She is alert and oriented to person, place, and time.   Skin: Skin is warm and dry.   Psychiatric: She has a normal mood and affect.   Vitals reviewed.      Assessment:       1. CKD (chronic kidney disease) stage 3, GFR 30-59 ml/min    2. Elevated blood pressure reading    3. Hypertriglyceridemia    4. Overlapping malignant neoplasm of colon        Plan:             Mild CKD. She was advised to continue a low sodium diet, ensure hydration and avoid NSAIDs. She will  undergo further work up with urine studies, a few select serologies and renal u/s to evaluate the size and echogenicity oh her kidneys.     She requires CT scan surveillance for her history of cancer and I advised for her to push fluids the day before, day of and day after CT scan with IV contrast.      RTC 6 months       [email or web address]

## 2021-07-30 ENCOUNTER — OFFICE VISIT (OUTPATIENT)
Dept: HEMATOLOGY/ONCOLOGY | Facility: CLINIC | Age: 86
End: 2021-07-30
Payer: MEDICARE

## 2021-07-30 ENCOUNTER — TELEPHONE (OUTPATIENT)
Dept: GASTROENTEROLOGY | Facility: CLINIC | Age: 86
End: 2021-07-30

## 2021-07-30 VITALS
WEIGHT: 174.19 LBS | RESPIRATION RATE: 16 BRPM | OXYGEN SATURATION: 92 % | HEIGHT: 63 IN | SYSTOLIC BLOOD PRESSURE: 143 MMHG | BODY MASS INDEX: 30.86 KG/M2 | HEART RATE: 80 BPM | TEMPERATURE: 97 F | DIASTOLIC BLOOD PRESSURE: 69 MMHG

## 2021-07-30 DIAGNOSIS — Z85.038 HISTORY OF COLON CANCER: Primary | ICD-10-CM

## 2021-07-30 DIAGNOSIS — E78.1 HYPERTRIGLYCERIDEMIA: ICD-10-CM

## 2021-07-30 DIAGNOSIS — Z01.818 PRE-OP TESTING: ICD-10-CM

## 2021-07-30 DIAGNOSIS — E03.9 PRIMARY HYPOTHYROIDISM: ICD-10-CM

## 2021-07-30 DIAGNOSIS — Z85.038 PERSONAL HISTORY OF COLON CANCER, STAGE III: ICD-10-CM

## 2021-07-30 PROCEDURE — 1159F MED LIST DOCD IN RCRD: CPT | Mod: HCNC,CPTII,S$GLB, | Performed by: INTERNAL MEDICINE

## 2021-07-30 PROCEDURE — 1101F PT FALLS ASSESS-DOCD LE1/YR: CPT | Mod: HCNC,CPTII,S$GLB, | Performed by: INTERNAL MEDICINE

## 2021-07-30 PROCEDURE — 1159F PR MEDICATION LIST DOCUMENTED IN MEDICAL RECORD: ICD-10-PCS | Mod: HCNC,CPTII,S$GLB, | Performed by: INTERNAL MEDICINE

## 2021-07-30 PROCEDURE — 3078F PR MOST RECENT DIASTOLIC BLOOD PRESSURE < 80 MM HG: ICD-10-PCS | Mod: HCNC,CPTII,S$GLB, | Performed by: INTERNAL MEDICINE

## 2021-07-30 PROCEDURE — 99214 OFFICE O/P EST MOD 30 MIN: CPT | Mod: HCNC,S$GLB,, | Performed by: INTERNAL MEDICINE

## 2021-07-30 PROCEDURE — 99999 PR PBB SHADOW E&M-EST. PATIENT-LVL IV: CPT | Mod: PBBFAC,HCNC,, | Performed by: INTERNAL MEDICINE

## 2021-07-30 PROCEDURE — 1125F PR PAIN SEVERITY QUANTIFIED, PAIN PRESENT: ICD-10-PCS | Mod: HCNC,CPTII,S$GLB, | Performed by: INTERNAL MEDICINE

## 2021-07-30 PROCEDURE — 3288F PR FALLS RISK ASSESSMENT DOCUMENTED: ICD-10-PCS | Mod: HCNC,CPTII,S$GLB, | Performed by: INTERNAL MEDICINE

## 2021-07-30 PROCEDURE — 3288F FALL RISK ASSESSMENT DOCD: CPT | Mod: HCNC,CPTII,S$GLB, | Performed by: INTERNAL MEDICINE

## 2021-07-30 PROCEDURE — 3078F DIAST BP <80 MM HG: CPT | Mod: HCNC,CPTII,S$GLB, | Performed by: INTERNAL MEDICINE

## 2021-07-30 PROCEDURE — 99214 PR OFFICE/OUTPT VISIT, EST, LEVL IV, 30-39 MIN: ICD-10-PCS | Mod: HCNC,S$GLB,, | Performed by: INTERNAL MEDICINE

## 2021-07-30 PROCEDURE — 3077F PR MOST RECENT SYSTOLIC BLOOD PRESSURE >= 140 MM HG: ICD-10-PCS | Mod: HCNC,CPTII,S$GLB, | Performed by: INTERNAL MEDICINE

## 2021-07-30 PROCEDURE — 99999 PR PBB SHADOW E&M-EST. PATIENT-LVL IV: ICD-10-PCS | Mod: PBBFAC,HCNC,, | Performed by: INTERNAL MEDICINE

## 2021-07-30 PROCEDURE — 3077F SYST BP >= 140 MM HG: CPT | Mod: HCNC,CPTII,S$GLB, | Performed by: INTERNAL MEDICINE

## 2021-07-30 PROCEDURE — 1125F AMNT PAIN NOTED PAIN PRSNT: CPT | Mod: HCNC,CPTII,S$GLB, | Performed by: INTERNAL MEDICINE

## 2021-07-30 PROCEDURE — 1101F PR PT FALLS ASSESS DOC 0-1 FALLS W/OUT INJ PAST YR: ICD-10-PCS | Mod: HCNC,CPTII,S$GLB, | Performed by: INTERNAL MEDICINE

## 2021-07-30 RX ORDER — TETANUS TOXOID, REDUCED DIPHTHERIA TOXOID AND ACELLULAR PERTUSSIS VACCINE, ADSORBED 5; 2.5; 8; 8; 2.5 [IU]/.5ML; [IU]/.5ML; UG/.5ML; UG/.5ML; UG/.5ML
SUSPENSION INTRAMUSCULAR
COMMUNITY
Start: 2021-05-01 | End: 2023-03-01

## 2021-08-02 ENCOUNTER — LAB VISIT (OUTPATIENT)
Dept: PRIMARY CARE CLINIC | Facility: CLINIC | Age: 86
End: 2021-08-02
Payer: MEDICARE

## 2021-08-02 DIAGNOSIS — Z01.818 PRE-OP TESTING: ICD-10-CM

## 2021-08-02 PROCEDURE — U0003 INFECTIOUS AGENT DETECTION BY NUCLEIC ACID (DNA OR RNA); SEVERE ACUTE RESPIRATORY SYNDROME CORONAVIRUS 2 (SARS-COV-2) (CORONAVIRUS DISEASE [COVID-19]), AMPLIFIED PROBE TECHNIQUE, MAKING USE OF HIGH THROUGHPUT TECHNOLOGIES AS DESCRIBED BY CMS-2020-01-R: HCPCS | Mod: HCNC | Performed by: INTERNAL MEDICINE

## 2021-08-02 PROCEDURE — U0005 INFEC AGEN DETEC AMPLI PROBE: HCPCS | Performed by: INTERNAL MEDICINE

## 2021-08-03 ENCOUNTER — PATIENT OUTREACH (OUTPATIENT)
Dept: ADMINISTRATIVE | Facility: OTHER | Age: 86
End: 2021-08-03

## 2021-08-03 LAB
SARS-COV-2 RNA RESP QL NAA+PROBE: NOT DETECTED
SARS-COV-2- CYCLE NUMBER: -1

## 2021-08-06 ENCOUNTER — OFFICE VISIT (OUTPATIENT)
Dept: OPTOMETRY | Facility: CLINIC | Age: 86
End: 2021-08-06
Payer: MEDICARE

## 2021-08-06 ENCOUNTER — CLINICAL SUPPORT (OUTPATIENT)
Dept: OPHTHALMOLOGY | Facility: CLINIC | Age: 86
End: 2021-08-06
Payer: MEDICARE

## 2021-08-06 DIAGNOSIS — H52.7 REFRACTIVE ERROR: ICD-10-CM

## 2021-08-06 DIAGNOSIS — Z96.1 PSEUDOPHAKIA: ICD-10-CM

## 2021-08-06 DIAGNOSIS — H40.1131 PRIMARY OPEN ANGLE GLAUCOMA (POAG) OF BOTH EYES, MILD STAGE: Primary | ICD-10-CM

## 2021-08-06 DIAGNOSIS — H40.1131 PRIMARY OPEN ANGLE GLAUCOMA (POAG) OF BOTH EYES, MILD STAGE: ICD-10-CM

## 2021-08-06 PROCEDURE — 99999 PR PBB SHADOW E&M-EST. PATIENT-LVL III: ICD-10-PCS | Mod: PBBFAC,HCNC,, | Performed by: OPTOMETRIST

## 2021-08-06 PROCEDURE — 92014 COMPRE OPH EXAM EST PT 1/>: CPT | Mod: HCNC,S$GLB,, | Performed by: OPTOMETRIST

## 2021-08-06 PROCEDURE — 92014 PR EYE EXAM, EST PATIENT,COMPREHESV: ICD-10-PCS | Mod: HCNC,S$GLB,, | Performed by: OPTOMETRIST

## 2021-08-06 PROCEDURE — 1159F PR MEDICATION LIST DOCUMENTED IN MEDICAL RECORD: ICD-10-PCS | Mod: HCNC,CPTII,S$GLB, | Performed by: OPTOMETRIST

## 2021-08-06 PROCEDURE — 3288F PR FALLS RISK ASSESSMENT DOCUMENTED: ICD-10-PCS | Mod: HCNC,CPTII,S$GLB, | Performed by: OPTOMETRIST

## 2021-08-06 PROCEDURE — 1101F PT FALLS ASSESS-DOCD LE1/YR: CPT | Mod: HCNC,CPTII,S$GLB, | Performed by: OPTOMETRIST

## 2021-08-06 PROCEDURE — 1159F MED LIST DOCD IN RCRD: CPT | Mod: HCNC,CPTII,S$GLB, | Performed by: OPTOMETRIST

## 2021-08-06 PROCEDURE — 3288F FALL RISK ASSESSMENT DOCD: CPT | Mod: HCNC,CPTII,S$GLB, | Performed by: OPTOMETRIST

## 2021-08-06 PROCEDURE — 1126F AMNT PAIN NOTED NONE PRSNT: CPT | Mod: HCNC,CPTII,S$GLB, | Performed by: OPTOMETRIST

## 2021-08-06 PROCEDURE — 1160F RVW MEDS BY RX/DR IN RCRD: CPT | Mod: HCNC,CPTII,S$GLB, | Performed by: OPTOMETRIST

## 2021-08-06 PROCEDURE — 76514 ECHO EXAM OF EYE THICKNESS: CPT | Mod: HCNC,S$GLB,, | Performed by: OPTOMETRIST

## 2021-08-06 PROCEDURE — 99999 PR PBB SHADOW E&M-EST. PATIENT-LVL III: CPT | Mod: PBBFAC,HCNC,, | Performed by: OPTOMETRIST

## 2021-08-06 PROCEDURE — 1160F PR REVIEW ALL MEDS BY PRESCRIBER/CLIN PHARMACIST DOCUMENTED: ICD-10-PCS | Mod: HCNC,CPTII,S$GLB, | Performed by: OPTOMETRIST

## 2021-08-06 PROCEDURE — 1126F PR PAIN SEVERITY QUANTIFIED, NO PAIN PRESENT: ICD-10-PCS | Mod: HCNC,CPTII,S$GLB, | Performed by: OPTOMETRIST

## 2021-08-06 PROCEDURE — 76514 PR  US, EYE, FOR CORNEAL THICKNESS: ICD-10-PCS | Mod: HCNC,S$GLB,, | Performed by: OPTOMETRIST

## 2021-08-06 PROCEDURE — 1101F PR PT FALLS ASSESS DOC 0-1 FALLS W/OUT INJ PAST YR: ICD-10-PCS | Mod: HCNC,CPTII,S$GLB, | Performed by: OPTOMETRIST

## 2021-08-06 RX ORDER — LATANOPROST 50 UG/ML
1 SOLUTION/ DROPS OPHTHALMIC NIGHTLY
Qty: 7.5 ML | Refills: 3 | Status: SHIPPED | OUTPATIENT
Start: 2021-08-06 | End: 2022-06-03

## 2021-10-05 ENCOUNTER — HOSPITAL ENCOUNTER (OUTPATIENT)
Dept: RADIOLOGY | Facility: CLINIC | Age: 86
Discharge: HOME | End: 2021-10-05
Attending: PHYSICIAN ASSISTANT
Payer: MEDICARE

## 2021-10-05 ENCOUNTER — OFFICE VISIT (OUTPATIENT)
Dept: FAMILY MEDICINE | Facility: CLINIC | Age: 86
End: 2021-10-05
Payer: MEDICARE

## 2021-10-05 ENCOUNTER — LAB VISIT (OUTPATIENT)
Dept: LAB | Facility: HOSPITAL | Age: 86
End: 2021-10-05
Attending: INTERNAL MEDICINE
Payer: MEDICARE

## 2021-10-05 VITALS
WEIGHT: 159.63 LBS | BODY MASS INDEX: 28.29 KG/M2 | OXYGEN SATURATION: 98 % | DIASTOLIC BLOOD PRESSURE: 68 MMHG | SYSTOLIC BLOOD PRESSURE: 116 MMHG | RESPIRATION RATE: 18 BRPM | HEART RATE: 96 BPM | HEIGHT: 63 IN

## 2021-10-05 DIAGNOSIS — Z85.038 PERSONAL HISTORY OF COLON CANCER, STAGE III: ICD-10-CM

## 2021-10-05 DIAGNOSIS — E78.1 HYPERTRIGLYCERIDEMIA: ICD-10-CM

## 2021-10-05 DIAGNOSIS — M25.561 ACUTE PAIN OF RIGHT KNEE: Primary | ICD-10-CM

## 2021-10-05 DIAGNOSIS — M25.561 ACUTE PAIN OF RIGHT KNEE: ICD-10-CM

## 2021-10-05 DIAGNOSIS — E03.9 PRIMARY HYPOTHYROIDISM: ICD-10-CM

## 2021-10-05 DIAGNOSIS — Z85.038 HISTORY OF COLON CANCER: ICD-10-CM

## 2021-10-05 LAB
ALBUMIN SERPL BCP-MCNC: 3.5 G/DL (ref 3.5–5.2)
ALP SERPL-CCNC: 73 U/L (ref 55–135)
ALT SERPL W/O P-5'-P-CCNC: 23 U/L (ref 10–44)
ANION GAP SERPL CALC-SCNC: 11 MMOL/L (ref 8–16)
AST SERPL-CCNC: 25 U/L (ref 10–40)
BASOPHILS # BLD AUTO: 0.03 K/UL (ref 0–0.2)
BASOPHILS NFR BLD: 0.3 % (ref 0–1.9)
BILIRUB SERPL-MCNC: 0.4 MG/DL (ref 0.1–1)
BUN SERPL-MCNC: 13 MG/DL (ref 8–23)
CALCIUM SERPL-MCNC: 10 MG/DL (ref 8.7–10.5)
CEA SERPL-MCNC: 2.9 NG/ML (ref 0–5)
CHLORIDE SERPL-SCNC: 100 MMOL/L (ref 95–110)
CO2 SERPL-SCNC: 29 MMOL/L (ref 23–29)
CREAT SERPL-MCNC: 1.3 MG/DL (ref 0.5–1.4)
DIFFERENTIAL METHOD: ABNORMAL
EOSINOPHIL # BLD AUTO: 0.2 K/UL (ref 0–0.5)
EOSINOPHIL NFR BLD: 1.9 % (ref 0–8)
ERYTHROCYTE [DISTWIDTH] IN BLOOD BY AUTOMATED COUNT: 11.8 % (ref 11.5–14.5)
EST. GFR  (AFRICAN AMERICAN): 44 ML/MIN/1.73 M^2
EST. GFR  (NON AFRICAN AMERICAN): 38 ML/MIN/1.73 M^2
GLUCOSE SERPL-MCNC: 114 MG/DL (ref 70–110)
HCT VFR BLD AUTO: 45.1 % (ref 37–48.5)
HGB BLD-MCNC: 14.8 G/DL (ref 12–16)
IMM GRANULOCYTES # BLD AUTO: 0.02 K/UL (ref 0–0.04)
IMM GRANULOCYTES NFR BLD AUTO: 0.2 % (ref 0–0.5)
LYMPHOCYTES # BLD AUTO: 3.2 K/UL (ref 1–4.8)
LYMPHOCYTES NFR BLD: 35 % (ref 18–48)
MCH RBC QN AUTO: 31.8 PG (ref 27–31)
MCHC RBC AUTO-ENTMCNC: 32.8 G/DL (ref 32–36)
MCV RBC AUTO: 97 FL (ref 82–98)
MONOCYTES # BLD AUTO: 0.9 K/UL (ref 0.3–1)
MONOCYTES NFR BLD: 9.9 % (ref 4–15)
NEUTROPHILS # BLD AUTO: 4.8 K/UL (ref 1.8–7.7)
NEUTROPHILS NFR BLD: 52.7 % (ref 38–73)
NRBC BLD-RTO: 0 /100 WBC
PLATELET # BLD AUTO: 206 K/UL (ref 150–450)
PMV BLD AUTO: 9.7 FL (ref 9.2–12.9)
POTASSIUM SERPL-SCNC: 3.6 MMOL/L (ref 3.5–5.1)
PROT SERPL-MCNC: 7.2 G/DL (ref 6–8.4)
RBC # BLD AUTO: 4.65 M/UL (ref 4–5.4)
SODIUM SERPL-SCNC: 140 MMOL/L (ref 136–145)
WBC # BLD AUTO: 9.13 K/UL (ref 3.9–12.7)

## 2021-10-05 PROCEDURE — 1101F PR PT FALLS ASSESS DOC 0-1 FALLS W/OUT INJ PAST YR: ICD-10-PCS | Mod: CPTII,S$GLB,, | Performed by: PHYSICIAN ASSISTANT

## 2021-10-05 PROCEDURE — 3074F PR MOST RECENT SYSTOLIC BLOOD PRESSURE < 130 MM HG: ICD-10-PCS | Mod: CPTII,S$GLB,, | Performed by: PHYSICIAN ASSISTANT

## 2021-10-05 PROCEDURE — 1160F RVW MEDS BY RX/DR IN RCRD: CPT | Mod: CPTII,S$GLB,, | Performed by: PHYSICIAN ASSISTANT

## 2021-10-05 PROCEDURE — 80053 COMPREHEN METABOLIC PANEL: CPT | Performed by: INTERNAL MEDICINE

## 2021-10-05 PROCEDURE — 99213 PR OFFICE/OUTPT VISIT, EST, LEVL III, 20-29 MIN: ICD-10-PCS | Mod: S$GLB,,, | Performed by: PHYSICIAN ASSISTANT

## 2021-10-05 PROCEDURE — 1159F MED LIST DOCD IN RCRD: CPT | Mod: CPTII,S$GLB,, | Performed by: PHYSICIAN ASSISTANT

## 2021-10-05 PROCEDURE — 3288F PR FALLS RISK ASSESSMENT DOCUMENTED: ICD-10-PCS | Mod: CPTII,S$GLB,, | Performed by: PHYSICIAN ASSISTANT

## 2021-10-05 PROCEDURE — 1101F PT FALLS ASSESS-DOCD LE1/YR: CPT | Mod: CPTII,S$GLB,, | Performed by: PHYSICIAN ASSISTANT

## 2021-10-05 PROCEDURE — 73565 X-RAY EXAM OF KNEES: CPT | Mod: 26,HCNC,, | Performed by: RADIOLOGY

## 2021-10-05 PROCEDURE — 3074F SYST BP LT 130 MM HG: CPT | Mod: CPTII,S$GLB,, | Performed by: PHYSICIAN ASSISTANT

## 2021-10-05 PROCEDURE — 1159F PR MEDICATION LIST DOCUMENTED IN MEDICAL RECORD: ICD-10-PCS | Mod: CPTII,S$GLB,, | Performed by: PHYSICIAN ASSISTANT

## 2021-10-05 PROCEDURE — 3078F PR MOST RECENT DIASTOLIC BLOOD PRESSURE < 80 MM HG: ICD-10-PCS | Mod: CPTII,S$GLB,, | Performed by: PHYSICIAN ASSISTANT

## 2021-10-05 PROCEDURE — 82378 CARCINOEMBRYONIC ANTIGEN: CPT | Performed by: INTERNAL MEDICINE

## 2021-10-05 PROCEDURE — 3288F FALL RISK ASSESSMENT DOCD: CPT | Mod: CPTII,S$GLB,, | Performed by: PHYSICIAN ASSISTANT

## 2021-10-05 PROCEDURE — 99999 PR PBB SHADOW E&M-EST. PATIENT-LVL V: ICD-10-PCS | Mod: PBBFAC,,, | Performed by: PHYSICIAN ASSISTANT

## 2021-10-05 PROCEDURE — 73565 X-RAY EXAM OF KNEES: CPT | Mod: TC,HCNC,FY,PO

## 2021-10-05 PROCEDURE — 99999 PR PBB SHADOW E&M-EST. PATIENT-LVL V: CPT | Mod: PBBFAC,,, | Performed by: PHYSICIAN ASSISTANT

## 2021-10-05 PROCEDURE — 85025 COMPLETE CBC W/AUTO DIFF WBC: CPT | Performed by: INTERNAL MEDICINE

## 2021-10-05 PROCEDURE — 1125F AMNT PAIN NOTED PAIN PRSNT: CPT | Mod: CPTII,S$GLB,, | Performed by: PHYSICIAN ASSISTANT

## 2021-10-05 PROCEDURE — 3078F DIAST BP <80 MM HG: CPT | Mod: CPTII,S$GLB,, | Performed by: PHYSICIAN ASSISTANT

## 2021-10-05 PROCEDURE — 99213 OFFICE O/P EST LOW 20 MIN: CPT | Mod: S$GLB,,, | Performed by: PHYSICIAN ASSISTANT

## 2021-10-05 PROCEDURE — 1160F PR REVIEW ALL MEDS BY PRESCRIBER/CLIN PHARMACIST DOCUMENTED: ICD-10-PCS | Mod: CPTII,S$GLB,, | Performed by: PHYSICIAN ASSISTANT

## 2021-10-05 PROCEDURE — 1125F PR PAIN SEVERITY QUANTIFIED, PAIN PRESENT: ICD-10-PCS | Mod: CPTII,S$GLB,, | Performed by: PHYSICIAN ASSISTANT

## 2021-10-05 PROCEDURE — 36415 COLL VENOUS BLD VENIPUNCTURE: CPT | Performed by: INTERNAL MEDICINE

## 2021-10-05 PROCEDURE — 73565 XR KNEE AP STANDING BILATERAL: ICD-10-PCS | Mod: 26,HCNC,, | Performed by: RADIOLOGY

## 2021-10-05 NOTE — ANESTHESIA PREPROCEDURE EVALUATION
01/21/2020  Kenyetta Andersen is a 85 y.o., female.    Anesthesia Evaluation    I have reviewed the Patient Summary Reports.    I have reviewed the Nursing Notes.      Review of Systems  Anesthesia Hx:  No problems with previous Anesthesia    Hepatic/GI:   GERD, well controlled        Physical Exam  General:  Well nourished    Airway/Jaw/Neck:  Airway Findings: Mallampati: II                Anesthesia Plan  Type of Anesthesia, risks & benefits discussed:  Anesthesia Type:  general  Patient's Preference:   Intra-op Monitoring Plan:   Intra-op Monitoring Plan Comments:   Post Op Pain Control Plan:   Post Op Pain Control Plan Comments:   Induction:   IV  Beta Blocker:  Patient is not currently on a Beta-Blocker (No further documentation required).       Informed Consent: Patient understands risks and agrees with Anesthesia plan.  Questions answered. Anesthesia consent signed with patient.  ASA Score: 2     Day of Surgery Review of History & Physical:    H&P update referred to the surgeon.         Ready For Surgery From Anesthesia Perspective.       
spontaneous

## 2021-10-08 ENCOUNTER — OFFICE VISIT (OUTPATIENT)
Dept: SPORTS MEDICINE | Facility: CLINIC | Age: 86
End: 2021-10-08
Payer: MEDICARE

## 2021-10-08 VITALS
RESPIRATION RATE: 18 BRPM | HEART RATE: 92 BPM | DIASTOLIC BLOOD PRESSURE: 82 MMHG | HEIGHT: 63 IN | BODY MASS INDEX: 28 KG/M2 | SYSTOLIC BLOOD PRESSURE: 148 MMHG | OXYGEN SATURATION: 95 % | WEIGHT: 158.06 LBS

## 2021-10-08 DIAGNOSIS — M25.561 ACUTE PAIN OF RIGHT KNEE: ICD-10-CM

## 2021-10-08 DIAGNOSIS — M76.31 IT BAND SYNDROME, RIGHT: Primary | ICD-10-CM

## 2021-10-08 PROCEDURE — 1125F PR PAIN SEVERITY QUANTIFIED, PAIN PRESENT: ICD-10-PCS | Mod: CPTII,S$GLB,, | Performed by: FAMILY MEDICINE

## 2021-10-08 PROCEDURE — 1101F PR PT FALLS ASSESS DOC 0-1 FALLS W/OUT INJ PAST YR: ICD-10-PCS | Mod: CPTII,S$GLB,, | Performed by: FAMILY MEDICINE

## 2021-10-08 PROCEDURE — 1160F PR REVIEW ALL MEDS BY PRESCRIBER/CLIN PHARMACIST DOCUMENTED: ICD-10-PCS | Mod: CPTII,S$GLB,, | Performed by: FAMILY MEDICINE

## 2021-10-08 PROCEDURE — 99213 PR OFFICE/OUTPT VISIT, EST, LEVL III, 20-29 MIN: ICD-10-PCS | Mod: S$GLB,,, | Performed by: FAMILY MEDICINE

## 2021-10-08 PROCEDURE — 3077F PR MOST RECENT SYSTOLIC BLOOD PRESSURE >= 140 MM HG: ICD-10-PCS | Mod: CPTII,S$GLB,, | Performed by: FAMILY MEDICINE

## 2021-10-08 PROCEDURE — 1159F PR MEDICATION LIST DOCUMENTED IN MEDICAL RECORD: ICD-10-PCS | Mod: CPTII,S$GLB,, | Performed by: FAMILY MEDICINE

## 2021-10-08 PROCEDURE — 3077F SYST BP >= 140 MM HG: CPT | Mod: CPTII,S$GLB,, | Performed by: FAMILY MEDICINE

## 2021-10-08 PROCEDURE — 99213 OFFICE O/P EST LOW 20 MIN: CPT | Mod: S$GLB,,, | Performed by: FAMILY MEDICINE

## 2021-10-08 PROCEDURE — 3079F PR MOST RECENT DIASTOLIC BLOOD PRESSURE 80-89 MM HG: ICD-10-PCS | Mod: CPTII,S$GLB,, | Performed by: FAMILY MEDICINE

## 2021-10-08 PROCEDURE — 3288F FALL RISK ASSESSMENT DOCD: CPT | Mod: CPTII,S$GLB,, | Performed by: FAMILY MEDICINE

## 2021-10-08 PROCEDURE — 1159F MED LIST DOCD IN RCRD: CPT | Mod: CPTII,S$GLB,, | Performed by: FAMILY MEDICINE

## 2021-10-08 PROCEDURE — 1101F PT FALLS ASSESS-DOCD LE1/YR: CPT | Mod: CPTII,S$GLB,, | Performed by: FAMILY MEDICINE

## 2021-10-08 PROCEDURE — 3079F DIAST BP 80-89 MM HG: CPT | Mod: CPTII,S$GLB,, | Performed by: FAMILY MEDICINE

## 2021-10-08 PROCEDURE — 3288F PR FALLS RISK ASSESSMENT DOCUMENTED: ICD-10-PCS | Mod: CPTII,S$GLB,, | Performed by: FAMILY MEDICINE

## 2021-10-08 PROCEDURE — 1125F AMNT PAIN NOTED PAIN PRSNT: CPT | Mod: CPTII,S$GLB,, | Performed by: FAMILY MEDICINE

## 2021-10-08 PROCEDURE — 1160F RVW MEDS BY RX/DR IN RCRD: CPT | Mod: CPTII,S$GLB,, | Performed by: FAMILY MEDICINE

## 2021-10-08 PROCEDURE — 99999 PR PBB SHADOW E&M-EST. PATIENT-LVL V: ICD-10-PCS | Mod: PBBFAC,,, | Performed by: FAMILY MEDICINE

## 2021-10-08 PROCEDURE — 99999 PR PBB SHADOW E&M-EST. PATIENT-LVL V: CPT | Mod: PBBFAC,,, | Performed by: FAMILY MEDICINE

## 2021-10-08 RX ORDER — METHYLPREDNISOLONE 4 MG/1
TABLET ORAL
Qty: 1 PACKAGE | Refills: 0 | Status: SHIPPED | OUTPATIENT
Start: 2021-10-08 | End: 2021-11-18

## 2021-10-08 RX ORDER — MELOXICAM 15 MG/1
15 TABLET ORAL DAILY PRN
Qty: 30 TABLET | Refills: 2 | Status: SHIPPED | OUTPATIENT
Start: 2021-10-08 | End: 2021-11-18

## 2021-10-12 ENCOUNTER — LAB VISIT (OUTPATIENT)
Dept: LAB | Facility: HOSPITAL | Age: 86
End: 2021-10-12
Attending: PHYSICIAN ASSISTANT
Payer: MEDICARE

## 2021-10-12 DIAGNOSIS — E55.9 HYPOVITAMINOSIS D: ICD-10-CM

## 2021-10-12 DIAGNOSIS — E04.1 THYROID NODULE: ICD-10-CM

## 2021-10-12 LAB
25(OH)D3+25(OH)D2 SERPL-MCNC: 55 NG/ML (ref 30–96)
ALBUMIN SERPL BCP-MCNC: 3.3 G/DL (ref 3.5–5.2)
ALP SERPL-CCNC: 74 U/L (ref 55–135)
ALT SERPL W/O P-5'-P-CCNC: 18 U/L (ref 10–44)
ANION GAP SERPL CALC-SCNC: 13 MMOL/L (ref 8–16)
AST SERPL-CCNC: 23 U/L (ref 10–40)
BILIRUB SERPL-MCNC: 0.4 MG/DL (ref 0.1–1)
BUN SERPL-MCNC: 14 MG/DL (ref 8–23)
CALCIUM SERPL-MCNC: 10 MG/DL (ref 8.7–10.5)
CHLORIDE SERPL-SCNC: 101 MMOL/L (ref 95–110)
CO2 SERPL-SCNC: 26 MMOL/L (ref 23–29)
CREAT SERPL-MCNC: 1 MG/DL (ref 0.5–1.4)
EST. GFR  (AFRICAN AMERICAN): 59.8 ML/MIN/1.73 M^2
EST. GFR  (NON AFRICAN AMERICAN): 51.9 ML/MIN/1.73 M^2
GLUCOSE SERPL-MCNC: 134 MG/DL (ref 70–110)
POTASSIUM SERPL-SCNC: 2.8 MMOL/L (ref 3.5–5.1)
PROT SERPL-MCNC: 7 G/DL (ref 6–8.4)
SODIUM SERPL-SCNC: 140 MMOL/L (ref 136–145)
T4 FREE SERPL-MCNC: 0.98 NG/DL (ref 0.71–1.51)
TSH SERPL DL<=0.005 MIU/L-ACNC: 0.52 UIU/ML (ref 0.4–4)

## 2021-10-12 PROCEDURE — 36415 COLL VENOUS BLD VENIPUNCTURE: CPT | Mod: PO | Performed by: PHYSICIAN ASSISTANT

## 2021-10-12 PROCEDURE — 84439 ASSAY OF FREE THYROXINE: CPT | Performed by: PHYSICIAN ASSISTANT

## 2021-10-12 PROCEDURE — 82306 VITAMIN D 25 HYDROXY: CPT | Performed by: PHYSICIAN ASSISTANT

## 2021-10-12 PROCEDURE — 80053 COMPREHEN METABOLIC PANEL: CPT | Performed by: PHYSICIAN ASSISTANT

## 2021-10-12 PROCEDURE — 84443 ASSAY THYROID STIM HORMONE: CPT | Performed by: PHYSICIAN ASSISTANT

## 2021-10-15 ENCOUNTER — TELEPHONE (OUTPATIENT)
Dept: NEUROLOGY | Facility: CLINIC | Age: 86
End: 2021-10-15

## 2021-10-15 ENCOUNTER — INFUSION (OUTPATIENT)
Dept: INFUSION THERAPY | Facility: HOSPITAL | Age: 86
End: 2021-10-15
Attending: INTERNAL MEDICINE
Payer: MEDICARE

## 2021-10-15 ENCOUNTER — TELEPHONE (OUTPATIENT)
Dept: HEMATOLOGY/ONCOLOGY | Facility: CLINIC | Age: 86
End: 2021-10-15

## 2021-10-15 ENCOUNTER — OFFICE VISIT (OUTPATIENT)
Dept: HEMATOLOGY/ONCOLOGY | Facility: CLINIC | Age: 86
End: 2021-10-15
Payer: MEDICARE

## 2021-10-15 VITALS
RESPIRATION RATE: 14 BRPM | DIASTOLIC BLOOD PRESSURE: 74 MMHG | SYSTOLIC BLOOD PRESSURE: 154 MMHG | BODY MASS INDEX: 27.7 KG/M2 | HEIGHT: 63 IN | HEART RATE: 78 BPM | TEMPERATURE: 97 F | OXYGEN SATURATION: 94 % | WEIGHT: 156.31 LBS

## 2021-10-15 VITALS
OXYGEN SATURATION: 94 % | TEMPERATURE: 97 F | DIASTOLIC BLOOD PRESSURE: 71 MMHG | HEIGHT: 63 IN | SYSTOLIC BLOOD PRESSURE: 154 MMHG | BODY MASS INDEX: 27.7 KG/M2 | RESPIRATION RATE: 14 BRPM | WEIGHT: 156.31 LBS | HEART RATE: 78 BPM

## 2021-10-15 DIAGNOSIS — E87.6 HYPOKALEMIA: Primary | ICD-10-CM

## 2021-10-15 DIAGNOSIS — Z85.038 PERSONAL HISTORY OF COLON CANCER, STAGE III: ICD-10-CM

## 2021-10-15 DIAGNOSIS — F70 MILD INTELLECTUAL DISABILITIES: ICD-10-CM

## 2021-10-15 DIAGNOSIS — Z85.038 HISTORY OF COLON CANCER: Primary | ICD-10-CM

## 2021-10-15 DIAGNOSIS — E87.6 HYPOKALEMIA: ICD-10-CM

## 2021-10-15 DIAGNOSIS — E53.8 B12 DEFICIENCY: ICD-10-CM

## 2021-10-15 DIAGNOSIS — G62.9 NEUROPATHY: ICD-10-CM

## 2021-10-15 LAB
ANION GAP SERPL CALC-SCNC: 9 MMOL/L (ref 8–16)
BUN SERPL-MCNC: 15 MG/DL (ref 8–23)
CALCIUM SERPL-MCNC: 9.7 MG/DL (ref 8.7–10.5)
CHLORIDE SERPL-SCNC: 105 MMOL/L (ref 95–110)
CO2 SERPL-SCNC: 30 MMOL/L (ref 23–29)
CREAT SERPL-MCNC: 1.1 MG/DL (ref 0.5–1.4)
EST. GFR  (AFRICAN AMERICAN): 53.3 ML/MIN/1.73 M^2
EST. GFR  (NON AFRICAN AMERICAN): 46.2 ML/MIN/1.73 M^2
GLUCOSE SERPL-MCNC: 154 MG/DL (ref 70–110)
POTASSIUM SERPL-SCNC: 3.5 MMOL/L (ref 3.5–5.1)
SODIUM SERPL-SCNC: 144 MMOL/L (ref 136–145)

## 2021-10-15 PROCEDURE — 3078F PR MOST RECENT DIASTOLIC BLOOD PRESSURE < 80 MM HG: ICD-10-PCS | Mod: HCNC,CPTII,S$GLB, | Performed by: INTERNAL MEDICINE

## 2021-10-15 PROCEDURE — 3077F PR MOST RECENT SYSTOLIC BLOOD PRESSURE >= 140 MM HG: ICD-10-PCS | Mod: HCNC,CPTII,S$GLB, | Performed by: INTERNAL MEDICINE

## 2021-10-15 PROCEDURE — 80048 BASIC METABOLIC PNL TOTAL CA: CPT | Performed by: INTERNAL MEDICINE

## 2021-10-15 PROCEDURE — 63600175 PHARM REV CODE 636 W HCPCS: Performed by: INTERNAL MEDICINE

## 2021-10-15 PROCEDURE — 96366 THER/PROPH/DIAG IV INF ADDON: CPT

## 2021-10-15 PROCEDURE — 1101F PR PT FALLS ASSESS DOC 0-1 FALLS W/OUT INJ PAST YR: ICD-10-PCS | Mod: HCNC,CPTII,S$GLB, | Performed by: INTERNAL MEDICINE

## 2021-10-15 PROCEDURE — 3288F FALL RISK ASSESSMENT DOCD: CPT | Mod: HCNC,CPTII,S$GLB, | Performed by: INTERNAL MEDICINE

## 2021-10-15 PROCEDURE — 99214 OFFICE O/P EST MOD 30 MIN: CPT | Mod: HCNC,S$GLB,, | Performed by: INTERNAL MEDICINE

## 2021-10-15 PROCEDURE — 36415 COLL VENOUS BLD VENIPUNCTURE: CPT | Performed by: INTERNAL MEDICINE

## 2021-10-15 PROCEDURE — 96365 THER/PROPH/DIAG IV INF INIT: CPT

## 2021-10-15 PROCEDURE — 99999 PR PBB SHADOW E&M-EST. PATIENT-LVL V: CPT | Mod: PBBFAC,HCNC,, | Performed by: INTERNAL MEDICINE

## 2021-10-15 PROCEDURE — 3078F DIAST BP <80 MM HG: CPT | Mod: HCNC,CPTII,S$GLB, | Performed by: INTERNAL MEDICINE

## 2021-10-15 PROCEDURE — 99999 PR PBB SHADOW E&M-EST. PATIENT-LVL V: ICD-10-PCS | Mod: PBBFAC,HCNC,, | Performed by: INTERNAL MEDICINE

## 2021-10-15 PROCEDURE — 1126F PR PAIN SEVERITY QUANTIFIED, NO PAIN PRESENT: ICD-10-PCS | Mod: HCNC,CPTII,S$GLB, | Performed by: INTERNAL MEDICINE

## 2021-10-15 PROCEDURE — 25000003 PHARM REV CODE 250: Performed by: INTERNAL MEDICINE

## 2021-10-15 PROCEDURE — 3077F SYST BP >= 140 MM HG: CPT | Mod: HCNC,CPTII,S$GLB, | Performed by: INTERNAL MEDICINE

## 2021-10-15 PROCEDURE — 3288F PR FALLS RISK ASSESSMENT DOCUMENTED: ICD-10-PCS | Mod: HCNC,CPTII,S$GLB, | Performed by: INTERNAL MEDICINE

## 2021-10-15 PROCEDURE — 1126F AMNT PAIN NOTED NONE PRSNT: CPT | Mod: HCNC,CPTII,S$GLB, | Performed by: INTERNAL MEDICINE

## 2021-10-15 PROCEDURE — 1159F PR MEDICATION LIST DOCUMENTED IN MEDICAL RECORD: ICD-10-PCS | Mod: HCNC,CPTII,S$GLB, | Performed by: INTERNAL MEDICINE

## 2021-10-15 PROCEDURE — 1101F PT FALLS ASSESS-DOCD LE1/YR: CPT | Mod: HCNC,CPTII,S$GLB, | Performed by: INTERNAL MEDICINE

## 2021-10-15 PROCEDURE — 99214 PR OFFICE/OUTPT VISIT, EST, LEVL IV, 30-39 MIN: ICD-10-PCS | Mod: HCNC,S$GLB,, | Performed by: INTERNAL MEDICINE

## 2021-10-15 PROCEDURE — 1159F MED LIST DOCD IN RCRD: CPT | Mod: HCNC,CPTII,S$GLB, | Performed by: INTERNAL MEDICINE

## 2021-10-15 RX ORDER — POTASSIUM CHLORIDE 7.45 MG/ML
20 INJECTION INTRAVENOUS ONCE
Status: DISCONTINUED | OUTPATIENT
Start: 2021-10-15 | End: 2021-10-15

## 2021-10-15 RX ORDER — SODIUM CHLORIDE 0.9 % (FLUSH) 0.9 %
10 SYRINGE (ML) INJECTION
Status: CANCELLED | OUTPATIENT
Start: 2021-10-15

## 2021-10-15 RX ORDER — HEPARIN 100 UNIT/ML
500 SYRINGE INTRAVENOUS
Status: CANCELLED | OUTPATIENT
Start: 2021-10-15

## 2021-10-15 RX ORDER — GABAPENTIN 100 MG/1
100 CAPSULE ORAL 3 TIMES DAILY
Qty: 90 CAPSULE | Refills: 2 | Status: SHIPPED | OUTPATIENT
Start: 2021-10-15 | End: 2022-01-09

## 2021-10-15 RX ORDER — POTASSIUM CHLORIDE 7.45 MG/ML
20 INJECTION INTRAVENOUS ONCE
Status: CANCELLED | OUTPATIENT
Start: 2021-10-15

## 2021-10-15 RX ORDER — POTASSIUM CHLORIDE 20 MEQ/1
20 TABLET, EXTENDED RELEASE ORAL DAILY
Qty: 30 TABLET | Refills: 11 | Status: SHIPPED | OUTPATIENT
Start: 2021-10-15 | End: 2022-09-29

## 2021-10-15 RX ADMIN — POTASSIUM CHLORIDE: 2 INJECTION, SOLUTION, CONCENTRATE INTRAVENOUS at 11:10

## 2021-10-18 ENCOUNTER — TELEPHONE (OUTPATIENT)
Dept: GASTROENTEROLOGY | Facility: CLINIC | Age: 86
End: 2021-10-18

## 2021-10-18 DIAGNOSIS — R13.10 DYSPHAGIA, UNSPECIFIED TYPE: Primary | ICD-10-CM

## 2021-10-21 ENCOUNTER — TELEPHONE (OUTPATIENT)
Dept: HEMATOLOGY/ONCOLOGY | Facility: CLINIC | Age: 86
End: 2021-10-21

## 2021-10-25 ENCOUNTER — SOCIAL WORK (OUTPATIENT)
Dept: HEMATOLOGY/ONCOLOGY | Facility: CLINIC | Age: 86
End: 2021-10-25
Payer: MEDICARE

## 2021-10-25 RX ORDER — ALENDRONATE SODIUM 70 MG/1
70 TABLET ORAL
Qty: 12 TABLET | Refills: 3 | Status: SHIPPED | OUTPATIENT
Start: 2021-10-25 | End: 2022-11-14

## 2021-11-11 ENCOUNTER — ANESTHESIA (OUTPATIENT)
Dept: ENDOSCOPY | Facility: HOSPITAL | Age: 86
End: 2021-11-11
Payer: MEDICARE

## 2021-11-11 ENCOUNTER — HOSPITAL ENCOUNTER (OUTPATIENT)
Facility: HOSPITAL | Age: 86
Discharge: HOME | End: 2021-11-11
Attending: INTERNAL MEDICINE | Admitting: INTERNAL MEDICINE
Payer: MEDICARE

## 2021-11-11 ENCOUNTER — ANESTHESIA EVENT (OUTPATIENT)
Dept: ENDOSCOPY | Facility: HOSPITAL | Age: 86
End: 2021-11-11
Payer: MEDICARE

## 2021-11-11 DIAGNOSIS — K20.90 ESOPHAGITIS DETERMINED BY ENDOSCOPY: Primary | ICD-10-CM

## 2021-11-11 DIAGNOSIS — R13.10 DYSPHAGIA: ICD-10-CM

## 2021-11-11 PROCEDURE — 88305 TISSUE EXAM BY PATHOLOGIST: CPT | Mod: 26,HCNC,, | Performed by: STUDENT IN AN ORGANIZED HEALTH CARE EDUCATION/TRAINING PROGRAM

## 2021-11-11 PROCEDURE — 88342 IMHCHEM/IMCYTCHM 1ST ANTB: CPT | Mod: 59,HCNC | Performed by: STUDENT IN AN ORGANIZED HEALTH CARE EDUCATION/TRAINING PROGRAM

## 2021-11-11 PROCEDURE — D9220A PRA ANESTHESIA: ICD-10-PCS | Mod: HCNC,CRNA,, | Performed by: NURSE ANESTHETIST, CERTIFIED REGISTERED

## 2021-11-11 PROCEDURE — 88305 TISSUE EXAM BY PATHOLOGIST: CPT | Mod: HCNC | Performed by: STUDENT IN AN ORGANIZED HEALTH CARE EDUCATION/TRAINING PROGRAM

## 2021-11-11 PROCEDURE — 43249 PR EGD, FLEX, W/BALL DILATION, < 30MM: ICD-10-PCS | Mod: HCNC,,, | Performed by: INTERNAL MEDICINE

## 2021-11-11 PROCEDURE — 25000003 PHARM REV CODE 250: Mod: HCNC | Performed by: INTERNAL MEDICINE

## 2021-11-11 PROCEDURE — 88342 CHG IMMUNOCYTOCHEMISTRY: ICD-10-PCS | Mod: 26,HCNC,, | Performed by: STUDENT IN AN ORGANIZED HEALTH CARE EDUCATION/TRAINING PROGRAM

## 2021-11-11 PROCEDURE — C1726 CATH, BAL DIL, NON-VASCULAR: HCPCS | Mod: HCNC | Performed by: INTERNAL MEDICINE

## 2021-11-11 PROCEDURE — 88342 IMHCHEM/IMCYTCHM 1ST ANTB: CPT | Mod: 26,HCNC,, | Performed by: STUDENT IN AN ORGANIZED HEALTH CARE EDUCATION/TRAINING PROGRAM

## 2021-11-11 PROCEDURE — 43239 EGD BIOPSY SINGLE/MULTIPLE: CPT | Mod: 59,HCNC,, | Performed by: INTERNAL MEDICINE

## 2021-11-11 PROCEDURE — 43239 PR EGD, FLEX, W/BIOPSY, SGL/MULTI: ICD-10-PCS | Mod: 59,HCNC,, | Performed by: INTERNAL MEDICINE

## 2021-11-11 PROCEDURE — 43249 ESOPH EGD DILATION <30 MM: CPT | Mod: HCNC | Performed by: INTERNAL MEDICINE

## 2021-11-11 PROCEDURE — 63600175 PHARM REV CODE 636 W HCPCS: Mod: HCNC | Performed by: NURSE ANESTHETIST, CERTIFIED REGISTERED

## 2021-11-11 PROCEDURE — 37000008 HC ANESTHESIA 1ST 15 MINUTES: Mod: HCNC | Performed by: INTERNAL MEDICINE

## 2021-11-11 PROCEDURE — 25000003 PHARM REV CODE 250: Mod: HCNC | Performed by: NURSE ANESTHETIST, CERTIFIED REGISTERED

## 2021-11-11 PROCEDURE — D9220A PRA ANESTHESIA: Mod: HCNC,ANES,, | Performed by: ANESTHESIOLOGY

## 2021-11-11 PROCEDURE — 88305 TISSUE EXAM BY PATHOLOGIST: ICD-10-PCS | Mod: 26,HCNC,, | Performed by: STUDENT IN AN ORGANIZED HEALTH CARE EDUCATION/TRAINING PROGRAM

## 2021-11-11 PROCEDURE — 43239 EGD BIOPSY SINGLE/MULTIPLE: CPT | Mod: 59,HCNC | Performed by: INTERNAL MEDICINE

## 2021-11-11 PROCEDURE — D9220A PRA ANESTHESIA: ICD-10-PCS | Mod: HCNC,ANES,, | Performed by: ANESTHESIOLOGY

## 2021-11-11 PROCEDURE — D9220A PRA ANESTHESIA: Mod: HCNC,CRNA,, | Performed by: NURSE ANESTHETIST, CERTIFIED REGISTERED

## 2021-11-11 PROCEDURE — 27201012 HC FORCEPS, HOT/COLD, DISP: Mod: HCNC | Performed by: INTERNAL MEDICINE

## 2021-11-11 PROCEDURE — 37000009 HC ANESTHESIA EA ADD 15 MINS: Mod: HCNC | Performed by: INTERNAL MEDICINE

## 2021-11-11 PROCEDURE — 43249 ESOPH EGD DILATION <30 MM: CPT | Mod: HCNC,,, | Performed by: INTERNAL MEDICINE

## 2021-11-11 RX ORDER — RABEPRAZOLE SODIUM 20 MG/1
20 TABLET, DELAYED RELEASE ORAL 2 TIMES DAILY
Qty: 120 TABLET | Refills: 0 | Status: SHIPPED | OUTPATIENT
Start: 2021-11-11 | End: 2022-01-28

## 2021-11-11 RX ORDER — SODIUM CHLORIDE 9 MG/ML
INJECTION, SOLUTION INTRAVENOUS CONTINUOUS
Status: DISCONTINUED | OUTPATIENT
Start: 2021-11-11 | End: 2021-11-11 | Stop reason: HOSPADM

## 2021-11-11 RX ORDER — PROPOFOL 10 MG/ML
VIAL (ML) INTRAVENOUS
Status: DISCONTINUED | OUTPATIENT
Start: 2021-11-11 | End: 2021-11-11

## 2021-11-11 RX ORDER — LIDOCAINE HCL/PF 100 MG/5ML
SYRINGE (ML) INTRAVENOUS
Status: DISCONTINUED | OUTPATIENT
Start: 2021-11-11 | End: 2021-11-11

## 2021-11-11 RX ADMIN — SODIUM CHLORIDE: 0.9 INJECTION, SOLUTION INTRAVENOUS at 11:11

## 2021-11-11 RX ADMIN — PROPOFOL 30 MG: 10 INJECTION, EMULSION INTRAVENOUS at 11:11

## 2021-11-11 RX ADMIN — PROPOFOL 100 MG: 10 INJECTION, EMULSION INTRAVENOUS at 11:11

## 2021-11-11 RX ADMIN — LIDOCAINE HYDROCHLORIDE 100 MG: 20 INJECTION INTRAVENOUS at 11:11

## 2021-11-12 VITALS
HEART RATE: 86 BPM | TEMPERATURE: 97 F | SYSTOLIC BLOOD PRESSURE: 149 MMHG | BODY MASS INDEX: 27.46 KG/M2 | OXYGEN SATURATION: 95 % | WEIGHT: 155 LBS | RESPIRATION RATE: 16 BRPM | DIASTOLIC BLOOD PRESSURE: 69 MMHG

## 2021-11-18 ENCOUNTER — OFFICE VISIT (OUTPATIENT)
Dept: FAMILY MEDICINE | Facility: CLINIC | Age: 86
End: 2021-11-18
Payer: MEDICARE

## 2021-11-18 ENCOUNTER — TELEPHONE (OUTPATIENT)
Dept: FAMILY MEDICINE | Facility: CLINIC | Age: 86
End: 2021-11-18
Payer: MEDICARE

## 2021-11-18 VITALS
OXYGEN SATURATION: 96 % | RESPIRATION RATE: 16 BRPM | HEART RATE: 70 BPM | SYSTOLIC BLOOD PRESSURE: 132 MMHG | TEMPERATURE: 99 F | BODY MASS INDEX: 26.57 KG/M2 | HEIGHT: 63 IN | DIASTOLIC BLOOD PRESSURE: 74 MMHG | WEIGHT: 149.94 LBS

## 2021-11-18 DIAGNOSIS — T45.1X5A CHEMOTHERAPY-INDUCED PERIPHERAL NEUROPATHY: ICD-10-CM

## 2021-11-18 DIAGNOSIS — N18.32 STAGE 3B CHRONIC KIDNEY DISEASE: ICD-10-CM

## 2021-11-18 DIAGNOSIS — K59.00 CONSTIPATION, UNSPECIFIED CONSTIPATION TYPE: ICD-10-CM

## 2021-11-18 DIAGNOSIS — J32.9 SINUSITIS, UNSPECIFIED CHRONICITY, UNSPECIFIED LOCATION: Primary | ICD-10-CM

## 2021-11-18 DIAGNOSIS — K21.9 GASTROESOPHAGEAL REFLUX DISEASE WITHOUT ESOPHAGITIS: ICD-10-CM

## 2021-11-18 DIAGNOSIS — F17.219 NICOTINE DEPENDENCE, CIGARETTES, WITH UNSPECIFIED NICOTINE-INDUCED DISORDERS: ICD-10-CM

## 2021-11-18 DIAGNOSIS — R05.9 COUGH: ICD-10-CM

## 2021-11-18 DIAGNOSIS — G62.0 CHEMOTHERAPY-INDUCED PERIPHERAL NEUROPATHY: ICD-10-CM

## 2021-11-18 DIAGNOSIS — M17.0 OSTEOARTHRITIS OF BOTH KNEES, UNSPECIFIED OSTEOARTHRITIS TYPE: ICD-10-CM

## 2021-11-18 PROCEDURE — 1101F PT FALLS ASSESS-DOCD LE1/YR: CPT | Mod: HCNC,CPTII,S$GLB, | Performed by: STUDENT IN AN ORGANIZED HEALTH CARE EDUCATION/TRAINING PROGRAM

## 2021-11-18 PROCEDURE — 1126F PR PAIN SEVERITY QUANTIFIED, NO PAIN PRESENT: ICD-10-PCS | Mod: HCNC,CPTII,S$GLB, | Performed by: STUDENT IN AN ORGANIZED HEALTH CARE EDUCATION/TRAINING PROGRAM

## 2021-11-18 PROCEDURE — 99214 PR OFFICE/OUTPT VISIT, EST, LEVL IV, 30-39 MIN: ICD-10-PCS | Mod: HCNC,S$GLB,, | Performed by: STUDENT IN AN ORGANIZED HEALTH CARE EDUCATION/TRAINING PROGRAM

## 2021-11-18 PROCEDURE — 1101F PR PT FALLS ASSESS DOC 0-1 FALLS W/OUT INJ PAST YR: ICD-10-PCS | Mod: HCNC,CPTII,S$GLB, | Performed by: STUDENT IN AN ORGANIZED HEALTH CARE EDUCATION/TRAINING PROGRAM

## 2021-11-18 PROCEDURE — 3288F FALL RISK ASSESSMENT DOCD: CPT | Mod: HCNC,CPTII,S$GLB, | Performed by: STUDENT IN AN ORGANIZED HEALTH CARE EDUCATION/TRAINING PROGRAM

## 2021-11-18 PROCEDURE — 3288F PR FALLS RISK ASSESSMENT DOCUMENTED: ICD-10-PCS | Mod: HCNC,CPTII,S$GLB, | Performed by: STUDENT IN AN ORGANIZED HEALTH CARE EDUCATION/TRAINING PROGRAM

## 2021-11-18 PROCEDURE — 99214 OFFICE O/P EST MOD 30 MIN: CPT | Mod: HCNC,S$GLB,, | Performed by: STUDENT IN AN ORGANIZED HEALTH CARE EDUCATION/TRAINING PROGRAM

## 2021-11-18 PROCEDURE — 1159F PR MEDICATION LIST DOCUMENTED IN MEDICAL RECORD: ICD-10-PCS | Mod: HCNC,CPTII,S$GLB, | Performed by: STUDENT IN AN ORGANIZED HEALTH CARE EDUCATION/TRAINING PROGRAM

## 2021-11-18 PROCEDURE — 3078F PR MOST RECENT DIASTOLIC BLOOD PRESSURE < 80 MM HG: ICD-10-PCS | Mod: HCNC,CPTII,S$GLB, | Performed by: STUDENT IN AN ORGANIZED HEALTH CARE EDUCATION/TRAINING PROGRAM

## 2021-11-18 PROCEDURE — 3075F PR MOST RECENT SYSTOLIC BLOOD PRESS GE 130-139MM HG: ICD-10-PCS | Mod: HCNC,CPTII,S$GLB, | Performed by: STUDENT IN AN ORGANIZED HEALTH CARE EDUCATION/TRAINING PROGRAM

## 2021-11-18 PROCEDURE — 3075F SYST BP GE 130 - 139MM HG: CPT | Mod: HCNC,CPTII,S$GLB, | Performed by: STUDENT IN AN ORGANIZED HEALTH CARE EDUCATION/TRAINING PROGRAM

## 2021-11-18 PROCEDURE — 99999 PR PBB SHADOW E&M-EST. PATIENT-LVL V: CPT | Mod: PBBFAC,HCNC,, | Performed by: STUDENT IN AN ORGANIZED HEALTH CARE EDUCATION/TRAINING PROGRAM

## 2021-11-18 PROCEDURE — 1159F MED LIST DOCD IN RCRD: CPT | Mod: HCNC,CPTII,S$GLB, | Performed by: STUDENT IN AN ORGANIZED HEALTH CARE EDUCATION/TRAINING PROGRAM

## 2021-11-18 PROCEDURE — 99999 PR PBB SHADOW E&M-EST. PATIENT-LVL V: ICD-10-PCS | Mod: PBBFAC,HCNC,, | Performed by: STUDENT IN AN ORGANIZED HEALTH CARE EDUCATION/TRAINING PROGRAM

## 2021-11-18 PROCEDURE — 3078F DIAST BP <80 MM HG: CPT | Mod: HCNC,CPTII,S$GLB, | Performed by: STUDENT IN AN ORGANIZED HEALTH CARE EDUCATION/TRAINING PROGRAM

## 2021-11-18 PROCEDURE — 1126F AMNT PAIN NOTED NONE PRSNT: CPT | Mod: HCNC,CPTII,S$GLB, | Performed by: STUDENT IN AN ORGANIZED HEALTH CARE EDUCATION/TRAINING PROGRAM

## 2021-11-18 RX ORDER — PROMETHAZINE HYDROCHLORIDE 6.25 MG/5ML
6.25 SYRUP ORAL EVERY 6 HOURS PRN
Qty: 118 ML | Refills: 1 | Status: SHIPPED | OUTPATIENT
Start: 2021-11-18 | End: 2021-12-21

## 2021-11-18 RX ORDER — METHYLPREDNISOLONE 4 MG/1
TABLET ORAL
Status: CANCELLED | OUTPATIENT
Start: 2021-11-18

## 2021-11-18 RX ORDER — METHYLPREDNISOLONE 4 MG/1
TABLET ORAL
Qty: 21 TABLET | Refills: 0 | Status: SHIPPED | OUTPATIENT
Start: 2021-11-18 | End: 2021-12-21

## 2021-11-18 RX ORDER — AMOXICILLIN AND CLAVULANATE POTASSIUM 875; 125 MG/1; MG/1
1 TABLET, FILM COATED ORAL 2 TIMES DAILY
Qty: 14 TABLET | Refills: 0 | Status: SHIPPED | OUTPATIENT
Start: 2021-11-18 | End: 2021-11-25

## 2021-11-29 LAB
FINAL PATHOLOGIC DIAGNOSIS: NORMAL
GROSS: NORMAL
Lab: NORMAL
MICROSCOPIC EXAM: NORMAL

## 2021-12-06 ENCOUNTER — TELEPHONE (OUTPATIENT)
Dept: SMOKING CESSATION | Facility: CLINIC | Age: 86
End: 2021-12-06
Payer: MEDICARE

## 2021-12-10 ENCOUNTER — TELEPHONE (OUTPATIENT)
Dept: GASTROENTEROLOGY | Facility: CLINIC | Age: 86
End: 2021-12-10
Payer: MEDICARE

## 2021-12-16 ENCOUNTER — OFFICE VISIT (OUTPATIENT)
Dept: OPTOMETRY | Facility: CLINIC | Age: 86
End: 2021-12-16
Payer: MEDICARE

## 2021-12-16 DIAGNOSIS — H04.123 DRY EYE SYNDROME, BILATERAL: ICD-10-CM

## 2021-12-16 DIAGNOSIS — H40.1131 PRIMARY OPEN ANGLE GLAUCOMA (POAG) OF BOTH EYES, MILD STAGE: Primary | ICD-10-CM

## 2021-12-16 PROCEDURE — 99999 PR PBB SHADOW E&M-EST. PATIENT-LVL III: CPT | Mod: PBBFAC,HCNC,, | Performed by: OPTOMETRIST

## 2021-12-16 PROCEDURE — 99213 PR OFFICE/OUTPT VISIT, EST, LEVL III, 20-29 MIN: ICD-10-PCS | Mod: HCNC,S$GLB,, | Performed by: OPTOMETRIST

## 2021-12-16 PROCEDURE — 99213 OFFICE O/P EST LOW 20 MIN: CPT | Mod: HCNC,S$GLB,, | Performed by: OPTOMETRIST

## 2021-12-16 PROCEDURE — 99999 PR PBB SHADOW E&M-EST. PATIENT-LVL III: ICD-10-PCS | Mod: PBBFAC,HCNC,, | Performed by: OPTOMETRIST

## 2021-12-16 RX ORDER — MELOXICAM 15 MG/1
TABLET ORAL
COMMUNITY
Start: 2021-12-05 | End: 2022-01-03

## 2021-12-21 ENCOUNTER — OFFICE VISIT (OUTPATIENT)
Dept: NEUROLOGY | Facility: CLINIC | Age: 86
End: 2021-12-21
Payer: MEDICARE

## 2021-12-21 VITALS
HEART RATE: 79 BPM | WEIGHT: 157.31 LBS | RESPIRATION RATE: 18 BRPM | SYSTOLIC BLOOD PRESSURE: 136 MMHG | HEIGHT: 63 IN | BODY MASS INDEX: 27.87 KG/M2 | DIASTOLIC BLOOD PRESSURE: 75 MMHG | TEMPERATURE: 97 F

## 2021-12-21 DIAGNOSIS — T45.1X5A CHEMOTHERAPY-INDUCED PERIPHERAL NEUROPATHY: Primary | ICD-10-CM

## 2021-12-21 DIAGNOSIS — G62.0 CHEMOTHERAPY-INDUCED PERIPHERAL NEUROPATHY: Primary | ICD-10-CM

## 2021-12-21 DIAGNOSIS — Z85.038 HISTORY OF COLON CANCER: ICD-10-CM

## 2021-12-21 PROCEDURE — 99204 PR OFFICE/OUTPT VISIT, NEW, LEVL IV, 45-59 MIN: ICD-10-PCS | Mod: HCNC,S$GLB,, | Performed by: NURSE PRACTITIONER

## 2021-12-21 PROCEDURE — 99999 PR PBB SHADOW E&M-EST. PATIENT-LVL V: ICD-10-PCS | Mod: PBBFAC,HCNC,, | Performed by: NURSE PRACTITIONER

## 2021-12-21 PROCEDURE — 99204 OFFICE O/P NEW MOD 45 MIN: CPT | Mod: HCNC,S$GLB,, | Performed by: NURSE PRACTITIONER

## 2021-12-21 PROCEDURE — 99999 PR PBB SHADOW E&M-EST. PATIENT-LVL V: CPT | Mod: PBBFAC,HCNC,, | Performed by: NURSE PRACTITIONER

## 2021-12-22 ENCOUNTER — TELEPHONE (OUTPATIENT)
Dept: NEUROLOGY | Facility: CLINIC | Age: 86
End: 2021-12-22
Payer: MEDICARE

## 2022-03-18 ENCOUNTER — LAB VISIT (OUTPATIENT)
Dept: LAB | Facility: HOSPITAL | Age: 87
End: 2022-03-18
Attending: NURSE PRACTITIONER
Payer: MEDICARE

## 2022-03-18 ENCOUNTER — OFFICE VISIT (OUTPATIENT)
Dept: NEUROLOGY | Facility: CLINIC | Age: 87
End: 2022-03-18
Payer: MEDICARE

## 2022-03-18 VITALS
WEIGHT: 144.81 LBS | DIASTOLIC BLOOD PRESSURE: 84 MMHG | RESPIRATION RATE: 17 BRPM | HEART RATE: 98 BPM | BODY MASS INDEX: 25.66 KG/M2 | SYSTOLIC BLOOD PRESSURE: 144 MMHG | HEIGHT: 63 IN

## 2022-03-18 DIAGNOSIS — G62.9 NEUROPATHY: ICD-10-CM

## 2022-03-18 DIAGNOSIS — T45.1X5A CHEMOTHERAPY-INDUCED PERIPHERAL NEUROPATHY: ICD-10-CM

## 2022-03-18 DIAGNOSIS — G62.0 CHEMOTHERAPY-INDUCED PERIPHERAL NEUROPATHY: ICD-10-CM

## 2022-03-18 LAB
ALBUMIN SERPL BCP-MCNC: 3.3 G/DL (ref 3.5–5.2)
ALP SERPL-CCNC: 55 U/L (ref 55–135)
ALT SERPL W/O P-5'-P-CCNC: 12 U/L (ref 10–44)
ANION GAP SERPL CALC-SCNC: 10 MMOL/L (ref 8–16)
AST SERPL-CCNC: 20 U/L (ref 10–40)
BASOPHILS # BLD AUTO: 0.03 K/UL (ref 0–0.2)
BASOPHILS NFR BLD: 0.4 % (ref 0–1.9)
BILIRUB SERPL-MCNC: 0.5 MG/DL (ref 0.1–1)
BUN SERPL-MCNC: 14 MG/DL (ref 8–23)
CALCIUM SERPL-MCNC: 9.8 MG/DL (ref 8.7–10.5)
CHLORIDE SERPL-SCNC: 106 MMOL/L (ref 95–110)
CO2 SERPL-SCNC: 26 MMOL/L (ref 23–29)
CREAT SERPL-MCNC: 1 MG/DL (ref 0.5–1.4)
DIFFERENTIAL METHOD: ABNORMAL
EOSINOPHIL # BLD AUTO: 0.3 K/UL (ref 0–0.5)
EOSINOPHIL NFR BLD: 4.4 % (ref 0–8)
ERYTHROCYTE [DISTWIDTH] IN BLOOD BY AUTOMATED COUNT: 12.2 % (ref 11.5–14.5)
EST. GFR  (AFRICAN AMERICAN): 59.4 ML/MIN/1.73 M^2
EST. GFR  (NON AFRICAN AMERICAN): 51.5 ML/MIN/1.73 M^2
GLUCOSE SERPL-MCNC: 101 MG/DL (ref 70–110)
HCT VFR BLD AUTO: 43.9 % (ref 37–48.5)
HGB BLD-MCNC: 14.8 G/DL (ref 12–16)
IMM GRANULOCYTES # BLD AUTO: 0.01 K/UL (ref 0–0.04)
IMM GRANULOCYTES NFR BLD AUTO: 0.1 % (ref 0–0.5)
LYMPHOCYTES # BLD AUTO: 3 K/UL (ref 1–4.8)
LYMPHOCYTES NFR BLD: 39.4 % (ref 18–48)
MCH RBC QN AUTO: 34.3 PG (ref 27–31)
MCHC RBC AUTO-ENTMCNC: 33.7 G/DL (ref 32–36)
MCV RBC AUTO: 102 FL (ref 82–98)
MONOCYTES # BLD AUTO: 0.7 K/UL (ref 0.3–1)
MONOCYTES NFR BLD: 9 % (ref 4–15)
NEUTROPHILS # BLD AUTO: 3.5 K/UL (ref 1.8–7.7)
NEUTROPHILS NFR BLD: 46.7 % (ref 38–73)
NRBC BLD-RTO: 0 /100 WBC
PLATELET # BLD AUTO: 189 K/UL (ref 150–450)
PMV BLD AUTO: 10.7 FL (ref 9.2–12.9)
POTASSIUM SERPL-SCNC: 4.3 MMOL/L (ref 3.5–5.1)
PROT SERPL-MCNC: 6.7 G/DL (ref 6–8.4)
RBC # BLD AUTO: 4.32 M/UL (ref 4–5.4)
SODIUM SERPL-SCNC: 142 MMOL/L (ref 136–145)
WBC # BLD AUTO: 7.53 K/UL (ref 3.9–12.7)

## 2022-03-18 PROCEDURE — 1160F RVW MEDS BY RX/DR IN RCRD: CPT | Mod: CPTII,S$GLB,, | Performed by: NURSE PRACTITIONER

## 2022-03-18 PROCEDURE — 99213 PR OFFICE/OUTPT VISIT, EST, LEVL III, 20-29 MIN: ICD-10-PCS | Mod: S$GLB,,, | Performed by: NURSE PRACTITIONER

## 2022-03-18 PROCEDURE — 1159F PR MEDICATION LIST DOCUMENTED IN MEDICAL RECORD: ICD-10-PCS | Mod: CPTII,S$GLB,, | Performed by: NURSE PRACTITIONER

## 2022-03-18 PROCEDURE — 1160F PR REVIEW ALL MEDS BY PRESCRIBER/CLIN PHARMACIST DOCUMENTED: ICD-10-PCS | Mod: CPTII,S$GLB,, | Performed by: NURSE PRACTITIONER

## 2022-03-18 PROCEDURE — 99999 PR PBB SHADOW E&M-EST. PATIENT-LVL V: ICD-10-PCS | Mod: PBBFAC,,, | Performed by: NURSE PRACTITIONER

## 2022-03-18 PROCEDURE — 3288F PR FALLS RISK ASSESSMENT DOCUMENTED: ICD-10-PCS | Mod: CPTII,S$GLB,, | Performed by: NURSE PRACTITIONER

## 2022-03-18 PROCEDURE — 1126F AMNT PAIN NOTED NONE PRSNT: CPT | Mod: CPTII,S$GLB,, | Performed by: NURSE PRACTITIONER

## 2022-03-18 PROCEDURE — 99999 PR PBB SHADOW E&M-EST. PATIENT-LVL V: CPT | Mod: PBBFAC,,, | Performed by: NURSE PRACTITIONER

## 2022-03-18 PROCEDURE — 1159F MED LIST DOCD IN RCRD: CPT | Mod: CPTII,S$GLB,, | Performed by: NURSE PRACTITIONER

## 2022-03-18 PROCEDURE — 1101F PT FALLS ASSESS-DOCD LE1/YR: CPT | Mod: CPTII,S$GLB,, | Performed by: NURSE PRACTITIONER

## 2022-03-18 PROCEDURE — 99213 OFFICE O/P EST LOW 20 MIN: CPT | Mod: S$GLB,,, | Performed by: NURSE PRACTITIONER

## 2022-03-18 PROCEDURE — 80053 COMPREHEN METABOLIC PANEL: CPT | Performed by: NURSE PRACTITIONER

## 2022-03-18 PROCEDURE — 3077F SYST BP >= 140 MM HG: CPT | Mod: CPTII,S$GLB,, | Performed by: NURSE PRACTITIONER

## 2022-03-18 PROCEDURE — 1126F PR PAIN SEVERITY QUANTIFIED, NO PAIN PRESENT: ICD-10-PCS | Mod: CPTII,S$GLB,, | Performed by: NURSE PRACTITIONER

## 2022-03-18 PROCEDURE — 3288F FALL RISK ASSESSMENT DOCD: CPT | Mod: CPTII,S$GLB,, | Performed by: NURSE PRACTITIONER

## 2022-03-18 PROCEDURE — 3079F PR MOST RECENT DIASTOLIC BLOOD PRESSURE 80-89 MM HG: ICD-10-PCS | Mod: CPTII,S$GLB,, | Performed by: NURSE PRACTITIONER

## 2022-03-18 PROCEDURE — 3077F PR MOST RECENT SYSTOLIC BLOOD PRESSURE >= 140 MM HG: ICD-10-PCS | Mod: CPTII,S$GLB,, | Performed by: NURSE PRACTITIONER

## 2022-03-18 PROCEDURE — 1101F PR PT FALLS ASSESS DOC 0-1 FALLS W/OUT INJ PAST YR: ICD-10-PCS | Mod: CPTII,S$GLB,, | Performed by: NURSE PRACTITIONER

## 2022-03-18 PROCEDURE — 85025 COMPLETE CBC W/AUTO DIFF WBC: CPT | Performed by: NURSE PRACTITIONER

## 2022-03-18 PROCEDURE — 3079F DIAST BP 80-89 MM HG: CPT | Mod: CPTII,S$GLB,, | Performed by: NURSE PRACTITIONER

## 2022-03-18 PROCEDURE — 36415 COLL VENOUS BLD VENIPUNCTURE: CPT | Mod: PO | Performed by: NURSE PRACTITIONER

## 2022-03-18 RX ORDER — GABAPENTIN 100 MG/1
300 CAPSULE ORAL 3 TIMES DAILY
Qty: 90 CAPSULE | Refills: 2 | Status: SHIPPED | OUTPATIENT
Start: 2022-03-18 | End: 2022-04-29

## 2022-03-18 NOTE — PROGRESS NOTES
NEUROLOGY  Outpatient Follow Up    [facility]  [address] [facility], [place] [address]  [phone] (office) / [phone] (fax)    Patient Name:  Kenyetta Andersen  :  1935  MR #:  [id number]  Acct #:  [id number]    Date of Neurology Visit: 2022  Name of Provider: BEATRIZ Clarke    Other Physicians:  Hever Arreola MD (Primary Care Physician); No ref. provider found (Referring)      Chief Complaint: Peripheral Neuropathy (Chemotherapy- Induced /)      History of Present Illness (HPI):  Kenyetta Andersen is a right handed 86 y.o. female.    Patient is here today management of Neuropathy. She has had this for about 12 years after receiving chemotherapy. She had stage III colorectal carcinoma in  and received adjuvant folfox. She reports symptoms are to her hands and feet and feel numb. There is no burning pain but she does report tingling. It is constant.   Her feet feel as though they are slippery. She may walk out of slippers and not even know it. She denies recent falls. She is maintained on Gabapentin and believes she is currenlty taking 600 mg TID but reports that her prescription was recently changed. She states she has been on this medication for ~ 5 years and doesn't report much aid. She has never tried any creams.            Interval Hx 3/18/2022:   Patient is here today for neuropathy follow up. She still reports constant tingling. Her Gabapentin is dosed at 100 mg TID and is unsure why her Gabapentin was decreased. She does not find this dose aids her well but also denies any side effects. She denies recent falls.           Past Medical, Surgical, Family & Social History:   Reviewed and updated.    Home Medications:     Current Outpatient Medications:     alendronate (FOSAMAX) 70 MG tablet, TAKE 1 TABLET (70 MG TOTAL) BY MOUTH EVERY 7 DAYS. ON EMPTY STOMACH IN MORNING, REMAIN UPRIGHT FOR 30 MINUTES., Disp: 12 tablet, Rfl: 3    ascorbic acid, vitamin C,  "(VITAMIN C) 500 MG tablet, Take 500 mg by mouth once daily., Disp: , Rfl:     b complex vitamins capsule, Take 1 capsule by mouth once daily., Disp: , Rfl:     BOOSTRIX TDAP 2.5-8-5 Lf-mcg-Lf/0.5mL Syrg injection, , Disp: , Rfl:     brimonidine 0.2% (ALPHAGAN) 0.2 % Drop, INSTILL 1 DROP INTO BOTH EYES 3 TIMES A DAY, Disp: 10 mL, Rfl: 6    cyanocobalamin (VITAMIN B-12) 1000 MCG tablet, Take 100 mcg by mouth once daily., Disp: , Rfl:     dorzolamide-timolol 2-0.5% (COSOPT) 22.3-6.8 mg/mL ophthalmic solution, INSTILL 1 DROP INTO BOTH EYES TWICE A DAY, Disp: 10 mL, Rfl: 4    hydroCHLOROthiazide (HYDRODIURIL) 12.5 MG Tab, TAKE 1 TABLET BY MOUTH EVERY DAY, Disp: 90 tablet, Rfl: 0    latanoprost 0.005 % ophthalmic solution, Place 1 drop into both eyes every evening., Disp: 7.5 mL, Rfl: 3    meloxicam (MOBIC) 15 MG tablet, TAKE 1 TABLET BY MOUTH EVERY DAY AS NEEDED FOR PAIN, Disp: 30 tablet, Rfl: 2    potassium chloride SA (K-DUR,KLOR-CON) 20 MEQ tablet, Take 1 tablet (20 mEq total) by mouth once daily., Disp: 30 tablet, Rfl: 11    RABEprazole (ACIPHEX) 20 mg tablet, Take 1 tablet (20 mg total) by mouth once daily., Disp: 90 tablet, Rfl: 1    SHINGRIX, PF, 50 mcg/0.5 mL injection, , Disp: , Rfl:     traZODone (DESYREL) 50 MG tablet, TAKE 1 TABLET (50 MG TOTAL) BY MOUTH EVERY EVENING., Disp: 90 tablet, Rfl: 1    gabapentin (NEURONTIN) 100 MG capsule, Take 3 capsules (300 mg total) by mouth 3 (three) times daily., Disp: 90 capsule, Rfl: 2    propofoL (DIPRIVAN) 10 mg/mL infusion, , Disp: , Rfl:     Physical Examination:  BP (!) 144/84 (BP Location: Left arm, Patient Position: Sitting, BP Method: Medium (Automatic))   Pulse 98   Resp 17   Ht 5' 3" (1.6 m)   Wt 65.7 kg (144 lb 13.5 oz)   BMI 25.66 kg/m²     GENERAL:  General appearance: Well, non-toxic appearing.  No apparent distress.  Neck: supple.  .     MENTAL STATUS:  Alertness, attention span & concentration: normal.  Language: normal.  Orientation to " self, place & time:  normal.  Memory, recent & remote: normal.  Fund of knowledge: normal.        SPEECH:  Clear and fluent.  Follows complex commands.     CRANIAL NERVES:  Cranial Nerves II-XII were examined.  II - Visual fields: normal.  III, IV, VI: PERRL, EOMI, No ptosis, No nystagmus.  V - Facial sensation: normal.  VII - Face symmetry & mobility: not assessed d/t COVID precautions  VIII - Hearing: normal.  IX, X - Palate: not assessed d/t COVID precautions  XI - Shoulder shrug: normal.  XII - Tongue protrusion: not assessed d/t COVID precautions     GROSS MOTOR:  Gait & station: non focal; mildly antalgic  Tone: normal.  Abnormal movements: none.  Finger-nose: normal.  Rapid alternating movements: normal.  Pronator drift: normal        MUSCLE STRENGTH:      Fascics Atrophy RIGHT      LEFT Atrophy Fascics       5 Biceps 5           5 Triceps 5           5 Forearm.Pr. 5                           4+ Iliopsoas flex    4+           5 Hip Abduct 5           5 Hip Adduct 5           5 Quads 5           5 Hams 5           5 Dorsiflex 5           5 Plantar Flex 5          REFLEXES:  No clonus  RIGHT Reflex    LEFT   2+ Biceps 2+   2+ Brachiorad. 2+           2+ Patellar 2+      SENSORY:  Light touch: Normal throughout.   Sharp touch: Normal throughout.  Vibration: Normal throughout.   Temperature: Normal throughout.   Joint Position: Normal throughout.              Diagnostic Data Reviewed:     Component      Latest Ref Rng & Units 11/11/2021   Sodium      136 - 145 mmol/L 142   Potassium      3.5 - 5.1 mmol/L 3.9   Chloride      95 - 110 mmol/L 107   CO2      23 - 29 mmol/L 23   Glucose      70 - 110 mg/dL 110   BUN      8 - 23 mg/dL 15   Creatinine      0.5 - 1.4 mg/dL 1.3   Calcium      8.7 - 10.5 mg/dL 9.8   PROTEIN TOTAL      6.0 - 8.4 g/dL 7.0   Albumin      3.5 - 5.2 g/dL 3.3 (L)   BILIRUBIN TOTAL      0.1 - 1.0 mg/dL 0.6   Alkaline Phosphatase      55 - 135 U/L 62   AST      10 - 40 U/L 22   ALT      10 - 44  U/L 19   Anion Gap      8 - 16 mmol/L 12   eGFR if African American      >60 mL/min/1.73 m:2 44 (A)   eGFR if non African American      >60 mL/min/1.73 m:2 38 (A)                     Assessment and Plan:    Problem List Items Addressed This Visit        Neuro    Chemotherapy-induced peripheral neuropathy    Current Assessment & Plan     Received adjuvant folfox post colorectal CA dx ~ 2009 and has suffered neuropathy symptoms ever since.   She reports only numbness and mild tingling to her hands and feet. She denies burning sensation, pain or weakness.   She has been maintained on Gabapentin for many years and unsure why her dose was changed by her oncologist.  Currently maintained on 100 mg TID without much aid   - increase to 300 mg TID with intentions of increasing.  Educated patient on the role of Gabapentin and how this medication does not aid with numbness but rather helps with tingling, pain etc.   CrCl 33 based on last CMP in November. Obtain updated labs   Offered compound cream but patient would first like to try OTC cream.                   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          The patient will return to clinic in 6-8 weeks    All questions were answered and patient is comfortable with the plan.         Thank you very much for the opportunity to assist in this patient's care.    If you have any questions or concerns, please do not hesitate to contact me at any time.      Sincerely,     BEATRIZ Clarke  [facility] [place]         I spent a total of 21 minutes  on the day of the visit.This includes face to face time and non-face to face time preparing to see the patient (eg, review of tests), Obtaining and/or reviewing separately obtained history, Documenting clinical information in the electronic or other health record, Independently interpreting resultsand communicating results to the patient/family/caregiver, or Care coordination.

## 2022-03-18 NOTE — ASSESSMENT & PLAN NOTE
Received adjuvant folfox post colorectal CA dx ~ 2009 and has suffered neuropathy symptoms ever since.   She reports only numbness and mild tingling to her hands and feet. She denies burning sensation, pain or weakness.   She has been maintained on Gabapentin for many years and unsure why her dose was changed by her oncologist.  Currently maintained on 100 mg TID without much aid   - increase to 300 mg TID with intentions of increasing.  Educated patient on the role of Gabapentin and how this medication does not aid with numbness but rather helps with tingling, pain etc.   CrCl 33 based on last CMP in November. Obtain updated labs   Offered compound cream but patient would first like to try OTC cream.

## 2022-03-18 NOTE — PATIENT INSTRUCTIONS
Neuropathy is the medical term for nerve damage. Neuropathy is a common complication of type 1 and type 2 diabetes; up to 26 percent of people with type 2 diabetes have evidence of nerve damage at the time that diabetes is diagnosed.  A generalized type of neuropathy, known as polyneuropathy, is the most common type of diabetic neuropathy. Other types of neuropathy can also affect people with diabetes, but will not be discussed here.    Signs and symptoms of diabetic neuropathy include loss of sensation and/or burning pain in the feet. Early detection of diabetes and tight control of blood sugar levels may reduce the risk of developing diabetic neuropathy.    Treatments for diabetic neuropathy are available, and include several elements: control of blood glucose levels, prevention of injury, and control of painful symptoms.        Causes  Peripheral neuropathy is nerve damage caused by a number of different conditions. Health conditions that can cause peripheral neuropathy include:    Autoimmune diseases. These include Sjogren's syndrome, lupus, rheumatoid arthritis, Guillain-Clarkston syndrome, chronic inflammatory demyelinating polyneuropathy and vasculitis.  Diabetes. This is the most common cause. Among people with diabetes, more than halfwill develop some type of neuropathy.  Infections. These include certain viral or bacterial infections, including Lyme disease, shingles, Zoe-Barr virus, hepatitis B and C, leprosy, diphtheria, and HIV.  Inherited disorders. Disorders such as Charcot-Yamileth-Tooth disease are hereditary types of neuropathy.  Tumors. Growths, cancerous (malignant) and noncancerous (benign), can develop on the nerves or press on nerves. Also, polyneuropathy can arise as a result of some cancers related to the body's immune response. These are a form of a degenerative disorder called paraneoplastic syndrome.  Bone marrow disorders. These include an abnormal protein in the blood (monoclonal  gammopathies), a form of bone cancer (myeloma), lymphoma and the rare disease amyloidosis.  Other diseases. These include kidney disease, liver disease, connective tissue disorders and an underactive thyroid (hypothyroidism).  Other causes of neuropathies include:    Alcoholism. Poor dietary choices made by people with alcoholism can lead to vitamin deficiencies.  Exposure to poisons. Toxic substances include industrial chemicals and heavy metals such as lead and mercury.  Medications. Certain medications, especially those used to treat cancer (chemotherapy), can cause peripheral neuropathy.  Injury or pressure on the nerve. Injuries, such as from motor vehicle accidents, falls or sports injuries, can sever or damage peripheral nerves. Nerve pressure can result from having a cast or using crutches or repeating a motion such as typing many times.  Vitamin deficiencies. B vitamins -- including B-1, B-6 and B-12 -- vitamin E and niacin are crucial to nerve health.

## 2022-04-01 ENCOUNTER — TELEPHONE (OUTPATIENT)
Dept: HEMATOLOGY/ONCOLOGY | Facility: CLINIC | Age: 87
End: 2022-04-01
Payer: MEDICARE

## 2022-04-01 NOTE — TELEPHONE ENCOUNTER
Called the pt. Together, we rescheduled her missed appt from Nov 2021. Labs were done at that time    ----- Message from Yuliet Vicente sent at 4/1/2022 12:51 PM CDT -----  Type:Needs Medical Advice    Who Called:COURTNEY  Best call back number:#[phone]  Additional Info: Requesting a call back regarding#YURY needs to schedule an appt with Dr Granado. She missed the one from November 2021. Please call to reschedule.  Please Advise- Thank you

## 2022-04-26 ENCOUNTER — OFFICE VISIT (OUTPATIENT)
Dept: HEMATOLOGY/ONCOLOGY | Facility: CLINIC | Age: 87
End: 2022-04-26
Payer: MEDICARE

## 2022-04-26 VITALS
BODY MASS INDEX: 25.74 KG/M2 | HEART RATE: 75 BPM | TEMPERATURE: 97 F | OXYGEN SATURATION: 95 % | SYSTOLIC BLOOD PRESSURE: 158 MMHG | RESPIRATION RATE: 16 BRPM | WEIGHT: 145.31 LBS | DIASTOLIC BLOOD PRESSURE: 70 MMHG

## 2022-04-26 DIAGNOSIS — E87.6 HYPOKALEMIA: ICD-10-CM

## 2022-04-26 DIAGNOSIS — E53.8 B12 DEFICIENCY: Primary | ICD-10-CM

## 2022-04-26 DIAGNOSIS — Z85.038 HISTORY OF COLON CANCER: ICD-10-CM

## 2022-04-26 DIAGNOSIS — D75.89 MACROCYTOSIS: ICD-10-CM

## 2022-04-26 PROCEDURE — 3077F SYST BP >= 140 MM HG: CPT | Mod: CPTII,S$GLB,, | Performed by: INTERNAL MEDICINE

## 2022-04-26 PROCEDURE — 99214 PR OFFICE/OUTPT VISIT, EST, LEVL IV, 30-39 MIN: ICD-10-PCS | Mod: S$GLB,,, | Performed by: INTERNAL MEDICINE

## 2022-04-26 PROCEDURE — 99999 PR PBB SHADOW E&M-EST. PATIENT-LVL IV: CPT | Mod: PBBFAC,,, | Performed by: INTERNAL MEDICINE

## 2022-04-26 PROCEDURE — 3288F FALL RISK ASSESSMENT DOCD: CPT | Mod: CPTII,S$GLB,, | Performed by: INTERNAL MEDICINE

## 2022-04-26 PROCEDURE — 1125F AMNT PAIN NOTED PAIN PRSNT: CPT | Mod: CPTII,S$GLB,, | Performed by: INTERNAL MEDICINE

## 2022-04-26 PROCEDURE — 1159F MED LIST DOCD IN RCRD: CPT | Mod: CPTII,S$GLB,, | Performed by: INTERNAL MEDICINE

## 2022-04-26 PROCEDURE — 1101F PR PT FALLS ASSESS DOC 0-1 FALLS W/OUT INJ PAST YR: ICD-10-PCS | Mod: CPTII,S$GLB,, | Performed by: INTERNAL MEDICINE

## 2022-04-26 PROCEDURE — 99999 PR PBB SHADOW E&M-EST. PATIENT-LVL IV: ICD-10-PCS | Mod: PBBFAC,,, | Performed by: INTERNAL MEDICINE

## 2022-04-26 PROCEDURE — 3078F DIAST BP <80 MM HG: CPT | Mod: CPTII,S$GLB,, | Performed by: INTERNAL MEDICINE

## 2022-04-26 PROCEDURE — 1125F PR PAIN SEVERITY QUANTIFIED, PAIN PRESENT: ICD-10-PCS | Mod: CPTII,S$GLB,, | Performed by: INTERNAL MEDICINE

## 2022-04-26 PROCEDURE — 3078F PR MOST RECENT DIASTOLIC BLOOD PRESSURE < 80 MM HG: ICD-10-PCS | Mod: CPTII,S$GLB,, | Performed by: INTERNAL MEDICINE

## 2022-04-26 PROCEDURE — 1101F PT FALLS ASSESS-DOCD LE1/YR: CPT | Mod: CPTII,S$GLB,, | Performed by: INTERNAL MEDICINE

## 2022-04-26 PROCEDURE — 3077F PR MOST RECENT SYSTOLIC BLOOD PRESSURE >= 140 MM HG: ICD-10-PCS | Mod: CPTII,S$GLB,, | Performed by: INTERNAL MEDICINE

## 2022-04-26 PROCEDURE — 3288F PR FALLS RISK ASSESSMENT DOCUMENTED: ICD-10-PCS | Mod: CPTII,S$GLB,, | Performed by: INTERNAL MEDICINE

## 2022-04-26 PROCEDURE — 1159F PR MEDICATION LIST DOCUMENTED IN MEDICAL RECORD: ICD-10-PCS | Mod: CPTII,S$GLB,, | Performed by: INTERNAL MEDICINE

## 2022-04-26 PROCEDURE — 99214 OFFICE O/P EST MOD 30 MIN: CPT | Mod: S$GLB,,, | Performed by: INTERNAL MEDICINE
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
## 2022-04-27 ENCOUNTER — OFFICE VISIT (OUTPATIENT)
Dept: OPTOMETRY | Facility: CLINIC | Age: 87
End: 2022-04-27
Payer: MEDICARE

## 2022-04-27 DIAGNOSIS — H40.1131 PRIMARY OPEN ANGLE GLAUCOMA (POAG) OF BOTH EYES, MILD STAGE: Primary | ICD-10-CM

## 2022-04-27 PROCEDURE — 1160F PR REVIEW ALL MEDS BY PRESCRIBER/CLIN PHARMACIST DOCUMENTED: ICD-10-PCS | Mod: CPTII,S$GLB,, | Performed by: OPTOMETRIST

## 2022-04-27 PROCEDURE — 3288F PR FALLS RISK ASSESSMENT DOCUMENTED: ICD-10-PCS | Mod: CPTII,S$GLB,, | Performed by: OPTOMETRIST

## 2022-04-27 PROCEDURE — 1160F RVW MEDS BY RX/DR IN RCRD: CPT | Mod: CPTII,S$GLB,, | Performed by: OPTOMETRIST

## 2022-04-27 PROCEDURE — 1159F MED LIST DOCD IN RCRD: CPT | Mod: CPTII,S$GLB,, | Performed by: OPTOMETRIST

## 2022-04-27 PROCEDURE — 99212 OFFICE O/P EST SF 10 MIN: CPT | Mod: S$GLB,,, | Performed by: OPTOMETRIST

## 2022-04-27 PROCEDURE — 1126F AMNT PAIN NOTED NONE PRSNT: CPT | Mod: CPTII,S$GLB,, | Performed by: OPTOMETRIST

## 2022-04-27 PROCEDURE — 99212 PR OFFICE/OUTPT VISIT, EST, LEVL II, 10-19 MIN: ICD-10-PCS | Mod: S$GLB,,, | Performed by: OPTOMETRIST

## 2022-04-27 PROCEDURE — 3288F FALL RISK ASSESSMENT DOCD: CPT | Mod: CPTII,S$GLB,, | Performed by: OPTOMETRIST

## 2022-04-27 PROCEDURE — 1159F PR MEDICATION LIST DOCUMENTED IN MEDICAL RECORD: ICD-10-PCS | Mod: CPTII,S$GLB,, | Performed by: OPTOMETRIST

## 2022-04-27 PROCEDURE — 1101F PT FALLS ASSESS-DOCD LE1/YR: CPT | Mod: CPTII,S$GLB,, | Performed by: OPTOMETRIST

## 2022-04-27 PROCEDURE — 99999 PR PBB SHADOW E&M-EST. PATIENT-LVL III: ICD-10-PCS | Mod: PBBFAC,,, | Performed by: OPTOMETRIST

## 2022-04-27 PROCEDURE — 99999 PR PBB SHADOW E&M-EST. PATIENT-LVL III: CPT | Mod: PBBFAC,,, | Performed by: OPTOMETRIST

## 2022-04-27 PROCEDURE — 1126F PR PAIN SEVERITY QUANTIFIED, NO PAIN PRESENT: ICD-10-PCS | Mod: CPTII,S$GLB,, | Performed by: OPTOMETRIST

## 2022-04-27 PROCEDURE — 1101F PR PT FALLS ASSESS DOC 0-1 FALLS W/OUT INJ PAST YR: ICD-10-PCS | Mod: CPTII,S$GLB,, | Performed by: OPTOMETRIST

## 2022-04-27 NOTE — PROGRESS NOTES
HPI     Pt here today for IOP check for POAG - OU.    Latanoprost OU qhs  Brimonidine OU tid  Cosopt OU bid    Last edited by Fadumo Mcgarry on 4/27/2022  1:58 PM. (History)            Assessment /Plan     For exam results, see Encounter Report.    Primary open angle glaucoma (POAG) of both eyes, mild stage      1. Primary open angle glaucoma (POAG) of both eyes, mild stage  IOP mildly higher than previous, target low to mid teens  Pt unsure if used drops today, discussed importance of med compliance  Continue cosopt bid OU  brimonidine tid OU  latanoprost qPM OU  Return in 4 months for glc workup  iop check  dfe   Will refer to Dr. Washburn if no improvement with iop    - OCT - Optic Nerve; Future  - Gracia Visual Field - OU - Extended - Both Eyes; Future

## 2022-04-28 ENCOUNTER — PATIENT OUTREACH (OUTPATIENT)
Dept: ADMINISTRATIVE | Facility: OTHER | Age: 87
End: 2022-04-28
Payer: MEDICARE

## 2022-04-28 ENCOUNTER — TELEPHONE (OUTPATIENT)
Dept: NEUROLOGY | Facility: CLINIC | Age: 87
End: 2022-04-28
Payer: MEDICARE

## 2022-04-28 NOTE — PROGRESS NOTES
Health Maintenance Due   Topic Date Due    Pneumococcal Vaccines (Age 65+) (3 - PPSV23 or PCV20) 03/20/2019     Updates were requested from care everywhere.  Chart was reviewed for overdue Proactive [facility] ([facility]) topics (CRS, Breast Cancer Screening, Eye exam)  Health Maintenance has been updated.  LINKS immunization registry triggered.  Immunizations were reconciled.

## 2022-04-29 ENCOUNTER — OFFICE VISIT (OUTPATIENT)
Dept: NEUROLOGY | Facility: CLINIC | Age: 87
End: 2022-04-29
Payer: MEDICARE

## 2022-04-29 ENCOUNTER — TELEPHONE (OUTPATIENT)
Dept: GASTROENTEROLOGY | Facility: CLINIC | Age: 87
End: 2022-04-29
Payer: MEDICARE

## 2022-04-29 VITALS
HEIGHT: 63 IN | RESPIRATION RATE: 16 BRPM | DIASTOLIC BLOOD PRESSURE: 78 MMHG | SYSTOLIC BLOOD PRESSURE: 125 MMHG | BODY MASS INDEX: 25.78 KG/M2 | HEART RATE: 77 BPM | WEIGHT: 145.5 LBS

## 2022-04-29 DIAGNOSIS — G62.9 NEUROPATHY: ICD-10-CM

## 2022-04-29 DIAGNOSIS — T45.1X5A CHEMOTHERAPY-INDUCED PERIPHERAL NEUROPATHY: ICD-10-CM

## 2022-04-29 DIAGNOSIS — G62.0 CHEMOTHERAPY-INDUCED PERIPHERAL NEUROPATHY: ICD-10-CM

## 2022-04-29 PROCEDURE — 1101F PT FALLS ASSESS-DOCD LE1/YR: CPT | Mod: CPTII,S$GLB,, | Performed by: NURSE PRACTITIONER

## 2022-04-29 PROCEDURE — 1159F PR MEDICATION LIST DOCUMENTED IN MEDICAL RECORD: ICD-10-PCS | Mod: CPTII,S$GLB,, | Performed by: NURSE PRACTITIONER

## 2022-04-29 PROCEDURE — 3288F PR FALLS RISK ASSESSMENT DOCUMENTED: ICD-10-PCS | Mod: CPTII,S$GLB,, | Performed by: NURSE PRACTITIONER

## 2022-04-29 PROCEDURE — 1126F PR PAIN SEVERITY QUANTIFIED, NO PAIN PRESENT: ICD-10-PCS | Mod: CPTII,S$GLB,, | Performed by: NURSE PRACTITIONER

## 2022-04-29 PROCEDURE — 3078F DIAST BP <80 MM HG: CPT | Mod: CPTII,S$GLB,, | Performed by: NURSE PRACTITIONER

## 2022-04-29 PROCEDURE — 99214 OFFICE O/P EST MOD 30 MIN: CPT | Mod: S$GLB,,, | Performed by: NURSE PRACTITIONER

## 2022-04-29 PROCEDURE — 3074F PR MOST RECENT SYSTOLIC BLOOD PRESSURE < 130 MM HG: ICD-10-PCS | Mod: CPTII,S$GLB,, | Performed by: NURSE PRACTITIONER

## 2022-04-29 PROCEDURE — 1160F PR REVIEW ALL MEDS BY PRESCRIBER/CLIN PHARMACIST DOCUMENTED: ICD-10-PCS | Mod: CPTII,S$GLB,, | Performed by: NURSE PRACTITIONER

## 2022-04-29 PROCEDURE — 99999 PR PBB SHADOW E&M-EST. PATIENT-LVL IV: ICD-10-PCS | Mod: PBBFAC,,, | Performed by: NURSE PRACTITIONER

## 2022-04-29 PROCEDURE — 99214 PR OFFICE/OUTPT VISIT, EST, LEVL IV, 30-39 MIN: ICD-10-PCS | Mod: S$GLB,,, | Performed by: NURSE PRACTITIONER

## 2022-04-29 PROCEDURE — 1159F MED LIST DOCD IN RCRD: CPT | Mod: CPTII,S$GLB,, | Performed by: NURSE PRACTITIONER

## 2022-04-29 PROCEDURE — 3288F FALL RISK ASSESSMENT DOCD: CPT | Mod: CPTII,S$GLB,, | Performed by: NURSE PRACTITIONER

## 2022-04-29 PROCEDURE — 1160F RVW MEDS BY RX/DR IN RCRD: CPT | Mod: CPTII,S$GLB,, | Performed by: NURSE PRACTITIONER

## 2022-04-29 PROCEDURE — 3074F SYST BP LT 130 MM HG: CPT | Mod: CPTII,S$GLB,, | Performed by: NURSE PRACTITIONER

## 2022-04-29 PROCEDURE — 3078F PR MOST RECENT DIASTOLIC BLOOD PRESSURE < 80 MM HG: ICD-10-PCS | Mod: CPTII,S$GLB,, | Performed by: NURSE PRACTITIONER

## 2022-04-29 PROCEDURE — 1126F AMNT PAIN NOTED NONE PRSNT: CPT | Mod: CPTII,S$GLB,, | Performed by: NURSE PRACTITIONER

## 2022-04-29 PROCEDURE — 99999 PR PBB SHADOW E&M-EST. PATIENT-LVL IV: CPT | Mod: PBBFAC,,, | Performed by: NURSE PRACTITIONER

## 2022-04-29 PROCEDURE — 1101F PR PT FALLS ASSESS DOC 0-1 FALLS W/OUT INJ PAST YR: ICD-10-PCS | Mod: CPTII,S$GLB,, | Performed by: NURSE PRACTITIONER

## 2022-04-29 RX ORDER — GABAPENTIN 300 MG/1
CAPSULE ORAL
Qty: 120 CAPSULE | Refills: 11 | Status: SHIPPED | OUTPATIENT
Start: 2022-04-29 | End: 2023-05-09

## 2022-04-29 NOTE — TELEPHONE ENCOUNTER
Patient did not want to schedule repeat EGD she wants to come in and speak with Dr. Washington first patient given first available 6/14 at 10am

## 2022-04-29 NOTE — TELEPHONE ENCOUNTER
----- Message from Lawrence Galloway sent at 4/29/2022 11:38 AM CDT -----  Regarding: appointment  Contact: patient  Patient want to speak with a nurse regarding rescheduling upper GI, please call back at [phone] (home)     Case number [id number]

## 2022-04-29 NOTE — PROGRESS NOTES
NEUROLOGY  Outpatient Follow Up    [facility]  [address] [facility], [place] [address]  [phone] (office) / [phone] (fax)    Patient Name:  Kenyetta Andersen  :  1935  MR #:  [id number]  Acct #:  [id number]    Date of Neurology Visit: 2022  Name of Provider: BEATRIZ Clarke    Other Physicians:  Hever Arerola MD (Primary Care Physician); No ref. provider found (Referring)      Chief Complaint: No chief complaint on file.      History of Present Illness (HPI):  Kenyetta Andersen is a right handed 86 y.o. female.    Patient is here today management of Neuropathy. She has had this for about 12 years after receiving chemotherapy. She had stage III colorectal carcinoma in  and received adjuvant folfox. She reports symptoms are to her hands and feet and feel numb. There is no burning pain but she does report tingling. It is constant.   Her feet feel as though they are slippery. She may walk out of slippers and not even know it. She denies recent falls. She is maintained on Gabapentin and believes she is currenlty taking 600 mg TID but reports that her prescription was recently changed. She states she has been on this medication for ~ 5 years and doesn't report much aid. She has never tried any creams.        Interval Hx 3/18/2022:   Patient is here today for neuropathy follow up. She still reports constant tingling. Her Gabapentin is dosed at 100 mg TID and is unsure why her Gabapentin was decreased. She does not find this dose aids her well but also denies any side effects. She denies recent falls.       Interval Hx 2022:  Patient is here today for neuropathy follow up. She reports that she is taking gabapentin 300 mg TID. She still reports numbness but pain is mostly controlled.  She denies any side effects to this medications. She reports numbness to her fingertip and a swelling feeling to her feet but they are not swollen. She denies significant pain  (burning, tingling, pins/needles) or weakness. She may have intermittent tingling to her feet at night. She did try OTC cream but doesn't believe it offered aid.                 Past Medical, Surgical, Family & Social History:   Reviewed and updated.    Home Medications:     Current Outpatient Medications:     alendronate (FOSAMAX) 70 MG tablet, TAKE 1 TABLET (70 MG TOTAL) BY MOUTH EVERY 7 DAYS. ON EMPTY STOMACH IN MORNING, REMAIN UPRIGHT FOR 30 MINUTES., Disp: 12 tablet, Rfl: 3    ascorbic acid, vitamin C, (VITAMIN C) 500 MG tablet, Take 500 mg by mouth once daily., Disp: , Rfl:     b complex vitamins capsule, Take 1 capsule by mouth once daily., Disp: , Rfl:     BOOSTRIX TDAP 2.5-8-5 Lf-mcg-Lf/0.5mL Syrg injection, , Disp: , Rfl:     brimonidine 0.2% (ALPHAGAN) 0.2 % Drop, INSTILL 1 DROP INTO BOTH EYES 3 TIMES A DAY, Disp: 10 mL, Rfl: 6    cyanocobalamin (VITAMIN B-12) 1000 MCG tablet, Take 100 mcg by mouth once daily., Disp: , Rfl:     dorzolamide-timolol 2-0.5% (COSOPT) 22.3-6.8 mg/mL ophthalmic solution, INSTILL 1 DROP INTO BOTH EYES TWICE A DAY, Disp: 10 mL, Rfl: 4    gabapentin (NEURONTIN) 100 MG capsule, Take 3 capsules (300 mg total) by mouth 3 (three) times daily., Disp: 90 capsule, Rfl: 2    hydroCHLOROthiazide (HYDRODIURIL) 12.5 MG Tab, TAKE 1 TABLET BY MOUTH EVERY DAY, Disp: 90 tablet, Rfl: 0    latanoprost 0.005 % ophthalmic solution, Place 1 drop into both eyes every evening., Disp: 7.5 mL, Rfl: 3    meloxicam (MOBIC) 15 MG tablet, TAKE 1 TABLET BY MOUTH EVERY DAY AS NEEDED FOR PAIN, Disp: 30 tablet, Rfl: 2    potassium chloride SA (K-DUR,KLOR-CON) 20 MEQ tablet, Take 1 tablet (20 mEq total) by mouth once daily., Disp: 30 tablet, Rfl: 11    propofoL (DIPRIVAN) 10 mg/mL infusion, , Disp: , Rfl:     RABEprazole (ACIPHEX) 20 mg tablet, Take 1 tablet (20 mg total) by mouth once daily., Disp: 90 tablet, Rfl: 1    SHINGRIX, PF, 50 mcg/0.5 mL injection, , Disp: , Rfl:     traZODone (DESYREL)  "50 MG tablet, TAKE 1 TABLET (50 MG TOTAL) BY MOUTH EVERY EVENING., Disp: 90 tablet, Rfl: 1    Physical Examination:  /78 (BP Location: Left arm, Patient Position: Sitting, BP Method: Medium (Automatic))   Pulse 77   Resp 16   Ht 5' 3" (1.6 m)   Wt 66 kg (145 lb 8.1 oz)   BMI 25.77 kg/m²     GENERAL:  General appearance: Well, non-toxic appearing.  No apparent distress.  Neck: supple.  .     MENTAL STATUS:  Alertness, attention span & concentration: normal.  Language: normal.  Orientation to self, place & time:  normal.  Memory, recent & remote: normal.  Fund of knowledge: normal.        SPEECH:  Clear and fluent.  Follows complex commands.     CRANIAL NERVES:  Cranial Nerves II-XII were examined.  II - Visual fields: normal.  III, IV, VI: PERRL, EOMI, No ptosis, No nystagmus.  V - Facial sensation: normal.  VII - Face symmetry & mobility: not assessed d/t COVID precautions  VIII - Hearing: normal.  IX, X - Palate: not assessed d/t COVID precautions  XI - Shoulder shrug: normal.  XII - Tongue protrusion: not assessed d/t COVID precautions     GROSS MOTOR:  Gait & station: non focal; mildly antalgic  Tone: normal.  Abnormal movements: none.  Finger-nose: normal.  Rapid alternating movements: normal.  Pronator drift: normal        MUSCLE STRENGTH:      Fascics Atrophy RIGHT      LEFT Atrophy Fascics       5 Biceps 5           5 Triceps 5           5 Forearm.Pr. 5                           4+ Iliopsoas flex    4+           5 Hip Abduct 5           5 Hip Adduct 5           5 Quads 5           5 Hams 5           5 Dorsiflex 5           5 Plantar Flex 5          REFLEXES:    RIGHT Reflex    LEFT   2+ Biceps 2+   2+ Brachiorad. 2+           2+ Patellar 2+      SENSORY:  Light touch: Normal throughout.   Sharp touch: Normal throughout and even hyperintense to bilateral feet  Vibration: no vibratory sensation to big toe left foot  Temperature: Normal throughout.   Joint Position: Normal throughout.  Proprioception: " abnormal to left foot             Diagnostic Data Reviewed:     Component      Latest Ref Rng & Units 11/11/2021   Sodium      136 - 145 mmol/L 142   Potassium      3.5 - 5.1 mmol/L 3.9   Chloride      95 - 110 mmol/L 107   CO2      23 - 29 mmol/L 23   Glucose      70 - 110 mg/dL 110   BUN      8 - 23 mg/dL 15   Creatinine      0.5 - 1.4 mg/dL 1.3   Calcium      8.7 - 10.5 mg/dL 9.8   PROTEIN TOTAL      6.0 - 8.4 g/dL 7.0   Albumin      3.5 - 5.2 g/dL 3.3 (L)   BILIRUBIN TOTAL      0.1 - 1.0 mg/dL 0.6   Alkaline Phosphatase      55 - 135 U/L 62   AST      10 - 40 U/L 22   ALT      10 - 44 U/L 19   Anion Gap      8 - 16 mmol/L 12   eGFR if African American      >60 mL/min/1.73 m:2 44 (A)   eGFR if non African American      >60 mL/min/1.73 m:2 38 (A)                     Assessment and Plan:    Problem List Items Addressed This Visit        Neuro    Chemotherapy-induced peripheral neuropathy    Current Assessment & Plan     Received adjuvant folfox post colorectal CA dx ~ 2009 and has suffered neuropathy symptoms ever since.   She reports only numbness and very mild tingling to her feet. She denies burning sensation, pain or weakness.   She has been maintained on Gabapentin for many years and unsure why her dose was changed by her oncologist.  Gabapentin previously increased from 100 mg TID to 300 mg TID    - increase night dose (300, 300, 600) and she intermittently may have pain to her feet at night but otherwise pain is controlled.   Educated patient again on the role of Gabapentin and how this medication does not aid with numbness but rather helps with tingling, pain etc.   Recent Crt WNL  Pt tried OTC cream without aid. Consider prescribed compound cream in future.   If symptoms dont hold stable then could ultimately try Lyrica instead of Gabapentin but seems as though gabapentin is aiding with tingling or pain.               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          The patient will return to clinic in 6-8 weeks    All questions were answered and patient is comfortable with the plan.         Thank you very much for the opportunity to assist in this patient's care.    If you have any questions or concerns, please do not hesitate to contact me at any time.      Sincerely,     BEATRIZ Clarke  [facility] [place]         SAMIA spent a total of 33 minutes on the day of the visit.This includes face to face time and non-face to face time preparing to see the patient (eg, review of tests), Obtaining and/or reviewing separately obtained history, Documenting clinical information in the electronic or other health record, Independently interpreting resultsand communicating results to the patient/family/caregiver, or Care coordination.

## 2022-04-29 NOTE — ASSESSMENT & PLAN NOTE
Received adjuvant folfox post colorectal CA dx ~ 2009 and has suffered neuropathy symptoms ever since.   She reports only numbness and very mild tingling to her feet. She denies burning sensation, pain or weakness.   She has been maintained on Gabapentin for many years and unsure why her dose was changed by her oncologist.  Gabapentin previously increased from 100 mg TID to 300 mg TID    - increase night dose (300, 300, 600) and she intermittently may have pain to her feet at night but otherwise pain is controlled.   Educated patient again on the role of Gabapentin and how this medication does not aid with numbness but rather helps with tingling, pain etc.   Recent Crt WNL  Pt tried OTC cream without aid. Consider prescribed compound cream in future.   If symptoms dont hold stable then could ultimately try Lyrica instead of Gabapentin but seems as though gabapentin is aiding with tingling or pain.

## 2022-04-29 NOTE — PATIENT INSTRUCTIONS
Neuropathy is the medical term for nerve damage. Neuropathy is a common complication of type 1 and type 2 diabetes; up to 26 percent of people with type 2 diabetes have evidence of nerve damage at the time that diabetes is diagnosed.  A generalized type of neuropathy, known as polyneuropathy, is the most common type of diabetic neuropathy. Other types of neuropathy can also affect people with diabetes, but will not be discussed here.    Signs and symptoms of diabetic neuropathy include loss of sensation and/or burning pain in the feet. Early detection of diabetes and tight control of blood sugar levels may reduce the risk of developing diabetic neuropathy.    Treatments for diabetic neuropathy are available, and include several elements: control of blood glucose levels, prevention of injury, and control of painful symptoms.        Causes  Peripheral neuropathy is nerve damage caused by a number of different conditions. Health conditions that can cause peripheral neuropathy include:    Autoimmune diseases. These include Sjogren's syndrome, lupus, rheumatoid arthritis, Guillain-Bloomington syndrome, chronic inflammatory demyelinating polyneuropathy and vasculitis.  Diabetes. This is the most common cause. Among people with diabetes, more than halfwill develop some type of neuropathy.  Infections. These include certain viral or bacterial infections, including Lyme disease, shingles, Zoe-Barr virus, hepatitis B and C, leprosy, diphtheria, and HIV.  Inherited disorders. Disorders such as Charcot-Yamileth-Tooth disease are hereditary types of neuropathy.  Tumors. Growths, cancerous (malignant) and noncancerous (benign), can develop on the nerves or press on nerves. Also, polyneuropathy can arise as a result of some cancers related to the body's immune response. These are a form of a degenerative disorder called paraneoplastic syndrome.  Bone marrow disorders. These include an abnormal protein in the blood (monoclonal  gammopathies), a form of bone cancer (myeloma), lymphoma and the rare disease amyloidosis.  Other diseases. These include kidney disease, liver disease, connective tissue disorders and an underactive thyroid (hypothyroidism).  Other causes of neuropathies include:    Alcoholism. Poor dietary choices made by people with alcoholism can lead to vitamin deficiencies.  Exposure to poisons. Toxic substances include industrial chemicals and heavy metals such as lead and mercury.  Medications. Certain medications, especially those used to treat cancer (chemotherapy), can cause peripheral neuropathy.  Injury or pressure on the nerve. Injuries, such as from motor vehicle accidents, falls or sports injuries, can sever or damage peripheral nerves. Nerve pressure can result from having a cast or using crutches or repeating a motion such as typing many times.  Vitamin deficiencies. B vitamins -- including B-1, B-6 and B-12 -- vitamin E and niacin are crucial to nerve health.

## 2022-05-18 ENCOUNTER — TELEPHONE (OUTPATIENT)
Dept: ADMINISTRATIVE | Facility: CLINIC | Age: 87
End: 2022-05-18
Payer: MEDICARE

## 2022-05-19 ENCOUNTER — OFFICE VISIT (OUTPATIENT)
Dept: FAMILY MEDICINE | Facility: CLINIC | Age: 87
End: 2022-05-19
Payer: MEDICARE

## 2022-05-19 VITALS
HEART RATE: 81 BPM | SYSTOLIC BLOOD PRESSURE: 120 MMHG | OXYGEN SATURATION: 94 % | WEIGHT: 143.06 LBS | DIASTOLIC BLOOD PRESSURE: 68 MMHG | TEMPERATURE: 98 F | BODY MASS INDEX: 25.35 KG/M2 | HEIGHT: 63 IN

## 2022-05-19 DIAGNOSIS — R26.9 ABNORMALITY OF GAIT AND MOBILITY: ICD-10-CM

## 2022-05-19 DIAGNOSIS — Z00.00 ENCOUNTER FOR PREVENTIVE HEALTH EXAMINATION: Primary | ICD-10-CM

## 2022-05-19 DIAGNOSIS — J43.9 PULMONARY EMPHYSEMA, UNSPECIFIED EMPHYSEMA TYPE: ICD-10-CM

## 2022-05-19 DIAGNOSIS — N18.32 STAGE 3B CHRONIC KIDNEY DISEASE: ICD-10-CM

## 2022-05-19 DIAGNOSIS — Z85.038 PERSONAL HISTORY OF COLON CANCER, STAGE III: ICD-10-CM

## 2022-05-19 DIAGNOSIS — I70.0 ABDOMINAL AORTIC ATHEROSCLEROSIS: ICD-10-CM

## 2022-05-19 PROCEDURE — 99499 UNLISTED E&M SERVICE: CPT | Mod: S$GLB,,, | Performed by: NURSE PRACTITIONER

## 2022-05-19 PROCEDURE — 1160F PR REVIEW ALL MEDS BY PRESCRIBER/CLIN PHARMACIST DOCUMENTED: ICD-10-PCS | Mod: CPTII,S$GLB,, | Performed by: NURSE PRACTITIONER

## 2022-05-19 PROCEDURE — 99999 PR PBB SHADOW E&M-EST. PATIENT-LVL V: ICD-10-PCS | Mod: PBBFAC,,, | Performed by: NURSE PRACTITIONER

## 2022-05-19 PROCEDURE — 1126F PR PAIN SEVERITY QUANTIFIED, NO PAIN PRESENT: ICD-10-PCS | Mod: CPTII,S$GLB,, | Performed by: NURSE PRACTITIONER

## 2022-05-19 PROCEDURE — 1170F PR FUNCTIONAL STATUS ASSESSED: ICD-10-PCS | Mod: CPTII,S$GLB,, | Performed by: NURSE PRACTITIONER

## 2022-05-19 PROCEDURE — 1160F RVW MEDS BY RX/DR IN RCRD: CPT | Mod: CPTII,S$GLB,, | Performed by: NURSE PRACTITIONER

## 2022-05-19 PROCEDURE — G0439 PR MEDICARE ANNUAL WELLNESS SUBSEQUENT VISIT: ICD-10-PCS | Mod: S$GLB,,, | Performed by: NURSE PRACTITIONER

## 2022-05-19 PROCEDURE — 1170F FXNL STATUS ASSESSED: CPT | Mod: CPTII,S$GLB,, | Performed by: NURSE PRACTITIONER

## 2022-05-19 PROCEDURE — 99499 RISK ADDL DX/OHS AUDIT: ICD-10-PCS | Mod: S$GLB,,, | Performed by: NURSE PRACTITIONER

## 2022-05-19 PROCEDURE — 1159F PR MEDICATION LIST DOCUMENTED IN MEDICAL RECORD: ICD-10-PCS | Mod: CPTII,S$GLB,, | Performed by: NURSE PRACTITIONER

## 2022-05-19 PROCEDURE — 99999 PR PBB SHADOW E&M-EST. PATIENT-LVL V: CPT | Mod: PBBFAC,,, | Performed by: NURSE PRACTITIONER

## 2022-05-19 PROCEDURE — G0439 PPPS, SUBSEQ VISIT: HCPCS | Mod: S$GLB,,, | Performed by: NURSE PRACTITIONER

## 2022-05-19 PROCEDURE — 1126F AMNT PAIN NOTED NONE PRSNT: CPT | Mod: CPTII,S$GLB,, | Performed by: NURSE PRACTITIONER

## 2022-05-19 PROCEDURE — 1159F MED LIST DOCD IN RCRD: CPT | Mod: CPTII,S$GLB,, | Performed by: NURSE PRACTITIONER

## 2022-05-19 NOTE — PATIENT INSTRUCTIONS
Counseling and Referral of Other Preventative  (Italic type indicates deductible and co-insurance are waived)    Patient Name: Kenyetta Andersen  Today's Date: 5/19/2022    Health Maintenance       Date Due Completion Date    Pneumococcal Vaccines (Age 65+) (3 - PPSV23 or PCV20) 03/20/2019 11/17/2015    COVID-19 Vaccine (4 - Booster for Pfizer series) 04/10/2022 12/10/2021    TETANUS VACCINE 09/13/2022 9/13/2012    DEXA Scan 02/11/2024 2/11/2021    Colonoscopy 01/21/2025 1/21/2020    Override on 3/14/2017: Done (Dr. Chey Daily copy sent to scanning)    Lipid Panel 07/12/2026 7/12/2021        No orders of the defined types were placed in this encounter.    The following information is provided to all patients.  This information is to help you find resources for any of the problems found today that may be affecting your health:                Living healthy guide: www.[facility].[place].gov      Understanding Diabetes: www.diabetes.org      Eating healthy: www.cdc.gov/healthyweight      CDC home safety checklist: www.cdc.gov/steadi/patient.html      Agency on Aging: www.goea.[place].gov      Alcoholics anonymous (AA): www.aa.org      Physical Activity: www.zulma.nih.gov/fd1mfrd      Tobacco use: www.quitwithusla.org

## 2022-05-19 NOTE — PROGRESS NOTES
"  Kenyetta Andersen presented for a  Medicare AWV and comprehensive Health Risk Assessment today. The following components were reviewed and updated:    · Medical history  · Family History  · Social history  · Allergies and Current Medications  · Health Risk Assessment  · Health Maintenance  · Care Team         ** See Completed Assessments for Annual Wellness Visit within the encounter summary.**         The following assessments were completed:  · Living Situation  · CAGE  · Depression Screening  · Timed Get Up and Go  · Whisper Test  · Cognitive Function Screening  · Nutrition Screening  · ADL Screening  · PAQ Screening            Vitals:    05/19/22 1122   BP: 120/68   Pulse: 81   Temp: 98.3 °F (36.8 °C)   SpO2: (!) 94%   Weight: 64.9 kg (143 lb 1.3 oz)   Height: 5' 3" (1.6 m)     Body mass index is 25.35 kg/m².  Physical Exam  Constitutional:       Appearance: She is well-developed.   HENT:      Head: Normocephalic and atraumatic.      Nose: Nose normal.   Eyes:      General: Lids are normal.      Conjunctiva/sclera: Conjunctivae normal.      Pupils: Pupils are equal, round, and reactive to light.   Cardiovascular:      Rate and Rhythm: Normal rate.   Pulmonary:      Effort: Pulmonary effort is normal.   Musculoskeletal:      Cervical back: Full passive range of motion without pain.   Skin:     General: Skin is warm and dry.   Neurological:      Mental Status: She is alert and oriented to person, place, and time.   Psychiatric:         Speech: Speech normal.         Behavior: Behavior normal.               Diagnoses and health risks identified today and associated recommendations/orders:    1. Encounter for preventive health examination  Discussed health maintenance guidelines appropriate for age.        2. Pulmonary emphysema, unspecified emphysema type  Stable, patient asymptomatic  Followed by pcp    3. Abdominal aortic atherosclerosis  Stable, continue to monitor  Followed by pcp    4. Stage 3b chronic kidney " disease  Stable, continue to monitor  Followed by pcp    5. Abnormality of gait and mobility  Stable, continue to monitor    6. Personal history of colon cancer, stage III  Stable, diagnosed in 2009, no recurrence since  Followed by GI and hem/onc      Provided Kenyetta with a 5-10 year written screening schedule and personal prevention plan. Recommendations were developed using the USPSTF age appropriate recommendations. Education, counseling, and referrals were provided as needed. After Visit Summary printed and given to patient which includes a list of additional screenings\tests needed.    Follow up for One year for Annual Wellness Visit.    Nannette Mueller NP    I offered to discuss advanced care planning, including how to pick a person who would make decisions for you if you were unable to make them for yourself, called a health care power of , and what kind of decisions you might make such as use of life sustaining treatments such as ventilators and tube feeding when faced with a life limiting illness recorded on a living will that they will need to know. (How you want to be cared for as you near the end of your natural life)     X Patient is interested in learning more about how to make advanced directives.  I provided them paperwork and offered to discuss this with them.

## 2022-06-14 ENCOUNTER — OFFICE VISIT (OUTPATIENT)
Dept: GASTROENTEROLOGY | Facility: CLINIC | Age: 87
End: 2022-06-14
Payer: MEDICARE

## 2022-06-14 VITALS
SYSTOLIC BLOOD PRESSURE: 156 MMHG | HEIGHT: 63 IN | BODY MASS INDEX: 25.5 KG/M2 | RESPIRATION RATE: 18 BRPM | WEIGHT: 143.94 LBS | DIASTOLIC BLOOD PRESSURE: 81 MMHG | HEART RATE: 78 BPM

## 2022-06-14 DIAGNOSIS — R10.31 RLQ ABDOMINAL PAIN: Primary | ICD-10-CM

## 2022-06-14 DIAGNOSIS — K21.9 GASTROESOPHAGEAL REFLUX DISEASE, UNSPECIFIED WHETHER ESOPHAGITIS PRESENT: ICD-10-CM

## 2022-06-14 DIAGNOSIS — R13.19 ESOPHAGEAL DYSPHAGIA: ICD-10-CM

## 2022-06-14 PROCEDURE — 3288F PR FALLS RISK ASSESSMENT DOCUMENTED: ICD-10-PCS | Mod: CPTII,S$GLB,, | Performed by: INTERNAL MEDICINE

## 2022-06-14 PROCEDURE — 99999 PR PBB SHADOW E&M-EST. PATIENT-LVL III: ICD-10-PCS | Mod: PBBFAC,,, | Performed by: INTERNAL MEDICINE

## 2022-06-14 PROCEDURE — 3079F PR MOST RECENT DIASTOLIC BLOOD PRESSURE 80-89 MM HG: ICD-10-PCS | Mod: CPTII,S$GLB,, | Performed by: INTERNAL MEDICINE

## 2022-06-14 PROCEDURE — 3288F FALL RISK ASSESSMENT DOCD: CPT | Mod: CPTII,S$GLB,, | Performed by: INTERNAL MEDICINE

## 2022-06-14 PROCEDURE — 3077F SYST BP >= 140 MM HG: CPT | Mod: CPTII,S$GLB,, | Performed by: INTERNAL MEDICINE

## 2022-06-14 PROCEDURE — 1126F AMNT PAIN NOTED NONE PRSNT: CPT | Mod: CPTII,S$GLB,, | Performed by: INTERNAL MEDICINE

## 2022-06-14 PROCEDURE — 3079F DIAST BP 80-89 MM HG: CPT | Mod: CPTII,S$GLB,, | Performed by: INTERNAL MEDICINE

## 2022-06-14 PROCEDURE — 99214 PR OFFICE/OUTPT VISIT, EST, LEVL IV, 30-39 MIN: ICD-10-PCS | Mod: S$GLB,,, | Performed by: INTERNAL MEDICINE

## 2022-06-14 PROCEDURE — 99214 OFFICE O/P EST MOD 30 MIN: CPT | Mod: S$GLB,,, | Performed by: INTERNAL MEDICINE

## 2022-06-14 PROCEDURE — 99999 PR PBB SHADOW E&M-EST. PATIENT-LVL III: CPT | Mod: PBBFAC,,, | Performed by: INTERNAL MEDICINE

## 2022-06-14 PROCEDURE — 1159F MED LIST DOCD IN RCRD: CPT | Mod: CPTII,S$GLB,, | Performed by: INTERNAL MEDICINE

## 2022-06-14 PROCEDURE — 1101F PR PT FALLS ASSESS DOC 0-1 FALLS W/OUT INJ PAST YR: ICD-10-PCS | Mod: CPTII,S$GLB,, | Performed by: INTERNAL MEDICINE

## 2022-06-14 PROCEDURE — 99499 UNLISTED E&M SERVICE: CPT | Mod: S$GLB,,, | Performed by: INTERNAL MEDICINE

## 2022-06-14 PROCEDURE — 1160F RVW MEDS BY RX/DR IN RCRD: CPT | Mod: CPTII,S$GLB,, | Performed by: INTERNAL MEDICINE

## 2022-06-14 PROCEDURE — 99499 RISK ADDL DX/OHS AUDIT: ICD-10-PCS | Mod: S$GLB,,, | Performed by: INTERNAL MEDICINE

## 2022-06-14 PROCEDURE — 1101F PT FALLS ASSESS-DOCD LE1/YR: CPT | Mod: CPTII,S$GLB,, | Performed by: INTERNAL MEDICINE

## 2022-06-14 PROCEDURE — 1126F PR PAIN SEVERITY QUANTIFIED, NO PAIN PRESENT: ICD-10-PCS | Mod: CPTII,S$GLB,, | Performed by: INTERNAL MEDICINE

## 2022-06-14 PROCEDURE — 1160F PR REVIEW ALL MEDS BY PRESCRIBER/CLIN PHARMACIST DOCUMENTED: ICD-10-PCS | Mod: CPTII,S$GLB,, | Performed by: INTERNAL MEDICINE

## 2022-06-14 PROCEDURE — 1159F PR MEDICATION LIST DOCUMENTED IN MEDICAL RECORD: ICD-10-PCS | Mod: CPTII,S$GLB,, | Performed by: INTERNAL MEDICINE

## 2022-06-14 PROCEDURE — 3077F PR MOST RECENT SYSTOLIC BLOOD PRESSURE >= 140 MM HG: ICD-10-PCS | Mod: CPTII,S$GLB,, | Performed by: INTERNAL MEDICINE

## 2022-06-14 RX ORDER — RABEPRAZOLE SODIUM 20 MG/1
20 TABLET, DELAYED RELEASE ORAL DAILY
Qty: 90 TABLET | Refills: 1 | Status: SHIPPED | OUTPATIENT
Start: 2022-06-14 | End: 2022-12-21

## 2022-06-14 NOTE — H&P (VIEW-ONLY)
"[facility]    CC: GERD, Abdominal pain    HPI 87 y.o. female  with history of colon cancer s/p right hemicolectomy and chemotherapy who is in remission as well as history of esophagitis and esophageal stenosis, presents for follow up of chronic, mild GERD which was previously controlled with Aciphex 20 mg BID, however she ran out of this medication several days ago. She notes recurrent dysphagia. Her last EGD in November 2021 was notable for LA Grade C esophagitis as well as esophageal stenosis dilated to 18 mm with TTS balloon. She reports mild, infrequent RLQ pain that occurs 1-2 times a week and is sometimes associated with a bowel movement. The pain will last minutes and resolves without intervention. She is concerned her colon cancer has returned. Her last colonoscopy was in January 2020 and was notable for healthy appearing anastomosis and two non-adenomatous polyps.     She has had a decreased appetite and is only eating 1-2 times a day, which has resulted in weight loss.     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    Anxiety     Arthritis     Escalante esophagus     Blood transfusion     Cataract     Done OU    Colon cancer 2009    Colon polyps 3/14/2017    GERD (gastroesophageal reflux disease)     Glaucoma 2/24/2012    Nuclear sclerosis 8/27/2012    Osteoporosis     Primary hypothyroidism 2/23/2020    Pseudophakia - Left Eye 2/24/2012       Allergies and Medications reviewed     Review of Systems  General ROS: negative for - chills, fever; +  weight loss  Cardiovascular ROS: no chest pain or dyspnea on exertion  Gastrointestinal ROS: + RLQ pain , + GERD, + mild dysphagia    Physical Examination  BP (!) 156/81 (BP Location: Right arm, Patient Position: Sitting)   Pulse 78   Resp 18   Ht 5' 3" (1.6 m)   Wt 65.3 kg (143 lb 15.4 oz)   BMI 25.50 kg/m²   General appearance: alert, cooperative, no distress  HENT: Normocephalic, atraumatic, " neck symmetrical, no nasal discharge, sclera anicteric   Lungs: clear to auscultation bilaterally, symmetric chest wall expansion bilaterally  Heart: regular rate and rhythm without rub; no displacement of the PMI   Abdomen: soft, nontender,nondistended, BS active  Extremities: extremities symmetric; no clubbing, cyanosis, or edema        Labs:  Lab Results   Component Value Date    WBC 7.53 03/18/2022    HGB 14.8 03/18/2022    HCT 43.9 03/18/2022     (H) 03/18/2022     03/18/2022       CMP  Sodium   Date Value Ref Range Status   03/18/2022 142 136 - 145 mmol/L Final     Potassium   Date Value Ref Range Status   03/18/2022 4.3 3.5 - 5.1 mmol/L Final     Chloride   Date Value Ref Range Status   03/18/2022 106 95 - 110 mmol/L Final     CO2   Date Value Ref Range Status   03/18/2022 26 23 - 29 mmol/L Final     Glucose   Date Value Ref Range Status   03/18/2022 101 70 - 110 mg/dL Final     BUN   Date Value Ref Range Status   03/18/2022 14 8 - 23 mg/dL Final     Creatinine   Date Value Ref Range Status   03/18/2022 1.0 0.5 - 1.4 mg/dL Final     Calcium   Date Value Ref Range Status   03/18/2022 9.8 8.7 - 10.5 mg/dL Final     Total Protein   Date Value Ref Range Status   03/18/2022 6.7 6.0 - 8.4 g/dL Final     Albumin   Date Value Ref Range Status   03/18/2022 3.3 (L) 3.5 - 5.2 g/dL Final     Total Bilirubin   Date Value Ref Range Status   03/18/2022 0.5 0.1 - 1.0 mg/dL Final     Comment:     For infants and newborns, interpretation of results should be based  on gestational age, weight and in agreement with clinical  observations.    Premature Infant recommended reference ranges:  Up to 24 hours.............<8.0 mg/dL  Up to 48 hours............<12.0 mg/dL  3-5 days..................<15.0 mg/dL  6-29 days.................<15.0 mg/dL       Alkaline Phosphatase   Date Value Ref Range Status   03/18/2022 55 55 - 135 U/L Final     AST   Date Value Ref Range Status   03/18/2022 20 10 - 40 U/L Final     ALT    Date Value Ref Range Status   03/18/2022 12 10 - 44 U/L Final     Anion Gap   Date Value Ref Range Status   03/18/2022 10 8 - 16 mmol/L Final     eGFR if    Date Value Ref Range Status   03/18/2022 59.4 (A) >60 mL/min/1.73 m^2 Final     eGFR if non    Date Value Ref Range Status   03/18/2022 51.5 (A) >60 mL/min/1.73 m^2 Final     Comment:     Calculation used to obtain the estimated glomerular filtration  rate (eGFR) is the CKD-EPI equation.            Assessment:   87 y.o. female with history of colon cancer s/p resection, presents for follow up of GERD, dysphagia and mild RLQ pain that is ? Musculoskeletal in nature.  Plan:  1.GERD, Dysphagia  -Schedule EGD with possible dilation   -Refill Aciphex     2.RLQ pain  -Schedule colonoscopy  -Heating pad    3.Weight loss, decreased PO intake  -Keep calorie count, must eat more than 1-2 times a day to ensure adequate caloric intake    Anaid Washington MD  [facility]  [address], [address]  [place] [address]  Office: [phone]  Fax: [phone]

## 2022-06-14 NOTE — PROGRESS NOTES
"[facility]    CC: GERD, Abdominal pain    HPI 87 y.o. female  with history of colon cancer s/p right hemicolectomy and chemotherapy who is in remission as well as history of esophagitis and esophageal stenosis, presents for follow up of chronic, mild GERD which was previously controlled with Aciphex 20 mg BID, however she ran out of this medication several days ago. She notes recurrent dysphagia. Her last EGD in November 2021 was notable for LA Grade C esophagitis as well as esophageal stenosis dilated to 18 mm with TTS balloon. She reports mild, infrequent RLQ pain that occurs 1-2 times a week and is sometimes associated with a bowel movement. The pain will last minutes and resolves without intervention. She is concerned her colon cancer has returned. Her last colonoscopy was in January 2020 and was notable for healthy appearing anastomosis and two non-adenomatous polyps.     She has had a decreased appetite and is only eating 1-2 times a day, which has resulted in weight loss.     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    Anxiety     Arthritis     Escalante esophagus     Blood transfusion     Cataract     Done OU    Colon cancer 2009    Colon polyps 3/14/2017    GERD (gastroesophageal reflux disease)     Glaucoma 2/24/2012    Nuclear sclerosis 8/27/2012    Osteoporosis     Primary hypothyroidism 2/23/2020    Pseudophakia - Left Eye 2/24/2012       Allergies and Medications reviewed     Review of Systems  General ROS: negative for - chills, fever; +  weight loss  Cardiovascular ROS: no chest pain or dyspnea on exertion  Gastrointestinal ROS: + RLQ pain , + GERD, + mild dysphagia    Physical Examination  BP (!) 156/81 (BP Location: Right arm, Patient Position: Sitting)   Pulse 78   Resp 18   Ht 5' 3" (1.6 m)   Wt 65.3 kg (143 lb 15.4 oz)   BMI 25.50 kg/m²   General appearance: alert, cooperative, no distress  HENT: Normocephalic, atraumatic, " neck symmetrical, no nasal discharge, sclera anicteric   Lungs: clear to auscultation bilaterally, symmetric chest wall expansion bilaterally  Heart: regular rate and rhythm without rub; no displacement of the PMI   Abdomen: soft, nontender,nondistended, BS active  Extremities: extremities symmetric; no clubbing, cyanosis, or edema        Labs:  Lab Results   Component Value Date    WBC 7.53 03/18/2022    HGB 14.8 03/18/2022    HCT 43.9 03/18/2022     (H) 03/18/2022     03/18/2022       CMP  Sodium   Date Value Ref Range Status   03/18/2022 142 136 - 145 mmol/L Final     Potassium   Date Value Ref Range Status   03/18/2022 4.3 3.5 - 5.1 mmol/L Final     Chloride   Date Value Ref Range Status   03/18/2022 106 95 - 110 mmol/L Final     CO2   Date Value Ref Range Status   03/18/2022 26 23 - 29 mmol/L Final     Glucose   Date Value Ref Range Status   03/18/2022 101 70 - 110 mg/dL Final     BUN   Date Value Ref Range Status   03/18/2022 14 8 - 23 mg/dL Final     Creatinine   Date Value Ref Range Status   03/18/2022 1.0 0.5 - 1.4 mg/dL Final     Calcium   Date Value Ref Range Status   03/18/2022 9.8 8.7 - 10.5 mg/dL Final     Total Protein   Date Value Ref Range Status   03/18/2022 6.7 6.0 - 8.4 g/dL Final     Albumin   Date Value Ref Range Status   03/18/2022 3.3 (L) 3.5 - 5.2 g/dL Final     Total Bilirubin   Date Value Ref Range Status   03/18/2022 0.5 0.1 - 1.0 mg/dL Final     Comment:     For infants and newborns, interpretation of results should be based  on gestational age, weight and in agreement with clinical  observations.    Premature Infant recommended reference ranges:  Up to 24 hours.............<8.0 mg/dL  Up to 48 hours............<12.0 mg/dL  3-5 days..................<15.0 mg/dL  6-29 days.................<15.0 mg/dL       Alkaline Phosphatase   Date Value Ref Range Status   03/18/2022 55 55 - 135 U/L Final     AST   Date Value Ref Range Status   03/18/2022 20 10 - 40 U/L Final     ALT    Date Value Ref Range Status   03/18/2022 12 10 - 44 U/L Final     Anion Gap   Date Value Ref Range Status   03/18/2022 10 8 - 16 mmol/L Final     eGFR if    Date Value Ref Range Status   03/18/2022 59.4 (A) >60 mL/min/1.73 m^2 Final     eGFR if non    Date Value Ref Range Status   03/18/2022 51.5 (A) >60 mL/min/1.73 m^2 Final     Comment:     Calculation used to obtain the estimated glomerular filtration  rate (eGFR) is the CKD-EPI equation.            Assessment:   87 y.o. female with history of colon cancer s/p resection, presents for follow up of GERD, dysphagia and mild RLQ pain that is ? Musculoskeletal in nature.  Plan:  1.GERD, Dysphagia  -Schedule EGD with possible dilation   -Refill Aciphex     2.RLQ pain  -Schedule colonoscopy  -Heating pad    3.Weight loss, decreased PO intake  -Keep calorie count, must eat more than 1-2 times a day to ensure adequate caloric intake    Anaid Washington MD  [facility]  [address], [address]  [place] [address]  Office: [phone]  Fax: [phone]

## 2022-06-16 ENCOUNTER — TELEPHONE (OUTPATIENT)
Dept: NEUROLOGY | Facility: CLINIC | Age: 87
End: 2022-06-16
Payer: MEDICARE

## 2022-06-17 ENCOUNTER — OFFICE VISIT (OUTPATIENT)
Dept: NEUROLOGY | Facility: CLINIC | Age: 87
End: 2022-06-17
Payer: MEDICARE

## 2022-06-17 VITALS
HEART RATE: 92 BPM | HEIGHT: 63 IN | DIASTOLIC BLOOD PRESSURE: 77 MMHG | WEIGHT: 142.88 LBS | SYSTOLIC BLOOD PRESSURE: 121 MMHG | BODY MASS INDEX: 25.32 KG/M2

## 2022-06-17 DIAGNOSIS — T45.1X5A CHEMOTHERAPY-INDUCED PERIPHERAL NEUROPATHY: ICD-10-CM

## 2022-06-17 DIAGNOSIS — G62.0 CHEMOTHERAPY-INDUCED PERIPHERAL NEUROPATHY: ICD-10-CM

## 2022-06-17 PROCEDURE — 1160F PR REVIEW ALL MEDS BY PRESCRIBER/CLIN PHARMACIST DOCUMENTED: ICD-10-PCS | Mod: CPTII,S$GLB,, | Performed by: NURSE PRACTITIONER

## 2022-06-17 PROCEDURE — 1159F PR MEDICATION LIST DOCUMENTED IN MEDICAL RECORD: ICD-10-PCS | Mod: CPTII,S$GLB,, | Performed by: NURSE PRACTITIONER

## 2022-06-17 PROCEDURE — 3074F PR MOST RECENT SYSTOLIC BLOOD PRESSURE < 130 MM HG: ICD-10-PCS | Mod: CPTII,S$GLB,, | Performed by: NURSE PRACTITIONER

## 2022-06-17 PROCEDURE — 99212 OFFICE O/P EST SF 10 MIN: CPT | Mod: S$GLB,,, | Performed by: NURSE PRACTITIONER

## 2022-06-17 PROCEDURE — 99212 PR OFFICE/OUTPT VISIT, EST, LEVL II, 10-19 MIN: ICD-10-PCS | Mod: S$GLB,,, | Performed by: NURSE PRACTITIONER

## 2022-06-17 PROCEDURE — 3078F PR MOST RECENT DIASTOLIC BLOOD PRESSURE < 80 MM HG: ICD-10-PCS | Mod: CPTII,S$GLB,, | Performed by: NURSE PRACTITIONER

## 2022-06-17 PROCEDURE — 1101F PT FALLS ASSESS-DOCD LE1/YR: CPT | Mod: CPTII,S$GLB,, | Performed by: NURSE PRACTITIONER

## 2022-06-17 PROCEDURE — 3288F FALL RISK ASSESSMENT DOCD: CPT | Mod: CPTII,S$GLB,, | Performed by: NURSE PRACTITIONER

## 2022-06-17 PROCEDURE — 1126F AMNT PAIN NOTED NONE PRSNT: CPT | Mod: CPTII,S$GLB,, | Performed by: NURSE PRACTITIONER

## 2022-06-17 PROCEDURE — 1101F PR PT FALLS ASSESS DOC 0-1 FALLS W/OUT INJ PAST YR: ICD-10-PCS | Mod: CPTII,S$GLB,, | Performed by: NURSE PRACTITIONER

## 2022-06-17 PROCEDURE — 1159F MED LIST DOCD IN RCRD: CPT | Mod: CPTII,S$GLB,, | Performed by: NURSE PRACTITIONER

## 2022-06-17 PROCEDURE — 3078F DIAST BP <80 MM HG: CPT | Mod: CPTII,S$GLB,, | Performed by: NURSE PRACTITIONER

## 2022-06-17 PROCEDURE — 99999 PR PBB SHADOW E&M-EST. PATIENT-LVL IV: CPT | Mod: PBBFAC,,, | Performed by: NURSE PRACTITIONER

## 2022-06-17 PROCEDURE — 3288F PR FALLS RISK ASSESSMENT DOCUMENTED: ICD-10-PCS | Mod: CPTII,S$GLB,, | Performed by: NURSE PRACTITIONER

## 2022-06-17 PROCEDURE — 99999 PR PBB SHADOW E&M-EST. PATIENT-LVL IV: ICD-10-PCS | Mod: PBBFAC,,, | Performed by: NURSE PRACTITIONER

## 2022-06-17 PROCEDURE — 1126F PR PAIN SEVERITY QUANTIFIED, NO PAIN PRESENT: ICD-10-PCS | Mod: CPTII,S$GLB,, | Performed by: NURSE PRACTITIONER

## 2022-06-17 PROCEDURE — 3074F SYST BP LT 130 MM HG: CPT | Mod: CPTII,S$GLB,, | Performed by: NURSE PRACTITIONER

## 2022-06-17 PROCEDURE — 1160F RVW MEDS BY RX/DR IN RCRD: CPT | Mod: CPTII,S$GLB,, | Performed by: NURSE PRACTITIONER

## 2022-06-17 NOTE — PROGRESS NOTES
NEUROLOGY  Outpatient Follow Up    [facility]  [address] [facility] [address], [place] [address]  [phone] (office) / [phone] (fax)    Patient Name:  Kenyetta Andersne  :  1935  MR #:  [id number]  Acct #:  [id number]    Date of Neurology Visit: 2022  Name of Provider: BEATRIZ Clarke    Other Physicians:  Hever Arreola MD (Primary Care Physician); No ref. provider found (Referring)      Chief Complaint: Numbness      History of Present Illness (HPI):  Kenyetta Andersen is a right handed 86 y.o. female.    Patient is here today management of Neuropathy. She has had this for about 12 years after receiving chemotherapy. She had stage III colorectal carcinoma in  and received adjuvant folfox. She reports symptoms are to her hands and feet and feel numb. There is no burning pain but she does report tingling. It is constant.   Her feet feel as though they are slippery. She may walk out of slippers and not even know it. She denies recent falls. She is maintained on Gabapentin and believes she is currenlty taking 600 mg TID but reports that her prescription was recently changed. She states she has been on this medication for ~ 5 years and doesn't report much aid. She has never tried any creams.        Interval Hx 3/18/2022:   Patient is here today for neuropathy follow up. She still reports constant tingling. Her Gabapentin is dosed at 100 mg TID and is unsure why her Gabapentin was decreased. She does not find this dose aids her well but also denies any side effects. She denies recent falls.       Interval Hx 2022:  Patient is here today for neuropathy follow up. She reports that she is taking gabapentin 300 mg TID. She still reports numbness but pain is mostly controlled.  She denies any side effects to this medications. She reports numbness to her fingertip and a swelling feeling to her feet but they are not swollen. She denies significant pain (burning, tingling,  pins/needles) or weakness. She may have intermittent tingling to her feet at night. She did try OTC cream but doesn't believe it offered aid.      Interval Hx 6/17/2022:  She is here today for neuropathy follow up. She is taking Gabapentin 300, 300, 600 mg. She reports ongoing tingling to the fingertips but no significant pain and no weakness. She denies recent falls. Overall, the increased dose of Gabapentin at night has helped. She feels as though her symptoms are stable and controlled.            Past Medical, Surgical, Family & Social History:   Reviewed and updated.    Home Medications:     Current Outpatient Medications:     alendronate (FOSAMAX) 70 MG tablet, TAKE 1 TABLET (70 MG TOTAL) BY MOUTH EVERY 7 DAYS. ON EMPTY STOMACH IN MORNING, REMAIN UPRIGHT FOR 30 MINUTES., Disp: 12 tablet, Rfl: 3    ascorbic acid, vitamin C, (VITAMIN C) 500 MG tablet, Take 500 mg by mouth once daily., Disp: , Rfl:     b complex vitamins capsule, Take 1 capsule by mouth once daily., Disp: , Rfl:     BOOSTRIX TDAP 2.5-8-5 Lf-mcg-Lf/0.5mL Syrg injection, , Disp: , Rfl:     brimonidine 0.2% (ALPHAGAN) 0.2 % Drop, INSTILL 1 DROP INTO BOTH EYES 3 TIMES A DAY, Disp: 10 mL, Rfl: 6    cyanocobalamin (VITAMIN B-12) 1000 MCG tablet, Take 100 mcg by mouth once daily., Disp: , Rfl:     dorzolamide-timolol 2-0.5% (COSOPT) 22.3-6.8 mg/mL ophthalmic solution, INSTILL 1 DROP INTO BOTH EYES TWICE A DAY, Disp: 10 mL, Rfl: 4    gabapentin (NEURONTIN) 300 MG capsule, Take 300 mg in the AM, 300 mg midday and 600 mg at night, Disp: 120 capsule, Rfl: 11    hydroCHLOROthiazide (HYDRODIURIL) 12.5 MG Tab, TAKE 1 TABLET BY MOUTH EVERY DAY, Disp: 90 tablet, Rfl: 0    latanoprost 0.005 % ophthalmic solution, PLACE 1 DROP INTO BOTH EYES EVERY EVENING., Disp: 7.5 mL, Rfl: 3    meloxicam (MOBIC) 15 MG tablet, TAKE 1 TABLET BY MOUTH EVERY DAY AS NEEDED FOR PAIN, Disp: 30 tablet, Rfl: 2    potassium chloride SA (K-DUR,KLOR-CON) 20 MEQ tablet, Take 1  "tablet (20 mEq total) by mouth once daily., Disp: 30 tablet, Rfl: 11    propofoL (DIPRIVAN) 10 mg/mL infusion, , Disp: , Rfl:     RABEprazole (ACIPHEX) 20 mg tablet, Take 1 tablet (20 mg total) by mouth once daily., Disp: 90 tablet, Rfl: 1    SHINGRIX, PF, 50 mcg/0.5 mL injection, , Disp: , Rfl:     traZODone (DESYREL) 50 MG tablet, TAKE 1 TABLET (50 MG TOTAL) BY MOUTH EVERY EVENING., Disp: 90 tablet, Rfl: 1    Physical Examination:  /77 (BP Location: Right arm, Patient Position: Sitting, BP Method: Medium (Automatic))   Pulse 92   Ht 5' 3" (1.6 m)   Wt 64.8 kg (142 lb 13.7 oz)   BMI 25.31 kg/m²     GENERAL:  General appearance: Well, non-toxic appearing.  No apparent distress.  Neck: supple.  .     MENTAL STATUS:  Alertness, attention span & concentration: normal.  Language: normal.  Orientation to self, place & time:  normal.  Memory, recent & remote: normal.  Fund of knowledge: normal.        SPEECH:  Clear and fluent.  Follows complex commands.     CRANIAL NERVES:  Cranial Nerves II-XII were examined.  II - Visual fields: normal.  III, IV, VI: PERRL, EOMI, No ptosis, No nystagmus.  V - Facial sensation: normal.  VII - Face symmetry & mobility: not assessed d/t COVID precautions  VIII - Hearing: normal.  IX, X - Palate: not assessed d/t COVID precautions  XI - Shoulder shrug: normal.  XII - Tongue protrusion: not assessed d/t COVID precautions     GROSS MOTOR:  Gait & station: non focal; mildly antalgic  Tone: normal.  Abnormal movements: none.  Finger-nose: normal.  Rapid alternating movements: normal.  Pronator drift: normal        MUSCLE STRENGTH:      Fascics Atrophy RIGHT      LEFT Atrophy Fascics       5 Biceps 5           5 Triceps 5           5 Forearm.Pr. 5                           4+ Iliopsoas flex    4+           5 Hip Abduct 5           5 Hip Adduct 5           5 Quads 5           5 Hams 5           5 Dorsiflex 5           5 Plantar Flex 5          REFLEXES:    RIGHT Reflex    LEFT   2+ " Biceps 2+   2+ Brachiorad. 2+           2+ Patellar 2+      SENSORY:  Light touch: Normal throughout.   Sharp touch: Normal throughout and even hyperintense to bilateral feet  Vibration: no vibratory sensation to big toe left foot  Temperature: Normal throughout.   Joint Position: Normal throughout.  Proprioception: abnormal to left foot             Diagnostic Data Reviewed:     Component      Latest Ref Rng & Units 11/11/2021   Sodium      136 - 145 mmol/L 142   Potassium      3.5 - 5.1 mmol/L 3.9   Chloride      95 - 110 mmol/L 107   CO2      23 - 29 mmol/L 23   Glucose      70 - 110 mg/dL 110   BUN      8 - 23 mg/dL 15   Creatinine      0.5 - 1.4 mg/dL 1.3   Calcium      8.7 - 10.5 mg/dL 9.8   PROTEIN TOTAL      6.0 - 8.4 g/dL 7.0   Albumin      3.5 - 5.2 g/dL 3.3 (L)   BILIRUBIN TOTAL      0.1 - 1.0 mg/dL 0.6   Alkaline Phosphatase      55 - 135 U/L 62   AST      10 - 40 U/L 22   ALT      10 - 44 U/L 19   Anion Gap      8 - 16 mmol/L 12   eGFR if African American      >60 mL/min/1.73 m:2 44 (A)   eGFR if non African American      >60 mL/min/1.73 m:2 38 (A)                     Assessment and Plan:    Problem List Items Addressed This Visit        Neuro    Chemotherapy-induced peripheral neuropathy    Current Assessment & Plan     Received adjuvant folfox post colorectal CA dx ~ 2009 and has suffered neuropathy symptoms ever since.   She reports only numbness and very mild tingling to her feet. She denies burning sensation, pain or weakness.   She has been maintained on Gabapentin for many years and unsure why her dose was changed by her oncologist.  Gabapentin previously increased from 100 mg TID to 300 mg TID    - recently increased night dose (300, 300, 600) for increased tingling to feet at night. Overall improvement with Gabapentin. Continue!  Educated patient again on the role of Gabapentin and how this medication does not aid with numbness but rather helps with tingling, pain etc.   Recent Crt WNL  Pt tried  OTC cream without aid.   Prescribe Compound cream   If symptoms dont hold stable then could ultimately try Lyrica instead of Gabapentin but seems as though gabapentin is aiding with tingling or pain.                                   Important to note, also  has a past medical history of Anatomical narrow angle (2/24/2012), Anxiety, Arthritis, Escalante esophagus, Blood transfusion, Cataract, Colon cancer (2009), Colon polyps (3/14/2017), GERD (gastroesophageal reflux disease), Glaucoma (2/24/2012), Nuclear sclerosis (8/27/2012), Osteoporosis, Primary hypothyroidism (2/23/2020), and Pseudophakia - Left Eye (2/24/2012).          The patient will return to clinic in 4 mths    All questions were answered and patient is comfortable with the plan.         Thank you very much for the opportunity to assist in this patient's care.    If you have any questions or concerns, please do not hesitate to contact me at any time.      Sincerely,     BEATRIZ Clarke  [facility] [place]         SAMIA spent a total of 19  minutes on the day of the visit.This includes face to face time and non-face to face time preparing to see the patient (eg, review of tests), Obtaining and/or reviewing separately obtained history, Documenting clinical information in the electronic or other health record, Independently interpreting resultsand communicating results to the patient/family/caregiver, or Care coordination.

## 2022-06-21 ENCOUNTER — OFFICE VISIT (OUTPATIENT)
Dept: FAMILY MEDICINE | Facility: CLINIC | Age: 87
End: 2022-06-21
Attending: FAMILY MEDICINE
Payer: MEDICARE

## 2022-06-21 VITALS
HEART RATE: 87 BPM | BODY MASS INDEX: 24.76 KG/M2 | TEMPERATURE: 98 F | HEIGHT: 63 IN | RESPIRATION RATE: 18 BRPM | OXYGEN SATURATION: 97 % | DIASTOLIC BLOOD PRESSURE: 84 MMHG | SYSTOLIC BLOOD PRESSURE: 139 MMHG | WEIGHT: 139.75 LBS

## 2022-06-21 DIAGNOSIS — G47.00 INSOMNIA, UNSPECIFIED TYPE: ICD-10-CM

## 2022-06-21 DIAGNOSIS — K22.70 BARRETT'S ESOPHAGUS WITHOUT DYSPLASIA: ICD-10-CM

## 2022-06-21 DIAGNOSIS — R63.0 LOSS OF APPETITE: ICD-10-CM

## 2022-06-21 DIAGNOSIS — J43.9 PULMONARY EMPHYSEMA, UNSPECIFIED EMPHYSEMA TYPE: ICD-10-CM

## 2022-06-21 DIAGNOSIS — I70.0 ABDOMINAL AORTIC ATHEROSCLEROSIS: ICD-10-CM

## 2022-06-21 DIAGNOSIS — N18.31 STAGE 3A CHRONIC KIDNEY DISEASE: ICD-10-CM

## 2022-06-21 DIAGNOSIS — G62.0 CHEMOTHERAPY-INDUCED PERIPHERAL NEUROPATHY: ICD-10-CM

## 2022-06-21 DIAGNOSIS — E03.9 PRIMARY HYPOTHYROIDISM: ICD-10-CM

## 2022-06-21 DIAGNOSIS — H40.2230 CHRONIC ANGLE-CLOSURE GLAUCOMA OF BOTH EYES, UNSPECIFIED GLAUCOMA STAGE: ICD-10-CM

## 2022-06-21 DIAGNOSIS — Z85.038 PERSONAL HISTORY OF COLON CANCER, STAGE III: ICD-10-CM

## 2022-06-21 DIAGNOSIS — K21.9 GASTROESOPHAGEAL REFLUX DISEASE WITHOUT ESOPHAGITIS: ICD-10-CM

## 2022-06-21 DIAGNOSIS — R73.03 PREDIABETES: ICD-10-CM

## 2022-06-21 DIAGNOSIS — T45.1X5A CHEMOTHERAPY-INDUCED PERIPHERAL NEUROPATHY: ICD-10-CM

## 2022-06-21 DIAGNOSIS — Z00.00 ENCOUNTER FOR PREVENTIVE HEALTH EXAMINATION: Primary | ICD-10-CM

## 2022-06-21 DIAGNOSIS — E55.9 VITAMIN D DEFICIENCY: ICD-10-CM

## 2022-06-21 DIAGNOSIS — L30.9 ECZEMA, UNSPECIFIED TYPE: ICD-10-CM

## 2022-06-21 DIAGNOSIS — E78.1 HYPERTRIGLYCERIDEMIA: ICD-10-CM

## 2022-06-21 PROCEDURE — 3075F PR MOST RECENT SYSTOLIC BLOOD PRESS GE 130-139MM HG: ICD-10-PCS | Mod: CPTII,S$GLB,, | Performed by: FAMILY MEDICINE

## 2022-06-21 PROCEDURE — 99397 PER PM REEVAL EST PAT 65+ YR: CPT | Mod: S$GLB,,, | Performed by: FAMILY MEDICINE

## 2022-06-21 PROCEDURE — 3079F DIAST BP 80-89 MM HG: CPT | Mod: CPTII,S$GLB,, | Performed by: FAMILY MEDICINE

## 2022-06-21 PROCEDURE — 1160F RVW MEDS BY RX/DR IN RCRD: CPT | Mod: CPTII,S$GLB,, | Performed by: FAMILY MEDICINE

## 2022-06-21 PROCEDURE — 3288F PR FALLS RISK ASSESSMENT DOCUMENTED: ICD-10-PCS | Mod: CPTII,S$GLB,, | Performed by: FAMILY MEDICINE

## 2022-06-21 PROCEDURE — 3075F SYST BP GE 130 - 139MM HG: CPT | Mod: CPTII,S$GLB,, | Performed by: FAMILY MEDICINE

## 2022-06-21 PROCEDURE — 1101F PT FALLS ASSESS-DOCD LE1/YR: CPT | Mod: CPTII,S$GLB,, | Performed by: FAMILY MEDICINE

## 2022-06-21 PROCEDURE — 99999 PR PBB SHADOW E&M-EST. PATIENT-LVL V: ICD-10-PCS | Mod: PBBFAC,,, | Performed by: FAMILY MEDICINE

## 2022-06-21 PROCEDURE — 1125F AMNT PAIN NOTED PAIN PRSNT: CPT | Mod: CPTII,S$GLB,, | Performed by: FAMILY MEDICINE

## 2022-06-21 PROCEDURE — 3079F PR MOST RECENT DIASTOLIC BLOOD PRESSURE 80-89 MM HG: ICD-10-PCS | Mod: CPTII,S$GLB,, | Performed by: FAMILY MEDICINE

## 2022-06-21 PROCEDURE — 99397 PR PREVENTIVE VISIT,EST,65 & OVER: ICD-10-PCS | Mod: S$GLB,,, | Performed by: FAMILY MEDICINE

## 2022-06-21 PROCEDURE — 1125F PR PAIN SEVERITY QUANTIFIED, PAIN PRESENT: ICD-10-PCS | Mod: CPTII,S$GLB,, | Performed by: FAMILY MEDICINE

## 2022-06-21 PROCEDURE — 1159F MED LIST DOCD IN RCRD: CPT | Mod: CPTII,S$GLB,, | Performed by: FAMILY MEDICINE

## 2022-06-21 PROCEDURE — 1101F PR PT FALLS ASSESS DOC 0-1 FALLS W/OUT INJ PAST YR: ICD-10-PCS | Mod: CPTII,S$GLB,, | Performed by: FAMILY MEDICINE

## 2022-06-21 PROCEDURE — 99499 UNLISTED E&M SERVICE: CPT | Mod: S$GLB,,, | Performed by: FAMILY MEDICINE

## 2022-06-21 PROCEDURE — 1159F PR MEDICATION LIST DOCUMENTED IN MEDICAL RECORD: ICD-10-PCS | Mod: CPTII,S$GLB,, | Performed by: FAMILY MEDICINE

## 2022-06-21 PROCEDURE — 1160F PR REVIEW ALL MEDS BY PRESCRIBER/CLIN PHARMACIST DOCUMENTED: ICD-10-PCS | Mod: CPTII,S$GLB,, | Performed by: FAMILY MEDICINE

## 2022-06-21 PROCEDURE — 99999 PR PBB SHADOW E&M-EST. PATIENT-LVL V: CPT | Mod: PBBFAC,,, | Performed by: FAMILY MEDICINE

## 2022-06-21 PROCEDURE — 3288F FALL RISK ASSESSMENT DOCD: CPT | Mod: CPTII,S$GLB,, | Performed by: FAMILY MEDICINE

## 2022-06-21 PROCEDURE — 99499 RISK ADDL DX/OHS AUDIT: ICD-10-PCS | Mod: S$GLB,,, | Performed by: FAMILY MEDICINE

## 2022-06-21 RX ORDER — PANTOPRAZOLE SODIUM 40 MG/1
40 TABLET, DELAYED RELEASE ORAL DAILY
Qty: 7 TABLET | Refills: 0 | Status: ON HOLD | OUTPATIENT
Start: 2022-06-21 | End: 2022-07-08 | Stop reason: SDUPTHER

## 2022-06-21 RX ORDER — MOMETASONE FUROATE 1 MG/G
CREAM TOPICAL DAILY
Qty: 15 G | Refills: 0 | Status: SHIPPED | OUTPATIENT
Start: 2022-06-21 | End: 2023-04-14

## 2022-06-21 RX ORDER — MIRTAZAPINE 7.5 MG/1
7.5 TABLET, FILM COATED ORAL NIGHTLY
Qty: 30 TABLET | Refills: 5 | Status: SHIPPED | OUTPATIENT
Start: 2022-06-21 | End: 2022-09-19
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
## 2022-07-01 ENCOUNTER — HOSPITAL ENCOUNTER (OUTPATIENT)
Dept: RADIOLOGY | Facility: HOSPITAL | Age: 87
Discharge: HOME | End: 2022-07-01
Attending: INTERNAL MEDICINE
Payer: MEDICARE

## 2022-07-01 DIAGNOSIS — Z85.038 HISTORY OF COLON CANCER: ICD-10-CM

## 2022-07-01 DIAGNOSIS — E87.6 HYPOKALEMIA: ICD-10-CM

## 2022-07-01 DIAGNOSIS — E53.8 B12 DEFICIENCY: ICD-10-CM

## 2022-07-01 DIAGNOSIS — D75.89 MACROCYTOSIS: ICD-10-CM

## 2022-07-01 PROCEDURE — 25500020 PHARM REV CODE 255

## 2022-07-01 PROCEDURE — 71260 CT CHEST WITH CONTRAST: ICD-10-PCS | Mod: 26,,, | Performed by: RADIOLOGY

## 2022-07-01 PROCEDURE — 71260 CT THORAX DX C+: CPT | Mod: TC

## 2022-07-01 PROCEDURE — 71260 CT THORAX DX C+: CPT | Mod: 26,,, | Performed by: RADIOLOGY

## 2022-07-01 RX ADMIN — IOHEXOL 75 ML: 350 INJECTION, SOLUTION INTRAVENOUS at 11:07

## 2022-07-08 ENCOUNTER — HOSPITAL ENCOUNTER (OUTPATIENT)
Facility: HOSPITAL | Age: 87
Discharge: HOME | End: 2022-07-08
Attending: INTERNAL MEDICINE | Admitting: INTERNAL MEDICINE
Payer: MEDICARE

## 2022-07-08 ENCOUNTER — ANESTHESIA EVENT (OUTPATIENT)
Dept: ENDOSCOPY | Facility: HOSPITAL | Age: 87
End: 2022-07-08
Payer: MEDICARE

## 2022-07-08 ENCOUNTER — ANESTHESIA (OUTPATIENT)
Dept: ENDOSCOPY | Facility: HOSPITAL | Age: 87
End: 2022-07-08
Payer: MEDICARE

## 2022-07-08 DIAGNOSIS — R10.31 RLQ ABDOMINAL PAIN: ICD-10-CM

## 2022-07-08 DIAGNOSIS — R13.19 ESOPHAGEAL DYSPHAGIA: Primary | ICD-10-CM

## 2022-07-08 PROCEDURE — 25000003 PHARM REV CODE 250: Performed by: INTERNAL MEDICINE

## 2022-07-08 PROCEDURE — 45380 PR COLONOSCOPY,BIOPSY: ICD-10-PCS | Mod: ,,, | Performed by: INTERNAL MEDICINE

## 2022-07-08 PROCEDURE — 45380 COLONOSCOPY AND BIOPSY: CPT | Mod: ,,, | Performed by: INTERNAL MEDICINE

## 2022-07-08 PROCEDURE — 88342 IMHCHEM/IMCYTCHM 1ST ANTB: CPT | Mod: 26,,, | Performed by: PATHOLOGY

## 2022-07-08 PROCEDURE — 45380 COLONOSCOPY AND BIOPSY: CPT | Performed by: INTERNAL MEDICINE

## 2022-07-08 PROCEDURE — 43249 ESOPH EGD DILATION <30 MM: CPT | Mod: 51,,, | Performed by: INTERNAL MEDICINE

## 2022-07-08 PROCEDURE — D9220A PRA ANESTHESIA: ICD-10-PCS | Mod: ANES,,, | Performed by: ANESTHESIOLOGY

## 2022-07-08 PROCEDURE — 88305 TISSUE EXAM BY PATHOLOGIST: ICD-10-PCS | Mod: 26,,, | Performed by: PATHOLOGY

## 2022-07-08 PROCEDURE — 25000003 PHARM REV CODE 250: Performed by: NURSE ANESTHETIST, CERTIFIED REGISTERED

## 2022-07-08 PROCEDURE — C1726 CATH, BAL DIL, NON-VASCULAR: HCPCS | Performed by: INTERNAL MEDICINE

## 2022-07-08 PROCEDURE — 88305 TISSUE EXAM BY PATHOLOGIST: CPT | Mod: 26,,, | Performed by: PATHOLOGY

## 2022-07-08 PROCEDURE — 27201012 HC FORCEPS, HOT/COLD, DISP: Performed by: INTERNAL MEDICINE

## 2022-07-08 PROCEDURE — 88342 CHG IMMUNOCYTOCHEMISTRY: ICD-10-PCS | Mod: 26,,, | Performed by: PATHOLOGY

## 2022-07-08 PROCEDURE — 43239 EGD BIOPSY SINGLE/MULTIPLE: CPT | Mod: 59 | Performed by: INTERNAL MEDICINE

## 2022-07-08 PROCEDURE — 43249 PR EGD, FLEX, W/BALL DILATION, < 30MM: ICD-10-PCS | Mod: 51,,, | Performed by: INTERNAL MEDICINE

## 2022-07-08 PROCEDURE — D9220A PRA ANESTHESIA: Mod: ANES,,, | Performed by: ANESTHESIOLOGY

## 2022-07-08 PROCEDURE — 88305 TISSUE EXAM BY PATHOLOGIST: CPT | Performed by: PATHOLOGY

## 2022-07-08 PROCEDURE — 63600175 PHARM REV CODE 636 W HCPCS: Performed by: NURSE ANESTHETIST, CERTIFIED REGISTERED

## 2022-07-08 PROCEDURE — 37000008 HC ANESTHESIA 1ST 15 MINUTES: Performed by: INTERNAL MEDICINE

## 2022-07-08 PROCEDURE — 88342 IMHCHEM/IMCYTCHM 1ST ANTB: CPT | Performed by: PATHOLOGY

## 2022-07-08 PROCEDURE — 37000009 HC ANESTHESIA EA ADD 15 MINS: Performed by: INTERNAL MEDICINE

## 2022-07-08 PROCEDURE — 43239 EGD BIOPSY SINGLE/MULTIPLE: CPT | Mod: 59,,, | Performed by: INTERNAL MEDICINE

## 2022-07-08 PROCEDURE — D9220A PRA ANESTHESIA: ICD-10-PCS | Mod: CRNA,,, | Performed by: NURSE ANESTHETIST, CERTIFIED REGISTERED

## 2022-07-08 PROCEDURE — 43239 PR EGD, FLEX, W/BIOPSY, SGL/MULTI: ICD-10-PCS | Mod: 59,,, | Performed by: INTERNAL MEDICINE

## 2022-07-08 PROCEDURE — 43249 ESOPH EGD DILATION <30 MM: CPT | Performed by: INTERNAL MEDICINE

## 2022-07-08 PROCEDURE — D9220A PRA ANESTHESIA: Mod: CRNA,,, | Performed by: NURSE ANESTHETIST, CERTIFIED REGISTERED

## 2022-07-08 RX ORDER — LIDOCAINE HYDROCHLORIDE 20 MG/ML
INJECTION INTRAVENOUS
Status: DISCONTINUED | OUTPATIENT
Start: 2022-07-08 | End: 2022-07-08

## 2022-07-08 RX ORDER — PROPOFOL 10 MG/ML
VIAL (ML) INTRAVENOUS
Status: DISCONTINUED | OUTPATIENT
Start: 2022-07-08 | End: 2022-07-08

## 2022-07-08 RX ORDER — SODIUM CHLORIDE 9 MG/ML
INJECTION, SOLUTION INTRAVENOUS CONTINUOUS
Status: DISCONTINUED | OUTPATIENT
Start: 2022-07-08 | End: 2022-07-08 | Stop reason: HOSPADM

## 2022-07-08 RX ORDER — PANTOPRAZOLE SODIUM 40 MG/1
40 TABLET, DELAYED RELEASE ORAL 2 TIMES DAILY
Qty: 60 TABLET | Refills: 0 | Status: SHIPPED | OUTPATIENT
Start: 2022-07-08 | End: 2022-12-21 | Stop reason: SDUPTHER

## 2022-07-08 RX ADMIN — SODIUM CHLORIDE: 0.9 INJECTION, SOLUTION INTRAVENOUS at 09:07

## 2022-07-08 RX ADMIN — PROPOFOL 25 MG: 10 INJECTION, EMULSION INTRAVENOUS at 10:07

## 2022-07-08 RX ADMIN — LIDOCAINE HYDROCHLORIDE 100 MG: 20 INJECTION, SOLUTION INTRAVENOUS at 10:07

## 2022-07-08 RX ADMIN — PROPOFOL 50 MG: 10 INJECTION, EMULSION INTRAVENOUS at 10:07

## 2022-07-08 RX ADMIN — GLYCOPYRROLATE 0.2 MG: 0.2 INJECTION, SOLUTION INTRAMUSCULAR; INTRAVITREAL at 10:07

## 2022-07-08 NOTE — [employer]OVATION PATIENT INSTRUCTIONS
Discharge Summary/Instructions after an Endoscopic Procedure  Patient Name: Kenyetta Andersen  Patient MRN: [id number]  Patient YOB: 1935 Friday, July 8, 2022  Anaid Washington MD  Dear patient,  As a result of recent federal legislation (The Federal Cures Act), you may   receive lab or pathology results from your procedure in your [facility]   account before your physician is able to contact you. Your physician or   their representative will relay the results to you with their   recommendations at their soonest availability.  Thank you,  RESTRICTIONS:  During your procedure today, you received medications for sedation.  These   medications may affect your judgment, balance and coordination.  Therefore,   for 24 hours, you have the following restrictions:   - DO NOT drive a car, operate machinery, make legal/financial decisions,   sign important papers or drink alcohol.    ACTIVITY:  Today: no heavy lifting, straining or running due to procedural   sedation/anesthesia.  The following day: return to full activity including work.  DIET:  Eat and drink normally unless instructed otherwise.     TREATMENT FOR COMMON SIDE EFFECTS:  - Mild abdominal pain, nausea, belching, bloating or excessive gas:  rest,   eat lightly and use a heating pad.  - Sore Throat: treat with throat lozenges and/or gargle with warm salt   water.  - Because air was used during the procedure, expelling large amounts of air   from your rectum or belching is normal.  - If a bowel prep was taken, you may not have a bowel movement for 1-3 days.    This is normal.  SYMPTOMS TO WATCH FOR AND REPORT TO YOUR PHYSICIAN:  1. Abdominal pain or bloating, other than gas cramps.  2. Chest pain.  3. Back pain.  4. Signs of infection such as: chills or fever occurring within 24 hours   after the procedure.  5. Rectal bleeding, which would show as bright red, maroon, or black stools.   (A tablespoon of blood from the rectum is not serious,  especially if   hemorrhoids are present.)  6. Vomiting.  7. Weakness or dizziness.  GO DIRECTLY TO THE NEAREST EMERGENCY ROOM IF YOU HAVE ANY OF THE FOLLOWING:      Difficulty breathing              Chills and/or fever over 101 F   Persistent vomiting and/or vomiting blood   Severe abdominal pain   Severe chest pain   Black, tarry stools   Bleeding- more than one tablespoon   Any other symptom or condition that you feel may need urgent attention  Your doctor recommends these additional instructions:  If any biopsies were taken, your doctors clinic will contact you in 1 to 2   weeks with any results.  - Patient has a contact number available for emergencies.  The signs and   symptoms of potential delayed complications were discussed with the   patient.  Return to normal activities tomorrow.  Written discharge   instructions were provided to the patient.   - Resume previous diet.   - Continue present medications.   - Await pathology results.   - Repeat colonoscopy is recommended for surveillance.  The colonoscopy date   will be determined after pathology results from today's exam become   available for review. Although she has a history of colon cancer her age is   advanced   - Return to my office PRN.   - Discharge patient to home (ambulatory).  For questions, problems or results please call your physician - Anaid Washington MD at Work:  [phone].  [facility], EMERGENCY ROOM PHONE NUMBER: [phone]  IF A COMPLICATION OR EMERGENCY SITUATION ARISES AND YOU ARE UNABLE TO REACH   YOUR PHYSICIAN - GO DIRECTLY TO THE EMERGENCY ROOM.  Anaid Washington MD  7/8/2022 10:47:39 AM  This report has been verified and signed electronically.  Dear patient,  As a result of recent federal legislation (The Federal Cures Act), you may   receive lab or pathology results from your procedure in your [facility]   account before your physician is able to contact you. Your physician or   their  representative will relay the results to you with their   recommendations at their soonest availability.  Thank you,  PROVATION

## 2022-07-08 NOTE — PROVATION PATIENT INSTRUCTIONS
Discharge Summary/Instructions after an Endoscopic Procedure  Patient Name: Kenyetta Andersen  Patient MRN: [id number]  Patient YOB: 1935 Friday, July 8, 2022  Anaid Washington MD  Dear patient,  As a result of recent federal legislation (The Federal Cures Act), you may   receive lab or pathology results from your procedure in your [facility]   account before your physician is able to contact you. Your physician or   their representative will relay the results to you with their   recommendations at their soonest availability.  Thank you,  RESTRICTIONS:  During your procedure today, you received medications for sedation.  These   medications may affect your judgment, balance and coordination.  Therefore,   for 24 hours, you have the following restrictions:   - DO NOT drive a car, operate machinery, make legal/financial decisions,   sign important papers or drink alcohol.    ACTIVITY:  Today: no heavy lifting, straining or running due to procedural   sedation/anesthesia.  The following day: return to full activity including work.  DIET:  Eat and drink normally unless instructed otherwise.     TREATMENT FOR COMMON SIDE EFFECTS:  - Mild abdominal pain, nausea, belching, bloating or excessive gas:  rest,   eat lightly and use a heating pad.  - Sore Throat: treat with throat lozenges and/or gargle with warm salt   water.  - Because air was used during the procedure, expelling large amounts of air   from your rectum or belching is normal.  - If a bowel prep was taken, you may not have a bowel movement for 1-3 days.    This is normal.  SYMPTOMS TO WATCH FOR AND REPORT TO YOUR PHYSICIAN:  1. Abdominal pain or bloating, other than gas cramps.  2. Chest pain.  3. Back pain.  4. Signs of infection such as: chills or fever occurring within 24 hours   after the procedure.  5. Rectal bleeding, which would show as bright red, maroon, or black stools.   (A tablespoon of blood from the rectum is not serious,  especially if   hemorrhoids are present.)  6. Vomiting.  7. Weakness or dizziness.  GO DIRECTLY TO THE NEAREST EMERGENCY ROOM IF YOU HAVE ANY OF THE FOLLOWING:      Difficulty breathing              Chills and/or fever over 101 F   Persistent vomiting and/or vomiting blood   Severe abdominal pain   Severe chest pain   Black, tarry stools   Bleeding- more than one tablespoon   Any other symptom or condition that you feel may need urgent attention  Your doctor recommends these additional instructions:  If any biopsies were taken, your doctors clinic will contact you in 1 to 2   weeks with any results.  - Await pathology results.   - Discharge patient to home (with escort).   - Patient has a contact number available for emergencies.  The signs and   symptoms of potential delayed complications were discussed with the   patient.  Return to normal activities tomorrow.  Written discharge   instructions were provided to the patient.   - Resume previous diet.   - Increase Protonix to twice daily x 4 weeks  For questions, problems or results please call your physician - Anaid Washington MD at Work:  [phone].  [facility], EMERGENCY ROOM PHONE NUMBER: [phone]  IF A COMPLICATION OR EMERGENCY SITUATION ARISES AND YOU ARE UNABLE TO REACH   YOUR PHYSICIAN - GO DIRECTLY TO THE EMERGENCY ROOM.  Anaid Washington MD  7/8/2022 10:49:19 AM  This report has been verified and signed electronically.  Dear patient,  As a result of recent federal legislation (The Federal Cures Act), you may   receive lab or pathology results from your procedure in your [facility]   account before your physician is able to contact you. Your physician or   their representative will relay the results to you with their   recommendations at their soonest availability.  Thank you,  PROVATION

## 2022-07-08 NOTE — TRANSFER OF CARE
"Anesthesia Transfer of Care Note    Patient: Kenyetta Andersen    Procedure(s) Performed: Procedure(s) (LRB):  EGD (ESOPHAGOGASTRODUODENOSCOPY) (N/A)  COLONOSCOPY (N/A)    Patient location: PACU    Anesthesia Type: general    Transport from OR: Transported from OR on room air with adequate spontaneous ventilation    Post pain: adequate analgesia    Post assessment: no apparent anesthetic complications and tolerated procedure well    Post vital signs: stable    Level of consciousness: sedated    Nausea/Vomiting: no nausea/vomiting    Complications: none    Transfer of care protocol was followed      Last vitals:   Visit Vitals  BP (!) 151/70 (BP Location: Left arm, Patient Position: Lying)   Pulse 71   Temp 36.6 °C (97.9 °F) (Skin)   Resp 15   Ht 5' 3" (1.6 m)   Wt 63.5 kg (140 lb)   SpO2 96%   Breastfeeding No   BMI 24.80 kg/m²     "

## 2022-07-08 NOTE — ANESTHESIA POSTPROCEDURE EVALUATION
Anesthesia Post Evaluation    Patient: Kenyetta Andersen    Procedure(s) Performed: Procedure(s) (LRB):  EGD (ESOPHAGOGASTRODUODENOSCOPY) (N/A)  COLONOSCOPY (N/A)    Final Anesthesia Type: general      Patient location during evaluation: PACU  Patient participation: Yes- Able to Participate  Level of consciousness: awake and alert and oriented  Post-procedure vital signs: reviewed and stable  Pain management: adequate  Airway patency: patent    PONV status at discharge: No PONV  Anesthetic complications: no      Cardiovascular status: blood pressure returned to baseline  Respiratory status: unassisted, spontaneous ventilation and room air  Hydration status: euvolemic  Follow-up not needed.          Vitals Value Taken Time   /77 07/08/22 1050   Temp 36.7 °C (98.1 °F) 07/08/22 1050   Pulse 96 07/08/22 1050   Resp 16 07/08/22 1050   SpO2 100   07/08/22 1148         No case tracking events are documented in the log.      Pain/Amanda Score: No data recorded

## 2022-07-08 NOTE — ANESTHESIA PREPROCEDURE EVALUATION
07/08/2022  Kenyetta Andersen is a 87 y.o., female.    Pre-op Assessment    I have reviewed the Patient Summary Reports.    I have reviewed the Nursing Notes. I have reviewed the NPO Status.   I have reviewed the Medications.     Review of Systems  Anesthesia Hx:  No problems with previous Anesthesia    Social:  Former Smoker Smoking Status: Current Some Day Smoker - 21.45 pack years  Smokeless Tobacco Status: Never Used  Alcohol use: Yes; 2.0 standard drinks per week  Drug use: No       Renal/:   Chronic Renal Disease    Hepatic/GI:   GERD, well controlled    Neurological:   Neuromuscular Disease,    Endocrine:   Hypothyroidism                                                                                                                 07/08/2022  Kenyetta Andersen is a 87 y.o., female.    Anesthesia Evaluation    I have reviewed the Patient Summary Reports.    I have reviewed the Nursing Notes.      Review of Systems  Anesthesia Hx:  No problems with previous Anesthesia    Hepatic/GI:   GERD, well controlled        Physical Exam  General:  Well nourished    Airway/Jaw/Neck:  Airway Findings: Mallampati: II                Anesthesia Plan  Type of Anesthesia, risks & benefits discussed:  Anesthesia Type:  general  Patient's Preference:   Intra-op Monitoring Plan:   Intra-op Monitoring Plan Comments:   Post Op Pain Control Plan:   Post Op Pain Control Plan Comments:   Induction:   IV  Beta Blocker:  Patient is not currently on a Beta-Blocker (No further documentation required).       Informed Consent: Patient understands risks and agrees with Anesthesia plan.  Questions answered. Anesthesia consent signed with patient.  ASA Score: 2     Day of Surgery Review of History & Physical:    H&P update referred to the surgeon.         Ready For Surgery From Anesthesia Perspective.                                                                                                                   07/08/2022  Kenyetta Andersen is a 87 y.o., female.    Anesthesia Evaluation    I have reviewed the Patient Summary Reports.    I have reviewed the Nursing Notes.      Review of Systems  Anesthesia Hx:  No problems with previous Anesthesia    Hepatic/GI:   GERD, well controlled        Physical Exam  General:  Well nourished    Airway/Jaw/Neck:  Airway Findings: Mallampati: II                Anesthesia Plan  Type of Anesthesia, risks & benefits discussed:  Anesthesia Type:  general  Patient's Preference:   Intra-op Monitoring Plan:   Intra-op Monitoring Plan Comments:   Post Op Pain Control Plan:   Post Op Pain Control Plan Comments:   Induction:   IV  Beta Blocker:  Patient is not currently on a Beta-Blocker (No further documentation required).       Informed Consent: Patient understands risks and agrees with Anesthesia plan.  Questions answered. Anesthesia consent signed with patient.  ASA Score: 2     Day of Surgery Review of History & Physical:    H&P update referred to the surgeon.         Ready For Surgery From Anesthesia Perspective.         Physical Exam  General:  Well nourished      Airway/Jaw/Neck:  Airway Findings: Mouth Opening: Normal   Tongue: Normal   General Airway Assessment: Adult, Good Oropharynx Findings: Normal Mallampati: II  Improves to II with phonation.  TM Distance: 4-6 cm   Neck Findings: Normal     Dental:  Dental Findings: In tact     Chest/Lungs:  Chest/Lungs Findings: Clear to auscultation, Normal Respiratory Rate      Heart/Vascular:  Heart Findings: Rate: Normal  Rhythm: Regular Rhythm  Sounds: Normal  Heart murmur: negative    Abdomen:  Abdomen Findings: Normal    Musculoskeletal:  Musculoskeletal Findings: Normal   Skin:  Skin Findings: Normal    Mental Status:  Mental Status Findings:  Cooperative, Alert and Oriented         Anesthesia Plan  Type of Anesthesia, risks & benefits discussed:  Anesthesia  Type:  general    Patient's Preference:   Plan Factors:          Intra-op Monitoring Plan: standard ASA monitors  Intra-op Monitoring Plan Comments:   Post Op Pain Control Plan:   Post Op Pain Control Plan Comments:     Induction:   IV  Beta Blocker:  Patient is not currently on a Beta-Blocker (No further documentation required).       Informed Consent: Informed consent signed with the Patient and all parties understand the risks and agree with anesthesia plan.  All questions answered.  Anesthesia consent signed with patient.  ASA Score: 4     Day of Surgery Review of History & Physical: I have interviewed and examined the patient. I have reviewed the patient's H&P dated:  There are no significant changes.   H&P completed by Anesthesiologist.         Ready For Surgery From Anesthesia Perspective.           Physical Exam  General: Well nourished    Airway:  Mallampati: II / II  Mouth Opening: Normal  TM Distance: 4-6 cm  Tongue: Normal    Dental:  In tact    Chest/Lungs:  Clear to auscultation, Normal Respiratory Rate    Heart:  Rate: Normal  Rhythm: Regular Rhythm  Sounds: Normal          Anesthesia Plan  Type of Anesthesia, risks & benefits discussed:    Anesthesia Type: general  Intra-op Monitoring Plan: standard ASA monitors  Induction:  IV  Informed Consent: Informed consent signed with the Patient and all parties understand the risks and agree with anesthesia plan.  All questions answered.   ASA Score: 4  Day of Surgery Review of History & Physical: I have interviewed and examined the patient. I have reviewed the patient's H&P dated: H&P completed by Anesthesiologist.    Ready For Surgery From Anesthesia Perspective.       .

## 2022-07-11 VITALS
HEIGHT: 63 IN | DIASTOLIC BLOOD PRESSURE: 77 MMHG | WEIGHT: 140 LBS | SYSTOLIC BLOOD PRESSURE: 142 MMHG | BODY MASS INDEX: 24.8 KG/M2 | RESPIRATION RATE: 16 BRPM | HEART RATE: 96 BPM | OXYGEN SATURATION: 96 % | TEMPERATURE: 98 F
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
## 2022-07-15 ENCOUNTER — OFFICE VISIT (OUTPATIENT)
Dept: HEMATOLOGY/ONCOLOGY | Facility: CLINIC | Age: 87
End: 2022-07-15
Payer: MEDICARE

## 2022-07-15 VITALS
OXYGEN SATURATION: 95 % | BODY MASS INDEX: 25.15 KG/M2 | HEART RATE: 74 BPM | SYSTOLIC BLOOD PRESSURE: 145 MMHG | DIASTOLIC BLOOD PRESSURE: 68 MMHG | RESPIRATION RATE: 16 BRPM | WEIGHT: 142 LBS | TEMPERATURE: 97 F

## 2022-07-15 DIAGNOSIS — E03.9 PRIMARY HYPOTHYROIDISM: ICD-10-CM

## 2022-07-15 DIAGNOSIS — Z85.038 PERSONAL HISTORY OF COLON CANCER, STAGE III: ICD-10-CM

## 2022-07-15 DIAGNOSIS — E78.1 HYPERTRIGLYCERIDEMIA: ICD-10-CM

## 2022-07-15 DIAGNOSIS — Z85.038 HISTORY OF COLON CANCER: ICD-10-CM

## 2022-07-15 DIAGNOSIS — N18.31 STAGE 3A CHRONIC KIDNEY DISEASE: ICD-10-CM

## 2022-07-15 DIAGNOSIS — E53.8 B12 DEFICIENCY: Primary | ICD-10-CM

## 2022-07-15 PROCEDURE — 99214 PR OFFICE/OUTPT VISIT, EST, LEVL IV, 30-39 MIN: ICD-10-PCS | Mod: S$GLB,,, | Performed by: INTERNAL MEDICINE

## 2022-07-15 PROCEDURE — 99999 PR PBB SHADOW E&M-EST. PATIENT-LVL IV: ICD-10-PCS | Mod: PBBFAC,,, | Performed by: INTERNAL MEDICINE

## 2022-07-15 PROCEDURE — 1125F AMNT PAIN NOTED PAIN PRSNT: CPT | Mod: CPTII,S$GLB,, | Performed by: INTERNAL MEDICINE

## 2022-07-15 PROCEDURE — 3078F PR MOST RECENT DIASTOLIC BLOOD PRESSURE < 80 MM HG: ICD-10-PCS | Mod: CPTII,S$GLB,, | Performed by: INTERNAL MEDICINE

## 2022-07-15 PROCEDURE — 1101F PT FALLS ASSESS-DOCD LE1/YR: CPT | Mod: CPTII,S$GLB,, | Performed by: INTERNAL MEDICINE

## 2022-07-15 PROCEDURE — 1125F PR PAIN SEVERITY QUANTIFIED, PAIN PRESENT: ICD-10-PCS | Mod: CPTII,S$GLB,, | Performed by: INTERNAL MEDICINE

## 2022-07-15 PROCEDURE — 99499 RISK ADDL DX/OHS AUDIT: ICD-10-PCS | Mod: S$GLB,,, | Performed by: INTERNAL MEDICINE

## 2022-07-15 PROCEDURE — 1159F MED LIST DOCD IN RCRD: CPT | Mod: CPTII,S$GLB,, | Performed by: INTERNAL MEDICINE

## 2022-07-15 PROCEDURE — 3077F PR MOST RECENT SYSTOLIC BLOOD PRESSURE >= 140 MM HG: ICD-10-PCS | Mod: CPTII,S$GLB,, | Performed by: INTERNAL MEDICINE

## 2022-07-15 PROCEDURE — 99214 OFFICE O/P EST MOD 30 MIN: CPT | Mod: S$GLB,,, | Performed by: INTERNAL MEDICINE

## 2022-07-15 PROCEDURE — 99999 PR PBB SHADOW E&M-EST. PATIENT-LVL IV: CPT | Mod: PBBFAC,,, | Performed by: INTERNAL MEDICINE

## 2022-07-15 PROCEDURE — 99499 UNLISTED E&M SERVICE: CPT | Mod: S$GLB,,, | Performed by: INTERNAL MEDICINE

## 2022-07-15 PROCEDURE — 1159F PR MEDICATION LIST DOCUMENTED IN MEDICAL RECORD: ICD-10-PCS | Mod: CPTII,S$GLB,, | Performed by: INTERNAL MEDICINE

## 2022-07-15 PROCEDURE — 3078F DIAST BP <80 MM HG: CPT | Mod: CPTII,S$GLB,, | Performed by: INTERNAL MEDICINE

## 2022-07-15 PROCEDURE — 3077F SYST BP >= 140 MM HG: CPT | Mod: CPTII,S$GLB,, | Performed by: INTERNAL MEDICINE

## 2022-07-15 PROCEDURE — 3288F PR FALLS RISK ASSESSMENT DOCUMENTED: ICD-10-PCS | Mod: CPTII,S$GLB,, | Performed by: INTERNAL MEDICINE

## 2022-07-15 PROCEDURE — 3288F FALL RISK ASSESSMENT DOCD: CPT | Mod: CPTII,S$GLB,, | Performed by: INTERNAL MEDICINE

## 2022-07-15 PROCEDURE — 1101F PR PT FALLS ASSESS DOC 0-1 FALLS W/OUT INJ PAST YR: ICD-10-PCS | Mod: CPTII,S$GLB,, | Performed by: INTERNAL MEDICINE

## 2022-07-15 NOTE — Clinical Note
Amb ref to renal - ckd amb ref to surgery thyroid mass  see me in 6 weeks with cbc, cmp, b12 na dtsh

## 2022-07-18 ENCOUNTER — TELEPHONE (OUTPATIENT)
Dept: HEMATOLOGY/ONCOLOGY | Facility: CLINIC | Age: 87
End: 2022-07-18
Payer: MEDICARE

## 2022-07-18 LAB
FINAL PATHOLOGIC DIAGNOSIS: NORMAL
Lab: NORMAL

## 2022-07-22 ENCOUNTER — OFFICE VISIT (OUTPATIENT)
Dept: SURGERY | Facility: CLINIC | Age: 87
End: 2022-07-22
Payer: MEDICARE

## 2022-07-22 VITALS
RESPIRATION RATE: 16 BRPM | DIASTOLIC BLOOD PRESSURE: 77 MMHG | HEART RATE: 77 BPM | TEMPERATURE: 99 F | SYSTOLIC BLOOD PRESSURE: 137 MMHG

## 2022-07-22 DIAGNOSIS — E04.1 RIGHT THYROID NODULE: ICD-10-CM

## 2022-07-22 PROCEDURE — 99999 PR PBB SHADOW E&M-EST. PATIENT-LVL IV: ICD-10-PCS | Mod: PBBFAC,,, | Performed by: SURGERY

## 2022-07-22 PROCEDURE — 3075F SYST BP GE 130 - 139MM HG: CPT | Mod: CPTII,S$GLB,, | Performed by: SURGERY

## 2022-07-22 PROCEDURE — 1101F PR PT FALLS ASSESS DOC 0-1 FALLS W/OUT INJ PAST YR: ICD-10-PCS | Mod: CPTII,S$GLB,, | Performed by: SURGERY

## 2022-07-22 PROCEDURE — 1101F PT FALLS ASSESS-DOCD LE1/YR: CPT | Mod: CPTII,S$GLB,, | Performed by: SURGERY

## 2022-07-22 PROCEDURE — 99203 PR OFFICE/OUTPT VISIT, NEW, LEVL III, 30-44 MIN: ICD-10-PCS | Mod: S$GLB,,, | Performed by: SURGERY

## 2022-07-22 PROCEDURE — 1159F PR MEDICATION LIST DOCUMENTED IN MEDICAL RECORD: ICD-10-PCS | Mod: CPTII,S$GLB,, | Performed by: SURGERY

## 2022-07-22 PROCEDURE — 3288F PR FALLS RISK ASSESSMENT DOCUMENTED: ICD-10-PCS | Mod: CPTII,S$GLB,, | Performed by: SURGERY

## 2022-07-22 PROCEDURE — 1126F AMNT PAIN NOTED NONE PRSNT: CPT | Mod: CPTII,S$GLB,, | Performed by: SURGERY

## 2022-07-22 PROCEDURE — 99999 PR PBB SHADOW E&M-EST. PATIENT-LVL IV: CPT | Mod: PBBFAC,,, | Performed by: SURGERY

## 2022-07-22 PROCEDURE — 99203 OFFICE O/P NEW LOW 30 MIN: CPT | Mod: S$GLB,,, | Performed by: SURGERY

## 2022-07-22 PROCEDURE — 3075F PR MOST RECENT SYSTOLIC BLOOD PRESS GE 130-139MM HG: ICD-10-PCS | Mod: CPTII,S$GLB,, | Performed by: SURGERY

## 2022-07-22 PROCEDURE — 3078F DIAST BP <80 MM HG: CPT | Mod: CPTII,S$GLB,, | Performed by: SURGERY

## 2022-07-22 PROCEDURE — 1159F MED LIST DOCD IN RCRD: CPT | Mod: CPTII,S$GLB,, | Performed by: SURGERY

## 2022-07-22 PROCEDURE — 3288F FALL RISK ASSESSMENT DOCD: CPT | Mod: CPTII,S$GLB,, | Performed by: SURGERY

## 2022-07-22 PROCEDURE — 3078F PR MOST RECENT DIASTOLIC BLOOD PRESSURE < 80 MM HG: ICD-10-PCS | Mod: CPTII,S$GLB,, | Performed by: SURGERY

## 2022-07-22 PROCEDURE — 1126F PR PAIN SEVERITY QUANTIFIED, NO PAIN PRESENT: ICD-10-PCS | Mod: CPTII,S$GLB,, | Performed by: SURGERY

## 2022-07-22 NOTE — H&P
GENERAL SURGERY  OUTPATIENT H&P    REASON FOR VISIT/CC:  Thyroid nodule    HPI: Kenyetta Andersen is a 87 y.o. female with a history of GERD, glaucoma, colon cancer status post resection and treatment, glaucoma and tobacco abuse was sent for evaluation of a thyroid nodule identified on screening CT scan for history of colon cancer.  Referred to surgery for evaluation.  I have discussed with patient the she has had history of thyroid nodule for many years and was previously biopsied.  She does have some concern his as not been imaged in a few years.  She denies any weight gain or weight loss, heat intolerance or cold intolerance, any other significant changes.  She has recently undergone endoscopy for follow-up of colon cancer and history of GERD and Escalante's esophagus.    I have reviewed the patient's chart including prior progress notes, procedures and testing.     ROS:   Review of Systems   Constitutional: Negative for activity change, chills and fever.   HENT: Negative for trouble swallowing.    Respiratory: Negative for apnea, chest tightness and shortness of breath.    Cardiovascular: Negative for chest pain, palpitations and leg swelling.   Gastrointestinal: Negative for abdominal distention, abdominal pain, nausea and vomiting.   Genitourinary: Negative for difficulty urinating, dysuria and hematuria.   Musculoskeletal: Negative for arthralgias, neck pain and neck stiffness.   Skin: Negative for color change and wound.   Neurological: Negative for dizziness, weakness and light-headedness.       PROBLEM LIST:  Patient Active Problem List   Diagnosis    Chronic angle-closure glaucoma of both eyes    Hypertriglyceridemia    Macrocytosis    B12 deficiency    Abdominal aortic atherosclerosis    GERD (gastroesophageal reflux disease)    Stage 3a chronic kidney disease    Bilateral renal cysts    Smoker    Osteopenia of multiple sites    BMI 27.0-27.9,adult    Calcified granuloma of lung    Personal  "history of colon cancer, stage III    Acid reflux    Escalante's esophagus    History of colon cancer    Chemotherapy-induced peripheral neuropathy    Primary hypothyroidism    Vitamin D deficiency    Primary insomnia    Stress incontinence of urine    Dysphagia    Hypokalemia    Pulmonary emphysema, unspecified emphysema type         HISTORY  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    Anxiety     Arthritis     Escalante esophagus     Blood transfusion     Cataract     Done OU    Colon cancer 2009    Colon polyps 3/14/2017    GERD (gastroesophageal reflux disease)     Glaucoma 2/24/2012    Nuclear sclerosis 8/27/2012    Osteoporosis     Primary hypothyroidism 2/23/2020    Pseudophakia - Left Eye 2/24/2012       Past Surgical History:   Procedure Laterality Date    APPENDECTOMY      CATARACT EXTRACTION W/  INTRAOCULAR LENS IMPLANT Left     CATARACT EXTRACTION W/  INTRAOCULAR LENS IMPLANT Right     Dr Sawant    COLON SURGERY      resection    COLONOSCOPY N/A 3/14/2017    Procedure: COLONOSCOPY;  Surgeon: Killian Guerrier MD;  Location: [facility] ENDO;  Service: Endoscopy;  Laterality: N/A;    COLONOSCOPY  03/14/2017    Dr. Guerrier: hemorrhoids, one colon polyp removed, "Patent functional end-to-end ileo-colonic anastomosis"with healthy mucosa; biopsy: hyperplastic polyp; repeat in 3 years for surveillance    COLONOSCOPY N/A 1/21/2020    Procedure: COLONOSCOPY;  Surgeon: Timo Darling MD;  Location: [facility] ENDO;  Service: Endoscopy;  Laterality: N/A;    COLONOSCOPY N/A 7/8/2022    Procedure: COLONOSCOPY;  Surgeon: Anaid Washington MD;  Location: [facility] ENDO;  Service: Endoscopy;  Laterality: N/A;    ECTOPIC PREGNANCY SURGERY      ESOPHAGOGASTRODUODENOSCOPY N/A 5/16/2019    Procedure: EGD (ESOPHAGOGASTRODUODENOSCOPY);  Surgeon: Anaid Washington MD;  Location: [facility] ENDO;  Service: Endoscopy;  Laterality: N/A;    ESOPHAGOGASTRODUODENOSCOPY N/A 7/11/2019 "    Procedure: EGD (ESOPHAGOGASTRODUODENOSCOPY);  Surgeon: Anaid Washington MD;  Location: [facility] ENDO;  Service: Endoscopy;  Laterality: N/A;    ESOPHAGOGASTRODUODENOSCOPY N/A 2021    Procedure: EGD (ESOPHAGOGASTRODUODENOSCOPY);  Surgeon: Anaid Washington MD;  Location: [facility] ENDO;  Service: Endoscopy;  Laterality: N/A;    ESOPHAGOGASTRODUODENOSCOPY N/A 2021    Procedure: EGD (ESOPHAGOGASTRODUODENOSCOPY);  Surgeon: Anaid Washington MD;  Location: [facility] ENDO;  Service: Endoscopy;  Laterality: N/A;    ESOPHAGOGASTRODUODENOSCOPY N/A 2022    Procedure: EGD (ESOPHAGOGASTRODUODENOSCOPY);  Surgeon: Anaid Washington MD;  Location: [facility] ENDO;  Service: Endoscopy;  Laterality: N/A;    EYE SURGERY      bilateral cataracts    HYSTERECTOMY      complete    JOINT REPLACEMENT      Liban Knee    ROTATOR CUFF REPAIR Right     TOE SURGERY      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    UPPER GASTROINTESTINAL ENDOSCOPY  2017    Dr. Guerrier: gastritis and esophagitis, biopsy: chronic gastritis, negative for h pylori, esophageal- positive eosinophils, negative for barretts; repeat in 2 months    UPPER GASTROINTESTINAL ENDOSCOPY  2017    Dr. Guerrier       Social History     Tobacco Use    Smoking status: Current Some Day Smoker     Packs/day: 0.33     Years: 65.00     Pack years: 21.45     Types: Cigarettes     Last attempt to quit: 10/5/2020     Years since quittin.7    Smokeless tobacco: Never Used   Substance Use Topics    Alcohol use: Yes     Alcohol/week: 2.0 standard drinks     Types: 2 Shots of liquor per week     Comment: occasional (crown)    Drug use: No       Family History   Problem Relation Age of Onset    Heart disease Mother         heart attack    Heart disease Father     Heart disease Brother         MI    Parkinsonism Sister     Arthritis Sister     No Known Problems Brother     No Known Problems Brother     Amblyopia Neg Hx     Blindness Neg Hx     Cancer Neg Hx      Cataracts Neg Hx     Glaucoma Neg Hx     Hypertension Neg Hx     Macular degeneration Neg Hx     Retinal detachment Neg Hx     Strabismus Neg Hx     Stroke Neg Hx     Thyroid disease Neg Hx     Diabetes Neg Hx     Colon cancer Neg Hx     Crohn's disease Neg Hx     Esophageal cancer Neg Hx     Stomach cancer Neg Hx     Ulcerative colitis Neg Hx          MEDS:  Current Outpatient Medications on File Prior to Visit   Medication Sig Dispense Refill    alendronate (FOSAMAX) 70 MG tablet TAKE 1 TABLET (70 MG TOTAL) BY MOUTH EVERY 7 DAYS. ON EMPTY STOMACH IN MORNING, REMAIN UPRIGHT FOR 30 MINUTES. 12 tablet 3    ascorbic acid, vitamin C, (VITAMIN C) 500 MG tablet Take 500 mg by mouth once daily.      b complex vitamins capsule Take 1 capsule by mouth once daily.      BOOSTRIX TDAP 2.5-8-5 Lf-mcg-Lf/0.5mL Syrg injection       brimonidine 0.2% (ALPHAGAN) 0.2 % Drop INSTILL 1 DROP INTO BOTH EYES 3 TIMES A DAY 10 mL 6    calcium carbonate-magnesium hydroxide (ROLAIDS) 550-110 mg Chew Take 1 tablet by mouth 2 (two) times daily with meals.      cyanocobalamin (VITAMIN B-12) 1000 MCG tablet Take 100 mcg by mouth once daily.      dorzolamide-timolol 2-0.5% (COSOPT) 22.3-6.8 mg/mL ophthalmic solution INSTILL 1 DROP INTO BOTH EYES TWICE A DAY 10 mL 4    gabapentin (NEURONTIN) 300 MG capsule Take 300 mg in the AM, 300 mg midday and 600 mg at night 120 capsule 11    hydroCHLOROthiazide (HYDRODIURIL) 12.5 MG Tab TAKE 1 TABLET BY MOUTH EVERY DAY 90 tablet 0    latanoprost 0.005 % ophthalmic solution PLACE 1 DROP INTO BOTH EYES EVERY EVENING. 7.5 mL 3    meloxicam (MOBIC) 15 MG tablet TAKE 1 TABLET BY MOUTH EVERY DAY AS NEEDED FOR PAIN 30 tablet 2    mirtazapine (REMERON) 7.5 MG Tab Take 1 tablet (7.5 mg total) by mouth every evening. 30 tablet 5    mometasone 0.1% (ELOCON) 0.1 % cream Apply topically once daily. for 10 days 15 g 0    pantoprazole (PROTONIX) 40 MG tablet Take 1 tablet (40 mg total) by  mouth 2 (two) times daily. 60 tablet 0    potassium chloride SA (K-DUR,KLOR-CON) 20 MEQ tablet Take 1 tablet (20 mEq total) by mouth once daily. 30 tablet 11    propofoL (DIPRIVAN) 10 mg/mL infusion       RABEprazole (ACIPHEX) 20 mg tablet Take 1 tablet (20 mg total) by mouth once daily. 90 tablet 1    SHINGRIX, PF, 50 mcg/0.5 mL injection        No current facility-administered medications on file prior to visit.       ALLERGIES:  Review of patient's allergies indicates:   Allergen Reactions    Ace inhibitors Edema     Other reaction(s): Angioedema    Codeine Hives         VITALS:  There were no vitals filed for this visit.      PHYSICAL EXAM:  Physical Exam  Vitals reviewed.   Constitutional:       General: She is not in acute distress.     Appearance: Normal appearance. She is well-developed.   HENT:      Head: Normocephalic and atraumatic.      Nose: Nose normal.   Eyes:      General: No scleral icterus.     Conjunctiva/sclera: Conjunctivae normal.   Neck:      Thyroid: No thyroid mass, thyromegaly or thyroid tenderness.      Trachea: No tracheal tenderness or tracheal deviation.   Cardiovascular:      Rate and Rhythm: Normal rate and regular rhythm.      Pulses: Normal pulses.   Pulmonary:      Effort: Pulmonary effort is normal. No accessory muscle usage or respiratory distress.      Breath sounds: Normal breath sounds.   Abdominal:      General: There is no distension.      Palpations: Abdomen is soft.      Tenderness: There is no abdominal tenderness.   Musculoskeletal:         General: No swelling or tenderness. Normal range of motion.      Cervical back: Normal range of motion and neck supple. No rigidity.   Lymphadenopathy:      Cervical:      Right cervical: No superficial or posterior cervical adenopathy.     Left cervical: No superficial or posterior cervical adenopathy.   Skin:     General: Skin is warm and dry.      Coloration: Skin is not jaundiced.      Findings: No erythema.   Neurological:       General: No focal deficit present.      Mental Status: She is alert and oriented to person, place, and time.      Motor: No weakness or abnormal muscle tone.   Psychiatric:         Mood and Affect: Mood normal.         Behavior: Behavior normal.         Thought Content: Thought content normal.         Judgment: Judgment normal.           LABS:  Lab Results   Component Value Date    WBC 9.47 07/01/2022    RBC 4.29 07/01/2022    HGB 13.8 07/01/2022    HCT 42.1 07/01/2022     07/01/2022     Lab Results   Component Value Date     07/01/2022     07/01/2022    K 4.0 07/01/2022     07/01/2022    CO2 30 (H) 07/01/2022    BUN 13 07/01/2022    CREATININE 1.2 07/01/2022    CALCIUM 9.4 07/01/2022     Lab Results   Component Value Date    ALT 11 07/01/2022    AST 18 07/01/2022    ALKPHOS 66 07/01/2022    BILITOT 0.2 07/01/2022     Lab Results   Component Value Date    MG 1.9 07/27/2009    PHOS 3.1 07/12/2021       STUDIES:  Images and reports were personally reviewed.    Thyroid US 2/11/21  FINDINGS:  The right hemithyroid measures 5.1 x 2.3 x 2.7 cm and the left 4.5 x 1.6 x 1.7 cm.  The thyroid isthmus measures 4 mm.  Total thyroid volume is 22 cc.     The thyroid parenchyma is homogeneous without hypervascularity.     Multiple nodules are present bilaterally.  The largest on the right is a lower pole nodule measuring 3.2 cm, demonstrating heterogeneous, solid echotexture.  This previously measured 3.7 cm.  On the left a single nodule is identified, seen as a mixed cystic and solid nodule measuring 10 mm, previously 8 mm.     No evidence for cervical lymphadenopathy.     Impression:     Mildly enlarged thyroid gland demonstrating a dominant right lower pole nodule which is without detrimental change    THYROID US 6/3/19  FINDINGS:  The right lobe of the thyroid gland measures 5.4 x 2.6 x 2.7 cm with a volume of 19 cc     The left lobe measures 4.8 x 1.2 x 1.9 cm with a volume of 57 cc     The  isthmus measures 0.6 cm AP     Total volume of the thyroid gland is 24.7 cc     There is dominant solid nodule lower pole of the right lobe measuring 3.7 x 2.4 x 2.5 cm.  On the prior exam this is measured at 3.8 x 2.4 x 2.5 cm; not significantly changed.  Next largest nodule the right lobe measures 10 x 3 x 8 mm     There are 2 nodules in the left lobe larger which is measured at 7 x 3 x 8 mm.     Impression:     3.7 cm dominant nodule in the right lobe of the thyroid gland meet ultrasound criteria for recommendation for aspiration but corresponds as was seen on the prior exam of 12/27/2011 without significant oval change..  Also history of prior biopsy right lobe of the thyroid gland and recommend correlation clinically    THYROID US 12/27/11  Narrative  Comparison is made to the prior thyroid ultrasound dated 4/7/09.     The thyroid gland is enlarged but has actually decreased in overall size since the prior study.  The right lobe is much larger than the left lobe due to the presence of a dominant heterogeneous solid nodule extending from the midportion to lower pole of   the right lobe.  This nodule has decreased in size since the prior study and now measures 3.8 x 2.5 x 2.4 cm compared to 4.8 x 3.9 x 2.7 cm previously.  Small benign appearing cystic and solid nodules are also present in the left lobe.  The largest   nodule in the left lobe is solid and measures 0.8-cm.  This is unchanged.  The right lobe measures 5.6 x 2.3 x 2.5 cm and the left lobe measures 5.3 x 1.3 x 2.0 cm.  Total thyroid volume is 24 cc compared to 32 cc previously.  The parathyroid soft   tissues are unremarkable.  Impression   Multinodular thyroid gland with a dominant nodule in the right lobe having decreased in size since the prior study of 4/7/09.       PATHOLOGY      ASSESSMENT & PLAN:  87 y.o. female with right thyroid nodule  - right thyroid lesion has been present for least 10 years, previously biopsied as benign previous  ultrasound showed no significant changes in size the lesion has not been imaged in 2 years  - will plan to repeat thyroid ultrasound and then proceed with FNA if indicated  - if FNA not indicated will plan to follow with ultrasound every few years.

## 2022-08-01 ENCOUNTER — TELEPHONE (OUTPATIENT)
Dept: NEPHROLOGY | Facility: CLINIC | Age: 87
End: 2022-08-01
Payer: MEDICARE

## 2022-08-01 ENCOUNTER — TELEPHONE (OUTPATIENT)
Dept: HEMATOLOGY/ONCOLOGY | Facility: CLINIC | Age: 87
End: 2022-08-01
Payer: MEDICARE

## 2022-08-01 NOTE — TELEPHONE ENCOUNTER
This nurse called pt to advise her to contact nephrologist office to schedule appt. She stated she would contact her MD. This nurse notified pt that she had missed her US appt as well. Pt stated she would call Dr Gómez's office to r/s.

## 2022-08-01 NOTE — TELEPHONE ENCOUNTER
----- Message from Karie Real sent at 8/1/2022  4:21 PM CDT -----  Contact: Patient  Type: Patient Call Back         Who called: Patient         What is the request in detail: stephane  np appt on 8/5; calling to see if any labs need to be done prior to visit; state she can get done while she is in clinic tmrw for US; please advise         Best call back number: [phone]         Additional Information:  Stage 3a chronic kidney disease [N18.31]             Thank You

## 2022-08-02 ENCOUNTER — HOSPITAL ENCOUNTER (OUTPATIENT)
Dept: RADIOLOGY | Facility: HOSPITAL | Age: 87
Discharge: HOME | End: 2022-08-02
Attending: SURGERY
Payer: MEDICARE

## 2022-08-02 DIAGNOSIS — E04.1 RIGHT THYROID NODULE: ICD-10-CM

## 2022-08-02 PROCEDURE — 76536 US SOFT TISSUE HEAD NECK THYROID: ICD-10-PCS | Mod: 26,,, | Performed by: RADIOLOGY

## 2022-08-02 PROCEDURE — 76536 US EXAM OF HEAD AND NECK: CPT | Mod: 26,,, | Performed by: RADIOLOGY

## 2022-08-02 PROCEDURE — 76536 US EXAM OF HEAD AND NECK: CPT | Mod: TC

## 2022-08-11 ENCOUNTER — OFFICE VISIT (OUTPATIENT)
Dept: NEPHROLOGY | Facility: CLINIC | Age: 87
End: 2022-08-11
Payer: MEDICARE

## 2022-08-11 VITALS
DIASTOLIC BLOOD PRESSURE: 68 MMHG | SYSTOLIC BLOOD PRESSURE: 124 MMHG | OXYGEN SATURATION: 98 % | BODY MASS INDEX: 24.98 KG/M2 | WEIGHT: 141 LBS | HEIGHT: 63 IN | HEART RATE: 78 BPM

## 2022-08-11 DIAGNOSIS — T45.1X5A CHEMOTHERAPY-INDUCED PERIPHERAL NEUROPATHY: Primary | ICD-10-CM

## 2022-08-11 DIAGNOSIS — N18.31 STAGE 3A CHRONIC KIDNEY DISEASE: ICD-10-CM

## 2022-08-11 DIAGNOSIS — E55.9 VITAMIN D DEFICIENCY: ICD-10-CM

## 2022-08-11 DIAGNOSIS — N28.1 BILATERAL RENAL CYSTS: ICD-10-CM

## 2022-08-11 DIAGNOSIS — Z85.038 HISTORY OF COLON CANCER: ICD-10-CM

## 2022-08-11 DIAGNOSIS — Z85.038 PERSONAL HISTORY OF COLON CANCER, STAGE III: ICD-10-CM

## 2022-08-11 DIAGNOSIS — G62.0 CHEMOTHERAPY-INDUCED PERIPHERAL NEUROPATHY: Primary | ICD-10-CM

## 2022-08-11 PROCEDURE — 1101F PR PT FALLS ASSESS DOC 0-1 FALLS W/OUT INJ PAST YR: ICD-10-PCS | Mod: CPTII,S$GLB,, | Performed by: INTERNAL MEDICINE

## 2022-08-11 PROCEDURE — 99204 PR OFFICE/OUTPT VISIT, NEW, LEVL IV, 45-59 MIN: ICD-10-PCS | Mod: S$GLB,,, | Performed by: INTERNAL MEDICINE

## 2022-08-11 PROCEDURE — 3288F PR FALLS RISK ASSESSMENT DOCUMENTED: ICD-10-PCS | Mod: CPTII,S$GLB,, | Performed by: INTERNAL MEDICINE

## 2022-08-11 PROCEDURE — 1160F PR REVIEW ALL MEDS BY PRESCRIBER/CLIN PHARMACIST DOCUMENTED: ICD-10-PCS | Mod: CPTII,S$GLB,, | Performed by: INTERNAL MEDICINE

## 2022-08-11 PROCEDURE — 1159F MED LIST DOCD IN RCRD: CPT | Mod: CPTII,S$GLB,, | Performed by: INTERNAL MEDICINE

## 2022-08-11 PROCEDURE — 1159F PR MEDICATION LIST DOCUMENTED IN MEDICAL RECORD: ICD-10-PCS | Mod: CPTII,S$GLB,, | Performed by: INTERNAL MEDICINE

## 2022-08-11 PROCEDURE — 99999 PR PBB SHADOW E&M-EST. PATIENT-LVL IV: ICD-10-PCS | Mod: PBBFAC,,, | Performed by: INTERNAL MEDICINE

## 2022-08-11 PROCEDURE — 3074F PR MOST RECENT SYSTOLIC BLOOD PRESSURE < 130 MM HG: ICD-10-PCS | Mod: CPTII,S$GLB,, | Performed by: INTERNAL MEDICINE

## 2022-08-11 PROCEDURE — 3078F PR MOST RECENT DIASTOLIC BLOOD PRESSURE < 80 MM HG: ICD-10-PCS | Mod: CPTII,S$GLB,, | Performed by: INTERNAL MEDICINE

## 2022-08-11 PROCEDURE — 99204 OFFICE O/P NEW MOD 45 MIN: CPT | Mod: S$GLB,,, | Performed by: INTERNAL MEDICINE

## 2022-08-11 PROCEDURE — 1160F RVW MEDS BY RX/DR IN RCRD: CPT | Mod: CPTII,S$GLB,, | Performed by: INTERNAL MEDICINE

## 2022-08-11 PROCEDURE — 1101F PT FALLS ASSESS-DOCD LE1/YR: CPT | Mod: CPTII,S$GLB,, | Performed by: INTERNAL MEDICINE

## 2022-08-11 PROCEDURE — 3078F DIAST BP <80 MM HG: CPT | Mod: CPTII,S$GLB,, | Performed by: INTERNAL MEDICINE

## 2022-08-11 PROCEDURE — 3288F FALL RISK ASSESSMENT DOCD: CPT | Mod: CPTII,S$GLB,, | Performed by: INTERNAL MEDICINE

## 2022-08-11 PROCEDURE — 99999 PR PBB SHADOW E&M-EST. PATIENT-LVL IV: CPT | Mod: PBBFAC,,, | Performed by: INTERNAL MEDICINE

## 2022-08-11 PROCEDURE — 3074F SYST BP LT 130 MM HG: CPT | Mod: CPTII,S$GLB,, | Performed by: INTERNAL MEDICINE

## 2022-08-11 NOTE — PROGRESS NOTES
"Subjective:       Patient ID: Kenyetta Andersen is a 87 y.o. Black or  female who presents for initial evaluation of CKD referred by PCP, dr Arreola.     Renal function per chart review with sr cr baseline of 1.1 - 1.3 mg/dL without hematuria or proteinuria.    - HTN diagnosed about 20 years ago. Patient reports good adherence to the current regiment, which includes HCTZ. They are following low salt diet. Previously on ACEi and stopped due to rash. Never hospitalized for uncontrolled HTN or hypotension/syncopal episodes.    Denies use of NSAIDs, nephrolithiasis or fam Hx of renal disease.      Review of Systems   Constitutional: Negative for chills, fatigue and fever.   HENT: Negative for hearing loss, trouble swallowing and voice change.    Respiratory: Negative for cough, shortness of breath and wheezing.    Cardiovascular: Negative for chest pain, palpitations and leg swelling.   Gastrointestinal: Negative for abdominal distention, abdominal pain, constipation and diarrhea.   Endocrine: Negative for polydipsia, polyphagia and polyuria.   Genitourinary: Negative for dysuria, flank pain, frequency and hematuria.   Musculoskeletal: Negative for arthralgias, back pain and myalgias.   Skin: Negative for rash and wound.   Neurological: Negative for dizziness, syncope, weakness and light-headedness.   Hematological: Negative for adenopathy. Does not bruise/bleed easily.       The past medical, family and social histories were reviewed for this encounter.     /68 (BP Location: Right arm, Patient Position: Sitting, BP Method: Medium (Manual))   Pulse 78   Ht 5' 3" (1.6 m)   Wt 64 kg (141 lb)   SpO2 98%   BMI 24.98 kg/m²     Objective:      Physical Exam  Vitals and nursing note reviewed.   Constitutional:       Appearance: Normal appearance.   HENT:      Head: Normocephalic and atraumatic.      Mouth/Throat:      Mouth: Mucous membranes are moist.      Pharynx: Oropharynx is clear.   Eyes:      " Extraocular Movements: Extraocular movements intact.      Conjunctiva/sclera: Conjunctivae normal.   Neck:      Vascular: No carotid bruit.   Cardiovascular:      Rate and Rhythm: Normal rate and regular rhythm.      Heart sounds: Normal heart sounds.   Pulmonary:      Effort: Pulmonary effort is normal. No respiratory distress.      Breath sounds: Normal breath sounds.   Abdominal:      General: Bowel sounds are normal. There is no distension.      Palpations: Abdomen is soft.      Tenderness: There is no abdominal tenderness.   Musculoskeletal:         General: No swelling. Normal range of motion.      Cervical back: Normal range of motion. No tenderness.   Lymphadenopathy:      Cervical: No cervical adenopathy.   Skin:     General: Skin is warm and dry.      Capillary Refill: Capillary refill takes less than 2 seconds.      Coloration: Skin is not jaundiced.   Neurological:      General: No focal deficit present.      Mental Status: She is alert and oriented to person, place, and time.   Psychiatric:         Mood and Affect: Mood normal.         Behavior: Behavior normal.         Judgment: Judgment normal.         Assessment:       1. Chemotherapy-induced peripheral neuropathy    2. Stage 3a chronic kidney disease    3. Bilateral renal cysts    4. History of colon cancer    5. Personal history of colon cancer, stage III    6. Vitamin D deficiency        Plan:   Return to clinic PRN    CKD stage G3aA1 in a setting of aging and HTN  Baseline creatinine is 1.1 - 1.3 mg/dL with out proteinuria  Lab Results   Component Value Date    CREATININE 1.2 07/01/2022      - Euvolemic   - Complete avoidance of NSAIDs   - Low salt diet with < 2000 mg/day   - Dose adjust medications to GFR of 45 - 60   - Avoid nephrotoxins including IV contrast    HTN   - BP well controlled: continue current medications, avoid hypotension and dehydration    Vit D def   - Continue replacement    Med changes: none

## 2022-08-24 ENCOUNTER — LAB VISIT (OUTPATIENT)
Dept: LAB | Facility: HOSPITAL | Age: 87
End: 2022-08-24
Attending: INTERNAL MEDICINE
Payer: MEDICARE

## 2022-08-24 DIAGNOSIS — E87.6 HYPOKALEMIA: ICD-10-CM

## 2022-08-24 DIAGNOSIS — N18.31 STAGE 3A CHRONIC KIDNEY DISEASE: ICD-10-CM

## 2022-08-24 DIAGNOSIS — Z85.038 PERSONAL HISTORY OF COLON CANCER, STAGE III: ICD-10-CM

## 2022-08-24 DIAGNOSIS — E53.8 B12 DEFICIENCY: ICD-10-CM

## 2022-08-24 DIAGNOSIS — Z85.038 HISTORY OF COLON CANCER: ICD-10-CM

## 2022-08-24 DIAGNOSIS — E03.9 PRIMARY HYPOTHYROIDISM: ICD-10-CM

## 2022-08-24 LAB
ALBUMIN SERPL BCP-MCNC: 3.3 G/DL (ref 3.5–5.2)
ALP SERPL-CCNC: 67 U/L (ref 55–135)
ALT SERPL W/O P-5'-P-CCNC: 8 U/L (ref 10–44)
ANION GAP SERPL CALC-SCNC: 7 MMOL/L (ref 8–16)
ANION GAP SERPL CALC-SCNC: 7 MMOL/L (ref 8–16)
AST SERPL-CCNC: 18 U/L (ref 10–40)
BASOPHILS # BLD AUTO: 0.05 K/UL (ref 0–0.2)
BASOPHILS NFR BLD: 0.6 % (ref 0–1.9)
BILIRUB SERPL-MCNC: 0.3 MG/DL (ref 0.1–1)
BUN SERPL-MCNC: 11 MG/DL (ref 8–23)
BUN SERPL-MCNC: 11 MG/DL (ref 8–23)
CALCIUM SERPL-MCNC: 9.1 MG/DL (ref 8.7–10.5)
CALCIUM SERPL-MCNC: 9.1 MG/DL (ref 8.7–10.5)
CHLORIDE SERPL-SCNC: 108 MMOL/L (ref 95–110)
CHLORIDE SERPL-SCNC: 108 MMOL/L (ref 95–110)
CO2 SERPL-SCNC: 26 MMOL/L (ref 23–29)
CO2 SERPL-SCNC: 26 MMOL/L (ref 23–29)
CREAT SERPL-MCNC: 1.1 MG/DL (ref 0.5–1.4)
CREAT SERPL-MCNC: 1.1 MG/DL (ref 0.5–1.4)
DIFFERENTIAL METHOD: ABNORMAL
EOSINOPHIL # BLD AUTO: 0.3 K/UL (ref 0–0.5)
EOSINOPHIL NFR BLD: 3.7 % (ref 0–8)
ERYTHROCYTE [DISTWIDTH] IN BLOOD BY AUTOMATED COUNT: 12.7 % (ref 11.5–14.5)
EST. GFR  (NO RACE VARIABLE): 49.2 ML/MIN/1.73 M^2
EST. GFR  (NO RACE VARIABLE): 49.2 ML/MIN/1.73 M^2
GLUCOSE SERPL-MCNC: 83 MG/DL (ref 70–110)
GLUCOSE SERPL-MCNC: 83 MG/DL (ref 70–110)
HCT VFR BLD AUTO: 41.2 % (ref 37–48.5)
HGB BLD-MCNC: 13.6 G/DL (ref 12–16)
IMM GRANULOCYTES # BLD AUTO: 0.06 K/UL (ref 0–0.04)
IMM GRANULOCYTES NFR BLD AUTO: 0.7 % (ref 0–0.5)
LYMPHOCYTES # BLD AUTO: 4.2 K/UL (ref 1–4.8)
LYMPHOCYTES NFR BLD: 48.6 % (ref 18–48)
MCH RBC QN AUTO: 32 PG (ref 27–31)
MCHC RBC AUTO-ENTMCNC: 33 G/DL (ref 32–36)
MCV RBC AUTO: 97 FL (ref 82–98)
MONOCYTES # BLD AUTO: 0.7 K/UL (ref 0.3–1)
MONOCYTES NFR BLD: 8.2 % (ref 4–15)
NEUTROPHILS # BLD AUTO: 3.3 K/UL (ref 1.8–7.7)
NEUTROPHILS NFR BLD: 38.2 % (ref 38–73)
NRBC BLD-RTO: 0 /100 WBC
PLATELET # BLD AUTO: 207 K/UL (ref 150–450)
PMV BLD AUTO: 10.2 FL (ref 9.2–12.9)
POTASSIUM SERPL-SCNC: 4.9 MMOL/L (ref 3.5–5.1)
POTASSIUM SERPL-SCNC: 4.9 MMOL/L (ref 3.5–5.1)
PROT SERPL-MCNC: 6.5 G/DL (ref 6–8.4)
RBC # BLD AUTO: 4.25 M/UL (ref 4–5.4)
SODIUM SERPL-SCNC: 141 MMOL/L (ref 136–145)
SODIUM SERPL-SCNC: 141 MMOL/L (ref 136–145)
TSH SERPL DL<=0.005 MIU/L-ACNC: 0.83 UIU/ML (ref 0.4–4)
VIT B12 SERPL-MCNC: >2000 PG/ML (ref 210–950)
WBC # BLD AUTO: 8.67 K/UL (ref 3.9–12.7)

## 2022-08-24 PROCEDURE — 85025 COMPLETE CBC W/AUTO DIFF WBC: CPT | Mod: PO | Performed by: INTERNAL MEDICINE

## 2022-08-24 PROCEDURE — 82607 VITAMIN B-12: CPT | Performed by: INTERNAL MEDICINE

## 2022-08-24 PROCEDURE — 80053 COMPREHEN METABOLIC PANEL: CPT | Performed by: INTERNAL MEDICINE

## 2022-08-24 PROCEDURE — 36415 COLL VENOUS BLD VENIPUNCTURE: CPT | Mod: PO | Performed by: INTERNAL MEDICINE

## 2022-08-24 PROCEDURE — 84443 ASSAY THYROID STIM HORMONE: CPT | Performed by: INTERNAL MEDICINE

## 2022-08-26 ENCOUNTER — OFFICE VISIT (OUTPATIENT)
Dept: HEMATOLOGY/ONCOLOGY | Facility: CLINIC | Age: 87
End: 2022-08-26
Payer: MEDICARE

## 2022-08-26 VITALS
DIASTOLIC BLOOD PRESSURE: 70 MMHG | OXYGEN SATURATION: 94 % | SYSTOLIC BLOOD PRESSURE: 147 MMHG | RESPIRATION RATE: 16 BRPM | HEART RATE: 88 BPM | WEIGHT: 145.06 LBS | TEMPERATURE: 97 F | BODY MASS INDEX: 25.7 KG/M2

## 2022-08-26 DIAGNOSIS — E03.9 PRIMARY HYPOTHYROIDISM: ICD-10-CM

## 2022-08-26 DIAGNOSIS — Z85.038 HISTORY OF COLON CANCER: ICD-10-CM

## 2022-08-26 DIAGNOSIS — N18.31 STAGE 3A CHRONIC KIDNEY DISEASE: ICD-10-CM

## 2022-08-26 DIAGNOSIS — E53.8 B12 DEFICIENCY: Primary | ICD-10-CM

## 2022-08-26 DIAGNOSIS — Z85.038 PERSONAL HISTORY OF COLON CANCER, STAGE III: ICD-10-CM

## 2022-08-26 PROCEDURE — 1126F PR PAIN SEVERITY QUANTIFIED, NO PAIN PRESENT: ICD-10-PCS | Mod: CPTII,S$GLB,, | Performed by: INTERNAL MEDICINE

## 2022-08-26 PROCEDURE — 3077F PR MOST RECENT SYSTOLIC BLOOD PRESSURE >= 140 MM HG: ICD-10-PCS | Mod: CPTII,S$GLB,, | Performed by: INTERNAL MEDICINE

## 2022-08-26 PROCEDURE — 1159F MED LIST DOCD IN RCRD: CPT | Mod: CPTII,S$GLB,, | Performed by: INTERNAL MEDICINE

## 2022-08-26 PROCEDURE — 1101F PR PT FALLS ASSESS DOC 0-1 FALLS W/OUT INJ PAST YR: ICD-10-PCS | Mod: CPTII,S$GLB,, | Performed by: INTERNAL MEDICINE

## 2022-08-26 PROCEDURE — 3077F SYST BP >= 140 MM HG: CPT | Mod: CPTII,S$GLB,, | Performed by: INTERNAL MEDICINE

## 2022-08-26 PROCEDURE — 99999 PR PBB SHADOW E&M-EST. PATIENT-LVL IV: ICD-10-PCS | Mod: PBBFAC,,, | Performed by: INTERNAL MEDICINE

## 2022-08-26 PROCEDURE — 1101F PT FALLS ASSESS-DOCD LE1/YR: CPT | Mod: CPTII,S$GLB,, | Performed by: INTERNAL MEDICINE

## 2022-08-26 PROCEDURE — 3288F PR FALLS RISK ASSESSMENT DOCUMENTED: ICD-10-PCS | Mod: CPTII,S$GLB,, | Performed by: INTERNAL MEDICINE

## 2022-08-26 PROCEDURE — 3078F DIAST BP <80 MM HG: CPT | Mod: CPTII,S$GLB,, | Performed by: INTERNAL MEDICINE

## 2022-08-26 PROCEDURE — 99214 PR OFFICE/OUTPT VISIT, EST, LEVL IV, 30-39 MIN: ICD-10-PCS | Mod: S$GLB,,, | Performed by: INTERNAL MEDICINE

## 2022-08-26 PROCEDURE — 99214 OFFICE O/P EST MOD 30 MIN: CPT | Mod: S$GLB,,, | Performed by: INTERNAL MEDICINE

## 2022-08-26 PROCEDURE — 3078F PR MOST RECENT DIASTOLIC BLOOD PRESSURE < 80 MM HG: ICD-10-PCS | Mod: CPTII,S$GLB,, | Performed by: INTERNAL MEDICINE

## 2022-08-26 PROCEDURE — 1159F PR MEDICATION LIST DOCUMENTED IN MEDICAL RECORD: ICD-10-PCS | Mod: CPTII,S$GLB,, | Performed by: INTERNAL MEDICINE

## 2022-08-26 PROCEDURE — 1126F AMNT PAIN NOTED NONE PRSNT: CPT | Mod: CPTII,S$GLB,, | Performed by: INTERNAL MEDICINE

## 2022-08-26 PROCEDURE — 99999 PR PBB SHADOW E&M-EST. PATIENT-LVL IV: CPT | Mod: PBBFAC,,, | Performed by: INTERNAL MEDICINE

## 2022-08-26 PROCEDURE — 3288F FALL RISK ASSESSMENT DOCD: CPT | Mod: CPTII,S$GLB,, | Performed by: INTERNAL MEDICINE
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
## 2022-09-02 ENCOUNTER — TELEPHONE (OUTPATIENT)
Dept: OPTOMETRY | Facility: CLINIC | Age: 87
End: 2022-09-02
Payer: MEDICARE

## 2022-09-02 NOTE — TELEPHONE ENCOUNTER
----- Message from Samantha Liu sent at 9/2/2022  9:59 AM CDT -----  Contact: pt  Type: Needs Medical Advice    Who Called: pt  Best Call Back Number: [phone]    Inquiry/Question: pt states she overslept for her appt today at 10:00. She wants to knowi if she can come in later or can she r/s to a different day       Thank you~

## 2022-09-07 ENCOUNTER — TELEPHONE (OUTPATIENT)
Dept: OPTOMETRY | Facility: CLINIC | Age: 87
End: 2022-09-07
Payer: MEDICARE

## 2022-09-07 NOTE — TELEPHONE ENCOUNTER
Patient came into clinic thinking appt was today. Told appt was for 10/07 that was the soonest we have available for the VF test and to see Dr. Contreras on the same day. Patient understood.

## 2022-09-26 DIAGNOSIS — E04.1 RIGHT THYROID NODULE: Primary | ICD-10-CM

## 2022-10-07 ENCOUNTER — CLINICAL SUPPORT (OUTPATIENT)
Dept: OPHTHALMOLOGY | Facility: CLINIC | Age: 87
End: 2022-10-07
Attending: OPTOMETRIST
Payer: MEDICARE

## 2022-10-07 ENCOUNTER — OFFICE VISIT (OUTPATIENT)
Dept: OPTOMETRY | Facility: CLINIC | Age: 87
End: 2022-10-07
Attending: OPTOMETRIST
Payer: MEDICARE

## 2022-10-07 DIAGNOSIS — H40.1131 PRIMARY OPEN ANGLE GLAUCOMA (POAG) OF BOTH EYES, MILD STAGE: ICD-10-CM

## 2022-10-07 DIAGNOSIS — H04.123 DRY EYE SYNDROME, BILATERAL: ICD-10-CM

## 2022-10-07 DIAGNOSIS — Z96.1 PSEUDOPHAKIA: ICD-10-CM

## 2022-10-07 DIAGNOSIS — H40.1131 PRIMARY OPEN ANGLE GLAUCOMA (POAG) OF BOTH EYES, MILD STAGE: Primary | ICD-10-CM

## 2022-10-07 DIAGNOSIS — H52.7 REFRACTIVE ERROR: ICD-10-CM

## 2022-10-07 PROCEDURE — 1126F AMNT PAIN NOTED NONE PRSNT: CPT | Mod: CPTII,S$GLB,, | Performed by: OPTOMETRIST

## 2022-10-07 PROCEDURE — 1160F PR REVIEW ALL MEDS BY PRESCRIBER/CLIN PHARMACIST DOCUMENTED: ICD-10-PCS | Mod: CPTII,S$GLB,, | Performed by: OPTOMETRIST

## 2022-10-07 PROCEDURE — 99999 PR PBB SHADOW E&M-EST. PATIENT-LVL II: ICD-10-PCS | Mod: PBBFAC,,, | Performed by: OPTOMETRIST

## 2022-10-07 PROCEDURE — 92015 PR REFRACTION: ICD-10-PCS | Mod: S$GLB,,, | Performed by: OPTOMETRIST

## 2022-10-07 PROCEDURE — 1159F MED LIST DOCD IN RCRD: CPT | Mod: CPTII,S$GLB,, | Performed by: OPTOMETRIST

## 2022-10-07 PROCEDURE — 1126F PR PAIN SEVERITY QUANTIFIED, NO PAIN PRESENT: ICD-10-PCS | Mod: CPTII,S$GLB,, | Performed by: OPTOMETRIST

## 2022-10-07 PROCEDURE — 1160F RVW MEDS BY RX/DR IN RCRD: CPT | Mod: CPTII,S$GLB,, | Performed by: OPTOMETRIST

## 2022-10-07 PROCEDURE — 1159F PR MEDICATION LIST DOCUMENTED IN MEDICAL RECORD: ICD-10-PCS | Mod: CPTII,S$GLB,, | Performed by: OPTOMETRIST

## 2022-10-07 PROCEDURE — 3288F FALL RISK ASSESSMENT DOCD: CPT | Mod: CPTII,S$GLB,, | Performed by: OPTOMETRIST

## 2022-10-07 PROCEDURE — 92014 COMPRE OPH EXAM EST PT 1/>: CPT | Mod: S$GLB,,, | Performed by: OPTOMETRIST

## 2022-10-07 PROCEDURE — 99999 PR PBB SHADOW E&M-EST. PATIENT-LVL II: CPT | Mod: PBBFAC,,, | Performed by: OPTOMETRIST

## 2022-10-07 PROCEDURE — 92014 PR EYE EXAM, EST PATIENT,COMPREHESV: ICD-10-PCS | Mod: S$GLB,,, | Performed by: OPTOMETRIST

## 2022-10-07 PROCEDURE — 92015 DETERMINE REFRACTIVE STATE: CPT | Mod: S$GLB,,, | Performed by: OPTOMETRIST

## 2022-10-07 PROCEDURE — 1101F PT FALLS ASSESS-DOCD LE1/YR: CPT | Mod: CPTII,S$GLB,, | Performed by: OPTOMETRIST

## 2022-10-07 PROCEDURE — 1101F PR PT FALLS ASSESS DOC 0-1 FALLS W/OUT INJ PAST YR: ICD-10-PCS | Mod: CPTII,S$GLB,, | Performed by: OPTOMETRIST

## 2022-10-07 PROCEDURE — 3288F PR FALLS RISK ASSESSMENT DOCUMENTED: ICD-10-PCS | Mod: CPTII,S$GLB,, | Performed by: OPTOMETRIST

## 2022-10-07 NOTE — PROGRESS NOTES
HPI    Pt here today for glaucoma workup with hvf rev.   States blurred vision at   both near & distance due to lenses scratched & coating coming off.   Would   like updated rx today.  Denies any eye pain.    Good compliance with glaucoma meds:  Cosopt OU bid  Brimonidine OU tid  Latanoprost OU qhs    (+) headaches -- occasionally  (+) tearing  (-) allergies  (-) floaters or light flashes  Last edited by Fadumo Mcgarry on 10/7/2022  2:54 PM.            Assessment /Plan     For exam results, see Encounter Report.    Primary open angle glaucoma (POAG) of both eyes, mild stage    Dry eye syndrome, bilateral    Pseudophakia    Refractive error      1. Primary open angle glaucoma (POAG) of both eyes, mild stage  IOP stable with use of drops  Continue cosopt bid OU  brimonidine tid OU  latanoprost qPM OU  Reviewed HVF  OCT today, overall stable   Recheck iop in 6 months    2. Dry eye syndrome, bilateral  Discussed ocular affects of dry eyes. Recommend OTC  artificial tears 2-4 times a day in both eyes. Discussed chronicity of WALLY. RTC if symptoms not alleviated by continued use of artificial tears.     3. Pseudophakia  S/p cataract extraction, mild PCO OU, not visually significant    4. Refractive error  Dispensed updated spectacle Rx. Discussed various spectacle lens options. Discussed adaptation period to new specs.   Demonstrated new spec Rx vs current specs in phoropter with patient satisfaction      RTC in 6 months for iop check

## 2022-11-17 ENCOUNTER — LAB VISIT (OUTPATIENT)
Dept: LAB | Facility: HOSPITAL | Age: 87
End: 2022-11-17
Attending: INTERNAL MEDICINE
Payer: MEDICARE

## 2022-11-17 DIAGNOSIS — E53.8 B12 DEFICIENCY: ICD-10-CM

## 2022-11-17 DIAGNOSIS — Z85.038 PERSONAL HISTORY OF COLON CANCER, STAGE III: ICD-10-CM

## 2022-11-17 LAB
ALBUMIN SERPL BCP-MCNC: 3.4 G/DL (ref 3.5–5.2)
ALP SERPL-CCNC: 84 U/L (ref 55–135)
ALT SERPL W/O P-5'-P-CCNC: 9 U/L (ref 10–44)
ANION GAP SERPL CALC-SCNC: 6 MMOL/L (ref 8–16)
AST SERPL-CCNC: 20 U/L (ref 10–40)
BASOPHILS # BLD AUTO: 0.03 K/UL (ref 0–0.2)
BASOPHILS NFR BLD: 0.4 % (ref 0–1.9)
BILIRUB SERPL-MCNC: 0.4 MG/DL (ref 0.1–1)
BUN SERPL-MCNC: 12 MG/DL (ref 8–23)
CALCIUM SERPL-MCNC: 9.7 MG/DL (ref 8.7–10.5)
CEA SERPL-MCNC: 2.2 NG/ML (ref 0–5)
CHLORIDE SERPL-SCNC: 109 MMOL/L (ref 95–110)
CO2 SERPL-SCNC: 25 MMOL/L (ref 23–29)
CREAT SERPL-MCNC: 1 MG/DL (ref 0.5–1.4)
DIFFERENTIAL METHOD: ABNORMAL
EOSINOPHIL # BLD AUTO: 0.2 K/UL (ref 0–0.5)
EOSINOPHIL NFR BLD: 2.9 % (ref 0–8)
ERYTHROCYTE [DISTWIDTH] IN BLOOD BY AUTOMATED COUNT: 11.9 % (ref 11.5–14.5)
EST. GFR  (NO RACE VARIABLE): 55.2 ML/MIN/1.73 M^2
GLUCOSE SERPL-MCNC: 103 MG/DL (ref 70–110)
HCT VFR BLD AUTO: 40.2 % (ref 37–48.5)
HGB BLD-MCNC: 13.6 G/DL (ref 12–16)
IMM GRANULOCYTES # BLD AUTO: 0.02 K/UL (ref 0–0.04)
IMM GRANULOCYTES NFR BLD AUTO: 0.3 % (ref 0–0.5)
LYMPHOCYTES # BLD AUTO: 3.1 K/UL (ref 1–4.8)
LYMPHOCYTES NFR BLD: 44.6 % (ref 18–48)
MCH RBC QN AUTO: 32.3 PG (ref 27–31)
MCHC RBC AUTO-ENTMCNC: 33.8 G/DL (ref 32–36)
MCV RBC AUTO: 96 FL (ref 82–98)
MONOCYTES # BLD AUTO: 0.7 K/UL (ref 0.3–1)
MONOCYTES NFR BLD: 9.6 % (ref 4–15)
NEUTROPHILS # BLD AUTO: 2.9 K/UL (ref 1.8–7.7)
NEUTROPHILS NFR BLD: 42.2 % (ref 38–73)
NRBC BLD-RTO: 0 /100 WBC
PLATELET # BLD AUTO: 210 K/UL (ref 150–450)
PMV BLD AUTO: 9.6 FL (ref 9.2–12.9)
POTASSIUM SERPL-SCNC: 5.1 MMOL/L (ref 3.5–5.1)
PROT SERPL-MCNC: 6.4 G/DL (ref 6–8.4)
RBC # BLD AUTO: 4.21 M/UL (ref 4–5.4)
SODIUM SERPL-SCNC: 140 MMOL/L (ref 136–145)
WBC # BLD AUTO: 6.97 K/UL (ref 3.9–12.7)

## 2022-11-17 PROCEDURE — 82378 CARCINOEMBRYONIC ANTIGEN: CPT | Performed by: INTERNAL MEDICINE

## 2022-11-17 PROCEDURE — 80053 COMPREHEN METABOLIC PANEL: CPT | Performed by: INTERNAL MEDICINE

## 2022-11-17 PROCEDURE — 85025 COMPLETE CBC W/AUTO DIFF WBC: CPT | Mod: PO | Performed by: INTERNAL MEDICINE

## 2022-11-17 PROCEDURE — 36415 COLL VENOUS BLD VENIPUNCTURE: CPT | Mod: PO | Performed by: INTERNAL MEDICINE

## 2022-11-18 ENCOUNTER — OFFICE VISIT (OUTPATIENT)
Dept: HEMATOLOGY/ONCOLOGY | Facility: CLINIC | Age: 87
End: 2022-11-18
Payer: MEDICARE

## 2022-11-18 VITALS
SYSTOLIC BLOOD PRESSURE: 135 MMHG | BODY MASS INDEX: 25.7 KG/M2 | HEART RATE: 88 BPM | HEIGHT: 63 IN | WEIGHT: 145.06 LBS | RESPIRATION RATE: 12 BRPM | DIASTOLIC BLOOD PRESSURE: 68 MMHG | TEMPERATURE: 96 F | OXYGEN SATURATION: 96 %

## 2022-11-18 DIAGNOSIS — Z85.038 PERSONAL HISTORY OF COLON CANCER, STAGE III: ICD-10-CM

## 2022-11-18 DIAGNOSIS — E53.8 B12 DEFICIENCY: Primary | ICD-10-CM

## 2022-11-18 DIAGNOSIS — E03.9 PRIMARY HYPOTHYROIDISM: ICD-10-CM

## 2022-11-18 DIAGNOSIS — E78.1 HYPERTRIGLYCERIDEMIA: ICD-10-CM

## 2022-11-18 DIAGNOSIS — Z85.038 HISTORY OF COLON CANCER: ICD-10-CM

## 2022-11-18 PROCEDURE — 3075F SYST BP GE 130 - 139MM HG: CPT | Mod: CPTII,S$GLB,, | Performed by: INTERNAL MEDICINE

## 2022-11-18 PROCEDURE — 99214 PR OFFICE/OUTPT VISIT, EST, LEVL IV, 30-39 MIN: ICD-10-PCS | Mod: S$GLB,,, | Performed by: INTERNAL MEDICINE

## 2022-11-18 PROCEDURE — 99499 UNLISTED E&M SERVICE: CPT | Mod: HCNC,S$GLB,, | Performed by: INTERNAL MEDICINE

## 2022-11-18 PROCEDURE — 3078F PR MOST RECENT DIASTOLIC BLOOD PRESSURE < 80 MM HG: ICD-10-PCS | Mod: CPTII,S$GLB,, | Performed by: INTERNAL MEDICINE

## 2022-11-18 PROCEDURE — 99499 RISK ADDL DX/OHS AUDIT: ICD-10-PCS | Mod: HCNC,S$GLB,, | Performed by: INTERNAL MEDICINE

## 2022-11-18 PROCEDURE — 1126F AMNT PAIN NOTED NONE PRSNT: CPT | Mod: CPTII,S$GLB,, | Performed by: INTERNAL MEDICINE

## 2022-11-18 PROCEDURE — 1101F PR PT FALLS ASSESS DOC 0-1 FALLS W/OUT INJ PAST YR: ICD-10-PCS | Mod: CPTII,S$GLB,, | Performed by: INTERNAL MEDICINE

## 2022-11-18 PROCEDURE — 3075F PR MOST RECENT SYSTOLIC BLOOD PRESS GE 130-139MM HG: ICD-10-PCS | Mod: CPTII,S$GLB,, | Performed by: INTERNAL MEDICINE

## 2022-11-18 PROCEDURE — 3288F PR FALLS RISK ASSESSMENT DOCUMENTED: ICD-10-PCS | Mod: CPTII,S$GLB,, | Performed by: INTERNAL MEDICINE

## 2022-11-18 PROCEDURE — 3288F FALL RISK ASSESSMENT DOCD: CPT | Mod: CPTII,S$GLB,, | Performed by: INTERNAL MEDICINE

## 2022-11-18 PROCEDURE — 1159F PR MEDICATION LIST DOCUMENTED IN MEDICAL RECORD: ICD-10-PCS | Mod: CPTII,S$GLB,, | Performed by: INTERNAL MEDICINE

## 2022-11-18 PROCEDURE — 99999 PR PBB SHADOW E&M-EST. PATIENT-LVL IV: CPT | Mod: PBBFAC,,, | Performed by: INTERNAL MEDICINE

## 2022-11-18 PROCEDURE — 99999 PR PBB SHADOW E&M-EST. PATIENT-LVL IV: ICD-10-PCS | Mod: PBBFAC,,, | Performed by: INTERNAL MEDICINE

## 2022-11-18 PROCEDURE — 1159F MED LIST DOCD IN RCRD: CPT | Mod: CPTII,S$GLB,, | Performed by: INTERNAL MEDICINE

## 2022-11-18 PROCEDURE — 1101F PT FALLS ASSESS-DOCD LE1/YR: CPT | Mod: CPTII,S$GLB,, | Performed by: INTERNAL MEDICINE

## 2022-11-18 PROCEDURE — 1126F PR PAIN SEVERITY QUANTIFIED, NO PAIN PRESENT: ICD-10-PCS | Mod: CPTII,S$GLB,, | Performed by: INTERNAL MEDICINE

## 2022-11-18 PROCEDURE — 3078F DIAST BP <80 MM HG: CPT | Mod: CPTII,S$GLB,, | Performed by: INTERNAL MEDICINE

## 2022-11-18 PROCEDURE — 99214 OFFICE O/P EST MOD 30 MIN: CPT | Mod: S$GLB,,, | Performed by: INTERNAL MEDICINE
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
## 2022-11-28 ENCOUNTER — HOSPITAL ENCOUNTER (EMERGENCY)
Facility: HOSPITAL | Age: 87
Discharge: HOME | End: 2022-11-28
Attending: EMERGENCY MEDICINE
Payer: MEDICARE

## 2022-11-28 ENCOUNTER — TELEPHONE (OUTPATIENT)
Dept: FAMILY MEDICINE | Facility: CLINIC | Age: 87
End: 2022-11-28
Payer: MEDICARE

## 2022-11-28 VITALS
HEIGHT: 63 IN | SYSTOLIC BLOOD PRESSURE: 137 MMHG | OXYGEN SATURATION: 95 % | DIASTOLIC BLOOD PRESSURE: 71 MMHG | BODY MASS INDEX: 25.69 KG/M2 | TEMPERATURE: 99 F | RESPIRATION RATE: 18 BRPM | HEART RATE: 92 BPM | WEIGHT: 145 LBS

## 2022-11-28 DIAGNOSIS — S32.010A CLOSED COMPRESSION FRACTURE OF BODY OF L1 VERTEBRA: Primary | ICD-10-CM

## 2022-11-28 PROCEDURE — 99284 EMERGENCY DEPT VISIT MOD MDM: CPT | Mod: 25

## 2022-11-28 PROCEDURE — 25000003 PHARM REV CODE 250: Performed by: EMERGENCY MEDICINE

## 2022-11-28 RX ORDER — LIDOCAINE 50 MG/G
1 PATCH TOPICAL ONCE
Status: DISCONTINUED | OUTPATIENT
Start: 2022-11-28 | End: 2022-11-28 | Stop reason: HOSPADM

## 2022-11-28 RX ORDER — METHOCARBAMOL 500 MG/1
1000 TABLET, FILM COATED ORAL 3 TIMES DAILY
Qty: 30 TABLET | Refills: 0 | Status: SHIPPED | OUTPATIENT
Start: 2022-11-28 | End: 2022-12-03

## 2022-11-28 RX ORDER — HYDROCODONE BITARTRATE AND ACETAMINOPHEN 5; 325 MG/1; MG/1
1 TABLET ORAL EVERY 6 HOURS PRN
Qty: 12 TABLET | Refills: 0 | Status: SHIPPED | OUTPATIENT
Start: 2022-11-28 | End: 2023-03-01

## 2022-11-28 RX ORDER — LIDOCAINE 50 MG/G
1 PATCH TOPICAL DAILY
Qty: 15 PATCH | Refills: 0 | Status: SHIPPED | OUTPATIENT
Start: 2022-11-28 | End: 2023-04-14

## 2022-11-28 RX ADMIN — LIDOCAINE 1 PATCH: 50 PATCH TOPICAL at 01:11

## 2022-11-28 NOTE — TELEPHONE ENCOUNTER
----- Message from Sandra Angela, Patient Care Assistant sent at 11/28/2022  8:39 AM CST -----  Contact: self  Patient fall on Saturday and can barely walk. She wanted to see her Dr but there is no soon availability. Please call back to advise [phone]

## 2022-11-28 NOTE — ED PROVIDER NOTES
Encounter Date: 11/28/2022       History     Chief Complaint   Patient presents with    Fall     Saturday     Back Pain     HPI    87-year-old female with past medical history of Escalante's esophagus, colon cancer, GERD, hypothyroidism presents after fall on Saturday.  Patient reports she was walking around with her cane as she normally does, states she turned sharply was unable to recover her balance and fell directly backward onto lower part of her back.  She reports pain since that time.  She reports attempting to treat this at home however has not noted any improvement with the Tylenol she is been trying.  She reports having a fracture there previously, states it feels similar.  Reports not getting any treatment for the fracture that occurred awhile ago.  She reports no numbness or tingling, states he still been able to walk around and get around on her own.  Reports driving here today.  Denies any further complaints.    Review of patient's allergies indicates:   Allergen Reactions    Ace inhibitors Edema     Other reaction(s): Angioedema    Codeine Hives     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    Anxiety     Arthritis     Escalante esophagus     Blood transfusion     Cataract     Done OU    Colon cancer 2009    Colon polyps 3/14/2017    GERD (gastroesophageal reflux disease)     Glaucoma 2/24/2012    Nuclear sclerosis 8/27/2012    Osteoporosis     Primary hypothyroidism 2/23/2020    Pseudophakia - Left Eye 2/24/2012     Past Surgical History:   Procedure Laterality Date    APPENDECTOMY      CATARACT EXTRACTION W/  INTRAOCULAR LENS IMPLANT Left     CATARACT EXTRACTION W/  INTRAOCULAR LENS IMPLANT Right     Dr Sawant    COLON SURGERY      resection    COLONOSCOPY N/A 3/14/2017    Procedure: COLONOSCOPY;  Surgeon: Killian Guerrier MD;  Location: [facility];  Service: Endoscopy;  Laterality: N/A;    COLONOSCOPY  03/14/2017    Dr. Guerrier: hemorrhoids, one colon polyp removed,  ""Patent functional end-to-end ileo-colonic anastomosis"with healthy mucosa; biopsy: hyperplastic polyp; repeat in 3 years for surveillance    COLONOSCOPY N/A 1/21/2020    Procedure: COLONOSCOPY;  Surgeon: Timo Darling MD;  Location: [facility] ENDO;  Service: Endoscopy;  Laterality: N/A;    COLONOSCOPY N/A 7/8/2022    Procedure: COLONOSCOPY;  Surgeon: Anaid Washington MD;  Location: [facility] ENDO;  Service: Endoscopy;  Laterality: N/A;    ECTOPIC PREGNANCY SURGERY      ESOPHAGOGASTRODUODENOSCOPY N/A 5/16/2019    Procedure: EGD (ESOPHAGOGASTRODUODENOSCOPY);  Surgeon: Anaid Washington MD;  Location: [facility] ENDO;  Service: Endoscopy;  Laterality: N/A;    ESOPHAGOGASTRODUODENOSCOPY N/A 7/11/2019    Procedure: EGD (ESOPHAGOGASTRODUODENOSCOPY);  Surgeon: Anaid Washington MD;  Location: [facility] ENDO;  Service: Endoscopy;  Laterality: N/A;    ESOPHAGOGASTRODUODENOSCOPY N/A 2/5/2021    Procedure: EGD (ESOPHAGOGASTRODUODENOSCOPY);  Surgeon: Anaid Washington MD;  Location: [facility] ENDO;  Service: Endoscopy;  Laterality: N/A;    ESOPHAGOGASTRODUODENOSCOPY N/A 11/11/2021    Procedure: EGD (ESOPHAGOGASTRODUODENOSCOPY);  Surgeon: Anaid Washington MD;  Location: [facility] ENDO;  Service: Endoscopy;  Laterality: N/A;    ESOPHAGOGASTRODUODENOSCOPY N/A 7/8/2022    Procedure: EGD (ESOPHAGOGASTRODUODENOSCOPY);  Surgeon: Anaid Washington MD;  Location: [facility] ENDO;  Service: Endoscopy;  Laterality: N/A;    EYE SURGERY      bilateral cataracts    HYSTERECTOMY      complete    JOINT REPLACEMENT      Liban Knee    ROTATOR CUFF REPAIR Right     TOE SURGERY      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    UPPER GASTROINTESTINAL ENDOSCOPY  06/12/2017    Dr. Guerrier: gastritis and esophagitis, biopsy: chronic gastritis, negative for h pylori, esophageal- positive eosinophils, negative for barretts; repeat in 2 months    UPPER GASTROINTESTINAL ENDOSCOPY  07/27/2017    Dr. Guerrier     Family History   Problem Relation Age of Onset    Heart disease Mother "         heart attack    Heart disease Father     Heart disease Brother         MI    Parkinsonism Sister     Arthritis Sister     No Known Problems Brother     No Known Problems Brother     Amblyopia Neg Hx     Blindness Neg Hx     Cancer Neg Hx     Cataracts Neg Hx     Glaucoma Neg Hx     Hypertension Neg Hx     Macular degeneration Neg Hx     Retinal detachment Neg Hx     Strabismus Neg Hx     Stroke Neg Hx     Thyroid disease Neg Hx     Diabetes Neg Hx     Colon cancer Neg Hx     Crohn's disease Neg Hx     Esophageal cancer Neg Hx     Stomach cancer Neg Hx     Ulcerative colitis Neg Hx      Social History     Tobacco Use    Smoking status: Some Days     Packs/day: 0.33     Years: 65.00     Pack years: 21.45     Types: Cigarettes     Last attempt to quit: 10/5/2020     Years since quittin.1    Smokeless tobacco: Never   Substance Use Topics    Alcohol use: Yes     Alcohol/week: 2.0 standard drinks     Types: 2 Shots of liquor per week     Comment: occasional (crown)    Drug use: No     Review of Systems   Constitutional: Negative.    HENT: Negative.     Eyes: Negative.    Respiratory: Negative.     Cardiovascular: Negative.    Gastrointestinal: Negative.    Genitourinary: Negative.    Musculoskeletal:  Positive for back pain.   Skin: Negative.    Neurological: Negative.      Physical Exam     Initial Vitals [22 1131]   BP Pulse Resp Temp SpO2   133/63 100 18 99.2 °F (37.3 °C) 95 %      MAP       --         Physical Exam    Nursing note and vitals reviewed.  Constitutional: She appears well-developed and well-nourished. She is not diaphoretic. No distress.   HENT:   Head: Normocephalic and atraumatic.   Nose: Nose normal.   Eyes: EOM are normal. Pupils are equal, round, and reactive to light.   Neck: Neck supple. No JVD present.   Normal range of motion.  Cardiovascular:  Normal rate, regular rhythm, normal heart sounds and intact distal pulses.           Pulmonary/Chest: Breath sounds normal. No stridor.  No respiratory distress. She has no wheezes. She has no rales.   Abdominal: Abdomen is soft. Bowel sounds are normal. She exhibits no distension. There is no abdominal tenderness.   Musculoskeletal:         General: Tenderness (mild para lumbar spinal area ttp, no overlying skin changes present.) present. No edema. Normal range of motion.      Cervical back: Normal range of motion and neck supple.     Neurological: She is alert and oriented to person, place, and time. She has normal strength.   Skin: Skin is warm and dry. Capillary refill takes less than 2 seconds. No rash noted. No erythema.       ED Course   Procedures  Labs Reviewed - No data to display       Imaging Results              X-Ray Lumbar Spine Ap And Lateral (Final result)  Result time 11/28/22 13:34:02      Final result by Reinier Emerson MD (11/28/22 13:34:02)                   Impression:      L1 compression fracture new relative to 2012 but unchanged relative to July 1, 2022.    Mid and lower lumbar degenerative facet changes.      Electronically signed by: Reinier Emerson MD  Date:    11/28/2022  Time:    13:34               Narrative:    EXAMINATION:  XR LUMBAR SPINE AP AND LATERAL    CLINICAL HISTORY:  fall;    TECHNIQUE:  AP, lateral and spot images were performed of the lumbar spine.    COMPARISON:  November 8, 2012    FINDINGS:  There is a levo scoliotic curvature in the mid lumbar region.  There is anterior osteophyte formation throughout the lumbar region.  There is subtle vertebral body height loss at L1 suggesting AA mild compression fracture.  There is anterior osteophyte formation in the mid and lower lumbar region.  Aortoiliac atherosclerosis is present.                                       Medications - No data to display                   MDM:    87-year-old female with past medical history of Escalante's esophagus, colon cancer, GERD, hypothyroidism presents after fall on Saturday.  Physical exam as noted above, ED workup notable  for L1 compression fracture as noted on x-ray, not significantly changed from previous x-ray.  Patient with suspected lumbar contusion based on exam and findings today, no neuro deficit noted, patient is otherwise ambulatory with steady gait.  Mechanical fall, nonsyncopal nature.  Vitals otherwise stable.  At this point in time based on physical exam and evaluation do not suspect cauda equina, conus medullaris, syncope, arrhythmia, intra-abdominal surgical emergency or any further medical emergency. Discussed diagnosis and further treatment with patient, including f/u.  Return precautions given and all questions answered.  Patient in understanding of plan.  Pt discharged to home improved and stable.              Clinical Impression:   Final diagnoses:  [S32.010A] Closed compression fracture of body of L1 vertebra (Primary)      ED Disposition Condition    Discharge Stable          ED Prescriptions       Medication Sig Dispense Start Date End Date Auth. Provider    HYDROcodone-acetaminophen (NORCO) 5-325 mg per tablet Take 1 tablet by mouth every 6 (six) hours as needed for Pain. 12 tablet 11/28/2022 -- Dong Bermeo MD    methocarbamoL (ROBAXIN) 500 MG Tab Take 2 tablets (1,000 mg total) by mouth 3 (three) times daily. for 5 days 30 tablet 11/28/2022 12/3/2022 Dong Bermeo MD    LIDOcaine (LIDODERM) 5 % Place 1 patch onto the skin once daily. Remove & Discard patch within 12 hours or as directed by MD 15 patch 11/28/2022 -- Dong Bermeo MD          Follow-up Information       Follow up With Specialties Details Why Contact Info    [facility] Emergency Dept Emergency Medicine Go to  If symptoms worsen [address] [place] [id number] [phone]    Jamie Rosa,  Neurosurgery Call in 1 day  [address] [place] [address] [address]  [address]  [facility] [address]  [phone]               Dong Bermeo MD  11/28/22 2252

## 2022-11-28 NOTE — TELEPHONE ENCOUNTER
Fall with injury to back possible, can barely walk, enc patient to go to ED, patient voiced understanding

## 2022-11-29 ENCOUNTER — TELEPHONE (OUTPATIENT)
Dept: FAMILY MEDICINE | Facility: CLINIC | Age: 87
End: 2022-11-29
Payer: MEDICARE

## 2022-11-29 NOTE — TELEPHONE ENCOUNTER
----- Message from Hellen Dailey MA sent at 11/29/2022  3:47 PM CST -----  Regarding: ER   [phone]- pt needs ER follow up appt.nothing available in Epic to me. Please call pt and asdvise.  Thank you

## 2022-12-08 ENCOUNTER — PATIENT OUTREACH (OUTPATIENT)
Dept: ADMINISTRATIVE | Facility: HOSPITAL | Age: 87
End: 2022-12-08
Payer: MEDICARE

## 2023-01-11 ENCOUNTER — TELEPHONE (OUTPATIENT)
Dept: FAMILY MEDICINE | Facility: CLINIC | Age: 88
End: 2023-01-11
Payer: MEDICARE

## 2023-01-11 NOTE — TELEPHONE ENCOUNTER
----- Message from Peewee Neumann sent at 2023 10:22 AM CST -----  Contact: pt  Type:  Sooner Appointment Request    Caller is requesting a sooner appointment.  Caller declined first available appointment listed below.  Caller will not accept being placed on the waitlist and is requesting a message be sent to doctor.    Name of Caller:  pt   When is the first available appointment?    Symptoms:  follow up   Best Call Back Number:  [phone]    Additional Information:  pt states she needs to reschedule her appt for  she states she has a  to attend. Please advise.

## 2023-01-11 NOTE — TELEPHONE ENCOUNTER
Appointment rescheduled for next available date. Appointment also added to wait list for possible earlier access.

## 2023-01-19 ENCOUNTER — OFFICE VISIT (OUTPATIENT)
Dept: NEUROSURGERY | Facility: CLINIC | Age: 88
End: 2023-01-19
Payer: MEDICARE

## 2023-01-19 VITALS
HEIGHT: 63 IN | HEART RATE: 81 BPM | BODY MASS INDEX: 25.69 KG/M2 | DIASTOLIC BLOOD PRESSURE: 80 MMHG | WEIGHT: 145 LBS | SYSTOLIC BLOOD PRESSURE: 159 MMHG

## 2023-01-19 DIAGNOSIS — G89.29 CHRONIC BILATERAL LOW BACK PAIN WITHOUT SCIATICA: Primary | ICD-10-CM

## 2023-01-19 DIAGNOSIS — S32.010A CLOSED COMPRESSION FRACTURE OF BODY OF L1 VERTEBRA: ICD-10-CM

## 2023-01-19 DIAGNOSIS — M54.50 CHRONIC BILATERAL LOW BACK PAIN WITHOUT SCIATICA: Primary | ICD-10-CM

## 2023-01-19 PROCEDURE — 1159F MED LIST DOCD IN RCRD: CPT | Mod: CPTII,S$GLB,, | Performed by: NEUROLOGICAL SURGERY

## 2023-01-19 PROCEDURE — 1159F PR MEDICATION LIST DOCUMENTED IN MEDICAL RECORD: ICD-10-PCS | Mod: CPTII,S$GLB,, | Performed by: NEUROLOGICAL SURGERY

## 2023-01-19 PROCEDURE — 1125F AMNT PAIN NOTED PAIN PRSNT: CPT | Mod: CPTII,S$GLB,, | Performed by: NEUROLOGICAL SURGERY

## 2023-01-19 PROCEDURE — 1125F PR PAIN SEVERITY QUANTIFIED, PAIN PRESENT: ICD-10-PCS | Mod: CPTII,S$GLB,, | Performed by: NEUROLOGICAL SURGERY

## 2023-01-19 PROCEDURE — 99204 PR OFFICE/OUTPT VISIT, NEW, LEVL IV, 45-59 MIN: ICD-10-PCS | Mod: S$GLB,,, | Performed by: NEUROLOGICAL SURGERY

## 2023-01-19 PROCEDURE — 99204 OFFICE O/P NEW MOD 45 MIN: CPT | Mod: S$GLB,,, | Performed by: NEUROLOGICAL SURGERY

## 2023-01-19 NOTE — PROGRESS NOTES
"    HPI:  This is a very pleasant 88-year-old woman who presents with axial low back pain since November 2022.  She reports that she sustained a mechanical fall at home.  She had no loss of consciousness.  She endorses aching discomfort in the lumbosacral region worse with standing or walking.  She denies leg pain weakness saddle anesthesia incontinence.  She has a known history of subtle L1 compression deformity.  She reports using a lumbar brace in the past without relief.  She is not had recent physical therapy, interventional pain procedures.  She has a history of osteoporosis and is on Fosamax        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    Anxiety     Arthritis     Escalante esophagus     Blood transfusion     Cataract     Done OU    Colon cancer 2009    Colon polyps 3/14/2017    GERD (gastroesophageal reflux disease)     Glaucoma 2/24/2012    Nuclear sclerosis 8/27/2012    Osteoporosis     Primary hypothyroidism 2/23/2020    Pseudophakia - Left Eye 2/24/2012      Past Surgical History:   Procedure Laterality Date    APPENDECTOMY      CATARACT EXTRACTION W/  INTRAOCULAR LENS IMPLANT Left     CATARACT EXTRACTION W/  INTRAOCULAR LENS IMPLANT Right     Dr Sawant    COLON SURGERY      resection    COLONOSCOPY N/A 3/14/2017    Procedure: COLONOSCOPY;  Surgeon: Killian Guerrier MD;  Location: [facility] ENDO;  Service: Endoscopy;  Laterality: N/A;    COLONOSCOPY  03/14/2017    Dr. Guerrier: hemorrhoids, one colon polyp removed, "Patent functional end-to-end ileo-colonic anastomosis"with healthy mucosa; biopsy: hyperplastic polyp; repeat in 3 years for surveillance    COLONOSCOPY N/A 1/21/2020    Procedure: COLONOSCOPY;  Surgeon: Timo Darling MD;  Location: [facility] ENDO;  Service: Endoscopy;  Laterality: N/A;    COLONOSCOPY N/A 7/8/2022    Procedure: COLONOSCOPY;  Surgeon: Anaid Washington MD;  Location: [facility] ENDO;  Service: Endoscopy;  Laterality: N/A;    ECTOPIC PREGNANCY SURGERY   "    ESOPHAGOGASTRODUODENOSCOPY N/A 2019    Procedure: EGD (ESOPHAGOGASTRODUODENOSCOPY);  Surgeon: Anaid Washington MD;  Location: [facility] ENDO;  Service: Endoscopy;  Laterality: N/A;    ESOPHAGOGASTRODUODENOSCOPY N/A 2019    Procedure: EGD (ESOPHAGOGASTRODUODENOSCOPY);  Surgeon: Anaid Washington MD;  Location: [facility] ENDO;  Service: Endoscopy;  Laterality: N/A;    ESOPHAGOGASTRODUODENOSCOPY N/A 2021    Procedure: EGD (ESOPHAGOGASTRODUODENOSCOPY);  Surgeon: Anaid Washington MD;  Location: [facility] ENDO;  Service: Endoscopy;  Laterality: N/A;    ESOPHAGOGASTRODUODENOSCOPY N/A 2021    Procedure: EGD (ESOPHAGOGASTRODUODENOSCOPY);  Surgeon: Anaid Washington MD;  Location: [facility] ENDO;  Service: Endoscopy;  Laterality: N/A;    ESOPHAGOGASTRODUODENOSCOPY N/A 2022    Procedure: EGD (ESOPHAGOGASTRODUODENOSCOPY);  Surgeon: Anaid Washington MD;  Location: [facility] ENDO;  Service: Endoscopy;  Laterality: N/A;    EYE SURGERY      bilateral cataracts    HYSTERECTOMY      complete    JOINT REPLACEMENT      Liban Knee    ROTATOR CUFF REPAIR Right     TOE SURGERY      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    UPPER GASTROINTESTINAL ENDOSCOPY  2017    Dr. Guerrier: gastritis and esophagitis, biopsy: chronic gastritis, negative for h pylori, esophageal- positive eosinophils, negative for barretts; repeat in 2 months    UPPER GASTROINTESTINAL ENDOSCOPY  2017    Dr. Guerrier     Social History     Tobacco Use    Smoking status: Some Days     Packs/day: 0.33     Years: 65.00     Pack years: 21.45     Types: Cigarettes     Last attempt to quit: 10/5/2020     Years since quittin.2    Smokeless tobacco: Never   Substance Use Topics    Alcohol use: Yes     Alcohol/week: 2.0 standard drinks     Types: 2 Shots of liquor per week     Comment: occasional (crown)    Drug use: No       Review of Systems: All systems reviewed and are as above or otherwise negative.    General: well developed, well nourished, no  distress.   Head: normocephalic, atraumatic  Eyes: pupils equal, round, reactive to light with accomodation, EOMI.   Neck: trachea midline. No JVD  Cardiovascular: No LE edema   Pulmonary: normal respirations, no signs of respiratory distress  Abdomen: soft, non-distended, not tender to palpation  Sensory: intact to light touch throughout  Extremities: No bruising, deformity  Skin: Skin is warm, dry and intact.    Motor Strength: Moves all extremities spontaneously with good tone.  Full strength upper and lower extremities. No abnormal movements seen.     Strength  Deltoids Triceps Biceps Wrist Extension Wrist Flexion Hand    Upper: R 5/5 5/5 5/5 5/5 5/5 5/5    L 5/5 5/5 5/5 5/5 5/5 5/5     Iliopsoas Quadriceps Knee  Flexion Tibialis  anterior Gastro- cnemius EHL   Lower: R 5/5 5/5 5/5 5/5 5/5 5/5    L 5/5 5/5 5/5 5/5 5/5 5/5     Neurologic: Alert and oriented. Thought content appropriate.  GCS: Motor: 6/Verbal: 5/Eyes: 4 GCS Total: 15  Mental Status: Awake, Alert, Oriented x 4  Language: No aphasia  Speech: No dysarthria  Cranial nerves: face symmetric, tongue midline, CN II-XII grossly intact.     Pronator Drift: no drift noted  Finger-to-nose: Intact bilaterally  DTR's: 2 + and symmetric in UE and LE  Jovel: absent  Clonus: absent  Babinski: absent    Gait: normal    Tandem Gait: No difficulty           Able to walk on heels & toes    Cervical Spine  ROM: full    Nontender to palpation    Lumbar Spine   ROM: full   Nontender to palpation    Pain on Hip ROM: Negative  Straight leg raise: negative bilaterally     SI Joint tenderness: Negative bilaterally   REMI: Negative bilaterally  Thigh Thrust: Negative bilaterally  Gaenslen: Negative bilaterally    Significant Exam Findings:  Tender to palpation midline lumbosacral junction.  Motor and sensory intact.  Ambulatory without assistance.    Significant Radiology Findings:  Lumbar x-rays shows subtle superior endplate compression deformity at L1, subtle  coronal scoliosis, no spondylolisthesis    Analysis:  I ordered MRI lumbar without contrast.  We will see her back with the imaging for further recommendations.    [unfilled]   Kenyetta was seen today for back pain.    Diagnoses and all orders for this visit:    Chronic bilateral low back pain without sciatica    Closed compression fracture of body of L1 vertebra  -     Ambulatory referral/consult to Neurosurgery     I spent a total of 45 minutes on the day of the visit.  This includes face to face time and non-face to face time preparing to see the patient (eg, review of tests), obtaining and/or reviewing separately obtained history, documenting clinical information in the electronic or other health record, independently interpreting results and communicating results to the patient/family/caregiver, or care coordinator.      No follow-ups on file.

## 2023-01-26 ENCOUNTER — LAB VISIT (OUTPATIENT)
Dept: LAB | Facility: HOSPITAL | Age: 88
End: 2023-01-26
Attending: INTERNAL MEDICINE
Payer: MEDICARE

## 2023-01-26 ENCOUNTER — TELEPHONE (OUTPATIENT)
Dept: HEMATOLOGY/ONCOLOGY | Facility: CLINIC | Age: 88
End: 2023-01-26
Payer: MEDICARE

## 2023-01-26 DIAGNOSIS — E53.8 B12 DEFICIENCY: ICD-10-CM

## 2023-01-26 DIAGNOSIS — E78.1 HYPERTRIGLYCERIDEMIA: ICD-10-CM

## 2023-01-26 DIAGNOSIS — Z85.038 HISTORY OF COLON CANCER: ICD-10-CM

## 2023-01-26 LAB
ALBUMIN SERPL BCP-MCNC: 3.3 G/DL (ref 3.5–5.2)
ALP SERPL-CCNC: 84 U/L (ref 55–135)
ALT SERPL W/O P-5'-P-CCNC: 9 U/L (ref 10–44)
ANION GAP SERPL CALC-SCNC: 10 MMOL/L (ref 8–16)
AST SERPL-CCNC: 17 U/L (ref 10–40)
BASOPHILS # BLD AUTO: 0.02 K/UL (ref 0–0.2)
BASOPHILS NFR BLD: 0.2 % (ref 0–1.9)
BILIRUB SERPL-MCNC: 0.4 MG/DL (ref 0.1–1)
BUN SERPL-MCNC: 11 MG/DL (ref 8–23)
CALCIUM SERPL-MCNC: 9.4 MG/DL (ref 8.7–10.5)
CEA SERPL-MCNC: 2.4 NG/ML (ref 0–5)
CHLORIDE SERPL-SCNC: 108 MMOL/L (ref 95–110)
CO2 SERPL-SCNC: 24 MMOL/L (ref 23–29)
CREAT SERPL-MCNC: 0.9 MG/DL (ref 0.5–1.4)
DIFFERENTIAL METHOD: ABNORMAL
EOSINOPHIL # BLD AUTO: 0.1 K/UL (ref 0–0.5)
EOSINOPHIL NFR BLD: 1.5 % (ref 0–8)
ERYTHROCYTE [DISTWIDTH] IN BLOOD BY AUTOMATED COUNT: 12.7 % (ref 11.5–14.5)
EST. GFR  (NO RACE VARIABLE): >60 ML/MIN/1.73 M^2
GLUCOSE SERPL-MCNC: 138 MG/DL (ref 70–110)
HCT VFR BLD AUTO: 40 % (ref 37–48.5)
HGB BLD-MCNC: 13.4 G/DL (ref 12–16)
IMM GRANULOCYTES # BLD AUTO: 0.02 K/UL (ref 0–0.04)
IMM GRANULOCYTES NFR BLD AUTO: 0.2 % (ref 0–0.5)
LYMPHOCYTES # BLD AUTO: 3.1 K/UL (ref 1–4.8)
LYMPHOCYTES NFR BLD: 35.9 % (ref 18–48)
MCH RBC QN AUTO: 32.3 PG (ref 27–31)
MCHC RBC AUTO-ENTMCNC: 33.5 G/DL (ref 32–36)
MCV RBC AUTO: 96 FL (ref 82–98)
MONOCYTES # BLD AUTO: 0.6 K/UL (ref 0.3–1)
MONOCYTES NFR BLD: 6.4 % (ref 4–15)
NEUTROPHILS # BLD AUTO: 4.8 K/UL (ref 1.8–7.7)
NEUTROPHILS NFR BLD: 55.8 % (ref 38–73)
NRBC BLD-RTO: 0 /100 WBC
PLATELET # BLD AUTO: 195 K/UL (ref 150–450)
PMV BLD AUTO: 9 FL (ref 9.2–12.9)
POTASSIUM SERPL-SCNC: 4.5 MMOL/L (ref 3.5–5.1)
PROT SERPL-MCNC: 6.7 G/DL (ref 6–8.4)
RBC # BLD AUTO: 4.15 M/UL (ref 4–5.4)
SODIUM SERPL-SCNC: 142 MMOL/L (ref 136–145)
WBC # BLD AUTO: 8.56 K/UL (ref 3.9–12.7)

## 2023-01-26 PROCEDURE — 82607 VITAMIN B-12: CPT | Mod: HCNC | Performed by: INTERNAL MEDICINE

## 2023-01-26 PROCEDURE — 36415 COLL VENOUS BLD VENIPUNCTURE: CPT | Mod: HCNC,PO | Performed by: INTERNAL MEDICINE

## 2023-01-26 PROCEDURE — 82378 CARCINOEMBRYONIC ANTIGEN: CPT | Mod: HCNC | Performed by: INTERNAL MEDICINE

## 2023-01-26 PROCEDURE — 85025 COMPLETE CBC W/AUTO DIFF WBC: CPT | Mod: HCNC,PO | Performed by: INTERNAL MEDICINE

## 2023-01-26 PROCEDURE — 80053 COMPREHEN METABOLIC PANEL: CPT | Mod: HCNC | Performed by: INTERNAL MEDICINE

## 2023-01-27 ENCOUNTER — TELEPHONE (OUTPATIENT)
Dept: GASTROENTEROLOGY | Facility: CLINIC | Age: 88
End: 2023-01-27
Payer: MEDICARE

## 2023-01-27 ENCOUNTER — OFFICE VISIT (OUTPATIENT)
Dept: HEMATOLOGY/ONCOLOGY | Facility: CLINIC | Age: 88
End: 2023-01-27
Payer: MEDICARE

## 2023-01-27 VITALS
WEIGHT: 145.5 LBS | RESPIRATION RATE: 14 BRPM | BODY MASS INDEX: 25.78 KG/M2 | TEMPERATURE: 98 F | DIASTOLIC BLOOD PRESSURE: 74 MMHG | HEART RATE: 79 BPM | OXYGEN SATURATION: 96 % | HEIGHT: 63 IN | SYSTOLIC BLOOD PRESSURE: 166 MMHG

## 2023-01-27 DIAGNOSIS — D50.0 IRON DEFICIENCY ANEMIA DUE TO CHRONIC BLOOD LOSS: ICD-10-CM

## 2023-01-27 DIAGNOSIS — G62.0 CHEMOTHERAPY-INDUCED PERIPHERAL NEUROPATHY: ICD-10-CM

## 2023-01-27 DIAGNOSIS — Z85.038 HISTORY OF COLON CANCER: ICD-10-CM

## 2023-01-27 DIAGNOSIS — T45.1X5A CHEMOTHERAPY-INDUCED PERIPHERAL NEUROPATHY: ICD-10-CM

## 2023-01-27 DIAGNOSIS — E53.8 B12 DEFICIENCY: Primary | ICD-10-CM

## 2023-01-27 DIAGNOSIS — Z85.038 PERSONAL HISTORY OF COLON CANCER, STAGE III: ICD-10-CM

## 2023-01-27 LAB — VIT B12 SERPL-MCNC: >2000 PG/ML (ref 210–950)

## 2023-01-27 PROCEDURE — 1125F PR PAIN SEVERITY QUANTIFIED, PAIN PRESENT: ICD-10-PCS | Mod: HCNC,CPTII,S$GLB, | Performed by: INTERNAL MEDICINE

## 2023-01-27 PROCEDURE — 1100F PR PT FALLS ASSESS DOC 2+ FALLS/FALL W/INJURY/YR: ICD-10-PCS | Mod: HCNC,CPTII,S$GLB, | Performed by: INTERNAL MEDICINE

## 2023-01-27 PROCEDURE — 99499 RISK ADDL DX/OHS AUDIT: ICD-10-PCS | Mod: S$GLB,,, | Performed by: INTERNAL MEDICINE

## 2023-01-27 PROCEDURE — 1159F MED LIST DOCD IN RCRD: CPT | Mod: HCNC,CPTII,S$GLB, | Performed by: INTERNAL MEDICINE

## 2023-01-27 PROCEDURE — 3288F PR FALLS RISK ASSESSMENT DOCUMENTED: ICD-10-PCS | Mod: HCNC,CPTII,S$GLB, | Performed by: INTERNAL MEDICINE

## 2023-01-27 PROCEDURE — 99499 UNLISTED E&M SERVICE: CPT | Mod: S$GLB,,, | Performed by: INTERNAL MEDICINE

## 2023-01-27 PROCEDURE — 1125F AMNT PAIN NOTED PAIN PRSNT: CPT | Mod: HCNC,CPTII,S$GLB, | Performed by: INTERNAL MEDICINE

## 2023-01-27 PROCEDURE — 1100F PTFALLS ASSESS-DOCD GE2>/YR: CPT | Mod: HCNC,CPTII,S$GLB, | Performed by: INTERNAL MEDICINE

## 2023-01-27 PROCEDURE — 99214 PR OFFICE/OUTPT VISIT, EST, LEVL IV, 30-39 MIN: ICD-10-PCS | Mod: HCNC,S$GLB,, | Performed by: INTERNAL MEDICINE

## 2023-01-27 PROCEDURE — 3288F FALL RISK ASSESSMENT DOCD: CPT | Mod: HCNC,CPTII,S$GLB, | Performed by: INTERNAL MEDICINE

## 2023-01-27 PROCEDURE — 99999 PR PBB SHADOW E&M-EST. PATIENT-LVL IV: ICD-10-PCS | Mod: PBBFAC,HCNC,, | Performed by: INTERNAL MEDICINE

## 2023-01-27 PROCEDURE — 1159F PR MEDICATION LIST DOCUMENTED IN MEDICAL RECORD: ICD-10-PCS | Mod: HCNC,CPTII,S$GLB, | Performed by: INTERNAL MEDICINE

## 2023-01-27 PROCEDURE — 99214 OFFICE O/P EST MOD 30 MIN: CPT | Mod: HCNC,S$GLB,, | Performed by: INTERNAL MEDICINE

## 2023-01-27 PROCEDURE — 99999 PR PBB SHADOW E&M-EST. PATIENT-LVL IV: CPT | Mod: PBBFAC,HCNC,, | Performed by: INTERNAL MEDICINE

## 2023-01-27 NOTE — TELEPHONE ENCOUNTER
----- Message from Tony Leach RN sent at 1/27/2023  2:16 PM CST -----  Im not sure, she is belle's patient and she wanted you all to reach out to her.       Thanks  ----- Message -----  From: Samantha Maurice LPN  Sent: 1/27/2023  12:16 PM CST  To: Tony Leach RN    Please advise what pt needs an appt for?  ----- Message -----  From: Tony Leach RN  Sent: 1/27/2023  12:08 PM CST  To: [facility] Staff    Please call patient to schedule appt.       Thanks    Tony RIZON, RN  [facility] [place]   Office [phone]  Fax [phone]

## 2023-01-27 NOTE — TELEPHONE ENCOUNTER
Call placed to Ms. Andersen per her request. She stated that Dr. Granado wants her to have an EGD. I advised she would need to be seen in office prior to scheduling. She verbalized understanding. Offered her first available with Dr. Washington on 3/21 at 1100. She accepted. No further issues noted.
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
## 2023-02-06 ENCOUNTER — HOSPITAL ENCOUNTER (OUTPATIENT)
Dept: RADIOLOGY | Facility: HOSPITAL | Age: 88
Discharge: HOME | End: 2023-02-06
Attending: NEUROLOGICAL SURGERY
Payer: MEDICARE

## 2023-02-06 DIAGNOSIS — M54.50 CHRONIC BILATERAL LOW BACK PAIN WITHOUT SCIATICA: ICD-10-CM

## 2023-02-06 DIAGNOSIS — S32.010A CLOSED COMPRESSION FRACTURE OF BODY OF L1 VERTEBRA: ICD-10-CM

## 2023-02-06 DIAGNOSIS — G89.29 CHRONIC BILATERAL LOW BACK PAIN WITHOUT SCIATICA: ICD-10-CM

## 2023-02-06 PROCEDURE — 72148 MRI LUMBAR SPINE W/O DYE: CPT | Mod: TC,HCNC

## 2023-02-06 PROCEDURE — 72148 MRI LUMBAR SPINE W/O DYE: CPT | Mod: 26,HCNC,, | Performed by: RADIOLOGY

## 2023-02-06 PROCEDURE — 72148 MRI LUMBAR SPINE WITHOUT CONTRAST: ICD-10-PCS | Mod: 26,HCNC,, | Performed by: RADIOLOGY

## 2023-02-13 ENCOUNTER — OFFICE VISIT (OUTPATIENT)
Dept: NEUROSURGERY | Facility: CLINIC | Age: 88
End: 2023-02-13
Payer: MEDICARE

## 2023-02-13 VITALS — SYSTOLIC BLOOD PRESSURE: 170 MMHG | HEART RATE: 90 BPM | DIASTOLIC BLOOD PRESSURE: 89 MMHG

## 2023-02-13 DIAGNOSIS — M51.36 DDD (DEGENERATIVE DISC DISEASE), LUMBAR: ICD-10-CM

## 2023-02-13 DIAGNOSIS — S32.010D COMPRESSION FRACTURE OF L1 VERTEBRA WITH ROUTINE HEALING, SUBSEQUENT ENCOUNTER: Primary | ICD-10-CM

## 2023-02-13 PROCEDURE — 1125F AMNT PAIN NOTED PAIN PRSNT: CPT | Mod: CPTII,S$GLB,, | Performed by: NEUROLOGICAL SURGERY

## 2023-02-13 PROCEDURE — 1159F PR MEDICATION LIST DOCUMENTED IN MEDICAL RECORD: ICD-10-PCS | Mod: CPTII,S$GLB,, | Performed by: NEUROLOGICAL SURGERY

## 2023-02-13 PROCEDURE — 1125F PR PAIN SEVERITY QUANTIFIED, PAIN PRESENT: ICD-10-PCS | Mod: CPTII,S$GLB,, | Performed by: NEUROLOGICAL SURGERY

## 2023-02-13 PROCEDURE — 99214 PR OFFICE/OUTPT VISIT, EST, LEVL IV, 30-39 MIN: ICD-10-PCS | Mod: S$GLB,,, | Performed by: NEUROLOGICAL SURGERY

## 2023-02-13 PROCEDURE — 1159F MED LIST DOCD IN RCRD: CPT | Mod: CPTII,S$GLB,, | Performed by: NEUROLOGICAL SURGERY

## 2023-02-13 PROCEDURE — 3288F PR FALLS RISK ASSESSMENT DOCUMENTED: ICD-10-PCS | Mod: CPTII,S$GLB,, | Performed by: NEUROLOGICAL SURGERY

## 2023-02-13 PROCEDURE — 99214 OFFICE O/P EST MOD 30 MIN: CPT | Mod: S$GLB,,, | Performed by: NEUROLOGICAL SURGERY

## 2023-02-13 PROCEDURE — 1101F PT FALLS ASSESS-DOCD LE1/YR: CPT | Mod: CPTII,S$GLB,, | Performed by: NEUROLOGICAL SURGERY

## 2023-02-13 PROCEDURE — 3288F FALL RISK ASSESSMENT DOCD: CPT | Mod: CPTII,S$GLB,, | Performed by: NEUROLOGICAL SURGERY

## 2023-02-13 PROCEDURE — 1101F PR PT FALLS ASSESS DOC 0-1 FALLS W/OUT INJ PAST YR: ICD-10-PCS | Mod: CPTII,S$GLB,, | Performed by: NEUROLOGICAL SURGERY

## 2023-02-13 NOTE — PROGRESS NOTES
Ms. Andersen returns today regarding L1 osteoporotic compression fracture.  She underwent MRI imaging which is available for review.  She endorses slowly improving back pain.  She denies radicular pain.  She endorses bilateral chronic knee pain.  She denies extremity weakness or numbness saddle anesthesia sphincter dysfunction.    MRI lumbar spine 2/6/23 was personally reviewed.  Subacute to chronic appearing L1 compression deformity with 40% vertebral body height loss, minimal retropulsion noted.  Advanced discogenic changes L2-3.  No significant lumbar spinal canal stenosis.  Alignment maintained.     X-rays lumbar spine 11/28/2022 also personally reviewed.  Mild interval progression of L1 compression deformity.    Exam:  No distress.  Pleasant.  Well groomed.  Awake, alert, oriented to person place and time.    Cranial nerves grossly intact.    Strength is graded 5/5 in all muscle groups.    Sensation is grossly intact throughout.    Gait and stance are normal  Lumbar spine nontender to palpation and percussion     Analysis:  I outlined the option of continued conservative management versus percutaneous L1 kyphoplasty.  I outlined the potential benefits/risks associated with both scenarios.  I explained that the advanced disc degeneration L2-3 could be a contributing factor as well.  Given her improved symptoms, we both agreed to continue conservative treatment for now.  If she has refractory pain over the next 4-6 weeks, we will consider L1 kyphoplasty.  She is in agreement with the above plan.    Kenyetta was seen today for back pain.    Diagnoses and all orders for this visit:    Compression fracture of L1 vertebra with routine healing, subsequent encounter    DDD (degenerative disc disease), lumbar    I spent a total of 30 minutes on the day of the visit.  This includes face to face time and non-face to face time preparing to see the patient (eg, review of tests), obtaining and/or reviewing separately  obtained history, documenting clinical information in the electronic or other health record, independently interpreting results and communicating results to the patient/family/caregiver, or care coordinator.

## 2023-02-20 ENCOUNTER — TELEPHONE (OUTPATIENT)
Dept: FAMILY MEDICINE | Facility: CLINIC | Age: 88
End: 2023-02-20
Payer: MEDICARE

## 2023-02-20 NOTE — TELEPHONE ENCOUNTER
----- Message from Mer Kohli sent at 2/20/2023  3:33 PM CST -----  Type:  Sooner Appointment Request    Caller is requesting a sooner appointment.  Caller declined first available appointment listed below.  Caller will not accept being placed on the waitlist and is requesting a message be sent to doctor.    Name of Caller:  pt   When is the first available appointment?  5/15  Symptoms:  ear hurts   Best Call Back Number:  [phone] (home)     Additional Information:  requesting to be seen asap please advise thank you

## 2023-02-23 ENCOUNTER — HOSPITAL ENCOUNTER (EMERGENCY)
Facility: HOSPITAL | Age: 88
Discharge: HOME | End: 2023-02-23
Attending: EMERGENCY MEDICINE
Payer: MEDICARE

## 2023-02-23 VITALS
DIASTOLIC BLOOD PRESSURE: 82 MMHG | RESPIRATION RATE: 18 BRPM | OXYGEN SATURATION: 99 % | HEART RATE: 82 BPM | HEIGHT: 63 IN | BODY MASS INDEX: 25.87 KG/M2 | SYSTOLIC BLOOD PRESSURE: 100 MMHG | WEIGHT: 146 LBS | TEMPERATURE: 98 F

## 2023-02-23 DIAGNOSIS — H60.502 ACUTE OTITIS EXTERNA OF LEFT EAR, UNSPECIFIED TYPE: ICD-10-CM

## 2023-02-23 DIAGNOSIS — H66.012 NON-RECURRENT ACUTE SUPPURATIVE OTITIS MEDIA OF LEFT EAR WITH SPONTANEOUS RUPTURE OF TYMPANIC MEMBRANE: Primary | ICD-10-CM

## 2023-02-23 PROCEDURE — 99282 EMERGENCY DEPT VISIT SF MDM: CPT

## 2023-02-23 NOTE — DISCHARGE INSTRUCTIONS
Please follow-up with the ENT doctor.  You using the antibiotics that you were prescribed on Monday.  Please take Tylenol for pain as needed.  Do not place Q-tip into the ear.    Return to the ER if you have significant worsening of pain, fever or any other complaints

## 2023-02-23 NOTE — ED PROVIDER NOTES
"Encounter Date: 2/23/2023       History     Chief Complaint   Patient presents with    Otalgia     L ear since Monday      88-year-old female with a history of hypothyroidism, colon cancer presents ER for evaluation of left ear pain.  Patient reports that symptoms have been ongoing for the last 4 days.  She states she went to urgent care on Monday was diagnosed with ear infection.  Was placed on Ciprodex and Augmentin.  Has been taking the medicine as prescribed.  She states she continues to have pain to the left side and decreased hearing.  Denies any fever chills.  Has not had any ear drainage otherwise.  Denies any neck pain or stiffness.  Patient has taken some Tylenol at home.  She states she just wanted to be re-evaluated and is requesting for ENT    The history is provided by the patient.   Review of patient's allergies indicates:   Allergen Reactions    Ace inhibitors Edema     Other reaction(s): Angioedema    Codeine Hives     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    Anxiety     Arthritis     Escalante esophagus     Blood transfusion     Cataract     Done OU    Colon cancer 2009    Colon polyps 3/14/2017    GERD (gastroesophageal reflux disease)     Glaucoma 2/24/2012    Nuclear sclerosis 8/27/2012    Osteoporosis     Primary hypothyroidism 2/23/2020    Pseudophakia - Left Eye 2/24/2012     Past Surgical History:   Procedure Laterality Date    APPENDECTOMY      CATARACT EXTRACTION W/  INTRAOCULAR LENS IMPLANT Left     CATARACT EXTRACTION W/  INTRAOCULAR LENS IMPLANT Right     Dr Sawant    COLON SURGERY      resection    COLONOSCOPY N/A 3/14/2017    Procedure: COLONOSCOPY;  Surgeon: Killian Guerrier MD;  Location: [facility];  Service: Endoscopy;  Laterality: N/A;    COLONOSCOPY  03/14/2017    Dr. Guerrier: hemorrhoids, one colon polyp removed, "Patent functional end-to-end ileo-colonic anastomosis"with healthy mucosa; biopsy: hyperplastic polyp; repeat in 3 years for " surveillance    COLONOSCOPY N/A 1/21/2020    Procedure: COLONOSCOPY;  Surgeon: Timo Darling MD;  Location: [facility] ENDO;  Service: Endoscopy;  Laterality: N/A;    COLONOSCOPY N/A 7/8/2022    Procedure: COLONOSCOPY;  Surgeon: Anaid Washington MD;  Location: [facility] ENDO;  Service: Endoscopy;  Laterality: N/A;    ECTOPIC PREGNANCY SURGERY      ESOPHAGOGASTRODUODENOSCOPY N/A 5/16/2019    Procedure: EGD (ESOPHAGOGASTRODUODENOSCOPY);  Surgeon: Anaid Washington MD;  Location: [facility] ENDO;  Service: Endoscopy;  Laterality: N/A;    ESOPHAGOGASTRODUODENOSCOPY N/A 7/11/2019    Procedure: EGD (ESOPHAGOGASTRODUODENOSCOPY);  Surgeon: Anaid Washington MD;  Location: [facility] ENDO;  Service: Endoscopy;  Laterality: N/A;    ESOPHAGOGASTRODUODENOSCOPY N/A 2/5/2021    Procedure: EGD (ESOPHAGOGASTRODUODENOSCOPY);  Surgeon: Anaid Washington MD;  Location: [facility] ENDO;  Service: Endoscopy;  Laterality: N/A;    ESOPHAGOGASTRODUODENOSCOPY N/A 11/11/2021    Procedure: EGD (ESOPHAGOGASTRODUODENOSCOPY);  Surgeon: Anaid Washington MD;  Location: [facility] ENDO;  Service: Endoscopy;  Laterality: N/A;    ESOPHAGOGASTRODUODENOSCOPY N/A 7/8/2022    Procedure: EGD (ESOPHAGOGASTRODUODENOSCOPY);  Surgeon: Anaid Washington MD;  Location: [facility] ENDO;  Service: Endoscopy;  Laterality: N/A;    EYE SURGERY      bilateral cataracts    HYSTERECTOMY      complete    JOINT REPLACEMENT      Liban Knee    ROTATOR CUFF REPAIR Right     TOE SURGERY      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    UPPER GASTROINTESTINAL ENDOSCOPY  06/12/2017    Dr. Guerrier: gastritis and esophagitis, biopsy: chronic gastritis, negative for h pylori, esophageal- positive eosinophils, negative for barretts; repeat in 2 months    UPPER GASTROINTESTINAL ENDOSCOPY  07/27/2017    Dr. Guerrier     Family History   Problem Relation Age of Onset    Heart disease Mother         heart attack    Heart disease Father     Heart disease Brother         MI    Parkinsonism Sister     Arthritis Sister      No Known Problems Brother     No Known Problems Brother     Amblyopia Neg Hx     Blindness Neg Hx     Cancer Neg Hx     Cataracts Neg Hx     Glaucoma Neg Hx     Hypertension Neg Hx     Macular degeneration Neg Hx     Retinal detachment Neg Hx     Strabismus Neg Hx     Stroke Neg Hx     Thyroid disease Neg Hx     Diabetes Neg Hx     Colon cancer Neg Hx     Crohn's disease Neg Hx     Esophageal cancer Neg Hx     Stomach cancer Neg Hx     Ulcerative colitis Neg Hx      Social History     Tobacco Use    Smoking status: Some Days     Packs/day: 0.33     Years: 65.00     Pack years: 21.45     Types: Cigarettes     Last attempt to quit: 10/5/2020     Years since quittin.3    Smokeless tobacco: Never   Substance Use Topics    Alcohol use: Yes     Alcohol/week: 2.0 standard drinks     Types: 2 Shots of liquor per week     Comment: occasional (crown)    Drug use: No     Review of Systems   Constitutional:  Negative for chills and fever.   HENT:  Positive for ear pain and hearing loss (Decreased hearing). Negative for congestion, ear discharge, rhinorrhea, sore throat and tinnitus.    Eyes:  Negative for visual disturbance.   Respiratory:  Negative for shortness of breath.    Cardiovascular:  Negative for chest pain.   Gastrointestinal:  Negative for nausea and vomiting.   Genitourinary:  Negative for dysuria and flank pain.   Musculoskeletal:  Negative for myalgias.   Skin:  Negative for rash.   Allergic/Immunologic: Negative for immunocompromised state.   Neurological:  Negative for weakness and numbness.   Hematological:  Does not bruise/bleed easily.   Psychiatric/Behavioral:  Negative for confusion.      Physical Exam     Initial Vitals [23 1353]   BP Pulse Resp Temp SpO2   100/82 82 18 98 °F (36.7 °C) 99 %      MAP       --         Physical Exam    Vitals reviewed.  Constitutional: She appears well-developed and well-nourished. She is not diaphoretic. No distress.   HENT:   Head: Normocephalic and  atraumatic.   Right Ear: Tympanic membrane normal.   Left Ear: There is drainage (clear) and swelling (mild swelling to the external ear canal). No tenderness. Tympanic membrane is perforated. Decreased hearing is noted.   Mouth/Throat: Oropharynx is clear and moist.   Eyes: Conjunctivae and EOM are normal.   Neck: Neck supple.   Cardiovascular:  Normal rate, regular rhythm, normal heart sounds and intact distal pulses.           Pulmonary/Chest: No respiratory distress.   Musculoskeletal:         General: Normal range of motion.      Cervical back: Neck supple.     Neurological: She is alert and oriented to person, place, and time.   Skin: Skin is warm.       ED Course   Procedures  Labs Reviewed - No data to display       Imaging Results    None          Medications - No data to display        APC / Resident Notes:   Patient seen in the ER promptly upon arrival.  She is afebrile, no acute distress.  Physical examination reveals perforated left TM with minimal drainage.  There is some mild edema to the outer ear canal as well.  Oropharynx is patent.  No erythema or exudate.  No signs of meningeal irritation.  No nuchal rigidity.    Exam consistent with otitis media with perforation/otitis externa.  Will advised to continue the Ciprodex and Augmentin as previously prescribed.  Advised to take Tylenol for pain at home.  Patient advised to refrain from Q-tip use.    Will give ENT referral for close follow-up and management.  Given strict return precautions ED including but not limited to worsening pain, development of fever or inability to tolerate the antibiotics.  Patient and family member agreeable to close follow-up at this time.    Disclaimer: This note has been generated using voice-recognition software. There may be typographical errors that have been missed during proof-reading.                       Clinical Impression:   Final diagnoses:  [H66.012] Non-recurrent acute suppurative otitis media of left ear  with spontaneous rupture of tympanic membrane (Primary)  [H60.502] Acute otitis externa of left ear, unspecified type        ED Disposition Condition    Discharge Stable          ED Prescriptions    None       Follow-up Information       Follow up With Specialties Details Why Contact Info    [facility]   [address] [address]             Yasmin Hernandez PA-C  02/23/23 [id number]

## 2023-02-27 ENCOUNTER — TELEPHONE (OUTPATIENT)
Dept: OTOLARYNGOLOGY | Facility: CLINIC | Age: 88
End: 2023-02-27
Payer: MEDICARE

## 2023-02-27 NOTE — TELEPHONE ENCOUNTER
Attempted to call pt back. Phone rang and then recording picked up stating wireless customer is not available, try call again later. Will try tomorrow. Thanks, Nena

## 2023-02-27 NOTE — TELEPHONE ENCOUNTER
----- Message from [facility] sent at 2/27/2023  7:59 AM CST -----  Contact: self  Type:  Sooner Appointment Request    Caller is requesting a sooner appointment.  Caller declined first available appointment listed below.  Caller will not accept being placed on the waitlist and is requesting a message be sent to doctor.    Name of Caller:  patient  When is the first available appointment?  End of march   Symptoms:  Non-recurrent acute suppurative otitis media of left ear with spontaneous rupture  Best Call Back Number:  [phone]  Additional Information:  patient has a referral in the system and requests she be seen sooner than the end of march.  Thanks

## 2023-02-28 NOTE — TELEPHONE ENCOUNTER
----- Message from Elizabeth Mccann sent at 2/28/2023 12:15 PM CST -----  Contact: pt at [phone]  Pt  Nena  Yes  [phone]  Please call back to advise.

## 2023-03-01 ENCOUNTER — OFFICE VISIT (OUTPATIENT)
Dept: OTOLARYNGOLOGY | Facility: CLINIC | Age: 88
End: 2023-03-01
Payer: MEDICARE

## 2023-03-01 VITALS
WEIGHT: 140.63 LBS | HEART RATE: 86 BPM | BODY MASS INDEX: 24.92 KG/M2 | HEIGHT: 63 IN | SYSTOLIC BLOOD PRESSURE: 138 MMHG | DIASTOLIC BLOOD PRESSURE: 72 MMHG

## 2023-03-01 DIAGNOSIS — E04.1 RIGHT THYROID NODULE: ICD-10-CM

## 2023-03-01 DIAGNOSIS — H65.192 ACUTE SEROMUCINOUS OTITIS MEDIA, LEFT: Primary | ICD-10-CM

## 2023-03-01 PROCEDURE — 3288F FALL RISK ASSESSMENT DOCD: CPT | Mod: HCNC,CPTII,S$GLB, | Performed by: OTOLARYNGOLOGY

## 2023-03-01 PROCEDURE — 1159F PR MEDICATION LIST DOCUMENTED IN MEDICAL RECORD: ICD-10-PCS | Mod: HCNC,CPTII,S$GLB, | Performed by: OTOLARYNGOLOGY

## 2023-03-01 PROCEDURE — 1101F PR PT FALLS ASSESS DOC 0-1 FALLS W/OUT INJ PAST YR: ICD-10-PCS | Mod: HCNC,CPTII,S$GLB, | Performed by: OTOLARYNGOLOGY

## 2023-03-01 PROCEDURE — 99204 PR OFFICE/OUTPT VISIT, NEW, LEVL IV, 45-59 MIN: ICD-10-PCS | Mod: HCNC,S$GLB,, | Performed by: OTOLARYNGOLOGY

## 2023-03-01 PROCEDURE — 99204 OFFICE O/P NEW MOD 45 MIN: CPT | Mod: HCNC,S$GLB,, | Performed by: OTOLARYNGOLOGY

## 2023-03-01 PROCEDURE — 1125F AMNT PAIN NOTED PAIN PRSNT: CPT | Mod: HCNC,CPTII,S$GLB, | Performed by: OTOLARYNGOLOGY

## 2023-03-01 PROCEDURE — 99999 PR PBB SHADOW E&M-EST. PATIENT-LVL IV: ICD-10-PCS | Mod: PBBFAC,HCNC,, | Performed by: OTOLARYNGOLOGY

## 2023-03-01 PROCEDURE — 99999 PR PBB SHADOW E&M-EST. PATIENT-LVL IV: CPT | Mod: PBBFAC,HCNC,, | Performed by: OTOLARYNGOLOGY

## 2023-03-01 PROCEDURE — 3288F PR FALLS RISK ASSESSMENT DOCUMENTED: ICD-10-PCS | Mod: HCNC,CPTII,S$GLB, | Performed by: OTOLARYNGOLOGY

## 2023-03-01 PROCEDURE — 1125F PR PAIN SEVERITY QUANTIFIED, PAIN PRESENT: ICD-10-PCS | Mod: HCNC,CPTII,S$GLB, | Performed by: OTOLARYNGOLOGY

## 2023-03-01 PROCEDURE — 1159F MED LIST DOCD IN RCRD: CPT | Mod: HCNC,CPTII,S$GLB, | Performed by: OTOLARYNGOLOGY

## 2023-03-01 PROCEDURE — 1101F PT FALLS ASSESS-DOCD LE1/YR: CPT | Mod: HCNC,CPTII,S$GLB, | Performed by: OTOLARYNGOLOGY

## 2023-03-01 NOTE — PATIENT INSTRUCTIONS
Left ear appears to be filled with fluid which based on history seems to be acute.  Additional family history implies that there maybe some left-greater-than-right underlying prior hearing loss with no hearing testing for documentation.      Based on the acute illness with sore throat pain and drainage which improve with drops and antibiotics this appears to be fluid in the middle ear that may take a few weeks to resolve.      As discussed there is no proven benefit of additional antibiotics, steroids, nasal steroids or decongestants.  Gentle blowing of the nose to try and pop the ear may be of benefit.  Avoiding nasal irritants including cigarette smoke may be of benefit.      As long as symptoms do not worsen with pain drainage fever or worsening hearing loss, we plan to return for hearing evaluation and recheck in 3 weeks.  If symptoms of fullness and hearing loss are not improved at that time or at least improving, we will proceed with eardrum incision (myringotomy) with or without placement of a ventilation tube in the eardrum as discussed.      Further workup including nasopharyngoscopy and imaging of the head and skull base may be appropriate at that time.    With respect to the 3.5 cm right thyroid nodule which has not changed.  Further ultrasound is not recommended.

## 2023-03-20 ENCOUNTER — CLINICAL SUPPORT (OUTPATIENT)
Dept: AUDIOLOGY | Facility: CLINIC | Age: 88
End: 2023-03-20
Payer: MEDICARE

## 2023-03-20 ENCOUNTER — OFFICE VISIT (OUTPATIENT)
Dept: OTOLARYNGOLOGY | Facility: CLINIC | Age: 88
End: 2023-03-20
Payer: MEDICARE

## 2023-03-20 VITALS — WEIGHT: 145.75 LBS | HEIGHT: 63 IN | BODY MASS INDEX: 25.82 KG/M2 | TEMPERATURE: 98 F

## 2023-03-20 DIAGNOSIS — H93.12 TINNITUS, LEFT EAR: ICD-10-CM

## 2023-03-20 DIAGNOSIS — H61.22 CERUMEN DEBRIS ON TYMPANIC MEMBRANE, LEFT: ICD-10-CM

## 2023-03-20 DIAGNOSIS — H90.3 ASYMMETRIC SNHL (SENSORINEURAL HEARING LOSS): Primary | ICD-10-CM

## 2023-03-20 DIAGNOSIS — H73.002 ACUTE MYRINGITIS, LEFT: Primary | ICD-10-CM

## 2023-03-20 DIAGNOSIS — H61.22 HEARING LOSS DUE TO CERUMEN IMPACTION, LEFT: ICD-10-CM

## 2023-03-20 PROCEDURE — 1160F PR REVIEW ALL MEDS BY PRESCRIBER/CLIN PHARMACIST DOCUMENTED: ICD-10-PCS | Mod: HCNC,CPTII,S$GLB, | Performed by: OTOLARYNGOLOGY

## 2023-03-20 PROCEDURE — 92557 PR COMPREHENSIVE HEARING TEST: ICD-10-PCS | Mod: HCNC,S$GLB,, | Performed by: AUDIOLOGIST

## 2023-03-20 PROCEDURE — 1101F PR PT FALLS ASSESS DOC 0-1 FALLS W/OUT INJ PAST YR: ICD-10-PCS | Mod: HCNC,CPTII,S$GLB, | Performed by: OTOLARYNGOLOGY

## 2023-03-20 PROCEDURE — 1159F MED LIST DOCD IN RCRD: CPT | Mod: HCNC,CPTII,S$GLB, | Performed by: OTOLARYNGOLOGY

## 2023-03-20 PROCEDURE — 3288F PR FALLS RISK ASSESSMENT DOCUMENTED: ICD-10-PCS | Mod: HCNC,CPTII,S$GLB, | Performed by: OTOLARYNGOLOGY

## 2023-03-20 PROCEDURE — 99213 PR OFFICE/OUTPT VISIT, EST, LEVL III, 20-29 MIN: ICD-10-PCS | Mod: HCNC,S$GLB,, | Performed by: OTOLARYNGOLOGY

## 2023-03-20 PROCEDURE — 92567 PR TYMPA2METRY: ICD-10-PCS | Mod: HCNC,S$GLB,, | Performed by: AUDIOLOGIST

## 2023-03-20 PROCEDURE — 1159F PR MEDICATION LIST DOCUMENTED IN MEDICAL RECORD: ICD-10-PCS | Mod: HCNC,CPTII,S$GLB, | Performed by: OTOLARYNGOLOGY

## 2023-03-20 PROCEDURE — 1126F AMNT PAIN NOTED NONE PRSNT: CPT | Mod: HCNC,CPTII,S$GLB, | Performed by: OTOLARYNGOLOGY

## 2023-03-20 PROCEDURE — 92567 TYMPANOMETRY: CPT | Mod: HCNC,S$GLB,, | Performed by: AUDIOLOGIST

## 2023-03-20 PROCEDURE — 99999 PR PBB SHADOW E&M-EST. PATIENT-LVL IV: ICD-10-PCS | Mod: PBBFAC,HCNC,, | Performed by: OTOLARYNGOLOGY

## 2023-03-20 PROCEDURE — 3288F FALL RISK ASSESSMENT DOCD: CPT | Mod: HCNC,CPTII,S$GLB, | Performed by: OTOLARYNGOLOGY

## 2023-03-20 PROCEDURE — 1126F PR PAIN SEVERITY QUANTIFIED, NO PAIN PRESENT: ICD-10-PCS | Mod: HCNC,CPTII,S$GLB, | Performed by: OTOLARYNGOLOGY

## 2023-03-20 PROCEDURE — 1160F RVW MEDS BY RX/DR IN RCRD: CPT | Mod: HCNC,CPTII,S$GLB, | Performed by: OTOLARYNGOLOGY

## 2023-03-20 PROCEDURE — 92557 COMPREHENSIVE HEARING TEST: CPT | Mod: HCNC,S$GLB,, | Performed by: AUDIOLOGIST

## 2023-03-20 PROCEDURE — 99999 PR PBB SHADOW E&M-EST. PATIENT-LVL IV: CPT | Mod: PBBFAC,HCNC,, | Performed by: OTOLARYNGOLOGY

## 2023-03-20 PROCEDURE — 99213 OFFICE O/P EST LOW 20 MIN: CPT | Mod: HCNC,S$GLB,, | Performed by: OTOLARYNGOLOGY

## 2023-03-20 PROCEDURE — 1101F PT FALLS ASSESS-DOCD LE1/YR: CPT | Mod: HCNC,CPTII,S$GLB, | Performed by: OTOLARYNGOLOGY

## 2023-03-20 NOTE — PROGRESS NOTES
[facility]    Subjective:      Patient: Kenyetta Andersen Patient PCP: Hever Arreola MD         :  1935     Sex:  female      MRN:  [id number]          Date of Visit: 2023      Chief Complaint: Follow-up (Follow up on ears. Audiogram completed this morning. )      2023 follow-up visitKenyetta Andersen is a 88 y.o. female current occasional cigarette smoker of less than 1 pack per day for 65 years with a personal history of colon cancer with chemotherapy-induced neuropathy, HLD, CKD 3, primary hypothyroidism, GERD with Escalante's and COPD seen just under 3 weeks ago with left ear pain fullness and drainage with findings the time of our exam of some hyperemia of the vascular portion of the tympanic membrane only and some crusting of the lateral aspect of the TM which may have imply a healed perforation.  Conservative care recommended as well as smoking cessation.  Audiogram today with notable low-frequency left-sided asymmetric hearing loss which appears to be mixed in nature with a flat to AS tympanogram on the left side.  Patient feels some occasional pain on the left side perhaps twice lasting just seconds.  No drainage.  She does not feel her left-sided hearing loss has improved since our last visit.          2023 initial patient consultation: Kenyetta Andersen is a 88 y.o. female current occasional cigarette smoker of less than 1 pack per day for 65 years with a personal history of colon cancer with chemotherapy-induced neuropathy, HLD, CKD 3, primary hypothyroidism, GERD with Escalante's and COPD referred to me by Yasmin Hernandez in consultation for ear infection.  Patient seen in the ER 1 week ago for left-sided ear pain x4 days presenting to the ER with worsening/persistent ear pain in spite of treatment with Ciprodex and Augmentin examination at that time revealed mild swelling of the external auditory canal and active drainage.    Ear pain has resolved.  There is no more  "drainage.  Everything began with a slight sore throat and upper respiratory tract illness symptoms.  She does not have any ongoing pain but does feel a sense of fullness which is quite bothersome.  Her family member who accompanies her today feels that she is had some longstanding left-sided hearing loss which predates this acute event.  Patient denies any ongoing sinonasal disease, unilateral nasal obstruction, epistaxis or neck mass.    History of thyroid nodule on the right side of 3+ cm with [place] 2 benign biopsy in 2012 appears stable on repeat ultrasounds dating back to 2011.      Lab Results   Component Value Date    TSH 0.835 08/24/2022       Review of Systems     Past Medical History  She has a past medical history of Anatomical narrow angle, Anxiety, Arthritis, Escalante esophagus, Blood transfusion, Cataract, Colon cancer, Colon polyps, GERD (gastroesophageal reflux disease), Glaucoma, Nuclear sclerosis, Osteoporosis, Primary hypothyroidism, and Pseudophakia - Left Eye.    Family / Surgical / Social History  Her family history includes Arthritis in her sister; Heart disease in her brother, father, and mother; No Known Problems in her brother and brother; Parkinsonism in her sister.    Past Surgical History:   Procedure Laterality Date    APPENDECTOMY      CATARACT EXTRACTION W/  INTRAOCULAR LENS IMPLANT Left     CATARACT EXTRACTION W/  INTRAOCULAR LENS IMPLANT Right     Dr Sawant    COLON SURGERY      resection    COLONOSCOPY N/A 3/14/2017    Procedure: COLONOSCOPY;  Surgeon: Killian Guerrier MD;  Location: [facility];  Service: Endoscopy;  Laterality: N/A;    COLONOSCOPY  03/14/2017    Dr. Guerrier: hemorrhoids, one colon polyp removed, "Patent functional end-to-end ileo-colonic anastomosis"with healthy mucosa; biopsy: hyperplastic polyp; repeat in 3 years for surveillance    COLONOSCOPY N/A 1/21/2020    Procedure: COLONOSCOPY;  Surgeon: Timo Darling MD;  Location: [facility];  Service: Endoscopy;  " Laterality: N/A;    COLONOSCOPY N/A 2022    Procedure: COLONOSCOPY;  Surgeon: Anaid Washington MD;  Location: [facility];  Service: Endoscopy;  Laterality: N/A;    ECTOPIC PREGNANCY SURGERY      ESOPHAGOGASTRODUODENOSCOPY N/A 2019    Procedure: EGD (ESOPHAGOGASTRODUODENOSCOPY);  Surgeon: Anaid Washington MD;  Location: [facility] END[facility];  Service: Endoscopy;  Laterality: N/A;    ESOPHAGOGASTRODUODENOSCOPY N/A 2019    Procedure: EGD (ESOPHAGOGASTRODUODENOSCOPY);  Surgeon: Anaid Washington MD;  Location: [facility] END[facility];  Service: Endoscopy;  Laterality: N/A;    ESOPHAGOGASTRODUODENOSCOPY N/A 2021    Procedure: EGD (ESOPHAGOGASTRODUODENOSCOPY);  Surgeon: Anaid Washington MD;  Location: [facility];  Service: Endoscopy;  Laterality: N/A;    ESOPHAGOGASTRODUODENOSCOPY N/A 2021    Procedure: EGD (ESOPHAGOGASTRODUODENOSCOPY);  Surgeon: Anaid Washington MD;  Location: [facility];  Service: Endoscopy;  Laterality: N/A;    ESOPHAGOGASTRODUODENOSCOPY N/A 2022    Procedure: EGD (ESOPHAGOGASTRODUODENOSCOPY);  Surgeon: Anaid Washington MD;  Location: [facility];  Service: Endoscopy;  Laterality: N/A;    EYE SURGERY      bilateral cataracts    HYSTERECTOMY      complete    JOINT REPLACEMENT      Liban Knee    ROTATOR CUFF REPAIR Right     TOE SURGERY      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    UPPER GASTROINTESTINAL ENDOSCOPY  2017    Dr. Guerrier: gastritis and esophagitis, biopsy: chronic gastritis, negative for h pylori, esophageal- positive eosinophils, negative for barretts; repeat in 2 months    UPPER GASTROINTESTINAL ENDOSCOPY  2017    Dr. Guerrier       Social History     Tobacco Use    Smoking status: Some Days     Packs/day: 0.33     Years: 65.00     Pack years: 21.45     Types: Cigarettes     Last attempt to quit: 10/5/2020     Years since quittin.4    Smokeless tobacco: Never    Tobacco comments:     3/1/23--states smokes 1-2 cigarettes a day   Substance and Sexual Activity     Alcohol use: Yes     Alcohol/week: 2.0 standard drinks     Types: 2 Shots of liquor per week     Comment: occasional (crown)    Drug use: No    Sexual activity: Not Currently       Medications  She has a current medication list which includes the following prescription(s): alendronate, ascorbic acid (vitamin c), b complex vitamins, brimonidine 0.2%, calcium carbonate-magnesium hydroxide, cyanocobalamin, dorzolamide-timolol 2-0.5%, gabapentin, hydrochlorothiazide, latanoprost, lidocaine, meloxicam, mirtazapine, mometasone 0.1%, potassium chloride sa, and rabeprazole.      Allergies  Review of patient's allergies indicates:   Allergen Reactions    Ace inhibitors Edema     Other reaction(s): Angioedema    Codeine Hives       All medications, allergies, and past history have been reviewed.    Objective:      Vitals:  Vitals - 1 value per visit 3/1/2023 3/20/2023 3/20/2023   SYSTOLIC 138 - -   DIASTOLIC 72 - -   Pulse 86 - -   Temp - - 98.2   Resp - - -   SPO2 - - -   Weight (lb) 140.65 - 145.72   Weight (kg) 63.8 - 66.1   Height 63 - 63   BMI (Calculated) 24.9 - 25.8   VISIT REPORT - - -   Pain Score  - 0 -   Some recent data might be hidden       Body surface area is 1.71 meters squared.    Physical Exam:    GENERAL  APPEARANCE -  alert, appears stated age, and cooperative  BARRIER(S) TO COMMUNICATION -  none VOICE - appropriate for age and gender    INTEGUMENTARY  no suspicious head and neck lesions    HEENT  HEAD: Normocephalic, without obvious abnormality, atraumatic  FACE: INSPECTION - Symmetric, no signs of trauma, no suspicious lesion(s)  PALPATION -  No masses SALIVARY GLANDS - non-tender with no appreciable mass  STRENGTH - facial symmetry  NECK/THYROID: normal atraumatic, no neck masses, normal thyroid, no jvd    EYES  Normal occular alignment and mobility with no visible nystagmus at rest    EARS/NOSE/MOUTH/THROAT  EARS  PINNAE AND EXTERNAL EARS - no suspicious lesion OTOSCOPIC EXAM (surgical microscopy was  used for visualization/instrumentation): EAR EXAM - left ear with excessive wax cleared from the meatus with a film of crusted dry skin/scale coming off of the eardrum appears to be lateral to the eardrum though maybe in contact with it most laterally/posteriorly.  Attempts to remove this with a day hook were too uncomfortable and discontinued.  HEARING - subjectively decreased on the left    CHEST AND LUNG   INSPECTION & AUSCULTATION - normal effort, no stridor    CARDIOVASCULAR  AUSCULTATION & PERIPHERAL VASCULAR - regular rate and rhythm.    NEUROLOGIC  MENTAL STATUS - alert, interactive CRANIAL NERVES - normal        Procedure(s):  None    Labs:  WBC   Date Value Ref Range Status   01/26/2023 8.56 3.90 - 12.70 K/uL Final     Hemoglobin   Date Value Ref Range Status   01/26/2023 13.4 12.0 - 16.0 g/dL Final     Platelets   Date Value Ref Range Status   01/26/2023 195 150 - 450 K/uL Final     Creatinine   Date Value Ref Range Status   01/26/2023 0.9 0.5 - 1.4 mg/dL Final     TSH   Date Value Ref Range Status   08/24/2022 0.835 0.400 - 4.000 uIU/mL Final     Glucose   Date Value Ref Range Status   01/26/2023 138 (H) 70 - 110 mg/dL Final     Hemoglobin A1C   Date Value Ref Range Status   07/01/2022 5.5 4.0 - 5.6 % Final     Comment:     ADA Screening Guidelines:  5.7-6.4%  Consistent with prediabetes  >or=6.5%  Consistent with diabetes    High levels of fetal hemoglobin interfere with the HbA1C  assay. Heterozygous hemoglobin variants (HbS, HgC, etc)do  not significantly interfere with this assay.   However, presence of multiple variants may affect accuracy.           Assessment:      Problem List Items Addressed This Visit    None  Visit Diagnoses       Acute myringitis, left    -  Primary    Hearing loss due to cerumen impaction, left        Cerumen debris on tympanic membrane, left                       Plan:      Use the prescribed Debrox/Cerumenex (over-the-counter wax dissolving drops) twice daily to the  affected LEFT ear for 5 days then use the mineral oil also from the pharmacy for 5 drops to both ears every night until seen in 4 weeks.    If hearing loss is not subjectively normalized that time we will repeat the audiogram and consider further evaluation including CT scan though I do not think this will need to be the case.

## 2023-03-20 NOTE — PROGRESS NOTES
Kenyetta Andersen was seen 03/20/2023 for an audiological evaluation. Pertinent complaints today include hearing loss and tinnitus in the left ear only.  Otoscopy revealed some cerumen in the left ear. The tympanic membrane was visualized AU prior to proceeding with the hearing testing.     Results reveal a mild-to-severe sensorineural hearing loss for the right ear and  moderate-to-severe sensorineural hearing loss for the left ear.    Speech Reception Thresholds were  35 dBHL for the right ear and 50 dBHL for the left ear.    Word recognition scores were good for the right ear and good for the left ear.   Tympanograms were Type A for the right ear and Type B for the left ear.    Recommendations: 1) Follow up with ENT     2) Annual hearing evaluation     3) Hearing aid consult when ready    Audiogram results were reviewed in detail with patient and all questions were answered. Results will be reviewed by the referring provider at the completion of this note.

## 2023-03-20 NOTE — PATIENT INSTRUCTIONS
Use the prescribed Debrox/Cerumenex (over-the-counter wax dissolving drops) twice daily to the affected LEFT ear for 5 days then use the mineral oil also from the pharmacy for 5 drops to both ears every night until seen in 4 weeks.    If hearing loss is not subjectively normalized that time we will repeat the audiogram and consider further evaluation including CT scan though I do not think this will need to be the case.

## 2023-03-21 ENCOUNTER — OFFICE VISIT (OUTPATIENT)
Dept: GASTROENTEROLOGY | Facility: CLINIC | Age: 88
End: 2023-03-21
Payer: MEDICARE

## 2023-03-21 VITALS — BODY MASS INDEX: 25.66 KG/M2 | RESPIRATION RATE: 18 BRPM | WEIGHT: 144.81 LBS | HEIGHT: 63 IN

## 2023-03-21 DIAGNOSIS — K92.1 BLACK STOOLS: Primary | ICD-10-CM

## 2023-03-21 PROCEDURE — 99999 PR PBB SHADOW E&M-EST. PATIENT-LVL III: CPT | Mod: PBBFAC,HCNC,, | Performed by: INTERNAL MEDICINE

## 2023-03-21 PROCEDURE — 1160F RVW MEDS BY RX/DR IN RCRD: CPT | Mod: HCNC,CPTII,S$GLB, | Performed by: INTERNAL MEDICINE

## 2023-03-21 PROCEDURE — 99999 PR PBB SHADOW E&M-EST. PATIENT-LVL III: ICD-10-PCS | Mod: PBBFAC,HCNC,, | Performed by: INTERNAL MEDICINE

## 2023-03-21 PROCEDURE — 99214 PR OFFICE/OUTPT VISIT, EST, LEVL IV, 30-39 MIN: ICD-10-PCS | Mod: HCNC,S$GLB,, | Performed by: INTERNAL MEDICINE

## 2023-03-21 PROCEDURE — 3288F FALL RISK ASSESSMENT DOCD: CPT | Mod: HCNC,CPTII,S$GLB, | Performed by: INTERNAL MEDICINE

## 2023-03-21 PROCEDURE — 1101F PT FALLS ASSESS-DOCD LE1/YR: CPT | Mod: HCNC,CPTII,S$GLB, | Performed by: INTERNAL MEDICINE

## 2023-03-21 PROCEDURE — 1160F PR REVIEW ALL MEDS BY PRESCRIBER/CLIN PHARMACIST DOCUMENTED: ICD-10-PCS | Mod: HCNC,CPTII,S$GLB, | Performed by: INTERNAL MEDICINE

## 2023-03-21 PROCEDURE — 99214 OFFICE O/P EST MOD 30 MIN: CPT | Mod: HCNC,S$GLB,, | Performed by: INTERNAL MEDICINE

## 2023-03-21 PROCEDURE — 1126F AMNT PAIN NOTED NONE PRSNT: CPT | Mod: HCNC,CPTII,S$GLB, | Performed by: INTERNAL MEDICINE

## 2023-03-21 PROCEDURE — 1101F PR PT FALLS ASSESS DOC 0-1 FALLS W/OUT INJ PAST YR: ICD-10-PCS | Mod: HCNC,CPTII,S$GLB, | Performed by: INTERNAL MEDICINE

## 2023-03-21 PROCEDURE — 1159F MED LIST DOCD IN RCRD: CPT | Mod: HCNC,CPTII,S$GLB, | Performed by: INTERNAL MEDICINE

## 2023-03-21 PROCEDURE — 3288F PR FALLS RISK ASSESSMENT DOCUMENTED: ICD-10-PCS | Mod: HCNC,CPTII,S$GLB, | Performed by: INTERNAL MEDICINE

## 2023-03-21 PROCEDURE — 1126F PR PAIN SEVERITY QUANTIFIED, NO PAIN PRESENT: ICD-10-PCS | Mod: HCNC,CPTII,S$GLB, | Performed by: INTERNAL MEDICINE

## 2023-03-21 PROCEDURE — 1159F PR MEDICATION LIST DOCUMENTED IN MEDICAL RECORD: ICD-10-PCS | Mod: HCNC,CPTII,S$GLB, | Performed by: INTERNAL MEDICINE

## 2023-03-21 NOTE — PROGRESS NOTES
"[facility] Note    CC: Dark stools    HPI 88 y.o. female with history of colon cancer s/p right hemicolectomy and chemotherapy who is in remission as well as history of esophagitis and esophageal stenosis, presents for evaluation of 2-3 days of dark black stools that occurred 3 weeks ago. She admits to taking Pepto Bismol around that time. Her oncologist is Dr. Granado who requests EGD due to abnormal area seen in stomach on previous CT. She has continued mild intermittent dysphagia that improved after dilation in July 2022. Her GERD is mostly controlled with Aciphex 20 mg daily, though she occasionally experiences nighttime reflux. Her last EGD and colonoscopy were in July 2022, colonoscopy notable for healthy anastomosis and benign colon polyps (no follow up recommended due to age), and EGD notable for Schatzki ring that was dilated, small HH, H.pylori negative gastritis and mild pyloric stenosis.     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    Anxiety     Arthritis     Escalante esophagus     Blood transfusion     Cataract     Done OU    Colon cancer 2009    Colon polyps 3/14/2017    GERD (gastroesophageal reflux disease)     Glaucoma 2/24/2012    Nuclear sclerosis 8/27/2012    Osteoporosis     Primary hypothyroidism 2/23/2020    Pseudophakia - Left Eye 2/24/2012       Allergies and Medications reviewed     Review of Systems  General ROS: negative for - chills, fever or weight loss  Cardiovascular ROS: no chest pain or dyspnea on exertion  Gastrointestinal ROS: intermittent dysphagia, mild GERD, black stools    Physical Examination  Resp 18   Ht 5' 3" (1.6 m)   Wt 65.7 kg (144 lb 13.5 oz)   BMI 25.66 kg/m²   General appearance: alert, cooperative, no distress  HENT: Normocephalic, atraumatic, neck symmetrical, no nasal discharge, sclera anicteric   Lungs: clear to auscultation bilaterally, symmetric chest wall expansion bilaterally  Heart: regular rate and rhythm " without rub; no displacement of the PMI   Abdomen: soft, nontender,nondistended, BS active, no masses appreciated  Extremities: extremities symmetric; no clubbing, cyanosis, or edema        Labs:  Lab Results   Component Value Date    WBC 8.56 01/26/2023    HGB 13.4 01/26/2023    HCT 40.0 01/26/2023    MCV 96 01/26/2023     01/26/2023           Assessment:   88 y.o. female with history of colon cancer s/p resection, presents for follow up of GERD, dysphagia and black stools    Plan:  -Schedule EGD  -Continue Aciphex- ok to take BID if needed       Anaid Washington MD  [facility]  [address], [address]  [place] [address]  Office: [phone]  Fax: [phone]

## 2023-03-22 ENCOUNTER — OFFICE VISIT (OUTPATIENT)
Dept: OPTOMETRY | Facility: CLINIC | Age: 88
End: 2023-03-22
Payer: MEDICARE

## 2023-03-22 DIAGNOSIS — H40.1131 PRIMARY OPEN ANGLE GLAUCOMA (POAG) OF BOTH EYES, MILD STAGE: Primary | ICD-10-CM

## 2023-03-22 PROCEDURE — 1101F PT FALLS ASSESS-DOCD LE1/YR: CPT | Mod: HCNC,CPTII,S$GLB, | Performed by: OPTOMETRIST

## 2023-03-22 PROCEDURE — 3288F PR FALLS RISK ASSESSMENT DOCUMENTED: ICD-10-PCS | Mod: HCNC,CPTII,S$GLB, | Performed by: OPTOMETRIST

## 2023-03-22 PROCEDURE — 1159F PR MEDICATION LIST DOCUMENTED IN MEDICAL RECORD: ICD-10-PCS | Mod: HCNC,CPTII,S$GLB, | Performed by: OPTOMETRIST

## 2023-03-22 PROCEDURE — 1126F PR PAIN SEVERITY QUANTIFIED, NO PAIN PRESENT: ICD-10-PCS | Mod: HCNC,CPTII,S$GLB, | Performed by: OPTOMETRIST

## 2023-03-22 PROCEDURE — 1160F RVW MEDS BY RX/DR IN RCRD: CPT | Mod: HCNC,CPTII,S$GLB, | Performed by: OPTOMETRIST

## 2023-03-22 PROCEDURE — 99999 PR PBB SHADOW E&M-EST. PATIENT-LVL II: CPT | Mod: PBBFAC,HCNC,, | Performed by: OPTOMETRIST

## 2023-03-22 PROCEDURE — 1159F MED LIST DOCD IN RCRD: CPT | Mod: HCNC,CPTII,S$GLB, | Performed by: OPTOMETRIST

## 2023-03-22 PROCEDURE — 99213 OFFICE O/P EST LOW 20 MIN: CPT | Mod: HCNC,S$GLB,, | Performed by: OPTOMETRIST

## 2023-03-22 PROCEDURE — 1160F PR REVIEW ALL MEDS BY PRESCRIBER/CLIN PHARMACIST DOCUMENTED: ICD-10-PCS | Mod: HCNC,CPTII,S$GLB, | Performed by: OPTOMETRIST

## 2023-03-22 PROCEDURE — 1101F PR PT FALLS ASSESS DOC 0-1 FALLS W/OUT INJ PAST YR: ICD-10-PCS | Mod: HCNC,CPTII,S$GLB, | Performed by: OPTOMETRIST

## 2023-03-22 PROCEDURE — 99213 PR OFFICE/OUTPT VISIT, EST, LEVL III, 20-29 MIN: ICD-10-PCS | Mod: HCNC,S$GLB,, | Performed by: OPTOMETRIST

## 2023-03-22 PROCEDURE — 3288F FALL RISK ASSESSMENT DOCD: CPT | Mod: HCNC,CPTII,S$GLB, | Performed by: OPTOMETRIST

## 2023-03-22 PROCEDURE — 1126F AMNT PAIN NOTED NONE PRSNT: CPT | Mod: HCNC,CPTII,S$GLB, | Performed by: OPTOMETRIST

## 2023-03-22 PROCEDURE — 99999 PR PBB SHADOW E&M-EST. PATIENT-LVL II: ICD-10-PCS | Mod: PBBFAC,HCNC,, | Performed by: OPTOMETRIST

## 2023-03-22 NOTE — PROGRESS NOTES
HPI    Pt here for 4 mo POAG f/u. Pt states vision stable, doing well no   complaints.     Latanoprost QHS, cosopt BID, brimonidine TID OU.   Last edited by Aurelia Grijalva on 3/22/2023 10:34 AM.            Assessment /Plan     For exam results, see Encounter Report.    Primary open angle glaucoma (POAG) of both eyes, mild stage      IOP stable with use of drops  Continue cosopt bid OU  brimonidine tid OU  latanoprost qPM OU  Recheck iop  dfe  hvf  oct in 6 months     Fixed pupil OD   No ptosis, pt denies headaches  Previously noted in 2019  Monitor yearly

## 2023-04-10 ENCOUNTER — TELEPHONE (OUTPATIENT)
Dept: FAMILY MEDICINE | Facility: CLINIC | Age: 88
End: 2023-04-10
Payer: MEDICARE

## 2023-04-10 NOTE — TELEPHONE ENCOUNTER
..I called the patient to confirm her appointment that she has with Dr. Arreola on 04/11/2023 at 9:40am, no answer left voicemail to return our call. I will send the patient a portal message as well.

## 2023-04-12 ENCOUNTER — ANESTHESIA (OUTPATIENT)
Dept: ENDOSCOPY | Facility: HOSPITAL | Age: 88
End: 2023-04-12
Payer: MEDICARE

## 2023-04-12 ENCOUNTER — ANESTHESIA EVENT (OUTPATIENT)
Dept: ENDOSCOPY | Facility: HOSPITAL | Age: 88
End: 2023-04-12
Payer: MEDICARE

## 2023-04-12 ENCOUNTER — HOSPITAL ENCOUNTER (OUTPATIENT)
Facility: HOSPITAL | Age: 88
Discharge: HOME | End: 2023-04-12
Attending: INTERNAL MEDICINE | Admitting: FAMILY MEDICINE
Payer: MEDICARE

## 2023-04-12 VITALS
TEMPERATURE: 98 F | SYSTOLIC BLOOD PRESSURE: 124 MMHG | OXYGEN SATURATION: 95 % | HEART RATE: 88 BPM | RESPIRATION RATE: 16 BRPM | DIASTOLIC BLOOD PRESSURE: 59 MMHG

## 2023-04-12 DIAGNOSIS — R13.19 ESOPHAGEAL DYSPHAGIA: Primary | ICD-10-CM

## 2023-04-12 DIAGNOSIS — K92.1 BLACK STOOLS: ICD-10-CM

## 2023-04-12 PROCEDURE — 37000009 HC ANESTHESIA EA ADD 15 MINS: Mod: HCNC | Performed by: INTERNAL MEDICINE

## 2023-04-12 PROCEDURE — 88342 IMHCHEM/IMCYTCHM 1ST ANTB: CPT | Mod: HCNC | Performed by: PATHOLOGY

## 2023-04-12 PROCEDURE — 43249 ESOPH EGD DILATION <30 MM: CPT | Mod: HCNC | Performed by: INTERNAL MEDICINE

## 2023-04-12 PROCEDURE — D9220A PRA ANESTHESIA: ICD-10-PCS | Mod: HCNC,CRNA,, | Performed by: NURSE ANESTHETIST, CERTIFIED REGISTERED

## 2023-04-12 PROCEDURE — 43249 PR EGD, FLEX, W/BALL DILATION, < 30MM: ICD-10-PCS | Mod: HCNC,,, | Performed by: INTERNAL MEDICINE

## 2023-04-12 PROCEDURE — 43239 EGD BIOPSY SINGLE/MULTIPLE: CPT | Mod: 59,HCNC,, | Performed by: INTERNAL MEDICINE

## 2023-04-12 PROCEDURE — 88305 TISSUE EXAM BY PATHOLOGIST: ICD-10-PCS | Mod: 26,HCNC,, | Performed by: PATHOLOGY

## 2023-04-12 PROCEDURE — 63600175 PHARM REV CODE 636 W HCPCS: Mod: HCNC | Performed by: NURSE ANESTHETIST, CERTIFIED REGISTERED

## 2023-04-12 PROCEDURE — 88305 TISSUE EXAM BY PATHOLOGIST: CPT | Mod: 26,HCNC,, | Performed by: PATHOLOGY

## 2023-04-12 PROCEDURE — 88305 TISSUE EXAM BY PATHOLOGIST: CPT | Mod: HCNC | Performed by: PATHOLOGY

## 2023-04-12 PROCEDURE — C1726 CATH, BAL DIL, NON-VASCULAR: HCPCS | Mod: HCNC | Performed by: INTERNAL MEDICINE

## 2023-04-12 PROCEDURE — D9220A PRA ANESTHESIA: ICD-10-PCS | Mod: HCNC,ANES,, | Performed by: ANESTHESIOLOGY

## 2023-04-12 PROCEDURE — 88342 IMHCHEM/IMCYTCHM 1ST ANTB: CPT | Mod: 26,HCNC,, | Performed by: PATHOLOGY

## 2023-04-12 PROCEDURE — 27201012 HC FORCEPS, HOT/COLD, DISP: Mod: HCNC | Performed by: INTERNAL MEDICINE

## 2023-04-12 PROCEDURE — 88341 PR IHC OR ICC EACH ADD'L SINGLE ANTIBODY  STAINPR: ICD-10-PCS | Mod: 26,HCNC,, | Performed by: PATHOLOGY

## 2023-04-12 PROCEDURE — 88341 IMHCHEM/IMCYTCHM EA ADD ANTB: CPT | Mod: 26,HCNC,, | Performed by: PATHOLOGY

## 2023-04-12 PROCEDURE — 37000008 HC ANESTHESIA 1ST 15 MINUTES: Mod: HCNC | Performed by: INTERNAL MEDICINE

## 2023-04-12 PROCEDURE — 88341 IMHCHEM/IMCYTCHM EA ADD ANTB: CPT | Mod: HCNC | Performed by: PATHOLOGY

## 2023-04-12 PROCEDURE — 25000003 PHARM REV CODE 250: Mod: HCNC | Performed by: INTERNAL MEDICINE

## 2023-04-12 PROCEDURE — 25000003 PHARM REV CODE 250: Mod: HCNC | Performed by: NURSE ANESTHETIST, CERTIFIED REGISTERED

## 2023-04-12 PROCEDURE — 88342 CHG IMMUNOCYTOCHEMISTRY: ICD-10-PCS | Mod: 26,HCNC,, | Performed by: PATHOLOGY

## 2023-04-12 PROCEDURE — D9220A PRA ANESTHESIA: Mod: HCNC,CRNA,, | Performed by: NURSE ANESTHETIST, CERTIFIED REGISTERED

## 2023-04-12 PROCEDURE — D9220A PRA ANESTHESIA: Mod: HCNC,ANES,, | Performed by: ANESTHESIOLOGY

## 2023-04-12 PROCEDURE — 43249 ESOPH EGD DILATION <30 MM: CPT | Mod: HCNC,,, | Performed by: INTERNAL MEDICINE

## 2023-04-12 PROCEDURE — 43239 EGD BIOPSY SINGLE/MULTIPLE: CPT | Mod: 59,HCNC | Performed by: INTERNAL MEDICINE

## 2023-04-12 PROCEDURE — 43239 PR EGD, FLEX, W/BIOPSY, SGL/MULTI: ICD-10-PCS | Mod: 59,HCNC,, | Performed by: INTERNAL MEDICINE

## 2023-04-12 RX ORDER — PROPOFOL 10 MG/ML
VIAL (ML) INTRAVENOUS
Status: DISCONTINUED | OUTPATIENT
Start: 2023-04-12 | End: 2023-04-12

## 2023-04-12 RX ORDER — LIDOCAINE HYDROCHLORIDE 20 MG/ML
INJECTION INTRAVENOUS
Status: DISCONTINUED | OUTPATIENT
Start: 2023-04-12 | End: 2023-04-12

## 2023-04-12 RX ORDER — OMEPRAZOLE 40 MG/1
CAPSULE, DELAYED RELEASE ORAL
Qty: 90 CAPSULE | Refills: 3 | Status: ON HOLD | OUTPATIENT
Start: 2023-04-12 | End: 2023-08-05 | Stop reason: SDUPTHER

## 2023-04-12 RX ORDER — SODIUM CHLORIDE 9 MG/ML
INJECTION, SOLUTION INTRAVENOUS CONTINUOUS
Status: DISCONTINUED | OUTPATIENT
Start: 2023-04-12 | End: 2023-04-12 | Stop reason: HOSPADM

## 2023-04-12 RX ORDER — SUCRALFATE 1 G/10ML
1 SUSPENSION ORAL
Qty: 560 ML | Refills: 0 | Status: SHIPPED | OUTPATIENT
Start: 2023-04-12 | End: 2023-04-26

## 2023-04-12 RX ADMIN — LIDOCAINE HYDROCHLORIDE 100 MG: 20 INJECTION, SOLUTION INTRAVENOUS at 09:04

## 2023-04-12 RX ADMIN — SODIUM CHLORIDE: 0.9 INJECTION, SOLUTION INTRAVENOUS at 09:04

## 2023-04-12 RX ADMIN — PROPOFOL 30 MG: 10 INJECTION, EMULSION INTRAVENOUS at 09:04

## 2023-04-12 RX ADMIN — PROPOFOL 80 MG: 10 INJECTION, EMULSION INTRAVENOUS at 09:04

## 2023-04-12 NOTE — TRANSFER OF CARE
Anesthesia Transfer of Care Note    Patient: Kenyetta Andersen    Procedure(s) Performed: Procedure(s) (LRB):  EGD (ESOPHAGOGASTRODUODENOSCOPY) (N/A)    Patient location: GI    Anesthesia Type: general    Transport from OR: Transported from OR on room air with adequate spontaneous ventilation    Post pain: adequate analgesia    Post assessment: no apparent anesthetic complications    Post vital signs: stable    Level of consciousness: awake    Nausea/Vomiting: no nausea/vomiting    Complications: none    Transfer of care protocol was followed      Last vitals:   Visit Vitals  Pulse 76   Temp 36.6 °C (97.9 °F) (Skin)   Resp 16   SpO2 (!) 94%   Breastfeeding No

## 2023-04-12 NOTE — ANESTHESIA POSTPROCEDURE EVALUATION
Anesthesia Post Evaluation    Patient: Kenyetta Andersen    Procedure(s) Performed: Procedure(s) (LRB):  EGD (ESOPHAGOGASTRODUODENOSCOPY) (N/A)    Final Anesthesia Type: general      Patient location during evaluation: PACU  Patient participation: Yes- Able to Participate  Level of consciousness: sedated and awake  Post-procedure vital signs: reviewed and stable  Pain management: adequate  Airway patency: patent    PONV status at discharge: No PONV  Anesthetic complications: no      Cardiovascular status: blood pressure returned to baseline  Respiratory status: spontaneous ventilation  Hydration status: euvolemic  Follow-up not needed.          Vitals Value Taken Time   /59 04/12/23 0956   Temp 36.5 °C (97.7 °F) 04/12/23 0956   Pulse 88 04/12/23 0956   Resp 16 04/12/23 0956   SpO2 95 % 04/12/23 0956         Event Time   Out of Recovery 10:34:19         Pain/Amanda Score: Amanda Score: 10 (4/12/2023  9:56 AM)

## 2023-04-12 NOTE — PLAN OF CARE
Vss, stefany po fluids, denies pain, ambulates easily. IV removed, catheter intact. Discharge instructions provided and states understanding. States ready to go home.  Discharged from facility with family per wheelchair.

## 2023-04-12 NOTE — [employer] PATIENT INSTRUCTIONS
Discharge Summary/Instructions after an Endoscopic Procedure  Patient Name: Kenyetta Andersen  Patient MRN: [id number]  Patient YOB: 1935 Wednesday, April 12, 2023  Anaid Washington MD  Dear patient,  As a result of recent federal legislation (The Federal Cures Act), you may   receive lab or pathology results from your procedure in your [facility]   account before your physician is able to contact you. Your physician or   their representative will relay the results to you with their   recommendations at their soonest availability.  Thank you,  RESTRICTIONS:  During your procedure today, you received medications for sedation.  These   medications may affect your judgment, balance and coordination.  Therefore,   for 24 hours, you have the following restrictions:   - DO NOT drive a car, operate machinery, make legal/financial decisions,   sign important papers or drink alcohol.    ACTIVITY:  Today: no heavy lifting, straining or running due to procedural   sedation/anesthesia.  The following day: return to full activity including work.  DIET:  Eat and drink normally unless instructed otherwise.     TREATMENT FOR COMMON SIDE EFFECTS:  - Mild abdominal pain, nausea, belching, bloating or excessive gas:  rest,   eat lightly and use a heating pad.  - Sore Throat: treat with throat lozenges and/or gargle with warm salt   water.  - Because air was used during the procedure, expelling large amounts of air   from your rectum or belching is normal.  - If a bowel prep was taken, you may not have a bowel movement for 1-3 days.    This is normal.  SYMPTOMS TO WATCH FOR AND REPORT TO YOUR PHYSICIAN:  1. Abdominal pain or bloating, other than gas cramps.  2. Chest pain.  3. Back pain.  4. Signs of infection such as: chills or fever occurring within 24 hours   after the procedure.  5. Rectal bleeding, which would show as bright red, maroon, or black stools.   (A tablespoon of blood from the rectum is not  serious, especially if   hemorrhoids are present.)  6. Vomiting.  7. Weakness or dizziness.  GO DIRECTLY TO THE NEAREST EMERGENCY ROOM IF YOU HAVE ANY OF THE FOLLOWING:      Difficulty breathing              Chills and/or fever over 101 F   Persistent vomiting and/or vomiting blood   Severe abdominal pain   Severe chest pain   Black, tarry stools   Bleeding- more than one tablespoon   Any other symptom or condition that you feel may need urgent attention  Your doctor recommends these additional instructions:  If any biopsies were taken, your doctors clinic will contact you in 1 to 2   weeks with any results.  - Await pathology results.   - Discharge patient to home (with escort).   - Patient has a contact number available for emergencies.  The signs and   symptoms of potential delayed complications were discussed with the   patient.  Return to normal activities tomorrow.  Written discharge   instructions were provided to the patient.   - Resume previous diet.   - Change PPI to twice daily x 8 weeks  -Carafate x 14 days  For questions, problems or results please call your physician - Anaid Washington MD at Work:  [phone].  [facility], EMERGENCY ROOM PHONE NUMBER: [phone]  IF A COMPLICATION OR EMERGENCY SITUATION ARISES AND YOU ARE UNABLE TO REACH   YOUR PHYSICIAN - GO DIRECTLY TO THE EMERGENCY ROOM.  Anaid Washington MD  4/12/2023 9:45:10 AM  This report has been verified and signed electronically.  Dear patient,  As a result of recent federal legislation (The Federal Cures Act), you may   receive lab or pathology results from your procedure in your [facility]   account before your physician is able to contact you. Your physician or   their representative will relay the results to you with their   recommendations at their soonest availability.  Thank you,  [employer]ATION

## 2023-04-12 NOTE — ANESTHESIA PREPROCEDURE EVALUATION
04/12/2023  Kenyetta Andersen is a 88 y.o., female.    Pre-op Assessment    I have reviewed the Patient Summary Reports.    I have reviewed the Nursing Notes. I have reviewed the NPO Status.   I have reviewed the Medications.     Review of Systems  Anesthesia Hx:  No problems with previous Anesthesia    Social:  Former Smoker, Smoker Smoking Status: Current Some Day Smoker - 21.45 pack years  Smokeless Tobacco Status: Never Used  Alcohol use: Yes; 2.0 standard drinks per week  Drug use: No       Pulmonary:   COPD    Renal/:   Chronic Renal Disease    Hepatic/GI:   GERD, well controlled dysphagia   Neurological:   Neuromuscular Disease,    Endocrine:   Hypothyroidism                                                                                                                 04/12/2023  Kenyetta Andersen is a 88 y.o., female.    Anesthesia Evaluation    I have reviewed the Patient Summary Reports.    I have reviewed the Nursing Notes.      Review of Systems  Anesthesia Hx:  No problems with previous Anesthesia    Hepatic/GI:   GERD, well controlled        Physical Exam  General:  Well nourished    Airway/Jaw/Neck:  Airway Findings: Mallampati: II                Anesthesia Plan  Type of Anesthesia, risks & benefits discussed:  Anesthesia Type:  general  Patient's Preference:   Intra-op Monitoring Plan:   Intra-op Monitoring Plan Comments:   Post Op Pain Control Plan:   Post Op Pain Control Plan Comments:   Induction:   IV  Beta Blocker:  Patient is not currently on a Beta-Blocker (No further documentation required).       Informed Consent: Patient understands risks and agrees with Anesthesia plan.  Questions answered. Anesthesia consent signed with patient.  ASA Score: 2     Day of Surgery Review of History & Physical:    H&P update referred to the surgeon.         Ready For Surgery From Anesthesia  Perspective.                                                                                                                  04/12/2023  Kenyetta Andersen is a 88 y.o., female.    Anesthesia Evaluation    I have reviewed the Patient Summary Reports.    I have reviewed the Nursing Notes.      Review of Systems  Anesthesia Hx:  No problems with previous Anesthesia    Hepatic/GI:   GERD, well controlled        Physical Exam  General:  Well nourished    Airway/Jaw/Neck:  Airway Findings: Mallampati: II                Anesthesia Plan  Type of Anesthesia, risks & benefits discussed:  Anesthesia Type:  general  Patient's Preference:   Intra-op Monitoring Plan:   Intra-op Monitoring Plan Comments:   Post Op Pain Control Plan:   Post Op Pain Control Plan Comments:   Induction:   IV  Beta Blocker:  Patient is not currently on a Beta-Blocker (No further documentation required).       Informed Consent: Patient understands risks and agrees with Anesthesia plan.  Questions answered. Anesthesia consent signed with patient.  ASA Score: 2     Day of Surgery Review of History & Physical:    H&P update referred to the surgeon.         Ready For Surgery From Anesthesia Perspective.         Physical Exam  General:  Well nourished      Airway/Jaw/Neck:  Airway Findings: Mouth Opening: Normal   Tongue: Normal   General Airway Assessment: Adult, Good Oropharynx Findings: Normal Mallampati: II  Improves to II with phonation.  TM Distance: 4-6 cm   Neck Findings: Normal     Dental:  Dental Findings: In tact     Chest/Lungs:  Chest/Lungs Findings: Clear to auscultation, Normal Respiratory Rate      Heart/Vascular:  Heart Findings: Rate: Normal  Rhythm: Regular Rhythm  Sounds: Normal  Heart murmur: negative    Abdomen:  Abdomen Findings: Normal    Musculoskeletal:  Musculoskeletal Findings: Normal   Skin:  Skin Findings: Normal    Mental Status:  Mental Status Findings:  Cooperative, Alert and Oriented         Anesthesia Plan  Type of  Anesthesia, risks & benefits discussed:  Anesthesia Type:  general    Patient's Preference:   Plan Factors:          Intra-op Monitoring Plan: standard ASA monitors  Intra-op Monitoring Plan Comments:   Post Op Pain Control Plan:   Post Op Pain Control Plan Comments:     Induction:   IV  Beta Blocker:  Patient is not currently on a Beta-Blocker (No further documentation required).       Informed Consent: Informed consent signed with the Patient and all parties understand the risks and agree with anesthesia plan.  All questions answered.  Anesthesia consent signed with patient.  ASA Score: 3     Day of Surgery Review of History & Physical: I have interviewed and examined the patient. I have reviewed the patient's H&P dated:  There are no significant changes.   H&P completed by Anesthesiologist.         Ready For Surgery From Anesthesia Perspective.           Physical Exam  General: Well nourished    Airway:  Mallampati: II / II  Mouth Opening: Normal  TM Distance: 4-6 cm  Tongue: Normal    Dental:  In tact    Chest/Lungs:  Clear to auscultation, Normal Respiratory Rate    Heart:  Rate: Normal  Rhythm: Regular Rhythm  Sounds: Normal          Anesthesia Plan  Type of Anesthesia, risks & benefits discussed:    Anesthesia Type: general  Intra-op Monitoring Plan: standard ASA monitors  Induction:  IV  Informed Consent: Informed consent signed with the Patient and all parties understand the risks and agree with anesthesia plan.  All questions answered.   ASA Score: 3  Day of Surgery Review of History & Physical: I have interviewed and examined the patient. I have reviewed the patient's H&P dated: H&P completed by Anesthesiologist.    Ready For Surgery From Anesthesia Perspective.       .

## 2023-04-12 NOTE — H&P
[facility] Note    CC: Dark stools    HPI 88 y.o. female presents for evaluation of dark stools    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    Anxiety     Arthritis     Escalante esophagus     Blood transfusion     Cataract     Done OU    Colon cancer 2009    Colon polyps 3/14/2017    GERD (gastroesophageal reflux disease)     Glaucoma 2/24/2012    Nuclear sclerosis 8/27/2012    Osteoporosis     Primary hypothyroidism 2/23/2020    Pseudophakia - Left Eye 2/24/2012       Allergies and Medications reviewed     Review of Systems  General ROS: negative for - chills, fever or weight loss  Cardiovascular ROS: no chest pain or dyspnea on exertion  Gastrointestinal ROS: + dark stools    Physical Examination  Pulse 76   Temp 97.9 °F (36.6 °C) (Skin)   Resp 16   SpO2 (!) 94%   Breastfeeding No   General appearance: alert, cooperative, no distress  HENT: Normocephalic, atraumatic, neck symmetrical, no nasal discharge, sclera anicteric   Lungs: clear to auscultation bilaterally, symmetric chest wall expansion bilaterally  Heart: regular rate and rhythm without rub; no displacement of the PMI   Abdomen: soft  Extremities: extremities symmetric; no clubbing, cyanosis, or edema        Labs:  Lab Results   Component Value Date    WBC 8.56 01/26/2023    HGB 13.4 01/26/2023    HCT 40.0 01/26/2023    MCV 96 01/26/2023     01/26/2023         Assessment:   88 y.o. female presents for EGD    Plan:  -Proceed to EGD    Anaid Washington MD  [facility]  [address], [address]  [place] [address]  Office: [phone]  Fax: [phone]

## 2023-04-14 ENCOUNTER — OFFICE VISIT (OUTPATIENT)
Dept: FAMILY MEDICINE | Facility: CLINIC | Age: 88
End: 2023-04-14
Attending: FAMILY MEDICINE
Payer: MEDICARE

## 2023-04-14 VITALS
HEIGHT: 63 IN | OXYGEN SATURATION: 97 % | DIASTOLIC BLOOD PRESSURE: 76 MMHG | HEART RATE: 74 BPM | BODY MASS INDEX: 25.76 KG/M2 | WEIGHT: 145.38 LBS | TEMPERATURE: 99 F | SYSTOLIC BLOOD PRESSURE: 128 MMHG | RESPIRATION RATE: 17 BRPM

## 2023-04-14 DIAGNOSIS — K21.00 GASTROESOPHAGEAL REFLUX DISEASE WITH ESOPHAGITIS WITHOUT HEMORRHAGE: ICD-10-CM

## 2023-04-14 DIAGNOSIS — K22.70 BARRETT'S ESOPHAGUS WITHOUT DYSPLASIA: ICD-10-CM

## 2023-04-14 DIAGNOSIS — I70.0 ABDOMINAL AORTIC ATHEROSCLEROSIS: ICD-10-CM

## 2023-04-14 DIAGNOSIS — Z85.038 PERSONAL HISTORY OF COLON CANCER, STAGE III: ICD-10-CM

## 2023-04-14 DIAGNOSIS — J43.9 PULMONARY EMPHYSEMA, UNSPECIFIED EMPHYSEMA TYPE: Primary | ICD-10-CM

## 2023-04-14 DIAGNOSIS — N18.31 STAGE 3A CHRONIC KIDNEY DISEASE: ICD-10-CM

## 2023-04-14 DIAGNOSIS — J84.10 CALCIFIED GRANULOMA OF LUNG: ICD-10-CM

## 2023-04-14 PROCEDURE — 99999 PR PBB SHADOW E&M-EST. PATIENT-LVL III: CPT | Mod: PBBFAC,HCNC,, | Performed by: FAMILY MEDICINE

## 2023-04-14 PROCEDURE — 1159F PR MEDICATION LIST DOCUMENTED IN MEDICAL RECORD: ICD-10-PCS | Mod: HCNC,CPTII,S$GLB, | Performed by: FAMILY MEDICINE

## 2023-04-14 PROCEDURE — 99999 PR PBB SHADOW E&M-EST. PATIENT-LVL III: ICD-10-PCS | Mod: PBBFAC,HCNC,, | Performed by: FAMILY MEDICINE

## 2023-04-14 PROCEDURE — 3288F FALL RISK ASSESSMENT DOCD: CPT | Mod: HCNC,CPTII,S$GLB, | Performed by: FAMILY MEDICINE

## 2023-04-14 PROCEDURE — 1101F PR PT FALLS ASSESS DOC 0-1 FALLS W/OUT INJ PAST YR: ICD-10-PCS | Mod: HCNC,CPTII,S$GLB, | Performed by: FAMILY MEDICINE

## 2023-04-14 PROCEDURE — 99214 PR OFFICE/OUTPT VISIT, EST, LEVL IV, 30-39 MIN: ICD-10-PCS | Mod: HCNC,S$GLB,, | Performed by: FAMILY MEDICINE

## 2023-04-14 PROCEDURE — 1126F AMNT PAIN NOTED NONE PRSNT: CPT | Mod: HCNC,CPTII,S$GLB, | Performed by: FAMILY MEDICINE

## 2023-04-14 PROCEDURE — 1160F RVW MEDS BY RX/DR IN RCRD: CPT | Mod: HCNC,CPTII,S$GLB, | Performed by: FAMILY MEDICINE

## 2023-04-14 PROCEDURE — 3288F PR FALLS RISK ASSESSMENT DOCUMENTED: ICD-10-PCS | Mod: HCNC,CPTII,S$GLB, | Performed by: FAMILY MEDICINE

## 2023-04-14 PROCEDURE — 1126F PR PAIN SEVERITY QUANTIFIED, NO PAIN PRESENT: ICD-10-PCS | Mod: HCNC,CPTII,S$GLB, | Performed by: FAMILY MEDICINE

## 2023-04-14 PROCEDURE — 99214 OFFICE O/P EST MOD 30 MIN: CPT | Mod: HCNC,S$GLB,, | Performed by: FAMILY MEDICINE

## 2023-04-14 PROCEDURE — 1159F MED LIST DOCD IN RCRD: CPT | Mod: HCNC,CPTII,S$GLB, | Performed by: FAMILY MEDICINE

## 2023-04-14 PROCEDURE — 1101F PT FALLS ASSESS-DOCD LE1/YR: CPT | Mod: HCNC,CPTII,S$GLB, | Performed by: FAMILY MEDICINE

## 2023-04-14 PROCEDURE — 1160F PR REVIEW ALL MEDS BY PRESCRIBER/CLIN PHARMACIST DOCUMENTED: ICD-10-PCS | Mod: HCNC,CPTII,S$GLB, | Performed by: FAMILY MEDICINE

## 2023-04-14 NOTE — PROGRESS NOTES
Subjective:       Patient ID: Kenyetta Andersen is a 88 y.o. female.    Chief Complaint: Follow-up (Pt states that she is here for her 6 mo fu )    88-year-old female coming in for several concerns.  She has a history of gastroesophageal reflux disease with Barretts esophagus without dysplasia and was ordered on Omeprazole and sucralfate by Dr. Maya in GI.  The patient read the side effect profiles on the medications and she was concerned about their safety.  She has a history of hypothyroidism, B12 and vitamin-D deficiencies, colon carcinoma stage III with a colon resection and no sign of recurrence so far, stage IIIA chronic kidney disease, abdominal aortic atherosclerosis, hypertriglyceridemia, pulmonary emphysema, calcified granuloma of lung, chemotherapy-induced neuropathy and glaucoma.  She is currently on Fosamax 70 mg for osteopenia with a high FRAX score and using hydrochlorothiazide with potassium for edema and a little tendency to high blood pressure.  She takes meloxicam 15 mg and gabapentin given to her by pain management.    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  No date: Blood transfusion  No date: Cataract      Comment:  Done OU  2009: Colon cancer  3/14/2017: Colon polyps  No date: GERD (gastroesophageal reflux disease)  2/24/2012: Glaucoma  8/27/2012: Nuclear sclerosis  No date: Osteoporosis  2/23/2020: Primary hypothyroidism  2/24/2012: Pseudophakia - Left Eye    Past Surgical History:  No date: APPENDECTOMY  No date: CATARACT EXTRACTION W/  INTRAOCULAR LENS IMPLANT; Left  No date: CATARACT EXTRACTION W/  INTRAOCULAR LENS IMPLANT; Right      Comment:  Dr Sawant  No date: COLON SURGERY      Comment:  resection  3/14/2017: COLONOSCOPY; N/A      Comment:  Procedure: COLONOSCOPY;  Surgeon: Killian Guerrier MD;                 Location: [facility];  Service: Endoscopy;  Laterality:                N/A;  03/14/2017:  "COLONOSCOPY      Comment:  Dr. Guerrier: hemorrhoids, one colon polyp removed, "Patent               functional end-to-end ileo-colonic anastomosis"with                healthy mucosa; biopsy: hyperplastic polyp; repeat in 3                years for surveillance  1/21/2020: COLONOSCOPY; N/A      Comment:  Procedure: COLONOSCOPY;  Surgeon: Timo Darling MD;               Location: [facility] ENDO;  Service: Endoscopy;  Laterality:                N/A;  7/8/2022: COLONOSCOPY; N/A      Comment:  Procedure: COLONOSCOPY;  Surgeon: Anaid Washington MD;               Location: [facility] ENDO;  Service: Endoscopy;  Laterality:                N/A;  No date: ECTOPIC PREGNANCY SURGERY  5/16/2019: ESOPHAGOGASTRODUODENOSCOPY; N/A      Comment:  Procedure: EGD (ESOPHAGOGASTRODUODENOSCOPY);  Surgeon:                Anaid Washington MD;  Location: [facility] ENDO;  Service:                Endoscopy;  Laterality: N/A;  7/11/2019: ESOPHAGOGASTRODUODENOSCOPY; N/A      Comment:  Procedure: EGD (ESOPHAGOGASTRODUODENOSCOPY);  Surgeon:                Anaid Washington MD;  Location: [facility] ENDO;  Service:                Endoscopy;  Laterality: N/A;  2/5/2021: ESOPHAGOGASTRODUODENOSCOPY; N/A      Comment:  Procedure: EGD (ESOPHAGOGASTRODUODENOSCOPY);  Surgeon:                Anaid Washington MD;  Location: [facility] ENDO;  Service:                Endoscopy;  Laterality: N/A;  11/11/2021: ESOPHAGOGASTRODUODENOSCOPY; N/A      Comment:  Procedure: EGD (ESOPHAGOGASTRODUODENOSCOPY);  Surgeon:                Anaid Washington MD;  Location: [facility] ENDO;  Service:                Endoscopy;  Laterality: N/A;  7/8/2022: ESOPHAGOGASTRODUODENOSCOPY; N/A      Comment:  Procedure: EGD (ESOPHAGOGASTRODUODENOSCOPY);  Surgeon:                Anaid Washington MD;  Location: [facility] ENDO;  Service:                Endoscopy;  Laterality: N/A;  4/12/2023: ESOPHAGOGASTRODUODENOSCOPY; N/A      Comment:  Procedure: EGD (ESOPHAGOGASTRODUODENOSCOPY);  Surgeon:                " Anaid Washington MD;  Location: [facility];  Service:                Endoscopy;  Laterality: N/A;  No date: EYE SURGERY      Comment:  bilateral cataracts  No date: HYSTERECTOMY      Comment:  complete  No date: JOINT REPLACEMENT      Comment:  Libna Knee  No date: ROTATOR CUFF REPAIR; Right  No date: TOE SURGERY      Comment:  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  06/12/2017: UPPER GASTROINTESTINAL ENDOSCOPY      Comment:  Dr. Guerrier: gastritis and esophagitis, biopsy: chronic                gastritis, negative for h pylori, esophageal- positive                eosinophils, negative for barretts; repeat in 2 months  07/27/2017: UPPER GASTROINTESTINAL ENDOSCOPY      Comment:  Dr. Guerrier    Current Outpatient Medications on File Prior to Visit:  alendronate (FOSAMAX) 70 MG tablet, TAKE 1 TABLET (70 MG TOTAL) BY MOUTH EVERY 7 DAYS. ON EMPTY STOMACH IN MORNING, REMAIN UPRIGHT FOR 30 MINUTES., Disp: 12 tablet, Rfl: 3  ascorbic acid, vitamin C, (VITAMIN C) 500 MG tablet, Take 500 mg by mouth once daily., Disp: , Rfl:   b complex vitamins capsule, Take 1 capsule by mouth once daily., Disp: , Rfl:   brimonidine 0.2% (ALPHAGAN) 0.2 % Drop, INSTILL 1 DROP INTO BOTH EYES 3 TIMES A DAY, Disp: 10 mL, Rfl: 6  calcium carbonate-magnesium hydroxide (ROLAIDS) 550-110 mg Chew, Take 1 tablet by mouth 2 (two) times daily with meals., Disp: , Rfl:   cyanocobalamin (VITAMIN B-12) 1000 MCG tablet, Take 100 mcg by mouth once daily., Disp: , Rfl:   dorzolamide-timolol 2-0.5% (COSOPT) 22.3-6.8 mg/mL ophthalmic solution, INSTILL 1 DROP INTO BOTH EYES TWICE A DAY, Disp: 10 mL, Rfl: 4  gabapentin (NEURONTIN) 300 MG capsule, Take 300 mg in the AM, 300 mg midday and 600 mg at night, Disp: 120 capsule, Rfl: 11  hydroCHLOROthiazide (HYDRODIURIL) 12.5 MG Tab, TAKE 1 TABLET BY MOUTH EVERY DAY, Disp: 90 tablet, Rfl: 0  latanoprost 0.005 % ophthalmic solution, PLACE 1 DROP INTO BOTH EYES EVERY EVENING., Disp: 7.5 mL, Rfl: 3  meloxicam (MOBIC) 15  MG tablet, TAKE 1 TABLET BY MOUTH EVERY DAY AS NEEDED FOR PAIN, Disp: 30 tablet, Rfl: 2  mirtazapine (REMERON) 7.5 MG Tab, TAKE 1 TABLET BY MOUTH EVERY EVENING., Disp: 90 tablet, Rfl: 1  potassium chloride SA (K-DUR,KLOR-CON) 20 MEQ tablet, TAKE 1 TABLET BY MOUTH ONCE DAILY, Disp: 90 tablet, Rfl: 3  omeprazole (PRILOSEC) 40 MG capsule, Take 1 capsule (40 mg total) by mouth 2 (two) times daily before meals for 60 days, THEN 1 capsule (40 mg total) every morning. (Patient not taking: Reported on 4/14/2023), Disp: 90 capsule, Rfl: 3  sucralfate (CARAFATE) 100 mg/mL suspension, Take 10 mLs (1 g total) by mouth 4 (four) times daily before meals and nightly. for 14 days (Patient not taking: Reported on 4/14/2023), Disp: 560 mL, Rfl: 0  [DISCONTINUED] LIDOcaine (LIDODERM) 5 %, Place 1 patch onto the skin once daily. Remove & Discard patch within 12 hours or as directed by MD (Patient not taking: Reported on 4/14/2023), Disp: 15 patch, Rfl: 0  [DISCONTINUED] mometasone 0.1% (ELOCON) 0.1 % cream, Apply topically once daily. for 10 days (Patient not taking: Reported on 4/14/2023), Disp: 15 g, Rfl: 0    No current facility-administered medications on file prior to visit.        Review of Systems   Constitutional:  Negative for chills, diaphoresis and fever.   HENT:  Negative for congestion, postnasal drip, rhinorrhea, sinus pressure and sinus pain.    Respiratory:  Positive for cough. Negative for apnea, shortness of breath and wheezing.    Gastrointestinal:         She is not aware of any reflux symptoms     Objective:      Physical Exam  Vitals and nursing note reviewed.   Constitutional:       General: She is not in acute distress.     Appearance: Normal appearance. She is normal weight. She is not ill-appearing, toxic-appearing or diaphoretic.      Comments: Initial blood pressure was a little elevated on recheck after she had a few minutes to sit and relax she came down to 128/76  Normal weight with a BMI of 25.8 she is  up 5.6 lb from her last visit with me June 21, 2022   HENT:      Head: Normocephalic and atraumatic.   Eyes:      General: No scleral icterus.     Extraocular Movements: Extraocular movements intact.      Pupils: Pupils are equal, round, and reactive to light.   Neck:      Vascular: No carotid bruit.   Cardiovascular:      Rate and Rhythm: Normal rate and regular rhythm.      Heart sounds: Normal heart sounds. No murmur heard.    No friction rub. No gallop.   Pulmonary:      Effort: Pulmonary effort is normal. No respiratory distress.      Breath sounds: Normal breath sounds. No stridor. No wheezing, rhonchi or rales.   Abdominal:      General: Abdomen is flat. There is no distension.      Palpations: Abdomen is soft. There is no mass.      Tenderness: There is no abdominal tenderness. There is no guarding or rebound.      Hernia: No hernia is present.   Musculoskeletal:      Cervical back: Normal range of motion and neck supple. No rigidity or tenderness.      Right lower leg: No edema.      Left lower leg: No edema.   Lymphadenopathy:      Cervical: No cervical adenopathy.   Skin:     General: Skin is warm and dry.      Coloration: Skin is not pale.      Findings: No erythema or rash.   Neurological:      General: No focal deficit present.      Mental Status: She is alert and oriented to person, place, and time. Mental status is at baseline.   Psychiatric:         Mood and Affect: Mood normal.         Behavior: Behavior normal.         Thought Content: Thought content normal.         Judgment: Judgment normal.       Assessment:       1. Pulmonary emphysema, unspecified emphysema type    2. Calcified granuloma of lung    3. Abdominal aortic atherosclerosis    4. Stage 3a chronic kidney disease    5. Personal history of colon cancer, stage III    6. Gastroesophageal reflux disease with esophagitis without hemorrhage    7. Escalante's esophagus without dysplasia    8. BMI 25.0-25.9,adult        Plan:       1. Pulmonary  emphysema, unspecified emphysema type  Stable    2. Calcified granuloma of lung  Unchanged as of last chest CT July 1, 2022, another is scheduled by Dr. Granado in the near future    3. Abdominal aortic atherosclerosis  Asymptomatic, try to maintain good blood pressure control, cholesterol control, and blood sugar control    4. Stage 3a chronic kidney disease   BMP  Lab Results   Component Value Date     01/26/2023    K 4.5 01/26/2023     01/26/2023    CO2 24 01/26/2023    BUN 11 01/26/2023    CREATININE 0.9 01/26/2023    CALCIUM 9.4 01/26/2023    ANIONGAP 10 01/26/2023    EGFRNORACEVR >60.0 01/26/2023   Resolved      5. Personal history of colon cancer, stage III  Last colonoscopy was July 8, 2022 with a hyperplastic polyp removed.  Dr. Maya indicated that she did not need any further colonoscopies for screening purposes    6. Gastroesophageal reflux disease with esophagitis without hemorrhage  Followed by GI, discussed her concerns about the omeprazole and sucralfate and answered her questions.  She indicated that she was now comfortable with the medications.  I did suggest that she take additional calcium and B12 to overcome the potential decreases    7. Escalante's esophagus without dysplasia  See above still followed by GI    8. BMI 25.0-25.9,adult  Good weight no changes needed

## 2023-04-17 ENCOUNTER — OFFICE VISIT (OUTPATIENT)
Dept: OTOLARYNGOLOGY | Facility: CLINIC | Age: 88
End: 2023-04-17
Payer: MEDICARE

## 2023-04-17 ENCOUNTER — CLINICAL SUPPORT (OUTPATIENT)
Dept: AUDIOLOGY | Facility: CLINIC | Age: 88
End: 2023-04-17
Payer: MEDICARE

## 2023-04-17 VITALS
DIASTOLIC BLOOD PRESSURE: 78 MMHG | WEIGHT: 141.56 LBS | HEIGHT: 63 IN | BODY MASS INDEX: 25.08 KG/M2 | SYSTOLIC BLOOD PRESSURE: 156 MMHG | HEART RATE: 71 BPM

## 2023-04-17 DIAGNOSIS — H73.002 ACUTE MYRINGITIS, LEFT: ICD-10-CM

## 2023-04-17 DIAGNOSIS — H90.A32 MIXED CONDUCTIVE AND SENSORINEURAL HEARING LOSS OF LEFT EAR WITH RESTRICTED HEARING OF RIGHT EAR: Primary | ICD-10-CM

## 2023-04-17 DIAGNOSIS — H91.13 PRESBYCUSIS, BILATERAL: ICD-10-CM

## 2023-04-17 DIAGNOSIS — H90.3 SENSORINEURAL HEARING LOSS, BILATERAL: Primary | ICD-10-CM

## 2023-04-17 DIAGNOSIS — H93.12 TINNITUS, LEFT EAR: ICD-10-CM

## 2023-04-17 DIAGNOSIS — H65.192 ACUTE SEROMUCINOUS OTITIS MEDIA, LEFT: ICD-10-CM

## 2023-04-17 PROCEDURE — 92557 PR COMPREHENSIVE HEARING TEST: ICD-10-PCS | Mod: HCNC,S$GLB,, | Performed by: AUDIOLOGIST

## 2023-04-17 PROCEDURE — 99213 PR OFFICE/OUTPT VISIT, EST, LEVL III, 20-29 MIN: ICD-10-PCS | Mod: HCNC,S$GLB,, | Performed by: OTOLARYNGOLOGY

## 2023-04-17 PROCEDURE — 1159F MED LIST DOCD IN RCRD: CPT | Mod: HCNC,CPTII,S$GLB, | Performed by: OTOLARYNGOLOGY

## 2023-04-17 PROCEDURE — 92567 TYMPANOMETRY: CPT | Mod: HCNC,S$GLB,, | Performed by: AUDIOLOGIST

## 2023-04-17 PROCEDURE — 92557 COMPREHENSIVE HEARING TEST: CPT | Mod: HCNC,S$GLB,, | Performed by: AUDIOLOGIST

## 2023-04-17 PROCEDURE — 1160F RVW MEDS BY RX/DR IN RCRD: CPT | Mod: HCNC,CPTII,S$GLB, | Performed by: OTOLARYNGOLOGY

## 2023-04-17 PROCEDURE — 1101F PR PT FALLS ASSESS DOC 0-1 FALLS W/OUT INJ PAST YR: ICD-10-PCS | Mod: HCNC,CPTII,S$GLB, | Performed by: OTOLARYNGOLOGY

## 2023-04-17 PROCEDURE — 1159F PR MEDICATION LIST DOCUMENTED IN MEDICAL RECORD: ICD-10-PCS | Mod: HCNC,CPTII,S$GLB, | Performed by: OTOLARYNGOLOGY

## 2023-04-17 PROCEDURE — 1126F PR PAIN SEVERITY QUANTIFIED, NO PAIN PRESENT: ICD-10-PCS | Mod: HCNC,CPTII,S$GLB, | Performed by: OTOLARYNGOLOGY

## 2023-04-17 PROCEDURE — 3288F FALL RISK ASSESSMENT DOCD: CPT | Mod: HCNC,CPTII,S$GLB, | Performed by: OTOLARYNGOLOGY

## 2023-04-17 PROCEDURE — 92567 PR TYMPA2METRY: ICD-10-PCS | Mod: HCNC,S$GLB,, | Performed by: AUDIOLOGIST

## 2023-04-17 PROCEDURE — 99213 OFFICE O/P EST LOW 20 MIN: CPT | Mod: HCNC,S$GLB,, | Performed by: OTOLARYNGOLOGY

## 2023-04-17 PROCEDURE — 3288F PR FALLS RISK ASSESSMENT DOCUMENTED: ICD-10-PCS | Mod: HCNC,CPTII,S$GLB, | Performed by: OTOLARYNGOLOGY

## 2023-04-17 PROCEDURE — 99999 PR PBB SHADOW E&M-EST. PATIENT-LVL III: ICD-10-PCS | Mod: PBBFAC,HCNC,, | Performed by: OTOLARYNGOLOGY

## 2023-04-17 PROCEDURE — 1101F PT FALLS ASSESS-DOCD LE1/YR: CPT | Mod: HCNC,CPTII,S$GLB, | Performed by: OTOLARYNGOLOGY

## 2023-04-17 PROCEDURE — 99999 PR PBB SHADOW E&M-EST. PATIENT-LVL III: CPT | Mod: PBBFAC,HCNC,, | Performed by: OTOLARYNGOLOGY

## 2023-04-17 PROCEDURE — 1160F PR REVIEW ALL MEDS BY PRESCRIBER/CLIN PHARMACIST DOCUMENTED: ICD-10-PCS | Mod: HCNC,CPTII,S$GLB, | Performed by: OTOLARYNGOLOGY

## 2023-04-17 PROCEDURE — 1126F AMNT PAIN NOTED NONE PRSNT: CPT | Mod: HCNC,CPTII,S$GLB, | Performed by: OTOLARYNGOLOGY

## 2023-04-17 NOTE — PROGRESS NOTES
Kenyetta Andersen was seen 04/17/2023 for an audiological evaluation. Pertinent complaints today include hearing loss. Otoscopy revealed no cerumen in both ears. The tympanic membrane was visualized AU prior to proceeding with the hearing testing.     Results reveal a mild-to-moderate sensorineural hearing loss for the right ear and  mild-to-severe sensorineural hearing loss for the left ear.  Speech Reception Thresholds were  40 dBHL for the right ear and 45 dBHL for the left ear.  Word recognition scores were good for the right ear and good for the left ear. Tympanograms were Type A for the right ear and Type As for the left ear.    Audiogram results were reviewed in detail with patient and all questions were answered. Results will be reviewed by the referring provider at the completion of this note.

## 2023-04-17 NOTE — PATIENT INSTRUCTIONS
There is no real suspicion of ongoing fluid or any perforation.  Likely her initial diagnosis was myringitis (infection/inflammation of the eardrum).  This has resulted in some stiffening of the eardrum slight thickening of the eardrum but I do not see the potential benefit in eustachian tube dilation or myringotomy/tube placement.  Routine ear care to be continued.  Instructions as outlined.  Return for repeat evaluation if symptoms worsen.  Annual audiometry recommended.  Patient should consider hearing aids in general.    ROUTINE EAR CARE    Keep the ears dry in general.  Water and soap dry the ears increases scaling by stripping away the natural oils of the ears.    A twisted single ply of facial tissue can be used to wick out moisture on the rare occasion when water gets stuck in the ear; or the water can be displaced with concentrated alcohol like OTC SwimEar or a few drops of 72-95% isopropyl alcohol to fill the ear canal.  Regular use will cause excess drying of the ears.    The ear should be kept moist in general with mineral oil. Three to four drops into the ear canal 2 to 3 times a week or even every night.    If the ears need to be irrigated use either a 50 50 mixture of white vinegar and distilled water or 50 50 mixture of alcohol and white vinegar.    Painful draining ears that do not resolve with conservative care could represent infection and may need microscopic office debridement/clearing of the wax, and topical antibiotic drops with or without steroids may need to be prescribed.

## 2023-04-17 NOTE — PROGRESS NOTES
[facility]    Subjective:      Patient: Kenyetta Andersen Patient PCP: Hever Arreola MD         :  1935     Sex:  female      MRN:  [id number]          Date of Visit: 2023      Chief Complaint: Follow-up (Follow up for left acute seromucinous OM and myringitis. Reports taking Debrox BID x5 days and Mineral oil 5 drops to bilateral ears until today's visit without any missed doses. Reports having some improvement in her symptoms. )      2023 follow-up visit:  Kenyetta Andersen is a 88 y.o. female current occasional cigarette smoker of less than 1 pack per day for 65 years with a personal history of colon cancer with chemotherapy-induced neuropathy, HLD, CKD 3, primary hypothyroidism, GERD with Escalante's and COPD seen 1 MONTH AGO WITH LEFT EAR SCABBING CRUSTING FROM PRIOR LIKELY PERFORATION WITH LOW-FREQUENCY HEARING LOSS QUESTION-ES MIXED AND FLAT TYMPANOGRAM ON THE AFFECTED LEFT SIDE WITH SLOPING SENSORINEURAL HEARING LOSS CONSISTENT WITH PRESBYCUSIS BILATERALLY COUNSELED TO USE CERUMENEX AS INSTRUCTED.  Reports some improvement in her left ear hearing loss fullness.  No repeat audiogram since this treatment.  Denies any pain or active drainage or bleeding.  Feels the left ear is not hearing as well as the right or as well as it did before the symptoms began.            2023 follow-up visit:  Kenyetta Andersen is a 88 y.o. female current occasional cigarette smoker of less than 1 pack per day for 65 years with a personal history of colon cancer with chemotherapy-induced neuropathy, HLD, CKD 3, primary hypothyroidism, GERD with Escalante's and COPD seen just under 3 weeks ago with left ear pain fullness and drainage with findings the time of our exam of some hyperemia of the vascular portion of the tympanic membrane only and some crusting of the lateral aspect of the TM which may have imply a healed perforation.  Conservative care recommended as well as smoking cessation.   Audiogram today with notable low-frequency left-sided asymmetric hearing loss which appears to be mixed in nature with a flat to AS tympanogram on the left side.  Patient feels some occasional pain on the left side perhaps twice lasting just seconds.  No drainage.  She does not feel her left-sided hearing loss has improved since our last visit.          03/01/2023 initial patient consultation: Kenyetta Andersen is a 88 y.o. female current occasional cigarette smoker of less than 1 pack per day for 65 years with a personal history of colon cancer with chemotherapy-induced neuropathy, HLD, CKD 3, primary hypothyroidism, GERD with Escalante's and COPD referred to me by Yasmin Hernandez in consultation for ear infection.  Patient seen in the ER 1 week ago for left-sided ear pain x4 days presenting to the ER with worsening/persistent ear pain in spite of treatment with Ciprodex and Augmentin examination at that time revealed mild swelling of the external auditory canal and active drainage.    Ear pain has resolved.  There is no more drainage.  Everything began with a slight sore throat and upper respiratory tract illness symptoms.  She does not have any ongoing pain but does feel a sense of fullness which is quite bothersome.  Her family member who accompanies her today feels that she is had some longstanding left-sided hearing loss which predates this acute event.  Patient denies any ongoing sinonasal disease, unilateral nasal obstruction, epistaxis or neck mass.    History of thyroid nodule on the right side of 3+ cm with [place] 2 benign biopsy in 2012 appears stable on repeat ultrasounds dating back to 2011.      Lab Results   Component Value Date    TSH 0.835 08/24/2022       Review of Systems     Past Medical History  She has a past medical history of Anatomical narrow angle, Anxiety, Arthritis, Escalante esophagus, Blood transfusion, Cataract, Colon cancer, Colon polyps, GERD (gastroesophageal reflux disease),  "Glaucoma, Nuclear sclerosis, Osteoporosis, Primary hypothyroidism, and Pseudophakia - Left Eye.    Family / Surgical / Social History  Her family history includes Arthritis in her sister; Heart disease in her brother, father, and mother; No Known Problems in her brother and brother; Parkinsonism in her sister.    Past Surgical History:   Procedure Laterality Date    APPENDECTOMY      CATARACT EXTRACTION W/  INTRAOCULAR LENS IMPLANT Left     CATARACT EXTRACTION W/  INTRAOCULAR LENS IMPLANT Right     Dr Sawant    COLON SURGERY      resection    COLONOSCOPY N/A 3/14/2017    Procedure: COLONOSCOPY;  Surgeon: Killian Guerrier MD;  Location: [facility] END[facility];  Service: Endoscopy;  Laterality: N/A;    COLONOSCOPY  03/14/2017    Dr. Guerrier: hemorrhoids, one colon polyp removed, "Patent functional end-to-end ileo-colonic anastomosis"with healthy mucosa; biopsy: hyperplastic polyp; repeat in 3 years for surveillance    COLONOSCOPY N/A 1/21/2020    Procedure: COLONOSCOPY;  Surgeon: Timo Darling MD;  Location: [facility] END[facility];  Service: Endoscopy;  Laterality: N/A;    COLONOSCOPY N/A 7/8/2022    Procedure: COLONOSCOPY;  Surgeon: Anaid Washington MD;  Location: [facility] END[facility];  Service: Endoscopy;  Laterality: N/A;    ECTOPIC PREGNANCY SURGERY      ESOPHAGOGASTRODUODENOSCOPY N/A 5/16/2019    Procedure: EGD (ESOPHAGOGASTRODUODENOSCOPY);  Surgeon: Anaid Washington MD;  Location: [facility] END[facility];  Service: Endoscopy;  Laterality: N/A;    ESOPHAGOGASTRODUODENOSCOPY N/A 7/11/2019    Procedure: EGD (ESOPHAGOGASTRODUODENOSCOPY);  Surgeon: Anaid Washington MD;  Location: [facility] END[facility];  Service: Endoscopy;  Laterality: N/A;    ESOPHAGOGASTRODUODENOSCOPY N/A 2/5/2021    Procedure: EGD (ESOPHAGOGASTRODUODENOSCOPY);  Surgeon: Anaid Washington MD;  Location: [facility];  Service: Endoscopy;  Laterality: N/A;    ESOPHAGOGASTRODUODENOSCOPY N/A 11/11/2021    Procedure: EGD (ESOPHAGOGASTRODUODENOSCOPY);  Surgeon: Anaid Washington MD;  Location: [facility] END[facility];  " Service: Endoscopy;  Laterality: N/A;    ESOPHAGOGASTRODUODENOSCOPY N/A 2022    Procedure: EGD (ESOPHAGOGASTRODUODENOSCOPY);  Surgeon: Anaid Washington MD;  Location: [facility];  Service: Endoscopy;  Laterality: N/A;    ESOPHAGOGASTRODUODENOSCOPY N/A 2023    Procedure: EGD (ESOPHAGOGASTRODUODENOSCOPY);  Surgeon: Anaid Washington MD;  Location: [facility];  Service: Endoscopy;  Laterality: N/A;    EYE SURGERY      bilateral cataracts    HYSTERECTOMY      complete    JOINT REPLACEMENT      Liban Knee    ROTATOR CUFF REPAIR Right     TOE SURGERY      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    UPPER GASTROINTESTINAL ENDOSCOPY  2017    Dr. Guerrier: gastritis and esophagitis, biopsy: chronic gastritis, negative for h pylori, esophageal- positive eosinophils, negative for barretts; repeat in 2 months    UPPER GASTROINTESTINAL ENDOSCOPY  2017    Dr. Guerrier       Social History     Tobacco Use    Smoking status: Some Days     Packs/day: 0.33     Years: 65.00     Pack years: 21.45     Types: Cigarettes     Last attempt to quit: 10/5/2020     Years since quittin.5    Smokeless tobacco: Never    Tobacco comments:     3/1/23--states smokes 1-2 cigarettes a day   Substance and Sexual Activity    Alcohol use: Yes     Alcohol/week: 2.0 standard drinks     Types: 2 Shots of liquor per week     Comment: occasional (crown)    Drug use: No    Sexual activity: Not Currently       Medications  She has a current medication list which includes the following prescription(s): alendronate, ascorbic acid (vitamin c), b complex vitamins, brimonidine 0.2%, calcium carbonate-magnesium hydroxide, cyanocobalamin, dorzolamide-timolol 2-0.5%, gabapentin, hydrochlorothiazide, latanoprost, meloxicam, mirtazapine, omeprazole, potassium chloride sa, and sucralfate.      Allergies  Review of patient's allergies indicates:   Allergen Reactions    Ace inhibitors Edema     Other reaction(s): Angioedema    Codeine Hives       All  medications, allergies, and past history have been reviewed.    Objective:      Vitals:  Vitals - 1 value per visit 4/14/2023 4/17/2023 4/17/2023   SYSTOLIC 128 - 156   DIASTOLIC 76 - 78   Pulse - - 71   Temp - - -   Resp - - -   SPO2 - - -   Weight (lb) - - 141.54   Weight (kg) - - 64.2   Height - - 63   BMI (Calculated) - - 25.1   VISIT REPORT - - -   Pain Score  - 0 -   Some recent data might be hidden       Body surface area is 1.69 meters squared.    Physical Exam:    GENERAL  APPEARANCE -  alert, appears stated age, and cooperative  BARRIER(S) TO COMMUNICATION -  none VOICE - appropriate for age and gender    INTEGUMENTARY  no suspicious head and neck lesions    HEENT  HEAD: Normocephalic, without obvious abnormality, atraumatic  FACE: INSPECTION - Symmetric, no signs of trauma, no suspicious lesion(s)  PALPATION -  No masses SALIVARY GLANDS - non-tender with no appreciable mass  STRENGTH - facial symmetry  NECK/THYROID: normal atraumatic, no neck masses, normal thyroid, no jvd    EYES  Normal occular alignment and mobility with no visible nystagmus at rest    EARS/NOSE/MOUTH/THROAT  EARS  PINNAE AND EXTERNAL EARS - no suspicious lesion OTOSCOPIC EXAM (surgical microscopy was used for visualization/instrumentation): EAR EXAM - right ear normal, left ear with a slightly narrow meatus some oil in the ear canal and what appears to be a very much normal and intact tympanic membrane.  Left ear slightly dull compared to the right but I do not appreciate any active effusion.  HEARING - subjectively decreased on the left    CHEST AND LUNG   INSPECTION & AUSCULTATION - normal effort, no stridor    CARDIOVASCULAR  AUSCULTATION & PERIPHERAL VASCULAR - regular rate and rhythm.    NEUROLOGIC  MENTAL STATUS - alert, interactive CRANIAL NERVES - normal        Procedure(s):  None    Labs:  WBC   Date Value Ref Range Status   01/26/2023 8.56 3.90 - 12.70 K/uL Final     Hemoglobin   Date Value Ref Range Status   01/26/2023 13.4  12.0 - 16.0 g/dL Final     Platelets   Date Value Ref Range Status   01/26/2023 195 150 - 450 K/uL Final     Creatinine   Date Value Ref Range Status   01/26/2023 0.9 0.5 - 1.4 mg/dL Final     TSH   Date Value Ref Range Status   08/24/2022 0.835 0.400 - 4.000 uIU/mL Final     Glucose   Date Value Ref Range Status   01/26/2023 138 (H) 70 - 110 mg/dL Final     Hemoglobin A1C   Date Value Ref Range Status   07/01/2022 5.5 4.0 - 5.6 % Final     Comment:     ADA Screening Guidelines:  5.7-6.4%  Consistent with prediabetes  >or=6.5%  Consistent with diabetes    High levels of fetal hemoglobin interfere with the HbA1C  assay. Heterozygous hemoglobin variants (HbS, HgC, etc)do  not significantly interfere with this assay.   However, presence of multiple variants may affect accuracy.           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  Likely her initial diagnosis was myringitis (infection/inflammation of the eardrum).  This has resulted in some stiffening of the eardrum slight thickening of the eardrum but I do not see the potential benefit in eustachian tube dilation or myringotomy/tube placement.  Routine ear care to be continued.  Instructions as outlined.  Return for repeat evaluation if symptoms worsen.  Annual audiometry recommended.  Patient should consider hearing aids in general.

## 2023-04-19 ENCOUNTER — TELEPHONE (OUTPATIENT)
Dept: FAMILY MEDICINE | Facility: CLINIC | Age: 88
End: 2023-04-19
Payer: MEDICARE

## 2023-04-19 NOTE — TELEPHONE ENCOUNTER
----- Message from Vianney Davis sent at 4/19/2023  4:14 PM CDT -----  Contact: [phone]  Type: Needs Medical Advice  Who Called:  Pt     Best Call Back Number: [phone]    Additional Information: Pt is calling to check the status of her handicap licence plate paperwork. Pls call back and advise

## 2023-04-20 LAB
FINAL PATHOLOGIC DIAGNOSIS: NORMAL
GROSS: NORMAL
Lab: NORMAL

## 2023-04-27 ENCOUNTER — HOSPITAL ENCOUNTER (OUTPATIENT)
Dept: RADIOLOGY | Facility: HOSPITAL | Age: 88
Discharge: HOME | End: 2023-04-27
Attending: INTERNAL MEDICINE
Payer: MEDICARE

## 2023-04-27 DIAGNOSIS — Z85.038 PERSONAL HISTORY OF COLON CANCER, STAGE III: ICD-10-CM

## 2023-04-27 DIAGNOSIS — E53.8 B12 DEFICIENCY: ICD-10-CM

## 2023-04-27 DIAGNOSIS — T45.1X5A CHEMOTHERAPY-INDUCED PERIPHERAL NEUROPATHY: ICD-10-CM

## 2023-04-27 DIAGNOSIS — D50.0 IRON DEFICIENCY ANEMIA DUE TO CHRONIC BLOOD LOSS: ICD-10-CM

## 2023-04-27 DIAGNOSIS — G62.0 CHEMOTHERAPY-INDUCED PERIPHERAL NEUROPATHY: ICD-10-CM

## 2023-04-27 PROCEDURE — 71260 CT CHEST ABDOMEN PELVIS WITH CONTRAST (XPD): ICD-10-PCS | Mod: 26,HCNC,, | Performed by: RADIOLOGY

## 2023-04-27 PROCEDURE — 71260 CT THORAX DX C+: CPT | Mod: TC,HCNC

## 2023-04-27 PROCEDURE — 74177 CT CHEST ABDOMEN PELVIS WITH CONTRAST (XPD): ICD-10-PCS | Mod: 26,HCNC,, | Performed by: RADIOLOGY

## 2023-04-27 PROCEDURE — 25500020 PHARM REV CODE 255: Mod: HCNC

## 2023-04-27 PROCEDURE — 74177 CT ABD & PELVIS W/CONTRAST: CPT | Mod: 26,HCNC,, | Performed by: RADIOLOGY

## 2023-04-27 PROCEDURE — A9698 NON-RAD CONTRAST MATERIALNOC: HCPCS | Mod: HCNC

## 2023-04-27 PROCEDURE — 71260 CT THORAX DX C+: CPT | Mod: 26,HCNC,, | Performed by: RADIOLOGY

## 2023-04-27 PROCEDURE — 74177 CT ABD & PELVIS W/CONTRAST: CPT | Mod: TC,HCNC

## 2023-04-27 RX ADMIN — IOHEXOL 75 ML: 350 INJECTION, SOLUTION INTRAVENOUS at 07:04

## 2023-04-27 RX ADMIN — IOHEXOL 1000 ML: 9 SOLUTION ORAL at 07:04

## 2023-04-28 ENCOUNTER — OFFICE VISIT (OUTPATIENT)
Dept: HEMATOLOGY/ONCOLOGY | Facility: CLINIC | Age: 88
End: 2023-04-28
Payer: MEDICARE

## 2023-04-28 VITALS
TEMPERATURE: 97 F | WEIGHT: 142.44 LBS | HEART RATE: 73 BPM | BODY MASS INDEX: 25.24 KG/M2 | SYSTOLIC BLOOD PRESSURE: 135 MMHG | DIASTOLIC BLOOD PRESSURE: 65 MMHG | RESPIRATION RATE: 10 BRPM | HEIGHT: 63 IN | OXYGEN SATURATION: 96 %

## 2023-04-28 DIAGNOSIS — G62.0 CHEMOTHERAPY-INDUCED PERIPHERAL NEUROPATHY: ICD-10-CM

## 2023-04-28 DIAGNOSIS — E53.8 B12 DEFICIENCY: Primary | ICD-10-CM

## 2023-04-28 DIAGNOSIS — Z85.038 HISTORY OF COLON CANCER: ICD-10-CM

## 2023-04-28 DIAGNOSIS — Z85.038 PERSONAL HISTORY OF COLON CANCER, STAGE III: ICD-10-CM

## 2023-04-28 DIAGNOSIS — T45.1X5A CHEMOTHERAPY-INDUCED PERIPHERAL NEUROPATHY: ICD-10-CM

## 2023-04-28 DIAGNOSIS — N18.31 STAGE 3A CHRONIC KIDNEY DISEASE: ICD-10-CM

## 2023-04-28 DIAGNOSIS — M85.89 OSTEOPENIA OF MULTIPLE SITES: ICD-10-CM

## 2023-04-28 PROCEDURE — 3288F PR FALLS RISK ASSESSMENT DOCUMENTED: ICD-10-PCS | Mod: HCNC,CPTII,S$GLB, | Performed by: INTERNAL MEDICINE

## 2023-04-28 PROCEDURE — 1158F ADVNC CARE PLAN TLK DOCD: CPT | Mod: HCNC,CPTII,S$GLB, | Performed by: INTERNAL MEDICINE

## 2023-04-28 PROCEDURE — 1101F PT FALLS ASSESS-DOCD LE1/YR: CPT | Mod: HCNC,CPTII,S$GLB, | Performed by: INTERNAL MEDICINE

## 2023-04-28 PROCEDURE — 99999 PR PBB SHADOW E&M-EST. PATIENT-LVL III: ICD-10-PCS | Mod: PBBFAC,HCNC,, | Performed by: INTERNAL MEDICINE

## 2023-04-28 PROCEDURE — 99214 PR OFFICE/OUTPT VISIT, EST, LEVL IV, 30-39 MIN: ICD-10-PCS | Mod: HCNC,S$GLB,, | Performed by: INTERNAL MEDICINE

## 2023-04-28 PROCEDURE — 1159F MED LIST DOCD IN RCRD: CPT | Mod: HCNC,CPTII,S$GLB, | Performed by: INTERNAL MEDICINE

## 2023-04-28 PROCEDURE — 99214 OFFICE O/P EST MOD 30 MIN: CPT | Mod: HCNC,S$GLB,, | Performed by: INTERNAL MEDICINE

## 2023-04-28 PROCEDURE — 99999 PR PBB SHADOW E&M-EST. PATIENT-LVL III: CPT | Mod: PBBFAC,HCNC,, | Performed by: INTERNAL MEDICINE

## 2023-04-28 PROCEDURE — 1158F PR ADVANCE CARE PLANNING DISCUSS DOCUMENTED IN MEDICAL RECORD: ICD-10-PCS | Mod: HCNC,CPTII,S$GLB, | Performed by: INTERNAL MEDICINE

## 2023-04-28 PROCEDURE — 1159F PR MEDICATION LIST DOCUMENTED IN MEDICAL RECORD: ICD-10-PCS | Mod: HCNC,CPTII,S$GLB, | Performed by: INTERNAL MEDICINE

## 2023-04-28 PROCEDURE — 1101F PR PT FALLS ASSESS DOC 0-1 FALLS W/OUT INJ PAST YR: ICD-10-PCS | Mod: HCNC,CPTII,S$GLB, | Performed by: INTERNAL MEDICINE

## 2023-04-28 PROCEDURE — 3288F FALL RISK ASSESSMENT DOCD: CPT | Mod: HCNC,CPTII,S$GLB, | Performed by: INTERNAL MEDICINE

## 2023-04-28 PROCEDURE — 1126F AMNT PAIN NOTED NONE PRSNT: CPT | Mod: HCNC,CPTII,S$GLB, | Performed by: INTERNAL MEDICINE

## 2023-04-28 PROCEDURE — 1126F PR PAIN SEVERITY QUANTIFIED, NO PAIN PRESENT: ICD-10-PCS | Mod: HCNC,CPTII,S$GLB, | Performed by: INTERNAL MEDICINE
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
## 2023-05-03 ENCOUNTER — PES CALL (OUTPATIENT)
Dept: ADMINISTRATIVE | Facility: CLINIC | Age: 88
End: 2023-05-03
Payer: MEDICARE

## 2023-05-09 DIAGNOSIS — G62.9 NEUROPATHY: ICD-10-CM

## 2023-05-09 RX ORDER — GABAPENTIN 300 MG/1
CAPSULE ORAL
Qty: 120 CAPSULE | Refills: 0 | Status: SHIPPED | OUTPATIENT
Start: 2023-05-09 | End: 2023-05-10 | Stop reason: SDUPTHER

## 2023-05-10 ENCOUNTER — OFFICE VISIT (OUTPATIENT)
Dept: NEUROLOGY | Facility: CLINIC | Age: 88
End: 2023-05-10
Payer: MEDICARE

## 2023-05-10 VITALS
SYSTOLIC BLOOD PRESSURE: 152 MMHG | HEIGHT: 63 IN | WEIGHT: 134.06 LBS | DIASTOLIC BLOOD PRESSURE: 77 MMHG | HEART RATE: 74 BPM | BODY MASS INDEX: 23.75 KG/M2

## 2023-05-10 DIAGNOSIS — G62.0 CHEMOTHERAPY-INDUCED PERIPHERAL NEUROPATHY: ICD-10-CM

## 2023-05-10 DIAGNOSIS — G62.9 NEUROPATHY: ICD-10-CM

## 2023-05-10 DIAGNOSIS — T45.1X5A CHEMOTHERAPY-INDUCED PERIPHERAL NEUROPATHY: ICD-10-CM

## 2023-05-10 PROCEDURE — 1126F AMNT PAIN NOTED NONE PRSNT: CPT | Mod: HCNC,CPTII,S$GLB, | Performed by: NURSE PRACTITIONER

## 2023-05-10 PROCEDURE — 1101F PT FALLS ASSESS-DOCD LE1/YR: CPT | Mod: HCNC,CPTII,S$GLB, | Performed by: NURSE PRACTITIONER

## 2023-05-10 PROCEDURE — 1159F MED LIST DOCD IN RCRD: CPT | Mod: HCNC,CPTII,S$GLB, | Performed by: NURSE PRACTITIONER

## 2023-05-10 PROCEDURE — 99214 OFFICE O/P EST MOD 30 MIN: CPT | Mod: HCNC,S$GLB,, | Performed by: NURSE PRACTITIONER

## 2023-05-10 PROCEDURE — 3288F FALL RISK ASSESSMENT DOCD: CPT | Mod: HCNC,CPTII,S$GLB, | Performed by: NURSE PRACTITIONER

## 2023-05-10 PROCEDURE — 99999 PR PBB SHADOW E&M-EST. PATIENT-LVL IV: ICD-10-PCS | Mod: PBBFAC,HCNC,, | Performed by: NURSE PRACTITIONER

## 2023-05-10 PROCEDURE — 1160F RVW MEDS BY RX/DR IN RCRD: CPT | Mod: HCNC,CPTII,S$GLB, | Performed by: NURSE PRACTITIONER

## 2023-05-10 PROCEDURE — 1126F PR PAIN SEVERITY QUANTIFIED, NO PAIN PRESENT: ICD-10-PCS | Mod: HCNC,CPTII,S$GLB, | Performed by: NURSE PRACTITIONER

## 2023-05-10 PROCEDURE — 1101F PR PT FALLS ASSESS DOC 0-1 FALLS W/OUT INJ PAST YR: ICD-10-PCS | Mod: HCNC,CPTII,S$GLB, | Performed by: NURSE PRACTITIONER

## 2023-05-10 PROCEDURE — 1159F PR MEDICATION LIST DOCUMENTED IN MEDICAL RECORD: ICD-10-PCS | Mod: HCNC,CPTII,S$GLB, | Performed by: NURSE PRACTITIONER

## 2023-05-10 PROCEDURE — 3288F PR FALLS RISK ASSESSMENT DOCUMENTED: ICD-10-PCS | Mod: HCNC,CPTII,S$GLB, | Performed by: NURSE PRACTITIONER

## 2023-05-10 PROCEDURE — 99214 PR OFFICE/OUTPT VISIT, EST, LEVL IV, 30-39 MIN: ICD-10-PCS | Mod: HCNC,S$GLB,, | Performed by: NURSE PRACTITIONER

## 2023-05-10 PROCEDURE — 1160F PR REVIEW ALL MEDS BY PRESCRIBER/CLIN PHARMACIST DOCUMENTED: ICD-10-PCS | Mod: HCNC,CPTII,S$GLB, | Performed by: NURSE PRACTITIONER

## 2023-05-10 PROCEDURE — 99999 PR PBB SHADOW E&M-EST. PATIENT-LVL IV: CPT | Mod: PBBFAC,HCNC,, | Performed by: NURSE PRACTITIONER

## 2023-05-10 RX ORDER — GABAPENTIN 300 MG/1
CAPSULE ORAL
Qty: 360 CAPSULE | Refills: 3 | Status: SHIPPED | OUTPATIENT
Start: 2023-05-10

## 2023-05-10 RX ORDER — AMOXICILLIN 875 MG/1
875 TABLET, FILM COATED ORAL 2 TIMES DAILY
Status: ON HOLD | COMMUNITY
Start: 2023-02-20 | End: 2023-07-31

## 2023-05-10 RX ORDER — CIPROFLOXACIN AND DEXAMETHASONE 3; 1 MG/ML; MG/ML
SUSPENSION/ DROPS AURICULAR (OTIC)
Status: ON HOLD | COMMUNITY
Start: 2023-02-20 | End: 2023-07-31

## 2023-05-10 NOTE — PROGRESS NOTES
NEUROLOGY  Outpatient Follow Up    [facility]  [address] [facility], [place] [address]  [phone] (office) / [phone] (fax)    Patient Name:  Kenyetta Andersen  :  1935  MR #:  [id number]  Acct #:  [id number]    Date of Neurology Visit: 05/10/2023  Name of Provider: BEATRIZ Clarke    Other Physicians:  Hever Arreola MD (Primary Care Physician); No ref. provider found (Referring)      Chief Complaint: Peripheral Neuropathy      History of Present Illness (HPI):  Kenyetta Andersen is a right handed 86 y.o. female.    Patient is here today management of Neuropathy. She has had this for about 12 years after receiving chemotherapy. She had stage III colorectal carcinoma in  and received adjuvant folfox. She reports symptoms are to her hands and feet and feel numb. There is no burning pain but she does report tingling. It is constant.   Her feet feel as though they are slippery. She may walk out of slippers and not even know it. She denies recent falls. She is maintained on Gabapentin and believes she is currenlty taking 600 mg TID but reports that her prescription was recently changed. She states she has been on this medication for ~ 5 years and doesn't report much aid. She has never tried any creams.        Interval Hx 3/18/2022:   Patient is here today for neuropathy follow up. She still reports constant tingling. Her Gabapentin is dosed at 100 mg TID and is unsure why her Gabapentin was decreased. She does not find this dose aids her well but also denies any side effects. She denies recent falls.       Interval Hx 2022:  Patient is here today for neuropathy follow up. She reports that she is taking gabapentin 300 mg TID. She still reports numbness but pain is mostly controlled.  She denies any side effects to this medications. She reports numbness to her fingertip and a swelling feeling to her feet but they are not swollen. She denies significant pain (burning,  tingling, pins/needles) or weakness. She may have intermittent tingling to her feet at night. She did try OTC cream but doesn't believe it offered aid.      Interval Hx 6/17/2022:  She is here today for neuropathy follow up. She is taking Gabapentin 300, 300, 600 mg. She reports ongoing tingling to the fingertips but no significant pain and no weakness. She denies recent falls. Overall, the increased dose of Gabapentin at night has helped. She feels as though her symptoms are stable and controlled.      Interval Hx 5/10/2023:  Patient is here today for neuropathy. She reports stability overall on Gabapentin. She continues to report mild tingling to feet and hands that is not painful. She is not interested in increasing the Gabapentin.         Past Medical, Surgical, Family & Social History:   Reviewed and updated.    Home Medications:     Current Outpatient Medications:     alendronate (FOSAMAX) 70 MG tablet, TAKE 1 TABLET (70 MG TOTAL) BY MOUTH EVERY 7 DAYS. ON EMPTY STOMACH IN MORNING, REMAIN UPRIGHT FOR 30 MINUTES., Disp: 12 tablet, Rfl: 3    amoxicillin (AMOXIL) 875 MG tablet, Take 875 mg by mouth 2 (two) times daily., Disp: , Rfl:     ascorbic acid, vitamin C, (VITAMIN C) 500 MG tablet, Take 500 mg by mouth once daily., Disp: , Rfl:     b complex vitamins capsule, Take 1 capsule by mouth once daily., Disp: , Rfl:     brimonidine 0.2% (ALPHAGAN) 0.2 % Drop, INSTILL 1 DROP INTO BOTH EYES 3 TIMES A DAY, Disp: 10 mL, Rfl: 6    calcium carbonate-magnesium hydroxide (ROLAIDS) 550-110 mg Chew, Take 1 tablet by mouth 2 (two) times daily with meals., Disp: , Rfl:     ciprofloxacin-dexAMETHasone 0.3-0.1% (CIPRODEX) 0.3-0.1 % DrpS, Place into both ears., Disp: , Rfl:     cyanocobalamin (VITAMIN B-12) 1000 MCG tablet, Take 100 mcg by mouth once daily., Disp: , Rfl:     dorzolamide-timolol 2-0.5% (COSOPT) 22.3-6.8 mg/mL ophthalmic solution, INSTILL 1 DROP INTO BOTH EYES TWICE A DAY, Disp: 10 mL, Rfl: 4     "hydroCHLOROthiazide (HYDRODIURIL) 12.5 MG Tab, TAKE 1 TABLET BY MOUTH EVERY DAY, Disp: 90 tablet, Rfl: 0    latanoprost 0.005 % ophthalmic solution, PLACE 1 DROP INTO BOTH EYES EVERY EVENING., Disp: 7.5 mL, Rfl: 3    meloxicam (MOBIC) 15 MG tablet, TAKE 1 TABLET BY MOUTH EVERY DAY AS NEEDED FOR PAIN, Disp: 30 tablet, Rfl: 2    mirtazapine (REMERON) 7.5 MG Tab, TAKE 1 TABLET BY MOUTH EVERY EVENING., Disp: 90 tablet, Rfl: 1    omeprazole (PRILOSEC) 40 MG capsule, Take 1 capsule (40 mg total) by mouth 2 (two) times daily before meals for 60 days, THEN 1 capsule (40 mg total) every morning., Disp: 90 capsule, Rfl: 3    potassium chloride SA (K-DUR,KLOR-CON) 20 MEQ tablet, TAKE 1 TABLET BY MOUTH ONCE DAILY, Disp: 90 tablet, Rfl: 3    gabapentin (NEURONTIN) 300 MG capsule, TAKE 1 CAP IN THE AM, 1 CAP MIDDAY, AND 2 CAPS AT NIGHT, Disp: 360 capsule, Rfl: 3    Physical Examination:  BP (!) 152/77 (BP Location: Right arm, Patient Position: Sitting, BP Method: Medium (Automatic))   Pulse 74   Ht 5' 3" (1.6 m)   Wt 60.8 kg (134 lb 0.6 oz)   BMI 23.74 kg/m²     GENERAL:  General appearance: Well, non-toxic appearing.  No apparent distress.  Neck: supple.  .     MENTAL STATUS:  Alertness, attention span & concentration: normal.  Language: normal.  Orientation to self, place & time:  normal.  Memory, recent & remote: normal.  Fund of knowledge: normal.        SPEECH:  Clear and fluent.  Follows complex commands.     CRANIAL NERVES:  Cranial Nerves II-XII were examined.  II - Visual fields: normal.  III, IV, VI: PERRL, EOMI, No ptosis, No nystagmus.  V - Facial sensation: normal.  VII - Face symmetry & mobility: not assessed d/t COVID precautions  VIII - Hearing: normal.  IX, X - Palate: not assessed d/t COVID precautions  XI - Shoulder shrug: normal.  XII - Tongue protrusion: not assessed d/t COVID precautions     GROSS MOTOR:  Gait & station: non focal; mildly antalgic  Tone: normal.  Abnormal movements: none.  Finger-nose: " normal.  Rapid alternating movements: normal.  Pronator drift: normal        MUSCLE STRENGTH:      RIGHT      LEFT   5 Biceps 5   5 Triceps 5   5 Forearm.Pr. 5           4+ Iliopsoas flex    4+   5 Hip Abduct 5   5 Hip Adduct 5   5 Quads 5   5 Hams 5   5 Dorsiflex 5   5 Plantar Flex 5      REFLEXES:    RIGHT Reflex    LEFT   2+ Biceps 2+   2+ Brachiorad. 2+           2+ Patellar 2+      SENSORY:  Light touch: Normal throughout.   Sharp touch: Normal throughout and even hyperintense to bilateral feet  Vibration: no vibratory sensation to big toe left foot  Temperature: Normal throughout.   Joint Position: Normal throughout.  Proprioception: abnormal to left foot             Diagnostic Data Reviewed:     Component      Latest Ref Rng & Units 11/11/2021   Sodium      136 - 145 mmol/L 142   Potassium      3.5 - 5.1 mmol/L 3.9   Chloride      95 - 110 mmol/L 107   CO2      23 - 29 mmol/L 23   Glucose      70 - 110 mg/dL 110   BUN      8 - 23 mg/dL 15   Creatinine      0.5 - 1.4 mg/dL 1.3   Calcium      8.7 - 10.5 mg/dL 9.8   PROTEIN TOTAL      6.0 - 8.4 g/dL 7.0   Albumin      3.5 - 5.2 g/dL 3.3 (L)   BILIRUBIN TOTAL      0.1 - 1.0 mg/dL 0.6   Alkaline Phosphatase      55 - 135 U/L 62   AST      10 - 40 U/L 22   ALT      10 - 44 U/L 19   Anion Gap      8 - 16 mmol/L 12   eGFR if African American      >60 mL/min/1.73 m:2 44 (A)   eGFR if non African American      >60 mL/min/1.73 m:2 38 (A)                     Assessment and Plan:      Problem List Items Addressed This Visit          Neuro    Chemotherapy-induced peripheral neuropathy    Current Assessment & Plan     Patient is a 87 y/o female that presents for neuropathy follow up.   Received adjuvant folfox post colorectal CA dx ~ 2009 and has suffered neuropathy symptoms ever since.   She reports only numbness and very mild tingling to her feet. She denies burning sensation, pain or weakness.   She has been maintained on Gabapentin for many years and unsure why her dose  was changed by her oncologist.  Gabapentin dose previously increased to 300, 300, 600 for increased tingling to feet at night.Pt reports stability of symptoms  -  Continue!  Educated patient again on the role of Gabapentin and how this medication does not aid with numbness but rather helps with tingling, pain etc.   Recent Crt WNL  Pt tried OTC cream without much aid but continues to use it  Compound cream did not offer benefit  If symptoms dont hold stable then could ultimately try Lyrica instead of Gabapentin but seems as though gabapentin is aiding with tingling or pain.          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          The patient will return to clinic in 12 mths    All questions were answered and patient is comfortable with the plan.         Thank you very much for the opportunity to assist in this patient's care.    If you have any questions or concerns, please do not hesitate to contact me at any time.      Sincerely,     BEATRIZ Clarke  [facility] [place]

## 2023-05-10 NOTE — ASSESSMENT & PLAN NOTE
Patient is a 87 y/o female that presents for neuropathy follow up.   Received adjuvant folfox post colorectal CA dx ~ 2009 and has suffered neuropathy symptoms ever since.   She reports only numbness and very mild tingling to her feet. She denies burning sensation, pain or weakness.   She has been maintained on Gabapentin for many years and unsure why her dose was changed by her oncologist.  Gabapentin dose previously increased to 300, 300, 600 for increased tingling to feet at night.Pt reports stability of symptoms  -  Continue!  Educated patient again on the role of Gabapentin and how this medication does not aid with numbness but rather helps with tingling, pain etc.   Recent Crt WNL  Pt tried OTC cream without much aid but continues to use it  Compound cream did not offer benefit  If symptoms dont hold stable then could ultimately try Lyrica instead of Gabapentin but seems as though gabapentin is aiding with tingling or pain.

## 2023-06-11 DIAGNOSIS — H40.1131 PRIMARY OPEN ANGLE GLAUCOMA (POAG) OF BOTH EYES, MILD STAGE: ICD-10-CM

## 2023-06-13 RX ORDER — DORZOLAMIDE HYDROCHLORIDE AND TIMOLOL MALEATE 20; 5 MG/ML; MG/ML
SOLUTION/ DROPS OPHTHALMIC
Qty: 10 ML | Refills: 4 | Status: SHIPPED | OUTPATIENT
Start: 2023-06-13

## 2023-06-13 RX ORDER — BRIMONIDINE TARTRATE 2 MG/ML
SOLUTION/ DROPS OPHTHALMIC
Qty: 10 ML | Refills: 6 | Status: SHIPPED | OUTPATIENT
Start: 2023-06-13

## 2023-06-16 ENCOUNTER — OFFICE VISIT (OUTPATIENT)
Dept: HOME HEALTH SERVICES | Facility: CLINIC | Age: 88
End: 2023-06-16
Payer: MEDICARE

## 2023-06-16 VITALS
HEART RATE: 91 BPM | DIASTOLIC BLOOD PRESSURE: 81 MMHG | WEIGHT: 134 LBS | SYSTOLIC BLOOD PRESSURE: 139 MMHG | OXYGEN SATURATION: 94 % | BODY MASS INDEX: 23.74 KG/M2

## 2023-06-16 DIAGNOSIS — F51.01 PRIMARY INSOMNIA: ICD-10-CM

## 2023-06-16 DIAGNOSIS — G62.0 CHEMOTHERAPY-INDUCED PERIPHERAL NEUROPATHY: ICD-10-CM

## 2023-06-16 DIAGNOSIS — N18.31 STAGE 3A CHRONIC KIDNEY DISEASE: ICD-10-CM

## 2023-06-16 DIAGNOSIS — I70.0 ABDOMINAL AORTIC ATHEROSCLEROSIS: ICD-10-CM

## 2023-06-16 DIAGNOSIS — T45.1X5A CHEMOTHERAPY-INDUCED PERIPHERAL NEUROPATHY: ICD-10-CM

## 2023-06-16 DIAGNOSIS — H40.2230 CHRONIC ANGLE-CLOSURE GLAUCOMA OF BOTH EYES, UNSPECIFIED GLAUCOMA STAGE: ICD-10-CM

## 2023-06-16 DIAGNOSIS — K21.00 GASTROESOPHAGEAL REFLUX DISEASE WITH ESOPHAGITIS WITHOUT HEMORRHAGE: ICD-10-CM

## 2023-06-16 DIAGNOSIS — Z00.00 ENCOUNTER FOR PREVENTIVE HEALTH EXAMINATION: Primary | ICD-10-CM

## 2023-06-16 DIAGNOSIS — M85.89 OSTEOPENIA OF MULTIPLE SITES: ICD-10-CM

## 2023-06-16 DIAGNOSIS — J43.9 PULMONARY EMPHYSEMA, UNSPECIFIED EMPHYSEMA TYPE: ICD-10-CM

## 2023-06-16 DIAGNOSIS — F17.200 SMOKER: ICD-10-CM

## 2023-06-16 DIAGNOSIS — J84.10 CALCIFIED GRANULOMA OF LUNG: ICD-10-CM

## 2023-06-16 PROCEDURE — 1126F AMNT PAIN NOTED NONE PRSNT: CPT | Mod: CPTII,S$GLB,, | Performed by: NURSE PRACTITIONER

## 2023-06-16 PROCEDURE — 1170F PR FUNCTIONAL STATUS ASSESSED: ICD-10-PCS | Mod: CPTII,S$GLB,, | Performed by: NURSE PRACTITIONER

## 2023-06-16 PROCEDURE — 3288F FALL RISK ASSESSMENT DOCD: CPT | Mod: CPTII,S$GLB,, | Performed by: NURSE PRACTITIONER

## 2023-06-16 PROCEDURE — 1159F MED LIST DOCD IN RCRD: CPT | Mod: CPTII,S$GLB,, | Performed by: NURSE PRACTITIONER

## 2023-06-16 PROCEDURE — 1160F PR REVIEW ALL MEDS BY PRESCRIBER/CLIN PHARMACIST DOCUMENTED: ICD-10-PCS | Mod: CPTII,S$GLB,, | Performed by: NURSE PRACTITIONER

## 2023-06-16 PROCEDURE — G0439 PPPS, SUBSEQ VISIT: HCPCS | Mod: S$GLB,,, | Performed by: NURSE PRACTITIONER

## 2023-06-16 PROCEDURE — 1170F FXNL STATUS ASSESSED: CPT | Mod: CPTII,S$GLB,, | Performed by: NURSE PRACTITIONER

## 2023-06-16 PROCEDURE — 1160F RVW MEDS BY RX/DR IN RCRD: CPT | Mod: CPTII,S$GLB,, | Performed by: NURSE PRACTITIONER

## 2023-06-16 PROCEDURE — 1159F PR MEDICATION LIST DOCUMENTED IN MEDICAL RECORD: ICD-10-PCS | Mod: CPTII,S$GLB,, | Performed by: NURSE PRACTITIONER

## 2023-06-16 PROCEDURE — 1101F PT FALLS ASSESS-DOCD LE1/YR: CPT | Mod: CPTII,S$GLB,, | Performed by: NURSE PRACTITIONER

## 2023-06-16 PROCEDURE — 1101F PR PT FALLS ASSESS DOC 0-1 FALLS W/OUT INJ PAST YR: ICD-10-PCS | Mod: CPTII,S$GLB,, | Performed by: NURSE PRACTITIONER

## 2023-06-16 PROCEDURE — 3288F PR FALLS RISK ASSESSMENT DOCUMENTED: ICD-10-PCS | Mod: CPTII,S$GLB,, | Performed by: NURSE PRACTITIONER

## 2023-06-16 PROCEDURE — G0439 PR MEDICARE ANNUAL WELLNESS SUBSEQUENT VISIT: ICD-10-PCS | Mod: S$GLB,,, | Performed by: NURSE PRACTITIONER

## 2023-06-16 PROCEDURE — 1126F PR PAIN SEVERITY QUANTIFIED, NO PAIN PRESENT: ICD-10-PCS | Mod: CPTII,S$GLB,, | Performed by: NURSE PRACTITIONER

## 2023-06-16 NOTE — PROGRESS NOTES
Kenyetta Andersen presented for a  Medicare AWV and comprehensive Health Risk Assessment today. The following components were reviewed and updated:    Medical history  Family History  Social history  Allergies and Current Medications  Health Risk Assessment  Health Maintenance  Care Team         ** See Completed Assessments for Annual Wellness Visit within the encounter summary.**         The following assessments were completed:  Living Situation  CAGE  Depression Screening  Timed Get Up and Go  Whisper Test  Cognitive Function Screening  Nutrition Screening  ADL Screening  PAQ Screening    Review for Opioid Screening: Patient does not have rx for Opioids.  Review for Substance Use Disorders: Patient does not use substance.      Vitals:    06/16/23 0932   BP: 139/81   Pulse: 91   SpO2: (!) 94%   Weight: 60.8 kg (134 lb)     Body mass index is 23.74 kg/m².  Physical Exam  Vitals reviewed.   HENT:      Head: Normocephalic.      Right Ear: Decreased hearing noted.      Left Ear: Decreased hearing noted.   Eyes:      Pupils: Pupils are equal, round, and reactive to light.   Cardiovascular:      Rate and Rhythm: Normal rate and regular rhythm.      Heart sounds: Normal heart sounds.   Pulmonary:      Effort: Pulmonary effort is normal.      Breath sounds: Normal breath sounds.   Abdominal:      General: Bowel sounds are normal.      Palpations: Abdomen is soft.   Musculoskeletal:         General: Normal range of motion.      Cervical back: Normal range of motion.      Right lower leg: No edema.      Left lower leg: No edema.   Skin:     General: Skin is warm and dry.   Neurological:      Mental Status: She is alert and oriented to person, place, and time.   Psychiatric:         Behavior: Behavior normal.             Diagnoses and health risks identified today and associated recommendations/orders:    1. Encounter for preventive health examination  - Above assessments completed. Preventive measures and health  maintenance reviewed with patient.  -discussed overdue tetanus    2. Chemotherapy-induced peripheral neuropathy  Stable, followed by Neurology  -on gabapentin    3. Pulmonary emphysema, unspecified emphysema type  Stable, followed by PCP  -not requiring inhalers    4. Calcified granuloma of lung  Stable, followed by PCP    5. Abdominal aortic atherosclerosis  Stable, followed by PCP  -encouraged low chol/heart healthy diet and exercise    6. Stage 3a chronic kidney disease  Stable, followed by PCP  -encouraged hydration and avoidance of NSAIDs    7. Gastroesophageal reflux disease with esophagitis without hemorrhage  Stable, followed by PCP  -on PPI    8. Smoker  -encouraged cessation, declines, counseled    9. Primary insomnia  Stable, followed by PCP  -on mirtazapine    10. Osteopenia of multiple sites  Stable, followed by PCP  -on alendronate    11. Chronic angle-closure glaucoma of both eyes, unspecified glaucoma stage  Stable, followed by Optometry  -on eye gtts      Provided Kenyetta with a 5-10 year written screening schedule and personal prevention plan. Recommendations were developed using the USPSTF age appropriate recommendations. Education, counseling, and referrals were provided as needed. After Visit Summary printed and given to patient which includes a list of additional screenings\tests needed.    Follow up in about 1 year (around 6/16/2024) for your next annual wellness visit.    Gricelda Bailey NP  I offered to discuss advanced care planning, including how to pick a person who would make decisions for you if you were unable to make them for yourself, called a health care power of , and what kind of decisions you might make such as use of life sustaining treatments such as ventilators and tube feeding when faced with a life limiting illness recorded on a living will that they will need to know. (How you want to be cared for as you near the end of your natural life)     X Patient is interested  in learning more about how to make advanced directives.  I provided them paperwork and offered to discuss this with them.

## 2023-06-16 NOTE — PATIENT INSTRUCTIONS
Counseling and Referral of Other Preventative  (Italic type indicates deductible and co-insurance are waived)    Patient Name: Kenyetta Andersen  Today's Date: 6/16/2023    Health Maintenance       Date Due Completion Date    COVID-19 Vaccine (4 - Pfizer series) 02/04/2022 12/10/2021    TETANUS VACCINE 09/13/2022 9/13/2012    Hemoglobin A1c (Prediabetes) 07/01/2023 7/1/2022    Lipid Panel 07/01/2027 7/1/2022        No orders of the defined types were placed in this encounter.    The following information is provided to all patients.  This information is to help you find resources for any of the problems found today that may be affecting your health:                Living healthy guide: www.[facility].[place].gov      Understanding Diabetes: www.diabetes.org      Eating healthy: www.cdc.gov/healthyweight      [facility] home safety checklist: www.cdc.gov/steadi/patient.html      Agency on Aging: www.goea.[place].gov      Alcoholics anonymous (AA): www.aa.org      Physical Activity: www.zulma.nih.gov/yf8tzkd      Tobacco use: www.quitwithusla.org

## 2023-07-06 NOTE — TELEPHONE ENCOUNTER
She has a roughly 3.7-3.8 cm right thyroid nodule previously seen for many years and not appreciably changed.  Last documented biopsy was March 28, 2012 and it was benign.  Reportedly she had a previous biopsy seven years before that that was benign but we do not have documentation of that one.    Nodule does not appear to have changed size since the last ultrasound done late 2011.  She was supposed to return to Endocrinology one year after the biopsy in 2012 for another ultrasound but did not do so and was lost to follow-up until now.    I suspect that this will again proved to be a benign nodule but it has not been checked in seven years and it is rather large.  Options are to 1)  do another fine needle aspirate which I can set up in radiology or 2)  have her see Endocrinology for 2nd opinion.  Of course her 3rd choice is to just ignore it if she so chooses, at the age of 84 I would not argue with her.  If she chooses the third option we could repeat ultrasounds yearly to keep an eye on it and revisit the possibility of a needle biopsy if it grows in size.   Tranexamic Acid Counseling:  Patient advised of the small risk of bleeding problems with tranexamic acid. They were also instructed to call if they developed any nausea, vomiting or diarrhea. All of the patient's questions and concerns were addressed.

## 2023-07-24 ENCOUNTER — TELEPHONE (OUTPATIENT)
Dept: HEMATOLOGY/ONCOLOGY | Facility: CLINIC | Age: 88
End: 2023-07-24
Payer: MEDICARE

## 2023-07-24 ENCOUNTER — LAB VISIT (OUTPATIENT)
Dept: LAB | Facility: HOSPITAL | Age: 88
End: 2023-07-24
Attending: INTERNAL MEDICINE
Payer: MEDICARE

## 2023-07-24 DIAGNOSIS — M85.89 OSTEOPENIA OF MULTIPLE SITES: ICD-10-CM

## 2023-07-24 DIAGNOSIS — N18.31 STAGE 3A CHRONIC KIDNEY DISEASE: ICD-10-CM

## 2023-07-24 DIAGNOSIS — Z85.038 PERSONAL HISTORY OF COLON CANCER, STAGE III: ICD-10-CM

## 2023-07-24 LAB
ALBUMIN SERPL BCP-MCNC: 3 G/DL (ref 3.5–5.2)
ALP SERPL-CCNC: 79 U/L (ref 55–135)
ALT SERPL W/O P-5'-P-CCNC: 11 U/L (ref 10–44)
ANION GAP SERPL CALC-SCNC: 9 MMOL/L (ref 8–16)
AST SERPL-CCNC: 19 U/L (ref 10–40)
BASOPHILS # BLD AUTO: 0.02 K/UL (ref 0–0.2)
BASOPHILS NFR BLD: 0.3 % (ref 0–1.9)
BILIRUB SERPL-MCNC: 0.3 MG/DL (ref 0.1–1)
BUN SERPL-MCNC: 14 MG/DL (ref 8–23)
CALCIUM SERPL-MCNC: 9.1 MG/DL (ref 8.7–10.5)
CHLORIDE SERPL-SCNC: 108 MMOL/L (ref 95–110)
CO2 SERPL-SCNC: 23 MMOL/L (ref 23–29)
CREAT SERPL-MCNC: 1 MG/DL (ref 0.5–1.4)
DIFFERENTIAL METHOD: ABNORMAL
EOSINOPHIL # BLD AUTO: 0.2 K/UL (ref 0–0.5)
EOSINOPHIL NFR BLD: 2.6 % (ref 0–8)
ERYTHROCYTE [DISTWIDTH] IN BLOOD BY AUTOMATED COUNT: 13.4 % (ref 11.5–14.5)
EST. GFR  (NO RACE VARIABLE): 55 ML/MIN/1.73 M^2
FERRITIN SERPL-MCNC: 291 NG/ML (ref 20–300)
GLUCOSE SERPL-MCNC: 117 MG/DL (ref 70–110)
HCT VFR BLD AUTO: 44.8 % (ref 37–48.5)
HGB BLD-MCNC: 14 G/DL (ref 12–16)
IMM GRANULOCYTES # BLD AUTO: 0.02 K/UL (ref 0–0.04)
IMM GRANULOCYTES NFR BLD AUTO: 0.3 % (ref 0–0.5)
IRON SERPL-MCNC: 63 UG/DL (ref 30–160)
LYMPHOCYTES # BLD AUTO: 3.8 K/UL (ref 1–4.8)
LYMPHOCYTES NFR BLD: 47.2 % (ref 18–48)
MCH RBC QN AUTO: 30.8 PG (ref 27–31)
MCHC RBC AUTO-ENTMCNC: 31.3 G/DL (ref 32–36)
MCV RBC AUTO: 99 FL (ref 82–98)
MONOCYTES # BLD AUTO: 0.7 K/UL (ref 0.3–1)
MONOCYTES NFR BLD: 9 % (ref 4–15)
NEUTROPHILS # BLD AUTO: 3.2 K/UL (ref 1.8–7.7)
NEUTROPHILS NFR BLD: 40.6 % (ref 38–73)
NRBC BLD-RTO: 0 /100 WBC
PLATELET # BLD AUTO: 245 K/UL (ref 150–450)
PMV BLD AUTO: 9.4 FL (ref 9.2–12.9)
POTASSIUM SERPL-SCNC: 4.3 MMOL/L (ref 3.5–5.1)
PROT SERPL-MCNC: 6.4 G/DL (ref 6–8.4)
RBC # BLD AUTO: 4.55 M/UL (ref 4–5.4)
SATURATED IRON: 27 % (ref 20–50)
SODIUM SERPL-SCNC: 140 MMOL/L (ref 136–145)
TOTAL IRON BINDING CAPACITY: 234 UG/DL (ref 250–450)
TRANSFERRIN SERPL-MCNC: 167 MG/DL (ref 200–375)
WBC # BLD AUTO: 7.98 K/UL (ref 3.9–12.7)

## 2023-07-24 PROCEDURE — 80053 COMPREHEN METABOLIC PANEL: CPT | Performed by: INTERNAL MEDICINE

## 2023-07-24 PROCEDURE — 36415 COLL VENOUS BLD VENIPUNCTURE: CPT | Performed by: INTERNAL MEDICINE

## 2023-07-24 PROCEDURE — 82728 ASSAY OF FERRITIN: CPT | Performed by: INTERNAL MEDICINE

## 2023-07-24 PROCEDURE — 84466 ASSAY OF TRANSFERRIN: CPT | Performed by: INTERNAL MEDICINE

## 2023-07-24 PROCEDURE — 85025 COMPLETE CBC W/AUTO DIFF WBC: CPT | Performed by: INTERNAL MEDICINE

## 2023-07-24 NOTE — TELEPHONE ENCOUNTER
----- Message from Paula Gonzalez sent at 7/24/2023  9:10 AM CDT -----  Regarding: return call  Contact: patient  Type:  Patient Returning Call    Who Called:  patient  Who Left Message for Patient:  Juanita  Does the patient know what this is regarding?:  reschedule  Best Call Back Number:  [phone] (home)     Additional Information:  Patient states she missed a call from the office.  Please call patient to advise. Thanks!

## 2023-07-24 NOTE — TELEPHONE ENCOUNTER
Spoke to pt and notified dr out appt 07/25/23 and will need to reschedule with another provider appt 07/26/23 w/Dr Sears.

## 2023-07-24 NOTE — TELEPHONE ENCOUNTER
----- Message from Laura Knight sent at 7/24/2023 11:00 AM CDT -----  Regarding: reschedule  Contact: Pt  Type:  Patient Returning Call    Who Called:Pt     Who Left Message for Patient:Juanita Reis MA    Does the patient know what this is regarding?:yes to rescheduled due to dr out on 7/25/23      Would the patient rather a call back or a response via [facility]? Call back     Best Call Back Number:[phone]    Additional Information: no appointment are available until 8/15/23 . A massage with sent in teams to Juanita at 11am

## 2023-07-26 ENCOUNTER — OFFICE VISIT (OUTPATIENT)
Dept: HEMATOLOGY/ONCOLOGY | Facility: CLINIC | Age: 88
End: 2023-07-26
Payer: MEDICARE

## 2023-07-26 VITALS
BODY MASS INDEX: 24.22 KG/M2 | WEIGHT: 136.69 LBS | RESPIRATION RATE: 10 BRPM | TEMPERATURE: 97 F | HEIGHT: 63 IN | HEART RATE: 73 BPM | SYSTOLIC BLOOD PRESSURE: 126 MMHG | OXYGEN SATURATION: 94 % | DIASTOLIC BLOOD PRESSURE: 59 MMHG

## 2023-07-26 DIAGNOSIS — T45.1X5A CHEMOTHERAPY-INDUCED PERIPHERAL NEUROPATHY: ICD-10-CM

## 2023-07-26 DIAGNOSIS — Z85.038 HISTORY OF COLON CANCER: Primary | ICD-10-CM

## 2023-07-26 DIAGNOSIS — N18.31 STAGE 3A CHRONIC KIDNEY DISEASE: ICD-10-CM

## 2023-07-26 DIAGNOSIS — D75.89 MACROCYTOSIS: ICD-10-CM

## 2023-07-26 DIAGNOSIS — G62.0 CHEMOTHERAPY-INDUCED PERIPHERAL NEUROPATHY: ICD-10-CM

## 2023-07-26 DIAGNOSIS — E53.8 B12 DEFICIENCY: ICD-10-CM

## 2023-07-26 PROCEDURE — 1101F PR PT FALLS ASSESS DOC 0-1 FALLS W/OUT INJ PAST YR: ICD-10-PCS | Mod: HCNC,CPTII,S$GLB, | Performed by: INTERNAL MEDICINE

## 2023-07-26 PROCEDURE — 99999 PR PBB SHADOW E&M-EST. PATIENT-LVL IV: ICD-10-PCS | Mod: PBBFAC,HCNC,, | Performed by: INTERNAL MEDICINE

## 2023-07-26 PROCEDURE — 1159F MED LIST DOCD IN RCRD: CPT | Mod: HCNC,CPTII,S$GLB, | Performed by: INTERNAL MEDICINE

## 2023-07-26 PROCEDURE — 1159F PR MEDICATION LIST DOCUMENTED IN MEDICAL RECORD: ICD-10-PCS | Mod: HCNC,CPTII,S$GLB, | Performed by: INTERNAL MEDICINE

## 2023-07-26 PROCEDURE — 1126F PR PAIN SEVERITY QUANTIFIED, NO PAIN PRESENT: ICD-10-PCS | Mod: HCNC,CPTII,S$GLB, | Performed by: INTERNAL MEDICINE

## 2023-07-26 PROCEDURE — 99214 OFFICE O/P EST MOD 30 MIN: CPT | Mod: HCNC,S$GLB,, | Performed by: INTERNAL MEDICINE

## 2023-07-26 PROCEDURE — 99999 PR PBB SHADOW E&M-EST. PATIENT-LVL IV: CPT | Mod: PBBFAC,HCNC,, | Performed by: INTERNAL MEDICINE

## 2023-07-26 PROCEDURE — 1101F PT FALLS ASSESS-DOCD LE1/YR: CPT | Mod: HCNC,CPTII,S$GLB, | Performed by: INTERNAL MEDICINE

## 2023-07-26 PROCEDURE — 99214 PR OFFICE/OUTPT VISIT, EST, LEVL IV, 30-39 MIN: ICD-10-PCS | Mod: HCNC,S$GLB,, | Performed by: INTERNAL MEDICINE

## 2023-07-26 PROCEDURE — 3288F FALL RISK ASSESSMENT DOCD: CPT | Mod: HCNC,CPTII,S$GLB, | Performed by: INTERNAL MEDICINE

## 2023-07-26 PROCEDURE — 1126F AMNT PAIN NOTED NONE PRSNT: CPT | Mod: HCNC,CPTII,S$GLB, | Performed by: INTERNAL MEDICINE

## 2023-07-26 PROCEDURE — 3288F PR FALLS RISK ASSESSMENT DOCUMENTED: ICD-10-PCS | Mod: HCNC,CPTII,S$GLB, | Performed by: INTERNAL MEDICINE
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
## 2023-07-30 ENCOUNTER — HOSPITAL ENCOUNTER (INPATIENT)
Facility: HOSPITAL | Age: 88
LOS: 2 days | Discharge: ANOTHER HOSPITAL | DRG: 871 | End: 2023-08-01
Attending: EMERGENCY MEDICINE | Admitting: INTERNAL MEDICINE
Payer: MEDICARE

## 2023-07-30 DIAGNOSIS — L03.90 CELLULITIS: ICD-10-CM

## 2023-07-30 DIAGNOSIS — K21.9 ACID REFLUX: ICD-10-CM

## 2023-07-30 DIAGNOSIS — F17.200 SMOKER: Primary | ICD-10-CM

## 2023-07-30 DIAGNOSIS — K81.0 ACUTE CHOLECYSTITIS: ICD-10-CM

## 2023-07-30 DIAGNOSIS — R07.9 CHEST PAIN: ICD-10-CM

## 2023-07-30 DIAGNOSIS — K81.9 ACALCULOUS CHOLECYSTITIS: ICD-10-CM

## 2023-07-30 DIAGNOSIS — R79.89 ELEVATED LFTS: ICD-10-CM

## 2023-07-30 PROBLEM — R00.0 TACHYCARDIA: Status: ACTIVE | Noted: 2023-07-30

## 2023-07-30 PROBLEM — Z78.9 DAILY CONSUMPTION OF ALCOHOL: Chronic | Status: ACTIVE | Noted: 2023-07-30

## 2023-07-30 PROBLEM — E87.20 LACTIC ACID ACIDOSIS: Status: ACTIVE | Noted: 2023-07-30

## 2023-07-30 LAB
ALBUMIN SERPL BCP-MCNC: 3.6 G/DL (ref 3.5–5.2)
ALP SERPL-CCNC: 120 U/L (ref 55–135)
ALT SERPL W/O P-5'-P-CCNC: 107 U/L (ref 10–44)
ANION GAP SERPL CALC-SCNC: 9 MMOL/L (ref 8–16)
AST SERPL-CCNC: 300 U/L (ref 10–40)
BASOPHILS # BLD AUTO: 0.01 K/UL (ref 0–0.2)
BASOPHILS NFR BLD: 0.1 % (ref 0–1.9)
BILIRUB SERPL-MCNC: 1.8 MG/DL (ref 0.1–1)
BNP SERPL-MCNC: 112 PG/ML (ref 0–99)
BUN SERPL-MCNC: 14 MG/DL (ref 8–23)
CALCIUM SERPL-MCNC: 9.6 MG/DL (ref 8.7–10.5)
CHLORIDE SERPL-SCNC: 110 MMOL/L (ref 95–110)
CO2 SERPL-SCNC: 23 MMOL/L (ref 23–29)
CREAT SERPL-MCNC: 1.1 MG/DL (ref 0.5–1.4)
DIFFERENTIAL METHOD: ABNORMAL
EOSINOPHIL # BLD AUTO: 0 K/UL (ref 0–0.5)
EOSINOPHIL NFR BLD: 0 % (ref 0–8)
ERYTHROCYTE [DISTWIDTH] IN BLOOD BY AUTOMATED COUNT: 13.3 % (ref 11.5–14.5)
EST. GFR  (NO RACE VARIABLE): 48.9 ML/MIN/1.73 M^2
FOLATE SERPL-MCNC: >24.8 NG/ML (ref 4–24)
GLUCOSE SERPL-MCNC: 165 MG/DL (ref 70–110)
GLUCOSE SERPL-MCNC: 187 MG/DL (ref 70–110)
HCT VFR BLD AUTO: 46.5 % (ref 37–48.5)
HGB BLD-MCNC: 15.3 G/DL (ref 12–16)
IMM GRANULOCYTES # BLD AUTO: 0.03 K/UL (ref 0–0.04)
IMM GRANULOCYTES NFR BLD AUTO: 0.3 % (ref 0–0.5)
LACTATE SERPL-SCNC: 3.2 MMOL/L (ref 0.5–1.9)
LACTATE SERPL-SCNC: 4.1 MMOL/L (ref 0.5–1.9)
LIPASE SERPL-CCNC: 28 U/L (ref 4–60)
LYMPHOCYTES # BLD AUTO: 0.4 K/UL (ref 1–4.8)
LYMPHOCYTES NFR BLD: 3.4 % (ref 18–48)
MAGNESIUM SERPL-MCNC: 1.5 MG/DL (ref 1.6–2.6)
MCH RBC QN AUTO: 32.9 PG (ref 27–31)
MCHC RBC AUTO-ENTMCNC: 32.9 G/DL (ref 32–36)
MCV RBC AUTO: 100 FL (ref 82–98)
MONOCYTES # BLD AUTO: 0.1 K/UL (ref 0.3–1)
MONOCYTES NFR BLD: 1.2 % (ref 4–15)
NEUTROPHILS # BLD AUTO: 10.5 K/UL (ref 1.8–7.7)
NEUTROPHILS NFR BLD: 95 % (ref 38–73)
NRBC BLD-RTO: 0 /100 WBC
PLATELET # BLD AUTO: 235 K/UL (ref 150–450)
PMV BLD AUTO: 9.9 FL (ref 9.2–12.9)
POTASSIUM SERPL-SCNC: 3.9 MMOL/L (ref 3.5–5.1)
PROT SERPL-MCNC: 7.5 G/DL (ref 6–8.4)
RBC # BLD AUTO: 4.65 M/UL (ref 4–5.4)
SODIUM SERPL-SCNC: 142 MMOL/L (ref 136–145)
TROPONIN I SERPL HS-MCNC: 7.2 PG/ML (ref 0–14.9)
TSH SERPL DL<=0.005 MIU/L-ACNC: 0.49 UIU/ML (ref 0.34–5.6)
VIT B12 SERPL-MCNC: >1500 PG/ML (ref 210–950)
WBC # BLD AUTO: 11.04 K/UL (ref 3.9–12.7)

## 2023-07-30 PROCEDURE — 25000003 PHARM REV CODE 250: Performed by: EMERGENCY MEDICINE

## 2023-07-30 PROCEDURE — 93010 EKG 12-LEAD: ICD-10-PCS | Mod: ,,, | Performed by: INTERNAL MEDICINE

## 2023-07-30 PROCEDURE — 36415 COLL VENOUS BLD VENIPUNCTURE: CPT | Performed by: EMERGENCY MEDICINE

## 2023-07-30 PROCEDURE — 87040 BLOOD CULTURE FOR BACTERIA: CPT | Mod: 59 | Performed by: INTERNAL MEDICINE

## 2023-07-30 PROCEDURE — 83880 ASSAY OF NATRIURETIC PEPTIDE: CPT | Performed by: EMERGENCY MEDICINE

## 2023-07-30 PROCEDURE — G0378 HOSPITAL OBSERVATION PER HR: HCPCS

## 2023-07-30 PROCEDURE — 83605 ASSAY OF LACTIC ACID: CPT | Performed by: EMERGENCY MEDICINE

## 2023-07-30 PROCEDURE — 96375 TX/PRO/DX INJ NEW DRUG ADDON: CPT

## 2023-07-30 PROCEDURE — 25500020 PHARM REV CODE 255: Performed by: EMERGENCY MEDICINE

## 2023-07-30 PROCEDURE — 80053 COMPREHEN METABOLIC PANEL: CPT | Performed by: EMERGENCY MEDICINE

## 2023-07-30 PROCEDURE — 63600175 PHARM REV CODE 636 W HCPCS: Performed by: EMERGENCY MEDICINE

## 2023-07-30 PROCEDURE — 63600175 PHARM REV CODE 636 W HCPCS: Performed by: INTERNAL MEDICINE

## 2023-07-30 PROCEDURE — 82746 ASSAY OF FOLIC ACID SERUM: CPT | Performed by: EMERGENCY MEDICINE

## 2023-07-30 PROCEDURE — 25000003 PHARM REV CODE 250: Performed by: INTERNAL MEDICINE

## 2023-07-30 PROCEDURE — 87077 CULTURE AEROBIC IDENTIFY: CPT | Performed by: INTERNAL MEDICINE

## 2023-07-30 PROCEDURE — 36415 COLL VENOUS BLD VENIPUNCTURE: CPT | Performed by: INTERNAL MEDICINE

## 2023-07-30 PROCEDURE — 85025 COMPLETE CBC W/AUTO DIFF WBC: CPT | Performed by: EMERGENCY MEDICINE

## 2023-07-30 PROCEDURE — 96374 THER/PROPH/DIAG INJ IV PUSH: CPT

## 2023-07-30 PROCEDURE — 82607 VITAMIN B-12: CPT | Performed by: EMERGENCY MEDICINE

## 2023-07-30 PROCEDURE — 93010 ELECTROCARDIOGRAM REPORT: CPT | Mod: ,,, | Performed by: INTERNAL MEDICINE

## 2023-07-30 PROCEDURE — 84484 ASSAY OF TROPONIN QUANT: CPT | Performed by: EMERGENCY MEDICINE

## 2023-07-30 PROCEDURE — 96361 HYDRATE IV INFUSION ADD-ON: CPT

## 2023-07-30 PROCEDURE — 83605 ASSAY OF LACTIC ACID: CPT | Mod: 91 | Performed by: INTERNAL MEDICINE

## 2023-07-30 PROCEDURE — 99285 EMERGENCY DEPT VISIT HI MDM: CPT | Mod: 25

## 2023-07-30 PROCEDURE — 83690 ASSAY OF LIPASE: CPT | Performed by: EMERGENCY MEDICINE

## 2023-07-30 PROCEDURE — 87186 SC STD MICRODIL/AGAR DIL: CPT | Performed by: INTERNAL MEDICINE

## 2023-07-30 PROCEDURE — 83735 ASSAY OF MAGNESIUM: CPT | Performed by: EMERGENCY MEDICINE

## 2023-07-30 PROCEDURE — 84443 ASSAY THYROID STIM HORMONE: CPT | Performed by: INTERNAL MEDICINE

## 2023-07-30 PROCEDURE — 93005 ELECTROCARDIOGRAM TRACING: CPT | Performed by: INTERNAL MEDICINE

## 2023-07-30 PROCEDURE — 87185 SC STD ENZYME DETCJ PER NZM: CPT | Performed by: INTERNAL MEDICINE

## 2023-07-30 PROCEDURE — 87076 CULTURE ANAEROBE IDENT EACH: CPT | Performed by: INTERNAL MEDICINE

## 2023-07-30 PROCEDURE — 12000002 HC ACUTE/MED SURGE SEMI-PRIVATE ROOM

## 2023-07-30 PROCEDURE — 82962 GLUCOSE BLOOD TEST: CPT

## 2023-07-30 RX ORDER — SODIUM CHLORIDE 0.9 % (FLUSH) 0.9 %
10 SYRINGE (ML) INJECTION EVERY 6 HOURS PRN
Status: DISCONTINUED | OUTPATIENT
Start: 2023-07-30 | End: 2023-08-01 | Stop reason: HOSPADM

## 2023-07-30 RX ORDER — ONDANSETRON 2 MG/ML
4 INJECTION INTRAMUSCULAR; INTRAVENOUS EVERY 8 HOURS PRN
Status: DISCONTINUED | OUTPATIENT
Start: 2023-07-30 | End: 2023-08-01 | Stop reason: HOSPADM

## 2023-07-30 RX ORDER — GABAPENTIN 100 MG/1
200 CAPSULE ORAL 2 TIMES DAILY
Status: DISCONTINUED | OUTPATIENT
Start: 2023-07-31 | End: 2023-07-31

## 2023-07-30 RX ORDER — SODIUM CHLORIDE, SODIUM LACTATE, POTASSIUM CHLORIDE, CALCIUM CHLORIDE 600; 310; 30; 20 MG/100ML; MG/100ML; MG/100ML; MG/100ML
INJECTION, SOLUTION INTRAVENOUS CONTINUOUS
Status: DISCONTINUED | OUTPATIENT
Start: 2023-07-30 | End: 2023-08-01 | Stop reason: HOSPADM

## 2023-07-30 RX ORDER — IBUPROFEN 200 MG
16 TABLET ORAL
Status: DISCONTINUED | OUTPATIENT
Start: 2023-07-30 | End: 2023-08-01 | Stop reason: HOSPADM

## 2023-07-30 RX ORDER — MORPHINE SULFATE 2 MG/ML
2 INJECTION, SOLUTION INTRAMUSCULAR; INTRAVENOUS EVERY 4 HOURS PRN
Status: DISCONTINUED | OUTPATIENT
Start: 2023-07-30 | End: 2023-08-01 | Stop reason: HOSPADM

## 2023-07-30 RX ORDER — HEPARIN SODIUM 5000 [USP'U]/ML
5000 INJECTION, SOLUTION INTRAVENOUS; SUBCUTANEOUS EVERY 8 HOURS
Status: DISCONTINUED | OUTPATIENT
Start: 2023-07-31 | End: 2023-08-01 | Stop reason: HOSPADM

## 2023-07-30 RX ORDER — IBUPROFEN 200 MG
1 TABLET ORAL DAILY
Status: DISCONTINUED | OUTPATIENT
Start: 2023-07-31 | End: 2023-07-31

## 2023-07-30 RX ORDER — SODIUM CHLORIDE 9 MG/ML
1000 INJECTION, SOLUTION INTRAVENOUS
Status: COMPLETED | OUTPATIENT
Start: 2023-07-30 | End: 2023-07-31

## 2023-07-30 RX ORDER — ACETAMINOPHEN 10 MG/ML
1000 INJECTION, SOLUTION INTRAVENOUS EVERY 8 HOURS
Status: COMPLETED | OUTPATIENT
Start: 2023-07-31 | End: 2023-07-31

## 2023-07-30 RX ORDER — FAMOTIDINE 10 MG/ML
20 INJECTION INTRAVENOUS
Status: COMPLETED | OUTPATIENT
Start: 2023-07-30 | End: 2023-07-30

## 2023-07-30 RX ORDER — MIRTAZAPINE 7.5 MG/1
7.5 TABLET, FILM COATED ORAL NIGHTLY
Status: DISCONTINUED | OUTPATIENT
Start: 2023-07-30 | End: 2023-07-31

## 2023-07-30 RX ORDER — MAGNESIUM SULFATE HEPTAHYDRATE 40 MG/ML
2 INJECTION, SOLUTION INTRAVENOUS ONCE
Status: COMPLETED | OUTPATIENT
Start: 2023-07-30 | End: 2023-07-31

## 2023-07-30 RX ORDER — GLUCAGON 1 MG
1 KIT INJECTION
Status: DISCONTINUED | OUTPATIENT
Start: 2023-07-30 | End: 2023-08-01 | Stop reason: HOSPADM

## 2023-07-30 RX ORDER — GABAPENTIN 100 MG/1
200 CAPSULE ORAL 2 TIMES DAILY
Status: DISCONTINUED | OUTPATIENT
Start: 2023-07-30 | End: 2023-07-30

## 2023-07-30 RX ORDER — PANTOPRAZOLE SODIUM 40 MG/10ML
40 INJECTION, POWDER, LYOPHILIZED, FOR SOLUTION INTRAVENOUS DAILY
Status: DISCONTINUED | OUTPATIENT
Start: 2023-07-31 | End: 2023-08-01 | Stop reason: HOSPADM

## 2023-07-30 RX ORDER — IPRATROPIUM BROMIDE 0.5 MG/2.5ML
0.5 SOLUTION RESPIRATORY (INHALATION) EVERY 6 HOURS
Status: DISCONTINUED | OUTPATIENT
Start: 2023-07-30 | End: 2023-08-01 | Stop reason: HOSPADM

## 2023-07-30 RX ORDER — MORPHINE SULFATE 2 MG/ML
2 INJECTION, SOLUTION INTRAMUSCULAR; INTRAVENOUS
Status: COMPLETED | OUTPATIENT
Start: 2023-07-30 | End: 2023-07-30

## 2023-07-30 RX ORDER — NALOXONE HCL 0.4 MG/ML
0.02 VIAL (ML) INJECTION
Status: DISCONTINUED | OUTPATIENT
Start: 2023-07-30 | End: 2023-08-01 | Stop reason: HOSPADM

## 2023-07-30 RX ORDER — AMOXICILLIN 250 MG
2 CAPSULE ORAL 2 TIMES DAILY PRN
Status: DISCONTINUED | OUTPATIENT
Start: 2023-07-30 | End: 2023-08-01 | Stop reason: HOSPADM

## 2023-07-30 RX ORDER — LORAZEPAM 2 MG/ML
0.5 INJECTION INTRAMUSCULAR EVERY 4 HOURS PRN
Status: DISCONTINUED | OUTPATIENT
Start: 2023-07-30 | End: 2023-08-01 | Stop reason: HOSPADM

## 2023-07-30 RX ORDER — IBUPROFEN 200 MG
24 TABLET ORAL
Status: DISCONTINUED | OUTPATIENT
Start: 2023-07-30 | End: 2023-08-01 | Stop reason: HOSPADM

## 2023-07-30 RX ORDER — ONDANSETRON 4 MG/1
4 TABLET, ORALLY DISINTEGRATING ORAL
Status: COMPLETED | OUTPATIENT
Start: 2023-07-30 | End: 2023-07-30

## 2023-07-30 RX ORDER — ACETAMINOPHEN 325 MG/1
650 TABLET ORAL EVERY 4 HOURS PRN
Status: DISCONTINUED | OUTPATIENT
Start: 2023-07-30 | End: 2023-08-01 | Stop reason: HOSPADM

## 2023-07-30 RX ORDER — OXYCODONE HYDROCHLORIDE 5 MG/1
5 TABLET ORAL EVERY 6 HOURS PRN
Status: DISCONTINUED | OUTPATIENT
Start: 2023-07-30 | End: 2023-08-01 | Stop reason: HOSPADM

## 2023-07-30 RX ORDER — SODIUM CHLORIDE 9 MG/ML
500 INJECTION, SOLUTION INTRAVENOUS
Status: COMPLETED | OUTPATIENT
Start: 2023-07-30 | End: 2023-07-30

## 2023-07-30 RX ADMIN — SODIUM CHLORIDE 500 ML: 0.9 INJECTION, SOLUTION INTRAVENOUS at 07:07

## 2023-07-30 RX ADMIN — ACETAMINOPHEN 975 MG: 325 SUPPOSITORY RECTAL at 10:07

## 2023-07-30 RX ADMIN — IOHEXOL 100 ML: 350 INJECTION, SOLUTION INTRAVENOUS at 08:07

## 2023-07-30 RX ADMIN — MORPHINE SULFATE 2 MG: 2 INJECTION, SOLUTION INTRAMUSCULAR; INTRAVENOUS at 07:07

## 2023-07-30 RX ADMIN — FOLIC ACID: 5 INJECTION, SOLUTION INTRAMUSCULAR; INTRAVENOUS; SUBCUTANEOUS at 10:07

## 2023-07-30 RX ADMIN — FAMOTIDINE 20 MG: 10 INJECTION, SOLUTION INTRAVENOUS at 07:07

## 2023-07-30 RX ADMIN — SODIUM CHLORIDE 1000 ML: 0.9 INJECTION, SOLUTION INTRAVENOUS at 09:07

## 2023-07-30 RX ADMIN — PIPERACILLIN SODIUM,TAZOBACTAM SODIUM 3.38 G: 3; .375 INJECTION, POWDER, FOR SOLUTION INTRAVENOUS at 09:07

## 2023-07-30 RX ADMIN — SODIUM CHLORIDE 500 ML: 0.9 INJECTION, SOLUTION INTRAVENOUS at 10:07

## 2023-07-30 RX ADMIN — ONDANSETRON 4 MG: 4 TABLET, ORALLY DISINTEGRATING ORAL at 07:07

## 2023-07-30 NOTE — Clinical Note
Diagnosis: Acute cholecystitis [575.0.ICD-9-CM]   Future Attending Provider: DORIS RIZZO [[id number]]   Place in Observation: [facility] [9040]   Admitting Provider:: DORIS RIZZO [[id number]]

## 2023-07-30 NOTE — ED PROVIDER NOTES
"Encounter Date: 7/30/2023       History     Chief Complaint   Patient presents with    Chest Pain     88-year-old female complaining epigastric pain, nausea and vomiting.  Patient says symptoms began earlier today around noon.  She has vomited 3 times.  Clear emesis, non bloody, nonbilious.  She has pain in her lower chest that radiates down into her abdomen.  She denies any diarrhea.  Pain is currently moderate.    The history is provided by the patient.     Review of patient's allergies indicates:   Allergen Reactions    Ace inhibitors Edema     Other reaction(s): Angioedema    Codeine Hives     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    Anxiety     Arthritis     Escalante esophagus     Blood transfusion     Cataract     Done OU    Colon cancer 2009    Colon polyps 3/14/2017    GERD (gastroesophageal reflux disease)     Glaucoma 2/24/2012    Nuclear sclerosis 8/27/2012    Osteoporosis     Primary hypothyroidism 2/23/2020    Pseudophakia - Left Eye 2/24/2012     Past Surgical History:   Procedure Laterality Date    APPENDECTOMY      CATARACT EXTRACTION W/  INTRAOCULAR LENS IMPLANT Left     CATARACT EXTRACTION W/  INTRAOCULAR LENS IMPLANT Right     Dr Sawatn    COLON SURGERY      resection    COLONOSCOPY N/A 3/14/2017    Procedure: COLONOSCOPY;  Surgeon: Killian Guerrier MD;  Location: [facility] ENDO;  Service: Endoscopy;  Laterality: N/A;    COLONOSCOPY  03/14/2017    Dr. Guerrier: hemorrhoids, one colon polyp removed, "Patent functional end-to-end ileo-colonic anastomosis"with healthy mucosa; biopsy: hyperplastic polyp; repeat in 3 years for surveillance    COLONOSCOPY N/A 1/21/2020    Procedure: COLONOSCOPY;  Surgeon: Timo Darling MD;  Location: [facility] ENDO;  Service: Endoscopy;  Laterality: N/A;    COLONOSCOPY N/A 7/8/2022    Procedure: COLONOSCOPY;  Surgeon: Anaid Washington MD;  Location: [facility] ENDO;  Service: Endoscopy;  Laterality: N/A;    ECTOPIC PREGNANCY SURGERY      " ESOPHAGOGASTRODUODENOSCOPY N/A 5/16/2019    Procedure: EGD (ESOPHAGOGASTRODUODENOSCOPY);  Surgeon: Anaid Washington MD;  Location: [facility] ENDO;  Service: Endoscopy;  Laterality: N/A;    ESOPHAGOGASTRODUODENOSCOPY N/A 7/11/2019    Procedure: EGD (ESOPHAGOGASTRODUODENOSCOPY);  Surgeon: Anaid Washington MD;  Location: [facility] ENDO;  Service: Endoscopy;  Laterality: N/A;    ESOPHAGOGASTRODUODENOSCOPY N/A 2/5/2021    Procedure: EGD (ESOPHAGOGASTRODUODENOSCOPY);  Surgeon: Anaid Washington MD;  Location: [facility] ENDO;  Service: Endoscopy;  Laterality: N/A;    ESOPHAGOGASTRODUODENOSCOPY N/A 11/11/2021    Procedure: EGD (ESOPHAGOGASTRODUODENOSCOPY);  Surgeon: Anaid Washnigton MD;  Location: [facility] ENDO;  Service: Endoscopy;  Laterality: N/A;    ESOPHAGOGASTRODUODENOSCOPY N/A 7/8/2022    Procedure: EGD (ESOPHAGOGASTRODUODENOSCOPY);  Surgeon: Anaid Washington MD;  Location: [facility] ENDO;  Service: Endoscopy;  Laterality: N/A;    ESOPHAGOGASTRODUODENOSCOPY N/A 4/12/2023    Procedure: EGD (ESOPHAGOGASTRODUODENOSCOPY);  Surgeon: Anaid Washington MD;  Location: [facility] ENDO;  Service: Endoscopy;  Laterality: N/A;    EYE SURGERY      bilateral cataracts    HYSTERECTOMY      complete    JOINT REPLACEMENT      Liban Knee    ROTATOR CUFF REPAIR Right     TOE SURGERY      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    UPPER GASTROINTESTINAL ENDOSCOPY  06/12/2017    Dr. Guerrier: gastritis and esophagitis, biopsy: chronic gastritis, negative for h pylori, esophageal- positive eosinophils, negative for barretts; repeat in 2 months    UPPER GASTROINTESTINAL ENDOSCOPY  07/27/2017    Dr. Guerrier     Family History   Problem Relation Age of Onset    Heart disease Mother         heart attack    Heart disease Father     Heart disease Brother         MI    Parkinsonism Sister     Arthritis Sister     No Known Problems Brother     No Known Problems Brother     Amblyopia Neg Hx     Blindness Neg Hx     Cancer Neg Hx     Cataracts Neg Hx     Glaucoma Neg  Hx     Hypertension Neg Hx     Macular degeneration Neg Hx     Retinal detachment Neg Hx     Strabismus Neg Hx     Stroke Neg Hx     Thyroid disease Neg Hx     Diabetes Neg Hx     Colon cancer Neg Hx     Crohn's disease Neg Hx     Esophageal cancer Neg Hx     Stomach cancer Neg Hx     Ulcerative colitis Neg Hx      Social History     Tobacco Use    Smoking status: Some Days     Current packs/day: 0.00     Average packs/day: 0.3 packs/day for 65.0 years (21.5 ttl pk-yrs)     Types: Cigarettes     Start date: 10/5/1955     Last attempt to quit: 10/5/2020     Years since quittin.8    Smokeless tobacco: Never    Tobacco comments:     3/1/23--states smokes 1-2 cigarettes a day   Substance Use Topics    Alcohol use: Yes     Alcohol/week: 2.0 standard drinks of alcohol     Types: 2 Shots of liquor per week     Comment: occasional (crown)    Drug use: No     Review of Systems   Cardiovascular:  Positive for chest pain.   Gastrointestinal:  Positive for abdominal pain, nausea and vomiting.   All other systems reviewed and are negative.      Physical Exam     Initial Vitals [23 1810]   BP Pulse Resp Temp SpO2   (!) 145/77 89 20 97.9 °F (36.6 °C) (!) 92 %      MAP       --         Physical Exam    Nursing note and vitals reviewed.  Constitutional: She appears well-developed and well-nourished. She is not diaphoretic. No distress.   HENT:   Head: Normocephalic.   Eyes: Conjunctivae are normal.   Neck: Neck supple.   Normal range of motion.  Cardiovascular:  Normal rate.           Pulmonary/Chest: No respiratory distress.   Abdominal: She exhibits no distension. There is abdominal tenderness.   Epigastric tenderness to palpation   Musculoskeletal:         General: No edema.      Cervical back: Normal range of motion and neck supple.     Neurological: She is alert. She has normal strength. GCS eye subscore is 4. GCS verbal subscore is 5. GCS motor subscore is 6.   Skin: Skin is warm and dry.   Psychiatric: She has a  normal mood and affect.         ED Course   Procedures  Labs Reviewed   CBC W/ AUTO DIFFERENTIAL - Abnormal; Notable for the following components:       Result Value     (*)     MCH 32.9 (*)     Gran # (ANC) 10.5 (*)     Lymph # 0.4 (*)     Mono # 0.1 (*)     Gran % 95.0 (*)     Lymph % 3.4 (*)     Mono % 1.2 (*)     All other components within normal limits   COMPREHENSIVE METABOLIC PANEL - Abnormal; Notable for the following components:    Glucose 165 (*)     Total Bilirubin 1.8 (*)      (*)      (*)     eGFR 48.9 (*)     All other components within normal limits   LACTIC ACID, PLASMA - Abnormal; Notable for the following components:    Lactate (Lactic Acid) 3.2 (*)     All other components within normal limits    Narrative:      Lactic Acid critical result(s) repeated. Called and verbal readback   obtained from Cherri Trinh Rn/ER by LALI 07/30/2023 20:39   LIPASE   TROPONIN I HIGH SENSITIVITY   MAGNESIUM   VITAMIN B12   FOLATE   URINALYSIS, REFLEX TO URINE CULTURE   B-TYPE NATRIURETIC PEPTIDE   VITAMIN B12   FOLATE   MAGNESIUM   LACTIC ACID, PLASMA     EKG Readings: (Independently Interpreted)   Sinus rhythm.  Ninety-four beats/minute.  Left axis deviation.  No ST elevation.       Imaging Results              CT Abdomen Pelvis With Contrast (Final result)  Result time 07/30/23 21:04:33      Final result by Marcela Escalante Jr., MD (07/30/23 21:04:33)                   Narrative:    EXAMINATION:  CT ABDOMEN PELVIS WITH CONTRAST    CLINICAL INDICATION: Female, 88 years old. Epigastric pain    TECHNIQUE: Helical CT scan examination of the abdomen and pelvis is performed from the domes of the diaphragm to the pubic symphysis with the administration of intravenous contrast material.    CONTRAST: 75 mL of Omnipaque 300 IV.    COMPARISON: None    FINDINGS:  Lower Chest: Visualized lung bases are clear other than hypoaeration changes. Heart is normal in size. No pericardial or pleural  effusion.    Liver: Normal in size and contour. No focal lesion.    Bile Ducts: Common bile duct is  dilated.    Gallbladder: There is marked dilation of the gallbladder and there may be pericolic cystic fluid. No definite gallstones can be seen.    Pancreas: Pancreas is atrophic but demonstrates no other abnormality.    Spleen: Normal in size and contour.    Adrenals: Normal configuration.    Kidneys and Ureters: Normal size and contour. No hydronephrosis. There are bilateral renal cysts.    Bladder: Normal in appearance.    Bowel:  Stomach: Unremarkable.  Small Intestine: Unremarkable.  Appendix: No inflammatory changes.  Colon: Unremarkable.    Vessels: Abdominal aorta and inferior vena cava are normal in course and caliber.    Reproductive Organs: Uterus is apparently surgically absent and no pelvic mass can be seen    Lymph Nodes: No pathologic mesenteric or retroperitoneal lymph nodes.    Peritoneum: No free air, free fluid, or fluid collection.    Abdominal Wall: No hernia or mass.    Musculoskeletal: No acute abnormality or suspicious bony lesion. There is considerable compression of the L1 vertebral body which appears chronic. There is some retropulsion of bone. Compression of the superior endplate of T12 also appears to be chronic. There are degenerative disc changes at T12-L1 to L2-L3. No aggressive skeletal lesions can be seen.    IMPRESSION:  Dilation of the gallbladder and dilation of the common bile duct. There may be pericholecystic fluid. No gallstones can be seen but changes may represent  noncalculous acute cholecystitis.    Small bilateral renal cysts.    This exam was performed according to our departmental dose-optimization program which includes automated exposure control, adjustment of the mA and/or kV according to patient size and/or use of iterative reconstruction technique.    Electronically signed by:  Marcela Escalante Jr., MD  7/30/2023 9:04 PM CDT Workstation: [id number]                                      X-Ray Chest AP Portable (Final result)  Result time 07/30/23 21:22:49      Final result by Marcela Escalante Jr., MD (07/30/23 21:22:49)                   Narrative:    EXAMINATION:  XR CHEST AP PORTABLE    CLINICAL INDICATION:  Female, 88 years old. Chest    COMPARISON:  None.    FINDINGS:  The heart and great vessels appear normal. There are some atherosclerotic changes of aorta.    Both lungs are well expanded and clear. No pleural effusion can be seen. No pneumothorax can be seen.    IMPRESSION:  No x-ray evidence of active or acute disease.    Electronically signed by:  Marcela Escalante Jr., MD  7/30/2023 9:22 PM CDT Workstation: [id number]                                     Medications   piperacillin-tazobactam 3.375 g in dextrose 5 % 100 mL IVPB (ready to mix) (3.375 g Intravenous New Bag 7/30/23 2155)   sodium chloride 0.9% bolus 500 mL 500 mL (has no administration in time range)   sodium chloride 0.9% 1,000 mL with mvi, (ADULT) no.4 with vit K 3,300 unit- 150 mcg/10 mL 10 mL, thiamine 100 mg, folic acid 1 mg infusion (has no administration in time range)   nicotine 21 mg/24 hr 1 patch (has no administration in time range)   LORazepam injection 0.5 mg (has no administration in time range)   ondansetron disintegrating tablet 4 mg (4 mg Oral Given 7/30/23 1913)   morphine injection 2 mg (2 mg Intravenous Given 7/30/23 1916)   0.9%  NaCl infusion (0 mLs Intravenous Stopped 7/30/23 2155)   famotidine (PF) injection 20 mg (20 mg Intravenous Given 7/30/23 1913)   iohexoL (OMNIPAQUE 350) injection 100 mL (100 mLs Intravenous Given 7/30/23 2017)   0.9%  NaCl infusion (1,000 mLs Intravenous New Bag 7/30/23 2118)     Medical Decision Making:   Initial Assessment:   88-year-old with acute epigastric pain, vomiting.  She is very tender on exam.  WBCs 11.  Lactate is 3.2.  T bili, AST/ALT are mildly elevated.  CT abdomen obtained and shows distended gallbladder with pericholecystic fluid, also  mildly dilated CBD.  Patient received 1 L  IV fluid bolus, IV Zofran and morphine.  IV Zosyn has been ordered.  Patient is still tachycardic in the 110s.  Additional 1 L IV fluid bolus has been ordered.  I called and discussed with General surgery, Dr. Sarkar.  He requested MRCP, repeat CMP in the morning, keep NPO for likely cholecystectomy tomorrow.  Discussed with hospitalist who will admit the patient.                          Clinical Impression:   Final diagnoses:  [R07.9] Epigastric Pain  [K81.0] Acute cholecystitis (Primary)        ED Disposition Condition    Admit                 Gustabo Das MD  07/30/23 [id number]

## 2023-07-31 ENCOUNTER — ANESTHESIA (OUTPATIENT)
Dept: SURGERY | Facility: HOSPITAL | Age: 88
DRG: 871 | End: 2023-07-31
Payer: MEDICARE

## 2023-07-31 ENCOUNTER — ANESTHESIA EVENT (OUTPATIENT)
Dept: SURGERY | Facility: HOSPITAL | Age: 88
DRG: 871 | End: 2023-07-31
Payer: MEDICARE

## 2023-07-31 LAB
ACINETOBACTER CALCOACETICUS/BAUMANNII COMPLEX: NOT DETECTED
ALBUMIN SERPL BCP-MCNC: 2.5 G/DL (ref 3.5–5.2)
ALLENS TEST: ABNORMAL
ALP SERPL-CCNC: 88 U/L (ref 55–135)
ALT SERPL W/O P-5'-P-CCNC: 105 U/L (ref 10–44)
AMMONIA PLAS-SCNC: 28 UMOL/L (ref 10–50)
ANION GAP SERPL CALC-SCNC: 10 MMOL/L (ref 8–16)
AST SERPL-CCNC: 217 U/L (ref 10–40)
BACTEROIDES FRAGILIS: DETECTED
BASOPHILS # BLD AUTO: 0.03 K/UL (ref 0–0.2)
BASOPHILS NFR BLD: 0.2 % (ref 0–1.9)
BILIRUB SERPL-MCNC: 2.8 MG/DL (ref 0.1–1)
BILIRUB UR QL STRIP: ABNORMAL
BUN SERPL-MCNC: 17 MG/DL (ref 8–23)
CALCIUM SERPL-MCNC: 8.2 MG/DL (ref 8.7–10.5)
CANDIDA ALBICANS: NOT DETECTED
CANDIDA AURIS: NOT DETECTED
CANDIDA GLABRATA: NOT DETECTED
CANDIDA KRUSEI: NOT DETECTED
CANDIDA PARAPSILOSIS: NOT DETECTED
CANDIDA TROPICALIS: NOT DETECTED
CHLORIDE SERPL-SCNC: 112 MMOL/L (ref 95–110)
CLARITY UR: CLEAR
CO2 SERPL-SCNC: 20 MMOL/L (ref 23–29)
COLOR UR: YELLOW
CREAT SERPL-MCNC: 1 MG/DL (ref 0.5–1.4)
CRYPTOCOCCUS NEOFORMANS/GATTII: NOT DETECTED
CTX-M GENE: NOT DETECTED
DELSYS: ABNORMAL
DIFFERENTIAL METHOD: ABNORMAL
ENTEROBACTER CLOACAE COMPLEX: NOT DETECTED
ENTEROBACTERALES: ABNORMAL
ENTEROCOCCUS FAECALIS: NOT DETECTED
ENTEROCOCCUS FAECIUM: NOT DETECTED
EOSINOPHIL # BLD AUTO: 0 K/UL (ref 0–0.5)
EOSINOPHIL NFR BLD: 0.1 % (ref 0–8)
ERYTHROCYTE [DISTWIDTH] IN BLOOD BY AUTOMATED COUNT: 13.4 % (ref 11.5–14.5)
ESCHERICHIA COLI: DETECTED
EST. GFR  (NO RACE VARIABLE): 54.9 ML/MIN/1.73 M^2
FLOW: 4
GLUCOSE SERPL-MCNC: 111 MG/DL (ref 70–110)
GLUCOSE SERPL-MCNC: 115 MG/DL (ref 70–110)
GLUCOSE UR QL STRIP: NEGATIVE
HAEMOPHILUS INFLUENZAE: NOT DETECTED
HCO3 UR-SCNC: 19.4 MMOL/L (ref 24–28)
HCT VFR BLD AUTO: 39.9 % (ref 37–48.5)
HCT VFR BLD CALC: 39 %PCV (ref 36–54)
HGB BLD-MCNC: 13.2 G/DL (ref 12–16)
HGB UR QL STRIP: NEGATIVE
IMM GRANULOCYTES # BLD AUTO: 0.09 K/UL (ref 0–0.04)
IMM GRANULOCYTES NFR BLD AUTO: 0.7 % (ref 0–0.5)
IMP GENE: NOT DETECTED
INR PPP: 1.1 (ref 0.8–1.2)
KETONES UR QL STRIP: NEGATIVE
KLEBSIELLA AEROGENES: NOT DETECTED
KLEBSIELLA OXYTOCA: NOT DETECTED
KLEBSIELLA PNEUMONIAE GROUP: DETECTED
KPC: NOT DETECTED
LACTATE SERPL-SCNC: 1.7 MMOL/L (ref 0.5–1.9)
LEUKOCYTE ESTERASE UR QL STRIP: NEGATIVE
LISTERIA MONOCYTOGENES: NOT DETECTED
LYMPHOCYTES # BLD AUTO: 0.6 K/UL (ref 1–4.8)
LYMPHOCYTES NFR BLD: 5 % (ref 18–48)
MAGNESIUM SERPL-MCNC: 1.8 MG/DL (ref 1.6–2.6)
MCH RBC QN AUTO: 31.8 PG (ref 27–31)
MCHC RBC AUTO-ENTMCNC: 33.1 G/DL (ref 32–36)
MCR-1: NOT DETECTED
MCV RBC AUTO: 96 FL (ref 82–98)
MEC A/C AND MREJ (MRSA): ABNORMAL
MEC A/C: ABNORMAL
MODE: ABNORMAL
MONOCYTES # BLD AUTO: 0.3 K/UL (ref 0.3–1)
MONOCYTES NFR BLD: 2.4 % (ref 4–15)
NDM GENE: NOT DETECTED
NEISSERIA MENINGITIDIS: NOT DETECTED
NEUTROPHILS # BLD AUTO: 11.2 K/UL (ref 1.8–7.7)
NEUTROPHILS NFR BLD: 91.6 % (ref 38–73)
NITRITE UR QL STRIP: NEGATIVE
NRBC BLD-RTO: 0 /100 WBC
OXA-48-LIKE: NOT DETECTED
PCO2 BLDA: 37.1 MMHG (ref 35–45)
PH SMN: 7.33 [PH] (ref 7.35–7.45)
PH UR STRIP: 6 [PH] (ref 5–8)
PLATELET # BLD AUTO: 141 K/UL (ref 150–450)
PLATELET BLD QL SMEAR: ABNORMAL
PMV BLD AUTO: 10.1 FL (ref 9.2–12.9)
PO2 BLDA: 71 MMHG (ref 80–100)
POC BE: -7 MMOL/L
POC IONIZED CALCIUM: 1.24 MMOL/L (ref 1.06–1.42)
POC PCO2 TEMP: 36.6 MMHG
POC PH TEMP: 7.33
POC PO2 TEMP: 70 MMHG
POC SATURATED O2: 93 % (ref 95–100)
POC TCO2: 21 MMOL/L (ref 23–27)
POC TEMPERATURE: ABNORMAL
POTASSIUM BLD-SCNC: 3 MMOL/L (ref 3.5–5.1)
POTASSIUM SERPL-SCNC: 3.2 MMOL/L (ref 3.5–5.1)
PROCALCITONIN SERPL IA-MCNC: 60.24 NG/ML (ref 0–0.5)
PROT SERPL-MCNC: 5.4 G/DL (ref 6–8.4)
PROT UR QL STRIP: ABNORMAL
PROTEUS SPECIES: NOT DETECTED
PROTHROMBIN TIME: 11.8 SEC (ref 9–12.5)
PSEUDOMONAS AERUGINOSA: NOT DETECTED
RBC # BLD AUTO: 4.15 M/UL (ref 4–5.4)
SALMONELLA SP: NOT DETECTED
SAMPLE: ABNORMAL
SARS-COV-2 RDRP RESP QL NAA+PROBE: NEGATIVE
SERRATIA MARCESCENS: NOT DETECTED
SITE: ABNORMAL
SODIUM BLD-SCNC: 142 MMOL/L (ref 136–145)
SODIUM SERPL-SCNC: 142 MMOL/L (ref 136–145)
SP GR UR STRIP: >1.03 (ref 1–1.03)
SP02: 98
STAPHYLOCOCCUS AUREUS: NOT DETECTED
STAPHYLOCOCCUS EPIDERMIDIS: NOT DETECTED
STAPHYLOCOCCUS LUGDUNESIS: NOT DETECTED
STAPHYLOCOCCUS SPECIES: NOT DETECTED
STENOTROPHOMONAS MALTOPHILIA: NOT DETECTED
STREPTOCOCCUS AGALACTIAE: NOT DETECTED
STREPTOCOCCUS PNEUMONIAE: NOT DETECTED
STREPTOCOCCUS PYOGENES: NOT DETECTED
STREPTOCOCCUS SPECIES: NOT DETECTED
URN SPEC COLLECT METH UR: ABNORMAL
UROBILINOGEN UR STRIP-ACNC: NEGATIVE EU/DL
VAN A/B: ABNORMAL
VIM GENE: NOT DETECTED
WBC # BLD AUTO: 12.22 K/UL (ref 3.9–12.7)

## 2023-07-31 PROCEDURE — 82140 ASSAY OF AMMONIA: CPT | Performed by: INTERNAL MEDICINE

## 2023-07-31 PROCEDURE — D9220A PRA ANESTHESIA: ICD-10-PCS | Mod: CRNA,,, | Performed by: NURSE ANESTHETIST, CERTIFIED REGISTERED

## 2023-07-31 PROCEDURE — 25000242 PHARM REV CODE 250 ALT 637 W/ HCPCS: Performed by: INTERNAL MEDICINE

## 2023-07-31 PROCEDURE — 25000003 PHARM REV CODE 250: Performed by: EMERGENCY MEDICINE

## 2023-07-31 PROCEDURE — 63600175 PHARM REV CODE 636 W HCPCS: Performed by: EMERGENCY MEDICINE

## 2023-07-31 PROCEDURE — 94640 AIRWAY INHALATION TREATMENT: CPT

## 2023-07-31 PROCEDURE — 87154 CUL TYP ID BLD PTHGN 6+ TRGT: CPT | Performed by: INTERNAL MEDICINE

## 2023-07-31 PROCEDURE — 99223 1ST HOSP IP/OBS HIGH 75: CPT | Mod: ,,, | Performed by: STUDENT IN AN ORGANIZED HEALTH CARE EDUCATION/TRAINING PROGRAM

## 2023-07-31 PROCEDURE — D9220A PRA ANESTHESIA: Mod: ANES,,, | Performed by: ANESTHESIOLOGY

## 2023-07-31 PROCEDURE — 99900031 HC PATIENT EDUCATION (STAT)

## 2023-07-31 PROCEDURE — 85025 COMPLETE CBC W/AUTO DIFF WBC: CPT | Performed by: INTERNAL MEDICINE

## 2023-07-31 PROCEDURE — 36415 COLL VENOUS BLD VENIPUNCTURE: CPT | Performed by: INTERNAL MEDICINE

## 2023-07-31 PROCEDURE — 25000003 PHARM REV CODE 250: Performed by: INTERNAL MEDICINE

## 2023-07-31 PROCEDURE — 63600175 PHARM REV CODE 636 W HCPCS: Performed by: INTERNAL MEDICINE

## 2023-07-31 PROCEDURE — 36415 COLL VENOUS BLD VENIPUNCTURE: CPT | Performed by: STUDENT IN AN ORGANIZED HEALTH CARE EDUCATION/TRAINING PROGRAM

## 2023-07-31 PROCEDURE — U0002 COVID-19 LAB TEST NON-CDC: HCPCS | Performed by: INTERNAL MEDICINE

## 2023-07-31 PROCEDURE — 85610 PROTHROMBIN TIME: CPT | Performed by: INTERNAL MEDICINE

## 2023-07-31 PROCEDURE — 83735 ASSAY OF MAGNESIUM: CPT | Performed by: INTERNAL MEDICINE

## 2023-07-31 PROCEDURE — 81003 URINALYSIS AUTO W/O SCOPE: CPT | Performed by: EMERGENCY MEDICINE

## 2023-07-31 PROCEDURE — 20000000 HC ICU ROOM

## 2023-07-31 PROCEDURE — 84145 PROCALCITONIN (PCT): CPT | Performed by: STUDENT IN AN ORGANIZED HEALTH CARE EDUCATION/TRAINING PROGRAM

## 2023-07-31 PROCEDURE — 94761 N-INVAS EAR/PLS OXIMETRY MLT: CPT

## 2023-07-31 PROCEDURE — 83605 ASSAY OF LACTIC ACID: CPT | Performed by: INTERNAL MEDICINE

## 2023-07-31 PROCEDURE — 99223 PR INITIAL HOSPITAL CARE,LEVL III: ICD-10-PCS | Mod: ,,, | Performed by: STUDENT IN AN ORGANIZED HEALTH CARE EDUCATION/TRAINING PROGRAM

## 2023-07-31 PROCEDURE — 63600175 PHARM REV CODE 636 W HCPCS: Performed by: NURSE ANESTHETIST, CERTIFIED REGISTERED

## 2023-07-31 PROCEDURE — D9220A PRA ANESTHESIA: Mod: CRNA,,, | Performed by: NURSE ANESTHETIST, CERTIFIED REGISTERED

## 2023-07-31 PROCEDURE — S4991 NICOTINE PATCH NONLEGEND: HCPCS | Performed by: INTERNAL MEDICINE

## 2023-07-31 PROCEDURE — C9113 INJ PANTOPRAZOLE SODIUM, VIA: HCPCS | Performed by: INTERNAL MEDICINE

## 2023-07-31 PROCEDURE — 37000008 HC ANESTHESIA 1ST 15 MINUTES: Performed by: INTERNAL MEDICINE

## 2023-07-31 PROCEDURE — D9220A PRA ANESTHESIA: ICD-10-PCS | Mod: ANES,,, | Performed by: ANESTHESIOLOGY

## 2023-07-31 PROCEDURE — 87040 BLOOD CULTURE FOR BACTERIA: CPT | Performed by: INTERNAL MEDICINE

## 2023-07-31 PROCEDURE — 94799 UNLISTED PULMONARY SVC/PX: CPT

## 2023-07-31 PROCEDURE — 27000221 HC OXYGEN, UP TO 24 HOURS

## 2023-07-31 PROCEDURE — 99223 PR INITIAL HOSPITAL CARE,LEVL III: ICD-10-PCS | Mod: ,,, | Performed by: INTERNAL MEDICINE

## 2023-07-31 PROCEDURE — 80053 COMPREHEN METABOLIC PANEL: CPT | Performed by: INTERNAL MEDICINE

## 2023-07-31 PROCEDURE — 99223 1ST HOSP IP/OBS HIGH 75: CPT | Mod: ,,, | Performed by: INTERNAL MEDICINE

## 2023-07-31 PROCEDURE — 43235 EGD DIAGNOSTIC BRUSH WASH: CPT | Performed by: INTERNAL MEDICINE

## 2023-07-31 PROCEDURE — 25000003 PHARM REV CODE 250: Performed by: NURSE ANESTHETIST, CERTIFIED REGISTERED

## 2023-07-31 PROCEDURE — 99900035 HC TECH TIME PER 15 MIN (STAT)

## 2023-07-31 PROCEDURE — 37000009 HC ANESTHESIA EA ADD 15 MINS: Performed by: INTERNAL MEDICINE

## 2023-07-31 RX ORDER — MUPIROCIN 20 MG/G
OINTMENT TOPICAL 2 TIMES DAILY
Status: DISCONTINUED | OUTPATIENT
Start: 2023-07-31 | End: 2023-08-01 | Stop reason: HOSPADM

## 2023-07-31 RX ORDER — ONDANSETRON 2 MG/ML
4 INJECTION INTRAMUSCULAR; INTRAVENOUS DAILY PRN
Status: DISCONTINUED | OUTPATIENT
Start: 2023-07-31 | End: 2023-08-01 | Stop reason: HOSPADM

## 2023-07-31 RX ORDER — LIDOCAINE HYDROCHLORIDE 20 MG/ML
INJECTION, SOLUTION EPIDURAL; INFILTRATION; INTRACAUDAL; PERINEURAL
Status: DISCONTINUED | OUTPATIENT
Start: 2023-07-31 | End: 2023-07-31

## 2023-07-31 RX ORDER — NICOTINE 7MG/24HR
1 PATCH, TRANSDERMAL 24 HOURS TRANSDERMAL DAILY
Status: DISCONTINUED | OUTPATIENT
Start: 2023-07-31 | End: 2023-08-01 | Stop reason: HOSPADM

## 2023-07-31 RX ORDER — SUCCINYLCHOLINE CHLORIDE 20 MG/ML
INJECTION INTRAMUSCULAR; INTRAVENOUS
Status: DISCONTINUED | OUTPATIENT
Start: 2023-07-31 | End: 2023-07-31

## 2023-07-31 RX ORDER — ETOMIDATE 2 MG/ML
INJECTION INTRAVENOUS
Status: DISCONTINUED | OUTPATIENT
Start: 2023-07-31 | End: 2023-07-31

## 2023-07-31 RX ORDER — MICONAZOLE NITRATE 2 %
2 CREAM (GRAM) TOPICAL
Qty: 14 EACH | Refills: 0 | Status: ON HOLD | OUTPATIENT
Start: 2023-07-31 | End: 2023-08-02 | Stop reason: CLARIF

## 2023-07-31 RX ORDER — ONDANSETRON HYDROCHLORIDE 2 MG/ML
INJECTION, SOLUTION INTRAMUSCULAR; INTRAVENOUS
Status: DISCONTINUED | OUTPATIENT
Start: 2023-07-31 | End: 2023-07-31

## 2023-07-31 RX ORDER — ROCURONIUM BROMIDE 10 MG/ML
INJECTION, SOLUTION INTRAVENOUS
Status: DISCONTINUED | OUTPATIENT
Start: 2023-07-31 | End: 2023-07-31

## 2023-07-31 RX ORDER — BENZONATATE 100 MG/1
200 CAPSULE ORAL 3 TIMES DAILY PRN
Status: DISCONTINUED | OUTPATIENT
Start: 2023-07-31 | End: 2023-08-01 | Stop reason: HOSPADM

## 2023-07-31 RX ORDER — POTASSIUM CHLORIDE 7.45 MG/ML
10 INJECTION INTRAVENOUS
Status: COMPLETED | OUTPATIENT
Start: 2023-07-31 | End: 2023-07-31

## 2023-07-31 RX ORDER — PHENYLEPHRINE HYDROCHLORIDE 10 MG/ML
INJECTION INTRAVENOUS
Status: DISCONTINUED | OUTPATIENT
Start: 2023-07-31 | End: 2023-07-31

## 2023-07-31 RX ORDER — FENTANYL CITRATE 50 UG/ML
INJECTION, SOLUTION INTRAMUSCULAR; INTRAVENOUS
Status: DISCONTINUED | OUTPATIENT
Start: 2023-07-31 | End: 2023-07-31

## 2023-07-31 RX ORDER — FAMOTIDINE 10 MG/ML
INJECTION INTRAVENOUS
Status: DISCONTINUED | OUTPATIENT
Start: 2023-07-31 | End: 2023-07-31

## 2023-07-31 RX ADMIN — LIDOCAINE HYDROCHLORIDE 60 MG: 20 INJECTION, SOLUTION INTRAVENOUS at 04:07

## 2023-07-31 RX ADMIN — OXYCODONE HYDROCHLORIDE 5 MG: 5 TABLET ORAL at 10:07

## 2023-07-31 RX ADMIN — PIPERACILLIN SODIUM,TAZOBACTAM SODIUM 3.38 G: 3; .375 INJECTION, POWDER, FOR SOLUTION INTRAVENOUS at 09:07

## 2023-07-31 RX ADMIN — ACETAMINOPHEN 1000 MG: 10 INJECTION INTRAVENOUS at 12:07

## 2023-07-31 RX ADMIN — POTASSIUM CHLORIDE 10 MEQ: 7.46 INJECTION, SOLUTION INTRAVENOUS at 12:07

## 2023-07-31 RX ADMIN — NICOTINE 1 PATCH: 21 PATCH, EXTENDED RELEASE TRANSDERMAL at 08:07

## 2023-07-31 RX ADMIN — ACETAMINOPHEN 1000 MG: 10 INJECTION INTRAVENOUS at 09:07

## 2023-07-31 RX ADMIN — HEPARIN SODIUM 5000 UNITS: 5000 INJECTION INTRAVENOUS; SUBCUTANEOUS at 06:07

## 2023-07-31 RX ADMIN — IPRATROPIUM BROMIDE 0.5 MG: 0.5 SOLUTION RESPIRATORY (INHALATION) at 07:07

## 2023-07-31 RX ADMIN — MUPIROCIN 1 G: 20 OINTMENT TOPICAL at 10:07

## 2023-07-31 RX ADMIN — Medication 100 MG: at 04:07

## 2023-07-31 RX ADMIN — PHENYLEPHRINE HYDROCHLORIDE 50 MCG: 10 INJECTION INTRAVENOUS at 05:07

## 2023-07-31 RX ADMIN — POTASSIUM CHLORIDE 10 MEQ: 7.46 INJECTION, SOLUTION INTRAVENOUS at 10:07

## 2023-07-31 RX ADMIN — NICOTINE 1 PATCH: 7 PATCH, EXTENDED RELEASE TRANSDERMAL at 03:07

## 2023-07-31 RX ADMIN — PIPERACILLIN SODIUM,TAZOBACTAM SODIUM 3.38 G: 3; .375 INJECTION, POWDER, FOR SOLUTION INTRAVENOUS at 06:07

## 2023-07-31 RX ADMIN — SODIUM CHLORIDE, SODIUM LACTATE, POTASSIUM CHLORIDE, AND CALCIUM CHLORIDE: .6; .31; .03; .02 INJECTION, SOLUTION INTRAVENOUS at 04:07

## 2023-07-31 RX ADMIN — FAMOTIDINE 20 MG: 10 INJECTION, SOLUTION INTRAVENOUS at 05:07

## 2023-07-31 RX ADMIN — ONDANSETRON 4 MG: 2 INJECTION INTRAMUSCULAR; INTRAVENOUS at 05:07

## 2023-07-31 RX ADMIN — SUGAMMADEX 200 MG: 100 INJECTION, SOLUTION INTRAVENOUS at 05:07

## 2023-07-31 RX ADMIN — HEPARIN SODIUM 5000 UNITS: 5000 INJECTION INTRAVENOUS; SUBCUTANEOUS at 01:07

## 2023-07-31 RX ADMIN — HEPARIN SODIUM 5000 UNITS: 5000 INJECTION INTRAVENOUS; SUBCUTANEOUS at 10:07

## 2023-07-31 RX ADMIN — POTASSIUM CHLORIDE 10 MEQ: 7.46 INJECTION, SOLUTION INTRAVENOUS at 03:07

## 2023-07-31 RX ADMIN — OXYCODONE HYDROCHLORIDE 5 MG: 5 TABLET ORAL at 01:07

## 2023-07-31 RX ADMIN — POTASSIUM CHLORIDE 10 MEQ: 7.46 INJECTION, SOLUTION INTRAVENOUS at 02:07

## 2023-07-31 RX ADMIN — FENTANYL CITRATE 50 MCG: 50 INJECTION INTRAMUSCULAR; INTRAVENOUS at 04:07

## 2023-07-31 RX ADMIN — MUPIROCIN 1 G: 20 OINTMENT TOPICAL at 09:07

## 2023-07-31 RX ADMIN — ROCURONIUM BROMIDE 5 MG: 10 INJECTION, SOLUTION INTRAVENOUS at 04:07

## 2023-07-31 RX ADMIN — POTASSIUM CHLORIDE 10 MEQ: 7.46 INJECTION, SOLUTION INTRAVENOUS at 04:07

## 2023-07-31 RX ADMIN — VANCOMYCIN HYDROCHLORIDE 1500 MG: 1.5 INJECTION, POWDER, LYOPHILIZED, FOR SOLUTION INTRAVENOUS at 10:07

## 2023-07-31 RX ADMIN — PIPERACILLIN SODIUM,TAZOBACTAM SODIUM 3.38 G: 3; .375 INJECTION, POWDER, FOR SOLUTION INTRAVENOUS at 01:07

## 2023-07-31 RX ADMIN — POTASSIUM CHLORIDE 10 MEQ: 7.46 INJECTION, SOLUTION INTRAVENOUS at 01:07

## 2023-07-31 RX ADMIN — ETOMIDATE 10 MG: 2 INJECTION, SOLUTION INTRAVENOUS at 04:07

## 2023-07-31 RX ADMIN — MAGNESIUM SULFATE IN WATER 2 G: 40 INJECTION, SOLUTION INTRAVENOUS at 03:07

## 2023-07-31 RX ADMIN — MORPHINE SULFATE 2 MG: 2 INJECTION, SOLUTION INTRAMUSCULAR; INTRAVENOUS at 11:07

## 2023-07-31 RX ADMIN — IPRATROPIUM BROMIDE 0.5 MG: 0.5 SOLUTION RESPIRATORY (INHALATION) at 12:07

## 2023-07-31 RX ADMIN — PANTOPRAZOLE SODIUM 40 MG: 40 INJECTION, POWDER, LYOPHILIZED, FOR SOLUTION INTRAVENOUS at 08:07

## 2023-07-31 NOTE — ASSESSMENT & PLAN NOTE
Sinus tach with sinus arrhythmia PVC   Likely in the setting of acute medical conditions, alcohol use, pain, infection   Keep potassium greater than 4, add on magnesium level  Remote telemetry monitoring

## 2023-07-31 NOTE — ASSESSMENT & PLAN NOTE
Lactic acid 3.2   Repeat status post fluid resuscitation IV antibiotics   Trend q.4 hours until less than 2

## 2023-07-31 NOTE — ASSESSMENT & PLAN NOTE
Daily alcohol consumption, 4 drinks of crown daily  Ordered banana bag   CIWA protocol with p.r.n. symptom driven Ativan and monitoring

## 2023-07-31 NOTE — ANESTHESIA PROCEDURE NOTES
Intubation    Date/Time: 7/31/2023 4:55 PM    Performed by: Aakash Montalvo CRNA  Authorized by: Perez Coffman MD    Intubation:     Induction:  Rapid sequence induction    Intubated:  Postinduction    Mask Ventilation:  Not attempted    Attempts:  1    Attempted By:  CRNA    Method of Intubation:  Video laryngoscopy    Blade:  Vicente 3    Laryngeal View Grade: Grade I - full view of cords      Difficult Airway Encountered?: No      Complications:  None    Airway Device:  Oral endotracheal tube    Airway Device Size:  7.0    Style/Cuff Inflation:  Cuffed    Inflation Amount (mL):  5    Tube secured:  20    Placement Verified By:  Capnometry    Complicating Factors:  None    Findings Post-Intubation:  BS equal bilateral

## 2023-07-31 NOTE — CONSULTS
Pulmonary/Critical Care Consult      PATIENT NAME: Kenyetta Andersen  MRN: [id number]  TODAY'S DATE: 2023  10:30 AM  ADMIT DATE: 2023  AGE: 88 y.o. : 1935    CONSULT REQUESTED BY: Christen Adorno MD    REASON FOR CONSULT:   Critical care management    HPI:  The patient is a functional 88 year old female who began having abdominal pain  morning.  It worsened through the day and she came to the ER last night.  She has bacteremia with multiiple Gram negative rods and a CT showing a dilated gallbladder and common bile duct.  She has hyperbilirubinemia and a transaminitis.    REVIEW OF SYSTEMS  GENERAL: Feeling poorly  EYES: Vision is good.  ENT: No sinusitis or pharyngitis.   HEART: No chest pain or palpitations.  LUNGS: No cough, sputum, or wheezing.  GI: She has abdominal pain, she is anorectic.  : No dysuria, hesitancy, or nocturia.  SKIN: No lesions or rashes.  MUSCULOSKELETAL: No joint pain or myalgias.She has a R antecubital IV.  NEURO: No headaches or neuropathy.  LYMPH: No edema or adenopathy.  PSYCH: No anxiety or depression.  ENDO: No weight change.    ALLERGIES  Review of patient's allergies indicates:   Allergen Reactions    Ace inhibitors Edema     Other reaction(s): Angioedema    Codeine Hives       INPATIENT SCHEDULED MEDICATIONS   heparin (porcine)  5,000 Units Subcutaneous Q8H    ipratropium  0.5 mg Nebulization Q6H    mupirocin   Nasal BID    nicotine  1 patch Transdermal Daily    pantoprazole  40 mg Intravenous Daily    piperacillin-tazobactam (Zosyn) IV (PEDS and ADULTS) (extended infusion is not appropriate)  3.375 g Intravenous Q8H    potassium chloride  10 mEq Intravenous Q1H    vancomycin (VANCOCIN) IV (PEDS and ADULTS)  1,500 mg Intravenous Once      lactated ringers 125 mL/hr at 23 1001       MEDICAL AND SURGICAL HISTORY  Past Medical History:   Diagnosis Date    Anatomical narrow angle 2012    Anxiety     Arthritis     Escalante esophagus     Blood  "transfusion     Cataract     Done OU    Colon cancer 2009    Colon polyps 3/14/2017    GERD (gastroesophageal reflux disease)     Glaucoma 2/24/2012    Nuclear sclerosis 8/27/2012    Osteoporosis     Primary hypothyroidism 2/23/2020    Pseudophakia - Left Eye 2/24/2012     Past Surgical History:   Procedure Laterality Date    APPENDECTOMY      CATARACT EXTRACTION W/  INTRAOCULAR LENS IMPLANT Left     CATARACT EXTRACTION W/  INTRAOCULAR LENS IMPLANT Right     Dr Sawant    COLON SURGERY      resection    COLONOSCOPY N/A 3/14/2017    Procedure: COLONOSCOPY;  Surgeon: Killian Guerrier MD;  Location: [facility] ENDO;  Service: Endoscopy;  Laterality: N/A;    COLONOSCOPY  03/14/2017    Dr. Guerrier: hemorrhoids, one colon polyp removed, "Patent functional end-to-end ileo-colonic anastomosis"with healthy mucosa; biopsy: hyperplastic polyp; repeat in 3 years for surveillance    COLONOSCOPY N/A 1/21/2020    Procedure: COLONOSCOPY;  Surgeon: Timo Darling MD;  Location: [facility];  Service: Endoscopy;  Laterality: N/A;    COLONOSCOPY N/A 7/8/2022    Procedure: COLONOSCOPY;  Surgeon: Anaid Washington MD;  Location: [facility] ENDO;  Service: Endoscopy;  Laterality: N/A;    ECTOPIC PREGNANCY SURGERY      ESOPHAGOGASTRODUODENOSCOPY N/A 5/16/2019    Procedure: EGD (ESOPHAGOGASTRODUODENOSCOPY);  Surgeon: Anaid Washington MD;  Location: [facility] ENDO;  Service: Endoscopy;  Laterality: N/A;    ESOPHAGOGASTRODUODENOSCOPY N/A 7/11/2019    Procedure: EGD (ESOPHAGOGASTRODUODENOSCOPY);  Surgeon: Anaid Washington MD;  Location: [facility] ENDO;  Service: Endoscopy;  Laterality: N/A;    ESOPHAGOGASTRODUODENOSCOPY N/A 2/5/2021    Procedure: EGD (ESOPHAGOGASTRODUODENOSCOPY);  Surgeon: Anaid Washington MD;  Location: [facility] ENDO;  Service: Endoscopy;  Laterality: N/A;    ESOPHAGOGASTRODUODENOSCOPY N/A 11/11/2021    Procedure: EGD (ESOPHAGOGASTRODUODENOSCOPY);  Surgeon: Anaid Washington MD;  Location: [facility] ENDO;  Service: Endoscopy;  Laterality: N/A;    " ESOPHAGOGASTRODUODENOSCOPY N/A 2022    Procedure: EGD (ESOPHAGOGASTRODUODENOSCOPY);  Surgeon: Anaid Washington MD;  Location: [facility] ENDO;  Service: Endoscopy;  Laterality: N/A;    ESOPHAGOGASTRODUODENOSCOPY N/A 2023    Procedure: EGD (ESOPHAGOGASTRODUODENOSCOPY);  Surgeon: Anaid Washington MD;  Location: [facility] ENDO;  Service: Endoscopy;  Laterality: N/A;    EYE SURGERY      bilateral cataracts    HYSTERECTOMY      complete    JOINT REPLACEMENT      Liban Knee    ROTATOR CUFF REPAIR Right     TOE SURGERY      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    UPPER GASTROINTESTINAL ENDOSCOPY  2017    Dr. Guerrier: gastritis and esophagitis, biopsy: chronic gastritis, negative for h pylori, esophageal- positive eosinophils, negative for barretts; repeat in 2 months    UPPER GASTROINTESTINAL ENDOSCOPY  2017    Dr. Guerrier       ALCOHOL, TOBACCO AND DRUG USE  Social History     Tobacco Use   Smoking Status Every Day    Current packs/day: 0.00    Average packs/day: 0.3 packs/day for 65.0 years (21.5 ttl pk-yrs)    Types: Cigarettes    Start date: 10/5/1955    Last attempt to quit: 10/5/2020    Years since quittin.8   Smokeless Tobacco Never   Tobacco Comments    3/1/23--states smokes 1-2 cigarettes a day     Social History     Substance and Sexual Activity   Alcohol Use Yes    Alcohol/week: 2.0 standard drinks of alcohol    Types: 2 Shots of liquor per week    Comment: Daily, about 4 drinks per day (crown)     Social History     Substance and Sexual Activity   Drug Use No       FAMILY HISTORY  Family History   Problem Relation Age of Onset    Heart disease Mother         heart attack    Heart disease Father     Heart disease Brother         MI    Parkinsonism Sister     Arthritis Sister     No Known Problems Brother     No Known Problems Brother     Amblyopia Neg Hx     Blindness Neg Hx     Cancer Neg Hx     Cataracts Neg Hx     Glaucoma Neg Hx     Hypertension Neg Hx     Macular degeneration Neg Hx      Retinal detachment Neg Hx     Strabismus Neg Hx     Stroke Neg Hx     Thyroid disease Neg Hx     Diabetes Neg Hx     Colon cancer Neg Hx     Crohn's disease Neg Hx     Esophageal cancer Neg Hx     Stomach cancer Neg Hx     Ulcerative colitis Neg Hx        VITAL SIGNS (MOST RECENT)  Temp: 98.7 °F (37.1 °C) (07/31/23 0701)  Pulse: 89 (07/31/23 1001)  Resp: 16 (07/31/23 1001)  BP: (!) 92/55 (07/31/23 1001)  SpO2: 99 % (07/31/23 1001)    INTAKE AND OUTPUT (LAST 24 HOURS):  Intake/Output Summary (Last 24 hours) at 7/31/2023 1030  Last data filed at 7/31/2023 1001  Gross per 24 hour   Intake 4145 ml   Output --   Net 4145 ml       WEIGHT  Wt Readings from Last 1 Encounters:   07/31/23 61.2 kg (135 lb)       PHYSICAL EXAM  GENERAL: Older patient in no distress.  HEENT: Pupils equal and reactive. Extraocular movements intact. Nose intact. Pharynx moist.  NECK: Supple.   HEART: Regular rate and rhythm. No murmur or gallop auscultated.  LUNGS: Clear to auscultation and percussion. Lung excursion symmetrical. No change in fremitus. No adventitial noises.  ABDOMEN: Bowel sounds present. Tender in the RUQ with a positive Johnson's sign.  : Normal anatomy.  EXTREMITIES: Normal muscle tone and joint movement, no cyanosis or clubbing.   LYMPHATICS: No adenopathy palpated, no edema.  SKIN: Dry, intact, no lesions.   NEURO: Cranial nerves II-XII intact. Motor strength 5/5 bilaterally, upper and lower extremities.  PSYCH: Appropriate affect      CBC LAST (LAST 24 HOURS)  Recent Labs   Lab 07/31/23  0456 07/31/23  0609   WBC 12.22  --    RBC 4.15  --    HGB 13.2  --    HCT 39.9 39   MCV 96  --    MCH 31.8*  --    MCHC 33.1  --    RDW 13.4  --    *  --    MPV 10.1  --    GRAN 91.6*  11.2*  --    LYMPH 5.0*  0.6*  --    MONO 2.4*  0.3  --    BASO 0.03  --    NRBC 0  --        CHEMISTRY LAST (LAST 24 HOURS)  Recent Labs   Lab 07/31/23  0456 07/31/23  0609     --    K 3.2*  --    *  --    CO2 20*  --    ANIONGAP 10   --    BUN 17  --    CREATININE 1.0  --    *  --    CALCIUM 8.2*  --    PH  --  7.327*   MG 1.8  --    ALBUMIN 2.5*  --    PROT 5.4*  --    ALKPHOS 88  --    *  --    *  --    BILITOT 2.8*  --        COAGULATION LAST (LAST 24 HOURS)  Recent Labs   Lab 07/31/23  0456   INR 1.1       CARDIAC PROFILE (LAST 24 HOURS)  Recent Labs   Lab 07/30/23  1844   *   TROPONINIHS 7.2       LAST 7 DAYS MICROBIOLOGY   Microbiology Results (last 7 days)       Procedure Component Value Units Date/Time    Blood culture [[id number]] Collected: 07/31/23 0931    Order Status: Sent Specimen: Blood Updated: 07/31/23 0936    Blood culture [[id number]] Collected: 07/31/23 0931    Order Status: Sent Specimen: Blood Updated: 07/31/23 0936    Rapid Organism ID by PCR (from Blood culture) [[id number]] Collected: 07/31/23 0905    Order Status: No result Updated: 07/31/23 0905    Blood culture [[id number]] Collected: 07/30/23 2344    Order Status: Completed Specimen: Blood Updated: 07/31/23 0859     Blood Culture, Routine Gram stain aer bottle: Gram negative rods      Gram stain aer bottle: Gram positive cocci      Results called to and read back by: SHALINI OROPEZA  07/31/2023      08:59  JT    Blood culture [[id number]] Collected: 07/30/23 2321    Order Status: Completed Specimen: Blood Updated: 07/31/23 0558     Blood Culture, Routine No Growth to date    Narrative:      Please sent 2 set            MOST RECENT IMAGING  X-Ray Chest AP Portable  EXAMINATION:  XR CHEST AP PORTABLE    CLINICAL INDICATION:  Female, 88 years old. Chest    COMPARISON:  None.    FINDINGS:  The heart and great vessels appear normal. There are some atherosclerotic changes of aorta.    Both lungs are well expanded and clear. No pleural effusion can be seen. No pneumothorax can be seen.    IMPRESSION:  No x-ray evidence of active or acute disease.    Electronically signed by:  Marcela Escalante Jr., MD  7/30/2023 9:22 PM CDT Workstation:  [id number]  CT Abdomen Pelvis With Contrast  EXAMINATION:  CT ABDOMEN PELVIS WITH CONTRAST    CLINICAL INDICATION: Female, 88 years old. Epigastric pain    TECHNIQUE: Helical CT scan examination of the abdomen and pelvis is performed from the domes of the diaphragm to the pubic symphysis with the administration of intravenous contrast material.    CONTRAST: 75 mL of Omnipaque 300 IV.    COMPARISON: None    FINDINGS:  Lower Chest: Visualized lung bases are clear other than hypoaeration changes. Heart is normal in size. No pericardial or pleural effusion.    Liver: Normal in size and contour. No focal lesion.    Bile Ducts: Common bile duct is  dilated.    Gallbladder: There is marked dilation of the gallbladder and there may be pericolic cystic fluid. No definite gallstones can be seen.    Pancreas: Pancreas is atrophic but demonstrates no other abnormality.    Spleen: Normal in size and contour.    Adrenals: Normal configuration.    Kidneys and Ureters: Normal size and contour. No hydronephrosis. There are bilateral renal cysts.    Bladder: Normal in appearance.    Bowel:  Stomach: Unremarkable.  Small Intestine: Unremarkable.  Appendix: No inflammatory changes.  Colon: Unremarkable.    Vessels: Abdominal aorta and inferior vena cava are normal in course and caliber.    Reproductive Organs: Uterus is apparently surgically absent and no pelvic mass can be seen    Lymph Nodes: No pathologic mesenteric or retroperitoneal lymph nodes.    Peritoneum: No free air, free fluid, or fluid collection.    Abdominal Wall: No hernia or mass.    Musculoskeletal: No acute abnormality or suspicious bony lesion. There is considerable compression of the L1 vertebral body which appears chronic. There is some retropulsion of bone. Compression of the superior endplate of T12 also appears to be chronic. There are degenerative disc changes at T12-L1 to L2-L3. No aggressive skeletal lesions can be seen.    IMPRESSION:  Dilation of the  gallbladder and dilation of the common bile duct. There may be pericholecystic fluid. No gallstones can be seen but changes may represent  noncalculous acute cholecystitis.    Small bilateral renal cysts.    This exam was performed according to our departmental dose-optimization program which includes automated exposure control, adjustment of the mA and/or kV according to patient size and/or use of iterative reconstruction technique.    Electronically signed by:  Marcela Escalante Jr., MD  7/30/2023 9:04 PM CDT Workstation: [id number]      CURRENT VISIT EKG  Results for orders placed or performed during the hospital encounter of 07/30/23   EKG 12-lead    Narrative    Test Reason : L03.90,    Vent. Rate : 094 BPM     Atrial Rate : 094 BPM     P-R Int : 174 ms          QRS Dur : 080 ms      QT Int : 344 ms       P-R-T Axes : 074 -44 067 degrees     QTc Int : 430 ms    Sinus rhythm with sinus arrhythmia with occasional Premature ventricular  complexes  Left axis deviation  Nonspecific T wave abnormality  Abnormal ECG  When compared with ECG of 25-FEB-2013 12:27,  Premature ventricular complexes are now Present  The axis Shifted left  Nonspecific T wave abnormality now evident in Inferior leads  Nonspecific T wave abnormality, worse in Lateral leads    Referred By: [email or web address]   SELF           Confirmed By:        ECHOCARDIOGRAM RESULTS  No results found for this or any previous visit.        Oxygen INFORMATION   2 liters           LAST ARTERIAL BLOOD GAS  ABG  Recent Labs   Lab 07/31/23  0609   PH 7.327*   PO2 71*   PCO2 37.1   HCO3 19.4*   BE -7       IMPRESSION AND PLAN  Apparent acalculous choleycstitis  - awaiting MRCP and HIDA scan  - bacteremic with multiple organisms  - ID following  Tobacco abuse  - 5 cigarettes a day    Continue NPO  Protonix for GI prophylaxis  SCD's pending possible need for surgery    Following      Mary Lou Ramos MD  Date of Service: 07/31/2023  10:30 AM

## 2023-07-31 NOTE — HPI
Ms. Andersen is an 88-year-old female who presented to the ED with chest/abdominal pain.  Patient just received morphine, somnolent but does briefly awaken but back to sleep, hard of hearing, collateral from family members present at bedside.  States she went to [employer] earlier this morning, this afternoon around 2:00 p.m. developed chest pain, described as someone sitting on her chest, radiating down into her epigastric/abdominal area, associated with nausea and 3 episodes of emesis PTA.  Denies constipation or diarrhea.  Patient did report mild shortness of breath, current smoker at least 2 pack per day, does not have home oxygen, no formal diagnosis of COPD.  Patient has had multiple prior abdominal surgery, remote history of stage III colorectal cancer status post adjuvant chemotherapy.  Daily alcohol consumption, quantified by at least 4 drinks of crown daily.  In the ED afebrile, blood pressure elevated, tachycardic.  Labs with no leukocytosis with left shift present, hemoglobin 15.3, BUN/creatinine 14/1.1, T bilirubin 1.8, , , , lipase 28, lactic acid 3.2, troponin 7.2.  EKG sinus with sinus arrhythmia with PVCs.  Chest x-ray no focal infiltrates or consolidation.  CT abdomen with marked distended gallbladder with dilatation of CBD, concerning for acalculous cholecystitis.  She received 2 L normal saline, famotidine 20 mg, Zofran, morphine 2 mg and ordered for piperacillin/tazobactam.  Case discussed with ED provider who states he discussed with on-call general surgery, Dr. Brewer, recommends MRCP and patient will be seen in consultation tomorrow.  Plan of care discussed with patient and visitors at bedside as best able.

## 2023-07-31 NOTE — CONSULTS
Consult Note  Infectious Disease    Reason for Consult:  Gram-positive and Gram-negative bacteremia:  Septic shock    HPI: Kenyetta Andersen is a 88 y.o. female , active smoker and daily alcohol use, with past medical history of hypothyroidism, colon cancer status post resection 2 years ago, follows with Oncology, who presented with sudden onset of epigastric abdominal pain radiated to her chest, 10/10 in intensity, associated nausea, and vomit and diarrhea.  She denies fever or chills. She also complains of cough, white phlegm, reports shortness of breath.  Denies headache, increased urinary frequency or dysuria.    Labs on admission white count 11, left shift 95%, H&H 15.3/46, , platelet count 235  Creatinine 1.1   Elevated LFTs /.  Total bili 2.8  Lactic acid 4.1, down to 1.7   UA negative   Chest x-ray clear  CT abdomen pelvis with pericholecystic fluid, can not exclude acalculous cholecystitis.      Discussed with Intensivist, and Surgery, plan for MRCP if unrevealing, we will get HIDA scan.    Review of patient's allergies indicates:   Allergen Reactions    Ace inhibitors Edema     Other reaction(s): Angioedema    Codeine Hives     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    Anxiety     Arthritis     Escalante esophagus     Blood transfusion     Cataract     Done OU    Colon cancer 2009    Colon polyps 3/14/2017    GERD (gastroesophageal reflux disease)     Glaucoma 2/24/2012    Nuclear sclerosis 8/27/2012    Osteoporosis     Primary hypothyroidism 2/23/2020    Pseudophakia - Left Eye 2/24/2012     Past Surgical History:   Procedure Laterality Date    APPENDECTOMY      CATARACT EXTRACTION W/  INTRAOCULAR LENS IMPLANT Left     CATARACT EXTRACTION W/  INTRAOCULAR LENS IMPLANT Right     Dr Sawant    COLON SURGERY      resection    COLONOSCOPY N/A 3/14/2017    Procedure: COLONOSCOPY;  Surgeon: Killian Guerrier MD;  Location: [facility];   "Service: Endoscopy;  Laterality: N/A;    COLONOSCOPY  03/14/2017    Dr. Guerrier: hemorrhoids, one colon polyp removed, "Patent functional end-to-end ileo-colonic anastomosis"with healthy mucosa; biopsy: hyperplastic polyp; repeat in 3 years for surveillance    COLONOSCOPY N/A 1/21/2020    Procedure: COLONOSCOPY;  Surgeon: Timo Darling MD;  Location: [facility];  Service: Endoscopy;  Laterality: N/A;    COLONOSCOPY N/A 7/8/2022    Procedure: COLONOSCOPY;  Surgeon: Anaid Washington MD;  Location: [facility];  Service: Endoscopy;  Laterality: N/A;    ECTOPIC PREGNANCY SURGERY      ESOPHAGOGASTRODUODENOSCOPY N/A 5/16/2019    Procedure: EGD (ESOPHAGOGASTRODUODENOSCOPY);  Surgeon: Anaid Washington MD;  Location: [facility];  Service: Endoscopy;  Laterality: N/A;    ESOPHAGOGASTRODUODENOSCOPY N/A 7/11/2019    Procedure: EGD (ESOPHAGOGASTRODUODENOSCOPY);  Surgeon: Anaid Washington MD;  Location: [facility];  Service: Endoscopy;  Laterality: N/A;    ESOPHAGOGASTRODUODENOSCOPY N/A 2/5/2021    Procedure: EGD (ESOPHAGOGASTRODUODENOSCOPY);  Surgeon: Anaid Washington MD;  Location: [facility];  Service: Endoscopy;  Laterality: N/A;    ESOPHAGOGASTRODUODENOSCOPY N/A 11/11/2021    Procedure: EGD (ESOPHAGOGASTRODUODENOSCOPY);  Surgeon: Anaid Washington MD;  Location: [facility];  Service: Endoscopy;  Laterality: N/A;    ESOPHAGOGASTRODUODENOSCOPY N/A 7/8/2022    Procedure: EGD (ESOPHAGOGASTRODUODENOSCOPY);  Surgeon: Anaid Washington MD;  Location: [facility];  Service: Endoscopy;  Laterality: N/A;    ESOPHAGOGASTRODUODENOSCOPY N/A 4/12/2023    Procedure: EGD (ESOPHAGOGASTRODUODENOSCOPY);  Surgeon: Anaid Washington MD;  Location: [facility];  Service: Endoscopy;  Laterality: N/A;    EYE SURGERY      bilateral cataracts    HYSTERECTOMY      complete    JOINT REPLACEMENT      Liban Knee    ROTATOR CUFF REPAIR Right     TOE SURGERY      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    UPPER GASTROINTESTINAL ENDOSCOPY  06/12/2017 "    Dr. Guerrier: gastritis and esophagitis, biopsy: chronic gastritis, negative for h pylori, esophageal- positive eosinophils, negative for barretts; repeat in 2 months    UPPER GASTROINTESTINAL ENDOSCOPY  2017    Dr. Guerrier     Social History     Tobacco Use    Smoking status: Every Day     Current packs/day: 0.00     Average packs/day: 0.3 packs/day for 65.0 years (21.5 ttl pk-yrs)     Types: Cigarettes     Start date: 10/5/1955     Last attempt to quit: 10/5/2020     Years since quittin.8    Smokeless tobacco: Never    Tobacco comments:     3/1/23--states smokes 1-2 cigarettes a day   Substance Use Topics    Alcohol use: Yes     Alcohol/week: 2.0 standard drinks of alcohol     Types: 2 Shots of liquor per week     Comment: Daily, about 4 drinks per day (crown)        Family History   Problem Relation Age of Onset    Heart disease Mother         heart attack    Heart disease Father     Heart disease Brother         MI    Parkinsonism Sister     Arthritis Sister     No Known Problems Brother     No Known Problems Brother     Amblyopia Neg Hx     Blindness Neg Hx     Cancer Neg Hx     Cataracts Neg Hx     Glaucoma Neg Hx     Hypertension Neg Hx     Macular degeneration Neg Hx     Retinal detachment Neg Hx     Strabismus Neg Hx     Stroke Neg Hx     Thyroid disease Neg Hx     Diabetes Neg Hx     Colon cancer Neg Hx     Crohn's disease Neg Hx     Esophageal cancer Neg Hx     Stomach cancer Neg Hx     Ulcerative colitis Neg Hx        Review of Systems:   No chills, fever, sweats, weight loss  No change in vision, loss of vision or diplopia  No sinus congestion, purulent nasal discharge, post nasal drip or facial pain  No pain in mouth or throat. No problems with teeth, gums.  No chest pain, palpitations, syncope  No cough, sputum production, shortness of breath, dyspnea on exertion, pleurisy, hemoptysis  No dysphagia, odynophagia  + nausea, vomiting, diarrhea, constipation, blood in stool, epigastric pain  radiated to chest  No dysuria, hesitancy, hematuria, retention, incontinence  No swelling of joints, redness of joints, injuries, or new focal pain  No unusual headaches, dizziness, vertigo, numbness, paresthesias, neuropathy, falls  No anxiety, depression, substance abuse, sleep disturbance  No bleeding, lymphadenopathy  No new rashes, lesions, or wounds    Outdoor activities: From  home, lives alone. Smoker, daily alcohol consumption. No drugs.  Travel: None  Implants: None  Antibiotic History: Vanco/Zosyn IV      EXAM & DIAGNOSTICS REVIEWED:   Vitals:     Temp:  [97.9 °F (36.6 °C)-103.1 °F (39.5 °C)]   Temp: 98.7 °F (37.1 °C) (07/31/23 0701)  Pulse: 85 (07/31/23 0801)  Resp: 14 (07/31/23 0801)  BP: (!) 89/44 (07/31/23 0801)  SpO2: (!) 93 % (07/31/23 0801)    Intake/Output Summary (Last 24 hours) at 7/31/2023 0948  Last data filed at 7/31/2023 0801  Gross per 24 hour   Intake 3745 ml   Output --   Net 3745 ml       General:  In NAD. A little lethargic but arousable upon verbal commands, on O2 by NC 2L  Eyes:  Anicteric, PERRL  ENT:  No ulcers, exudates, thrush, nares patent, dentures and fair lower  Neck:  Supple, no adenopathy appreciated  Lungs: Clear to auscultation b/l  Heart:  S1/S2+, regular rhythm, no murmurs  Abd:  Johnson+, tender on palpation, rebound   :  Voids, urine clear  Musc:  Joints without effusion, swelling,  erythema, synovitis, ambulatory  Skin:  Warm, no rash  Wound:   Neuro:  Following commands, speech is clear, moves all extremities   Psych:  Calm, cooperative  Lymphatic:     No cervical, supraclavicular nodes  Extrem: No LE edema b/l  VAD:  Peripheral IV       Isolation: None       General Labs reviewed:  Recent Labs   Lab 07/30/23  1844 07/31/23  0456 07/31/23  0609   WBC 11.04 12.22  --    HGB 15.3 13.2  --    HCT 46.5 39.9 39    141*  --        Recent Labs   Lab 07/30/23  1844 07/31/23  0456    142   K 3.9 3.2*    112*   CO2 23 20*   BUN 14 17   CREATININE 1.1 1.0  "  CALCIUM 9.6 8.2*   PROT 7.5 5.4*   BILITOT 1.8* 2.8*   ALKPHOS 120 88   * 105*   * 217*     No results for input(s): "CRP" in the last 168 hours.  No results for input(s): "SEDRATE" in the last 168 hours.    Estimated Creatinine Clearance: 33.4 mL/min (based on SCr of 1 mg/dL).     Micro:  Microbiology Results (last 7 days)       Procedure Component Value Units Date/Time    Blood culture [[id number]] Collected: 07/31/23 0931    Order Status: Sent Specimen: Blood Updated: 07/31/23 0936    Blood culture [[id number]] Collected: 07/31/23 0931    Order Status: Sent Specimen: Blood Updated: 07/31/23 0936    Rapid Organism ID by PCR (from Blood culture) [[id number]] Collected: 07/31/23 0905    Order Status: No result Updated: 07/31/23 0905    Blood culture [[id number]] Collected: 07/30/23 2344    Order Status: Completed Specimen: Blood Updated: 07/31/23 0859     Blood Culture, Routine Gram stain aer bottle: Gram negative rods      Gram stain aer bottle: Gram positive cocci      Results called to and read back by: SHALINI OROPEZA  07/31/2023      08:59  JT    Blood culture [[id number]] Collected: 07/30/23 2321    Order Status: Completed Specimen: Blood Updated: 07/31/23 0558     Blood Culture, Routine No Growth to date    Narrative:      Please sent 2 set                Imaging Reviewed:  CXR  CT abdomen/pelvis:  Dilation of the gallbladder and dilation of the common bile duct. There may be pericholecystic fluid. No gallstones can be seen but changes may represent  noncalculous acute cholecystitis.  Small bilateral renal cysts.    Cardiology: EKG Qtc:430ms       IMPRESSION & PLAN     Sepsis secondary to polymicrobial bacteremia E coli, Klebsiella pneumoniae, and Bacteriodes fragilis in the setting of acute cholecystitis   Lactic acid 4.1-->1.7    Transamintis, secondary to above and alcohol use    PMHx: hypothyroidism, colon cancer status post resection 2 years ago, follows with " Oncology    Recommendations:  Adequate source control  Procal added-on  Follow MRCP   Surgery vs IR   Stop vancomycin IV   Zosyn 3.375g IV q8h over 4h infusion to cover isolated organisms, dose as per crcl  Repeat blood cultures x 2 sets  Follow sensitivities    D/w ICU nursing, Dr Ramos, Dr Adorno       Medical Decision Making during this encounter was  [_] Low Complexity  [_] Moderate Complexity  [xx] High Complexity

## 2023-07-31 NOTE — CONSULTS
Advance Care Planning     Date: 07/31/2023    Power of   I initiated the process of voluntary advance care planning today and explained the importance of this process to the patient.  I introduced the concept of advance directives to the patient, as well. Then the patient received detailed information about the importance of designating a Health Care Power of  (HCPOA). She was also instructed to communicate with this person about their wishes for future healthcare, should she become sick and lose decision-making capacity. The patient has not previously appointed a HCPOA. After our discussion, the patient has decided to complete a HCPOA and has appointed her brother and Niece , health care agent:  Tucker Whitty and Nalini Granado  & health care agent number:  [phone] / [phone] / [phone]  I spent a total time of 25 minutes discussing this issue with the patient.    Advance Care Planning     Date: 07/31/2023    Code Status  In light of the patients advanced and life limiting illness,I engaged the the patient in a voluntary conversation about the patient's preferences for care  at the very end of life. The patient wishes to have a natural, peaceful death.  Along those lines, the patient does not wish to have CPR or other invasive treatments performed when her heart and/or breathing stops. I communicated to the patient that a LaPOST form was completed to reflect other EOL preferences of the patient such as no PEG TUBE   I spent a total of 10 minutes engaging the patient in this advance care planning discussion.            HCPOA faxed to HIM department. Will follow up. PC Team will sign off thank you for consult.

## 2023-07-31 NOTE — PLAN OF CARE
"POC reviewed with Kenyetta ALVA Nathaly and family at 1400. Pt verbalized understanding. Questions and concerns addressed. No acute events today. Pt progressing toward goals. Will continue to monitor. See below and flowsheets for full assessment and VS info.       Neuro:  Lynnette Coma Scale  Best Eye Response: 3-->(E3) to speech  Best Motor Response: 6-->(M6) obeys commands  Best Verbal Response: 5-->(V5) oriented  Lynnette Coma Scale Score: 14  Assessment Qualifiers: patient not sedated/intubated, no eye obstruction present  Pupil PERRLA: yes     24 hr Temp:  [97.7 °F (36.5 °C)-103.1 °F (39.5 °C)]     CV:   Rhythm: normal sinus rhythm  BP goals:   SBP < 160  MAP > 65    Resp:      Oxygen Concentration (%): 32    Plan: N/A    GI/:     Diet/Nutrition Received: NPO  Last Bowel Movement: 07/26/23  Voiding Characteristics: external catheter    Intake/Output Summary (Last 24 hours) at 7/31/2023 1756  Last data filed at 7/31/2023 1739  Gross per 24 hour   Intake 6262.55 ml   Output 350 ml   Net 5912.55 ml     Unmeasured Output  Urine Occurrence: 1  Pad Count: 1    Labs/Accuchecks:  Recent Labs   Lab 07/31/23  0456 07/31/23  0609   WBC 12.22  --    RBC 4.15  --    HGB 13.2  --    HCT 39.9 39   *  --       Recent Labs   Lab 07/31/23  0456      K 3.2*   CO2 20*   *   BUN 17   CREATININE 1.0   ALKPHOS 88   *   *   BILITOT 2.8*      Recent Labs   Lab 07/31/23  0456   INR 1.1    No results for input(s): "CPK", "CPKMB", "TROPONINI", "MB" in the last 168 hours.    Electrolytes: Electrolytes replaced  Accuchecks: none    Gtts:   lactated ringers 100 mL/hr at 07/31/23 1601       LDA/Wounds:  Lines/Drains/Airways       Drain  Duration             Female External Urinary Catheter 07/31/23 1250 <1 day              Peripheral Intravenous Line  Duration                  Midline Catheter Insertion/Assessment  - Single Lumen 07/31/23 1247 Left basilic vein (medial side of arm) <1 day         Peripheral IV - " Single Lumen 07/31/23 1248 20 G Anterior;Left Forearm <1 day                  Wounds: No  Wound care consulted: No

## 2023-07-31 NOTE — PHARMACY MED REC
"Admission Medication History     The home medication history was taken by Pablito Armendariz.    You may go to "Admission" then "Reconcile Home Medications" tabs to review and/or act upon these items.     The home medication list has been updated by the Pharmacy department.   Please read ALL comments highlighted in yellow.   Please address this information as you see fit.    Feel free to contact us if you have any questions or require assistance.      The medications listed below were removed from the home medication list. Please reorder if appropriate:  Patient reports no longer taking the following medication(s):  Amoxicillin 875 mg  Ciprodex Drops  Vitamin B-12  Hydrochlorothiazide 12.5 mg  Latanoprost Drops  Meloxicam 15 mg    Medications listed below were obtained from: Patient/family and Analytic software- [employer]  No current facility-administered medications on file prior to encounter.     Current Outpatient Medications on File Prior to Encounter   Medication Sig Dispense Refill    alendronate (FOSAMAX) 70 MG tablet TAKE 1 TABLET (70 MG TOTAL) BY MOUTH EVERY 7 DAYS. ON EMPTY STOMACH IN MORNING, REMAIN UPRIGHT FOR 30 MINUTES. (Patient taking differently: Take 70 mg by mouth every 7 days. On empty stomach in morning, remain upright for 30 minutes. Mondays) 12 tablet 3    ascorbic acid, vitamin C, (VITAMIN C) 500 MG tablet Take 500 mg by mouth once daily.      b complex vitamins capsule Take 1 capsule by mouth once daily.      brimonidine 0.2% (ALPHAGAN) 0.2 % Drop INSTILL 1 DROP INTO BOTH EYES 3 TIMES A DAY (Patient taking differently: Place 1 drop into both eyes 3 (three) times daily.) 10 mL 6    calcium carbonate-magnesium hydroxide (ROLAIDS) 550-110 mg Chew Take 1 tablet by mouth 2 (two) times daily with meals.      dorzolamide-timolol 2-0.5% (COSOPT) 22.3-6.8 mg/mL ophthalmic solution INSTILL 1 DROP INTO BOTH EYES TWICE A DAY (Patient taking differently: Place 1 drop into both eyes 2 (two) times daily.) 10 mL 4 "    gabapentin (NEURONTIN) 300 MG capsule TAKE 1 CAP IN THE AM, 1 CAP MIDDAY, AND 2 CAPS AT NIGHT (Patient taking differently: Take 300 mg by mouth As instructed. TAKE 1 CAP IN THE AM, 1 CAP MIDDAY, AND 2 CAPS AT NIGHT) 360 capsule 3    omeprazole (PRILOSEC) 40 MG capsule Take 1 capsule (40 mg total) by mouth 2 (two) times daily before meals for 60 days, THEN 1 capsule (40 mg total) every morning. 90 capsule 3    potassium chloride SA (K-DUR,KLOR-CON) 20 MEQ tablet TAKE 1 TABLET BY MOUTH ONCE DAILY (Patient taking differently: Take 20 mEq by mouth once daily.) 90 tablet 3    mirtazapine (REMERON) 7.5 MG Tab TAKE 1 TABLET BY MOUTH EVERY EVENING. (Patient not taking: Reported on 7/31/2023) 90 tablet 1    [DISCONTINUED] amoxicillin (AMOXIL) 875 MG tablet Take 875 mg by mouth 2 (two) times daily.      [DISCONTINUED] ciprofloxacin-dexAMETHasone 0.3-0.1% (CIPRODEX) 0.3-0.1 % DrpS Place into both ears.      [DISCONTINUED] cyanocobalamin (VITAMIN B-12) 1000 MCG tablet Take 100 mcg by mouth once daily.      [DISCONTINUED] hydroCHLOROthiazide (HYDRODIURIL) 12.5 MG Tab TAKE 1 TABLET BY MOUTH EVERY DAY (Patient not taking: Reported on 6/16/2023) 90 tablet 0    [DISCONTINUED] latanoprost 0.005 % ophthalmic solution PLACE 1 DROP INTO BOTH EYES EVERY EVENING. 7.5 mL 3    [DISCONTINUED] meloxicam (MOBIC) 15 MG tablet TAKE 1 TABLET BY MOUTH EVERY DAY AS NEEDED FOR PAIN 30 tablet 2           Pablito Armendariz  EXT 1910                 .

## 2023-07-31 NOTE — CARE UPDATE
Continue breathing tx   07/31/23 0721   Patient Assessment/Suction   Level of Consciousness (AVPU) alert   Respiratory Effort Normal;Unlabored   Expansion/Accessory Muscles/Retractions no use of accessory muscles;expansion symmetric   All Lung Fields Breath Sounds rhonchi;diminished   CHU Breath Sounds rhonchi   LLL Breath Sounds diminished   RUL Breath Sounds rhonchi   RML Breath Sounds coarse   RLL Breath Sounds diminished   Rhythm/Pattern, Respiratory unlabored;depth regular;no shortness of breath reported   Cough Frequency infrequent   Cough Type productive;good   Secretions Amount small   Secretions Color white   Secretions Characteristics thick   PRE-TX-O2   Device (Oxygen Therapy) nasal cannula   $ Is the patient on Low Flow Oxygen? Yes   Flow (L/min) 4   SpO2 96 %   Pulse Oximetry Type Continuous   $ Pulse Oximetry - Multiple Charge Pulse Oximetry - Multiple   Pulse 92   Resp 16   Aerosol Therapy   $ Aerosol Therapy Charges Aerosol Treatment   Daily Review of Necessity (SVN) completed   Respiratory Treatment Status (SVN) given   Treatment Route (SVN) mask;oxygen   Patient Position (SVN) semi-Mobley's   Post Treatment Assessment (SVN) breath sounds improved;increased aeration   Signs of Intolerance (SVN) none   Breath Sounds Post-Respiratory Treatment   Throughout All Fields Post-Treatment All Fields   Throughout All Fields Post-Treatment aeration increased   Post-treatment Heart Rate (beats/min) 95   Post-treatment Resp Rate (breaths/min) 21   Education   $ Education Bronchodilator;15 min   Respiratory Evaluation   $ Care Plan Tech Time 15 min   $ Eval/Re-eval Charges Re-evaluation

## 2023-07-31 NOTE — ASSESSMENT & PLAN NOTE
Known history of CKD stage 3   Await UA with reflex urine culture  Renally dose all medications and avoid nephrotoxin drugs  Trending BMP

## 2023-07-31 NOTE — ASSESSMENT & PLAN NOTE
Known history of macrocytosis, previously on B12 supplementation, daily alcohol consumption   Repeat B12 and folic acid level

## 2023-07-31 NOTE — PROVIDER TRANSFER
Outside Transfer Acceptance Note / [facility]    Referring facility: Replaced by [facility]   Referring provider: MANJEET GONZALEZ  Accepting facility: [facility]  Accepting provider: Dr. Ally Lara  Admitting provider: Dr. Ally Lara  Reason for transfer:  higher level of care  Transfer diagnosis: sepsis 2/2 cholangitis   Transfer specialty requested: Gastroenterology  Transfer specialty notified: Yes  Transfer level: NUMBER 1-5: 2  Bed type requested: stepdown   Isolation status: No active isolations   Admission class or status: IP- Inpatient      Narrative     Per referring provider: 88-year-old female with past medical history of GERD, hypothyroidism, colon cancer, Escalante's esophagus presented with chest pain and abdominal pain as well as generalized weakness, she was found to have evidence of possible a calculus cholecystitis on presentation on CT scan.  She had elevated bilirubin and transaminitis, she was also diagnosed with cholangitis spiking fevers and found to have polymicrobial bacteremia with Gram-positive and Gram-negative bacteremia.  Infectious Disease was consulted, they recommended continue with Zosyn repeating blood cultures.  She was IV fluid resuscitated had a midline placed. general surgery was consulted-they recommended consult for ERCP. GI consult for ERCP and duct clearance, can consider cholecystectomy afterwards as MRCP was suggestive of common bile duct stone.  Palliative Care was consulted and reviewed code status, she was made DNR, she also expressed that she would not want a PEG tube.     She was taken to ERCP with GI, EGD findings was tortuous esophagus, narrowing at GE junction, distortion of antrum noted, gastroscope exchange for duodenal scope, scope advanced easily to the antrum and navigated around distorted antral thickening, scope advanced to the pylorus but unable to drop into the duodenal bulb, scope exchange back to  gastroscope and unremarkable, GI is recommending transfer to [facility] for attempt at ERCP.     Dr. Arreaga notified of patient and agreed to consult. Blood pressure has been stable, patient has not needed any IV pressors. She is on IVF and IV zosyn. Lactic acid normalized, no leukocytosis.     Objective     Vitals: Temp: 96 °F (35.6 °C) (07/31/23 1745)  Pulse: 89 (07/31/23 1815)  Resp: (!) 25 (07/31/23 1815)  BP: (!) 114/58 (07/31/23 1815)  SpO2: 98 % (07/31/23 1815)  Recent Labs: All pertinent labs within the past 24 hours have been reviewed.  Bilirubin:   Recent Labs   Lab 07/24/23  0752 07/30/23 1844 07/31/23 0456   BILITOT 0.3 1.8* 2.8*     Blood Culture:   Recent Labs   Lab 07/30/23  2321 07/30/23  2344 07/31/23  0931   LABBLOO Gram stain aer bottle: Gram negative rods  Positive results previously called Gram stain aer bottle: Gram negative rods  Gram stain aer bottle: Gram positive cocci  Results called to and read back by: SHALINI OROPEZA  07/31/2023  08:59  JT No Growth to date  No Growth to date     CBC:   Recent Labs   Lab 07/30/23  1844 07/31/23  0456 07/31/23  0609   WBC 11.04 12.22  --    HGB 15.3 13.2  --    HCT 46.5 39.9 39    141*  --      CMP:   Recent Labs   Lab 07/30/23  1844 07/31/23  0456    142   K 3.9 3.2*    112*   CO2 23 20*   * 115*   BUN 14 17   CREATININE 1.1 1.0   CALCIUM 9.6 8.2*   PROT 7.5 5.4*   ALBUMIN 3.6 2.5*   BILITOT 1.8* 2.8*   ALKPHOS 120 88   * 217*   * 105*   ANIONGAP 9 10     Cardiac Markers:   Recent Labs   Lab 07/30/23  1844   *     Coagulation:   Recent Labs   Lab 07/31/23 0456   INR 1.1     Lactic Acid:   Recent Labs   Lab 07/30/23  1917 07/30/23  2255 07/31/23  0456   LACTATE 3.2* 4.1* 1.7     Lipase:   Recent Labs   Lab 07/30/23  1844   LIPASE 28     TSH:   Recent Labs   Lab 07/30/23 1844   TSH 0.490     Urine Studies:   Recent Labs   Lab 07/31/23  0435   COLORU Yellow   APPEARANCEUA Clear   PHUR  6.0   SPECGRAV >1.030*   PROTEINUA Trace*   GLUCUA Negative   KETONESU Negative   BILIRUBINUA 1+*   OCCULTUA Negative   NITRITE Negative   UROBILINOGEN Negative   LEUKOCYTESUR Negative     Recent imaging: as above  Airway:     Vent settings: Oxygen Concentration (%):  [32] 32       IV access:        Peripheral IV - Single Lumen 07/31/23 1248 20 G Anterior;Left Forearm (Active)   Site Assessment Clean;Dry;Intact;No redness;No swelling 07/31/23 1701   Extremity Assessment Distal to IV No abnormal discoloration;No redness;No swelling;No warmth 07/31/23 1701   Line Status Flushed 07/31/23 1701   Dressing Status Clean;Dry;Intact 07/31/23 1701   Dressing Intervention Integrity maintained 07/31/23 1701   Dressing Change Due 08/04/23 07/31/23 1701   Site Change Due 08/04/23 07/31/23 1501   Reason Not Rotated Not due 07/31/23 1701            Midline Catheter Insertion/Assessment  - Single Lumen 07/31/23 1247 Left basilic vein (medial side of arm) (Active)   Site Assessment Clean;Dry;Intact;No redness;No swelling 07/31/23 1701   IV Device Securement catheter securement device 07/31/23 1701   Line Status Flushed;Infusing 07/31/23 1701   Dressing Type Central line dressing 07/31/23 1701   Dressing Status Clean;Dry;Intact 07/31/23 1701   Dressing Intervention Integrity maintained 07/31/23 1701   Dressing Change Due 08/07/23 07/31/23 1701   Site Change Due 08/31/23 07/31/23 1501   Reason Not Rotated Not due 07/31/23 1701     Infusions: IVF, IV zosyn  Allergies:   Review of patient's allergies indicates:   Allergen Reactions    Ace inhibitors Edema     Other reaction(s): Angioedema    Codeine Hives      NPO: Yes    Anticoagulation:   Anticoagulants       Ordered     Route Frequency Start Stop    07/30/23 2229  heparin (porcine)  (VTE Pharmacological Prophylaxis Orders - High Risk)         SubQ Every 8 hours 07/31/23 0600 --             Instructions      Rajesh Cook-  Admit to Hospital Medicine  Upon patient arrival to floor, please  send SecureChat to [facility] or call extension [phone] (if no answer, do NOT leave a callback number after the beep, rather please send a SecureChat to [facility]), for Hospital Medicine admit team assignment and for additional admit orders for the patient.  Do not page the attending physician associated with the patient on arrival (this physician may not be on duty at the time of arrival).  Rather, always send a SecureChat to [facility] or call [phone] to reach the triage physician for orders and team assignment.    Consult RIP - Dr. Arreaga's team to be notified of this patient upon arrival to the floor.

## 2023-07-31 NOTE — DISCHARGE SUMMARY
[facility] Medicine  Discharge Summary      Patient Name: Kenyetta Andersen  MRN: [id number]  MILES: [id number]  Patient Class: IP- Inpatient  Admission Date: 7/30/2023  Hospital Length of Stay: 1 days  Discharge Date and Time:  07/31/2023 5:42 PM  Attending Physician: Christen Adorno MD   Discharging Provider: Christen Adorno MD  Primary Care Provider: Hever Arreola MD    Primary Care Team: Networked reference to record PCT     HPI:   Ms. Andersen is an 88-year-old female who presented to the ED with chest/abdominal pain.  Patient just received morphine, somnolent but does briefly awaken but back to sleep, hard of hearing, collateral from family members present at bedside.  States she went to [place] earlier this morning, this afternoon around 2:00 p.m. developed chest pain, described as someone sitting on her chest, radiating down into her epigastric/abdominal area, associated with nausea and 3 episodes of emesis PTA.  Denies constipation or diarrhea.  Patient did report mild shortness of breath, current smoker at least 2 pack per day, does not have home oxygen, no formal diagnosis of COPD.  Patient has had multiple prior abdominal surgery, remote history of stage III colorectal cancer status post adjuvant chemotherapy.  Daily alcohol consumption, quantified by at least 4 drinks of crown daily.  In the ED afebrile, blood pressure elevated, tachycardic.  Labs with no leukocytosis with left shift present, hemoglobin 15.3, BUN/creatinine 14/1.1, T bilirubin 1.8, , , , lipase 28, lactic acid 3.2, troponin 7.2.  EKG sinus with sinus arrhythmia with PVCs.  Chest x-ray no focal infiltrates or consolidation.  CT abdomen with marked distended gallbladder with dilatation of CBD, concerning for acalculous cholecystitis.  She received 2 L normal saline, famotidine 20 mg, Zofran, morphine 2 mg and ordered for piperacillin/tazobactam.  Case discussed with ED provider who states he  discussed with on-call general surgery, Dr. Brewer, recommends MRCP and patient will be seen in consultation tomorrow.  Plan of care discussed with patient and visitors at bedside as best able.      Procedure(s) (LRB):  ERCP (ENDOSCOPIC RETROGRADE CHOLANGIOPANCREATOGRAPHY) (N/A)      Hospital Course:     88-year-old female with past medical history of GERD, hypothyroidism, colon cancer, Escalante's esophagus presented with chest pain and abdominal pain as well as generalized weakness, she was found to have evidence of possible a calculus cholecystitis on presentation on CT scan.  She had elevated bilirubin and transaminitis, she was also diagnosis cholangitis spiking fevers and found to have polymicrobial bacteremia with Gram-positive and Gram-negative bacteremia.  Infectious Disease was consulted, they recommended continue with Zosyn repeating blood cultures.  She was IV fluid resuscitated had a midline placed. general surgery was consulted-they recommended consult for ER GI consult for ERCP and duct clearance, can consider cholecystectomy afterwards as MRCP was suggestive of common bile duct stone.  Palliative Care was consulted and reviewed code status, she was made DNR, she also expressed that she would not want a PEG tube.    She was taken to ERCP with GI, EGD findings was tortuous esophagus, narrowing at GE junction, distortion of antrum noted, gastroscope exchange for duodenal scope, scope advanced easily to the antrum and navigated around distorted antral thickening, scope advanced to the pylorus but unable to drop into the duodenal bulb, scope exchange back to gastroscope and unremarkable, GI is recommending transfer to [facility] for attempt at ERCP origin, transfer process is now being initiated.      Discharge assessment and plan:    Cholangitis with possible Acalculous cholecystitis course complicated by polymicrobial bacteremia, with impending possible septic shock  Presenting with lower  chest/epigastric pain associated with nausea vomiting   Transaminitis with T bilirubin 1.8 with normal lipase with CT concerning for acalculous cholecystitis with dilatation of CBD   Abdomen is tender to palpate, no leukocytosis but left shift present, has had prior abdominal surgeries  NPO except for medication until evaluated by General surgery -recommend ERCP based on MRCP finding of possible common bile duct stone  IV fluid hydration to continue    P.r.n. antiemetic and analgesic as ordered, adjust as needed, monitor for over-sedation   Initially on Zosyn, blood cultures found to be positive for Gram-negative and Gram-positive bacteremia, brought into vancomycin, Infectious Disease recommends discontinuing vancomycin and continue with Zosyn monotherapy  Serial abdominal examination  Patient was hypotensive despite aggressive IV fluids, pulmonary Critical Care consulted, midline was placed, no need for pressors at this time  -She was taken to ERCP with GI today on 7/31, EGD findings was tortuous esophagus, narrowing at GE junction, distortion of antrum noted, gastroscope exchange for duodenal scope, scope advanced easily to the antrum and navigated around distorted antral thickening, scope advanced to the pylorus but unable to drop into the duodenal bulb, scope exchange back to gastroscope and unremarkable, GI is recommending transfer to [facility] for attempt at ERCP origin, transfer process is now being initiated.   -7:00 p.m. addendum-accepted at [facility], accepting attending is Dr. Lara, given patient is hemodynamically stable at this time she should hopefully be stable for step-down bed at that hospital, accepting facility would be like to be updated if patient becomes hemodynamically unstable and needs ICU bed, case discussed with GI specialist at [facility] Dr. Arreaga who states that patient would be arranged for ERCP this evening the earliest upon arrival to [facility]         Elevated LFTs  Likely related to cholangitis     Tachycardia-likely due to sepsis from acute cholecystitis  Sinus tach with sinus arrhythmia PVC   Likely in the setting of acute medical conditions, alcohol use, pain, infection   Keep potassium greater than 4, add on magnesium level  Remote telemetry monitoring        Daily consumption of alcohol  Daily alcohol consumption, 4 drinks of crown daily  Ordered banana bag   CIWA protocol with p.r.n. symptom driven Ativan and monitoring        Lactic acid acidosis  Lactic acid 3.2   Repeat status post fluid resuscitation IV antibiotics   Trend q.4 hours until less than 2        Pulmonary emphysema, unspecified emphysema type  As per chart review   Chronic smoker 2 pack per day, denies formal diagnosis of COPD and not on home inhalers   Continue supplemental oxygen, wean/adjust as needed  Scheduled breathing treatments  We will hold off on steroids at this time        Chemotherapy-induced peripheral neuropathy  Noted chronic history of same, continue gabapentin, renal dose        Personal history of colon cancer, stage III  Noted prior history of stage III colorectal cancer, status post adjuvant chemotherapy   Followed by Dr. Granado/Renu     Smoker  Current smoker 2 pack per day   Nicotine patch ordered  Smoking cessation counseling as able        Stage 3a chronic kidney disease  Known history of CKD stage 3   Await UA with reflex urine culture  Renally dose all medications and avoid nephrotoxin drugs  Trending BMP        GERD (gastroesophageal reflux disease)  IV PPI therapy        Macrocytosis  Known history of macrocytosis, previously on B12 supplementation, daily alcohol consumption   Repeat B12 and folic acid level     Hypokalemia  -replace as needed        DVT ppx- heparin         CODE Status- patient is now DNR      Dispo-  needs urgent transfer to [facility]     Christen Adorno M.D.    Goals of Care Treatment Preferences:  Code Status: DNR    Health care  agent: Tucker Murcia and [facility] agent number: [phone] / [phone] / [phone]    Physical Exam:     Gen:  Mild distress, pain, nasal cannula  HEENT:  Atraumatic, normocephalic.    Neck:  No JVD; supple  Cards: RRR  Pul:      Normal effort; clear to auscultation bilaterally  Abd:     Tender primarily in the right upper quadrant  Ext:      No clubbing, cyanosis, or edema  Skin:    No rash or petechiae     LaPOST: Yes           Consults:   Consults (From admission, onward)          Status Ordering Provider     Inpatient consult to Gastroenterology  Once        Provider:  Nasim Cuadra MD    Completed PURVI NOBLES     Inpatient consult to Anesthesiology  Once        Provider:  Perez Coffman MD    Acknowledged MANJEET GONZALEZ     Inpatient consult to Infectious Diseases  Once        Provider:  Kiara Don MD    Completed MANJEET GONZALEZ     Inpatient consult to Pulmonology  Once        Provider:  Mary Lou Ramos MD    Completed MANJEET GONZALEZ     Inpatient consult to Palliative Care  Once        Provider:  Hector Estrada MD    Completed LEIDY HILLS     Inpatient consult to Social Work/Case Management  Once        Provider:  (Not yet assigned)    Completed DORIS RIZZO     Inpatient consult to Registered Dietitian/Nutritionist  Once        Provider:  (Not yet assigned)    Completed DORIS RIZZO     Inpatient consult to General Surgery  Once        Provider:  Saqib Sarkar MD    Completed JOE VALERIO            No new Assessment & Plan notes have been filed under this hospital service since the last note was generated.  Service: Hospital Medicine    Final Active Diagnoses:    Diagnosis Date Noted POA    PRINCIPAL PROBLEM:  Acalculous cholecystitis [K81.9] 07/30/2023 Yes    Lactic acid acidosis [E87.20] 07/30/2023 Yes    Daily consumption of alcohol [Z78.9] 07/30/2023 Yes     Chronic    Tachycardia [R00.0] 07/30/2023 Yes    Elevated LFTs  [R79.89] 07/30/2023 Yes    Pulmonary emphysema, unspecified emphysema type [J43.9] 05/19/2022 Yes    Chemotherapy-induced peripheral neuropathy [G62.0, T45.1X5A] 02/23/2020 Yes    Personal history of colon cancer, stage III [Z85.038] 04/01/2019 Yes    Smoker [F17.200] 10/22/2018 Yes    Stage 3a chronic kidney disease [N18.31] 04/04/2018 Yes    GERD (gastroesophageal reflux disease) [K21.9] 06/12/2017 Yes    Macrocytosis [D75.89] 10/19/2016 Yes      Problems Resolved During this Admission:       Discharged Condition:critical    Disposition:  To tertiary care center    Follow Up:    Patient Instructions:      Ambulatory referral/consult to Outpatient Case Management   Referral Priority: Routine Referral Type: Consultation   Referral Reason: Specialty Services Required   Number of Visits Requested: 1       Significant Diagnostic Studies:}    Pending Diagnostic Studies:       None           Medications:  Reconciled Home Medications:      Medication List        ASK your doctor about these medications      alendronate 70 MG tablet  Commonly known as: FOSAMAX  TAKE 1 TABLET (70 MG TOTAL) BY MOUTH EVERY 7 DAYS. ON EMPTY STOMACH IN MORNING, REMAIN UPRIGHT FOR 30 MINUTES.     ascorbic acid (vitamin C) 500 MG tablet  Commonly known as: VITAMIN C  Take 500 mg by mouth once daily.     b complex vitamins capsule  Take 1 capsule by mouth once daily.     brimonidine 0.2% 0.2 % Drop  Commonly known as: ALPHAGAN  INSTILL 1 DROP INTO BOTH EYES 3 TIMES A DAY     calcium carbonate-magnesium hydroxide 550-110 mg Chew  Commonly known as: ROLAIDS  Take 1 tablet by mouth 2 (two) times daily with meals.     dorzolamide-timolol 2-0.5% 22.3-6.8 mg/mL ophthalmic solution  Commonly known as: COSOPT  INSTILL 1 DROP INTO BOTH EYES TWICE A DAY     gabapentin 300 MG capsule  Commonly known as: NEURONTIN  TAKE 1 CAP IN THE AM, 1 CAP MIDDAY, AND 2 CAPS AT NIGHT     mirtazapine 7.5 MG Tab  Commonly known as: REMERON  TAKE 1 TABLET BY MOUTH EVERY  EVENING.     omeprazole 40 MG capsule  Commonly known as: PRILOSEC  Take 1 capsule (40 mg total) by mouth 2 (two) times daily before meals for 60 days, THEN 1 capsule (40 mg total) every morning.  Start taking on: April 12, 2023     potassium chloride SA 20 MEQ tablet  Commonly known as: K-DUR,KLOR-CON  TAKE 1 TABLET BY MOUTH ONCE DAILY              Indwelling Lines/Drains at time of discharge:   Lines/Drains/Airways       Drain  Duration             Female External Urinary Catheter 07/31/23 1250 <1 day                    Time spent on the discharge of patient: 35 minutes         Christen Adorno MD  Department of Hospital Medicine  [facility]

## 2023-07-31 NOTE — TRANSFER OF CARE
"Anesthesia Transfer of Care Note    Patient: Kenyetta Andersen    Procedure(s) Performed: Procedure(s) (LRB):  ERCP (ENDOSCOPIC RETROGRADE CHOLANGIOPANCREATOGRAPHY) (N/A)    Patient location: ICU    Anesthesia Type: general    Transport from OR: Continuous ECG monitoring in transport. Continuous SpO2 monitoring in transport. Continuos invasive BP monitoring in transport. Transported from OR on 2-3 L/min O2 by NC with adequate spontaneous ventilation    Post pain: adequate analgesia    Post assessment: no apparent anesthetic complications    Post vital signs: stable    Level of consciousness: awake    Nausea/Vomiting: no nausea/vomiting    Complications: none    Transfer of care protocol was followedComments: Patient stable, report to RN, questions answered.  I am available during the recovery time for any further needs       Last vitals:   Visit Vitals  BP (!) 101/53 (BP Location: Right arm, Patient Position: Lying)   Pulse 95   Temp 36.6 °C (97.9 °F) (Oral)   Resp (!) 22   Ht 5' 3" (1.6 m)   Wt 61.2 kg (135 lb)   SpO2 (!) 92%   BMI 23.91 kg/m²     "

## 2023-07-31 NOTE — CONSULTS
"[facility]  Adult Nutrition   Consult Note (Initial Assessment)     SUMMARY     Recommendations  1. Start clear liquid diet as medically feasible and advance to low fat diet with texture per speech recommendation.    2. If unable to advance diet within 48 hrs, suggest PN Clinimix E @ 75mL/hr to provide 612 kcals, 76.5 gm protein.    3.RD to monitor and provide recommendations PRN.  Goals: Pt to receive nutrition within 48hr.  Nutrition Goal Status: new    Dietitian Rounds Brief  Pt currently NPO with N/V at admission along with abdominal pain.     Diet order:   Current Diet Order: NPO                 Evaluation of Received Nutrient/Fluid Intake  Energy Calories Required: not meeting needs  Protein Required: not meeting needs  Fluid Required: not meeting needs     % Intake of Estimated Energy Needs: 0 - 25 %  % Meal Intake: 0 - 25 %      Intake/Output Summary (Last 24 hours) at 7/31/2023 1447  Last data filed at 7/31/2023 1201  Gross per 24 hour   Intake 4767.13 ml   Output --   Net 4767.13 ml        Anthropometrics  Temp: 97.7 °F (36.5 °C)  Height Method: Stated  Height: 5' 3" (160 cm)  Height (inches): 63 in  Weight Method: Bed Scale  Weight: 61.2 kg (135 lb)  Weight (lb): 135 lb  Ideal Body Weight (IBW), Female: 115 lb  % Ideal Body Weight, Female (lb): 117.39 %  BMI (Calculated): 23.9  BMI Grade: 18.5-24.9 - normal       Estimated/Assessed Needs  Weight Used For Calorie Calculations: 61.2 kg (134 lb 14.7 oz)  Energy Calorie Requirements (kcal): 4191-3753 (25-30 kcal/ kg bw)  Energy Need Method: Kcal/kg  Protein Requirements: 74-91g (1.2-1.5g/ kg bw)  Weight Used For Protein Calculations: 61.2 kg (134 lb 14.7 oz)     Estimated Fluid Requirement Method: RDA Method  RDA Method (mL): 1530       Reason for Assessment  Reason For Assessment: consult  Relevant Medical History: active smoker and daily alcohol use, with past medical history of hypothyroidism, colon cancer status post resection 2 years ago, " follows with Oncology, who presented with sudden onset of epigastric abdominal pain radiated to her chest  Interdisciplinary Rounds: attended    Nutrition/Diet History  Patient Reported Diet/Restrictions/Preferences: general  Food Allergies: NKFA  Factors Affecting Nutritional Intake: NPO    Nutrition Risk Screen  Nutrition Risk Screen: no indicators present     MST Score: 2  Have you recently lost weight without trying?: Unsure  Weight loss score: 2  Have you been eating poorly because of a decreased appetite?: No  Appetite score: 0       Weight History:  Wt Readings from Last 10 Encounters:   07/31/23 61.2 kg (135 lb)   07/26/23 62 kg (136 lb 11 oz)   06/16/23 60.8 kg (134 lb)   05/10/23 60.8 kg (134 lb 0.6 oz)   04/28/23 64.6 kg (142 lb 6.7 oz)   04/17/23 64.2 kg (141 lb 8.6 oz)   04/14/23 65.9 kg (145 lb 6.3 oz)   03/21/23 65.7 kg (144 lb 13.5 oz)   03/20/23 66.1 kg (145 lb 11.6 oz)   03/01/23 63.8 kg (140 lb 10.5 oz)        Lab/Procedures/Meds: Pertinent Labs/Meds Reviewed    Medications:Pertinent Medications Reviewed  Scheduled Meds:   heparin (porcine)  5,000 Units Subcutaneous Q8H    ipratropium  0.5 mg Nebulization Q6H    mupirocin   Nasal BID    nicotine  1 patch Transdermal Daily    pantoprazole  40 mg Intravenous Daily    piperacillin-tazobactam (Zosyn) IV (PEDS and ADULTS) (extended infusion is not appropriate)  3.375 g Intravenous Q8H    potassium chloride  10 mEq Intravenous Q1H     Continuous Infusions:   lactated ringers 100 mL/hr at 07/31/23 1410     PRN Meds:.acetaminophen, benzonatate, dextrose 50%, dextrose 50%, glucagon (human recombinant), glucose, glucose, lorazepam, morphine, naloxone, ondansetron, oxyCODONE, senna-docusate 8.6-50 mg, sodium chloride 0.9%    Labs: Pertinent Labs Reviewed  Clinical Chemistry:  Recent Labs   Lab 07/30/23  1844 07/30/23  1917 07/31/23  0456     --  142   K 3.9  --  3.2*     --  112*   CO2 23  --  20*   *  --  115*   BUN 14  --  17  "  CREATININE 1.1  --  1.0   CALCIUM 9.6  --  8.2*   PROT 7.5  --  5.4*   ALBUMIN 3.6  --  2.5*   BILITOT 1.8*  --  2.8*   ALKPHOS 120  --  88   *  --  217*   *  --  105*   ANIONGAP 9  --  10   MG  --    < > 1.8   LIPASE 28  --   --     < > = values in this interval not displayed.     CBC:   Recent Labs   Lab 07/31/23  0456 07/31/23  0609   WBC 12.22  --    RBC 4.15  --    HGB 13.2  --    HCT 39.9 39   *  --    MCV 96  --    MCH 31.8*  --    MCHC 33.1  --      Lipid Panel:  No results for input(s): "CHOL", "HDL", "LDLCALC", "TRIG", "CHOLHDL" in the last 168 hours.  Cardiac Profile:  Recent Labs   Lab 07/30/23  1844   *     Inflammatory Labs:  No results for input(s): "CRP" in the last 168 hours.  Diabetes:  No results for input(s): "HGBA1C", "POCTGLUCOSE" in the last 168 hours.  Thyroid & Parathyroid:  Recent Labs   Lab 07/30/23  1844   TSH 0.490       Monitor and Evaluation  Food and Nutrient Intake: energy intake, food and beverage intake  Knowledge/Beliefs/Attitudes: food and nutrition knowledge/skill  Anthropometric Measurements: height/length, weight, weight change  Nutrition-Focused Physical Findings: overall appearance     Nutrition Risk  Level of Risk/Frequency of Follow-up: high     Nutrition Follow-Up  RD Follow-up?: Yes      Cady Randolph, NGUYEN 07/31/2023 2:47 PM      "

## 2023-07-31 NOTE — CONSULTS
"GASTROENTEROLOGY INPATIENT CONSULT NOTE  Patient Name: Kenyetta Andersen  Patient MRN: [id number]  Patient : 1935    Admit Date: 2023  Service date: 2023    Reason for Consult: abdominal pain, sepsis    PCP: Hever Arreola MD    Chief Complaint   Patient presents with    Chest Pain       HPI: Patient is a 88 y.o. female admitted with acute onset moderate severe ruq throbbing persistent pain.  Imaging showed dilated cbd and mrcp with CBD stone.  No anticoagulation. No prior ERCP.  Has distended GB as well.  On abx.  Blood cx positive.     Past Medical History:  Past Medical History:   Diagnosis Date    Anatomical narrow angle 2012    Anxiety     Arthritis     Escalante esophagus     Blood transfusion     Cataract     Done OU    Colon cancer 2009    Colon polyps 3/14/2017    GERD (gastroesophageal reflux disease)     Glaucoma 2012    Nuclear sclerosis 2012    Osteoporosis     Primary hypothyroidism 2020    Pseudophakia - Left Eye 2012        Past Surgical History:  Past Surgical History:   Procedure Laterality Date    APPENDECTOMY      CATARACT EXTRACTION W/  INTRAOCULAR LENS IMPLANT Left     CATARACT EXTRACTION W/  INTRAOCULAR LENS IMPLANT Right     Dr Sawant    COLON SURGERY      resection    COLONOSCOPY N/A 3/14/2017    Procedure: COLONOSCOPY;  Surgeon: Killian Guerrier MD;  Location: [facility] ENDO;  Service: Endoscopy;  Laterality: N/A;    COLONOSCOPY  2017    Dr. Guerrier: hemorrhoids, one colon polyp removed, "Patent functional end-to-end ileo-colonic anastomosis"with healthy mucosa; biopsy: hyperplastic polyp; repeat in 3 years for surveillance    COLONOSCOPY N/A 2020    Procedure: COLONOSCOPY;  Surgeon: Timo Darling MD;  Location: [facility] ENDO;  Service: Endoscopy;  Laterality: N/A;    COLONOSCOPY N/A 2022    Procedure: COLONOSCOPY;  Surgeon: Anaid Washington MD;  Location: [facility] ENDO;  Service: Endoscopy;  Laterality: N/A;    ECTOPIC PREGNANCY SURGERY   "    ESOPHAGOGASTRODUODENOSCOPY N/A 5/16/2019    Procedure: EGD (ESOPHAGOGASTRODUODENOSCOPY);  Surgeon: Anaid Washington MD;  Location: [facility] ENDO;  Service: Endoscopy;  Laterality: N/A;    ESOPHAGOGASTRODUODENOSCOPY N/A 7/11/2019    Procedure: EGD (ESOPHAGOGASTRODUODENOSCOPY);  Surgeon: Anaid Washington MD;  Location: [facility] ENDO;  Service: Endoscopy;  Laterality: N/A;    ESOPHAGOGASTRODUODENOSCOPY N/A 2/5/2021    Procedure: EGD (ESOPHAGOGASTRODUODENOSCOPY);  Surgeon: Anaid Washington MD;  Location: [facility] ENDO;  Service: Endoscopy;  Laterality: N/A;    ESOPHAGOGASTRODUODENOSCOPY N/A 11/11/2021    Procedure: EGD (ESOPHAGOGASTRODUODENOSCOPY);  Surgeon: Anaid Washington MD;  Location: [facility] ENDO;  Service: Endoscopy;  Laterality: N/A;    ESOPHAGOGASTRODUODENOSCOPY N/A 7/8/2022    Procedure: EGD (ESOPHAGOGASTRODUODENOSCOPY);  Surgeon: Anaid Washington MD;  Location: [facility] ENDO;  Service: Endoscopy;  Laterality: N/A;    ESOPHAGOGASTRODUODENOSCOPY N/A 4/12/2023    Procedure: EGD (ESOPHAGOGASTRODUODENOSCOPY);  Surgeon: Anaid Washington MD;  Location: [facility] ENDO;  Service: Endoscopy;  Laterality: N/A;    EYE SURGERY      bilateral cataracts    HYSTERECTOMY      complete    JOINT REPLACEMENT      Liban Knee    ROTATOR CUFF REPAIR Right     TOE SURGERY      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    UPPER GASTROINTESTINAL ENDOSCOPY  06/12/2017    Dr. Guerrier: gastritis and esophagitis, biopsy: chronic gastritis, negative for h pylori, esophageal- positive eosinophils, negative for barretts; repeat in 2 months    UPPER GASTROINTESTINAL ENDOSCOPY  07/27/2017    Dr. Guerrier        Home Medications:  Medications Prior to Admission   Medication Sig Dispense Refill Last Dose    alendronate (FOSAMAX) 70 MG tablet TAKE 1 TABLET (70 MG TOTAL) BY MOUTH EVERY 7 DAYS. ON EMPTY STOMACH IN MORNING, REMAIN UPRIGHT FOR 30 MINUTES. (Patient taking differently: Take 70 mg by mouth every 7 days. On empty stomach in morning, remain upright  for 30 minutes. Mondays) 12 tablet 3 7/24/2023 at 08:00    ascorbic acid, vitamin C, (VITAMIN C) 500 MG tablet Take 500 mg by mouth once daily.   7/30/2023 at 08:00    b complex vitamins capsule Take 1 capsule by mouth once daily.   7/30/2023 at 08:00    brimonidine 0.2% (ALPHAGAN) 0.2 % Drop INSTILL 1 DROP INTO BOTH EYES 3 TIMES A DAY (Patient taking differently: Place 1 drop into both eyes 3 (three) times daily.) 10 mL 6 7/30/2023 at 08:00    calcium carbonate-magnesium hydroxide (ROLAIDS) 550-110 mg Chew Take 1 tablet by mouth 2 (two) times daily with meals.   Past Month    dorzolamide-timolol 2-0.5% (COSOPT) 22.3-6.8 mg/mL ophthalmic solution INSTILL 1 DROP INTO BOTH EYES TWICE A DAY (Patient taking differently: Place 1 drop into both eyes 2 (two) times daily.) 10 mL 4 7/30/2023 at 08:00    gabapentin (NEURONTIN) 300 MG capsule TAKE 1 CAP IN THE AM, 1 CAP MIDDAY, AND 2 CAPS AT NIGHT (Patient taking differently: Take 300 mg by mouth As instructed. TAKE 1 CAP IN THE AM, 1 CAP MIDDAY, AND 2 CAPS AT NIGHT) 360 capsule 3 7/30/2023 at 08:00    omeprazole (PRILOSEC) 40 MG capsule Take 1 capsule (40 mg total) by mouth 2 (two) times daily before meals for 60 days, THEN 1 capsule (40 mg total) every morning. 90 capsule 3 7/30/2023 at 08:00    potassium chloride SA (K-DUR,KLOR-CON) 20 MEQ tablet TAKE 1 TABLET BY MOUTH ONCE DAILY (Patient taking differently: Take 20 mEq by mouth once daily.) 90 tablet 3 7/30/2023 at 08:00    mirtazapine (REMERON) 7.5 MG Tab TAKE 1 TABLET BY MOUTH EVERY EVENING. (Patient not taking: Reported on 7/31/2023) 90 tablet 1 Not Taking       Inpatient Medications:   heparin (porcine)  5,000 Units Subcutaneous Q8H    ipratropium  0.5 mg Nebulization Q6H    mupirocin   Nasal BID    nicotine  1 patch Transdermal Daily    pantoprazole  40 mg Intravenous Daily    piperacillin-tazobactam (Zosyn) IV (PEDS and ADULTS) (extended infusion is not appropriate)  3.375 g Intravenous Q8H     acetaminophen,  benzonatate, dextrose 50%, dextrose 50%, glucagon (human recombinant), glucose, glucose, lorazepam, morphine, naloxone, ondansetron, oxyCODONE, senna-docusate 8.6-50 mg, sodium chloride 0.9%    Review of patient's allergies indicates:   Allergen Reactions    Ace inhibitors Edema     Other reaction(s): Angioedema    Codeine Hives       Social History:   Social History     Occupational History    Not on file   Tobacco Use    Smoking status: Every Day     Current packs/day: 0.00     Average packs/day: 0.3 packs/day for 65.0 years (21.5 ttl pk-yrs)     Types: Cigarettes     Start date: 10/5/1955     Last attempt to quit: 10/5/2020     Years since quittin.8    Smokeless tobacco: Never    Tobacco comments:     3/1/23--states smokes 1-2 cigarettes a day   Substance and Sexual Activity    Alcohol use: Yes     Alcohol/week: 2.0 standard drinks of alcohol     Types: 2 Shots of liquor per week     Comment: Daily, about 4 drinks per day (crown)    Drug use: No    Sexual activity: Not Currently       Family History:   Family History   Problem Relation Age of Onset    Heart disease Mother         heart attack    Heart disease Father     Heart disease Brother         MI    Parkinsonism Sister     Arthritis Sister     No Known Problems Brother     No Known Problems Brother     Amblyopia Neg Hx     Blindness Neg Hx     Cancer Neg Hx     Cataracts Neg Hx     Glaucoma Neg Hx     Hypertension Neg Hx     Macular degeneration Neg Hx     Retinal detachment Neg Hx     Strabismus Neg Hx     Stroke Neg Hx     Thyroid disease Neg Hx     Diabetes Neg Hx     Colon cancer Neg Hx     Crohn's disease Neg Hx     Esophageal cancer Neg Hx     Stomach cancer Neg Hx     Ulcerative colitis Neg Hx        Review of Systems:  A 10 point review of systems was performed and was normal, except as mentioned in the HPI, including constitutional, HEENT, heme, lymph, cardiovascular, respiratory, gastrointestinal, genitourinary, neurologic, endocrine,  "psychiatric and musculoskeletal.      OBJECTIVE:    Physical Exam:  24 Hour Vital Sign Ranges: Temp:  [97.7 °F (36.5 °C)-103.1 °F (39.5 °C)] 97.9 °F (36.6 °C)  Pulse:  [] 86  Resp:  [13-28] 19  SpO2:  [92 %-100 %] 98 %  BP: ()/(44-77) 119/58  Most recent vitals: BP (!) 119/58 (BP Location: Left leg, Patient Position: Lying)   Pulse 86   Temp 97.9 °F (36.6 °C) (Oral)   Resp 19   Ht 5' 3" (1.6 m)   Wt 61.2 kg (135 lb)   SpO2 98%   BMI 23.91 kg/m²    GEN: elderly, ill appearing, awake and alert, non-toxic appearing adult  HEENT: PERRL, sclera anicteric, oral mucosa pink and moist without lesion  NECK: trachea midline; Good ROM  CV: regular rate and rhythm, no murmurs or gallops  RESP: clear to auscultation bilaterally, no wheezes, rhonci or rales  ABD: soft, ttp epigastrium, ruq normal bowel sounds  EXT: no swelling or edema, 2+ pulses distally  SKIN: no rashes or jaundice  PSYCH: normal affect    Labs:   Recent Labs     07/30/23 1844 07/31/23 0456   WBC 11.04 12.22   * 96    141*     Recent Labs     07/30/23  1844 07/31/23  0456    142   K 3.9 3.2*    112*   CO2 23 20*   BUN 14 17   * 115*     No results for input(s): "ALB" in the last 72 hours.    Invalid input(s): "ALKP", "SGOT", "SGPT", "TBIL", "DBIL", "TPRO"  Recent Labs     07/31/23  0456   INR 1.1         Radiology Review:  MRI MRCP Without Contrast   Final Result      CT Abdomen Pelvis With Contrast   Final Result      X-Ray Chest AP Portable   Final Result      FL ERCP Biliary And Pancreatic By Rad Tech    (Results Pending)         IMPRESSION / RECOMMENDATIONS:  Cholangitis  -case discussed with patient and her brother independently.  Discussed risks of ERCP including pancreatitis, perforation, failed cannulation, stent migration, bleeding etc.  Offered alternative options including transfer to tertiary center and ultimately prefer to proceed with endoscopy now given cholangitis and sepsis.    Thank you for " this consult.    Nasim Cuadra  7/31/2023  4:58 PM

## 2023-07-31 NOTE — PLAN OF CARE
Patient cleared for discharge from case management standpoint.    Chart and discharge orders reviewed.  Patient discharged home with no further case management needs.     07/31/23 1818   Final Note   Assessment Type Final Discharge Note   Anticipated Discharge Disposition Home   Post-Acute Status   Discharge Delays None known at this time

## 2023-07-31 NOTE — ASSESSMENT & PLAN NOTE
Presenting with lower chest/epigastric pain associated with nausea vomiting   Transaminitis with T bilirubin 1.8 with normal lipase with CT concerning for acalculous cholecystitis with dilatation of CBD   Abdomen is tender to palpate, no leukocytosis but left shift present, has had prior abdominal surgeries  NPO except for medication until evaluated by General surgery   IV fluid hydration   MRCP evaluate mild dilatation of CBD   P.r.n. antiemetic and analgesic as ordered, adjust as needed, monitor for over-sedation   Empiric antibiotics with piperacillin/tazobactam  Serial abdominal examination  General surgery consulted

## 2023-07-31 NOTE — PROGRESS NOTES
Patient is now febrile to 103. IV acetaminophen ordered. Blood cultures were sent. Subsequently blood pressure down trending (previously elevated), lactic acid up trending, remains intermittent somnolent. Concerns for worsening severe sepsis vs impending septic shock, transfer to ICU, continue IV fluids, levophed if needed for MAP>65, antibiotics. Morning labs pending including repeat lactic acid, ammonia level. Will check ABG. Commu nicated with nursing. Will continue to monitor closely.

## 2023-07-31 NOTE — ASSESSMENT & PLAN NOTE
Noted prior history of stage III colorectal cancer, status post adjuvant chemotherapy   Followed by Dr. Granado/Renu

## 2023-07-31 NOTE — NURSING
Nurses Note -- 4 Eyes      7/31/2023   2:17 AM      Skin assessed during: Admit      [x] No Altered Skin Integrity Present    [x]Prevention Measures Documented      [] Yes- Altered Skin Integrity Present or Discovered   [] LDA Added if Not in Epic (Describe Wound)   [] New Altered Skin Integrity was Present on Admit and Documented in LDA   [] Wound Image Taken    Wound Care Consulted? No    Attending Nurse:  Rufina Davis RN     Second RN/Staff Member:  abram

## 2023-07-31 NOTE — [employer] PATIENT INSTRUCTIONS
Discharge Summary/Instructions after an Endoscopic Procedure  Patient Name: Kenyetta Andersen  Patient MRN: [id number]  Patient YOB: 1935 Monday, July 31, 2023  Nasim Cuadra MD  RESTRICTIONS:  During your procedure today, you received medications for sedation.  These   medications may affect your judgment, balance and coordination.  Therefore,   for 24 hours, you have the following restrictions:   - DO NOT drive a car, operate machinery, make legal/financial decisions,   sign important papers or drink alcohol.    ACTIVITY:  Today: no heavy lifting, straining or running due to procedural   sedation/anesthesia.  The following day: return to full activity including work.  DIET:  Eat and drink normally unless instructed otherwise.     TREATMENT FOR COMMON SIDE EFFECTS:  - Mild abdominal pain, nausea, belching, bloating or excessive gas:  rest,   eat lightly and use a heating pad.  - Sore Throat: treat with throat lozenges and/or gargle with warm salt   water.  - Because air was used during the procedure, expelling large amounts of air   from your rectum or belching is normal.  - If a bowel prep was taken, you may not have a bowel movement for 1-3 days.    This is normal.  SYMPTOMS TO WATCH FOR AND REPORT TO YOUR PHYSICIAN:  1. Abdominal pain or bloating, other than gas cramps.  2. Chest pain.  3. Back pain.  4. Signs of infection such as: chills or fever occurring within 24 hours   after the procedure.  5. Rectal bleeding, which would show as bright red, maroon, or black stools.   (A tablespoon of blood from the rectum is not serious, especially if   hemorrhoids are present.)  6. Vomiting.  7. Weakness or dizziness.  GO DIRECTLY TO THE NEAREST EMERGENCY ROOM IF YOU HAVE ANY OF THE FOLLOWING:      Difficulty breathing              Chills and/or fever over 101 F   Persistent vomiting and/or vomiting blood   Severe abdominal pain   Severe chest pain   Black, tarry stools   Bleeding- more than one  tablespoon   Any other symptom or condition that you feel may need urgent attention  Your doctor recommends these additional instructions:  If any biopsies were taken, your doctors clinic will contact you in 1 to 2   weeks with any results.  - The findings and recommendations were discussed with the patient and their   family. Transfer center contacted and [place] [facility] [place] and stone extraction.  Unclear if unable to advance into position due   to distorted antral antomy vs pyloric stricture...  Continue iv antibiotics.  - Transfer patient to another hospital.  For questions, problems or results please call your physician - Nasim Cuadra MD at Work:  [phone].  [facility], EMERGENCY ROOM PHONE NUMBER: [phone]  IF A COMPLICATION OR EMERGENCY SITUATION ARISES AND YOU ARE UNABLE TO REACH   YOUR PHYSICIAN - GO DIRECTLY TO THE EMERGENCY ROOM.  MD Nasim Mercado MD  7/31/2023 6:17:59 PM  This report has been verified and signed electronically.  Dear patient,  As a result of recent federal legislation (The Federal Cures Act), you may   receive lab or pathology results from your procedure in your [facility]   account before your physician is able to contact you. Your physician or   their representative will relay the results to you with their   recommendations at their soonest availability.  Thank you,  [employer]

## 2023-07-31 NOTE — BRIEF OP NOTE
EGD- tortuous esophagus.  Narrowing at GE junction.  Distortion of antrum noted.  Gastroscope exchanged for duodenoscope.  Scope advanced easily to antrum and navigated around distorted antral thickening (prior scarring?)  Scope advanced to the pylorus but unable to drop into the duodenal bulb.  Scope exchanged back to gastroscope and unremarkable.    Plan  -transfer to [facility] for attempt at ERCP urgently.    -continue abx and monitoring.

## 2023-07-31 NOTE — CONSULTS
[facility]  General Surgery  Consult Note    Inpatient consult to General Surgery  Consult performed by: Randal Slater MD  Consult ordered by: Gustabo Das MD        Subjective:     Chief Complaint/Reason for Admission:  Abdominal pain    History of Present Illness:  This is an 88-year-old female who presented with midline abdominal pain that radiated up into her epigastric and chest.  In the ER, was noted that she would elevation in her LFTs.  She would a CT scan which showed a dilated gallbladder as well as a dilated common bile duct without evidence of stones on CT imaging prompting surgical consultation for acalculous cholecystitis.    No current facility-administered medications on file prior to encounter.     Current Outpatient Medications on File Prior to Encounter   Medication Sig    alendronate (FOSAMAX) 70 MG tablet TAKE 1 TABLET (70 MG TOTAL) BY MOUTH EVERY 7 DAYS. ON EMPTY STOMACH IN MORNING, REMAIN UPRIGHT FOR 30 MINUTES. (Patient taking differently: Take 70 mg by mouth every 7 days. On empty stomach in morning, remain upright for 30 minutes. Mondays)    ascorbic acid, vitamin C, (VITAMIN C) 500 MG tablet Take 500 mg by mouth once daily.    b complex vitamins capsule Take 1 capsule by mouth once daily.    brimonidine 0.2% (ALPHAGAN) 0.2 % Drop INSTILL 1 DROP INTO BOTH EYES 3 TIMES A DAY (Patient taking differently: Place 1 drop into both eyes 3 (three) times daily.)    calcium carbonate-magnesium hydroxide (ROLAIDS) 550-110 mg Chew Take 1 tablet by mouth 2 (two) times daily with meals.    dorzolamide-timolol 2-0.5% (COSOPT) 22.3-6.8 mg/mL ophthalmic solution INSTILL 1 DROP INTO BOTH EYES TWICE A DAY (Patient taking differently: Place 1 drop into both eyes 2 (two) times daily.)    gabapentin (NEURONTIN) 300 MG capsule TAKE 1 CAP IN THE AM, 1 CAP MIDDAY, AND 2 CAPS AT NIGHT (Patient taking differently: Take 300 mg by mouth As instructed. TAKE 1 CAP IN THE AM, 1 CAP MIDDAY, AND 2 CAPS  AT NIGHT)    omeprazole (PRILOSEC) 40 MG capsule Take 1 capsule (40 mg total) by mouth 2 (two) times daily before meals for 60 days, THEN 1 capsule (40 mg total) every morning.    potassium chloride SA (K-DUR,KLOR-CON) 20 MEQ tablet TAKE 1 TABLET BY MOUTH ONCE DAILY (Patient taking differently: Take 20 mEq by mouth once daily.)    mirtazapine (REMERON) 7.5 MG Tab TAKE 1 TABLET BY MOUTH EVERY EVENING. (Patient not taking: Reported on 7/31/2023)    [DISCONTINUED] amoxicillin (AMOXIL) 875 MG tablet Take 875 mg by mouth 2 (two) times daily.    [DISCONTINUED] ciprofloxacin-dexAMETHasone 0.3-0.1% (CIPRODEX) 0.3-0.1 % DrpS Place into both ears.    [DISCONTINUED] cyanocobalamin (VITAMIN B-12) 1000 MCG tablet Take 100 mcg by mouth once daily.    [DISCONTINUED] hydroCHLOROthiazide (HYDRODIURIL) 12.5 MG Tab TAKE 1 TABLET BY MOUTH EVERY DAY (Patient not taking: Reported on 6/16/2023)    [DISCONTINUED] latanoprost 0.005 % ophthalmic solution PLACE 1 DROP INTO BOTH EYES EVERY EVENING.    [DISCONTINUED] meloxicam (MOBIC) 15 MG tablet TAKE 1 TABLET BY MOUTH EVERY DAY AS NEEDED FOR PAIN       Review of patient's allergies indicates:   Allergen Reactions    Ace inhibitors Edema     Other reaction(s): Angioedema    Codeine Hives       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    Anxiety     Arthritis     Escalante esophagus     Blood transfusion     Cataract     Done OU    Colon cancer 2009    Colon polyps 3/14/2017    GERD (gastroesophageal reflux disease)     Glaucoma 2/24/2012    Nuclear sclerosis 8/27/2012    Osteoporosis     Primary hypothyroidism 2/23/2020    Pseudophakia - Left Eye 2/24/2012     Past Surgical History:   Procedure Laterality Date    APPENDECTOMY      CATARACT EXTRACTION W/  INTRAOCULAR LENS IMPLANT Left     CATARACT EXTRACTION W/  INTRAOCULAR LENS IMPLANT Right     Dr Sawant    COLON SURGERY      resection    COLONOSCOPY N/A 3/14/2017    Procedure: COLONOSCOPY;  Surgeon: Killian  "SARIKA Guerrier MD;  Location: [facility] ENDO;  Service: Endoscopy;  Laterality: N/A;    COLONOSCOPY  03/14/2017    Dr. Guerrier: hemorrhoids, one colon polyp removed, "Patent functional end-to-end ileo-colonic anastomosis"with healthy mucosa; biopsy: hyperplastic polyp; repeat in 3 years for surveillance    COLONOSCOPY N/A 1/21/2020    Procedure: COLONOSCOPY;  Surgeon: Timo Darling MD;  Location: [facility] ENDO;  Service: Endoscopy;  Laterality: N/A;    COLONOSCOPY N/A 7/8/2022    Procedure: COLONOSCOPY;  Surgeon: Anaid Washington MD;  Location: [facility] ENDO;  Service: Endoscopy;  Laterality: N/A;    ECTOPIC PREGNANCY SURGERY      ESOPHAGOGASTRODUODENOSCOPY N/A 5/16/2019    Procedure: EGD (ESOPHAGOGASTRODUODENOSCOPY);  Surgeon: Anaid Washington MD;  Location: [facility] ENDO;  Service: Endoscopy;  Laterality: N/A;    ESOPHAGOGASTRODUODENOSCOPY N/A 7/11/2019    Procedure: EGD (ESOPHAGOGASTRODUODENOSCOPY);  Surgeon: Anaid Washington MD;  Location: [facility] ENDO;  Service: Endoscopy;  Laterality: N/A;    ESOPHAGOGASTRODUODENOSCOPY N/A 2/5/2021    Procedure: EGD (ESOPHAGOGASTRODUODENOSCOPY);  Surgeon: Anaid Washington MD;  Location: [facility] ENDO;  Service: Endoscopy;  Laterality: N/A;    ESOPHAGOGASTRODUODENOSCOPY N/A 11/11/2021    Procedure: EGD (ESOPHAGOGASTRODUODENOSCOPY);  Surgeon: Anaid Washington MD;  Location: [facility] ENDO;  Service: Endoscopy;  Laterality: N/A;    ESOPHAGOGASTRODUODENOSCOPY N/A 7/8/2022    Procedure: EGD (ESOPHAGOGASTRODUODENOSCOPY);  Surgeon: Anaid Washington MD;  Location: [facility] ENDO;  Service: Endoscopy;  Laterality: N/A;    ESOPHAGOGASTRODUODENOSCOPY N/A 4/12/2023    Procedure: EGD (ESOPHAGOGASTRODUODENOSCOPY);  Surgeon: Anaid Washington MD;  Location: [facility] ENDO;  Service: Endoscopy;  Laterality: N/A;    EYE SURGERY      bilateral cataracts    HYSTERECTOMY      complete    JOINT REPLACEMENT      Liban Knee    ROTATOR CUFF REPAIR Right     TOE SURGERY      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    UPPER " GASTROINTESTINAL ENDOSCOPY  2017    Dr. Guerrier: gastritis and esophagitis, biopsy: chronic gastritis, negative for h pylori, esophageal- positive eosinophils, negative for barretts; repeat in 2 months    UPPER GASTROINTESTINAL ENDOSCOPY  2017    Dr. Guerrier     Family History       Problem Relation (Age of Onset)    Arthritis Sister    Heart disease Mother, Father, Brother    No Known Problems Brother, Brother    Parkinsonism Sister          Tobacco Use    Smoking status: Every Day     Current packs/day: 0.00     Average packs/day: 0.3 packs/day for 65.0 years (21.5 ttl pk-yrs)     Types: Cigarettes     Start date: 10/5/1955     Last attempt to quit: 10/5/2020     Years since quittin.8    Smokeless tobacco: Never    Tobacco comments:     3/1/23--states smokes 1-2 cigarettes a day   Substance and Sexual Activity    Alcohol use: Yes     Alcohol/week: 2.0 standard drinks of alcohol     Types: 2 Shots of liquor per week     Comment: Daily, about 4 drinks per day (crown)    Drug use: No    Sexual activity: Not Currently     Review of Systems   Constitutional:  Negative for fever.   HENT: Negative.     Eyes: Negative.    Respiratory: Negative.     Cardiovascular: Negative.    Gastrointestinal:  Positive for abdominal pain.   Endocrine: Negative.    Genitourinary: Negative.    Musculoskeletal: Negative.    Skin: Negative.    Allergic/Immunologic: Negative.    Neurological: Negative.    Hematological: Negative.    Psychiatric/Behavioral: Negative.       Objective:     Vital Signs (Most Recent):  Temp: 97.7 °F (36.5 °C) (23 1101)  Pulse: 85 (23 1401)  Resp: (!) 24 (23 1401)  BP: (!) 119/58 (23 1401)  SpO2: 100 % (23 1401) Vital Signs (24h Range):  Temp:  [97.7 °F (36.5 °C)-103.1 °F (39.5 °C)] 97.7 °F (36.5 °C)  Pulse:  [] 85  Resp:  [13-28] 24  SpO2:  [92 %-100 %] 100 %  BP: ()/(44-77) 119/58     Weight: 61.2 kg (135 lb)  Body mass index is 23.91  kg/m².      Intake/Output Summary (Last 24 hours) at 7/31/2023 1453  Last data filed at 7/31/2023 1201  Gross per 24 hour   Intake 4767.13 ml   Output --   Net 4767.13 ml       Physical Exam  Constitutional:       General: She is not in acute distress.     Appearance: Normal appearance. She is not ill-appearing, toxic-appearing or diaphoretic.   HENT:      Head: Normocephalic.      Nose: Nose normal.   Eyes:      Conjunctiva/sclera: Conjunctivae normal.   Cardiovascular:      Rate and Rhythm: Normal rate and regular rhythm.   Pulmonary:      Effort: Pulmonary effort is normal.   Abdominal:      Palpations: Abdomen is soft.      Tenderness: There is abdominal tenderness.   Musculoskeletal:         General: Normal range of motion.      Cervical back: Normal range of motion.   Skin:     General: Skin is warm.   Neurological:      General: No focal deficit present.      Mental Status: She is alert.   Psychiatric:         Mood and Affect: Mood normal.         Significant Labs:  CBC:   Recent Labs   Lab 07/31/23 0456 07/31/23  0609   WBC 12.22  --    RBC 4.15  --    HGB 13.2  --    HCT 39.9 39   *  --    MCV 96  --    MCH 31.8*  --    MCHC 33.1  --      CMP:   Recent Labs   Lab 07/31/23 0456   *   CALCIUM 8.2*   ALBUMIN 2.5*   PROT 5.4*      K 3.2*   CO2 20*   *   BUN 17   CREATININE 1.0   ALKPHOS 88   *   *   BILITOT 2.8*     Coagulation:   Recent Labs   Lab 07/31/23 0456   INR 1.1     Lactic Acid:   Recent Labs   Lab 07/31/23 0456   LACTATE 1.7       Significant Diagnostics:  CT was reviewed.  The gallbladder is dilated.  The common duct is also dilated with intrahepatic dilation.  MRCP was reviewed.  Dilated common duct concerning for choledocholithiasis.    Assessment/Plan:     Active Diagnoses:    Diagnosis Date Noted POA    PRINCIPAL PROBLEM:  Acalculous cholecystitis [K81.9] 07/30/2023 Yes    Lactic acid acidosis [E87.20] 07/30/2023 Yes    Daily consumption of alcohol  [Z78.9] 07/30/2023 Yes     Chronic    Tachycardia [R00.0] 07/30/2023 Yes    Elevated LFTs [R79.89] 07/30/2023 Yes    Pulmonary emphysema, unspecified emphysema type [J43.9] 05/19/2022 Yes    Chemotherapy-induced peripheral neuropathy [G62.0, T45.1X5A] 02/23/2020 Yes    Personal history of colon cancer, stage III [Z85.038] 04/01/2019 Yes    Smoker [F17.200] 10/22/2018 Yes    Stage 3a chronic kidney disease [N18.31] 04/04/2018 Yes    GERD (gastroesophageal reflux disease) [K21.9] 06/12/2017 Yes    Macrocytosis [D75.89] 10/19/2016 Yes      Problems Resolved During this Admission:     88-year-old female admitted with abdominal pain.  Initial CT imaging was concerning for acalculous cholecystitis although this does not explain her common duct dilation.  Because of this, I ordered an MRCP is suggestive of a common duct stone.  Overall, her picture is consistent with cholangitis    Recommend GI consultation for ERCP and duct clearance.  Can consider cholecystectomy afterwards.        Thank you for your consult. I will follow-up with patient. Please contact us if you have any additional questions.    Randal Slater MD  General Surgery  [facility]

## 2023-07-31 NOTE — PLAN OF CARE
[facility]  Initial Discharge Assessment       Primary Care Provider: Hever Arreola MD    Admission Diagnosis: Acute cholecystitis [K81.0]    Admission Date: 7/30/2023  Expected Discharge Date:     Transition of Care Barriers: None    Assessment completed at bedside.  Advanced directives not addressed at this time.  Patient lives alone.  Discharge plan to be determined by hospital course, cm to follow for needs.    Payor: [employer] MANAGED MEDICARE / Plan: [employer] MEDICARE HMO / Product Type: Capitation /     Extended Emergency Contact Information  Primary Emergency Contact: GranadoEl Dorado Hillseliza Carson  Address: UNKNOWN           [place] [address] [place]  Mobile Phone: [phone]  Relation: Relative  Preferred language: English   needed? No    Discharge Plan A: Home, Home with family  Discharge Plan B: Other (TBD by hospital course)      [id number] - [place] - [address]  [address]  [place] [address]  Phone: [phone] Fax: [phone]      Initial Assessment (most recent)       Adult Discharge Assessment - 07/31/23 1031          Discharge Assessment    Assessment Type Discharge Planning Assessment     Confirmed/corrected address, phone number and insurance Yes     Confirmed Demographics Correct on Facesheet     Source of Information patient     Communicated TIERA with patient/caregiver Date not available/Unable to determine     Reason For Admission cholecystitis     People in Home alone     Facility Arrived From: home     Do you expect to return to your current living situation? Yes     Do you have help at home or someone to help you manage your care at home? Yes     Who are your caregiver(s) and their phone number(s)? Nalini Granado (Relative)   [phone]     Prior to hospitilization cognitive status: Alert/Oriented     Current cognitive status: Alert/Oriented     Equipment Currently Used at Home none     Readmission within 30 days? No     Patient  currently being followed by outpatient case management? No     Do you currently have service(s) that help you manage your care at home? No     Do you take prescription medications? Yes     Do you have prescription coverage? Yes     Coverage [employer]     Do you have any problems affording any of your prescribed medications? No     Is the patient taking medications as prescribed? yes     Who is going to help you get home at discharge? Nalini Granado Yamileth (Relative)   [phone]     How do you get to doctors appointments? family or friend will provide     Are you on dialysis? No     Do you take coumadin? No     Discharge Plan A Home;Home with family     Discharge Plan B Other   TBD by hospital course    DME Needed Upon Discharge  none     Discharge Plan discussed with: Patient     Transition of Care Barriers None

## 2023-07-31 NOTE — NURSING
Pt received as transfer from 1st floor. Eyes are closed but arouses to voice..Answers questions appropriately,is oriented to place ,situation and self. Follows commands,SHERMAN. C/O chest discomfort, non radiating. Card monitor shows NSR, Bp is 88/54 with a mean of 65. Resp are easy, non labored.O2 sats 95% on nc of 2 liters.ABG drawn. LR infusing via PIV Right ac at 125 cc/hr.Safety maintained, voices understanding to get up out of bed.

## 2023-07-31 NOTE — SUBJECTIVE & OBJECTIVE
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    Anxiety     Arthritis     Escalante esophagus     Blood transfusion     Cataract     Done OU    Colon cancer 2009    Colon polyps 3/14/2017    GERD (gastroesophageal reflux disease)     Glaucoma 2/24/2012    Nuclear sclerosis 8/27/2012    Osteoporosis     Primary hypothyroidism 2/23/2020    Pseudophakia - Left Eye 2/24/2012       Past Surgical History:   Procedure Laterality Date    APPENDECTOMY      CATARACT EXTRACTION W/  INTRAOCULAR LENS IMPLANT Left     CATARACT EXTRACTION W/  INTRAOCULAR LENS IMPLANT Right     Dr Sawant    COLON SURGERY      resection    COLONOSCOPY N/A 3/14/2017    Procedure: COLONOSCOPY;  Surgeon: Killian Guerrier MD;  Location: [facility] ENDO;  Service: Endoscopy;  Laterality: N/A;    COLONOSCOPY  03/14/2017    Dr. Guerrier: hemorrhoids, one colon polyp removed, "Patent functional end-to-end ileo-colonic anastomosis"with healthy mucosa; biopsy: hyperplastic polyp; repeat in 3 years for surveillance    COLONOSCOPY N/A 1/21/2020    Procedure: COLONOSCOPY;  Surgeon: Timo Darling MD;  Location: [facility] ENDO;  Service: Endoscopy;  Laterality: N/A;    COLONOSCOPY N/A 7/8/2022    Procedure: COLONOSCOPY;  Surgeon: Anaid Washington MD;  Location: [facility] ENDO;  Service: Endoscopy;  Laterality: N/A;    ECTOPIC PREGNANCY SURGERY      ESOPHAGOGASTRODUODENOSCOPY N/A 5/16/2019    Procedure: EGD (ESOPHAGOGASTRODUODENOSCOPY);  Surgeon: Anaid Washington MD;  Location: [facility] ENDO;  Service: Endoscopy;  Laterality: N/A;    ESOPHAGOGASTRODUODENOSCOPY N/A 7/11/2019    Procedure: EGD (ESOPHAGOGASTRODUODENOSCOPY);  Surgeon: Anaid Washington MD;  Location: [facility] ENDO;  Service: Endoscopy;  Laterality: N/A;    ESOPHAGOGASTRODUODENOSCOPY N/A 2/5/2021    Procedure: EGD (ESOPHAGOGASTRODUODENOSCOPY);  Surgeon: Anaid Washington MD;  Location: [facility] ENDO;  Service: Endoscopy;  Laterality: N/A;    ESOPHAGOGASTRODUODENOSCOPY N/A 11/11/2021    " Procedure: EGD (ESOPHAGOGASTRODUODENOSCOPY);  Surgeon: Anaid Washington MD;  Location: [facility] ENDO;  Service: Endoscopy;  Laterality: N/A;    ESOPHAGOGASTRODUODENOSCOPY N/A 7/8/2022    Procedure: EGD (ESOPHAGOGASTRODUODENOSCOPY);  Surgeon: Anaid Washington MD;  Location: [facility] ENDO;  Service: Endoscopy;  Laterality: N/A;    ESOPHAGOGASTRODUODENOSCOPY N/A 4/12/2023    Procedure: EGD (ESOPHAGOGASTRODUODENOSCOPY);  Surgeon: Anaid Washington MD;  Location: [facility] ENDO;  Service: Endoscopy;  Laterality: N/A;    EYE SURGERY      bilateral cataracts    HYSTERECTOMY      complete    JOINT REPLACEMENT      Liban Knee    ROTATOR CUFF REPAIR Right     TOE SURGERY      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    UPPER GASTROINTESTINAL ENDOSCOPY  06/12/2017    Dr. Guerrier: gastritis and esophagitis, biopsy: chronic gastritis, negative for h pylori, esophageal- positive eosinophils, negative for barretts; repeat in 2 months    UPPER GASTROINTESTINAL ENDOSCOPY  07/27/2017    Dr. Guerrier       Review of patient's allergies indicates:   Allergen Reactions    Ace inhibitors Edema     Other reaction(s): Angioedema    Codeine Hives       No current facility-administered medications on file prior to encounter.     Current Outpatient Medications on File Prior to Encounter   Medication Sig    alendronate (FOSAMAX) 70 MG tablet TAKE 1 TABLET (70 MG TOTAL) BY MOUTH EVERY 7 DAYS. ON EMPTY STOMACH IN MORNING, REMAIN UPRIGHT FOR 30 MINUTES.    amoxicillin (AMOXIL) 875 MG tablet Take 875 mg by mouth 2 (two) times daily.    ascorbic acid, vitamin C, (VITAMIN C) 500 MG tablet Take 500 mg by mouth once daily.    b complex vitamins capsule Take 1 capsule by mouth once daily.    brimonidine 0.2% (ALPHAGAN) 0.2 % Drop INSTILL 1 DROP INTO BOTH EYES 3 TIMES A DAY    calcium carbonate-magnesium hydroxide (ROLAIDS) 550-110 mg Chew Take 1 tablet by mouth 2 (two) times daily with meals.    ciprofloxacin-dexAMETHasone 0.3-0.1% (CIPRODEX) 0.3-0.1 % Yazmin  Place into both ears.    cyanocobalamin (VITAMIN B-12) 1000 MCG tablet Take 100 mcg by mouth once daily.    dorzolamide-timolol 2-0.5% (COSOPT) 22.3-6.8 mg/mL ophthalmic solution INSTILL 1 DROP INTO BOTH EYES TWICE A DAY    gabapentin (NEURONTIN) 300 MG capsule TAKE 1 CAP IN THE AM, 1 CAP MIDDAY, AND 2 CAPS AT NIGHT    hydroCHLOROthiazide (HYDRODIURIL) 12.5 MG Tab TAKE 1 TABLET BY MOUTH EVERY DAY (Patient not taking: Reported on 2023)    latanoprost 0.005 % ophthalmic solution PLACE 1 DROP INTO BOTH EYES EVERY EVENING.    meloxicam (MOBIC) 15 MG tablet TAKE 1 TABLET BY MOUTH EVERY DAY AS NEEDED FOR PAIN    mirtazapine (REMERON) 7.5 MG Tab TAKE 1 TABLET BY MOUTH EVERY EVENING.    omeprazole (PRILOSEC) 40 MG capsule Take 1 capsule (40 mg total) by mouth 2 (two) times daily before meals for 60 days, THEN 1 capsule (40 mg total) every morning.    potassium chloride SA (K-DUR,KLOR-CON) 20 MEQ tablet TAKE 1 TABLET BY MOUTH ONCE DAILY     Family History       Problem Relation (Age of Onset)    Arthritis Sister    Heart disease Mother, Father, Brother    No Known Problems Brother, Brother    Parkinsonism Sister          Tobacco Use    Smoking status: Every Day     Current packs/day: 0.00     Average packs/day: 0.3 packs/day for 65.0 years (21.5 ttl pk-yrs)     Types: Cigarettes     Start date: 10/5/1955     Last attempt to quit: 10/5/2020     Years since quittin.8    Smokeless tobacco: Never    Tobacco comments:     3/1/23--states smokes 1-2 cigarettes a day   Substance and Sexual Activity    Alcohol use: Yes     Alcohol/week: 2.0 standard drinks of alcohol     Types: 2 Shots of liquor per week     Comment: Daily, about 4 drinks per day (crown)    Drug use: No    Sexual activity: Not Currently     Review of Systems   Constitutional:  Positive for chills. Negative for fever.   HENT:  Negative for congestion.    Respiratory:  Positive for shortness of breath.    Cardiovascular:  Positive for chest pain and leg  swelling (intermittent for years).   Gastrointestinal:  Positive for abdominal pain, nausea and vomiting.   Genitourinary:  Negative for dysuria and urgency.   Musculoskeletal:  Positive for back pain.   Skin:  Negative for wound.   Allergic/Immunologic: Negative for immunocompromised state.   Neurological:  Negative for seizures.   Psychiatric/Behavioral:          Mildly confused after morphine     Objective:     Vital Signs (Most Recent):  Temp: 97.9 °F (36.6 °C) (07/30/23 1810)  Pulse: 98 (07/30/23 2000)  Resp: (!) 21 (07/30/23 2000)  BP: (!) 164/73 (07/30/23 2000)  SpO2: (!) 92 % (07/30/23 1810) Vital Signs (24h Range):  Temp:  [97.9 °F (36.6 °C)] 97.9 °F (36.6 °C)  Pulse:  [89-98] 98  Resp:  [20-27] 21  SpO2:  [92 %] 92 %  BP: (145-164)/(73-77) 164/73     Weight: 61.2 kg (135 lb)  Body mass index is 23.91 kg/m².     Physical Exam  Vitals and nursing note reviewed.   Constitutional:       Appearance: She is ill-appearing.      Comments: Elderly female, frail, ill appearing, shaking, under the blankets   HENT:      Head: Normocephalic and atraumatic.      Mouth/Throat:      Mouth: Mucous membranes are moist.   Eyes:      General:         Right eye: No discharge.         Left eye: No discharge.      Comments: Wearing corrective lenses, senile arcus   Cardiovascular:      Rate and Rhythm: Regular rhythm. Tachycardia present.      Comments: Warm peripheries, mild pedal edema  Pulmonary:      Comments: On NC, no wheeze  Abdominal:      Comments: Healed previous surgical incision, tenderness to palpate diffuse, no peritoneal signs currently   Genitourinary:     Comments: No martines  Skin:     General: Skin is warm.   Neurological:      Comments: Sleeping/somnolent, does awaken with insistence but shortly back to sleep (just received morphine), [place], following simple commands all four extremities, + generalized weakness   Psychiatric:      Comments: cooperative                Significant Labs: BMP:   Recent Labs   Lab  "07/30/23  1844   *      K 3.9      CO2 23   BUN 14   CREATININE 1.1   CALCIUM 9.6     CBC:   Recent Labs   Lab 07/30/23  1844   WBC 11.04   HGB 15.3   HCT 46.5        CMP:   Recent Labs   Lab 07/30/23  1844      K 3.9      CO2 23   *   BUN 14   CREATININE 1.1   CALCIUM 9.6   PROT 7.5   ALBUMIN 3.6   BILITOT 1.8*   ALKPHOS 120   *   *   ANIONGAP 9     Cardiac Markers: No results for input(s): "CKMB", "MYOGLOBIN", "BNP", "TROPISTAT" in the last 48 hours.  Coagulation: No results for input(s): "PT", "INR", "APTT" in the last 48 hours.  Magnesium: No results for input(s): "MG" in the last 48 hours.  POCT Glucose: No results for input(s): "POCTGLUCOSE" in the last 48 hours.  Troponin:   Recent Labs   Lab 07/30/23  1844   TROPONINIHS 7.2     TSH: No results for input(s): "TSH" in the last 4320 hours.  Urine Culture: No results for input(s): "LABURIN" in the last 48 hours.  Urine Studies: No results for input(s): "COLORU", "APPEARANCEUA", "PHUR", "SPECGRAV", "PROTEINUA", "GLUCUA", "KETONESU", "BILIRUBINUA", "OCCULTUA", "NITRITE", "UROBILINOGEN", "LEUKOCYTESUR", "RBCUA", "WBCUA", "BACTERIA", "SQUAMEPITHEL", "HYALINECASTS" in the last 48 hours.    Invalid input(s): "WRIGHTSUR"    Significant Imaging: I have reviewed all pertinent imaging results/findings within the past 24 hours.    X-Ray Chest AP Portable    Result Date: 7/30/2023  EXAMINATION: XR CHEST AP PORTABLE CLINICAL INDICATION: Female, 88 years old. Chest COMPARISON: None. FINDINGS: The heart and great vessels appear normal. There are some atherosclerotic changes of aorta. Both lungs are well expanded and clear. No pleural effusion can be seen. No pneumothorax can be seen. IMPRESSION: No x-ray evidence of active or acute disease. Electronically signed by:  Marcela Escalante Jr., MD  7/30/2023 9:22 PM CDT Workstation: [id number]    CT Abdomen Pelvis With Contrast    Result Date: 7/30/2023  EXAMINATION: CT " ABDOMEN PELVIS WITH CONTRAST CLINICAL INDICATION: Female, 88 years old. Epigastric pain TECHNIQUE: Helical CT scan examination of the abdomen and pelvis is performed from the domes of the diaphragm to the pubic symphysis with the administration of intravenous contrast material. CONTRAST: 75 mL of Omnipaque 300 IV. COMPARISON: None FINDINGS: Lower Chest: Visualized lung bases are clear other than hypoaeration changes. Heart is normal in size. No pericardial or pleural effusion. Liver: Normal in size and contour. No focal lesion. Bile Ducts: Common bile duct is  dilated. Gallbladder: There is marked dilation of the gallbladder and there may be pericolic cystic fluid. No definite gallstones can be seen. Pancreas: Pancreas is atrophic but demonstrates no other abnormality. Spleen: Normal in size and contour. Adrenals: Normal configuration. Kidneys and Ureters: Normal size and contour. No hydronephrosis. There are bilateral renal cysts. Bladder: Normal in appearance. Bowel: Stomach: Unremarkable. Small Intestine: Unremarkable. Appendix: No inflammatory changes. Colon: Unremarkable. Vessels: Abdominal aorta and inferior vena cava are normal in course and caliber. Reproductive Organs: Uterus is apparently surgically absent and no pelvic mass can be seen Lymph Nodes: No pathologic mesenteric or retroperitoneal lymph nodes. Peritoneum: No free air, free fluid, or fluid collection. Abdominal Wall: No hernia or mass. Musculoskeletal: No acute abnormality or suspicious bony lesion. There is considerable compression of the L1 vertebral body which appears chronic. There is some retropulsion of bone. Compression of the superior endplate of T12 also appears to be chronic. There are degenerative disc changes at T12-L1 to L2-L3. No aggressive skeletal lesions can be seen. IMPRESSION: Dilation of the gallbladder and dilation of the common bile duct. There may be pericholecystic fluid. No gallstones can be seen but changes may  represent  noncalculous acute cholecystitis. Small bilateral renal cysts. This exam was performed according to our departmental dose-optimization program which includes automated exposure control, adjustment of the mA and/or kV according to patient size and/or use of iterative reconstruction technique. Electronically signed by:  Marcela Escalante Jr., MD  7/30/2023 9:04 PM CDT Workstation: [id number]

## 2023-07-31 NOTE — ANESTHESIA PREPROCEDURE EVALUATION
07/31/2023  Kenyetta Andersen is a 88 y.o., female.      Tobacco Use:  The patient  reports that she has been smoking cigarettes. She started smoking about 67 years ago. She has a 21.5 pack-year smoking history. She has never used smokeless tobacco.     Results for orders placed or performed during the hospital encounter of 07/30/23   EKG 12-lead    Collection Time: 07/30/23  6:17 PM    Narrative    Test Reason : L03.90,    Vent. Rate : 094 BPM     Atrial Rate : 094 BPM     P-R Int : 174 ms          QRS Dur : 080 ms      QT Int : 344 ms       P-R-T Axes : 074 -44 067 degrees     QTc Int : 430 ms    Sinus rhythm with sinus arrhythmia with occasional Premature ventricular  complexes  Left axis deviation  Nonspecific T wave abnormality  Abnormal ECG  When compared with ECG of 25-FEB-2013 12:27,  Premature ventricular complexes are now Present  The axis Shifted left  Nonspecific T wave abnormality now evident in Inferior leads  Nonspecific T wave abnormality, worse in Lateral leads    Referred By: [email or web address]   SELF           Confirmed By:         Imaging Results          MRI MRCP Without Contrast (Final result)  Result time 07/31/23 12:55:03    Final result by Leo Day IV, MD (07/31/23 12:55:03)                 Narrative:    MRI of the abdomen without contrast (MRCP)    HISTORY: Common bile duct dilatation. Abdominal pain.    There is degradation of the examination by patient motion which limits the sensitivity of the study.    There is an oblong-shaped low signal intensity mass which appears to lie within the lumen of the distal common bile duct at the level of the pancreatic head measuring approximately 10 mm in longitudinal dimension. This is suggestive of distal choledocholithiasis. No additional intraluminal filling defect or focal stricture is observed. There is diffuse dilatation of the common  hepatic duct and common bile duct measuring up to 14 mm. The gallbladder appears mildly distended and there is a small amount of pericholecystic fluid as well as a small component of scattered free fluid observed within the upper abdomen. The pancreatic duct is normal in caliber. Additional observations include small cystic signal intensity bilateral renal masses. An L1 compression deformity is again observed. There is minimal incidentally observed pleural fluid bilaterally.    IMPRESSION:    Significant degradation by patient motion.    [place]-shaped low signal intensity mass which appears to lie within the distal segment of the common bile duct suggestive of choledocholithiasis. There is dilatation of the biliary tree proximal to this level.    Small amount of free fluid within the abdomen.    Additional incidental findings as above.            Electronically signed by:  Leo Day MD  7/31/2023 12:55 PM CDT Workstation: [id number]                             CT Abdomen Pelvis With Contrast (Final result)  Result time 07/30/23 21:04:33    Final result by Marcela Escalante Jr., MD (07/30/23 21:04:33)                 Narrative:    EXAMINATION:  CT ABDOMEN PELVIS WITH CONTRAST    CLINICAL INDICATION: Female, 88 years old. Epigastric pain    TECHNIQUE: Helical CT scan examination of the abdomen and pelvis is performed from the domes of the diaphragm to the pubic symphysis with the administration of intravenous contrast material.    CONTRAST: 75 mL of Omnipaque 300 IV.    COMPARISON: None    FINDINGS:  Lower Chest: Visualized lung bases are clear other than hypoaeration changes. Heart is normal in size. No pericardial or pleural effusion.    Liver: Normal in size and contour. No focal lesion.    Bile Ducts: Common bile duct is  dilated.    Gallbladder: There is marked dilation of the gallbladder and there may be pericolic cystic fluid. No definite gallstones can be seen.    Pancreas: Pancreas is atrophic but  demonstrates no other abnormality.    Spleen: Normal in size and contour.    Adrenals: Normal configuration.    Kidneys and Ureters: Normal size and contour. No hydronephrosis. There are bilateral renal cysts.    Bladder: Normal in appearance.    Bowel:  Stomach: Unremarkable.  Small Intestine: Unremarkable.  Appendix: No inflammatory changes.  Colon: Unremarkable.    Vessels: Abdominal aorta and inferior vena cava are normal in course and caliber.    Reproductive Organs: Uterus is apparently surgically absent and no pelvic mass can be seen    Lymph Nodes: No pathologic mesenteric or retroperitoneal lymph nodes.    Peritoneum: No free air, free fluid, or fluid collection.    Abdominal Wall: No hernia or mass.    Musculoskeletal: No acute abnormality or suspicious bony lesion. There is considerable compression of the L1 vertebral body which appears chronic. There is some retropulsion of bone. Compression of the superior endplate of T12 also appears to be chronic. There are degenerative disc changes at T12-L1 to L2-L3. No aggressive skeletal lesions can be seen.    IMPRESSION:  Dilation of the gallbladder and dilation of the common bile duct. There may be pericholecystic fluid. No gallstones can be seen but changes may represent  noncalculous acute cholecystitis.    Small bilateral renal cysts.    This exam was performed according to our departmental dose-optimization program which includes automated exposure control, adjustment of the mA and/or kV according to patient size and/or use of iterative reconstruction technique.    Electronically signed by:  Marcela Escalante Jr., MD  7/30/2023 9:04 PM CDT Workstation: [id number]                             X-Ray Chest AP Portable (Final result)  Result time 07/30/23 21:22:49    Final result by Marcela Escalante Jr., MD (07/30/23 21:22:49)                 Narrative:    EXAMINATION:  XR CHEST AP PORTABLE    CLINICAL INDICATION:  Female, 88 years old.  Chest    COMPARISON:  None.    FINDINGS:  The heart and great vessels appear normal. There are some atherosclerotic changes of aorta.    Both lungs are well expanded and clear. No pleural effusion can be seen. No pneumothorax can be seen.    IMPRESSION:  No x-ray evidence of active or acute disease.    Electronically signed by:  Marcela Escaalnte Jr., MD  7/30/2023 9:22 PM CDT Workstation: [id number]                               Lab Results   Component Value Date    WBC 12.22 07/31/2023    HGB 13.2 07/31/2023    HCT 39 07/31/2023    MCV 96 07/31/2023     (L) 07/31/2023     BMP  Lab Results   Component Value Date     07/31/2023    K 3.2 (L) 07/31/2023     (H) 07/31/2023    CO2 20 (L) 07/31/2023    BUN 17 07/31/2023    CREATININE 1.0 07/31/2023    CALCIUM 8.2 (L) 07/31/2023    ANIONGAP 10 07/31/2023     (H) 07/31/2023     (H) 07/30/2023     (H) 07/24/2023       No results found for this or any previous visit.        Pre-op Assessment    I have reviewed the Patient Summary Reports.     I have reviewed the Nursing Notes. I have reviewed the NPO Status.   I have reviewed the Medications.     Review of Systems  Anesthesia Hx:  No problems with previous Anesthesia  Denies Family Hx of Anesthesia complications.   Denies Personal Hx of Anesthesia complications.   Social:  Alcohol Use, Smoker    Hematology/Oncology:         -- Cancer in past history: surgery and chemotherapy  Oncology Comments: Colon cancer     EENT/Dental:  EENT/Dental Normal Eyes: Visual Impairment Has Bilateral and S/P Extraction - Bilateral Catarract Eye Disease: Glaucoma: Narrow angle     Cardiovascular:  Cardiovascular Normal  ECG has been reviewed.    Pulmonary:   COPD, mild Pulmonary emphysema - no inhaler or home oxygen use    Renal/:   Chronic Renal Disease, CKD Bilateral renal cysts Kidney Function/Disease, Chronic Kidney Disease (CKD) , CKD Stage III (GFR 30-59)    Hepatic/GI:   GERD Escalante  esophagus    Dysphagia   Musculoskeletal:   Arthritis   Bone Disorders: Osteopenia, Osteoporosis    Neurological:   Neuromuscular Disease,   Peripheral Neuropathy    Endocrine:   Hypothyroidism    Dermatological:  Skin Normal    Psych:   anxiety Sleep Disorder and Insomnia. Sleep Disorder and Insomnia.        Physical Exam  General: Well nourished, Cooperative, Alert and Oriented    Airway:  Mallampati: III   Mouth Opening: Normal  TM Distance: > 6 cm  Tongue: Normal  Neck ROM: Extension Decreased    Dental:  Dentures    Chest/Lungs:  Clear to auscultation, Normal Respiratory Rate    Heart:  Rate: Normal  Rhythm: Regular Rhythm        Anesthesia Plan  Type of Anesthesia, risks & benefits discussed:    Anesthesia Type: Gen ETT  Intra-op Monitoring Plan: Standard ASA Monitors  Post Op Pain Control Plan:   (medical reason for not using multimodal pain management)  Induction:  IV and rapid sequence  Airway Plan: Direct and Video, Post-Induction  Informed Consent: Informed consent signed with the Patient and all parties understand the risks and agree with anesthesia plan.  All questions answered.   ASA Score: 3 Emergent  Anesthesia Plan Notes:       GETA    RSI    Etomidate Induction     No Decadron     No Ofirmev    Zofran 4mg iv, Pepcid 20mg iv     Sugammadex     Ready For Surgery From Anesthesia Perspective.     .

## 2023-07-31 NOTE — PROGRESS NOTES
Progress Note  Hospital Medicine      Admit Date: 7/30/2023   Length of Stay:  LOS: 1 day      Reason for Admission:  Acalculous cholecystitis       HPI: Ms. Andersen is an 88-year-old female who presented to the ED with chest/abdominal pain.  Patient just received morphine, somnolent but does briefly awaken but back to sleep, hard of hearing, collateral from family members present at bedside.  States she went to [facility] earlier this morning, this afternoon around 2:00 p.m. developed chest pain, described as someone sitting on her chest, radiating down into her epigastric/abdominal area, associated with nausea and 3 episodes of emesis PTA.  Denies constipation or diarrhea.  Patient did report mild shortness of breath, current smoker at least 2 pack per day, does not have home oxygen, no formal diagnosis of COPD.  Patient has had multiple prior abdominal surgery, remote history of stage III colorectal cancer status post adjuvant chemotherapy.  Daily alcohol consumption, quantified by at least 4 drinks of crown daily.  In the ED afebrile, blood pressure elevated, tachycardic.  Labs with no leukocytosis with left shift present, hemoglobin 15.3, BUN/creatinine 14/1.1, T bilirubin 1.8, , , , lipase 28, lactic acid 3.2, troponin 7.2.  EKG sinus with sinus arrhythmia with PVCs.  Chest x-ray no focal infiltrates or consolidation.  CT abdomen with marked distended gallbladder with dilatation of CBD, concerning for acalculous cholecystitis.  She received 2 L normal saline, famotidine 20 mg, Zofran, morphine 2 mg and ordered for piperacillin/tazobactam.  Case discussed with ED provider who states he discussed with on-call general surgery, Dr. Brewer, recommends MRCP and patient will be seen in consultation tomorrow.  Plan of care discussed with patient and visitors at bedside as best able.      HPI/Interval history/Subjective:  .  States that she is abdominal pain, denies any nausea, vomiting, headache,  fevers, chills,, shortness of breath or constipation.  Evaluated by so far Infectious Disease.                  OBJECTIVE:     Temp:  [97.9 °F (36.6 °C)-103.1 °F (39.5 °C)]   Pulse:  []   Resp:  [13-28]   BP: ()/(44-77)   SpO2:  [92 %-99 %]  Body mass index is 23.91 kg/m².  Intake/Outake:  This Shift:  I/O this shift:  In: 4045 [I.V.:3267.1; IV Piggyback:777.9]  Out: -     Net I/O past 24h:     Intake/Output Summary (Last 24 hours) at 7/31/2023 1149  Last data filed at 7/31/2023 1001  Gross per 24 hour   Intake 4145 ml   Output --   Net 4145 ml               Physical Exam:    Gen:  Mild distress, pain, nasal cannula  HEENT:  Atraumatic, normocephalic.    Neck:  No JVD; supple  Cards: RRR  Pul: Normal effort; clear to auscultation bilaterally  Abd: Tender primarily in the right upper quadrant  Ext: No clubbing, cyanosis, or edema  Skin: No rash or petechiae      Laboratory:  CBC/Anemia Labs: Coags:    Recent Labs   Lab 07/30/23 1844 07/30/23 1917 07/31/23 0456 07/31/23  0609   WBC 11.04  --  12.22  --    HGB 15.3  --  13.2  --    HCT 46.5  --  39.9 39     --  141*  --    *  --  96  --    RDW 13.3  --  13.4  --    FOLATE  --  >24.8*  --   --    [id number]  --  >1500*  --   --     Recent Labs   Lab 07/31/23 0456   INR 1.1        Chemistries:   Recent Labs   Lab 07/30/23 1844 07/30/23 1917 07/31/23 0456     --  142   K 3.9  --  3.2*     --  112*   CO2 23  --  20*   BUN 14  --  17   CREATININE 1.1  --  1.0   CALCIUM 9.6  --  8.2*   PROT 7.5  --  5.4*   BILITOT 1.8*  --  2.8*   ALKPHOS 120  --  88   *  --  105*   *  --  217*   MG  --  1.5* 1.8          CONTINOUS INFUSIONS:      Intake/Output Summary (Last 24 hours) at 7/31/2023 1149  Last data filed at 7/31/2023 1001  Gross per 24 hour   Intake 4145 ml   Output --   Net 4145 ml        Significant Imaging:   Imaging Results              MRI MRCP Without Contrast (In process)                      CT Abdomen Pelvis  With Contrast (Final result)  Result time 07/30/23 21:04:33      Final result by Marcela Escalante Jr., MD (07/30/23 21:04:33)                   Narrative:    EXAMINATION:  CT ABDOMEN PELVIS WITH CONTRAST    CLINICAL INDICATION: Female, 88 years old. Epigastric pain    TECHNIQUE: Helical CT scan examination of the abdomen and pelvis is performed from the domes of the diaphragm to the pubic symphysis with the administration of intravenous contrast material.    CONTRAST: 75 mL of Omnipaque 300 IV.    COMPARISON: None    FINDINGS:  Lower Chest: Visualized lung bases are clear other than hypoaeration changes. Heart is normal in size. No pericardial or pleural effusion.    Liver: Normal in size and contour. No focal lesion.    Bile Ducts: Common bile duct is  dilated.    Gallbladder: There is marked dilation of the gallbladder and there may be pericolic cystic fluid. No definite gallstones can be seen.    Pancreas: Pancreas is atrophic but demonstrates no other abnormality.    Spleen: Normal in size and contour.    Adrenals: Normal configuration.    Kidneys and Ureters: Normal size and contour. No hydronephrosis. There are bilateral renal cysts.    Bladder: Normal in appearance.    Bowel:  Stomach: Unremarkable.  Small Intestine: Unremarkable.  Appendix: No inflammatory changes.  Colon: Unremarkable.    Vessels: Abdominal aorta and inferior vena cava are normal in course and caliber.    Reproductive Organs: Uterus is apparently surgically absent and no pelvic mass can be seen    Lymph Nodes: No pathologic mesenteric or retroperitoneal lymph nodes.    Peritoneum: No free air, free fluid, or fluid collection.    Abdominal Wall: No hernia or mass.    Musculoskeletal: No acute abnormality or suspicious bony lesion. There is considerable compression of the L1 vertebral body which appears chronic. There is some retropulsion of bone. Compression of the superior endplate of T12 also appears to be chronic. There are degenerative  disc changes at T12-L1 to L2-L3. No aggressive skeletal lesions can be seen.    IMPRESSION:  Dilation of the gallbladder and dilation of the common bile duct. There may be pericholecystic fluid. No gallstones can be seen but changes may represent  noncalculous acute cholecystitis.    Small bilateral renal cysts.    This exam was performed according to our departmental dose-optimization program which includes automated exposure control, adjustment of the mA and/or kV according to patient size and/or use of iterative reconstruction technique.    Electronically signed by:  Marcela Escalante Jr., MD  7/30/2023 9:04 PM CDT Workstation: [id number]                                     X-Ray Chest AP Portable (Final result)  Result time 07/30/23 21:22:49      Final result by Marcela Escalante Jr., MD (07/30/23 21:22:49)                   Narrative:    EXAMINATION:  XR CHEST AP PORTABLE    CLINICAL INDICATION:  Female, 88 years old. Chest    COMPARISON:  None.    FINDINGS:  The heart and great vessels appear normal. There are some atherosclerotic changes of aorta.    Both lungs are well expanded and clear. No pleural effusion can be seen. No pneumothorax can be seen.    IMPRESSION:  No x-ray evidence of active or acute disease.    Electronically signed by:  Marcela Escalante Jr., MD  7/30/2023 9:22 PM CDT Workstation: [id number]                                  No results found for this or any previous visit.    Results for orders placed during the hospital encounter of 02/11/21    DXA Bone Density Spine And Hip    Narrative  EXAMINATION:  DEXA BONE DENSITY SPINE HIP    CLINICAL HISTORY:  Asymptomatic menopausal state    TECHNIQUE:  DXA scanning was performed over the left hip and lumbar spine.  Review of the images confirms satisfactory positioning and technique.    COMPARISON:  02/07/2019.  Please refer to that report which described osteopenia    FINDINGS:  The L1-L4 vertebral bone mineral density is equal to 0.033 g/cm  squared with a T score of -0.1.    The left femoral neck bone mineral density is equal to 0.648 g/cm squared with a T score of -1.8.    The total hip bone mineral density is equal to 0.791 g/cm squared with a T score of -1.2.    There is a 9.1% risk of a major osteoporotic fracture and a 4.1% risk of hip fracture in the next 10 years (FRAX).    Impression  Osteopenia    Consider FDA approved medical therapies in postmenopausal women and men aged 50 years and older, based on the following:    *A hip or vertebral (clinical or morphometric) fracture  *T score less than or equal to -2.5 at the femoral neck or spine after appropriate evaluation to exclude secondary causes.  *Low bone mass -- also known as osteopenia (T score between -1.0 and -2.5 at the femoral neck or spine) and a 10 year probability of hip fracture greater than or equal to 3% or a 10 year probability of major osteoporosis-related fracture greater than or equal to 20% based on the [place]-adapted WHO algorithm.  *Clinicians judgment and/or patient preference may indicate treatment for people with 10 year fracture probabilities is above or below these levels.      Electronically signed by: Raisa Bermudez MD  Date:    02/11/2021  Time:    16:04    Results for orders placed during the hospital encounter of 07/14/21    X-Ray Chest PA And Lateral    Narrative  EXAMINATION:  XR CHEST PA AND LATERAL    CLINICAL HISTORY:  Cough    TECHNIQUE:  PA and lateral views of the chest were performed.    COMPARISON:  05/16/2019.    FINDINGS:  There is mild chronic interstitial change.  Minimal dependent atelectasis at the left lung base.  No focal consolidation.  No significant pleural effusion.    Heart size is top-normal.  Calcified aortic plaque.  Trachea midline.    Bony thorax intact.  Mild S shaped scoliosis.    Impression  1. Mild chronic interstitial change with minimal dependent atelectasis at the left lung base.  2. Mild S shaped scoliosis.      Electronically  signed by: Jose Medina  Date:    07/14/2021  Time:    11:13    No results found for this or any previous visit.        Medications:  Scheduled Meds:   heparin (porcine)  5,000 Units Subcutaneous Q8H    ipratropium  0.5 mg Nebulization Q6H    mupirocin   Nasal BID    nicotine  1 patch Transdermal Daily    pantoprazole  40 mg Intravenous Daily    piperacillin-tazobactam (Zosyn) IV (PEDS and ADULTS) (extended infusion is not appropriate)  3.375 g Intravenous Q8H    potassium chloride  10 mEq Intravenous Q1H                             Continuous Infusions:   lactated ringers 125 mL/hr at 07/31/23 1001     PRN Meds:.acetaminophen, dextrose 50%, dextrose 50%, glucagon (human recombinant), glucose, glucose, lorazepam, morphine, naloxone, ondansetron, oxyCODONE, senna-docusate 8.6-50 mg, sodium chloride 0.9%      Patient Active Problem List   Diagnosis    Chronic angle-closure glaucoma of both eyes    Hypertriglyceridemia    Macrocytosis    B12 deficiency    Abdominal aortic atherosclerosis    GERD (gastroesophageal reflux disease)    Stage 3a chronic kidney disease    Bilateral renal cysts    Smoker    Osteopenia of multiple sites    BMI 27.0-27.9,adult    Calcified granuloma of lung    Personal history of colon cancer, stage III    Acid reflux    Escalante's esophagus    History of colon cancer    Chemotherapy-induced peripheral neuropathy    Primary hypothyroidism    Vitamin D deficiency    Primary insomnia    Stress incontinence of urine    Dysphagia    Hypokalemia    Pulmonary emphysema, unspecified emphysema type    Acalculous cholecystitis    Lactic acid acidosis    Daily consumption of alcohol    Tachycardia    Elevated LFTs           ASSESSMENT/PLAN:         Acalculous cholecystitis course complicated by polymicrobial bacteremia, with impending possible septic shock  Presenting with lower chest/epigastric pain associated with nausea vomiting   Transaminitis with T bilirubin 1.8 with normal lipase with CT  concerning for acalculous cholecystitis with dilatation of CBD   Abdomen is tender to palpate, no leukocytosis but left shift present, has had prior abdominal surgeries  NPO except for medication until evaluated by General surgery   IV fluid hydration to continue  MRCP evaluate mild dilatation of CBD is pending  P.r.n. antiemetic and analgesic as ordered, adjust as needed, monitor for over-sedation   Empiric antibiotics with piperacillin/tazobactam  Serial abdominal examination  General surgery consulted  Infectious disease consulted, they discontinued vancomycin, continue with Zosyn  Pulmonary critical Care was consulted given patient's persistently hypotensive despite aggressive IV fluid resuscitation           Elevated LFTs  New elevation LFTs with current concern for cholecystitis   MRCP for evaluation  Trending CMP        Tachycardia-likely due to sepsis from acute cholecystitis  Sinus tach with sinus arrhythmia PVC   Likely in the setting of acute medical conditions, alcohol use, pain, infection   Keep potassium greater than 4, add on magnesium level  Remote telemetry monitoring        Daily consumption of alcohol  Daily alcohol consumption, 4 drinks of crown daily  Ordered banana bag   CIWA protocol with p.r.n. symptom driven Ativan and monitoring        Lactic acid acidosis  Lactic acid 3.2   Repeat status post fluid resuscitation IV antibiotics   Trend q.4 hours until less than 2        Pulmonary emphysema, unspecified emphysema type  As per chart review   Chronic smoker 2 pack per day, denies formal diagnosis of COPD and not on home inhalers   Continue supplemental oxygen, wean/adjust as needed  Scheduled breathing treatments  We will hold off on steroids at this time        Chemotherapy-induced peripheral neuropathy  Noted chronic history of same, continue gabapentin, renal dose        Personal history of colon cancer, stage III  Noted prior history of stage III colorectal cancer, status post adjuvant  chemotherapy   Followed by Dr. Granado/Renu     Smoker  Current smoker 2 pack per day   Nicotine patch ordered  Smoking cessation counseling as able        Stage 3a chronic kidney disease  Known history of CKD stage 3   Await UA with reflex urine culture  Renally dose all medications and avoid nephrotoxin drugs  Trending BMP        GERD (gastroesophageal reflux disease)  IV PPI therapy        Macrocytosis  Known history of macrocytosis, previously on B12 supplementation, daily alcohol consumption   Repeat B12 and folic acid level    Hypokalemia  -replace as needed      DVT ppx- heparin       CODE Status- patient is now DNR     Dispo- keep in hospital for now    Christen Adorno M.D.

## 2023-07-31 NOTE — ASSESSMENT & PLAN NOTE
As per chart review   Chronic smoker 2 pack per day, denies formal diagnosis of COPD and not on home inhalers   Continue supplemental oxygen, wean/adjust as needed  Scheduled breathing treatments

## 2023-08-01 ENCOUNTER — HOSPITAL ENCOUNTER (INPATIENT)
Facility: HOSPITAL | Age: 88
LOS: 7 days | Discharge: NURSING, REHAB OR LONG-TERM CARE | DRG: 445 | End: 2023-08-08
Attending: STUDENT IN AN ORGANIZED HEALTH CARE EDUCATION/TRAINING PROGRAM | Admitting: STUDENT IN AN ORGANIZED HEALTH CARE EDUCATION/TRAINING PROGRAM
Payer: MEDICARE

## 2023-08-01 ENCOUNTER — ANESTHESIA EVENT (OUTPATIENT)
Dept: ENDOSCOPY | Facility: HOSPITAL | Age: 88
DRG: 445 | End: 2023-08-01
Payer: MEDICARE

## 2023-08-01 ENCOUNTER — ANESTHESIA (OUTPATIENT)
Dept: ENDOSCOPY | Facility: HOSPITAL | Age: 88
DRG: 445 | End: 2023-08-01
Payer: MEDICARE

## 2023-08-01 VITALS
SYSTOLIC BLOOD PRESSURE: 115 MMHG | HEART RATE: 92 BPM | BODY MASS INDEX: 23.92 KG/M2 | TEMPERATURE: 97 F | OXYGEN SATURATION: 98 % | HEIGHT: 63 IN | DIASTOLIC BLOOD PRESSURE: 56 MMHG | RESPIRATION RATE: 9 BRPM | WEIGHT: 135 LBS

## 2023-08-01 DIAGNOSIS — K83.09 CHOLANGITIS: ICD-10-CM

## 2023-08-01 DIAGNOSIS — R78.81 BACTEREMIA DUE TO GRAM-NEGATIVE BACTERIA: Primary | ICD-10-CM

## 2023-08-01 DIAGNOSIS — I38 ENDOCARDITIS: ICD-10-CM

## 2023-08-01 DIAGNOSIS — F17.210 CIGARETTE NICOTINE DEPENDENCE WITHOUT COMPLICATION: ICD-10-CM

## 2023-08-01 DIAGNOSIS — R07.9 CHEST PAIN: ICD-10-CM

## 2023-08-01 LAB
ALBUMIN SERPL BCP-MCNC: 2.3 G/DL (ref 3.5–5.2)
ALP SERPL-CCNC: 73 U/L (ref 55–135)
ALT SERPL W/O P-5'-P-CCNC: 91 U/L (ref 10–44)
ANION GAP SERPL CALC-SCNC: 4 MMOL/L (ref 8–16)
AST SERPL-CCNC: 131 U/L (ref 10–40)
BILIRUB SERPL-MCNC: 2 MG/DL (ref 0.1–1)
BUN SERPL-MCNC: 23 MG/DL (ref 8–23)
CALCIUM SERPL-MCNC: 7.7 MG/DL (ref 8.7–10.5)
CHLORIDE SERPL-SCNC: 114 MMOL/L (ref 95–110)
CO2 SERPL-SCNC: 22 MMOL/L (ref 23–29)
CREAT SERPL-MCNC: 1.3 MG/DL (ref 0.5–1.4)
EST. GFR  (NO RACE VARIABLE): 40 ML/MIN/1.73 M^2
GLUCOSE SERPL-MCNC: 79 MG/DL (ref 70–110)
MAGNESIUM SERPL-MCNC: 1.8 MG/DL (ref 1.6–2.6)
PHOSPHATE SERPL-MCNC: 3.5 MG/DL (ref 2.7–4.5)
POTASSIUM SERPL-SCNC: 4.4 MMOL/L (ref 3.5–5.1)
PROT SERPL-MCNC: 5.3 G/DL (ref 6–8.4)
SODIUM SERPL-SCNC: 140 MMOL/L (ref 136–145)

## 2023-08-01 PROCEDURE — 37000008 HC ANESTHESIA 1ST 15 MINUTES: Performed by: INTERNAL MEDICINE

## 2023-08-01 PROCEDURE — 43264 ERCP REMOVE DUCT CALCULI: CPT | Performed by: INTERNAL MEDICINE

## 2023-08-01 PROCEDURE — 25000242 PHARM REV CODE 250 ALT 637 W/ HCPCS: Performed by: INTERNAL MEDICINE

## 2023-08-01 PROCEDURE — 63600175 PHARM REV CODE 636 W HCPCS: Performed by: NURSE ANESTHETIST, CERTIFIED REGISTERED

## 2023-08-01 PROCEDURE — 27202125 HC BALLOON, EXTRACTION (ANY): Performed by: INTERNAL MEDICINE

## 2023-08-01 PROCEDURE — C1769 GUIDE WIRE: HCPCS | Performed by: INTERNAL MEDICINE

## 2023-08-01 PROCEDURE — 63600175 PHARM REV CODE 636 W HCPCS: Performed by: INTERNAL MEDICINE

## 2023-08-01 PROCEDURE — 43264 PR ERCP,W/REMOVAL STONE,BIL/PANCR DUCTS: ICD-10-PCS | Mod: 51,,, | Performed by: INTERNAL MEDICINE

## 2023-08-01 PROCEDURE — 25000003 PHARM REV CODE 250: Performed by: NURSE ANESTHETIST, CERTIFIED REGISTERED

## 2023-08-01 PROCEDURE — 99406 BEHAV CHNG SMOKING 3-10 MIN: CPT | Mod: HCNC,,,

## 2023-08-01 PROCEDURE — C2625 STENT, NON-COR, TEM W/DEL SY: HCPCS | Performed by: INTERNAL MEDICINE

## 2023-08-01 PROCEDURE — 74328 X-RAY BILE DUCT ENDOSCOPY: CPT | Mod: TC | Performed by: INTERNAL MEDICINE

## 2023-08-01 PROCEDURE — 99223 PR INITIAL HOSPITAL CARE,LEVL III: ICD-10-PCS | Mod: HCNC,25,,

## 2023-08-01 PROCEDURE — 63600175 PHARM REV CODE 636 W HCPCS: Performed by: HOSPITALIST

## 2023-08-01 PROCEDURE — 27000221 HC OXYGEN, UP TO 24 HOURS: Mod: HCNC

## 2023-08-01 PROCEDURE — 43274 ERCP DUCT STENT PLACEMENT: CPT | Performed by: INTERNAL MEDICINE

## 2023-08-01 PROCEDURE — D9220A PRA ANESTHESIA: Mod: ANES,,, | Performed by: ANESTHESIOLOGY

## 2023-08-01 PROCEDURE — 74328 X-RAY BILE DUCT ENDOSCOPY: CPT | Mod: 26,,, | Performed by: INTERNAL MEDICINE

## 2023-08-01 PROCEDURE — 85027 COMPLETE CBC AUTOMATED: CPT | Performed by: INTERNAL MEDICINE

## 2023-08-01 PROCEDURE — 94761 N-INVAS EAR/PLS OXIMETRY MLT: CPT | Mod: HCNC

## 2023-08-01 PROCEDURE — 80053 COMPREHEN METABOLIC PANEL: CPT | Performed by: INTERNAL MEDICINE

## 2023-08-01 PROCEDURE — 20600001 HC STEP DOWN PRIVATE ROOM

## 2023-08-01 PROCEDURE — D9220A PRA ANESTHESIA: ICD-10-PCS | Mod: ANES,,, | Performed by: ANESTHESIOLOGY

## 2023-08-01 PROCEDURE — 43274 PR ERCP W/STENT PLCMNT BILIARY/PANCREATIC DUCT: ICD-10-PCS | Mod: ,,, | Performed by: INTERNAL MEDICINE

## 2023-08-01 PROCEDURE — D9220A PRA ANESTHESIA: ICD-10-PCS | Mod: CRNA,,, | Performed by: NURSE ANESTHETIST, CERTIFIED REGISTERED

## 2023-08-01 PROCEDURE — 25000003 PHARM REV CODE 250: Performed by: EMERGENCY MEDICINE

## 2023-08-01 PROCEDURE — 83735 ASSAY OF MAGNESIUM: CPT | Performed by: INTERNAL MEDICINE

## 2023-08-01 PROCEDURE — 84100 ASSAY OF PHOSPHORUS: CPT | Performed by: INTERNAL MEDICINE

## 2023-08-01 PROCEDURE — 43274 ERCP DUCT STENT PLACEMENT: CPT | Mod: ,,, | Performed by: INTERNAL MEDICINE

## 2023-08-01 PROCEDURE — C2617 STENT, NON-COR, TEM W/O DEL: HCPCS | Performed by: INTERNAL MEDICINE

## 2023-08-01 PROCEDURE — 99223 1ST HOSP IP/OBS HIGH 75: CPT | Mod: HCNC,25,,

## 2023-08-01 PROCEDURE — 99406 PR TOBACCO USE CESSATION INTERMEDIATE 3-10 MINUTES: ICD-10-PCS | Mod: HCNC,,,

## 2023-08-01 PROCEDURE — 43264 ERCP REMOVE DUCT CALCULI: CPT | Mod: 51,,, | Performed by: INTERNAL MEDICINE

## 2023-08-01 PROCEDURE — 37000009 HC ANESTHESIA EA ADD 15 MINS: Performed by: INTERNAL MEDICINE

## 2023-08-01 PROCEDURE — 99223 PR INITIAL HOSPITAL CARE,LEVL III: ICD-10-PCS | Mod: 25,HCNC,GC, | Performed by: INTERNAL MEDICINE

## 2023-08-01 PROCEDURE — 63600175 PHARM REV CODE 636 W HCPCS: Performed by: EMERGENCY MEDICINE

## 2023-08-01 PROCEDURE — D9220A PRA ANESTHESIA: Mod: CRNA,,, | Performed by: NURSE ANESTHETIST, CERTIFIED REGISTERED

## 2023-08-01 PROCEDURE — 25000003 PHARM REV CODE 250: Performed by: INTERNAL MEDICINE

## 2023-08-01 PROCEDURE — 74328 PR  X-RAY FOR BILE DUCT ENDOSCOPY: ICD-10-PCS | Mod: 26,,, | Performed by: INTERNAL MEDICINE

## 2023-08-01 PROCEDURE — 27202127 HC STENT INTRODUCER: Performed by: INTERNAL MEDICINE

## 2023-08-01 PROCEDURE — 25000003 PHARM REV CODE 250: Mod: HCNC

## 2023-08-01 PROCEDURE — 99223 1ST HOSP IP/OBS HIGH 75: CPT | Mod: 25,HCNC,GC, | Performed by: INTERNAL MEDICINE

## 2023-08-01 PROCEDURE — 27201674 HC SPHINCTERTOME: Performed by: INTERNAL MEDICINE

## 2023-08-01 PROCEDURE — 25000003 PHARM REV CODE 250: Performed by: HOSPITALIST

## 2023-08-01 PROCEDURE — 25500020 PHARM REV CODE 255: Performed by: INTERNAL MEDICINE

## 2023-08-01 DEVICE — BILIARY STENT
Type: IMPLANTABLE DEVICE | Site: BILE DUCT | Status: NON-FUNCTIONAL
Brand: ADVANIX™ BILIARY
Removed: 2023-12-12

## 2023-08-01 RX ORDER — MAG HYDROX/ALUMINUM HYD/SIMETH 200-200-20
30 SUSPENSION, ORAL (FINAL DOSE FORM) ORAL 4 TIMES DAILY PRN
Status: DISCONTINUED | OUTPATIENT
Start: 2023-08-01 | End: 2023-08-08 | Stop reason: HOSPADM

## 2023-08-01 RX ORDER — TALC
6 POWDER (GRAM) TOPICAL NIGHTLY PRN
Status: DISCONTINUED | OUTPATIENT
Start: 2023-08-01 | End: 2023-08-08 | Stop reason: HOSPADM

## 2023-08-01 RX ORDER — ACETAMINOPHEN 325 MG/1
650 TABLET ORAL EVERY 4 HOURS PRN
Status: DISCONTINUED | OUTPATIENT
Start: 2023-08-01 | End: 2023-08-08 | Stop reason: HOSPADM

## 2023-08-01 RX ORDER — SODIUM CHLORIDE 0.9 % (FLUSH) 0.9 %
10 SYRINGE (ML) INJECTION
Status: DISCONTINUED | OUTPATIENT
Start: 2023-08-01 | End: 2023-08-01 | Stop reason: HOSPADM

## 2023-08-01 RX ORDER — NALOXONE HCL 0.4 MG/ML
0.02 VIAL (ML) INJECTION
Status: DISCONTINUED | OUTPATIENT
Start: 2023-08-01 | End: 2023-08-08 | Stop reason: HOSPADM

## 2023-08-01 RX ORDER — PROPOFOL 10 MG/ML
VIAL (ML) INTRAVENOUS
Status: DISCONTINUED | OUTPATIENT
Start: 2023-08-01 | End: 2023-08-01

## 2023-08-01 RX ORDER — SIMETHICONE 80 MG
1 TABLET,CHEWABLE ORAL 4 TIMES DAILY PRN
Status: DISCONTINUED | OUTPATIENT
Start: 2023-08-01 | End: 2023-08-08 | Stop reason: HOSPADM

## 2023-08-01 RX ORDER — IBUPROFEN 200 MG
16 TABLET ORAL
Status: DISCONTINUED | OUTPATIENT
Start: 2023-08-01 | End: 2023-08-08 | Stop reason: HOSPADM

## 2023-08-01 RX ORDER — PHENYLEPHRINE HYDROCHLORIDE 10 MG/ML
INJECTION INTRAVENOUS
Status: DISCONTINUED | OUTPATIENT
Start: 2023-08-01 | End: 2023-08-01

## 2023-08-01 RX ORDER — PROCHLORPERAZINE EDISYLATE 5 MG/ML
5 INJECTION INTRAMUSCULAR; INTRAVENOUS EVERY 6 HOURS PRN
Status: DISCONTINUED | OUTPATIENT
Start: 2023-08-01 | End: 2023-08-08 | Stop reason: HOSPADM

## 2023-08-01 RX ORDER — INDOMETHACIN 50 MG/1
SUPPOSITORY RECTAL
Status: COMPLETED | OUTPATIENT
Start: 2023-08-01 | End: 2023-08-01

## 2023-08-01 RX ORDER — GLUCAGON 1 MG
1 KIT INJECTION
Status: DISCONTINUED | OUTPATIENT
Start: 2023-08-01 | End: 2023-08-08 | Stop reason: HOSPADM

## 2023-08-01 RX ORDER — HEPARIN SODIUM 5000 [USP'U]/ML
5000 INJECTION, SOLUTION INTRAVENOUS; SUBCUTANEOUS EVERY 8 HOURS
Status: DISCONTINUED | OUTPATIENT
Start: 2023-08-02 | End: 2023-08-08 | Stop reason: HOSPADM

## 2023-08-01 RX ORDER — SODIUM CHLORIDE 0.9 % (FLUSH) 0.9 %
5 SYRINGE (ML) INJECTION
Status: DISCONTINUED | OUTPATIENT
Start: 2023-08-01 | End: 2023-08-08 | Stop reason: HOSPADM

## 2023-08-01 RX ORDER — PANTOPRAZOLE SODIUM 40 MG/10ML
40 INJECTION, POWDER, LYOPHILIZED, FOR SOLUTION INTRAVENOUS DAILY
Status: DISCONTINUED | OUTPATIENT
Start: 2023-08-02 | End: 2023-08-02

## 2023-08-01 RX ORDER — BISACODYL 10 MG
10 SUPPOSITORY, RECTAL RECTAL DAILY PRN
Status: DISCONTINUED | OUTPATIENT
Start: 2023-08-01 | End: 2023-08-02

## 2023-08-01 RX ORDER — SODIUM CHLORIDE 9 MG/ML
INJECTION, SOLUTION INTRAVENOUS CONTINUOUS
Status: DISCONTINUED | OUTPATIENT
Start: 2023-08-01 | End: 2023-08-02

## 2023-08-01 RX ORDER — ONDANSETRON 8 MG/1
8 TABLET, ORALLY DISINTEGRATING ORAL EVERY 8 HOURS PRN
Status: DISCONTINUED | OUTPATIENT
Start: 2023-08-01 | End: 2023-08-08 | Stop reason: HOSPADM

## 2023-08-01 RX ORDER — FENTANYL CITRATE 50 UG/ML
INJECTION, SOLUTION INTRAMUSCULAR; INTRAVENOUS
Status: DISCONTINUED | OUTPATIENT
Start: 2023-08-01 | End: 2023-08-01

## 2023-08-01 RX ORDER — POLYETHYLENE GLYCOL 3350 17 G/17G
17 POWDER, FOR SOLUTION ORAL 2 TIMES DAILY PRN
Status: DISCONTINUED | OUTPATIENT
Start: 2023-08-01 | End: 2023-08-08 | Stop reason: HOSPADM

## 2023-08-01 RX ORDER — IBUPROFEN 200 MG
24 TABLET ORAL
Status: DISCONTINUED | OUTPATIENT
Start: 2023-08-01 | End: 2023-08-08 | Stop reason: HOSPADM

## 2023-08-01 RX ORDER — IPRATROPIUM BROMIDE AND ALBUTEROL SULFATE 2.5; .5 MG/3ML; MG/3ML
3 SOLUTION RESPIRATORY (INHALATION) EVERY 4 HOURS PRN
Status: DISCONTINUED | OUTPATIENT
Start: 2023-08-01 | End: 2023-08-08 | Stop reason: HOSPADM

## 2023-08-01 RX ORDER — IBUPROFEN 200 MG
1 TABLET ORAL DAILY
Status: DISCONTINUED | OUTPATIENT
Start: 2023-08-01 | End: 2023-08-01

## 2023-08-01 RX ORDER — LIDOCAINE HYDROCHLORIDE 20 MG/ML
INJECTION INTRAVENOUS
Status: DISCONTINUED | OUTPATIENT
Start: 2023-08-01 | End: 2023-08-01

## 2023-08-01 RX ORDER — PROPOFOL 10 MG/ML
VIAL (ML) INTRAVENOUS CONTINUOUS PRN
Status: DISCONTINUED | OUTPATIENT
Start: 2023-08-01 | End: 2023-08-01

## 2023-08-01 RX ORDER — ACETAMINOPHEN 500 MG
1000 TABLET ORAL EVERY 8 HOURS PRN
Status: DISCONTINUED | OUTPATIENT
Start: 2023-08-01 | End: 2023-08-08 | Stop reason: HOSPADM

## 2023-08-01 RX ADMIN — PIPERACILLIN SODIUM AND TAZOBACTAM SODIUM 4.5 G: 4; .5 INJECTION, POWDER, FOR SOLUTION INTRAVENOUS at 05:08

## 2023-08-01 RX ADMIN — FENTANYL CITRATE 12.5 MCG: 50 INJECTION, SOLUTION INTRAMUSCULAR; INTRAVENOUS at 02:08

## 2023-08-01 RX ADMIN — SODIUM CHLORIDE: 9 INJECTION, SOLUTION INTRAVENOUS at 12:08

## 2023-08-01 RX ADMIN — HEPARIN SODIUM 5000 UNITS: 5000 INJECTION INTRAVENOUS; SUBCUTANEOUS at 06:08

## 2023-08-01 RX ADMIN — LIDOCAINE HYDROCHLORIDE 40 MG: 20 INJECTION INTRAVENOUS at 02:08

## 2023-08-01 RX ADMIN — PHENYLEPHRINE HYDROCHLORIDE 100 MCG: 10 INJECTION INTRAVENOUS at 02:08

## 2023-08-01 RX ADMIN — PIPERACILLIN SODIUM,TAZOBACTAM SODIUM 3.38 G: 3; .375 INJECTION, POWDER, FOR SOLUTION INTRAVENOUS at 06:08

## 2023-08-01 RX ADMIN — FENTANYL CITRATE 12.5 MCG: 50 INJECTION, SOLUTION INTRAMUSCULAR; INTRAVENOUS at 03:08

## 2023-08-01 RX ADMIN — SODIUM CHLORIDE, SODIUM LACTATE, POTASSIUM CHLORIDE, AND CALCIUM CHLORIDE: .6; .31; .03; .02 INJECTION, SOLUTION INTRAVENOUS at 03:08

## 2023-08-01 RX ADMIN — SODIUM CHLORIDE: 0.9 INJECTION, SOLUTION INTRAVENOUS at 02:08

## 2023-08-01 RX ADMIN — MORPHINE SULFATE 2 MG: 2 INJECTION, SOLUTION INTRAMUSCULAR; INTRAVENOUS at 03:08

## 2023-08-01 RX ADMIN — PROPOFOL 30 MG: 10 INJECTION, EMULSION INTRAVENOUS at 02:08

## 2023-08-01 RX ADMIN — PHENYLEPHRINE HYDROCHLORIDE 200 MCG: 10 INJECTION INTRAVENOUS at 03:08

## 2023-08-01 RX ADMIN — PROPOFOL 20 MG: 10 INJECTION, EMULSION INTRAVENOUS at 03:08

## 2023-08-01 RX ADMIN — IPRATROPIUM BROMIDE 0.5 MG: 0.5 SOLUTION RESPIRATORY (INHALATION) at 12:08

## 2023-08-01 RX ADMIN — PROPOFOL 150 MCG/KG/MIN: 10 INJECTION, EMULSION INTRAVENOUS at 02:08

## 2023-08-01 RX ADMIN — PHENYLEPHRINE HYDROCHLORIDE 100 MCG: 10 INJECTION INTRAVENOUS at 03:08

## 2023-08-01 NOTE — H&P
[facility] Medicine  History & Physical    Patient Name: Kenyetta Andersen  MRN: [id number]  Patient Class: IP- Inpatient  Admission Date: 8/1/2023  Attending Physician: Sarahy Leger MD   Primary Care Provider: Hever Arreola MD         Patient information was obtained from patient, past medical records and ER records.     Subjective:     Principal Problem:Cholangitis    Chief Complaint: No chief complaint on file.       HPI: Kenyetta Andersen is a 88 y.o. female with PMHx GERD c/b esophagitis, hypothyroid, colon cancer (Dx 2009) s/p R hemicolectomy with end to side ileocolonic anastamosis and FOLFOX chemotherapy who presents to [facility] as transfer from [facility] for ERCP.     Per  transfer note: presented with chest pain and abdominal pain as well as generalized weakness, she was found to have evidence of possible acalculus cholecystitis on presentation on CT scan.  She had elevated bilirubin and transaminitis, she was also diagnosed with cholangitis spiking fevers and found to have polymicrobial bacteremia with Gram-positive and Gram-negative bacteremia.  Infectious Disease was consulted, they recommended continue with Zosyn repeating blood cultures.  She was IV fluid resuscitated had a midline placed. general surgery was consulted-they recommended consult for ERCP. GI consult for ERCP and duct clearance, can consider cholecystectomy afterwards as MRCP was suggestive of common bile duct stone.  Palliative Care was consulted and reviewed code status, she was made DNR, she also expressed that she would not want a PEG tube. She was taken to ERCP with GI, EGD findings was tortuous esophagus, narrowing at GE junction, distortion of antrum noted, gastroscope exchange for duodenal scope, scope advanced easily to the antrum and navigated around distorted antral thickening, scope advanced to the pylorus but unable to drop into the duodenal bulb, scope exchange back to gastroscope and  "unremarkable, GI is recommending transfer to [facility] for attempt at ERCP.    Labs and imaging at [facility]: CBC WBC 12.2, Hgb 13-15, plts 14. BUN/Cr 23/1.3 (baseline cr ~1.0). Tibi 2.8 ->2.0, ALP wnl, AST/ /107-> 131/91. Procal 60. BCX w/ E coli, Klebsiella pneumoniae, and Bacteriodes fragilis 07/30. Repeat blood cultures drawn 7/31 NGTD. MRCP [place]-shaped low signal intensity mass which appears to lie within the distal segment of the common bile duct suggestive of choledocholithiasis. There is dilatation of the biliary tree proximal to this level.    On arrival, afebrile, HDS on 3L NC (not oxygen a baseline). She complains of some ongoing abdominal pain, but denies nausea, vomiting, diarrhea, fever, chills.         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    Anxiety     Arthritis     Escalante esophagus     Blood transfusion     Cataract     Done OU    Colon cancer 2009    Colon polyps 3/14/2017    GERD (gastroesophageal reflux disease)     Glaucoma 2/24/2012    Nuclear sclerosis 8/27/2012    Osteoporosis     Primary hypothyroidism 2/23/2020    Pseudophakia - Left Eye 2/24/2012       Past Surgical History:   Procedure Laterality Date    APPENDECTOMY      CATARACT EXTRACTION W/  INTRAOCULAR LENS IMPLANT Left     CATARACT EXTRACTION W/  INTRAOCULAR LENS IMPLANT Right     Dr Sawant    COLON SURGERY      resection    COLONOSCOPY N/A 3/14/2017    Procedure: COLONOSCOPY;  Surgeon: Killian Guerrier MD;  Location: [facility];  Service: Endoscopy;  Laterality: N/A;    COLONOSCOPY  03/14/2017    Dr. Guerrier: hemorrhoids, one colon polyp removed, "Patent functional end-to-end ileo-colonic anastomosis"with healthy mucosa; biopsy: hyperplastic polyp; repeat in 3 years for surveillance    COLONOSCOPY N/A 1/21/2020    Procedure: COLONOSCOPY;  Surgeon: Timo Darling MD;  Location: [facility];  Service: Endoscopy;  Laterality: N/A;    COLONOSCOPY N/A 7/8/2022 "    Procedure: COLONOSCOPY;  Surgeon: Anaid Washington MD;  Location: [facility] ENDO;  Service: Endoscopy;  Laterality: N/A;    ECTOPIC PREGNANCY SURGERY      ESOPHAGOGASTRODUODENOSCOPY N/A 5/16/2019    Procedure: EGD (ESOPHAGOGASTRODUODENOSCOPY);  Surgeon: Anaid Washington MD;  Location: [facility] ENDO;  Service: Endoscopy;  Laterality: N/A;    ESOPHAGOGASTRODUODENOSCOPY N/A 7/11/2019    Procedure: EGD (ESOPHAGOGASTRODUODENOSCOPY);  Surgeon: Anaid Washington MD;  Location: [facility] ENDO;  Service: Endoscopy;  Laterality: N/A;    ESOPHAGOGASTRODUODENOSCOPY N/A 2/5/2021    Procedure: EGD (ESOPHAGOGASTRODUODENOSCOPY);  Surgeon: Aniad Washington MD;  Location: [facility] ENDO;  Service: Endoscopy;  Laterality: N/A;    ESOPHAGOGASTRODUODENOSCOPY N/A 11/11/2021    Procedure: EGD (ESOPHAGOGASTRODUODENOSCOPY);  Surgeon: Anaid Washington MD;  Location: [facility] ENDO;  Service: Endoscopy;  Laterality: N/A;    ESOPHAGOGASTRODUODENOSCOPY N/A 7/8/2022    Procedure: EGD (ESOPHAGOGASTRODUODENOSCOPY);  Surgeon: Anaid Washington MD;  Location: [facility] ENDO;  Service: Endoscopy;  Laterality: N/A;    ESOPHAGOGASTRODUODENOSCOPY N/A 4/12/2023    Procedure: EGD (ESOPHAGOGASTRODUODENOSCOPY);  Surgeon: Anaid Washington MD;  Location: [facility] ENDO;  Service: Endoscopy;  Laterality: N/A;    EYE SURGERY      bilateral cataracts    HYSTERECTOMY      complete    JOINT REPLACEMENT      Liban Knee    ROTATOR CUFF REPAIR Right     TOE SURGERY      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    UPPER GASTROINTESTINAL ENDOSCOPY  06/12/2017    Dr. Guerrier: gastritis and esophagitis, biopsy: chronic gastritis, negative for h pylori, esophageal- positive eosinophils, negative for barretts; repeat in 2 months    UPPER GASTROINTESTINAL ENDOSCOPY  07/27/2017    Dr. Guerrier       Review of patient's allergies indicates:   Allergen Reactions    Ace inhibitors Edema     Other reaction(s): Angioedema    Codeine Hives       Current Facility-Administered  Medications on File Prior to Encounter   Medication    [COMPLETED] potassium chloride 10 mEq in 100 mL IVPB    [DISCONTINUED] acetaminophen tablet 650 mg    [DISCONTINUED] benzonatate capsule 200 mg    [DISCONTINUED] dextrose 50% injection 12.5 g    [DISCONTINUED] dextrose 50% injection 25 g    [DISCONTINUED] etomidate injection    [DISCONTINUED] famotidine (PF) injection    [DISCONTINUED] fentaNYL 50 mcg/mL injection    [DISCONTINUED] glucagon (human recombinant) injection 1 mg    [DISCONTINUED] glucose chewable tablet 16 g    [DISCONTINUED] glucose chewable tablet 24 g    [DISCONTINUED] heparin (porcine) injection 5,000 Units    [DISCONTINUED] ipratropium 0.02 % nebulizer solution 0.5 mg    [DISCONTINUED] lactated ringers infusion    [DISCONTINUED] lactated ringers infusion    [DISCONTINUED] LIDOcaine (PF) 20 mg/mL (2%) injection    [DISCONTINUED] LORazepam injection 0.5 mg    [DISCONTINUED] morphine injection 2 mg    [DISCONTINUED] mupirocin 2 % ointment    [DISCONTINUED] naloxone 0.4 mg/mL injection 0.02 mg    [DISCONTINUED] nicotine 21 mg/24 hr 1 patch    [DISCONTINUED] nicotine 7 mg/24 hr 1 patch    [DISCONTINUED] ondansetron HCl (PF) injection    [DISCONTINUED] ondansetron injection 4 mg    [DISCONTINUED] ondansetron injection 4 mg    [DISCONTINUED] oxyCODONE immediate release tablet 5 mg    [DISCONTINUED] pantoprazole injection 40 mg    [DISCONTINUED] PHENYLephrine injection    [DISCONTINUED] piperacillin-tazobactam 3.375 g in dextrose 5 % 100 mL IVPB (ready to mix)    [DISCONTINUED] rocuronium injection    [DISCONTINUED] senna-docusate 8.6-50 mg per tablet 2 tablet    [DISCONTINUED] sodium chloride 0.9% flush 10 mL    [DISCONTINUED] succinylcholine injection    [DISCONTINUED] sugammadex (BRIDION) 100 mg/mL injection     Current Outpatient Medications on File Prior to Encounter   Medication Sig    alendronate (FOSAMAX) 70 MG tablet TAKE 1 TABLET (70 MG TOTAL) BY MOUTH EVERY  7 DAYS. ON EMPTY STOMACH IN MORNING, REMAIN UPRIGHT FOR 30 MINUTES. (Patient taking differently: Take 70 mg by mouth every 7 days. On empty stomach in morning, remain upright for 30 minutes. Mondays)    ascorbic acid, vitamin C, (VITAMIN C) 500 MG tablet Take 500 mg by mouth once daily.    b complex vitamins capsule Take 1 capsule by mouth once daily.    brimonidine 0.2% (ALPHAGAN) 0.2 % Drop INSTILL 1 DROP INTO BOTH EYES 3 TIMES A DAY (Patient taking differently: Place 1 drop into both eyes 3 (three) times daily.)    calcium carbonate-magnesium hydroxide (ROLAIDS) 550-110 mg Chew Take 1 tablet by mouth 2 (two) times daily with meals.    dorzolamide-timolol 2-0.5% (COSOPT) 22.3-6.8 mg/mL ophthalmic solution INSTILL 1 DROP INTO BOTH EYES TWICE A DAY (Patient taking differently: Place 1 drop into both eyes 2 (two) times daily.)    gabapentin (NEURONTIN) 300 MG capsule TAKE 1 CAP IN THE AM, 1 CAP MIDDAY, AND 2 CAPS AT NIGHT (Patient taking differently: Take 300 mg by mouth As instructed. TAKE 1 CAP IN THE AM, 1 CAP MIDDAY, AND 2 CAPS AT NIGHT)    mirtazapine (REMERON) 7.5 MG Tab TAKE 1 TABLET BY MOUTH EVERY EVENING. (Patient not taking: Reported on 7/31/2023)    nicotine, polacrilex, (NICORETTE) 2 mg Gum Take 1 each (2 mg total) by mouth every hour as needed (smoking cessation).    omeprazole (PRILOSEC) 40 MG capsule Take 1 capsule (40 mg total) by mouth 2 (two) times daily before meals for 60 days, THEN 1 capsule (40 mg total) every morning.    potassium chloride SA (K-DUR,KLOR-CON) 20 MEQ tablet TAKE 1 TABLET BY MOUTH ONCE DAILY (Patient taking differently: Take 20 mEq by mouth once daily.)     Family History       Problem Relation (Age of Onset)    Arthritis Sister    Heart disease Mother, Father, Brother    No Known Problems Brother, Brother    Parkinsonism Sister          Tobacco Use    Smoking status: Every Day     Current packs/day: 0.00     Average packs/day: 0.3 packs/day for 65.0 years (21.5 ttl  pk-yrs)     Types: Cigarettes     Start date: 10/5/1955     Last attempt to quit: 10/5/2020     Years since quittin.8    Smokeless tobacco: Never    Tobacco comments:     3/1/23--states smokes 1-2 cigarettes a day   Substance and Sexual Activity    Alcohol use: Yes     Alcohol/week: 2.0 standard drinks of alcohol     Types: 2 Shots of liquor per week     Comment: Daily, about 4 drinks per day (crown)    Drug use: No    Sexual activity: Not Currently     Review of Systems   Constitutional:  Negative for chills and fever.   HENT:  Negative for trouble swallowing.    Eyes:  Negative for visual disturbance.   Respiratory:  Positive for cough. Negative for shortness of breath.    Cardiovascular:  Negative for chest pain and leg swelling.   Gastrointestinal:  Positive for abdominal pain. Negative for constipation, diarrhea, nausea and vomiting.   Genitourinary:  Negative for difficulty urinating, dysuria and flank pain.   Musculoskeletal:  Negative for back pain.   Skin:  Negative for color change and rash.   Neurological:  Negative for weakness and light-headedness.   Psychiatric/Behavioral:  Negative for confusion. The patient is not nervous/anxious.      Objective:     Vital Signs (Most Recent):  Temp: 97.6 °F (36.4 °C) (23 1148)  Pulse: 94 (23 1148)  Resp: 18 (23 1148)  BP: (!) 118/57 (23 1148)  SpO2: 97 % (23 1148) Vital Signs (24h Range):  Temp:  [95.2 °F (35.1 °C)-98.2 °F (36.8 °C)] 97.6 °F (36.4 °C)  Pulse:  [73-98] 94  Resp:  [9-29] 18  SpO2:  [92 %-100 %] 97 %  BP: ()/(46-69) 118/57     Weight: 68 kg (149 lb 14.6 oz)  Body mass index is 26.56 kg/m².     Physical Exam  Vitals and nursing note reviewed.   Constitutional:       General: She is not in acute distress.  HENT:      Head: Normocephalic and atraumatic.      Nose:      Comments: NC in nares     Mouth/Throat:      Mouth: Mucous membranes are dry.   Eyes:      Extraocular Movements: Extraocular movements intact.       Conjunctiva/sclera: Conjunctivae normal.   Cardiovascular:      Rate and Rhythm: Normal rate and regular rhythm.      Heart sounds: Normal heart sounds.   Pulmonary:      Effort: Pulmonary effort is normal. No respiratory distress.      Comments: Coughing up white sputum (chronic per pt)  Abdominal:      General: There is no distension.      Palpations: Abdomen is soft.      Tenderness: There is abdominal tenderness. There is no guarding or rebound.      Comments: Tenderness to palpation throughout   Musculoskeletal:         General: Normal range of motion.      Cervical back: Normal range of motion.      Right lower leg: No edema.      Left lower leg: No edema.   Skin:     General: Skin is warm and dry.   Neurological:      General: No focal deficit present.      Mental Status: She is alert and oriented to person, place, and time.      Motor: No weakness.   Psychiatric:         Mood and Affect: Mood normal.         Behavior: Behavior normal.         Thought Content: Thought content normal.         Judgment: Judgment normal.                Significant Labs: All pertinent labs within the past 24 hours have been reviewed.  Blood Culture:   Recent Labs   Lab 07/30/23  2321 07/30/23  2344 07/31/23  0931   LABBLOO Gram stain aer bottle: Gram negative rods  Positive results previously called  GRAM NEGATIVE PRISCILA, LACTOSE   For susceptibility see order #[id number]  * Gram stain aer bottle: Gram negative rods  Gram stain aer bottle: Gram positive cocci  Results called to and read back by: SHALINI OROPEZA  07/31/2023  08:59  JT  GRAM NEGATIVE PRISCILA, LACTOSE   Identification and susceptibility pending  * No Growth to date  Gram stain aer bottle: Gram negative rods  No Growth to date  Gram stain aer bottle: Gram negative rods  Positive results previously called     CBC:   Recent Labs   Lab 07/30/23  1844 07/31/23  0456 07/31/23  0609   WBC 11.04 12.22  --    HGB 15.3 13.2  --    HCT 46.5 39.9  39    141*  --      CMP:   Recent Labs   Lab 07/30/23 1844 07/31/23  0456 08/01/23  0412    142 140   K 3.9 3.2* 4.4    112* 114*   CO2 23 20* 22*   * 115* 79   BUN 14 17 23   CREATININE 1.1 1.0 1.3   CALCIUM 9.6 8.2* 7.7*   PROT 7.5 5.4* 5.3*   ALBUMIN 3.6 2.5* 2.3*   BILITOT 1.8* 2.8* 2.0*   ALKPHOS 120 88 73   * 217* 131*   * 105* 91*   ANIONGAP 9 10 4*     Cardiac Markers:   Recent Labs   Lab 07/30/23 1844   *     Lactic Acid:   Recent Labs   Lab 07/30/23 1917 07/30/23  2255 07/31/23  0456   LACTATE 3.2* 4.1* 1.7     Lipase:   Recent Labs   Lab 07/30/23 1844   LIPASE 28     Magnesium:   Recent Labs   Lab 07/30/23 1917 07/31/23  0456 08/01/23  0412   MG 1.5* 1.8 1.8     Troponin:   Recent Labs   Lab 07/30/23 1844   TROPONINIHS 7.2     TSH:   Recent Labs   Lab 07/30/23 1844   TSH 0.490     Urine Studies:   Recent Labs   Lab 07/31/23  0435   COLORU Yellow   APPEARANCEUA Clear   PHUR 6.0   SPECGRAV >1.030*   PROTEINUA Trace*   GLUCUA Negative   KETONESU Negative   BILIRUBINUA 1+*   OCCULTUA Negative   NITRITE Negative   UROBILINOGEN Negative   LEUKOCYTESUR Negative       Significant Imaging: I have reviewed all pertinent imaging results/findings within the past 24 hours.  FL Flouro Usage  See Notes    This procedure was auto-finalized.  MRI MRCP Without Contrast  MRI of the abdomen without contrast (MRCP)    HISTORY: Common bile duct dilatation. Abdominal pain.    There is degradation of the examination by patient motion which limits the sensitivity of the study.    There is an oblong-shaped low signal intensity mass which appears to lie within the lumen of the distal common bile duct at the level of the pancreatic head measuring approximately 10 mm in longitudinal dimension. This is suggestive of distal choledocholithiasis. No additional intraluminal filling defect or focal stricture is observed. There is diffuse dilatation of the common hepatic duct and common  bile duct measuring up to 14 mm. The gallbladder appears mildly distended and there is a small amount of pericholecystic fluid as well as a small component of scattered free fluid observed within the upper abdomen. The pancreatic duct is normal in caliber. Additional observations include small cystic signal intensity bilateral renal masses. An L1 compression deformity is again observed. There is minimal incidentally observed pleural fluid bilaterally.    IMPRESSION:    Significant degradation by patient motion.    [place]-shaped low signal intensity mass which appears to lie within the distal segment of the common bile duct suggestive of choledocholithiasis. There is dilatation of the biliary tree proximal to this level.    Small amount of free fluid within the abdomen.    Additional incidental findings as above.    Electronically signed by:  Leo Day MD  7/31/2023 12:55 PM CDT Workstation: [id number]       Assessment/Plan:     * Cholangitis  88F presenting at transfer from [facility] for ERCP in setting of cholangitis.    -Continue fluids, NPO  -IV zosyn ordered  -Monitor daily CBC, CMP  -Pain control  -AES consulted, appreciate recommendations      ERCP today  -General surgery consulted for possible cholecystectomy     Bacteremia due to Gram-negative bacteria  -BCX 7/20 w/ E coli, Klebsiella pneumoniae, and Bacteriodes fragilis.   -Repeat blood cultures drawn 7/31 NGTD.  -S/p vancomycin and zosyn at [facility]  -ID recommendations from 7/31/23 reviewed  -Zosyn ordered (4.5g q8h per pharmacy recs)  -Follow sensitivities     Cigarette nicotine dependence without complication  Dangers of cigarette smoking were reviewed with patient in detail. Patient was Counseled for 3-10 minutes. Nicotine replacement options were discussed. Nicotine replacement was discussed- not prescribed per patient's request    Daily consumption of alcohol  -Reports drinking 1 shot liquor daily  -monitor CIWA  qshift, prn ativan    Pulmonary emphysema, unspecified emphysema type  Pt reports smoking 2ppd previously, cut back to 1pack per week lately. Has chronic cough, not on home oxygen and denies COPD diagnosis.   -supplemental oxygen as needed  -prn rosana    Personal history of colon cancer, stage III  -Noted prior history of stage III colorectal cancer, status post adjuvant chemotherapy   -Followed by Dr. Granado/Renu    Stage 3a chronic kidney disease  Creatine stable for now. BMP reviewed- noted Estimated Creatinine Clearance: 28.7 mL/min (based on SCr of 1.3 mg/dL). according to latest data. Based on current GFR, CKD stage is stage 3 - GFR 30-59.  Monitor UOP and serial BMP and adjust therapy as needed. Renally dose meds. Avoid nephrotoxic medications and procedures.   Cr 1.3 on admission. Baseline ~1.0. Continuous IVFs ordered while pt is NPO.     GERD (gastroesophageal reflux disease)  -IV PPI while NPO      VTE Risk Mitigation (From admission, onward)         Ordered     heparin (porcine) injection 5,000 Units  Every 8 hours         08/01/23 1051     IP VTE HIGH RISK PATIENT  Once         08/01/23 1051     Place sequential compression device  Until discontinued         08/01/23 1051                           Renita Martinez PA-C  Department of Hospital Medicine  [facility]

## 2023-08-01 NOTE — PLAN OF CARE
Pt. Admitted today from [place]. Alert and oriented, not yet ambulating. Pending ERCP. On 3L NC. Using PureWick.

## 2023-08-01 NOTE — HPI
Kenyetta Andersen is a 88 y.o. female with PMHx GERD c/b esophagitis, hypothyroid, colon cancer (Dx 2009) s/p R hemicolectomy with end to side ileocolonic anastamosis and FOLFOX chemotherapy who presents to [facility] as transfer from [facility] for ERCP.     Per  transfer note: presented with chest pain and abdominal pain as well as generalized weakness, she was found to have evidence of possible acalculus cholecystitis on presentation on CT scan.  She had elevated bilirubin and transaminitis, she was also diagnosed with cholangitis spiking fevers and found to have polymicrobial bacteremia with Gram-positive and Gram-negative bacteremia.  Infectious Disease was consulted, they recommended continue with Zosyn repeating blood cultures.  She was IV fluid resuscitated had a midline placed. general surgery was consulted-they recommended consult for ERCP. GI consult for ERCP and duct clearance, can consider cholecystectomy afterwards as MRCP was suggestive of common bile duct stone.  Palliative Care was consulted and reviewed code status, she was made DNR, she also expressed that she would not want a PEG tube. She was taken to ERCP with GI, EGD findings was tortuous esophagus, narrowing at GE junction, distortion of antrum noted, gastroscope exchange for duodenal scope, scope advanced easily to the antrum and navigated around distorted antral thickening, scope advanced to the pylorus but unable to drop into the duodenal bulb, scope exchange back to gastroscope and unremarkable, GI is recommending transfer to [facility] for attempt at ERCP.    Labs and imaging at [facility]: CBC WBC 12.2, Hgb 13-15, plts 14. BUN/Cr 23/1.3 (baseline cr ~1.0). Tibi 2.8 ->2.0, ALP wnl, AST/ /107-> 131/91. Procal 60. BCX w/ E coli, Klebsiella pneumoniae, and Bacteriodes fragilis 07/30. Repeat blood cultures drawn 7/31 NGTD. MRCP [place]-shaped low signal intensity mass which appears to lie within the distal segment of  the common bile duct suggestive of choledocholithiasis. There is dilatation of the biliary tree proximal to this level.    On arrival, afebrile, HDS on 3L NC (not oxygen a baseline). She complains of some ongoing abdominal pain, but denies nausea, vomiting, diarrhea, fever, chills.

## 2023-08-01 NOTE — TRANSFER OF CARE
"15Anesthesia Transfer of Care Note    Patient: Kenyetta Andersen    Procedure(s) Performed: Procedure(s) (LRB):  ERCP (ENDOSCOPIC RETROGRADE CHOLANGIOPANCREATOGRAPHY) (N/A)    Patient location: PACU    Anesthesia Type: general    Transport from OR: Transported from OR on 2-3 L/min O2 by NC with adequate spontaneous ventilation    Post pain: adequate analgesia    Post assessment: no apparent anesthetic complications and tolerated procedure well    Post vital signs: stable    Level of consciousness: lethargic    Nausea/Vomiting: no nausea/vomiting    Complications: none    Transfer of care protocol was followed      Last vitals:   Visit Vitals  BP (!) 110/56 (Patient Position: Lying)   Pulse 99   Temp 36.6 °C (97.9 °F) (Temporal)   Resp 16   Ht 5' 3" (1.6 m)   Wt 68 kg (149 lb 14.6 oz)   SpO2 96%   Breastfeeding No   BMI 26.56 kg/m²     "

## 2023-08-01 NOTE — ASSESSMENT & PLAN NOTE
Creatine stable for now. BMP reviewed- noted Estimated Creatinine Clearance: 28.7 mL/min (based on SCr of 1.3 mg/dL). according to latest data. Based on current GFR, CKD stage is stage 3 - GFR 30-59.  Monitor UOP and serial BMP and adjust therapy as needed. Renally dose meds. Avoid nephrotoxic medications and procedures.   Cr 1.3 on admission. Baseline ~1.0. Continuous IVFs ordered while pt is NPO.

## 2023-08-01 NOTE — CARE UPDATE
07/31/23 1945   Patient Assessment/Suction   Level of Consciousness (AVPU) alert   Respiratory Effort Normal;Unlabored   Expansion/Accessory Muscles/Retractions no use of accessory muscles   All Lung Fields Breath Sounds Anterior:;coarse;diminished   Rhythm/Pattern, Respiratory unlabored;pattern regular;depth regular;no shortness of breath reported   Cough Frequency infrequent   PRE-TX-O2   Device (Oxygen Therapy) Oxymask   Flow (L/min) 5   SpO2 100 %   Pulse Oximetry Type Continuous   $ Pulse Oximetry - Multiple Charge Pulse Oximetry - Multiple   Pulse 77   Resp 17   BP (!) 111/52   Aerosol Therapy   $ Aerosol Therapy Charges Aerosol Treatment   Daily Review of Necessity (SVN) completed   Respiratory Treatment Status (SVN) given   Treatment Route (SVN) mask;oxygen   Patient Position (SVN) HOB elevated   Post Treatment Assessment (SVN) increased aeration   Signs of Intolerance (SVN) none   Breath Sounds Post-Respiratory Treatment   Post-treatment Heart Rate (beats/min) 77   Post-treatment Resp Rate (breaths/min) 17   Education   $ Education Bronchodilator;DME Oxygen;15 min;DME Nebulizer

## 2023-08-01 NOTE — PLAN OF CARE
Plan of care reviewed with Patient . Safety precautions maintained. Pt remains free from injury. Patient maintains oxygen saturations above 92% while on 5 L via Oxy mask. Purewick in place and draining (new purewick placed during bath. Pain controlled with prn medication.  Turned/repositioned independely. Vital signs stable. Afebrile. AAOx4. Call light within reach. Bed in lowest position and locked.         Problem: Adult Inpatient Plan of Care  Goal: Plan of Care Review  Outcome: Ongoing, Progressing  Goal: Patient-Specific Goal (Individualized)  Outcome: Ongoing, Progressing  Goal: Absence of Hospital-Acquired Illness or Injury  Outcome: Ongoing, Progressing  Goal: Optimal Comfort and Wellbeing  Outcome: Ongoing, Progressing  Goal: Readiness for Transition of Care  Outcome: Ongoing, Progressing     Problem: Fall Injury Risk  Goal: Absence of Fall and Fall-Related Injury  Outcome: Ongoing, Progressing     Problem: Skin Injury Risk Increased  Goal: Skin Health and Integrity  Outcome: Ongoing, Progressing     Problem: Coping Ineffective  Goal: Effective Coping  Outcome: Ongoing, Progressing     Problem: Infection  Goal: Absence of Infection Signs and Symptoms  Outcome: Ongoing, Progressing

## 2023-08-01 NOTE — SUBJECTIVE & OBJECTIVE
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    Anxiety     Arthritis     Escalante esophagus     Blood transfusion     Cataract     Done OU    Colon cancer 2009    Colon polyps 3/14/2017    GERD (gastroesophageal reflux disease)     Glaucoma 2/24/2012    Nuclear sclerosis 8/27/2012    Osteoporosis     Primary hypothyroidism 2/23/2020    Pseudophakia - Left Eye 2/24/2012       Past Surgical History:   Procedure Laterality Date    APPENDECTOMY      CATARACT EXTRACTION W/  INTRAOCULAR LENS IMPLANT Left     CATARACT EXTRACTION W/  INTRAOCULAR LENS IMPLANT Right     Dr Sawant    COLON SURGERY      resection    COLONOSCOPY N/A 3/14/2017    Procedure: COLONOSCOPY;  Surgeon: Killian Guerrier MD;  Location: [facility] ENDO;  Service: Endoscopy;  Laterality: N/A;    COLONOSCOPY  03/14/2017    Dr. Guerrier: hemorrhoids, one colon polyp removed, "Patent functional end-to-end ileo-colonic anastomosis"with healthy mucosa; biopsy: hyperplastic polyp; repeat in 3 years for surveillance    COLONOSCOPY N/A 1/21/2020    Procedure: COLONOSCOPY;  Surgeon: Timo Darling MD;  Location: [facility] ENDO;  Service: Endoscopy;  Laterality: N/A;    COLONOSCOPY N/A 7/8/2022    Procedure: COLONOSCOPY;  Surgeon: Anaid Washington MD;  Location: [facility] ENDO;  Service: Endoscopy;  Laterality: N/A;    ECTOPIC PREGNANCY SURGERY      ESOPHAGOGASTRODUODENOSCOPY N/A 5/16/2019    Procedure: EGD (ESOPHAGOGASTRODUODENOSCOPY);  Surgeon: Anaid Washington MD;  Location: [facility] ENDO;  Service: Endoscopy;  Laterality: N/A;    ESOPHAGOGASTRODUODENOSCOPY N/A 7/11/2019    Procedure: EGD (ESOPHAGOGASTRODUODENOSCOPY);  Surgeon: Anaid Washington MD;  Location: [facility] ENDO;  Service: Endoscopy;  Laterality: N/A;    ESOPHAGOGASTRODUODENOSCOPY N/A 2/5/2021    Procedure: EGD (ESOPHAGOGASTRODUODENOSCOPY);  Surgeon: Anaid Washington MD;  Location: [facility] ENDO;  Service: Endoscopy;  Laterality: N/A;    ESOPHAGOGASTRODUODENOSCOPY N/A 11/11/2021    " Procedure: EGD (ESOPHAGOGASTRODUODENOSCOPY);  Surgeon: Anaid Washington MD;  Location: [facility] ENDO;  Service: Endoscopy;  Laterality: N/A;    ESOPHAGOGASTRODUODENOSCOPY N/A 7/8/2022    Procedure: EGD (ESOPHAGOGASTRODUODENOSCOPY);  Surgeon: Anaid Washington MD;  Location: [facility] ENDO;  Service: Endoscopy;  Laterality: N/A;    ESOPHAGOGASTRODUODENOSCOPY N/A 4/12/2023    Procedure: EGD (ESOPHAGOGASTRODUODENOSCOPY);  Surgeon: Anaid Washington MD;  Location: [facility] ENDO;  Service: Endoscopy;  Laterality: N/A;    EYE SURGERY      bilateral cataracts    HYSTERECTOMY      complete    JOINT REPLACEMENT      Liban Knee    ROTATOR CUFF REPAIR Right     TOE SURGERY      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    UPPER GASTROINTESTINAL ENDOSCOPY  06/12/2017    Dr. Guerrier: gastritis and esophagitis, biopsy: chronic gastritis, negative for h pylori, esophageal- positive eosinophils, negative for barretts; repeat in 2 months    UPPER GASTROINTESTINAL ENDOSCOPY  07/27/2017    Dr. Guerrier       Review of patient's allergies indicates:   Allergen Reactions    Ace inhibitors Edema     Other reaction(s): Angioedema    Codeine Hives       Current Facility-Administered Medications on File Prior to Encounter   Medication    [COMPLETED] potassium chloride 10 mEq in 100 mL IVPB    [DISCONTINUED] acetaminophen tablet 650 mg    [DISCONTINUED] benzonatate capsule 200 mg    [DISCONTINUED] dextrose 50% injection 12.5 g    [DISCONTINUED] dextrose 50% injection 25 g    [DISCONTINUED] etomidate injection    [DISCONTINUED] famotidine (PF) injection    [DISCONTINUED] fentaNYL 50 mcg/mL injection    [DISCONTINUED] glucagon (human recombinant) injection 1 mg    [DISCONTINUED] glucose chewable tablet 16 g    [DISCONTINUED] glucose chewable tablet 24 g    [DISCONTINUED] heparin (porcine) injection 5,000 Units    [DISCONTINUED] ipratropium 0.02 % nebulizer solution 0.5 mg    [DISCONTINUED] lactated ringers infusion    [DISCONTINUED] lactated ringers  infusion    [DISCONTINUED] LIDOcaine (PF) 20 mg/mL (2%) injection    [DISCONTINUED] LORazepam injection 0.5 mg    [DISCONTINUED] morphine injection 2 mg    [DISCONTINUED] mupirocin 2 % ointment    [DISCONTINUED] naloxone 0.4 mg/mL injection 0.02 mg    [DISCONTINUED] nicotine 21 mg/24 hr 1 patch    [DISCONTINUED] nicotine 7 mg/24 hr 1 patch    [DISCONTINUED] ondansetron HCl (PF) injection    [DISCONTINUED] ondansetron injection 4 mg    [DISCONTINUED] ondansetron injection 4 mg    [DISCONTINUED] oxyCODONE immediate release tablet 5 mg    [DISCONTINUED] pantoprazole injection 40 mg    [DISCONTINUED] PHENYLephrine injection    [DISCONTINUED] piperacillin-tazobactam 3.375 g in dextrose 5 % 100 mL IVPB (ready to mix)    [DISCONTINUED] rocuronium injection    [DISCONTINUED] senna-docusate 8.6-50 mg per tablet 2 tablet    [DISCONTINUED] sodium chloride 0.9% flush 10 mL    [DISCONTINUED] succinylcholine injection    [DISCONTINUED] sugammadex (BRIDION) 100 mg/mL injection     Current Outpatient Medications on File Prior to Encounter   Medication Sig    alendronate (FOSAMAX) 70 MG tablet TAKE 1 TABLET (70 MG TOTAL) BY MOUTH EVERY 7 DAYS. ON EMPTY STOMACH IN MORNING, REMAIN UPRIGHT FOR 30 MINUTES. (Patient taking differently: Take 70 mg by mouth every 7 days. On empty stomach in morning, remain upright for 30 minutes. Mondays)    ascorbic acid, vitamin C, (VITAMIN C) 500 MG tablet Take 500 mg by mouth once daily.    b complex vitamins capsule Take 1 capsule by mouth once daily.    brimonidine 0.2% (ALPHAGAN) 0.2 % Drop INSTILL 1 DROP INTO BOTH EYES 3 TIMES A DAY (Patient taking differently: Place 1 drop into both eyes 3 (three) times daily.)    calcium carbonate-magnesium hydroxide (ROLAIDS) 550-110 mg Chew Take 1 tablet by mouth 2 (two) times daily with meals.    dorzolamide-timolol 2-0.5% (COSOPT) 22.3-6.8 mg/mL ophthalmic solution INSTILL 1 DROP INTO BOTH EYES TWICE A DAY (Patient taking differently: Place 1 drop into both  eyes 2 (two) times daily.)    gabapentin (NEURONTIN) 300 MG capsule TAKE 1 CAP IN THE AM, 1 CAP MIDDAY, AND 2 CAPS AT NIGHT (Patient taking differently: Take 300 mg by mouth As instructed. TAKE 1 CAP IN THE AM, 1 CAP MIDDAY, AND 2 CAPS AT NIGHT)    mirtazapine (REMERON) 7.5 MG Tab TAKE 1 TABLET BY MOUTH EVERY EVENING. (Patient not taking: Reported on 2023)    nicotine, polacrilex, (NICORETTE) 2 mg Gum Take 1 each (2 mg total) by mouth every hour as needed (smoking cessation).    omeprazole (PRILOSEC) 40 MG capsule Take 1 capsule (40 mg total) by mouth 2 (two) times daily before meals for 60 days, THEN 1 capsule (40 mg total) every morning.    potassium chloride SA (K-DUR,KLOR-CON) 20 MEQ tablet TAKE 1 TABLET BY MOUTH ONCE DAILY (Patient taking differently: Take 20 mEq by mouth once daily.)     Family History       Problem Relation (Age of Onset)    Arthritis Sister    Heart disease Mother, Father, Brother    No Known Problems Brother, Brother    Parkinsonism Sister          Tobacco Use    Smoking status: Every Day     Current packs/day: 0.00     Average packs/day: 0.3 packs/day for 65.0 years (21.5 ttl pk-yrs)     Types: Cigarettes     Start date: 10/5/1955     Last attempt to quit: 10/5/2020     Years since quittin.8    Smokeless tobacco: Never    Tobacco comments:     3/1/23--states smokes 1-2 cigarettes a day   Substance and Sexual Activity    Alcohol use: Yes     Alcohol/week: 2.0 standard drinks of alcohol     Types: 2 Shots of liquor per week     Comment: Daily, about 4 drinks per day (crown)    Drug use: No    Sexual activity: Not Currently     Review of Systems   Constitutional:  Negative for chills and fever.   HENT:  Negative for trouble swallowing.    Eyes:  Negative for visual disturbance.   Respiratory:  Positive for cough. Negative for shortness of breath.    Cardiovascular:  Negative for chest pain and leg swelling.   Gastrointestinal:  Positive for abdominal pain. Negative for constipation,  diarrhea, nausea and vomiting.   Genitourinary:  Negative for difficulty urinating, dysuria and flank pain.   Musculoskeletal:  Negative for back pain.   Skin:  Negative for color change and rash.   Neurological:  Negative for weakness and light-headedness.   Psychiatric/Behavioral:  Negative for confusion. The patient is not nervous/anxious.      Objective:     Vital Signs (Most Recent):  Temp: 97.6 °F (36.4 °C) (08/01/23 1148)  Pulse: 94 (08/01/23 1148)  Resp: 18 (08/01/23 1148)  BP: (!) 118/57 (08/01/23 1148)  SpO2: 97 % (08/01/23 1148) Vital Signs (24h Range):  Temp:  [95.2 °F (35.1 °C)-98.2 °F (36.8 °C)] 97.6 °F (36.4 °C)  Pulse:  [73-98] 94  Resp:  [9-29] 18  SpO2:  [92 %-100 %] 97 %  BP: ()/(46-69) 118/57     Weight: 68 kg (149 lb 14.6 oz)  Body mass index is 26.56 kg/m².     Physical Exam  Vitals and nursing note reviewed.   Constitutional:       General: She is not in acute distress.  HENT:      Head: Normocephalic and atraumatic.      Nose:      Comments: NC in nares     Mouth/Throat:      Mouth: Mucous membranes are dry.   Eyes:      Extraocular Movements: Extraocular movements intact.      Conjunctiva/sclera: Conjunctivae normal.   Cardiovascular:      Rate and Rhythm: Normal rate and regular rhythm.      Heart sounds: Normal heart sounds.   Pulmonary:      Effort: Pulmonary effort is normal. No respiratory distress.      Comments: Coughing up white sputum (chronic per pt)  Abdominal:      General: There is no distension.      Palpations: Abdomen is soft.      Tenderness: There is abdominal tenderness. There is no guarding or rebound.      Comments: Tenderness to palpation throughout   Musculoskeletal:         General: Normal range of motion.      Cervical back: Normal range of motion.      Right lower leg: No edema.      Left lower leg: No edema.   Skin:     General: Skin is warm and dry.   Neurological:      General: No focal deficit present.      Mental Status: She is alert and oriented to  person, place, and time.      Motor: No weakness.   Psychiatric:         Mood and Affect: Mood normal.         Behavior: Behavior normal.         Thought Content: Thought content normal.         Judgment: Judgment normal.                Significant Labs: All pertinent labs within the past 24 hours have been reviewed.  Blood Culture:   Recent Labs   Lab 07/30/23  2321 07/30/23  2344 07/31/23  0931   LABBLOO Gram stain aer bottle: Gram negative rods  Positive results previously called  GRAM NEGATIVE PRISCILA, LACTOSE   For susceptibility see order #[id number]  * Gram stain aer bottle: Gram negative rods  Gram stain aer bottle: Gram positive cocci  Results called to and read back by: SHALINI OROPEZA  07/31/2023  08:59  JT  GRAM NEGATIVE PRISCILA, LACTOSE   Identification and susceptibility pending  * No Growth to date  Gram stain aer bottle: Gram negative rods  No Growth to date  Gram stain aer bottle: Gram negative rods  Positive results previously called     CBC:   Recent Labs   Lab 07/30/23 1844 07/31/23  0456 07/31/23  0609   WBC 11.04 12.22  --    HGB 15.3 13.2  --    HCT 46.5 39.9 39    141*  --      CMP:   Recent Labs   Lab 07/30/23 1844 07/31/23 0456 08/01/23  0412    142 140   K 3.9 3.2* 4.4    112* 114*   CO2 23 20* 22*   * 115* 79   BUN 14 17 23   CREATININE 1.1 1.0 1.3   CALCIUM 9.6 8.2* 7.7*   PROT 7.5 5.4* 5.3*   ALBUMIN 3.6 2.5* 2.3*   BILITOT 1.8* 2.8* 2.0*   ALKPHOS 120 88 73   * 217* 131*   * 105* 91*   ANIONGAP 9 10 4*     Cardiac Markers:   Recent Labs   Lab 07/30/23  1844   *     Lactic Acid:   Recent Labs   Lab 07/30/23 1917 07/30/23  2255 07/31/23  0456   LACTATE 3.2* 4.1* 1.7     Lipase:   Recent Labs   Lab 07/30/23  1844   LIPASE 28     Magnesium:   Recent Labs   Lab 07/30/23 1917 07/31/23 0456 08/01/23  0412   MG 1.5* 1.8 1.8     Troponin:   Recent Labs   Lab 07/30/23  1844   TROPONINIHS 7.2     TSH:   Recent Labs    Lab 07/30/23  1844   TSH 0.490     Urine Studies:   Recent Labs   Lab 07/31/23  0435   COLORU Yellow   APPEARANCEUA Clear   PHUR 6.0   SPECGRAV >1.030*   PROTEINUA Trace*   GLUCUA Negative   KETONESU Negative   BILIRUBINUA 1+*   OCCULTUA Negative   NITRITE Negative   UROBILINOGEN Negative   LEUKOCYTESUR Negative       Significant Imaging: I have reviewed all pertinent imaging results/findings within the past 24 hours.  FL Flouro Usage  See Notes    This procedure was auto-finalized.  MRI MRCP Without Contrast  MRI of the abdomen without contrast (MRCP)    HISTORY: Common bile duct dilatation. Abdominal pain.    There is degradation of the examination by patient motion which limits the sensitivity of the study.    There is an oblong-shaped low signal intensity mass which appears to lie within the lumen of the distal common bile duct at the level of the pancreatic head measuring approximately 10 mm in longitudinal dimension. This is suggestive of distal choledocholithiasis. No additional intraluminal filling defect or focal stricture is observed. There is diffuse dilatation of the common hepatic duct and common bile duct measuring up to 14 mm. The gallbladder appears mildly distended and there is a small amount of pericholecystic fluid as well as a small component of scattered free fluid observed within the upper abdomen. The pancreatic duct is normal in caliber. Additional observations include small cystic signal intensity bilateral renal masses. An L1 compression deformity is again observed. There is minimal incidentally observed pleural fluid bilaterally.    IMPRESSION:    Significant degradation by patient motion.    Brinson-shaped low signal intensity mass which appears to lie within the distal segment of the common bile duct suggestive of choledocholithiasis. There is dilatation of the biliary tree proximal to this level.    Small amount of free fluid within the abdomen.    Additional incidental findings as  above.    Electronically signed by:  Leo Day MD  7/31/2023 12:55 PM CDT Workstation: [id number]

## 2023-08-01 NOTE — ASSESSMENT & PLAN NOTE
-Noted prior history of stage III colorectal cancer, status post adjuvant chemotherapy   -Followed by Dr. Granado/Renu

## 2023-08-01 NOTE — ASSESSMENT & PLAN NOTE
88F presenting at transfer from [facility] for ERCP in setting of cholangitis.    -Continue fluids, NPO  -IV zosyn ordered  -Monitor daily CBC, CMP  -Pain control  -AES consulted, appreciate recommendations      ERCP today  -General surgery consulted for possible cholecystectomy

## 2023-08-01 NOTE — ASSESSMENT & PLAN NOTE
-BCX 7/20 w/ E coli, Klebsiella pneumoniae, and Bacteriodes fragilis.   -Repeat blood cultures drawn 7/31 NGTD.  -S/p vancomycin and zosyn at [facility]  -ID recommendations from 7/31/23 reviewed  -Zosyn ordered (4.5g q8h per pharmacy recs)  -Follow sensitivities

## 2023-08-01 NOTE — ANESTHESIA POSTPROCEDURE EVALUATION
Anesthesia Post Evaluation    Patient: Kenyetta Andersen    Procedure(s) Performed: Procedure(s) (LRB):  ERCP (ENDOSCOPIC RETROGRADE CHOLANGIOPANCREATOGRAPHY) (N/A)    Final Anesthesia Type: general      Patient location during evaluation: ICU  Patient participation: Yes- Able to Participate  Level of consciousness: awake and alert, oriented and awake  Post-procedure vital signs: reviewed and stable  Pain management: adequate  Airway patency: patent    PONV status at discharge: No PONV  Anesthetic complications: no      Cardiovascular status: blood pressure returned to baseline, hemodynamically stable and stable  Respiratory status: unassisted, spontaneous ventilation and face mask  Hydration status: euvolemic  Follow-up not needed.          Vitals Value Taken Time   /58 07/31/23 1909   Temp 35.6 °C (96 °F) 07/31/23 1745   Pulse 83 07/31/23 1910   Resp 25 07/31/23 1910   SpO2 98 % 07/31/23 1910   Vitals shown include unvalidated device data.      No case tracking events are documented in the log.      Pain/Amanda Score: Pain Rating Prior to Med Admin: 6 (7/31/2023  1:50 PM)  Pain Rating Post Med Admin: 4 (7/31/2023  2:50 PM)

## 2023-08-01 NOTE — ASSESSMENT & PLAN NOTE
Pt reports smoking 2ppd previously, cut back to 1pack per week lately. Has chronic cough, not on home oxygen and denies COPD diagnosis.   -supplemental oxygen as needed  -prn rosana

## 2023-08-01 NOTE — CONSULTS
[facility]  Gastroenterology  Consult Note    Patient Name: Kenyetta Andersen  MRN: [id number]  Admission Date: 8/1/2023  Hospital Length of Stay: 0 days  Code Status: Full Code   Attending Provider: Sarahy Leger MD   Consulting Provider: Nathaniel Pro MD  Primary Care Physician: Hever Arreola MD  Principal Problem:<principal problem not specified>    Inpatient consult to [facility] ([facility])  Consult performed by: Nathaniel Pro MD  Consult ordered by: Renita Martinez PA-C        Subjective:     HPI:   Ms Andersen is an 87yo PMHx GERD c/b esophagitis, hypothyroid, colon cancer (Dx 2009) s/p R hemicolectomy with end to side ileocolonic anastamosis and FOLFOX chemotherapy presents to [facility] as transfer from [place] for choledocholithiasis.    ERCP 07/31 for choledocholithiasis notable for distorted antrum and pylorus unable to advanced duodenoscope into position at major papilla.    VS notable for unremarkable, 3L NC  CBC w/ leukocytosis 12.2, Hgb 13-15, plts 141  BMP w/ BUN/ Cr 23/ 1.3  LFTs w/ BR 2.8-->2, ALP wnl, AST/ / 107--> 131/ 91    BCX w/ GNR 07/30    Significant degradation by patient motion.     MRCP w/ oblong-shaped low signal intensity mass which appears to lie within the distal segment of the common bile duct suggestive of choledocholithiasis. There is dilatation to 14mm of the biliary tree proximal to this level.      Objective:     Vitals:    08/01/23 0800   BP: (!) 102/54   Pulse: 95   Resp: 18   Temp: 98.1 °F (36.7 °C)         Constitutional:  not in acute distress and well developed  HENT: Head: Normal, normocephalic, atraumatic.  Eyes: conjunctiva clear and sclera nonicteric  GI: soft, non-tender, without masses or organomegaly  Musculoskeletal: no muscular tenderness noted  Skin: normal color  Neurological: alert        Assessment/Plan:        87yo PMHx GERD c/b esophagitis, hypothyroid, colon cancer (Dx 2009) s/p R hemicolectomy with end to side  ileocolonic anastamosis and FOLFOX chemotherapy presents to [facility] as transfer from [place] for cholangitis.    Problem List:  Cholangitis    Recommendations:  ERCP today    Thank you for involving us in the care of Kenyetta Andersen. Please call with any additional questions, concerns or changes in the patient's clinical status. We will continue to follow. .    Nathaniel Pro MD  Gastroenterology Fellow PGY VI  [facility]

## 2023-08-01 NOTE — PROVATION PATIENT INSTRUCTIONS
Discharge Summary/Instructions after an Endoscopic Procedure  Patient Name: Kenyetta Andersen  Patient MRN: [id number]  Patient YOB: 1935 Tuesday, August 1, 2023  Олег Arreaga MD  Dear patient,  As a result of recent federal legislation (The Federal Cures Act), you may   receive lab or pathology results from your procedure in your [facility]   account before your physician is able to contact you. Your physician or   their representative will relay the results to you with their   recommendations at their soonest availability.  Thank you,  RESTRICTIONS:  During your procedure today, you received medications for sedation.  These   medications may affect your judgment, balance and coordination.  Therefore,   for 24 hours, you have the following restrictions:   - DO NOT drive a car, operate machinery, make legal/financial decisions,   sign important papers or drink alcohol.    ACTIVITY:  Today: no heavy lifting, straining or running due to procedural   sedation/anesthesia.  The following day: return to full activity including work.  DIET:  Eat and drink normally unless instructed otherwise.     TREATMENT FOR COMMON SIDE EFFECTS:  - Mild abdominal pain, nausea, belching, bloating or excessive gas:  rest,   eat lightly and use a heating pad.  - Sore Throat: treat with throat lozenges and/or gargle with warm salt   water.  - Because air was used during the procedure, expelling large amounts of air   from your rectum or belching is normal.  - If a bowel prep was taken, you may not have a bowel movement for 1-3 days.    This is normal.  SYMPTOMS TO WATCH FOR AND REPORT TO YOUR PHYSICIAN:  1. Abdominal pain or bloating, other than gas cramps.  2. Chest pain.  3. Back pain.  4. Signs of infection such as: chills or fever occurring within 24 hours   after the procedure.  5. Rectal bleeding, which would show as bright red, maroon, or black stools.   (A tablespoon of blood from the rectum is not serious, especially  if   hemorrhoids are present.)  6. Vomiting.  7. Weakness or dizziness.  GO DIRECTLY TO THE NEAREST EMERGENCY ROOM IF YOU HAVE ANY OF THE FOLLOWING:      Difficulty breathing              Chills and/or fever over 101 F   Persistent vomiting and/or vomiting blood   Severe abdominal pain   Severe chest pain   Black, tarry stools   Bleeding- more than one tablespoon   Any other symptom or condition that you feel may need urgent attention  Your doctor recommends these additional instructions:  If any biopsies were taken, your doctors clinic will contact you in 1 to 2   weeks with any results.  - Return patient to [facility] for [facility] care.   - Watch for pancreatitis, bleeding, perforation, and cholangitis.   - Repeat ERCP in 10 weeks to remove stent.   - The findings and recommendations were discussed with the patient's   family.  For questions, problems or results please call your physician - Олег Arreaga MD at Work:  [phone].  [facility], EMERGENCY ROOM PHONE NUMBER: [phone]  IF A COMPLICATION OR EMERGENCY SITUATION ARISES AND YOU ARE UNABLE TO REACH   YOUR PHYSICIAN - GO DIRECTLY TO THE EMERGENCY ROOM.  Олег Arreaga MD  8/1/2023 3:45:49 PM  This report has been verified and signed electronically.  Dear patient,  As a result of recent federal legislation (The Federal Cures Act), you may   receive lab or pathology results from your procedure in your [facility]   account before your physician is able to contact you. Your physician or   their representative will relay the results to you with their   recommendations at their soonest availability.  Thank you,  PROVATION

## 2023-08-01 NOTE — NURSING
EMT came to pik up the patient, patient clean and dry, no acute distress noted. Patient has zosyn running at 25/hr during transport. Report called to GI step down room 1059, understanding verbaIized.

## 2023-08-01 NOTE — TREATMENT PLAN
AES Treatment Plan:    Impression:            - Acquired deformity in the pylorus.                          - Prior biliary sphincterotomy appeared stenosed                          or narrowed.                          - The common bile duct was dilated.                          - The biliary tree was swept and sludge and                          nothing were found.                          - One biliary stent was placed into the common                          bile duct.   Recommendation:        - Return patient to [facility] for ongoing care.                          - Watch for pancreatitis, bleeding, perforation,                          and cholangitis.                          - Repeat ERCP in 10 weeks to remove stent.                          - The findings and recommendations were discussed                          with the patient's family.       Nathaniel Pro MD  Gastroenterology/Hepatology, PGY IV  [facility]

## 2023-08-01 NOTE — HPI
"88F with h/o GERD c/b esophagitis, hypothyroid, colon cancer (Dx 2009; with lung nodule; adj folfox) s/p R hemicolectomy with end to side ileocolonic anastamosis admitted to [facility] as transfer from [facility] for ERCP. Pt had been symptomatic with "chest pain and abdominal pain as well as generalized weakness, she was found to have evidence of possible acalculus cholecystitis on presentation on CT scan.  She had elevated bilirubin and transaminitis, she was also diagnosed with cholangitis spiking fevers and found to have polymicrobial bacteremia with Gram-positive and Gram-negative bacteremia."    Reports acute onset of belly pain on Saturday associated with fever. Pain and fever now improved. Denies focal tenderness. Denies diarrhea. Reports hasn't been on chemo for 10 years and denies presence of port/pacemaker    Blood Culture, Routine      Abnormal   GRAM NEGATIVE PRISCILA, LACTOSE    Identification and susceptibility pending           Bacteroides fragilis Not Detected Detected Abnormal     Enterobacterales Not Detected See species for ID Abnormal          Escherichia coli Not Detected Detected Abnormal               Klebsiella pneumoniae group Not Detected Detected Abnormal         Repeat bcx, NGTD x 1 day, but gram stain positive    Component 1 d ago   Blood Culture, Routine No Growth to date P    Blood Culture, Routine Gram stain aer bottle: Gram negative rods P      On vanc/zosyn. Fever defervesced    S/p ERCP 8/1        ID consulted for "GNR bacteremia"  "

## 2023-08-01 NOTE — ANESTHESIA PREPROCEDURE EVALUATION
"                                                                                                             08/01/2023  Kenyetta Andersen is a 88 y.o., female.    Patient Active Problem List   Diagnosis    Chronic angle-closure glaucoma of both eyes    Hypertriglyceridemia    Macrocytosis    B12 deficiency    Abdominal aortic atherosclerosis    GERD (gastroesophageal reflux disease)    Stage 3a chronic kidney disease    Bilateral renal cysts    Smoker    Osteopenia of multiple sites    BMI 27.0-27.9,adult    Calcified granuloma of lung    Personal history of colon cancer, stage III    Acid reflux    Escalante's esophagus    History of colon cancer    Chemotherapy-induced peripheral neuropathy    Primary hypothyroidism    Vitamin D deficiency    Primary insomnia    Stress incontinence of urine    Dysphagia    Hypokalemia    Pulmonary emphysema, unspecified emphysema type    Acalculous cholecystitis    Lactic acid acidosis    Daily consumption of alcohol    Tachycardia    Elevated LFTs    Cigarette nicotine dependence without complication    Bacteremia due to Gram-negative bacteria    Cholangitis       2013      CONCLUSIONS     1 - Normal left ventricular function (EF 65%).     2 - Normal diastolic function.       Past Surgical History:   Procedure Laterality Date    APPENDECTOMY      CATARACT EXTRACTION W/  INTRAOCULAR LENS IMPLANT Left     CATARACT EXTRACTION W/  INTRAOCULAR LENS IMPLANT Right     Dr Sawant    COLON SURGERY      resection    COLONOSCOPY N/A 3/14/2017    Procedure: COLONOSCOPY;  Surgeon: Killian Guerrier MD;  Location: [facility];  Service: Endoscopy;  Laterality: N/A;    COLONOSCOPY  03/14/2017    Dr. Guerrier: hemorrhoids, one colon polyp removed, "Patent functional end-to-end ileo-colonic anastomosis"with healthy mucosa; biopsy: hyperplastic polyp; repeat in 3 years for surveillance    COLONOSCOPY N/A 1/21/2020    Procedure: COLONOSCOPY;  Surgeon: Timo TORRES" MD Lisset;  Location: [facility];  Service: Endoscopy;  Laterality: N/A;    COLONOSCOPY N/A 7/8/2022    Procedure: COLONOSCOPY;  Surgeon: Anaid Washington MD;  Location: [facility] END[facility];  Service: Endoscopy;  Laterality: N/A;    ECTOPIC PREGNANCY SURGERY      ESOPHAGOGASTRODUODENOSCOPY N/A 5/16/2019    Procedure: EGD (ESOPHAGOGASTRODUODENOSCOPY);  Surgeon: Anaid Washington MD;  Location: [facility];  Service: Endoscopy;  Laterality: N/A;    ESOPHAGOGASTRODUODENOSCOPY N/A 7/11/2019    Procedure: EGD (ESOPHAGOGASTRODUODENOSCOPY);  Surgeon: Anaid Washington MD;  Location: [facility];  Service: Endoscopy;  Laterality: N/A;    ESOPHAGOGASTRODUODENOSCOPY N/A 2/5/2021    Procedure: EGD (ESOPHAGOGASTRODUODENOSCOPY);  Surgeon: Anaid Washington MD;  Location: [facility];  Service: Endoscopy;  Laterality: N/A;    ESOPHAGOGASTRODUODENOSCOPY N/A 11/11/2021    Procedure: EGD (ESOPHAGOGASTRODUODENOSCOPY);  Surgeon: Anaid Washington MD;  Location: [facility];  Service: Endoscopy;  Laterality: N/A;    ESOPHAGOGASTRODUODENOSCOPY N/A 7/8/2022    Procedure: EGD (ESOPHAGOGASTRODUODENOSCOPY);  Surgeon: Anaid Washington MD;  Location: [facility];  Service: Endoscopy;  Laterality: N/A;    ESOPHAGOGASTRODUODENOSCOPY N/A 4/12/2023    Procedure: EGD (ESOPHAGOGASTRODUODENOSCOPY);  Surgeon: Anaid Washington MD;  Location: [facility];  Service: Endoscopy;  Laterality: N/A;    EYE SURGERY      bilateral cataracts    HYSTERECTOMY      complete    JOINT REPLACEMENT      Liban Knee    ROTATOR CUFF REPAIR Right     TOE SURGERY      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    UPPER GASTROINTESTINAL ENDOSCOPY  06/12/2017    Dr. Guerrier: gastritis and esophagitis, biopsy: chronic gastritis, negative for h pylori, esophageal- positive eosinophils, negative for barretts; repeat in 2 months    UPPER GASTROINTESTINAL ENDOSCOPY  07/27/2017    Dr. Guerrier         Pre-op Assessment    I have reviewed the Patient Summary Reports.     I have  reviewed the Nursing Notes. I have reviewed the NPO Status.      Review of Systems      Physical Exam  General: Well nourished    Airway:  Mallampati: II   Mouth Opening: Normal  TM Distance: Normal  Tongue: Normal  Neck ROM: Normal ROM        Anesthesia Plan  Type of Anesthesia, risks & benefits discussed:    Anesthesia Type: Gen ETT, Gen Natural Airway  Intra-op Monitoring Plan: Standard ASA Monitors  Post Op Pain Control Plan: multimodal analgesia  Induction:  IV  Airway Plan: Direct  Informed Consent: Informed consent signed with the Patient and all parties understand the risks and agree with anesthesia plan.  All questions answered.   ASA Score: 3  Day of Surgery Review of History & Physical: H&P Update referred to the surgeon/provider.    Ready For Surgery From Anesthesia Perspective.     .

## 2023-08-01 NOTE — NURSING TRANSFER
Nursing Transfer Note      8/1/2023   4:34 PM    Nurse giving handoff:RACHEL laird   Nurse receiving handoff:Rachel Chávez    Reason patient is being transferred: post op    Transfer To: 1059    Transfer via stretcher    Transfer with cardiac monitoring, 3L NC    Transported by transport      Telemetry: Box Number 1113, Rate 87, and Rhythm NSR  Order for Tele Monitor? Yes      Any special needs or follow-up needed: routine        Chart send with patient: Yes    Notified: brother      Patient reassessed at: 1637    Dr. Leger and receiving nurse notified patient was pulling tele off.

## 2023-08-01 NOTE — CONSULTS
"[facility] - Endoscopy  Infectious Disease  Consult Note    Patient Name: Kenyetta Andersen  MRN: [id number]  Admission Date: 8/1/2023  Hospital Length of Stay: 0 days  Attending Physician: Sarahy Leger MD  Primary Care Provider: Hever Arreola MD     Isolation Status: No active isolations    Patient information was obtained from ER records.      Inpatient consult to Infectious Diseases  Consult performed by: Giovana Oh MD  Consult ordered by: Renita Martinez PA-C      consult received, chart reviewed. Attempted to see patient, but JEANNINE for ERCP    BCx with E.coli/kleb pneumo/bacteroides on BCID    Would continue zosyn, stop vancomycin  Further de-escalation based on culture results  Would repeat bx to document clearance, but suspect persistently positive bcx from source control issue> failure of abx    Anticipate 2 weeks antibiotics- PO vs IV TBD, so would hold off on picc for now    Giovana Oh MD  Infectious Disease  [facility] - Endoscopy    Subjective:     Principal Problem: Cholangitis    HPI:   88F with h/o GERD c/b esophagitis, hypothyroid, colon cancer (Dx 2009; with lung nodule; adj folfox) s/p R hemicolectomy with end to side ileocolonic anastamosis [place] [facility] [place] [facility] for ERCP. Pt had been symptomatic with "chest pain and abdominal pain as well as generalized weakness, she was found to have evidence of possible acalculus cholecystitis on presentation on CT scan.  She had elevated bilirubin and transaminitis, she was also diagnosed with cholangitis spiking fevers and found to have polymicrobial bacteremia with Gram-positive and Gram-negative bacteremia."    Blood Culture, Routine      Abnormal   GRAM NEGATIVE PRISCILA, LACTOSE    Identification and susceptibility pending           Bacteroides fragilis Not Detected Detected Abnormal     Enterobacterales Not Detected See species for ID Abnormal          Escherichia coli Not Detected Detected Abnormal   " "            Klebsiella pneumoniae group Not Detected Detected Abnormal         Repeat bcx, NGTD x 1 day, but gram stain positive    Component 1 d ago   Blood Culture, Routine No Growth to date P    Blood Culture, Routine Gram stain aer bottle: Gram negative rods P      On vanc/zosyn. Fever defervesced    Pt currently getting ERCP today w/ [facility]. Surgery consulted for gary      ID consulted for "GNR bacteremia"      No new subjective & objective note has been filed under this hospital service since the last note was generated.              "

## 2023-08-02 LAB
ALBUMIN SERPL BCP-MCNC: 1.8 G/DL (ref 3.5–5.2)
ALP SERPL-CCNC: 105 U/L (ref 55–135)
ALT SERPL W/O P-5'-P-CCNC: 62 U/L (ref 10–44)
ANION GAP SERPL CALC-SCNC: 11 MMOL/L (ref 8–16)
AST SERPL-CCNC: 66 U/L (ref 10–40)
BACTERIA BLD CULT: ABNORMAL
BASOPHILS # BLD AUTO: 0.05 K/UL (ref 0–0.2)
BASOPHILS NFR BLD: 0.3 % (ref 0–1.9)
BILIRUB SERPL-MCNC: 1.2 MG/DL (ref 0.1–1)
BUN SERPL-MCNC: 27 MG/DL (ref 8–23)
CALCIUM SERPL-MCNC: 8 MG/DL (ref 8.7–10.5)
CHLORIDE SERPL-SCNC: 113 MMOL/L (ref 95–110)
CO2 SERPL-SCNC: 15 MMOL/L (ref 23–29)
CREAT SERPL-MCNC: 1.1 MG/DL (ref 0.5–1.4)
DIFFERENTIAL METHOD: ABNORMAL
EOSINOPHIL # BLD AUTO: 0.1 K/UL (ref 0–0.5)
EOSINOPHIL NFR BLD: 0.5 % (ref 0–8)
ERYTHROCYTE [DISTWIDTH] IN BLOOD BY AUTOMATED COUNT: 14.4 % (ref 11.5–14.5)
EST. GFR  (NO RACE VARIABLE): 48.9 ML/MIN/1.73 M^2
GLUCOSE SERPL-MCNC: 45 MG/DL (ref 70–110)
HCT VFR BLD AUTO: 35.8 % (ref 37–48.5)
HGB BLD-MCNC: 12 G/DL (ref 12–16)
IMM GRANULOCYTES # BLD AUTO: 0.11 K/UL (ref 0–0.04)
IMM GRANULOCYTES NFR BLD AUTO: 0.6 % (ref 0–0.5)
LYMPHOCYTES # BLD AUTO: 1.3 K/UL (ref 1–4.8)
LYMPHOCYTES NFR BLD: 6.6 % (ref 18–48)
MAGNESIUM SERPL-MCNC: 2 MG/DL (ref 1.6–2.6)
MCH RBC QN AUTO: 32 PG (ref 27–31)
MCHC RBC AUTO-ENTMCNC: 33.5 G/DL (ref 32–36)
MCV RBC AUTO: 96 FL (ref 82–98)
MONOCYTES # BLD AUTO: 0.8 K/UL (ref 0.3–1)
MONOCYTES NFR BLD: 4.1 % (ref 4–15)
NEUTROPHILS # BLD AUTO: 17.1 K/UL (ref 1.8–7.7)
NEUTROPHILS NFR BLD: 87.9 % (ref 38–73)
NRBC BLD-RTO: 0 /100 WBC
PHOSPHATE SERPL-MCNC: 2.8 MG/DL (ref 2.7–4.5)
PLATELET # BLD AUTO: 60 K/UL (ref 150–450)
PLATELET BLD QL SMEAR: ABNORMAL
PMV BLD AUTO: 12.1 FL (ref 9.2–12.9)
POCT GLUCOSE: 170 MG/DL (ref 70–110)
POCT GLUCOSE: 41 MG/DL (ref 70–110)
POCT GLUCOSE: 71 MG/DL (ref 70–110)
POCT GLUCOSE: 72 MG/DL (ref 70–110)
POCT GLUCOSE: 73 MG/DL (ref 70–110)
POTASSIUM SERPL-SCNC: 3.9 MMOL/L (ref 3.5–5.1)
PROT SERPL-MCNC: 4.9 G/DL (ref 6–8.4)
RBC # BLD AUTO: 3.75 M/UL (ref 4–5.4)
SODIUM SERPL-SCNC: 139 MMOL/L (ref 136–145)
WBC # BLD AUTO: 19.4 K/UL (ref 3.9–12.7)

## 2023-08-02 PROCEDURE — 85025 COMPLETE CBC W/AUTO DIFF WBC: CPT

## 2023-08-02 PROCEDURE — 84100 ASSAY OF PHOSPHORUS: CPT

## 2023-08-02 PROCEDURE — 99222 1ST HOSP IP/OBS MODERATE 55: CPT | Mod: ,,, | Performed by: INTERNAL MEDICINE

## 2023-08-02 PROCEDURE — 20600001 HC STEP DOWN PRIVATE ROOM

## 2023-08-02 PROCEDURE — 99222 PR INITIAL HOSPITAL CARE,LEVL II: ICD-10-PCS | Mod: ,,, | Performed by: INTERNAL MEDICINE

## 2023-08-02 PROCEDURE — 36415 COLL VENOUS BLD VENIPUNCTURE: CPT | Performed by: HOSPITALIST

## 2023-08-02 PROCEDURE — 36415 COLL VENOUS BLD VENIPUNCTURE: CPT

## 2023-08-02 PROCEDURE — 99233 PR SUBSEQUENT HOSPITAL CARE,LEVL III: ICD-10-PCS | Mod: ,,, | Performed by: HOSPITALIST

## 2023-08-02 PROCEDURE — 80053 COMPREHEN METABOLIC PANEL: CPT

## 2023-08-02 PROCEDURE — 83735 ASSAY OF MAGNESIUM: CPT

## 2023-08-02 PROCEDURE — 25000003 PHARM REV CODE 250: Performed by: HOSPITALIST

## 2023-08-02 PROCEDURE — 63600175 PHARM REV CODE 636 W HCPCS

## 2023-08-02 PROCEDURE — C9113 INJ PANTOPRAZOLE SODIUM, VIA: HCPCS

## 2023-08-02 PROCEDURE — 63600175 PHARM REV CODE 636 W HCPCS: Performed by: HOSPITALIST

## 2023-08-02 PROCEDURE — 87040 BLOOD CULTURE FOR BACTERIA: CPT | Performed by: HOSPITALIST

## 2023-08-02 PROCEDURE — 99233 SBSQ HOSP IP/OBS HIGH 50: CPT | Mod: ,,, | Performed by: HOSPITALIST

## 2023-08-02 PROCEDURE — 25000003 PHARM REV CODE 250

## 2023-08-02 RX ORDER — DEXTROMETHORPHAN HYDROBROMIDE, GUAIFENESIN 5; 100 MG/5ML; MG/5ML
650 LIQUID ORAL EVERY 8 HOURS PRN
COMMUNITY

## 2023-08-02 RX ORDER — AMOXICILLIN 250 MG
1 CAPSULE ORAL DAILY
Status: DISCONTINUED | OUTPATIENT
Start: 2023-08-02 | End: 2023-08-08 | Stop reason: HOSPADM

## 2023-08-02 RX ORDER — TIMOLOL MALEATE 5 MG/ML
1 SOLUTION/ DROPS OPHTHALMIC 2 TIMES DAILY
Status: DISCONTINUED | OUTPATIENT
Start: 2023-08-02 | End: 2023-08-08 | Stop reason: HOSPADM

## 2023-08-02 RX ORDER — BRIMONIDINE TARTRATE 1.5 MG/ML
1 SOLUTION/ DROPS OPHTHALMIC EVERY 8 HOURS
Status: DISCONTINUED | OUTPATIENT
Start: 2023-08-02 | End: 2023-08-08 | Stop reason: HOSPADM

## 2023-08-02 RX ORDER — MIRTAZAPINE 7.5 MG/1
7.5 TABLET, FILM COATED ORAL NIGHTLY
Status: DISCONTINUED | OUTPATIENT
Start: 2023-08-02 | End: 2023-08-08 | Stop reason: HOSPADM

## 2023-08-02 RX ORDER — BISACODYL 10 MG
10 SUPPOSITORY, RECTAL RECTAL DAILY PRN
Status: DISCONTINUED | OUTPATIENT
Start: 2023-08-02 | End: 2023-08-08 | Stop reason: HOSPADM

## 2023-08-02 RX ORDER — DORZOLAMIDE HCL 20 MG/ML
1 SOLUTION/ DROPS OPHTHALMIC 2 TIMES DAILY
Status: DISCONTINUED | OUTPATIENT
Start: 2023-08-02 | End: 2023-08-08 | Stop reason: HOSPADM

## 2023-08-02 RX ORDER — POLYETHYLENE GLYCOL 3350 17 G/17G
17 POWDER, FOR SOLUTION ORAL DAILY
Status: DISCONTINUED | OUTPATIENT
Start: 2023-08-02 | End: 2023-08-08 | Stop reason: HOSPADM

## 2023-08-02 RX ORDER — PANTOPRAZOLE SODIUM 40 MG/1
40 TABLET, DELAYED RELEASE ORAL DAILY
Status: DISCONTINUED | OUTPATIENT
Start: 2023-08-03 | End: 2023-08-08 | Stop reason: HOSPADM

## 2023-08-02 RX ADMIN — MIRTAZAPINE 7.5 MG: 7.5 TABLET, FILM COATED ORAL at 09:08

## 2023-08-02 RX ADMIN — BRIMONIDINE TARTRATE 1 DROP: 1.5 SOLUTION/ DROPS OPHTHALMIC at 05:08

## 2023-08-02 RX ADMIN — BRIMONIDINE TARTRATE 1 DROP: 1.5 SOLUTION/ DROPS OPHTHALMIC at 09:08

## 2023-08-02 RX ADMIN — TIMOLOL MALEATE 1 DROP: 5 SOLUTION OPHTHALMIC at 09:08

## 2023-08-02 RX ADMIN — POLYETHYLENE GLYCOL 3350 17 G: 17 POWDER, FOR SOLUTION ORAL at 06:08

## 2023-08-02 RX ADMIN — PIPERACILLIN SODIUM AND TAZOBACTAM SODIUM 4.5 G: 4; .5 INJECTION, POWDER, FOR SOLUTION INTRAVENOUS at 12:08

## 2023-08-02 RX ADMIN — HEPARIN SODIUM 5000 UNITS: 5000 INJECTION INTRAVENOUS; SUBCUTANEOUS at 06:08

## 2023-08-02 RX ADMIN — HEPARIN SODIUM 5000 UNITS: 5000 INJECTION INTRAVENOUS; SUBCUTANEOUS at 09:08

## 2023-08-02 RX ADMIN — PANTOPRAZOLE SODIUM 40 MG: 40 INJECTION, POWDER, FOR SOLUTION INTRAVENOUS at 08:08

## 2023-08-02 RX ADMIN — DEXTROSE MONOHYDRATE 250 ML: 100 INJECTION, SOLUTION INTRAVENOUS at 07:08

## 2023-08-02 RX ADMIN — DORZOLAMIDE 1 DROP: 20 SOLUTION/ DROPS OPHTHALMIC at 04:08

## 2023-08-02 RX ADMIN — HEPARIN SODIUM 5000 UNITS: 5000 INJECTION INTRAVENOUS; SUBCUTANEOUS at 01:08

## 2023-08-02 RX ADMIN — DORZOLAMIDE 1 DROP: 20 SOLUTION/ DROPS OPHTHALMIC at 09:08

## 2023-08-02 RX ADMIN — PIPERACILLIN SODIUM AND TAZOBACTAM SODIUM 4.5 G: 4; .5 INJECTION, POWDER, FOR SOLUTION INTRAVENOUS at 04:08

## 2023-08-02 RX ADMIN — SENNOSIDES AND DOCUSATE SODIUM 1 TABLET: 50; 8.6 TABLET ORAL at 06:08

## 2023-08-02 RX ADMIN — PIPERACILLIN SODIUM AND TAZOBACTAM SODIUM 4.5 G: 4; .5 INJECTION, POWDER, FOR SOLUTION INTRAVENOUS at 09:08

## 2023-08-02 NOTE — ASSESSMENT & PLAN NOTE
Kenyetta Andersen is a 88 y.o. female with cholangitis s/p ERCP with biliary sphincterotomy and CBD stent. Sludge found in duct, but no stones.     - Patient seen and examined. Labs and imaging reviewed. Case discussed with Dr. Peters  - Given her medical history and overall clinical picture, do not think she would have much additional benefit from a cholecystectomy as cholangitis likely secondary to strictured sphincter and not true choledocholithiasis  - There is a chance she could have recurrent cholangitis, but the risk of this is extremely low  - Will defer surgical intervention at this time. Thank you for this consult.

## 2023-08-02 NOTE — HPI
Kenyetta Andersen is a 88 y.o. female  with a history of GERD c/b campbell's esophagus, hypothyroidism, colorectal cancer s/p R hemicolectomy (2009) and FOLFOX who presented as a transfer from [facility] for ERCP. She initially presented there with chest and abdominal pain. Work up there was concerning for acalculus cholecystitis with a distended gallbladder and dilated CBD. An MRCP was obtained which showed a possible filling defect and so ERCP was recommended. Unfortunately they were not able to advance the scope into the duodenum due to the complexity of the case. She was then transferred here for AES evaluation. She was taken for ERCP 8/1 where her previous sphincterotomy was found to be strictured and a biliary stent was performed. There was some sludge in the duct, but no stones. General surgery consulted for evaluation of cholecystectomy.Of note she is DNR and has had some conversations with palliative care in the past

## 2023-08-02 NOTE — PLAN OF CARE
Tbili and LFTs trending down post-ERCP. WBC 19 today - blood cultures done x2. ID following - Zosyn continued. MIVF d/c'd. Patient transitioned from clear liquids to regular diet. Diet changed to mechanical soft per patient request d/t not having upper dentures with her. Offered to assist patient to chair for lunch and dinner - patient declined. Reminded to change positions in bed frequently to prevent skin breakdown. Bowel regimen started. Family at bedside for visit this afternoon.

## 2023-08-02 NOTE — PLAN OF CARE
[facility]  Initial Discharge Assessment       Primary Care Provider: Hever Arreola MD    Admission Diagnosis: Cholangitis [K83.09]    Admission Date: 8/1/2023  Expected Discharge Date:     Transition of Care Barriers: None    Payor: HU[employer] MANAGED MEDICARE / Plan: HU[employer] MEDICARE HMO / Product Type: Capitation /     Extended Emergency Contact Information  Primary Emergency Contact: Nalini Granado  Address: [place] [address] [place]  Mobile Phone: [phone]  Relation: Relative  Preferred language: English   needed? No  Secondary Emergency Contact: Tucker Murcia  Mobile Phone: [phone]  Relation: Brother    Discharge Plan A: Home  Discharge Plan B: Home Health      [id number] - [place], [place] - [address]  [address]  [place] [address]  Phone: [phone] Fax: [phone]      Initial Assessment (most recent)       Adult Discharge Assessment - 08/02/23 1226          Discharge Assessment    Assessment Type Discharge Planning Assessment     Confirmed/corrected address, phone number and insurance Yes     Confirmed Demographics Correct on Facesheet   Home address: [address] [place] [id number]    Source of Information patient     When was your last doctors appointment? 07/14/23     Communicated TIERA with patient/caregiver Date not available/Unable to determine     People in Home alone     Do you expect to return to your current living situation? Yes     Do you have help at home or someone to help you manage your care at home? No     Prior to hospitilization cognitive status: Alert/Oriented     Current cognitive status: Alert/Oriented     Home Layout Able to live on 1st floor     Equipment Currently Used at Home none     Readmission within 30 days? Yes     Patient currently being followed by outpatient case management? No     Do you currently have service(s) that help you manage your care at home? No     Do you take prescription  medications? Yes     Do you have prescription coverage? Yes     Do you have any problems affording any of your prescribed medications? No     Is the patient taking medications as prescribed? yes     Who is going to help you get home at discharge? Sister Tracie and nephew Luis Daniel     How do you get to doctors appointments? car, drives self     Are you on dialysis? No     Do you take coumadin? No     Discharge Plan A Home     Discharge Plan B Home Health     DME Needed Upon Discharge  none     Discharge Plan discussed with: Patient     Transition of Care Barriers None        Physical Activity    On average, how many days per week do you engage in moderate to strenuous exercise (like a brisk walk)? 0 days     On average, how many minutes do you engage in exercise at this level? 0 min        Financial Resource Strain    How hard is it for you to pay for the very basics like food, housing, medical care, and heating? Not very hard        Housing Stability    In the last 12 months, was there a time when you were not able to pay the mortgage or rent on time? No     In the last 12 months, was there a time when you did not have a steady place to sleep or slept in a shelter (including now)? No        Transportation Needs    In the past 12 months, has lack of transportation kept you from medical appointments or from getting medications? No     In the past 12 months, has lack of transportation kept you from meetings, work, or from getting things needed for daily living? No        Food Insecurity    Within the past 12 months, you worried that your food would run out before you got the money to buy more. Never true     Within the past 12 months, the food you bought just didn't last and you didn't have money to get more. Never true        Social Connections    In a typical week, how many times do you talk on the phone with family, friends, or neighbors? Three times a week     How often do you get together with friends or relatives?  Three times a week     How often do you attend Methodist or Anabaptist services? More than 4 times per year     Do you belong to any clubs or organizations such as Methodist groups, unions, fraternal or athletic groups, or school groups? No     How often do you attend meetings of the clubs or organizations you belong to? Never        Alcohol Use    Q1: How often do you have a drink containing alcohol? Patient refused     Q2: How many drinks containing alcohol do you have on a typical day when you are drinking? Patient refused     Q3: How often do you have six or more drinks on one occasion? Patient refused                        Patient lives alone. Her sister and nephew will bring her home upon discharge. She is not on dialysis or coumadin.    Rosibel Luevano RN    [facility]  [phone]

## 2023-08-02 NOTE — PLAN OF CARE
[facility] Plan of Care Note  Dx: cholangitis    Shift Events: lethargic post-anesthesia, Q2 turns, SCDs on, IV ABX therapy, no pain reported overnight, CIWA Q12 - stable    Goals of Care:     Neuro: ANOx3    Vital Signs: WNL 97.8 / 88 HR / 18 RR / 93 O2 / 112/55 BP    Respiratory: titrated from 3L to RA by end of shift    Diet: CLD    Is patient tolerating current diet? Too lethargic to eat. Will try again today.     GTTS: NS @ 100; IV aBX    Urine Output/Bowel Movement: UOP per purewick only 200 but bladder scan showing 70ml; no BM tonight    Drains/Tubes/Tube Feeds (include total output/shift): N/A    Lines: CHARITY ML; LFA 20g      Accuchecks: N/A    Skin: intact, mepelex foam to sacrum    Fall Risk Score: 15    Activity level? Not OOB tonight, Q2 turns 1 person assist    Any scheduled procedures? N/A    Any safety concerns? no    Other: Should spend time OOBTC today as anesthesia wears off. IS use encouraged. Monitor UOP.

## 2023-08-02 NOTE — ASSESSMENT & PLAN NOTE
"88F with h/o GERD, colon cancer (Dx 2009; with lung nodule; adj folfox) s/p R hemicolectomy with end to side ileocolonic anastamosis admitted with abdominal pain, found to have cholangitis. BCx with pan-susceptible E.coli and GPC (BCID with bacteriodes), bcx 8/2 pending. S/p ERCP. Clinically improving. On zosyn. ID consulted for "GNR bacteremia"    Recommendations:   - continue zosyn. Will de-escalate pending culture results  - agree with repeating bcx to document clearance  - tentatively planning on cipro/flagyl x 2 weeks at d/c (pending micro results), so would hold off on picc    "

## 2023-08-02 NOTE — PROGRESS NOTES
[facility] Medicine  Progress Note    Patient Name: Kenyetta Andersen  MRN: [id number]  Patient Class: IP- Inpatient   Admission Date: 8/1/2023  Length of Stay: 1 days  Attending Physician: Sarahy Leger MD  Primary Care Provider: Hever Arreola MD        Subjective:     Principal Problem:Cholangitis        HPI:  Kenyetta Andersen is a 88 y.o. female with PMHx GERD c/b esophagitis, hypothyroid, colon cancer (Dx 2009) s/p R hemicolectomy with end to side ileocolonic anastamosis and FOLFOX chemotherapy [place] [facility] [place] [facility] [place] ERCP.     Per  transfer note: presented with chest pain and abdominal pain as well as generalized weakness, she was found to have evidence of possible acalculus cholecystitis on presentation on CT scan.  She had elevated bilirubin and transaminitis, she was also diagnosed with cholangitis spiking fevers and found to have polymicrobial bacteremia with Gram-positive and Gram-negative bacteremia.  Infectious Disease was consulted, they recommended continue with Zosyn repeating blood cultures.  She was IV fluid resuscitated had a midline placed. general surgery was consulted-they recommended consult for ERCP. GI consult for ERCP and duct clearance, can consider cholecystectomy afterwards as MRCP was suggestive of common bile duct stone.  Palliative Care was consulted and reviewed code status, she was made DNR, she also expressed that she would not want a PEG tube. She was taken to ERCP with GI, EGD findings was tortuous esophagus, narrowing at GE junction, distortion of antrum noted, gastroscope exchange for duodenal scope, scope advanced easily to the antrum and navigated around distorted antral thickening, scope advanced to the pylorus but unable to drop into the duodenal bulb, scope exchange back to gastroscope and unremarkable, GI is recommending transfer to [facility] for attempt at ERCP.    Labs and imaging at [facility]:  CBC WBC 12.2, Hgb 13-15, plts 14. BUN/Cr 23/1.3 (baseline cr ~1.0). Tibi 2.8 ->2.0, ALP wnl, AST/ /107-> 131/91. Procal 60. BCX w/ E coli, Klebsiella pneumoniae, and Bacteriodes fragilis 07/30. Repeat blood cultures drawn 7/31 NGTD. MRCP [place]-shaped low signal intensity mass which appears to lie within the distal segment of the common bile duct suggestive of choledocholithiasis. There is dilatation of the biliary tree proximal to this level.    On arrival, afebrile, HDS on 3L NC (not oxygen a baseline). She complains of some ongoing abdominal pain, but denies nausea, vomiting, diarrhea, fever, chills.         Overview/Hospital Course:  No notes on file    Interval History:   8/2: no acute issues reported overnight. Episode hypoglycemia treated with d10.     Review of Systems   Constitutional:  Negative for chills and fever.   HENT:  Negative for trouble swallowing.    Eyes:  Negative for visual disturbance.   Respiratory:  Positive for cough. Negative for shortness of breath.    Cardiovascular:  Negative for chest pain and leg swelling.   Gastrointestinal:  Positive for abdominal pain. Negative for constipation, diarrhea, nausea and vomiting.   Genitourinary:  Negative for difficulty urinating, dysuria and flank pain.   Musculoskeletal:  Negative for back pain.   Skin:  Negative for color change and rash.   Neurological:  Negative for weakness and light-headedness.   Psychiatric/Behavioral:  Negative for confusion. The patient is not nervous/anxious.      Objective:     Vital Signs (Most Recent):  Temp: 97.7 °F (36.5 °C) (08/02/23 0807)  Pulse: 85 (08/02/23 1006)  Resp: 18 (08/02/23 0807)  BP: 134/60 (08/02/23 0807)  SpO2: (!) 92 % (08/02/23 1006) Vital Signs (24h Range):  Temp:  [97.3 °F (36.3 °C)-97.9 °F (36.6 °C)] 97.7 °F (36.5 °C)  Pulse:  [] 85  Resp:  [10-21] 18  SpO2:  [80 %-99 %] 92 %  BP: ()/(51-97) 134/60     Weight: 68 kg (149 lb 14.6 oz)  Body mass index is 26.56  kg/m².    Intake/Output Summary (Last 24 hours) at 8/2/2023 1301  Last data filed at 8/2/2023 1006  Gross per 24 hour   Intake --   Output 250 ml   Net -250 ml         Physical Exam  Vitals and nursing note reviewed.   Constitutional:       General: She is not in acute distress.  HENT:      Head: Normocephalic and atraumatic.      Nose:      Comments: NC in nares     Mouth/Throat:      Mouth: Mucous membranes are dry.   Eyes:      Extraocular Movements: Extraocular movements intact.      Conjunctiva/sclera: Conjunctivae normal.   Cardiovascular:      Rate and Rhythm: Normal rate and regular rhythm.      Heart sounds: Normal heart sounds.   Pulmonary:      Effort: Pulmonary effort is normal. No respiratory distress.      Comments: Coughing up white sputum (chronic per pt)  Abdominal:      General: There is no distension.      Palpations: Abdomen is soft.      Tenderness: There is abdominal tenderness. There is no guarding or rebound.      Comments: Tenderness to palpation throughout   Musculoskeletal:         General: Normal range of motion.      Cervical back: Normal range of motion.      Right lower leg: No edema.      Left lower leg: No edema.   Skin:     General: Skin is warm and dry.   Neurological:      General: No focal deficit present.      Mental Status: She is alert and oriented to person, place, and time.      Motor: No weakness.   Psychiatric:         Mood and Affect: Mood normal.         Behavior: Behavior normal.         Thought Content: Thought content normal.         Judgment: Judgment normal.             Significant Labs: All pertinent labs within the past 24 hours have been reviewed.    Significant Imaging: I have reviewed all pertinent imaging results/findings within the past 24 hours.      Assessment/Plan:      * Cholangitis  88F presenting at transfer from [facility] for ERCP in setting of cholangitis.    -Continue fluids, NPO  -IV zosyn ordered to be continued   -Monitor daily  CBC, CMP  -Pain control  -AES consulted, appreciate recommendations      ERCP on 8/1 - prior sphincterectomy stenosis and biliary stent placed.   -General surgery consulted for possible cholecystectomy     Bacteremia due to Gram-negative bacteria  -BCX 7/20 w/ E coli, Klebsiella pneumoniae, and Bacteriodes fragilis.   -Repeat blood cultures drawn 7/31 NGTD.  -S/p vancomycin and zosyn at [facility]  -ID recommendations from 7/31/23 reviewed   -Zosyn ordered (4.5g q8h per pharmacy recs)  -Follow sensitivities     Cigarette nicotine dependence without complication  Dangers of cigarette smoking were reviewed with patient in detail. Patient was Counseled for 3-10 minutes. Nicotine replacement options were discussed. Nicotine replacement was discussed- not prescribed per patient's request    Daily consumption of alcohol  -Reports drinking 1 shot liquor daily  -monitor CIWA qshift, prn ativan    Pulmonary emphysema, unspecified emphysema type  Pt reports smoking 2ppd previously, cut back to 1pack per week lately. Has chronic cough, not on home oxygen and denies COPD diagnosis.   -supplemental oxygen as needed  -prn rosana    Personal history of colon cancer, stage III  -Noted prior history of stage III colorectal cancer, status post adjuvant chemotherapy   -Followed by Dr. Granado/Renu    Stage 3a chronic kidney disease  Creatine stable for now. BMP reviewed- noted Estimated Creatinine Clearance: 28.7 mL/min (based on SCr of 1.3 mg/dL). according to latest data. Based on current GFR, CKD stage is stage 3 - GFR 30-59.  Monitor UOP and serial BMP and adjust therapy as needed. Renally dose meds. Avoid nephrotoxic medications and procedures.   Cr 1.3 on admission. Baseline ~1.0. Continuous IVFs ordered while pt is NPO.     GERD (gastroesophageal reflux disease)  - ppi      VTE Risk Mitigation (From admission, onward)         Ordered     heparin (porcine) injection 5,000 Units  Every 8 hours         08/01/23 8197      IP VTE HIGH RISK PATIENT  Once         08/01/23 1051     Place sequential compression device  Until discontinued         08/01/23 1051                Discharge Planning   TIERA:      Code Status: DNR   Is the patient medically ready for discharge?:     Reason for patient still in hospital (select all that apply): Patient trending condition  Discharge Plan A: Home                  Sarahy Leger MD  Department of Hospital Medicine   [facility] [place]

## 2023-08-02 NOTE — CONSULTS
"[facility]  Infectious Disease  Consult Note    Patient Name: Kenyetta Andersen  MRN: [id number]  Admission Date: 8/1/2023  Hospital Length of Stay: 1 days  Attending Physician: Sarahy Leger MD  Primary Care Provider: Hever Arreola MD     Isolation Status: No active isolations    Patient information was obtained from patient and ER records.      Consults  Assessment/Plan:     ID  * Cholangitis  88F with h/o GERD, colon cancer (Dx 2009; with lung nodule; adj folfox) s/p R hemicolectomy with end to side ileocolonic anastamosis admitted with abdominal pain, found to have cholangitis. BCx with pan-susceptible E.coli and GPC (BCID with bacteriodes), bcx 8/2 pending. S/p ERCP. Clinically improving. On zosyn. ID consulted for "GNR bacteremia"    Recommendations:   - continue zosyn. Will de-escalate pending culture results  - agree with repeating bcx to document clearance  - tentatively planning on cipro/flagyl x 2 weeks at d/c (pending micro results), so would hold off on picc          Thank you for your consult. I will follow-up with patient. Please contact us if you have any additional questions.    Giovana Oh MD  Infectious Disease  [facility]    Subjective:     Principal Problem: Cholangitis    HPI:   88F with h/o GERD c/b esophagitis, hypothyroid, colon cancer (Dx 2009; with lung nodule; adj folfox) s/p R hemicolectomy with end to side ileocolonic anastamosis admitted to [facility] [place] from [facility] for ERCP. Pt had been symptomatic with "chest pain and abdominal pain as well as generalized weakness, she was found to have evidence of possible acalculus cholecystitis on presentation on CT scan.  She had elevated bilirubin and transaminitis, she was also diagnosed with cholangitis spiking fevers and found to have polymicrobial bacteremia with Gram-positive and Gram-negative bacteremia."    Reports acute onset of belly pain on Saturday associated with fever. Pain and fever now " "improved. Denies focal tenderness. Denies diarrhea. Reports hasn't been on chemo for 10 years and denies presence of port/pacemaker    Blood Culture, Routine      Abnormal   GRAM NEGATIVE PRISCILA, LACTOSE    Identification and susceptibility pending           Bacteroides fragilis Not Detected Detected Abnormal     Enterobacterales Not Detected See species for ID Abnormal          Escherichia coli Not Detected Detected Abnormal               Klebsiella pneumoniae group Not Detected Detected Abnormal         Repeat bcx, NGTD x 1 day, but gram stain positive    Component 1 d ago   Blood Culture, Routine No Growth to date P    Blood Culture, Routine Gram stain aer bottle: Gram negative rods P      On vanc/zosyn. 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    Anxiety     Arthritis     Escalante esophagus     Blood transfusion     Cataract     Done OU    Colon cancer 2009    Colon polyps 3/14/2017    GERD (gastroesophageal reflux disease)     Glaucoma 2/24/2012    Nuclear sclerosis 8/27/2012    Osteoporosis     Primary hypothyroidism 2/23/2020    Pseudophakia - Left Eye 2/24/2012       Past Surgical History:   Procedure Laterality Date    APPENDECTOMY      CATARACT EXTRACTION W/  INTRAOCULAR LENS IMPLANT Left     CATARACT EXTRACTION W/  INTRAOCULAR LENS IMPLANT Right     Dr Sawant    COLON SURGERY      resection    COLONOSCOPY N/A 3/14/2017    Procedure: COLONOSCOPY;  Surgeon: Killian Guerrier MD;  Location: [facility];  Service: Endoscopy;  Laterality: N/A;    COLONOSCOPY  03/14/2017    Dr. uGerrier: hemorrhoids, one colon polyp removed, "Patent functional end-to-end ileo-colonic anastomosis"with healthy mucosa; biopsy: hyperplastic polyp; repeat in 3 years for surveillance    COLONOSCOPY N/A 1/21/2020    Procedure: COLONOSCOPY;  Surgeon: Timo Darling MD;  Location: [facility];  " Service: Endoscopy;  Laterality: N/A;    COLONOSCOPY N/A 7/8/2022    Procedure: COLONOSCOPY;  Surgeon: Anaid Washington MD;  Location: [facility] ENDO;  Service: Endoscopy;  Laterality: N/A;    ECTOPIC PREGNANCY SURGERY      ERCP N/A 8/1/2023    Procedure: ERCP (ENDOSCOPIC RETROGRADE CHOLANGIOPANCREATOGRAPHY);  Surgeon: Олег Arreaga MD;  Location: [facility] ENDO ([address] [place] [address]);  Service: Endoscopy;  Laterality: N/A;    ERCP N/A 7/31/2023    Procedure: ERCP (ENDOSCOPIC RETROGRADE CHOLANGIOPANCREATOGRAPHY);  Surgeon: Nasim Cuadra MD;  Location: [facility];  Service: Endoscopy;  Laterality: N/A;    ESOPHAGOGASTRODUODENOSCOPY N/A 5/16/2019    Procedure: EGD (ESOPHAGOGASTRODUODENOSCOPY);  Surgeon: Anaid Washington MD;  Location: [facility];  Service: Endoscopy;  Laterality: N/A;    ESOPHAGOGASTRODUODENOSCOPY N/A 7/11/2019    Procedure: EGD (ESOPHAGOGASTRODUODENOSCOPY);  Surgeon: Anaid Washington MD;  Location: [facility];  Service: Endoscopy;  Laterality: N/A;    ESOPHAGOGASTRODUODENOSCOPY N/A 2/5/2021    Procedure: EGD (ESOPHAGOGASTRODUODENOSCOPY);  Surgeon: Anaid Washington MD;  Location: [facility] ENDO;  Service: Endoscopy;  Laterality: N/A;    ESOPHAGOGASTRODUODENOSCOPY N/A 11/11/2021    Procedure: EGD (ESOPHAGOGASTRODUODENOSCOPY);  Surgeon: Anaid Washington MD;  Location: [facility] ENDO;  Service: Endoscopy;  Laterality: N/A;    ESOPHAGOGASTRODUODENOSCOPY N/A 7/8/2022    Procedure: EGD (ESOPHAGOGASTRODUODENOSCOPY);  Surgeon: Anaid Washington MD;  Location: [facility] ENDO;  Service: Endoscopy;  Laterality: N/A;    ESOPHAGOGASTRODUODENOSCOPY N/A 4/12/2023    Procedure: EGD (ESOPHAGOGASTRODUODENOSCOPY);  Surgeon: Anaid Washington MD;  Location: [facility] ENDO;  Service: Endoscopy;  Laterality: N/A;    EYE SURGERY      bilateral cataracts    HYSTERECTOMY      complete    JOINT REPLACEMENT      Liban Knee    ROTATOR CUFF REPAIR Right     TOE SURGERY      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    UPPER  GASTROINTESTINAL ENDOSCOPY  06/12/2017    Dr. Guerrier: gastritis and esophagitis, biopsy: chronic gastritis, negative for h pylori, esophageal- positive eosinophils, negative for barretts; repeat in 2 months    UPPER GASTROINTESTINAL ENDOSCOPY  07/27/2017    Dr. Guerrier       Review of patient's allergies indicates:   Allergen Reactions    Ace inhibitors Edema     Other reaction(s): Angioedema    Codeine Hives       No current facility-administered medications on file prior to encounter.     Current Outpatient Medications on File Prior to Encounter   Medication Sig    acetaminophen (TYLENOL) 650 MG TbSR Take 650 mg by mouth every 8 (eight) hours as needed (pain).    alendronate (FOSAMAX) 70 MG tablet TAKE 1 TABLET (70 MG TOTAL) BY MOUTH EVERY 7 DAYS. ON EMPTY STOMACH IN MORNING, REMAIN UPRIGHT FOR 30 MINUTES.    ascorbic acid, vitamin C, (VITAMIN C) 500 MG tablet Take 500 mg by mouth once daily.    b complex vitamins capsule Take 1 capsule by mouth once daily.    brimonidine 0.2% (ALPHAGAN) 0.2 % Drop INSTILL 1 DROP INTO BOTH EYES 3 TIMES A DAY (Patient taking differently: Place 1 drop into both eyes 3 (three) times daily.)    calcium carbonate-magnesium hydroxide (ROLAIDS) 550-110 mg Chew Take 1 tablet by mouth 2 (two) times daily as needed (heartburn).    dorzolamide-timolol 2-0.5% (COSOPT) 22.3-6.8 mg/mL ophthalmic solution INSTILL 1 DROP INTO BOTH EYES TWICE A DAY    gabapentin (NEURONTIN) 300 MG capsule TAKE 1 CAP IN THE AM, 1 CAP MIDDAY, AND 2 CAPS AT NIGHT    mirtazapine (REMERON) 7.5 MG Tab TAKE 1 TABLET BY MOUTH EVERY EVENING.    omeprazole (PRILOSEC) 40 MG capsule Take 1 capsule (40 mg total) by mouth 2 (two) times daily before meals for 60 days, THEN 1 capsule (40 mg total) every morning. (Patient taking differently: Take 1 capsule (40 mg total) by mouth daily before meals )    potassium chloride SA (K-DUR,KLOR-CON) 20 MEQ tablet TAKE 1 TABLET BY MOUTH ONCE DAILY (Patient taking differently:  Take 20 mEq by mouth once daily.)    [DISCONTINUED] nicotine, polacrilex, (NICORETTE) 2 mg Gum Take 1 each (2 mg total) by mouth every hour as needed (smoking cessation).     Family History       Problem Relation (Age of Onset)    Arthritis Sister    Heart disease Mother, Father, Brother    No Known Problems Brother, Brother    Parkinsonism Sister          Tobacco Use    Smoking status: Every Day     Current packs/day: 0.00     Average packs/day: 0.3 packs/day for 65.0 years (21.5 ttl pk-yrs)     Types: Cigarettes     Start date: 10/5/1955     Last attempt to quit: 10/5/2020     Years since quittin.8    Smokeless tobacco: Never    Tobacco comments:     3/1/23--states smokes 1-2 cigarettes a day   Substance and Sexual Activity    Alcohol use: Yes     Alcohol/week: 2.0 standard drinks of alcohol     Types: 2 Shots of liquor per week     Comment: Daily, about 4 drinks per day (crown)    Drug use: No    Sexual activity: Not Currently     Review of Systems   Constitutional:  Positive for chills. Negative for fever.   HENT:  Negative for congestion.    Respiratory:  Positive for shortness of breath.    Cardiovascular:  Positive for chest pain and leg swelling (intermittent for years).   Gastrointestinal:  Positive for abdominal pain, nausea and vomiting.   Genitourinary:  Negative for dysuria and urgency.   Musculoskeletal:  Positive for back pain.   Skin:  Negative for wound.   Allergic/Immunologic: Negative for immunocompromised state.   Neurological:  Negative for seizures.   Psychiatric/Behavioral:          Mildly confused after morphine     Objective:     Vital Signs (Most Recent):  Temp: 97.7 °F (36.5 °C) (23 1302)  Pulse: 101 (23 1302)  Resp: 18 (23 1302)  BP: 120/62 (23 1302)  SpO2: (!) 92 % (23 1302) Vital Signs (24h Range):  Temp:  [97.3 °F (36.3 °C)-97.9 °F (36.6 °C)] 97.7 °F (36.5 °C)  Pulse:  [] 101  Resp:  [10-21] 18  SpO2:  [80 %-99 %] 92 %  BP:  "()/(51-97) 120/62     Weight: 68 kg (149 lb 14.6 oz)  Body mass index is 26.56 kg/m².     Physical Exam  Vitals and nursing note reviewed.   Constitutional:       Appearance: Normal appearance. She is not ill-appearing.      Comments: Elderly female, frail, ill appearing, shaking, under the blankets   HENT:      Head: Normocephalic and atraumatic.      Mouth/Throat:      Mouth: Mucous membranes are moist.   Eyes:      General:         Right eye: No discharge.         Left eye: No discharge.      Comments: Wearing corrective lenses, senile arcus   Cardiovascular:      Rate and Rhythm: Regular rhythm. Tachycardia present.      Heart sounds: No murmur heard.     Comments: Warm peripheries, mild pedal edema  Pulmonary:      Effort: Pulmonary effort is normal. No respiratory distress.      Breath sounds: Normal breath sounds.   Abdominal:      General: Abdomen is flat. Bowel sounds are normal. There is no distension.      Palpations: Abdomen is soft.   Genitourinary:     Comments: No martines  Musculoskeletal:         General: No swelling.   Skin:     General: Skin is warm.   Neurological:      General: No focal deficit present.      Mental Status: She is alert and oriented to person, place, and time.   Psychiatric:      Comments: cooperative                Significant Labs: BMP:   Recent Labs   Lab 08/02/23  0550   GLU 45*      K 3.9   *   CO2 15*   BUN 27*   CREATININE 1.1   CALCIUM 8.0*   MG 2.0       CBC:   Recent Labs   Lab 08/02/23  0550   WBC 19.40*   HGB 12.0   HCT 35.8*   PLT 60*       CMP:   Recent Labs   Lab 08/01/23  0412 08/02/23  0550    139   K 4.4 3.9   * 113*   CO2 22* 15*   GLU 79 45*   BUN 23 27*   CREATININE 1.3 1.1   CALCIUM 7.7* 8.0*   PROT 5.3* 4.9*   ALBUMIN 2.3* 1.8*   BILITOT 2.0* 1.2*   ALKPHOS 73 105   * 66*   ALT 91* 62*   ANIONGAP 4* 11       Cardiac Markers: No results for input(s): "CKMB", "MYOGLOBIN", "BNP", "TROPISTAT" in the last 48 hours.  Coagulation: " "No results for input(s): "PT", "INR", "APTT" in the last 48 hours.  Magnesium:   Recent Labs   Lab 08/01/23  0412 08/02/23  0550   MG 1.8 2.0     POCT Glucose:   Recent Labs   Lab 08/02/23  0734 08/02/23  0806 08/02/23  1212   POCTGLUCOSE 41* 170* 72     Troponin:   No results for input(s): "TROPONINI", "TROPONINIHS" in the last 48 hours.    TSH:   Recent Labs   Lab 07/30/23  1844   TSH 0.490     Urine Culture: No results for input(s): "LABURIN" in the last 48 hours.  Urine Studies: No results for input(s): "COLORU", "APPEARANCEUA", "PHUR", "SPECGRAV", "PROTEINUA", "GLUCUA", "KETONESU", "BILIRUBINUA", "OCCULTUA", "NITRITE", "UROBILINOGEN", "LEUKOCYTESUR", "RBCUA", "WBCUA", "BACTERIA", "SQUAMEPITHEL", "HYALINECASTS" in the last 48 hours.    Invalid input(s): "WRIGHTSUR"    Significant Imaging: I have reviewed all pertinent imaging results/findings within the past 24 hours.    X-Ray Chest AP Portable    Result Date: 7/30/2023  EXAMINATION: XR CHEST AP PORTABLE CLINICAL INDICATION: Female, 88 years old. Chest COMPARISON: None. FINDINGS: The heart and great vessels appear normal. There are some atherosclerotic changes of aorta. Both lungs are well expanded and clear. No pleural effusion can be seen. No pneumothorax can be seen. IMPRESSION: No x-ray evidence of active or acute disease. Electronically signed by:  Marcela Escalante Jr., MD  7/30/2023 9:22 PM CDT Workstation: [id number]    CT Abdomen Pelvis With Contrast    Result Date: 7/30/2023  EXAMINATION: CT ABDOMEN PELVIS WITH CONTRAST CLINICAL INDICATION: Female, 88 years old. Epigastric pain TECHNIQUE: Helical CT scan examination of the abdomen and pelvis is performed from the domes of the diaphragm to the pubic symphysis with the administration of intravenous contrast material. CONTRAST: 75 mL of Omnipaque 300 IV. COMPARISON: None FINDINGS: Lower Chest: Visualized lung bases are clear other than hypoaeration changes. Heart is normal in size. No pericardial or pleural " effusion. Liver: Normal in size and contour. No focal lesion. Bile Ducts: Common bile duct is  dilated. Gallbladder: There is marked dilation of the gallbladder and there may be pericolic cystic fluid. No definite gallstones can be seen. Pancreas: Pancreas is atrophic but demonstrates no other abnormality. Spleen: Normal in size and contour. Adrenals: Normal configuration. Kidneys and Ureters: Normal size and contour. No hydronephrosis. There are bilateral renal cysts. Bladder: Normal in appearance. Bowel: Stomach: Unremarkable. Small Intestine: Unremarkable. Appendix: No inflammatory changes. Colon: Unremarkable. Vessels: Abdominal aorta and inferior vena cava are normal in course and caliber. Reproductive Organs: Uterus is apparently surgically absent and no pelvic mass can be seen Lymph Nodes: No pathologic mesenteric or retroperitoneal lymph nodes. Peritoneum: No free air, free fluid, or fluid collection. Abdominal Wall: No hernia or mass. Musculoskeletal: No acute abnormality or suspicious bony lesion. There is considerable compression of the L1 vertebral body which appears chronic. There is some retropulsion of bone. Compression of the superior endplate of T12 also appears to be chronic. There are degenerative disc changes at T12-L1 to L2-L3. No aggressive skeletal lesions can be seen. IMPRESSION: Dilation of the gallbladder and dilation of the common bile duct. There may be pericholecystic fluid. No gallstones can be seen but changes may represent  noncalculous acute cholecystitis. Small bilateral renal cysts. This exam was performed according to our departmental dose-optimization program which includes automated exposure control, adjustment of the mA and/or kV according to patient size and/or use of iterative reconstruction technique. Electronically signed by:  Marcela Escalante Jr., MD  7/30/2023 9:04 PM CDT Workstation: [id number]

## 2023-08-02 NOTE — SUBJECTIVE & OBJECTIVE
Interval History:   8/2: no acute issues reported overnight. Episode hypoglycemia treated with d10.     Review of Systems   Constitutional:  Negative for chills and fever.   HENT:  Negative for trouble swallowing.    Eyes:  Negative for visual disturbance.   Respiratory:  Positive for cough. Negative for shortness of breath.    Cardiovascular:  Negative for chest pain and leg swelling.   Gastrointestinal:  Positive for abdominal pain. Negative for constipation, diarrhea, nausea and vomiting.   Genitourinary:  Negative for difficulty urinating, dysuria and flank pain.   Musculoskeletal:  Negative for back pain.   Skin:  Negative for color change and rash.   Neurological:  Negative for weakness and light-headedness.   Psychiatric/Behavioral:  Negative for confusion. The patient is not nervous/anxious.      Objective:     Vital Signs (Most Recent):  Temp: 97.7 °F (36.5 °C) (08/02/23 0807)  Pulse: 85 (08/02/23 1006)  Resp: 18 (08/02/23 0807)  BP: 134/60 (08/02/23 0807)  SpO2: (!) 92 % (08/02/23 1006) Vital Signs (24h Range):  Temp:  [97.3 °F (36.3 °C)-97.9 °F (36.6 °C)] 97.7 °F (36.5 °C)  Pulse:  [] 85  Resp:  [10-21] 18  SpO2:  [80 %-99 %] 92 %  BP: ()/(51-97) 134/60     Weight: 68 kg (149 lb 14.6 oz)  Body mass index is 26.56 kg/m².    Intake/Output Summary (Last 24 hours) at 8/2/2023 1301  Last data filed at 8/2/2023 1006  Gross per 24 hour   Intake --   Output 250 ml   Net -250 ml         Physical Exam  Vitals and nursing note reviewed.   Constitutional:       General: She is not in acute distress.  HENT:      Head: Normocephalic and atraumatic.      Nose:      Comments: NC in nares     Mouth/Throat:      Mouth: Mucous membranes are dry.   Eyes:      Extraocular Movements: Extraocular movements intact.      Conjunctiva/sclera: Conjunctivae normal.   Cardiovascular:      Rate and Rhythm: Normal rate and regular rhythm.      Heart sounds: Normal heart sounds.   Pulmonary:      Effort: Pulmonary effort is  normal. No respiratory distress.      Comments: Coughing up white sputum (chronic per pt)  Abdominal:      General: There is no distension.      Palpations: Abdomen is soft.      Tenderness: There is abdominal tenderness. There is no guarding or rebound.      Comments: Tenderness to palpation throughout   Musculoskeletal:         General: Normal range of motion.      Cervical back: Normal range of motion.      Right lower leg: No edema.      Left lower leg: No edema.   Skin:     General: Skin is warm and dry.   Neurological:      General: No focal deficit present.      Mental Status: She is alert and oriented to person, place, and time.      Motor: No weakness.   Psychiatric:         Mood and Affect: Mood normal.         Behavior: Behavior normal.         Thought Content: Thought content normal.         Judgment: Judgment normal.             Significant Labs: All pertinent labs within the past 24 hours have been reviewed.    Significant Imaging: I have reviewed all pertinent imaging results/findings within the past 24 hours.

## 2023-08-02 NOTE — CONSULTS
[facility]  General Surgery  Consult Note    Patient Name: Kenyetta Andersen  MRN: [id number]  Code Status: DNR  Admission Date: 8/1/2023  Hospital Length of Stay: 1 days  Attending Physician: Sarahy Leger MD  Primary Care Provider: Hever Arreola MD    Patient information was obtained from patient, past medical records and ER records.     Inpatient consult to General Surgery  Consult performed by: Radha Price MD  Consult ordered by: Renita Martinez PA-C        Subjective:     Principal Problem: Cholangitis    History of Present Illness: Kenyetta Andersen is a 88 y.o. female  with a history of GERD c/b campbell's esophagus, hypothyroidism, colorectal cancer s/p R hemicolectomy (2009) and FOLFOX who presented as a transfer from [facility] for ERCP. She initially presented there with chest and abdominal pain. Work up there was concerning for acalculus cholecystitis with a distended gallbladder and dilated CBD. An MRCP was obtained which showed a possible filling defect and so ERCP was recommended. Unfortunately they were not able to advance the scope into the duodenum due to the complexity of the case. She was then transferred here for AES evaluation. She was taken for ERCP 8/1 where her previous sphincterotomy was found to be strictured and a biliary stent was performed. There was some sludge in the duct, but no stones. General surgery consulted for evaluation of cholecystectomy.Of note she is DNR and has had some conversations with palliative care in the past      No current facility-administered medications on file prior to encounter.     Current Outpatient Medications on File Prior to Encounter   Medication Sig    acetaminophen (TYLENOL) 650 MG TbSR Take 650 mg by mouth every 8 (eight) hours as needed (pain).    alendronate (FOSAMAX) 70 MG tablet TAKE 1 TABLET (70 MG TOTAL) BY MOUTH EVERY 7 DAYS. ON EMPTY STOMACH IN MORNING, REMAIN UPRIGHT FOR 30 MINUTES.    ascorbic acid,  vitamin C, (VITAMIN C) 500 MG tablet Take 500 mg by mouth once daily.    b complex vitamins capsule Take 1 capsule by mouth once daily.    brimonidine 0.2% (ALPHAGAN) 0.2 % Drop INSTILL 1 DROP INTO BOTH EYES 3 TIMES A DAY (Patient taking differently: Place 1 drop into both eyes 3 (three) times daily.)    calcium carbonate-magnesium hydroxide (ROLAIDS) 550-110 mg Chew Take 1 tablet by mouth 2 (two) times daily as needed (heartburn).    dorzolamide-timolol 2-0.5% (COSOPT) 22.3-6.8 mg/mL ophthalmic solution INSTILL 1 DROP INTO BOTH EYES TWICE A DAY    gabapentin (NEURONTIN) 300 MG capsule TAKE 1 CAP IN THE AM, 1 CAP MIDDAY, AND 2 CAPS AT NIGHT    mirtazapine (REMERON) 7.5 MG Tab TAKE 1 TABLET BY MOUTH EVERY EVENING.    omeprazole (PRILOSEC) 40 MG capsule Take 1 capsule (40 mg total) by mouth 2 (two) times daily before meals for 60 days, THEN 1 capsule (40 mg total) every morning. (Patient taking differently: Take 1 capsule (40 mg total) by mouth daily before meals )    potassium chloride SA (K-DUR,KLOR-CON) 20 MEQ tablet TAKE 1 TABLET BY MOUTH ONCE DAILY (Patient taking differently: Take 20 mEq by mouth once daily.)    [DISCONTINUED] nicotine, polacrilex, (NICORETTE) 2 mg Gum Take 1 each (2 mg total) by mouth every hour as needed (smoking cessation).       Review of patient's allergies indicates:   Allergen Reactions    Ace inhibitors Edema     Other reaction(s): Angioedema    Codeine Hives       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    Anxiety     Arthritis     Escalante esophagus     Blood transfusion     Cataract     Done OU    Colon cancer 2009    Colon polyps 3/14/2017    GERD (gastroesophageal reflux disease)     Glaucoma 2/24/2012    Nuclear sclerosis 8/27/2012    Osteoporosis     Primary hypothyroidism 2/23/2020    Pseudophakia - Left Eye 2/24/2012     Past Surgical History:   Procedure Laterality Date    APPENDECTOMY      CATARACT EXTRACTION W/   "INTRAOCULAR LENS IMPLANT Left     CATARACT EXTRACTION W/  INTRAOCULAR LENS IMPLANT Right     Dr Sawant    COLON SURGERY      resection    COLONOSCOPY N/A 3/14/2017    Procedure: COLONOSCOPY;  Surgeon: Killian Guerrier MD;  Location: [facility] ENDO;  Service: Endoscopy;  Laterality: N/A;    COLONOSCOPY  03/14/2017    Dr. Guerrier: hemorrhoids, one colon polyp removed, "Patent functional end-to-end ileo-colonic anastomosis"with healthy mucosa; biopsy: hyperplastic polyp; repeat in 3 years for surveillance    COLONOSCOPY N/A 1/21/2020    Procedure: COLONOSCOPY;  Surgeon: Timo Darling MD;  Location: [facility] ENDO;  Service: Endoscopy;  Laterality: N/A;    COLONOSCOPY N/A 7/8/2022    Procedure: COLONOSCOPY;  Surgeon: Anaid Washington MD;  Location: [facility];  Service: Endoscopy;  Laterality: N/A;    ECTOPIC PREGNANCY SURGERY      ERCP N/A 8/1/2023    Procedure: ERCP (ENDOSCOPIC RETROGRADE CHOLANGIOPANCREATOGRAPHY);  Surgeon: Олег Arreaga MD;  Location: [facility] ([address] [place] [address]);  Service: Endoscopy;  Laterality: N/A;    ERCP N/A 7/31/2023    Procedure: ERCP (ENDOSCOPIC RETROGRADE CHOLANGIOPANCREATOGRAPHY);  Surgeon: Nasim Cuadra MD;  Location: [facility];  Service: Endoscopy;  Laterality: N/A;    ESOPHAGOGASTRODUODENOSCOPY N/A 5/16/2019    Procedure: EGD (ESOPHAGOGASTRODUODENOSCOPY);  Surgeon: Anaid Washington MD;  Location: [facility];  Service: Endoscopy;  Laterality: N/A;    ESOPHAGOGASTRODUODENOSCOPY N/A 7/11/2019    Procedure: EGD (ESOPHAGOGASTRODUODENOSCOPY);  Surgeon: Anaid Washington MD;  Location: [facility] ENDO;  Service: Endoscopy;  Laterality: N/A;    ESOPHAGOGASTRODUODENOSCOPY N/A 2/5/2021    Procedure: EGD (ESOPHAGOGASTRODUODENOSCOPY);  Surgeon: Anaid Washington MD;  Location: [facility] ENDO;  Service: Endoscopy;  Laterality: N/A;    ESOPHAGOGASTRODUODENOSCOPY N/A 11/11/2021    Procedure: EGD (ESOPHAGOGASTRODUODENOSCOPY);  Surgeon: Anaid Washington MD;  Location: [facility];  Service: Endoscopy;  " Laterality: N/A;    ESOPHAGOGASTRODUODENOSCOPY N/A 2022    Procedure: EGD (ESOPHAGOGASTRODUODENOSCOPY);  Surgeon: Anaid Washington MD;  Location: [facility] ENDO;  Service: Endoscopy;  Laterality: N/A;    ESOPHAGOGASTRODUODENOSCOPY N/A 2023    Procedure: EGD (ESOPHAGOGASTRODUODENOSCOPY);  Surgeon: Anaid Washington MD;  Location: [facility] ENDO;  Service: Endoscopy;  Laterality: N/A;    EYE SURGERY      bilateral cataracts    HYSTERECTOMY      complete    JOINT REPLACEMENT      Liban Knee    ROTATOR CUFF REPAIR Right     TOE SURGERY      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    UPPER GASTROINTESTINAL ENDOSCOPY  2017    Dr. Guerrier: gastritis and esophagitis, biopsy: chronic gastritis, negative for h pylori, esophageal- positive eosinophils, negative for barretts; repeat in 2 months    UPPER GASTROINTESTINAL ENDOSCOPY  2017    Dr. Guerrier     Family History       Problem Relation (Age of Onset)    Arthritis Sister    Heart disease Mother, Father, Brother    No Known Problems Brother, Brother    Parkinsonism Sister          Tobacco Use    Smoking status: Every Day     Current packs/day: 0.00     Average packs/day: 0.3 packs/day for 65.0 years (21.5 ttl pk-yrs)     Types: Cigarettes     Start date: 10/5/1955     Last attempt to quit: 10/5/2020     Years since quittin.8    Smokeless tobacco: Never    Tobacco comments:     3/1/23--states smokes 1-2 cigarettes a day   Substance and Sexual Activity    Alcohol use: Yes     Alcohol/week: 2.0 standard drinks of alcohol     Types: 2 Shots of liquor per week     Comment: Daily, about 4 drinks per day (crown)    Drug use: No    Sexual activity: Not Currently     Review of Systems   Constitutional:  Negative for chills and fever.   HENT:  Negative for trouble swallowing.    Eyes:  Negative for visual disturbance.   Respiratory:  Positive for cough. Negative for shortness of breath.    Cardiovascular:  Negative for chest pain and leg swelling.    Gastrointestinal:  Positive for abdominal pain. Negative for constipation, diarrhea, nausea and vomiting.   Genitourinary:  Negative for difficulty urinating, dysuria and flank pain.   Musculoskeletal:  Negative for back pain.   Skin:  Negative for color change and rash.   Neurological:  Negative for weakness and light-headedness.   Psychiatric/Behavioral:  Negative for confusion. The patient is not nervous/anxious.      Objective:     Vital Signs (Most Recent):  Temp: 97.7 °F (36.5 °C) (08/02/23 0807)  Pulse: 85 (08/02/23 1006)  Resp: 18 (08/02/23 0807)  BP: 134/60 (08/02/23 0807)  SpO2: (!) 92 % (08/02/23 1006) Vital Signs (24h Range):  Temp:  [97.3 °F (36.3 °C)-97.9 °F (36.6 °C)] 97.7 °F (36.5 °C)  Pulse:  [] 85  Resp:  [10-21] 18  SpO2:  [80 %-99 %] 92 %  BP: ()/(51-97) 134/60     Weight: 68 kg (149 lb 14.6 oz)  Body mass index is 26.56 kg/m².     Physical Exam  Vitals and nursing note reviewed.   Constitutional:       General: She is not in acute distress.     Appearance: She is ill-appearing. She is not toxic-appearing.   Eyes:      General: No scleral icterus.     Extraocular Movements: Extraocular movements intact.   Cardiovascular:      Rate and Rhythm: Normal rate and regular rhythm.   Pulmonary:      Effort: Pulmonary effort is normal. No respiratory distress.   Abdominal:      General: There is no distension.      Palpations: Abdomen is soft.      Tenderness: There is abdominal tenderness (RUQ). There is no guarding.   Musculoskeletal:         General: No deformity. Normal range of motion.   Skin:     General: Skin is warm and dry.      Coloration: Skin is not jaundiced or pale.   Neurological:      General: No focal deficit present.      Mental Status: She is alert.      Cranial Nerves: No cranial nerve deficit.   Psychiatric:         Mood and Affect: Mood normal.         Behavior: Behavior normal.            I have reviewed all pertinent lab results within the past 24 hours.  CBC:    Recent Labs   Lab 08/02/23  0550   WBC 19.40*   RBC 3.75*   HGB 12.0   HCT 35.8*   PLT 60*   MCV 96   MCH 32.0*   MCHC 33.5     CMP:   Recent Labs   Lab 08/02/23  0550   GLU 45*   CALCIUM 8.0*   ALBUMIN 1.8*   PROT 4.9*      K 3.9   CO2 15*   *   BUN 27*   CREATININE 1.1   ALKPHOS 105   ALT 62*   AST 66*   BILITOT 1.2*       Significant Diagnostics:  I have reviewed all pertinent imaging results/findings within the past 24 hours.           Assessment/Plan:     * Cholangitis  Kenyetta Andersen is a 88 y.o. female with cholangitis s/p ERCP with biliary sphincterotomy and CBD stent. Sludge found in duct, but no stones.     - Patient seen and examined. Labs and imaging reviewed. Case discussed with Dr. Peters  - Given her medical history and overall clinical picture, do not think she would have much additional benefit from a cholecystectomy as cholangitis likely secondary to strictured sphincter and not true choledocholithiasis  - There is a chance she could have recurrent cholangitis, but the risk of this is extremely low  - Will defer surgical intervention at this time. Thank you for this consult.      VTE Risk Mitigation (From admission, onward)         Ordered     heparin (porcine) injection 5,000 Units  Every 8 hours         08/01/23 1051     IP VTE HIGH RISK PATIENT  Once         08/01/23 1051     Place sequential compression device  Until discontinued         08/01/23 1051                Thank you for your consult. I will sign off. Please contact us if you have any additional questions.    Radha Price MD  General Surgery  [facility]

## 2023-08-02 NOTE — SUBJECTIVE & OBJECTIVE
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    Anxiety     Arthritis     Escalante esophagus     Blood transfusion     Cataract     Done OU    Colon cancer 2009    Colon polyps 3/14/2017    GERD (gastroesophageal reflux disease)     Glaucoma 2/24/2012    Nuclear sclerosis 8/27/2012    Osteoporosis     Primary hypothyroidism 2/23/2020    Pseudophakia - Left Eye 2/24/2012       Past Surgical History:   Procedure Laterality Date    APPENDECTOMY      CATARACT EXTRACTION W/  INTRAOCULAR LENS IMPLANT Left     CATARACT EXTRACTION W/  INTRAOCULAR LENS IMPLANT Right     Dr Sawant    COLON SURGERY      resection    COLONOSCOPY N/A 3/14/2017    Procedure: COLONOSCOPY;  Surgeon: Killian Guerrier MD;  Location: [facility];  Service: Endoscopy;  Laterality: N/A;    COLONOSCOPY  03/14/2017    Dr. Guerrier: hemorrhoids, one colon polyp removed, "Patent functional end-to-end ileo-colonic anastomosis"with healthy mucosa; biopsy: hyperplastic polyp; repeat in 3 years for surveillance    COLONOSCOPY N/A 1/21/2020    Procedure: COLONOSCOPY;  Surgeon: Timo Darling MD;  Location: [facility] END[facility];  Service: Endoscopy;  Laterality: N/A;    COLONOSCOPY N/A 7/8/2022    Procedure: COLONOSCOPY;  Surgeon: Anaid Washington MD;  Location: [facility];  Service: Endoscopy;  Laterality: N/A;    ECTOPIC PREGNANCY SURGERY      ERCP N/A 8/1/2023    Procedure: ERCP (ENDOSCOPIC RETROGRADE CHOLANGIOPANCREATOGRAPHY);  Surgeon: Олег Arreaga MD;  Location: [facility] ([address] [place] [address]);  Service: Endoscopy;  Laterality: N/A;    ERCP N/A 7/31/2023    Procedure: ERCP (ENDOSCOPIC RETROGRADE CHOLANGIOPANCREATOGRAPHY);  Surgeon: Nasim Cuadra MD;  Location: [facility];  Service: Endoscopy;  Laterality: N/A;    ESOPHAGOGASTRODUODENOSCOPY N/A 5/16/2019    Procedure: EGD (ESOPHAGOGASTRODUODENOSCOPY);  Surgeon: Anaid Washington MD;  Location: [facility];  Service: Endoscopy;  Laterality: N/A;    ESOPHAGOGASTRODUODENOSCOPY N/A 7/11/2019    " Procedure: EGD (ESOPHAGOGASTRODUODENOSCOPY);  Surgeon: Anaid Washington MD;  Location: [facility] ENDO;  Service: Endoscopy;  Laterality: N/A;    ESOPHAGOGASTRODUODENOSCOPY N/A 2/5/2021    Procedure: EGD (ESOPHAGOGASTRODUODENOSCOPY);  Surgeon: Anaid Washington MD;  Location: [facility] END[facility];  Service: Endoscopy;  Laterality: N/A;    ESOPHAGOGASTRODUODENOSCOPY N/A 11/11/2021    Procedure: EGD (ESOPHAGOGASTRODUODENOSCOPY);  Surgeon: Anaid Washington MD;  Location: [facility] END[facility];  Service: Endoscopy;  Laterality: N/A;    ESOPHAGOGASTRODUODENOSCOPY N/A 7/8/2022    Procedure: EGD (ESOPHAGOGASTRODUODENOSCOPY);  Surgeon: Anaid Washington MD;  Location: [facility] END[facility];  Service: Endoscopy;  Laterality: N/A;    ESOPHAGOGASTRODUODENOSCOPY N/A 4/12/2023    Procedure: EGD (ESOPHAGOGASTRODUODENOSCOPY);  Surgeon: Anaid Washington MD;  Location: [facility] END[facility];  Service: Endoscopy;  Laterality: N/A;    EYE SURGERY      bilateral cataracts    HYSTERECTOMY      complete    JOINT REPLACEMENT      Liban Knee    ROTATOR CUFF REPAIR Right     TOE SURGERY      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    UPPER GASTROINTESTINAL ENDOSCOPY  06/12/2017    Dr. Guerrier: gastritis and esophagitis, biopsy: chronic gastritis, negative for h pylori, esophageal- positive eosinophils, negative for barretts; repeat in 2 months    UPPER GASTROINTESTINAL ENDOSCOPY  07/27/2017    Dr. Guerrier       Review of patient's allergies indicates:   Allergen Reactions    Ace inhibitors Edema     Other reaction(s): Angioedema    Codeine Hives       No current facility-administered medications on file prior to encounter.     Current Outpatient Medications on File Prior to Encounter   Medication Sig    acetaminophen (TYLENOL) 650 MG TbSR Take 650 mg by mouth every 8 (eight) hours as needed (pain).    alendronate (FOSAMAX) 70 MG tablet TAKE 1 TABLET (70 MG TOTAL) BY MOUTH EVERY 7 DAYS. ON EMPTY STOMACH IN MORNING, REMAIN UPRIGHT FOR 30 MINUTES.    ascorbic acid, vitamin C, (VITAMIN C)  500 MG tablet Take 500 mg by mouth once daily.    b complex vitamins capsule Take 1 capsule by mouth once daily.    brimonidine 0.2% (ALPHAGAN) 0.2 % Drop INSTILL 1 DROP INTO BOTH EYES 3 TIMES A DAY (Patient taking differently: Place 1 drop into both eyes 3 (three) times daily.)    calcium carbonate-magnesium hydroxide (ROLAIDS) 550-110 mg Chew Take 1 tablet by mouth 2 (two) times daily as needed (heartburn).    dorzolamide-timolol 2-0.5% (COSOPT) 22.3-6.8 mg/mL ophthalmic solution INSTILL 1 DROP INTO BOTH EYES TWICE A DAY    gabapentin (NEURONTIN) 300 MG capsule TAKE 1 CAP IN THE AM, 1 CAP MIDDAY, AND 2 CAPS AT NIGHT    mirtazapine (REMERON) 7.5 MG Tab TAKE 1 TABLET BY MOUTH EVERY EVENING.    omeprazole (PRILOSEC) 40 MG capsule Take 1 capsule (40 mg total) by mouth 2 (two) times daily before meals for 60 days, THEN 1 capsule (40 mg total) every morning. (Patient taking differently: Take 1 capsule (40 mg total) by mouth daily before meals )    potassium chloride SA (K-DUR,KLOR-CON) 20 MEQ tablet TAKE 1 TABLET BY MOUTH ONCE DAILY (Patient taking differently: Take 20 mEq by mouth once daily.)    [DISCONTINUED] nicotine, polacrilex, (NICORETTE) 2 mg Gum Take 1 each (2 mg total) by mouth every hour as needed (smoking cessation).     Family History       Problem Relation (Age of Onset)    Arthritis Sister    Heart disease Mother, Father, Brother    No Known Problems Brother, Brother    Parkinsonism Sister          Tobacco Use    Smoking status: Every Day     Current packs/day: 0.00     Average packs/day: 0.3 packs/day for 65.0 years (21.5 ttl pk-yrs)     Types: Cigarettes     Start date: 10/5/1955     Last attempt to quit: 10/5/2020     Years since quittin.8    Smokeless tobacco: Never    Tobacco comments:     3/1/23--states smokes 1-2 cigarettes a day   Substance and Sexual Activity    Alcohol use: Yes     Alcohol/week: 2.0 standard drinks of alcohol     Types: 2 Shots of liquor per week     Comment: Daily, about 4  drinks per day (crown)    Drug use: No    Sexual activity: Not Currently     Review of Systems   Constitutional:  Positive for chills. Negative for fever.   HENT:  Negative for congestion.    Respiratory:  Positive for shortness of breath.    Cardiovascular:  Positive for chest pain and leg swelling (intermittent for years).   Gastrointestinal:  Positive for abdominal pain, nausea and vomiting.   Genitourinary:  Negative for dysuria and urgency.   Musculoskeletal:  Positive for back pain.   Skin:  Negative for wound.   Allergic/Immunologic: Negative for immunocompromised state.   Neurological:  Negative for seizures.   Psychiatric/Behavioral:          Mildly confused after morphine     Objective:     Vital Signs (Most Recent):  Temp: 97.7 °F (36.5 °C) (08/02/23 1302)  Pulse: 101 (08/02/23 1302)  Resp: 18 (08/02/23 1302)  BP: 120/62 (08/02/23 1302)  SpO2: (!) 92 % (08/02/23 1302) Vital Signs (24h Range):  Temp:  [97.3 °F (36.3 °C)-97.9 °F (36.6 °C)] 97.7 °F (36.5 °C)  Pulse:  [] 101  Resp:  [10-21] 18  SpO2:  [80 %-99 %] 92 %  BP: ()/(51-97) 120/62     Weight: 68 kg (149 lb 14.6 oz)  Body mass index is 26.56 kg/m².     Physical Exam  Vitals and nursing note reviewed.   Constitutional:       Appearance: Normal appearance. She is not ill-appearing.      Comments: Elderly female, frail, ill appearing, shaking, under the blankets   HENT:      Head: Normocephalic and atraumatic.      Mouth/Throat:      Mouth: Mucous membranes are moist.   Eyes:      General:         Right eye: No discharge.         Left eye: No discharge.      Comments: Wearing corrective lenses, senile arcus   Cardiovascular:      Rate and Rhythm: Regular rhythm. Tachycardia present.      Heart sounds: No murmur heard.     Comments: Warm peripheries, mild pedal edema  Pulmonary:      Effort: Pulmonary effort is normal. No respiratory distress.      Breath sounds: Normal breath sounds.   Abdominal:      General: Abdomen is flat. Bowel sounds  "are normal. There is no distension.      Palpations: Abdomen is soft.   Genitourinary:     Comments: No martines  Musculoskeletal:         General: No swelling.   Skin:     General: Skin is warm.   Neurological:      General: No focal deficit present.      Mental Status: She is alert and oriented to person, place, and time.   Psychiatric:      Comments: cooperative                Significant Labs: BMP:   Recent Labs   Lab 08/02/23  0550   GLU 45*      K 3.9   *   CO2 15*   BUN 27*   CREATININE 1.1   CALCIUM 8.0*   MG 2.0       CBC:   Recent Labs   Lab 08/02/23  0550   WBC 19.40*   HGB 12.0   HCT 35.8*   PLT 60*       CMP:   Recent Labs   Lab 08/01/23  0412 08/02/23  0550    139   K 4.4 3.9   * 113*   CO2 22* 15*   GLU 79 45*   BUN 23 27*   CREATININE 1.3 1.1   CALCIUM 7.7* 8.0*   PROT 5.3* 4.9*   ALBUMIN 2.3* 1.8*   BILITOT 2.0* 1.2*   ALKPHOS 73 105   * 66*   ALT 91* 62*   ANIONGAP 4* 11       Cardiac Markers: No results for input(s): "CKMB", "MYOGLOBIN", "BNP", "TROPISTAT" in the last 48 hours.  Coagulation: No results for input(s): "PT", "INR", "APTT" in the last 48 hours.  Magnesium:   Recent Labs   Lab 08/01/23  0412 08/02/23  0550   MG 1.8 2.0     POCT Glucose:   Recent Labs   Lab 08/02/23  0734 08/02/23  0806 08/02/23  1212   POCTGLUCOSE 41* 170* 72     Troponin:   No results for input(s): "TROPONINI", "TROPONINIHS" in the last 48 hours.    TSH:   Recent Labs   Lab 07/30/23  1844   TSH 0.490     Urine Culture: No results for input(s): "LABURIN" in the last 48 hours.  Urine Studies: No results for input(s): "COLORU", "APPEARANCEUA", "PHUR", "SPECGRAV", "PROTEINUA", "GLUCUA", "KETONESU", "BILIRUBINUA", "OCCULTUA", "NITRITE", "UROBILINOGEN", "LEUKOCYTESUR", "RBCUA", "WBCUA", "BACTERIA", "SQUAMEPITHEL", "HYALINECASTS" in the last 48 hours.    Invalid input(s): "WRIGHTSUR"    Significant Imaging: I have reviewed all pertinent imaging results/findings within the past 24 hours.    X-Ray " Chest AP Portable    Result Date: 7/30/2023  EXAMINATION: XR CHEST AP PORTABLE CLINICAL INDICATION: Female, 88 years old. Chest COMPARISON: None. FINDINGS: The heart and great vessels appear normal. There are some atherosclerotic changes of aorta. Both lungs are well expanded and clear. No pleural effusion can be seen. No pneumothorax can be seen. IMPRESSION: No x-ray evidence of active or acute disease. Electronically signed by:  Marcela Escalante Jr., MD  7/30/2023 9:22 PM CDT Workstation: [id number]    CT Abdomen Pelvis With Contrast    Result Date: 7/30/2023  EXAMINATION: CT ABDOMEN PELVIS WITH CONTRAST CLINICAL INDICATION: Female, 88 years old. Epigastric pain TECHNIQUE: Helical CT scan examination of the abdomen and pelvis is performed from the domes of the diaphragm to the pubic symphysis with the administration of intravenous contrast material. CONTRAST: 75 mL of Omnipaque 300 IV. COMPARISON: None FINDINGS: Lower Chest: Visualized lung bases are clear other than hypoaeration changes. Heart is normal in size. No pericardial or pleural effusion. Liver: Normal in size and contour. No focal lesion. Bile Ducts: Common bile duct is  dilated. Gallbladder: There is marked dilation of the gallbladder and there may be pericolic cystic fluid. No definite gallstones can be seen. Pancreas: Pancreas is atrophic but demonstrates no other abnormality. Spleen: Normal in size and contour. Adrenals: Normal configuration. Kidneys and Ureters: Normal size and contour. No hydronephrosis. There are bilateral renal cysts. Bladder: Normal in appearance. Bowel: Stomach: Unremarkable. Small Intestine: Unremarkable. Appendix: No inflammatory changes. Colon: Unremarkable. Vessels: Abdominal aorta and inferior vena cava are normal in course and caliber. Reproductive Organs: Uterus is apparently surgically absent and no pelvic mass can be seen Lymph Nodes: No pathologic mesenteric or retroperitoneal lymph nodes. Peritoneum: No free air,  free fluid, or fluid collection. Abdominal Wall: No hernia or mass. Musculoskeletal: No acute abnormality or suspicious bony lesion. There is considerable compression of the L1 vertebral body which appears chronic. There is some retropulsion of bone. Compression of the superior endplate of T12 also appears to be chronic. There are degenerative disc changes at T12-L1 to L2-L3. No aggressive skeletal lesions can be seen. IMPRESSION: Dilation of the gallbladder and dilation of the common bile duct. There may be pericholecystic fluid. No gallstones can be seen but changes may represent  noncalculous acute cholecystitis. Small bilateral renal cysts. This exam was performed according to our departmental dose-optimization program which includes automated exposure control, adjustment of the mA and/or kV according to patient size and/or use of iterative reconstruction technique. Electronically signed by:  Marcela Escalante Jr., MD  7/30/2023 9:04 PM CDT Workstation: [id number]

## 2023-08-02 NOTE — TREATMENT PLAN
AES Follow-up Note     Kenyetta Andersen was seen and examined.   1 Day Post-Op s/p ERCP   No abdominal discomfort. Tolerating diet.    Vitals stable. Afebrile.     Lab Results   Component Value Date    ALT 62 (H) 08/02/2023    AST 66 (H) 08/02/2023    ALKPHOS 105 08/02/2023    BILITOT 1.2 (H) 08/02/2023    BILITOT 2.0 (H) 08/01/2023    BILITOT 2.8 (H) 07/31/2023       No sx/sx of post-ERCP pancreatitis or other procedural complications.     Please call if any questions/concerns.  Patient will be contacted with follow-up information.     Nathaniel Pro MD  Gastroenterology & Hepatology Fellow

## 2023-08-02 NOTE — PLAN OF CARE
Problem: Adult Inpatient Plan of Care  Goal: Optimal Comfort and Wellbeing  Outcome: Ongoing, Progressing     Problem: Infection  Goal: Absence of Infection Signs and Symptoms  Outcome: Ongoing, Progressing     Problem: Fall Injury Risk  Goal: Absence of Fall and Fall-Related Injury  Outcome: Ongoing, Progressing     Problem: Skin Injury Risk Increased  Goal: Skin Health and Integrity  Outcome: Ongoing, Progressing     Problem: Adult Inpatient Plan of Care  Goal: Plan of Care Review  Outcome: Ongoing, Progressing     Problem: Adult Inpatient Plan of Care  Goal: Readiness for Transition of Care  Outcome: Ongoing, Progressing

## 2023-08-02 NOTE — ASSESSMENT & PLAN NOTE
-BCX 7/20 w/ E coli, Klebsiella pneumoniae, and Bacteriodes fragilis.   -Repeat blood cultures drawn 7/31 NGTD.  -[facility]  -ID recommendations from 7/31/23 reviewed   -Zosyn ordered (4.5g q8h per pharmacy recs)  -Follow sensitivities

## 2023-08-02 NOTE — SUBJECTIVE & OBJECTIVE
No current facility-administered medications on file prior to encounter.     Current Outpatient Medications on File Prior to Encounter   Medication Sig    acetaminophen (TYLENOL) 650 MG TbSR Take 650 mg by mouth every 8 (eight) hours as needed (pain).    alendronate (FOSAMAX) 70 MG tablet TAKE 1 TABLET (70 MG TOTAL) BY MOUTH EVERY 7 DAYS. ON EMPTY STOMACH IN MORNING, REMAIN UPRIGHT FOR 30 MINUTES.    ascorbic acid, vitamin C, (VITAMIN C) 500 MG tablet Take 500 mg by mouth once daily.    b complex vitamins capsule Take 1 capsule by mouth once daily.    brimonidine 0.2% (ALPHAGAN) 0.2 % Drop INSTILL 1 DROP INTO BOTH EYES 3 TIMES A DAY (Patient taking differently: Place 1 drop into both eyes 3 (three) times daily.)    calcium carbonate-magnesium hydroxide (ROLAIDS) 550-110 mg Chew Take 1 tablet by mouth 2 (two) times daily as needed (heartburn).    dorzolamide-timolol 2-0.5% (COSOPT) 22.3-6.8 mg/mL ophthalmic solution INSTILL 1 DROP INTO BOTH EYES TWICE A DAY    gabapentin (NEURONTIN) 300 MG capsule TAKE 1 CAP IN THE AM, 1 CAP MIDDAY, AND 2 CAPS AT NIGHT    mirtazapine (REMERON) 7.5 MG Tab TAKE 1 TABLET BY MOUTH EVERY EVENING.    omeprazole (PRILOSEC) 40 MG capsule Take 1 capsule (40 mg total) by mouth 2 (two) times daily before meals for 60 days, THEN 1 capsule (40 mg total) every morning. (Patient taking differently: Take 1 capsule (40 mg total) by mouth daily before meals )    potassium chloride SA (K-DUR,KLOR-CON) 20 MEQ tablet TAKE 1 TABLET BY MOUTH ONCE DAILY (Patient taking differently: Take 20 mEq by mouth once daily.)    [DISCONTINUED] nicotine, polacrilex, (NICORETTE) 2 mg Gum Take 1 each (2 mg total) by mouth every hour as needed (smoking cessation).       Review of patient's allergies indicates:   Allergen Reactions    Ace inhibitors Edema     Other reaction(s): Angioedema    Codeine Hives       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    Anxiety     Arthritis     Escalante  "esophagus     Blood transfusion     Cataract     Done OU    Colon cancer 2009    Colon polyps 3/14/2017    GERD (gastroesophageal reflux disease)     Glaucoma 2/24/2012    Nuclear sclerosis 8/27/2012    Osteoporosis     Primary hypothyroidism 2/23/2020    Pseudophakia - Left Eye 2/24/2012     Past Surgical History:   Procedure Laterality Date    APPENDECTOMY      CATARACT EXTRACTION W/  INTRAOCULAR LENS IMPLANT Left     CATARACT EXTRACTION W/  INTRAOCULAR LENS IMPLANT Right     Dr Sawant    COLON SURGERY      resection    COLONOSCOPY N/A 3/14/2017    Procedure: COLONOSCOPY;  Surgeon: Killian Guerrier MD;  Location: [facility];  Service: Endoscopy;  Laterality: N/A;    COLONOSCOPY  03/14/2017    Dr. Guerrier: hemorrhoids, one colon polyp removed, "Patent functional end-to-end ileo-colonic anastomosis"with healthy mucosa; biopsy: hyperplastic polyp; repeat in 3 years for surveillance    COLONOSCOPY N/A 1/21/2020    Procedure: COLONOSCOPY;  Surgeon: Timo Darling MD;  Location: [facility];  Service: Endoscopy;  Laterality: N/A;    COLONOSCOPY N/A 7/8/2022    Procedure: COLONOSCOPY;  Surgeon: Anaid Washington MD;  Location: [facility];  Service: Endoscopy;  Laterality: N/A;    ECTOPIC PREGNANCY SURGERY      ERCP N/A 8/1/2023    Procedure: ERCP (ENDOSCOPIC RETROGRADE CHOLANGIOPANCREATOGRAPHY);  Surgeon: Олег Arreaga MD;  Location: [facility] ([address] [place] [address]);  Service: Endoscopy;  Laterality: N/A;    ERCP N/A 7/31/2023    Procedure: ERCP (ENDOSCOPIC RETROGRADE CHOLANGIOPANCREATOGRAPHY);  Surgeon: Nasim Cuadra MD;  Location: [facility];  Service: Endoscopy;  Laterality: N/A;    ESOPHAGOGASTRODUODENOSCOPY N/A 5/16/2019    Procedure: EGD (ESOPHAGOGASTRODUODENOSCOPY);  Surgeon: Anaid Washington MD;  Location: [facility];  Service: Endoscopy;  Laterality: N/A;    ESOPHAGOGASTRODUODENOSCOPY N/A 7/11/2019    Procedure: EGD (ESOPHAGOGASTRODUODENOSCOPY);  Surgeon: Anaid Washington MD;  Location: [facility];  Service: " Endoscopy;  Laterality: N/A;    ESOPHAGOGASTRODUODENOSCOPY N/A 2021    Procedure: EGD (ESOPHAGOGASTRODUODENOSCOPY);  Surgeon: Anaid Washington MD;  Location: [facility] END[facility];  Service: Endoscopy;  Laterality: N/A;    ESOPHAGOGASTRODUODENOSCOPY N/A 2021    Procedure: EGD (ESOPHAGOGASTRODUODENOSCOPY);  Surgeon: Anaid Washington MD;  Location: [facility] END[facility];  Service: Endoscopy;  Laterality: N/A;    ESOPHAGOGASTRODUODENOSCOPY N/A 2022    Procedure: EGD (ESOPHAGOGASTRODUODENOSCOPY);  Surgeon: Anaid Washington MD;  Location: [facility];  Service: Endoscopy;  Laterality: N/A;    ESOPHAGOGASTRODUODENOSCOPY N/A 2023    Procedure: EGD (ESOPHAGOGASTRODUODENOSCOPY);  Surgeon: Anaid Washington MD;  Location: [facility] END[facility];  Service: Endoscopy;  Laterality: N/A;    EYE SURGERY      bilateral cataracts    HYSTERECTOMY      complete    JOINT REPLACEMENT      Liban Knee    ROTATOR CUFF REPAIR Right     TOE SURGERY      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    UPPER GASTROINTESTINAL ENDOSCOPY  2017    Dr. Guerrier: gastritis and esophagitis, biopsy: chronic gastritis, negative for h pylori, esophageal- positive eosinophils, negative for barretts; repeat in 2 months    UPPER GASTROINTESTINAL ENDOSCOPY  2017    Dr. Guerrier     Family History       Problem Relation (Age of Onset)    Arthritis Sister    Heart disease Mother, Father, Brother    No Known Problems Brother, Brother    Parkinsonism Sister          Tobacco Use    Smoking status: Every Day     Current packs/day: 0.00     Average packs/day: 0.3 packs/day for 65.0 years (21.5 ttl pk-yrs)     Types: Cigarettes     Start date: 10/5/1955     Last attempt to quit: 10/5/2020     Years since quittin.8    Smokeless tobacco: Never    Tobacco comments:     3/1/23--states smokes 1-2 cigarettes a day   Substance and Sexual Activity    Alcohol use: Yes     Alcohol/week: 2.0 standard drinks of alcohol     Types: 2 Shots of liquor per week     Comment: Daily,  about 4 drinks per day (crown)    Drug use: No    Sexual activity: Not Currently     Review of Systems   Constitutional:  Negative for chills and fever.   HENT:  Negative for trouble swallowing.    Eyes:  Negative for visual disturbance.   Respiratory:  Positive for cough. Negative for shortness of breath.    Cardiovascular:  Negative for chest pain and leg swelling.   Gastrointestinal:  Positive for abdominal pain. Negative for constipation, diarrhea, nausea and vomiting.   Genitourinary:  Negative for difficulty urinating, dysuria and flank pain.   Musculoskeletal:  Negative for back pain.   Skin:  Negative for color change and rash.   Neurological:  Negative for weakness and light-headedness.   Psychiatric/Behavioral:  Negative for confusion. The patient is not nervous/anxious.      Objective:     Vital Signs (Most Recent):  Temp: 97.7 °F (36.5 °C) (08/02/23 0807)  Pulse: 85 (08/02/23 1006)  Resp: 18 (08/02/23 0807)  BP: 134/60 (08/02/23 0807)  SpO2: (!) 92 % (08/02/23 1006) Vital Signs (24h Range):  Temp:  [97.3 °F (36.3 °C)-97.9 °F (36.6 °C)] 97.7 °F (36.5 °C)  Pulse:  [] 85  Resp:  [10-21] 18  SpO2:  [80 %-99 %] 92 %  BP: ()/(51-97) 134/60     Weight: 68 kg (149 lb 14.6 oz)  Body mass index is 26.56 kg/m².     Physical Exam  Vitals and nursing note reviewed.   Constitutional:       General: She is not in acute distress.     Appearance: She is ill-appearing. She is not toxic-appearing.   Eyes:      General: No scleral icterus.     Extraocular Movements: Extraocular movements intact.   Cardiovascular:      Rate and Rhythm: Normal rate and regular rhythm.   Pulmonary:      Effort: Pulmonary effort is normal. No respiratory distress.   Abdominal:      General: There is no distension.      Palpations: Abdomen is soft.      Tenderness: There is abdominal tenderness (RUQ). There is no guarding.   Musculoskeletal:         General: No deformity. Normal range of motion.   Skin:     General: Skin is warm and  dry.      Coloration: Skin is not jaundiced or pale.   Neurological:      General: No focal deficit present.      Mental Status: She is alert.      Cranial Nerves: No cranial nerve deficit.   Psychiatric:         Mood and Affect: Mood normal.         Behavior: Behavior normal.            I have reviewed all pertinent lab results within the past 24 hours.  CBC:   Recent Labs   Lab 08/02/23  0550   WBC 19.40*   RBC 3.75*   HGB 12.0   HCT 35.8*   PLT 60*   MCV 96   MCH 32.0*   MCHC 33.5     CMP:   Recent Labs   Lab 08/02/23  0550   GLU 45*   CALCIUM 8.0*   ALBUMIN 1.8*   PROT 4.9*      K 3.9   CO2 15*   *   BUN 27*   CREATININE 1.1   ALKPHOS 105   ALT 62*   AST 66*   BILITOT 1.2*       Significant Diagnostics:  I have reviewed all pertinent imaging results/findings within the past 24 hours.

## 2023-08-02 NOTE — ASSESSMENT & PLAN NOTE
88F presenting at transfer from [facility] for ERCP in setting of cholangitis.    -Continue fluids, NPO  -IV zosyn ordered to be continued   -Monitor daily CBC, CMP  -Pain control  -AES consulted, appreciate recommendations      ERCP on 8/1 - prior sphincterectomy stenosis and biliary stent placed.   -General surgery consulted for possible cholecystectomy

## 2023-08-03 ENCOUNTER — TELEPHONE (OUTPATIENT)
Dept: ENDOSCOPY | Facility: HOSPITAL | Age: 88
End: 2023-08-03

## 2023-08-03 DIAGNOSIS — K83.1 BILIARY STRICTURE: Primary | ICD-10-CM

## 2023-08-03 LAB
ALBUMIN SERPL BCP-MCNC: 1.8 G/DL (ref 3.5–5.2)
ALP SERPL-CCNC: 217 U/L (ref 55–135)
ALT SERPL W/O P-5'-P-CCNC: 47 U/L (ref 10–44)
ANION GAP SERPL CALC-SCNC: 9 MMOL/L (ref 8–16)
AST SERPL-CCNC: 36 U/L (ref 10–40)
BASOPHILS # BLD AUTO: 0.07 K/UL (ref 0–0.2)
BASOPHILS NFR BLD: 0.4 % (ref 0–1.9)
BILIRUB SERPL-MCNC: 1.1 MG/DL (ref 0.1–1)
BUN SERPL-MCNC: 21 MG/DL (ref 8–23)
CALCIUM SERPL-MCNC: 8.5 MG/DL (ref 8.7–10.5)
CHLORIDE SERPL-SCNC: 112 MMOL/L (ref 95–110)
CO2 SERPL-SCNC: 19 MMOL/L (ref 23–29)
CREAT SERPL-MCNC: 1.2 MG/DL (ref 0.5–1.4)
DIFFERENTIAL METHOD: ABNORMAL
EOSINOPHIL # BLD AUTO: 0.2 K/UL (ref 0–0.5)
EOSINOPHIL NFR BLD: 1.4 % (ref 0–8)
ERYTHROCYTE [DISTWIDTH] IN BLOOD BY AUTOMATED COUNT: 14.6 % (ref 11.5–14.5)
EST. GFR  (NO RACE VARIABLE): 44.1 ML/MIN/1.73 M^2
GLUCOSE SERPL-MCNC: 59 MG/DL (ref 70–110)
HCT VFR BLD AUTO: 36.6 % (ref 37–48.5)
HGB BLD-MCNC: 12.6 G/DL (ref 12–16)
IMM GRANULOCYTES # BLD AUTO: 0.3 K/UL (ref 0–0.04)
IMM GRANULOCYTES NFR BLD AUTO: 1.8 % (ref 0–0.5)
LYMPHOCYTES # BLD AUTO: 1.3 K/UL (ref 1–4.8)
LYMPHOCYTES NFR BLD: 7.6 % (ref 18–48)
MAGNESIUM SERPL-MCNC: 2.1 MG/DL (ref 1.6–2.6)
MCH RBC QN AUTO: 32 PG (ref 27–31)
MCHC RBC AUTO-ENTMCNC: 34.4 G/DL (ref 32–36)
MCV RBC AUTO: 93 FL (ref 82–98)
MONOCYTES # BLD AUTO: 1.1 K/UL (ref 0.3–1)
MONOCYTES NFR BLD: 6.5 % (ref 4–15)
NEUTROPHILS # BLD AUTO: 14 K/UL (ref 1.8–7.7)
NEUTROPHILS NFR BLD: 82.3 % (ref 38–73)
NRBC BLD-RTO: 0 /100 WBC
PHOSPHATE SERPL-MCNC: 1.7 MG/DL (ref 2.7–4.5)
PLATELET # BLD AUTO: 57 K/UL (ref 150–450)
PLATELET BLD QL SMEAR: ABNORMAL
PMV BLD AUTO: 12.7 FL (ref 9.2–12.9)
POCT GLUCOSE: 71 MG/DL (ref 70–110)
POCT GLUCOSE: 90 MG/DL (ref 70–110)
POTASSIUM SERPL-SCNC: 3.4 MMOL/L (ref 3.5–5.1)
PROT SERPL-MCNC: 5.1 G/DL (ref 6–8.4)
RBC # BLD AUTO: 3.94 M/UL (ref 4–5.4)
SODIUM SERPL-SCNC: 140 MMOL/L (ref 136–145)
WBC # BLD AUTO: 17.05 K/UL (ref 3.9–12.7)

## 2023-08-03 PROCEDURE — 85025 COMPLETE CBC W/AUTO DIFF WBC: CPT

## 2023-08-03 PROCEDURE — 25000003 PHARM REV CODE 250

## 2023-08-03 PROCEDURE — 99232 PR SUBSEQUENT HOSPITAL CARE,LEVL II: ICD-10-PCS | Mod: ,,, | Performed by: STUDENT IN AN ORGANIZED HEALTH CARE EDUCATION/TRAINING PROGRAM

## 2023-08-03 PROCEDURE — 25000003 PHARM REV CODE 250: Performed by: STUDENT IN AN ORGANIZED HEALTH CARE EDUCATION/TRAINING PROGRAM

## 2023-08-03 PROCEDURE — 51798 US URINE CAPACITY MEASURE: CPT

## 2023-08-03 PROCEDURE — 80053 COMPREHEN METABOLIC PANEL: CPT

## 2023-08-03 PROCEDURE — 84100 ASSAY OF PHOSPHORUS: CPT

## 2023-08-03 PROCEDURE — 63600175 PHARM REV CODE 636 W HCPCS: Performed by: HOSPITALIST

## 2023-08-03 PROCEDURE — 20600001 HC STEP DOWN PRIVATE ROOM

## 2023-08-03 PROCEDURE — 36415 COLL VENOUS BLD VENIPUNCTURE: CPT

## 2023-08-03 PROCEDURE — 25000003 PHARM REV CODE 250: Performed by: HOSPITALIST

## 2023-08-03 PROCEDURE — 99232 SBSQ HOSP IP/OBS MODERATE 35: CPT | Mod: ,,, | Performed by: STUDENT IN AN ORGANIZED HEALTH CARE EDUCATION/TRAINING PROGRAM

## 2023-08-03 PROCEDURE — 83735 ASSAY OF MAGNESIUM: CPT

## 2023-08-03 PROCEDURE — 99233 PR SUBSEQUENT HOSPITAL CARE,LEVL III: ICD-10-PCS | Mod: ,,, | Performed by: INTERNAL MEDICINE

## 2023-08-03 PROCEDURE — 99233 SBSQ HOSP IP/OBS HIGH 50: CPT | Mod: ,,, | Performed by: INTERNAL MEDICINE

## 2023-08-03 PROCEDURE — 63600175 PHARM REV CODE 636 W HCPCS: Performed by: INTERNAL MEDICINE

## 2023-08-03 PROCEDURE — 63600175 PHARM REV CODE 636 W HCPCS

## 2023-08-03 RX ORDER — SODIUM CHLORIDE, SODIUM LACTATE, POTASSIUM CHLORIDE, CALCIUM CHLORIDE 600; 310; 30; 20 MG/100ML; MG/100ML; MG/100ML; MG/100ML
INJECTION, SOLUTION INTRAVENOUS CONTINUOUS
Status: ACTIVE | OUTPATIENT
Start: 2023-08-03 | End: 2023-08-03

## 2023-08-03 RX ORDER — POTASSIUM CHLORIDE 20 MEQ/1
40 TABLET, EXTENDED RELEASE ORAL ONCE
Status: COMPLETED | OUTPATIENT
Start: 2023-08-03 | End: 2023-08-03

## 2023-08-03 RX ADMIN — PIPERACILLIN SODIUM AND TAZOBACTAM SODIUM 4.5 G: 4; .5 INJECTION, POWDER, FOR SOLUTION INTRAVENOUS at 12:08

## 2023-08-03 RX ADMIN — DORZOLAMIDE 1 DROP: 20 SOLUTION/ DROPS OPHTHALMIC at 08:08

## 2023-08-03 RX ADMIN — PANTOPRAZOLE SODIUM 40 MG: 40 TABLET, DELAYED RELEASE ORAL at 08:08

## 2023-08-03 RX ADMIN — HEPARIN SODIUM 5000 UNITS: 5000 INJECTION INTRAVENOUS; SUBCUTANEOUS at 09:08

## 2023-08-03 RX ADMIN — TIMOLOL MALEATE 1 DROP: 5 SOLUTION OPHTHALMIC at 09:08

## 2023-08-03 RX ADMIN — TIMOLOL MALEATE 1 DROP: 5 SOLUTION OPHTHALMIC at 08:08

## 2023-08-03 RX ADMIN — BRIMONIDINE TARTRATE 1 DROP: 1.5 SOLUTION/ DROPS OPHTHALMIC at 02:08

## 2023-08-03 RX ADMIN — PIPERACILLIN SODIUM AND TAZOBACTAM SODIUM 4.5 G: 4; .5 INJECTION, POWDER, FOR SOLUTION INTRAVENOUS at 04:08

## 2023-08-03 RX ADMIN — SODIUM CHLORIDE, POTASSIUM CHLORIDE, SODIUM LACTATE AND CALCIUM CHLORIDE: 600; 310; 30; 20 INJECTION, SOLUTION INTRAVENOUS at 04:08

## 2023-08-03 RX ADMIN — SENNOSIDES AND DOCUSATE SODIUM 1 TABLET: 50; 8.6 TABLET ORAL at 08:08

## 2023-08-03 RX ADMIN — PIPERACILLIN SODIUM AND TAZOBACTAM SODIUM 4.5 G: 4; .5 INJECTION, POWDER, FOR SOLUTION INTRAVENOUS at 09:08

## 2023-08-03 RX ADMIN — HEPARIN SODIUM 5000 UNITS: 5000 INJECTION INTRAVENOUS; SUBCUTANEOUS at 02:08

## 2023-08-03 RX ADMIN — MIRTAZAPINE 7.5 MG: 7.5 TABLET, FILM COATED ORAL at 09:08

## 2023-08-03 RX ADMIN — POTASSIUM CHLORIDE 40 MEQ: 1500 TABLET, EXTENDED RELEASE ORAL at 02:08

## 2023-08-03 RX ADMIN — DORZOLAMIDE 1 DROP: 20 SOLUTION/ DROPS OPHTHALMIC at 09:08

## 2023-08-03 RX ADMIN — BRIMONIDINE TARTRATE 1 DROP: 1.5 SOLUTION/ DROPS OPHTHALMIC at 09:08

## 2023-08-03 RX ADMIN — HEPARIN SODIUM 5000 UNITS: 5000 INJECTION INTRAVENOUS; SUBCUTANEOUS at 04:08

## 2023-08-03 RX ADMIN — POLYETHYLENE GLYCOL 3350 17 G: 17 POWDER, FOR SOLUTION ORAL at 08:08

## 2023-08-03 RX ADMIN — BRIMONIDINE TARTRATE 1 DROP: 1.5 SOLUTION/ DROPS OPHTHALMIC at 04:08

## 2023-08-03 NOTE — PLAN OF CARE
Pt. A&Ox4, but drowsy and somewhat impulsive. Very poor appetite, given Boosts found in the fridge, still not interested. May need Boosts added to meals. 1BM, out of bed to bedside commode. LR running @50ml/hr for hydration and low urine output, will finish out 1000ml bag and reassess at that time.

## 2023-08-03 NOTE — ASSESSMENT & PLAN NOTE
88F presenting at transfer from [facility] for ERCP in setting of cholangitis.    -Gentle IV fluids. Tolerating diet.  -S/p ERCP by [facility] on 8/1 - prior sphincterectomy stenosis and biliary stent placed.  -General surgery consulted for possible cholecystectomy. Per gen surg, given her medical history and overall clinical picture, do not think she would have much additional benefit from a cholecystectomy as cholangitis likely secondary to strictured sphincter and not true choledocholithiasis. There is a chance she could have recurrent cholangitis, but the risk of this is extremely low. Will defer surgical intervention at this time.   -IV zosyn. ID following.  -Monitor daily CBC, CMP  -Pain control

## 2023-08-03 NOTE — PLAN OF CARE
.[facility] Plan of Care Note  Dx: Cholangitis, Inflammation of bile duct system    Shift Events: POC reviewed. AAOx4, VSS on RA with no complaints of SOB, headaches, or dizziness. Urine output per external urethral catheter to continuous low wall suction canister, minimal, 125 mL. Team informed of low urine oautput, order placed for continuous IV fluids, LR @ 40 mL/hr. IV Piperacillin tolerated. Dysphagia Mechanical Soft diet, no intake during evening shift. No complaints of nausea or vomiting. One BM to bedside commode, creamy brown output, moderate in size. Denies pain, controlled with prn pain medications. Blood glucose monitored ACHS before meals and at bedtime, no insulin coverage required. Resting with side rails up and call light within reach. Continuing to monitor patient status.      Goals of Care: Patient comfort, IV antibiotics - piperacillin to address abnormal blood cultures, awaiting final lab results. Monitor and address low urine output. Assist patient to bedside commode for bowel output.    Neuro: AAOx4    Vital Signs: VSS on RA    Respiratory: WDL    Diet: Dysphagia Mechanical Soft Diet, no intake during evening shift.    Is patient tolerating current diet? Yes    GTTS: Continuous IV fluids, LR @ 50 mL/hr    Urine Output/Bowel Movement: Urine output per external urethral catheter to low continuous wall suction canister, minimal, 125 mL. One BM to bedside commode, creamy brown output, moderate in size.    Drains/Tubes/Tube Feeds (include total output/shift): None    Lines: Left upper arm ML (saline locked)    Accuchecks:Blood glucose monitored ACHS, before meals and at bedtime. No insulin coverage required.      Skin: Intact, clean, dry    Fall Risk Score: 9    Activity level? X1 Assist    Any scheduled procedures? No    Any safety concerns? Falls Risk    Other:

## 2023-08-03 NOTE — ASSESSMENT & PLAN NOTE
-BCX 7/20 w/ E coli, Klebsiella pneumoniae, and Bacteriodes fragilis.   -Repeat blood cultures drawn 7/31 NGTD.  -S/p vancomycin and zosyn at [facility]  -ID consulted. Rec Zosyn. May switch to PO cipro/flagyl pending blood cx results.   -Follow sensitivities

## 2023-08-03 NOTE — ASSESSMENT & PLAN NOTE
Dangers of cigarette smoking were reviewed with patient in detail. Patient was Counseled for 3-10 minutes. Nicotine replacement options were discussed. Nicotine replacement was discussed- not prescribed per patient's request   DC instructions

## 2023-08-03 NOTE — SUBJECTIVE & OBJECTIVE
"Interval history: NAEO. Tolerating abx. eating    Past Surgical History:   Procedure Laterality Date    APPENDECTOMY      CATARACT EXTRACTION W/  INTRAOCULAR LENS IMPLANT Left     CATARACT EXTRACTION W/  INTRAOCULAR LENS IMPLANT Right     Dr Sawant    COLON SURGERY      resection    COLONOSCOPY N/A 3/14/2017    Procedure: COLONOSCOPY;  Surgeon: Killian Guerrier MD;  Location: [facility] ENDO;  Service: Endoscopy;  Laterality: N/A;    COLONOSCOPY  03/14/2017    Dr. Guerrier: hemorrhoids, one colon polyp removed, "Patent functional end-to-end ileo-colonic anastomosis"with healthy mucosa; biopsy: hyperplastic polyp; repeat in 3 years for surveillance    COLONOSCOPY N/A 1/21/2020    Procedure: COLONOSCOPY;  Surgeon: Timo Darling MD;  Location: [facility] ENDO;  Service: Endoscopy;  Laterality: N/A;    COLONOSCOPY N/A 7/8/2022    Procedure: COLONOSCOPY;  Surgeon: Anaid Washington MD;  Location: [facility];  Service: Endoscopy;  Laterality: N/A;    ECTOPIC PREGNANCY SURGERY      ERCP N/A 7/31/2023    Procedure: ERCP (ENDOSCOPIC RETROGRADE CHOLANGIOPANCREATOGRAPHY);  Surgeon: Nasim Cuadra MD;  Location: [facility];  Service: Endoscopy;  Laterality: N/A;    ERCP N/A 8/1/2023    Procedure: ERCP (ENDOSCOPIC RETROGRADE CHOLANGIOPANCREATOGRAPHY);  Surgeon: Олег Arreaga MD;  Location: [facility] ([address] [place] [address]);  Service: Endoscopy;  Laterality: N/A;    ESOPHAGOGASTRODUODENOSCOPY N/A 5/16/2019    Procedure: EGD (ESOPHAGOGASTRODUODENOSCOPY);  Surgeon: Anaid Washington MD;  Location: [facility];  Service: Endoscopy;  Laterality: N/A;    ESOPHAGOGASTRODUODENOSCOPY N/A 7/11/2019    Procedure: EGD (ESOPHAGOGASTRODUODENOSCOPY);  Surgeon: Anaid Washington MD;  Location: [facility] ENDO;  Service: Endoscopy;  Laterality: N/A;    ESOPHAGOGASTRODUODENOSCOPY N/A 2/5/2021    Procedure: EGD (ESOPHAGOGASTRODUODENOSCOPY);  Surgeon: Anaid Washington MD;  Location: [facility];  Service: Endoscopy;  Laterality: N/A;    ESOPHAGOGASTRODUODENOSCOPY N/A " 11/11/2021    Procedure: EGD (ESOPHAGOGASTRODUODENOSCOPY);  Surgeon: Anaid Washington MD;  Location: [facility] ENDO;  Service: Endoscopy;  Laterality: N/A;    ESOPHAGOGASTRODUODENOSCOPY N/A 7/8/2022    Procedure: EGD (ESOPHAGOGASTRODUODENOSCOPY);  Surgeon: Anaid Washington MD;  Location: [facility] ENDO;  Service: Endoscopy;  Laterality: N/A;    ESOPHAGOGASTRODUODENOSCOPY N/A 4/12/2023    Procedure: EGD (ESOPHAGOGASTRODUODENOSCOPY);  Surgeon: Anaid Washington MD;  Location: [facility] ENDO;  Service: Endoscopy;  Laterality: N/A;    EYE SURGERY      bilateral cataracts    HYSTERECTOMY      complete    JOINT REPLACEMENT      Liban Knee    ROTATOR CUFF REPAIR Right     TOE SURGERY      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    UPPER GASTROINTESTINAL ENDOSCOPY  06/12/2017    Dr. Guerrier: gastritis and esophagitis, biopsy: chronic gastritis, negative for h pylori, esophageal- positive eosinophils, negative for barretts; repeat in 2 months    UPPER GASTROINTESTINAL ENDOSCOPY  07/27/2017    Dr. Guerrier       Review of patient's allergies indicates:   Allergen Reactions    Ace inhibitors Edema     Other reaction(s): Angioedema    Codeine Hives       No current facility-administered medications on file prior to encounter.     Current Outpatient Medications on File Prior to Encounter   Medication Sig    acetaminophen (TYLENOL) 650 MG TbSR Take 650 mg by mouth every 8 (eight) hours as needed (pain).    alendronate (FOSAMAX) 70 MG tablet TAKE 1 TABLET (70 MG TOTAL) BY MOUTH EVERY 7 DAYS. ON EMPTY STOMACH IN MORNING, REMAIN UPRIGHT FOR 30 MINUTES.    ascorbic acid, vitamin C, (VITAMIN C) 500 MG tablet Take 500 mg by mouth once daily.    b complex vitamins capsule Take 1 capsule by mouth once daily.    brimonidine 0.2% (ALPHAGAN) 0.2 % Drop INSTILL 1 DROP INTO BOTH EYES 3 TIMES A DAY (Patient taking differently: Place 1 drop into both eyes 3 (three) times daily.)    calcium carbonate-magnesium hydroxide (ROLAIDS) 550-110 mg Chew Take 1  tablet by mouth 2 (two) times daily as needed (heartburn).    dorzolamide-timolol 2-0.5% (COSOPT) 22.3-6.8 mg/mL ophthalmic solution INSTILL 1 DROP INTO BOTH EYES TWICE A DAY    gabapentin (NEURONTIN) 300 MG capsule TAKE 1 CAP IN THE AM, 1 CAP MIDDAY, AND 2 CAPS AT NIGHT    mirtazapine (REMERON) 7.5 MG Tab TAKE 1 TABLET BY MOUTH EVERY EVENING.    omeprazole (PRILOSEC) 40 MG capsule Take 1 capsule (40 mg total) by mouth 2 (two) times daily before meals for 60 days, THEN 1 capsule (40 mg total) every morning. (Patient taking differently: Take 1 capsule (40 mg total) by mouth daily before meals )    potassium chloride SA (K-DUR,KLOR-CON) 20 MEQ tablet TAKE 1 TABLET BY MOUTH ONCE DAILY (Patient taking differently: Take 20 mEq by mouth once daily.)     Family History       Problem Relation (Age of Onset)    Arthritis Sister    Heart disease Mother, Father, Brother    No Known Problems Brother, Brother    Parkinsonism Sister          Tobacco Use    Smoking status: Every Day     Current packs/day: 0.00     Average packs/day: 0.3 packs/day for 65.0 years (21.5 ttl pk-yrs)     Types: Cigarettes     Start date: 10/5/1955     Last attempt to quit: 10/5/2020     Years since quittin.8    Smokeless tobacco: Never    Tobacco comments:     3/1/23--states smokes 1-2 cigarettes a day   Substance and Sexual Activity    Alcohol use: Yes     Alcohol/week: 2.0 standard drinks of alcohol     Types: 2 Shots of liquor per week     Comment: Daily, about 4 drinks per day (crown)    Drug use: No    Sexual activity: Not Currently     Review of Systems   Constitutional:  Positive for chills. Negative for fever.   HENT:  Negative for congestion.    Respiratory:  Positive for shortness of breath.    Cardiovascular:  Positive for chest pain and leg swelling (intermittent for years).   Gastrointestinal:  Positive for abdominal pain, nausea and vomiting.   Genitourinary:  Negative for dysuria and urgency.   Musculoskeletal:  Positive for back  pain.   Skin:  Negative for wound.   Allergic/Immunologic: Negative for immunocompromised state.   Neurological:  Negative for seizures.   Psychiatric/Behavioral:          Mildly confused after morphine     Objective:     Vital Signs (Most Recent):  Temp: 98.1 °F (36.7 °C) (08/03/23 1108)  Pulse: 79 (08/03/23 1108)  Resp: 20 (08/03/23 1108)  BP: (!) 146/65 (08/03/23 1108)  SpO2: (!) 90 % (08/03/23 1108) Vital Signs (24h Range):  Temp:  [97.8 °F (36.6 °C)-98.7 °F (37.1 °C)] 98.1 °F (36.7 °C)  Pulse:  [79-87] 79  Resp:  [16-20] 20  SpO2:  [90 %-94 %] 90 %  BP: (118-152)/(56-87) 146/65     Weight: 68 kg (149 lb 14.6 oz)  Body mass index is 26.56 kg/m².     Physical Exam  Vitals and nursing note reviewed.   Constitutional:       Appearance: Normal appearance. She is not ill-appearing.      Comments: Elderly female, frail, ill appearing, shaking, under the blankets   HENT:      Head: Normocephalic and atraumatic.      Mouth/Throat:      Mouth: Mucous membranes are moist.   Eyes:      General:         Right eye: No discharge.         Left eye: No discharge.      Comments: Wearing corrective lenses, senile arcus   Cardiovascular:      Rate and Rhythm: Regular rhythm. Tachycardia present.      Heart sounds: No murmur heard.     Comments: Warm peripheries, mild pedal edema  Pulmonary:      Effort: Pulmonary effort is normal. No respiratory distress.      Breath sounds: Normal breath sounds.   Abdominal:      General: Abdomen is flat. Bowel sounds are normal. There is no distension.      Palpations: Abdomen is soft.   Genitourinary:     Comments: No martines  Musculoskeletal:         General: No swelling.   Skin:     General: Skin is warm.   Neurological:      General: No focal deficit present.      Mental Status: She is alert and oriented to person, place, and time.   Psychiatric:      Comments: cooperative                Significant Labs: BMP:   Recent Labs   Lab 08/03/23  0549   GLU 59*      K 3.4*   *   CO2 19*  "  BUN 21   CREATININE 1.2   CALCIUM 8.5*   MG 2.1       CBC:   Recent Labs   Lab 08/02/23  0550 08/03/23  0549   WBC 19.40* 17.05*   HGB 12.0 12.6   HCT 35.8* 36.6*   PLT 60* 57*       CMP:   Recent Labs   Lab 08/02/23  0550 08/03/23  0549    140   K 3.9 3.4*   * 112*   CO2 15* 19*   GLU 45* 59*   BUN 27* 21   CREATININE 1.1 1.2   CALCIUM 8.0* 8.5*   PROT 4.9* 5.1*   ALBUMIN 1.8* 1.8*   BILITOT 1.2* 1.1*   ALKPHOS 105 217*   AST 66* 36   ALT 62* 47*   ANIONGAP 11 9       Cardiac Markers: No results for input(s): "CKMB", "MYOGLOBIN", "BNP", "TROPISTAT" in the last 48 hours.  Coagulation: No results for input(s): "PT", "INR", "APTT" in the last 48 hours.  Magnesium:   Recent Labs   Lab 08/02/23  0550 08/03/23  0549   MG 2.0 2.1       POCT Glucose:   Recent Labs   Lab 08/02/23  2351 08/03/23  0216 08/03/23  0832   POCTGLUCOSE 71 90 71       Troponin:   No results for input(s): "TROPONINI", "TROPONINIHS" in the last 48 hours.    TSH:   Recent Labs   Lab 07/30/23  1844   TSH 0.490       Urine Culture: No results for input(s): "LABURIN" in the last 48 hours.  Urine Studies: No results for input(s): "COLORU", "APPEARANCEUA", "PHUR", "SPECGRAV", "PROTEINUA", "GLUCUA", "KETONESU", "BILIRUBINUA", "OCCULTUA", "NITRITE", "UROBILINOGEN", "LEUKOCYTESUR", "RBCUA", "WBCUA", "BACTERIA", "SQUAMEPITHEL", "HYALINECASTS" in the last 48 hours.    Invalid input(s): "WRIGHTSUR"    Significant Imaging: I have reviewed all pertinent imaging results/findings within the past 24 hours.    X-Ray Chest AP Portable    Result Date: 7/30/2023  EXAMINATION: XR CHEST AP PORTABLE CLINICAL INDICATION: Female, 88 years old. Chest COMPARISON: None. FINDINGS: The heart and great vessels appear normal. There are some atherosclerotic changes of aorta. Both lungs are well expanded and clear. No pleural effusion can be seen. No pneumothorax can be seen. IMPRESSION: No x-ray evidence of active or acute disease. Electronically signed by:  Marcela " Boris Madera MD  7/30/2023 9:22 PM CDT Workstation: [id number]    CT Abdomen Pelvis With Contrast    Result Date: 7/30/2023  EXAMINATION: CT ABDOMEN PELVIS WITH CONTRAST CLINICAL INDICATION: Female, 88 years old. Epigastric pain TECHNIQUE: Helical CT scan examination of the abdomen and pelvis is performed from the domes of the diaphragm to the pubic symphysis with the administration of intravenous contrast material. CONTRAST: 75 mL of Omnipaque 300 IV. COMPARISON: None FINDINGS: Lower Chest: Visualized lung bases are clear other than hypoaeration changes. Heart is normal in size. No pericardial or pleural effusion. Liver: Normal in size and contour. No focal lesion. Bile Ducts: Common bile duct is  dilated. Gallbladder: There is marked dilation of the gallbladder and there may be pericolic cystic fluid. No definite gallstones can be seen. Pancreas: Pancreas is atrophic but demonstrates no other abnormality. Spleen: Normal in size and contour. Adrenals: Normal configuration. Kidneys and Ureters: Normal size and contour. No hydronephrosis. There are bilateral renal cysts. Bladder: Normal in appearance. Bowel: Stomach: Unremarkable. Small Intestine: Unremarkable. Appendix: No inflammatory changes. Colon: Unremarkable. Vessels: Abdominal aorta and inferior vena cava are normal in course and caliber. Reproductive Organs: Uterus is apparently surgically absent and no pelvic mass can be seen Lymph Nodes: No pathologic mesenteric or retroperitoneal lymph nodes. Peritoneum: No free air, free fluid, or fluid collection. Abdominal Wall: No hernia or mass. Musculoskeletal: No acute abnormality or suspicious bony lesion. There is considerable compression of the L1 vertebral body which appears chronic. There is some retropulsion of bone. Compression of the superior endplate of T12 also appears to be chronic. There are degenerative disc changes at T12-L1 to L2-L3. No aggressive skeletal lesions can be seen. IMPRESSION: Dilation of  the gallbladder and dilation of the common bile duct. There may be pericholecystic fluid. No gallstones can be seen but changes may represent  noncalculous acute cholecystitis. Small bilateral renal cysts. This exam was performed according to our departmental dose-optimization program which includes automated exposure control, adjustment of the mA and/or kV according to patient size and/or use of iterative reconstruction technique. Electronically signed by:  Marcela Escalante Jr., MD  7/30/2023 9:04 PM CDT Workstation: [id number]

## 2023-08-03 NOTE — ASSESSMENT & PLAN NOTE
"88F with h/o GERD, colon cancer (Dx 2009; with lung nodule; adj folfox) s/p R hemicolectomy with end to side ileocolonic anastamosis admitted with abdominal pain, found to have cholangitis. BCx with pan-susceptible E.coli and enterococcus and another GNR (identification pending). bcx 8/2 ngtd. S/p ERCP. Clinically improving since ERCP. On zosyn. ID consulted for "GNR bacteremia"    Recommendations:   - continue zosyn. Will de-escalate pending culture results (still waiting on identification of GNR)  - needs 2d echo to evaluate for vegetations  - will need picc line and OPAT set up, but would wait for 8/2 cultures to be neg x 48h before placing picc    "

## 2023-08-03 NOTE — PROGRESS NOTES
[facility] Medicine  Progress Note    Patient Name: Kenyetta Andersen  MRN: [id number]  Patient Class: IP- Inpatient   Admission Date: 8/1/2023  Length of Stay: 2 days  Attending Physician: Fawad Wheat MD  Primary Care Provider: Hever Arreola MD        Subjective:     Principal Problem:Cholangitis        HPI:  Kenyetta Andersen is a 88 y.o. female with PMHx GERD c/b esophagitis, hypothyroid, colon cancer (Dx 2009) s/p R hemicolectomy with end to side ileocolonic anastamosis and FOLFOX chemotherapy who [place] [facility] [place] [facility] for ERCP.     Per  transfer note: presented with chest pain and abdominal pain as well as generalized weakness, she was found to have evidence of possible acalculus cholecystitis on presentation on CT scan.  She had elevated bilirubin and transaminitis, she was also diagnosed with cholangitis spiking fevers and found to have polymicrobial bacteremia with Gram-positive and Gram-negative bacteremia.  Infectious Disease was consulted, they recommended continue with Zosyn repeating blood cultures.  She was IV fluid resuscitated had a midline placed. general surgery was consulted-they recommended consult for ERCP. GI consult for ERCP and duct clearance, can consider cholecystectomy afterwards as MRCP was suggestive of common bile duct stone.  Palliative Care was consulted and reviewed code status, she was made DNR, she also expressed that she would not want a PEG tube. She was taken to ERCP with GI, EGD findings was tortuous esophagus, narrowing at GE junction, distortion of antrum noted, gastroscope exchange for duodenal scope, scope advanced easily to the antrum and navigated around distorted antral thickening, scope advanced to the pylorus but unable to drop into the duodenal bulb, scope exchange back to gastroscope and unremarkable, GI is recommending transfer to [facility] for attempt at ERCP.    Labs and imaging at [facility]:  CBC WBC 12.2, Hgb 13-15, plts 14. BUN/Cr 23/1.3 (baseline cr ~1.0). Tibi 2.8 ->2.0, ALP wnl, AST/ /107-> 131/91. Procal 60. BCX w/ E coli, Klebsiella pneumoniae, and Bacteriodes fragilis 07/30. Repeat blood cultures drawn 7/31 NGTD. MRCP [place]-shaped low signal intensity mass which appears to lie within the distal segment of the common bile duct suggestive of choledocholithiasis. There is dilatation of the biliary tree proximal to this level.    On arrival, afebrile, HDS on 3L NC (not oxygen a baseline). She complains of some ongoing abdominal pain, but denies nausea, vomiting, diarrhea, fever, chills.         Overview/Hospital Course:  No notes on file    Interval Hx:  Patient reports feeling better overall today. WBC downtrending. Repeat Blood cultures pending. ID following, may switch to PO Abx, will hold off on placing PICC line, pending ID eval.     Review of Systems  Objective:     Vital Signs (Most Recent):  Temp: 98.1 °F (36.7 °C) (08/03/23 1108)  Pulse: 79 (08/03/23 1108)  Resp: 20 (08/03/23 1108)  BP: (!) 146/65 (08/03/23 1108)  SpO2: (!) 90 % (08/03/23 1108) Vital Signs (24h Range):  Temp:  [97.8 °F (36.6 °C)-98.7 °F (37.1 °C)] 98.1 °F (36.7 °C)  Pulse:  [79-87] 79  Resp:  [16-20] 20  SpO2:  [90 %-94 %] 90 %  BP: (118-152)/(56-87) 146/65     Weight: 68 kg (149 lb 14.6 oz)  Body mass index is 26.56 kg/m².    Intake/Output Summary (Last 24 hours) at 8/3/2023 1348  Last data filed at 8/3/2023 1235  Gross per 24 hour   Intake 971.22 ml   Output 225 ml   Net 746.22 ml         Physical Exam  Vitals and nursing note reviewed.   Constitutional:       General: She is not in acute distress.  HENT:      Head: Normocephalic and atraumatic.      Nose:      Comments: NC in nares     Mouth/Throat:      Mouth: Mucous membranes are dry.   Eyes:      Extraocular Movements: Extraocular movements intact.      Conjunctiva/sclera: Conjunctivae normal.   Cardiovascular:      Rate and Rhythm: Normal rate and regular  rhythm.      Heart sounds: Normal heart sounds.   Pulmonary:      Effort: Pulmonary effort is normal. No respiratory distress.   Abdominal:      General: There is no distension.      Palpations: Abdomen is soft.      Tenderness: There is no guarding or rebound.   Musculoskeletal:         General: Normal range of motion.      Cervical back: Normal range of motion.      Right lower leg: No edema.      Left lower leg: No edema.   Skin:     General: Skin is warm and dry.   Neurological:      General: No focal deficit present.      Mental Status: She is alert and oriented to person, place, and time.      Motor: No weakness.   Psychiatric:         Mood and Affect: Mood normal.         Behavior: Behavior normal.         Thought Content: Thought content normal.         Judgment: Judgment normal.               Assessment/Plan:      * Cholangitis  88F presenting at transfer from [facility] for ERCP in setting of cholangitis.    -Gentle IV fluids. Tolerating diet.  -S/p ERCP by [facility] on 8/1 - prior sphincterectomy stenosis and biliary stent placed.  -General surgery consulted for possible cholecystectomy. Per gen surg, given her medical history and overall clinical picture, do not think she would have much additional benefit from a cholecystectomy as cholangitis likely secondary to strictured sphincter and not true choledocholithiasis. There is a chance she could have recurrent cholangitis, but the risk of this is extremely low. Will defer surgical intervention at this time.   -IV zosyn. ID following.  -Monitor daily CBC, CMP  -Pain control       Bacteremia due to Gram-negative bacteria  -BCX 7/20 w/ E coli, Klebsiella pneumoniae, and Bacteriodes fragilis.   -Repeat blood cultures drawn 7/31 NGTD.  -S/p vancomycin and zosyn at [facility]  -ID consulted. Rec Zosyn. May switch to PO cipro/flagyl pending blood cx results.   -Follow sensitivities     Cigarette nicotine dependence without  complication  Dangers of cigarette smoking were reviewed with patient in detail. Patient was Counseled for 3-10 minutes. Nicotine replacement options were discussed. Nicotine replacement was discussed- not prescribed per patient's request    Daily consumption of alcohol  -Reports drinking 1 shot liquor daily  -monitor CIWA qshift, prn ativan    Pulmonary emphysema, unspecified emphysema type  Pt reports smoking 2ppd previously, cut back to 1pack per week lately. Has chronic cough, not on home oxygen and denies COPD diagnosis.   -supplemental oxygen as needed  -prn duonebs    Personal history of colon cancer, stage III  -Noted prior history of stage III colorectal cancer, status post adjuvant chemotherapy   -Followed by Dr. Granado/Renu    Stage 3a chronic kidney disease  Creatine stable for now. BMP reviewed- noted Estimated Creatinine Clearance: 31.1 mL/min (based on SCr of 1.2 mg/dL). according to latest data. Based on current GFR, CKD stage is stage 3 - GFR 30-59.  Monitor UOP and serial BMP and adjust therapy as needed. Renally dose meds. Avoid nephrotoxic medications and procedures.   Cr 1.3 on admission. Baseline ~1.0. Improving with Continuous IVF hydration. Encourage PO hydration.     GERD (gastroesophageal reflux disease)  - ppi      VTE Risk Mitigation (From admission, onward)         Ordered     heparin (porcine) injection 5,000 Units  Every 8 hours         08/01/23 1051     IP VTE HIGH RISK PATIENT  Once         08/01/23 1051     Place sequential compression device  Until discontinued         08/01/23 1051                Discharge Planning   TIERA: 8/4/2023     Code Status: DNR   Is the patient medically ready for discharge?:     Reason for patient still in hospital (select all that apply): Patient trending condition  Discharge Plan A: Home                  Fawad Wheat MD  Department of Hospital Medicine   [facility]

## 2023-08-03 NOTE — ANESTHESIA POSTPROCEDURE EVALUATION
Anesthesia Post Evaluation    Patient: Kenyetta Andersen    Procedure(s) Performed: Procedure(s) (LRB):  ERCP (ENDOSCOPIC RETROGRADE CHOLANGIOPANCREATOGRAPHY) (N/A)    Final Anesthesia Type: general      Patient location during evaluation: PACU  Patient participation: Yes- Able to Participate  Level of consciousness: awake and alert and oriented  Pain management: adequate  Airway patency: patent    PONV status at discharge: No PONV  Anesthetic complications: no      Cardiovascular status: blood pressure returned to baseline and hemodynamically stable  Respiratory status: unassisted  Hydration status: euvolemic  Follow-up not needed.          Vitals Value Taken Time   /65 08/03/23 1108   Temp 36.7 °C (98.1 °F) 08/03/23 1108   Pulse 79 08/03/23 1108   Resp 20 08/03/23 1108   SpO2 90 % 08/03/23 1108         Event Time   Out of Recovery 17:24:00         Pain/Amanda Score: Pain Rating Prior to Med Admin: 0 (8/2/2023  7:37 PM)

## 2023-08-03 NOTE — PROGRESS NOTES
Patient has only output 125 mL urine per [employer] to wall suction canister since start of shfit.  No bladder volume indicated on Bladder Scan

## 2023-08-03 NOTE — CONSULTS
"[facility]esh mon[facility]ONS[facility]  Infectious Disease  Consult Note    Patient Name: Kenyetta Andersen  MRN: [id number]  Admission Date: 8/1/2023  Hospital Length of Stay: 2 days  Attending Physician: Fawad Wheat MD  Primary Care Provider: Hever Arreola MD     Isolation Status: No active isolations    Patient information was obtained from patient and ER records.      Consults  Assessment/Plan:     ID  * Cholangitis  88F with h/o GERD, colon cancer (Dx 2009; with lung nodule; adj folfox) s/p R hemicolectomy with end to side ileocolonic anastamosis admitted with abdominal pain, found to have cholangitis. BCx with pan-susceptible E.coli and enterococcus and another GNR (identification pending). bcx 8/2 ngtd. S/p ERCP. Clinically improving since ERCP. On zosyn. ID consulted for "GNR bacteremia"    Recommendations:   - continue zosyn. Will de-escalate pending culture results (still waiting on identification of GNR)  - needs 2d echo to evaluate for vegetations  - will need picc line and OPAT set up, but would wait for 8/2 cultures to be neg x 48h before placing picc          Thank you for your consult. I will follow-up with patient. Please contact us if you have any additional questions.    Giovana Oh MD  Infectious Disease  [facility]    Subjective:     Principal Problem: Cholangitis    HPI:   88F with h/o GERD c/b esophagitis, hypothyroid, colon cancer (Dx 2009; with lung nodule; adj folfox) s/p R hemicolectomy with end to side ileocolonic anastamosis admitted to [facility] as transfer from [facility] for ERCP. Pt had been symptomatic with "chest pain and abdominal pain as well as generalized weakness, she was found to have evidence of possible acalculus cholecystitis on presentation on CT scan.  She had elevated bilirubin and transaminitis, she was also diagnosed with cholangitis spiking fevers and found to have polymicrobial bacteremia with Gram-positive and Gram-negative bacteremia."    Reports acute onset " "of belly pain on Saturday associated with fever. Pain and fever now improved. Denies focal tenderness. Denies diarrhea. Reports hasn't been on chemo for 10 years and denies presence of port/pacemaker    Blood Culture, Routine      Abnormal   GRAM NEGATIVE PRISCILA, LACTOSE    Identification and susceptibility pending           Bacteroides fragilis Not Detected Detected Abnormal     Enterobacterales Not Detected See species for ID Abnormal          Escherichia coli Not Detected Detected Abnormal               Klebsiella pneumoniae group Not Detected Detected Abnormal         Repeat bcx, NGTD x 1 day, but gram stain positive    Component 1 d ago   Blood Culture, Routine No Growth to date P    Blood Culture, Routine Gram stain aer bottle: Gram negative rods P      On vanc/zosyn. Fever defervesced    S/p ERCP 8/1        ID consulted for "GNR bacteremia"      Interval history: NAEO. Tolerating abx. eating    Past Surgical History:   Procedure Laterality Date    APPENDECTOMY      CATARACT EXTRACTION W/  INTRAOCULAR LENS IMPLANT Left     CATARACT EXTRACTION W/  INTRAOCULAR LENS IMPLANT Right     Dr Sawant    COLON SURGERY      resection    COLONOSCOPY N/A 3/14/2017    Procedure: COLONOSCOPY;  Surgeon: Killian Guerrier MD;  Location: [facility];  Service: Endoscopy;  Laterality: N/A;    COLONOSCOPY  03/14/2017    Dr. Guerrier: hemorrhoids, one colon polyp removed, "Patent functional end-to-end ileo-colonic anastomosis"with healthy mucosa; biopsy: hyperplastic polyp; repeat in 3 years for surveillance    COLONOSCOPY N/A 1/21/2020    Procedure: COLONOSCOPY;  Surgeon: Timo Darling MD;  Location: [facility];  Service: Endoscopy;  Laterality: N/A;    COLONOSCOPY N/A 7/8/2022    Procedure: COLONOSCOPY;  Surgeon: Anaid Washington MD;  Location: [facility];  Service: Endoscopy;  Laterality: N/A;    ECTOPIC PREGNANCY SURGERY      ERCP N/A 7/31/2023    Procedure: ERCP (ENDOSCOPIC RETROGRADE " CHOLANGIOPANCREATOGRAPHY);  Surgeon: Nasim Cuadra MD;  Location[facility];  Service: Endoscopy;  Laterality: N/A;    ERCP N/A 8/1/2023    Procedure: ERCP (ENDOSCOPIC RETROGRADE CHOLANGIOPANCREATOGRAPHY);  Surgeon: Олег Arreaga MD;  Location: [facility] (2ND FLR);  Service: Endoscopy;  Laterality: N/A;    ESOPHAGOGASTRODUODENOSCOPY N/A 5/16/2019    Procedure: EGD (ESOPHAGOGASTRODUODENOSCOPY);  Surgeon: Anaid Washington MD;  Location: [facility];  Service: Endoscopy;  Laterality: N/A;    ESOPHAGOGASTRODUODENOSCOPY N/A 7/11/2019    Procedure: EGD (ESOPHAGOGASTRODUODENOSCOPY);  Surgeon: Anaid Washington MD;  Location: [facility];  Service: Endoscopy;  Laterality: N/A;    ESOPHAGOGASTRODUODENOSCOPY N/A 2/5/2021    Procedure: EGD (ESOPHAGOGASTRODUODENOSCOPY);  Surgeon: Anaid Washington MD;  Location: [facility];  Service: Endoscopy;  Laterality: N/A;    ESOPHAGOGASTRODUODENOSCOPY N/A 11/11/2021    Procedure: EGD (ESOPHAGOGASTRODUODENOSCOPY);  Surgeon: Anaid Washington MD;  Location: [facility];  Service: Endoscopy;  Laterality: N/A;    ESOPHAGOGASTRODUODENOSCOPY N/A 7/8/2022    Procedure: EGD (ESOPHAGOGASTRODUODENOSCOPY);  Surgeon: Anaid Washington MD;  Location: [facility];  Service: Endoscopy;  Laterality: N/A;    ESOPHAGOGASTRODUODENOSCOPY N/A 4/12/2023    Procedure: EGD (ESOPHAGOGASTRODUODENOSCOPY);  Surgeon: Anaid Washington MD;  Location: [facility];  Service: Endoscopy;  Laterality: N/A;    EYE SURGERY      bilateral cataracts    HYSTERECTOMY      complete    JOINT REPLACEMENT      Liban Knee    ROTATOR CUFF REPAIR Right     TOE SURGERY      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    UPPER GASTROINTESTINAL ENDOSCOPY  06/12/2017    Dr. Guerrier: gastritis and esophagitis, biopsy: chronic gastritis, negative for h pylori, esophageal- positive eosinophils, negative for barretts; repeat in 2 months    UPPER GASTROINTESTINAL ENDOSCOPY  07/27/2017    Dr. Guerrier       Review of patient's  allergies indicates:   Allergen Reactions    Ace inhibitors Edema     Other reaction(s): Angioedema    Codeine Hives       No current facility-administered medications on file prior to encounter.     Current Outpatient Medications on File Prior to Encounter   Medication Sig    acetaminophen (TYLENOL) 650 MG TbSR Take 650 mg by mouth every 8 (eight) hours as needed (pain).    alendronate (FOSAMAX) 70 MG tablet TAKE 1 TABLET (70 MG TOTAL) BY MOUTH EVERY 7 DAYS. ON EMPTY STOMACH IN MORNING, REMAIN UPRIGHT FOR 30 MINUTES.    ascorbic acid, vitamin C, (VITAMIN C) 500 MG tablet Take 500 mg by mouth once daily.    b complex vitamins capsule Take 1 capsule by mouth once daily.    brimonidine 0.2% (ALPHAGAN) 0.2 % Drop INSTILL 1 DROP INTO BOTH EYES 3 TIMES A DAY (Patient taking differently: Place 1 drop into both eyes 3 (three) times daily.)    calcium carbonate-magnesium hydroxide (ROLAIDS) 550-110 mg Chew Take 1 tablet by mouth 2 (two) times daily as needed (heartburn).    dorzolamide-timolol 2-0.5% (COSOPT) 22.3-6.8 mg/mL ophthalmic solution INSTILL 1 DROP INTO BOTH EYES TWICE A DAY    gabapentin (NEURONTIN) 300 MG capsule TAKE 1 CAP IN THE AM, 1 CAP MIDDAY, AND 2 CAPS AT NIGHT    mirtazapine (REMERON) 7.5 MG Tab TAKE 1 TABLET BY MOUTH EVERY EVENING.    omeprazole (PRILOSEC) 40 MG capsule Take 1 capsule (40 mg total) by mouth 2 (two) times daily before meals for 60 days, THEN 1 capsule (40 mg total) every morning. (Patient taking differently: Take 1 capsule (40 mg total) by mouth daily before meals )    potassium chloride SA (K-DUR,KLOR-CON) 20 MEQ tablet TAKE 1 TABLET BY MOUTH ONCE DAILY (Patient taking differently: Take 20 mEq by mouth once daily.)     Family History       Problem Relation (Age of Onset)    Arthritis Sister    Heart disease Mother, Father, Brother    No Known Problems Brother, Brother    Parkinsonism Sister          Tobacco Use    Smoking status: Every Day     Current packs/day: 0.00      Average packs/day: 0.3 packs/day for 65.0 years (21.5 ttl pk-yrs)     Types: Cigarettes     Start date: 10/5/1955     Last attempt to quit: 10/5/2020     Years since quittin.8    Smokeless tobacco: Never    Tobacco comments:     3/1/23--states smokes 1-2 cigarettes a day   Substance and Sexual Activity    Alcohol use: Yes     Alcohol/week: 2.0 standard drinks of alcohol     Types: 2 Shots of liquor per week     Comment: Daily, about 4 drinks per day (crown)    Drug use: No    Sexual activity: Not Currently     Review of Systems   Constitutional:  Positive for chills. Negative for fever.   HENT:  Negative for congestion.    Respiratory:  Positive for shortness of breath.    Cardiovascular:  Positive for chest pain and leg swelling (intermittent for years).   Gastrointestinal:  Positive for abdominal pain, nausea and vomiting.   Genitourinary:  Negative for dysuria and urgency.   Musculoskeletal:  Positive for back pain.   Skin:  Negative for wound.   Allergic/Immunologic: Negative for immunocompromised state.   Neurological:  Negative for seizures.   Psychiatric/Behavioral:          Mildly confused after morphine     Objective:     Vital Signs (Most Recent):  Temp: 98.1 °F (36.7 °C) (23 1108)  Pulse: 79 (23 1108)  Resp: 20 (23 1108)  BP: (!) 146/65 (23 1108)  SpO2: (!) 90 % (23 1108) Vital Signs (24h Range):  Temp:  [97.8 °F (36.6 °C)-98.7 °F (37.1 °C)] 98.1 °F (36.7 °C)  Pulse:  [79-87] 79  Resp:  [16-20] 20  SpO2:  [90 %-94 %] 90 %  BP: (118-152)/(56-87) 146/65     Weight: 68 kg (149 lb 14.6 oz)  Body mass index is 26.56 kg/m².     Physical Exam  Vitals and nursing note reviewed.   Constitutional:       Appearance: Normal appearance. She is not ill-appearing.      Comments: Elderly female, frail, ill appearing, shaking, under the blankets   HENT:      Head: Normocephalic and atraumatic.      Mouth/Throat:      Mouth: Mucous membranes are moist.   Eyes:      General:          "Right eye: No discharge.         Left eye: No discharge.      Comments: Wearing corrective lenses, senile arcus   Cardiovascular:      Rate and Rhythm: Regular rhythm. Tachycardia present.      Heart sounds: No murmur heard.     Comments: Warm peripheries, mild pedal edema  Pulmonary:      Effort: Pulmonary effort is normal. No respiratory distress.      Breath sounds: Normal breath sounds.   Abdominal:      General: Abdomen is flat. Bowel sounds are normal. There is no distension.      Palpations: Abdomen is soft.   Genitourinary:     Comments: No martines  Musculoskeletal:         General: No swelling.   Skin:     General: Skin is warm.   Neurological:      General: No focal deficit present.      Mental Status: She is alert and oriented to person, place, and time.   Psychiatric:      Comments: cooperative                Significant Labs: BMP:   Recent Labs   Lab 08/03/23  0549   GLU 59*      K 3.4*   *   CO2 19*   BUN 21   CREATININE 1.2   CALCIUM 8.5*   MG 2.1       CBC:   Recent Labs   Lab 08/02/23  0550 08/03/23  0549   WBC 19.40* 17.05*   HGB 12.0 12.6   HCT 35.8* 36.6*   PLT 60* 57*       CMP:   Recent Labs   Lab 08/02/23  0550 08/03/23  0549    140   K 3.9 3.4*   * 112*   CO2 15* 19*   GLU 45* 59*   BUN 27* 21   CREATININE 1.1 1.2   CALCIUM 8.0* 8.5*   PROT 4.9* 5.1*   ALBUMIN 1.8* 1.8*   BILITOT 1.2* 1.1*   ALKPHOS 105 217*   AST 66* 36   ALT 62* 47*   ANIONGAP 11 9       Cardiac Markers: No results for input(s): "CKMB", "MYOGLOBIN", "BNP", "TROPISTAT" in the last 48 hours.  Coagulation: No results for input(s): "PT", "INR", "APTT" in the last 48 hours.  Magnesium:   Recent Labs   Lab 08/02/23  0550 08/03/23  0549   MG 2.0 2.1       POCT Glucose:   Recent Labs   Lab 08/02/23  2351 08/03/23  0216 08/03/23  0832   POCTGLUCOSE 71 90 71       Troponin:   No results for input(s): "TROPONINI", "TROPONINIHS" in the last 48 hours.    TSH:   Recent Labs   Lab 07/30/23  1844   TSH 0.490 " "      Urine Culture: No results for input(s): "LABURIN" in the last 48 hours.  Urine Studies: No results for input(s): "COLORU", "APPEARANCEUA", "PHUR", "SPECGRAV", "PROTEINUA", "GLUCUA", "KETONESU", "BILIRUBINUA", "OCCULTUA", "NITRITE", "UROBILINOGEN", "LEUKOCYTESUR", "RBCUA", "WBCUA", "BACTERIA", "SQUAMEPITHEL", "HYALINECASTS" in the last 48 hours.    Invalid input(s): "WRIGHTSUR"    Significant Imaging: I have reviewed all pertinent imaging results/findings within the past 24 hours.    X-Ray Chest AP Portable    Result Date: 7/30/2023  EXAMINATION: XR CHEST AP PORTABLE CLINICAL INDICATION: Female, 88 years old. Chest COMPARISON: None. FINDINGS: The heart and great vessels appear normal. There are some atherosclerotic changes of aorta. Both lungs are well expanded and clear. No pleural effusion can be seen. No pneumothorax can be seen. IMPRESSION: No x-ray evidence of active or acute disease. Electronically signed by:  Marcela Escalante Jr., MD  7/30/2023 9:22 PM CDT Workstation: [id number]    CT Abdomen Pelvis With Contrast    Result Date: 7/30/2023  EXAMINATION: CT ABDOMEN PELVIS WITH CONTRAST CLINICAL INDICATION: Female, 88 years old. Epigastric pain TECHNIQUE: Helical CT scan examination of the abdomen and pelvis is performed from the domes of the diaphragm to the pubic symphysis with the administration of intravenous contrast material. CONTRAST: 75 mL of Omnipaque 300 IV. COMPARISON: None FINDINGS: Lower Chest: Visualized lung bases are clear other than hypoaeration changes. Heart is normal in size. No pericardial or pleural effusion. Liver: Normal in size and contour. No focal lesion. Bile Ducts: Common bile duct is  dilated. Gallbladder: There is marked dilation of the gallbladder and there may be pericolic cystic fluid. No definite gallstones can be seen. Pancreas: Pancreas is atrophic but demonstrates no other abnormality. Spleen: Normal in size and contour. Adrenals: Normal configuration. Kidneys and " Ureters: Normal size and contour. No hydronephrosis. There are bilateral renal cysts. Bladder: Normal in appearance. Bowel: Stomach: Unremarkable. Small Intestine: Unremarkable. Appendix: No inflammatory changes. Colon: Unremarkable. Vessels: Abdominal aorta and inferior vena cava are normal in course and caliber. Reproductive Organs: Uterus is apparently surgically absent and no pelvic mass can be seen Lymph Nodes: No pathologic mesenteric or retroperitoneal lymph nodes. Peritoneum: No free air, free fluid, or fluid collection. Abdominal Wall: No hernia or mass. Musculoskeletal: No acute abnormality or suspicious bony lesion. There is considerable compression of the L1 vertebral body which appears chronic. There is some retropulsion of bone. Compression of the superior endplate of T12 also appears to be chronic. There are degenerative disc changes at T12-L1 to L2-L3. No aggressive skeletal lesions can be seen. IMPRESSION: Dilation of the gallbladder and dilation of the common bile duct. There may be pericholecystic fluid. No gallstones can be seen but changes may represent  noncalculous acute cholecystitis. Small bilateral renal cysts. This exam was performed according to our departmental dose-optimization program which includes automated exposure control, adjustment of the mA and/or kV according to patient size and/or use of iterative reconstruction technique. Electronically signed by:  Marcela Escalante Jr., MD  7/30/2023 9:04 PM CDT Workstation: [id number]

## 2023-08-03 NOTE — SUBJECTIVE & OBJECTIVE
Interval Hx:  Patient reports feeling better overall today. WBC downtrending. Repeat Blood cultures pending. ID following, may switch to PO Abx, will hold off on placing PICC line, pending ID eval.     Review of Systems  Objective:     Vital Signs (Most Recent):  Temp: 98.1 °F (36.7 °C) (08/03/23 1108)  Pulse: 79 (08/03/23 1108)  Resp: 20 (08/03/23 1108)  BP: (!) 146/65 (08/03/23 1108)  SpO2: (!) 90 % (08/03/23 1108) Vital Signs (24h Range):  Temp:  [97.8 °F (36.6 °C)-98.7 °F (37.1 °C)] 98.1 °F (36.7 °C)  Pulse:  [79-87] 79  Resp:  [16-20] 20  SpO2:  [90 %-94 %] 90 %  BP: (118-152)/(56-87) 146/65     Weight: 68 kg (149 lb 14.6 oz)  Body mass index is 26.56 kg/m².    Intake/Output Summary (Last 24 hours) at 8/3/2023 1348  Last data filed at 8/3/2023 1235  Gross per 24 hour   Intake 971.22 ml   Output 225 ml   Net 746.22 ml         Physical Exam  Vitals and nursing note reviewed.   Constitutional:       General: She is not in acute distress.  HENT:      Head: Normocephalic and atraumatic.      Nose:      Comments: NC in nares     Mouth/Throat:      Mouth: Mucous membranes are dry.   Eyes:      Extraocular Movements: Extraocular movements intact.      Conjunctiva/sclera: Conjunctivae normal.   Cardiovascular:      Rate and Rhythm: Normal rate and regular rhythm.      Heart sounds: Normal heart sounds.   Pulmonary:      Effort: Pulmonary effort is normal. No respiratory distress.   Abdominal:      General: There is no distension.      Palpations: Abdomen is soft.      Tenderness: There is no guarding or rebound.   Musculoskeletal:         General: Normal range of motion.      Cervical back: Normal range of motion.      Right lower leg: No edema.      Left lower leg: No edema.   Skin:     General: Skin is warm and dry.   Neurological:      General: No focal deficit present.      Mental Status: She is alert and oriented to person, place, and time.      Motor: No weakness.   Psychiatric:         Mood and Affect: Mood  normal.         Behavior: Behavior normal.         Thought Content: Thought content normal.         Judgment: Judgment normal.

## 2023-08-04 LAB
ALBUMIN SERPL BCP-MCNC: 1.8 G/DL (ref 3.5–5.2)
ALP SERPL-CCNC: 291 U/L (ref 55–135)
ALT SERPL W/O P-5'-P-CCNC: 40 U/L (ref 10–44)
ANION GAP SERPL CALC-SCNC: 9 MMOL/L (ref 8–16)
ASCENDING AORTA: 2.83 CM
AST SERPL-CCNC: 39 U/L (ref 10–40)
AV INDEX (PROSTH): 0.54
AV MEAN GRADIENT: 5 MMHG
AV PEAK GRADIENT: 10 MMHG
AV VALVE AREA BY VELOCITY RATIO: 1.37 CM²
AV VALVE AREA: 1.44 CM²
AV VELOCITY RATIO: 0.52
BACTERIA BLD CULT: ABNORMAL
BASOPHILS # BLD AUTO: 0.08 K/UL (ref 0–0.2)
BASOPHILS NFR BLD: 0.5 % (ref 0–1.9)
BILIRUB SERPL-MCNC: 0.9 MG/DL (ref 0.1–1)
BSA FOR ECHO PROCEDURE: 1.73 M2
BUN SERPL-MCNC: 14 MG/DL (ref 8–23)
CALCIUM SERPL-MCNC: 8.8 MG/DL (ref 8.7–10.5)
CHLORIDE SERPL-SCNC: 115 MMOL/L (ref 95–110)
CO2 SERPL-SCNC: 16 MMOL/L (ref 23–29)
CREAT SERPL-MCNC: 0.9 MG/DL (ref 0.5–1.4)
CV ECHO LV RWT: 0.34 CM
DIFFERENTIAL METHOD: ABNORMAL
DOP CALC AO PEAK VEL: 1.59 M/S
DOP CALC AO VTI: 36.23 CM
DOP CALC LVOT AREA: 2.7 CM2
DOP CALC LVOT DIAMETER: 1.84 CM
DOP CALC LVOT PEAK VEL: 0.82 M/S
DOP CALC LVOT STROKE VOLUME: 52.2 CM3
DOP CALCLVOT PEAK VEL VTI: 19.64 CM
E WAVE DECELERATION TIME: 194.12 MSEC
E/A RATIO: 0.96
E/E' RATIO: 9.2 M/S
ECHO LV POSTERIOR WALL: 0.86 CM (ref 0.6–1.1)
EOSINOPHIL # BLD AUTO: 0.1 K/UL (ref 0–0.5)
EOSINOPHIL NFR BLD: 0.8 % (ref 0–8)
ERYTHROCYTE [DISTWIDTH] IN BLOOD BY AUTOMATED COUNT: 14.6 % (ref 11.5–14.5)
EST. GFR  (NO RACE VARIABLE): >60 ML/MIN/1.73 M^2
FRACTIONAL SHORTENING: 28 % (ref 28–44)
GLUCOSE SERPL-MCNC: 81 MG/DL (ref 70–110)
HCT VFR BLD AUTO: 36.3 % (ref 37–48.5)
HGB BLD-MCNC: 12.6 G/DL (ref 12–16)
IMM GRANULOCYTES # BLD AUTO: 0.21 K/UL (ref 0–0.04)
IMM GRANULOCYTES NFR BLD AUTO: 1.2 % (ref 0–0.5)
INTERVENTRICULAR SEPTUM: 0.79 CM (ref 0.6–1.1)
LA MAJOR: 5.24 CM
LA MINOR: 5.32 CM
LA WIDTH: 4.1 CM
LEFT ATRIUM SIZE: 3.46 CM
LEFT ATRIUM VOLUME INDEX MOD: 38.7 ML/M2
LEFT ATRIUM VOLUME INDEX: 37.2 ML/M2
LEFT ATRIUM VOLUME MOD: 66.18 CM3
LEFT ATRIUM VOLUME: 63.66 CM3
LEFT INTERNAL DIMENSION IN SYSTOLE: 3.69 CM (ref 2.1–4)
LEFT VENTRICLE DIASTOLIC VOLUME INDEX: 72.13 ML/M2
LEFT VENTRICLE DIASTOLIC VOLUME: 123.35 ML
LEFT VENTRICLE MASS INDEX: 85 G/M2
LEFT VENTRICLE SYSTOLIC VOLUME INDEX: 33.8 ML/M2
LEFT VENTRICLE SYSTOLIC VOLUME: 57.88 ML
LEFT VENTRICULAR INTERNAL DIMENSION IN DIASTOLE: 5.09 CM (ref 3.5–6)
LEFT VENTRICULAR MASS: 145.63 G
LV LATERAL E/E' RATIO: 8.63 M/S
LV SEPTAL E/E' RATIO: 9.86 M/S
LYMPHOCYTES # BLD AUTO: 2.1 K/UL (ref 1–4.8)
LYMPHOCYTES NFR BLD: 12.4 % (ref 18–48)
MAGNESIUM SERPL-MCNC: 1.7 MG/DL (ref 1.6–2.6)
MCH RBC QN AUTO: 32.1 PG (ref 27–31)
MCHC RBC AUTO-ENTMCNC: 34.7 G/DL (ref 32–36)
MCV RBC AUTO: 92 FL (ref 82–98)
MONOCYTES # BLD AUTO: 2.2 K/UL (ref 0.3–1)
MONOCYTES NFR BLD: 12.9 % (ref 4–15)
MV PEAK A VEL: 0.72 M/S
MV PEAK E VEL: 0.69 M/S
NEUTROPHILS # BLD AUTO: 12.3 K/UL (ref 1.8–7.7)
NEUTROPHILS NFR BLD: 72.2 % (ref 38–73)
NRBC BLD-RTO: 0 /100 WBC
PHOSPHATE SERPL-MCNC: 1.5 MG/DL (ref 2.7–4.5)
PISA TR MAX VEL: 3.32 M/S
PLATELET # BLD AUTO: 60 K/UL (ref 150–450)
PLATELET BLD QL SMEAR: ABNORMAL
PMV BLD AUTO: 12.4 FL (ref 9.2–12.9)
POCT GLUCOSE: 83 MG/DL (ref 70–110)
POCT GLUCOSE: 85 MG/DL (ref 70–110)
POCT GLUCOSE: 94 MG/DL (ref 70–110)
POTASSIUM SERPL-SCNC: 4.2 MMOL/L (ref 3.5–5.1)
PROT SERPL-MCNC: 5.3 G/DL (ref 6–8.4)
RA MAJOR: 4.83 CM
RA PRESSURE ESTIMATED: 15 MMHG
RA WIDTH: 3.87 CM
RBC # BLD AUTO: 3.93 M/UL (ref 4–5.4)
RIGHT VENTRICULAR END-DIASTOLIC DIMENSION: 4.08 CM
RV TB RVSP: 18 MMHG
SINUS: 2.87 CM
SODIUM SERPL-SCNC: 140 MMOL/L (ref 136–145)
STJ: 2.67 CM
TDI LATERAL: 0.08 M/S
TDI SEPTAL: 0.07 M/S
TDI: 0.08 M/S
TR MAX PG: 44 MMHG
TRICUSPID ANNULAR PLANE SYSTOLIC EXCURSION: 1.41 CM
TV REST PULMONARY ARTERY PRESSURE: 59 MMHG
WBC # BLD AUTO: 17.04 K/UL (ref 3.9–12.7)
Z-SCORE OF LEFT VENTRICULAR DIMENSION IN END DIASTOLE: 0.69
Z-SCORE OF LEFT VENTRICULAR DIMENSION IN END SYSTOLE: 1.8

## 2023-08-04 PROCEDURE — 80053 COMPREHEN METABOLIC PANEL: CPT

## 2023-08-04 PROCEDURE — 84100 ASSAY OF PHOSPHORUS: CPT

## 2023-08-04 PROCEDURE — 99232 SBSQ HOSP IP/OBS MODERATE 35: CPT | Mod: ,,, | Performed by: INTERNAL MEDICINE

## 2023-08-04 PROCEDURE — 20600001 HC STEP DOWN PRIVATE ROOM

## 2023-08-04 PROCEDURE — 99232 PR SUBSEQUENT HOSPITAL CARE,LEVL II: ICD-10-PCS | Mod: ,,, | Performed by: INTERNAL MEDICINE

## 2023-08-04 PROCEDURE — 99233 PR SUBSEQUENT HOSPITAL CARE,LEVL III: ICD-10-PCS | Mod: ,,, | Performed by: STUDENT IN AN ORGANIZED HEALTH CARE EDUCATION/TRAINING PROGRAM

## 2023-08-04 PROCEDURE — 36415 COLL VENOUS BLD VENIPUNCTURE: CPT

## 2023-08-04 PROCEDURE — 25000003 PHARM REV CODE 250: Performed by: STUDENT IN AN ORGANIZED HEALTH CARE EDUCATION/TRAINING PROGRAM

## 2023-08-04 PROCEDURE — 83735 ASSAY OF MAGNESIUM: CPT

## 2023-08-04 PROCEDURE — 63600175 PHARM REV CODE 636 W HCPCS

## 2023-08-04 PROCEDURE — 99233 SBSQ HOSP IP/OBS HIGH 50: CPT | Mod: ,,, | Performed by: STUDENT IN AN ORGANIZED HEALTH CARE EDUCATION/TRAINING PROGRAM

## 2023-08-04 PROCEDURE — A4216 STERILE WATER/SALINE, 10 ML: HCPCS | Performed by: STUDENT IN AN ORGANIZED HEALTH CARE EDUCATION/TRAINING PROGRAM

## 2023-08-04 PROCEDURE — 85025 COMPLETE CBC W/AUTO DIFF WBC: CPT

## 2023-08-04 PROCEDURE — 25000003 PHARM REV CODE 250: Performed by: HOSPITALIST

## 2023-08-04 PROCEDURE — 63600175 PHARM REV CODE 636 W HCPCS: Performed by: HOSPITALIST

## 2023-08-04 RX ORDER — SODIUM CHLORIDE 0.9 % (FLUSH) 0.9 %
10 SYRINGE (ML) INJECTION EVERY 6 HOURS
Status: DISCONTINUED | OUTPATIENT
Start: 2023-08-04 | End: 2023-08-08 | Stop reason: HOSPADM

## 2023-08-04 RX ORDER — SODIUM CHLORIDE 0.9 % (FLUSH) 0.9 %
10 SYRINGE (ML) INJECTION
Status: DISCONTINUED | OUTPATIENT
Start: 2023-08-04 | End: 2023-08-08 | Stop reason: HOSPADM

## 2023-08-04 RX ORDER — SODIUM,POTASSIUM PHOSPHATES 280-250MG
2 POWDER IN PACKET (EA) ORAL ONCE
Status: COMPLETED | OUTPATIENT
Start: 2023-08-04 | End: 2023-08-04

## 2023-08-04 RX ADMIN — HEPARIN SODIUM 5000 UNITS: 5000 INJECTION INTRAVENOUS; SUBCUTANEOUS at 09:08

## 2023-08-04 RX ADMIN — BRIMONIDINE TARTRATE 1 DROP: 1.5 SOLUTION/ DROPS OPHTHALMIC at 02:08

## 2023-08-04 RX ADMIN — PANTOPRAZOLE SODIUM 40 MG: 40 TABLET, DELAYED RELEASE ORAL at 10:08

## 2023-08-04 RX ADMIN — GUAIFENESIN AND DEXTROMETHORPHAN HYDROBROMIDE 1 TABLET: 600; 30 TABLET, EXTENDED RELEASE ORAL at 08:08

## 2023-08-04 RX ADMIN — MIRTAZAPINE 7.5 MG: 7.5 TABLET, FILM COATED ORAL at 09:08

## 2023-08-04 RX ADMIN — PIPERACILLIN SODIUM AND TAZOBACTAM SODIUM 4.5 G: 4; .5 INJECTION, POWDER, FOR SOLUTION INTRAVENOUS at 05:08

## 2023-08-04 RX ADMIN — POTASSIUM & SODIUM PHOSPHATES POWDER PACK 280-160-250 MG 2 PACKET: 280-160-250 PACK at 10:08

## 2023-08-04 RX ADMIN — HEPARIN SODIUM 5000 UNITS: 5000 INJECTION INTRAVENOUS; SUBCUTANEOUS at 06:08

## 2023-08-04 RX ADMIN — DORZOLAMIDE 1 DROP: 20 SOLUTION/ DROPS OPHTHALMIC at 10:08

## 2023-08-04 RX ADMIN — HEPARIN SODIUM 5000 UNITS: 5000 INJECTION INTRAVENOUS; SUBCUTANEOUS at 02:08

## 2023-08-04 RX ADMIN — BRIMONIDINE TARTRATE 1 DROP: 1.5 SOLUTION/ DROPS OPHTHALMIC at 06:08

## 2023-08-04 RX ADMIN — POLYETHYLENE GLYCOL 3350 17 G: 17 POWDER, FOR SOLUTION ORAL at 10:08

## 2023-08-04 RX ADMIN — PIPERACILLIN SODIUM AND TAZOBACTAM SODIUM 4.5 G: 4; .5 INJECTION, POWDER, FOR SOLUTION INTRAVENOUS at 01:08

## 2023-08-04 RX ADMIN — PIPERACILLIN SODIUM AND TAZOBACTAM SODIUM 4.5 G: 4; .5 INJECTION, POWDER, FOR SOLUTION INTRAVENOUS at 10:08

## 2023-08-04 RX ADMIN — GUAIFENESIN AND DEXTROMETHORPHAN HYDROBROMIDE 1 TABLET: 600; 30 TABLET, EXTENDED RELEASE ORAL at 03:08

## 2023-08-04 RX ADMIN — TIMOLOL MALEATE 1 DROP: 5 SOLUTION OPHTHALMIC at 09:08

## 2023-08-04 RX ADMIN — TIMOLOL MALEATE 1 DROP: 5 SOLUTION OPHTHALMIC at 10:08

## 2023-08-04 RX ADMIN — BRIMONIDINE TARTRATE 1 DROP: 1.5 SOLUTION/ DROPS OPHTHALMIC at 09:08

## 2023-08-04 RX ADMIN — DORZOLAMIDE 1 DROP: 20 SOLUTION/ DROPS OPHTHALMIC at 08:08

## 2023-08-04 RX ADMIN — SENNOSIDES AND DOCUSATE SODIUM 1 TABLET: 50; 8.6 TABLET ORAL at 10:08

## 2023-08-04 RX ADMIN — Medication 10 ML: at 09:08

## 2023-08-04 NOTE — PROCEDURES
"Kenyetta Andersen is a 88 y.o. female patient.    Temp: 98.5 °F (36.9 °C) (08/04/23 1608)  Pulse: 62 (08/04/23 1608)  Resp: 20 (08/04/23 1608)  BP: 136/65 (08/04/23 1608)  SpO2: 95 % (08/04/23 1608)  Weight: 67.6 kg (149 lb) (08/04/23 0805)  Height: 5' 3" (160 cm) (08/04/23 0805)    PICC  Date/Time: 8/4/2023 6:07 PM  Performed by: Ilda Smalls RN  Assisting provider: Andreas Odonnell RN  Consent Done: Yes  Time out: Immediately prior to procedure a time out was called to verify the correct patient, procedure, equipment, support staff and site/side marked as required  Indications: med administration and vascular access  Anesthesia: local infiltration  Local anesthetic: lidocaine 1% without epinephrine  Anesthetic Total (mL): 3  Description of findings: PICC  Preparation: skin prepped with ChloraPrep  Skin prep agent dried: skin prep agent completely dried prior to procedure  Sterile barriers: all five maximum sterile barriers used - cap, mask, sterile gown, sterile gloves, and large sterile sheet  Hand hygiene: hand hygiene performed prior to central venous catheter insertion  Location details: right basilic  Catheter type: double lumen  Catheter size: 5 Fr  Catheter Length: 36cm    Ultrasound guidance: yes  Vessel Caliber: medium and patent, compressibility normal  Vascular Doppler: not done  Needle advanced into vessel with real time Ultrasound guidance.  Guidewire confirmed in vessel.  Image recorded and saved.  Sterile sheath used.  Number of attempts: 1  Post-procedure: blood return through all ports, chlorhexidine patch and sterile dressing applied  Technical procedures used: 3CG  Specimens: No  Implants: No  Assessment: placement verified by x-ray  Complications: none          Name   8/4/2023    "

## 2023-08-04 NOTE — ASSESSMENT & PLAN NOTE
"88F with h/o GERD, colon cancer (Dx 2009; with lung nodule; adj folfox) s/p R hemicolectomy with end to side ileocolonic anastamosis admitted with abdominal pain, found to have cholangitis. BCx with pan-susceptible E.coli and enterococcus, kleb pneumo, bacteroides. bcx 8/2 ngtd. S/p ERCP. Clinically improving since ERCP. On zosyn. ID consulted for "GNR bacteremia"    Suspect prolonged bacteremia from source control issue more than abx failure. Fortunately clinically improved following ercp. 2d echo neg for veg and bcx clear. Wendy being considered    Recommendations:   - continue zosyn. Plan on 2 weeks OPAT  - no objections to picc line from infectious disease standpoint    Outpatient Antibiotic Therapy Plan:    Please send referral to [facility].    1) Infection: cholangitis, bacteremia    2) Discharge Antibiotics:    Intravenous antibiotics:   Zosyn 12gm daily continuous infusion    3) Therapy Duration:  2 weeks    Estimated end date of IV antibiotics: 8/15/23    4) Outpatient Weekly Labs:    Order the following labs to be drawn on Mondays:    CBC   CMP    CRP    5) Fax Lab Results to Infectious Diseases Provider: Dr duke    [facility] Fax Number: [phone]    6) Outpatient Infectious Diseases Follow-up     Follow-up appointment will be arranged by the ID clinic and will be found in the patient's appointments tab.     Prior to discharge, please ensure the patient's follow-up has been scheduled.     If there is still no follow-up scheduled prior to discharge, please send an EPIC message to Lisette Peralta in Infectious Diseases.              "

## 2023-08-04 NOTE — SUBJECTIVE & OBJECTIVE
Interval Hx:  Blood cultures 7/21 growing E Coli. ID following, rec obtaining TTE. Continue Zosyn. Final ID recs pending   Patient noted to require assistance  with transfers. Pending PT/OT eval .     Review of Systems  Objective:     Vital Signs (Most Recent):  Temp: 98.4 °F (36.9 °C) (08/04/23 1228)  Pulse: 75 (08/04/23 1228)  Resp: 20 (08/04/23 0805)  BP: (!) 154/73 (08/04/23 1228)  SpO2: (!) 93 % (08/04/23 1228) Vital Signs (24h Range):  Temp:  [97.7 °F (36.5 °C)-98.9 °F (37.2 °C)] 98.4 °F (36.9 °C)  Pulse:  [72-94] 75  Resp:  [18-20] 20  SpO2:  [92 %-95 %] 93 %  BP: (130-178)/(62-86) 154/73     Weight: 67.6 kg (149 lb)  Body mass index is 26.39 kg/m².    Intake/Output Summary (Last 24 hours) at 8/4/2023 1446  Last data filed at 8/4/2023 0128  Gross per 24 hour   Intake 188.57 ml   Output 300 ml   Net -111.43 ml           Physical Exam  Vitals and nursing note reviewed.   Constitutional:       General: She is not in acute distress.  HENT:      Head: Normocephalic and atraumatic.      Nose:      Comments: NC in nares     Mouth/Throat:      Mouth: Mucous membranes are dry.   Eyes:      Extraocular Movements: Extraocular movements intact.      Conjunctiva/sclera: Conjunctivae normal.   Cardiovascular:      Rate and Rhythm: Normal rate and regular rhythm.      Heart sounds: Normal heart sounds.   Pulmonary:      Effort: Pulmonary effort is normal. No respiratory distress.   Abdominal:      General: There is no distension.      Palpations: Abdomen is soft.      Tenderness: There is no guarding or rebound.   Musculoskeletal:         General: Normal range of motion.      Cervical back: Normal range of motion.      Right lower leg: No edema.      Left lower leg: No edema.   Skin:     General: Skin is warm and dry.   Neurological:      General: No focal deficit present.      Mental Status: She is alert and oriented to person, place, and time.      Motor: No weakness.   Psychiatric:         Mood and Affect: Mood normal.          Behavior: Behavior normal.         Thought Content: Thought content normal.         Judgment: Judgment normal.

## 2023-08-04 NOTE — CONSULTS
"[facility] - [facility]  Infectious Disease  Consult Note    Patient Name: Kenyetta Andersen  MRN: [id number]  Admission Date: 8/1/2023  Hospital Length of Stay: 3 days  Attending Physician: Fawad Wheat MD  Primary Care Provider: Hever Arreola MD     Isolation Status: No active isolations    Patient information was obtained from patient and ER records.      Consults  Assessment/Plan:     ID  * Cholangitis  88F with h/o GERD, colon cancer (Dx 2009; with lung nodule; adj folfox) s/p R hemicolectomy with end to side ileocolonic anastamosis admitted with abdominal pain, found to have cholangitis. BCx with pan-susceptible E.coli and enterococcus, kleb pneumo, bacteroides. bcx 8/2 ngtd. S/p ERCP. Clinically improving since ERCP. On zosyn. ID consulted for "GNR bacteremia"    Suspect prolonged bacteremia from source control issue more than abx failure. Fortunately clinically improved following ercp. 2d echo neg for veg and bcx clear. Wendy being considered    Recommendations:   - continue zosyn. Plan on 2 weeks OPAT  - no objections to picc line from infectious disease standpoint    Outpatient Antibiotic Therapy Plan:    Please send referral to [facility].    1) Infection: cholangitis, bacteremia    2) Discharge Antibiotics:    Intravenous antibiotics:   Zosyn 12gm daily continuous infusion    3) Therapy Duration:  2 weeks    Estimated end date of IV antibiotics: 8/15/23    4) Outpatient Weekly Labs:    Order the following labs to be drawn on Mondays:    CBC   CMP    CRP    5) Fax Lab Results to Infectious Diseases Provider: Dr duke    [facility] Fax Number: [phone]    6) Outpatient Infectious Diseases Follow-up     Follow-up appointment will be arranged by the ID clinic and will be found in the patient's appointments tab.     Prior to discharge, please ensure the patient's follow-up has been scheduled.     If there is still no follow-up scheduled prior to discharge, " "please send an EPIC message to Lisette Peralta in Infectious Diseases.                    Thank you for your consult. I will sign off. Please contact us if you have any additional questions.    Giovana Oh MD  Infectious Disease  [facility]    Subjective:     Principal Problem: Cholangitis    HPI:   88F with h/o GERD c/b esophagitis, hypothyroid, colon cancer (Dx 2009; with lung nodule; adj folfox) s/p R hemicolectomy with end to side ileocolonic anastamosis admitted to [facility] as transfer from [facility] for ERCP. Pt had been symptomatic with "chest pain and abdominal pain as well as generalized weakness, she was found to have evidence of possible acalculus cholecystitis on presentation on CT scan.  She had elevated bilirubin and transaminitis, she was also diagnosed with cholangitis spiking fevers and found to have polymicrobial bacteremia with Gram-positive and Gram-negative bacteremia."    Reports acute onset of belly pain on Saturday associated with fever. Pain and fever now improved. Denies focal tenderness. Denies diarrhea. Reports hasn't been on chemo for 10 years and denies presence of port/pacemaker    Blood Culture, Routine      Abnormal   GRAM NEGATIVE PRISCILA, LACTOSE    Identification and susceptibility pending           Bacteroides fragilis Not Detected Detected Abnormal     Enterobacterales Not Detected See species for ID Abnormal          Escherichia coli Not Detected Detected Abnormal               Klebsiella pneumoniae group Not Detected Detected Abnormal         Repeat bcx, NGTD x 1 day, but gram stain positive    Component 1 d ago   Blood Culture, Routine No Growth to date P    Blood Culture, Routine Gram stain aer bottle: Gram negative rods P      On vanc/zosyn. Fever defervesced    S/p ERCP 8/1        ID consulted for "GNR bacteremia"      Interval history: NAEO. Tolerating abx. Asking about going home. Denies abdominal pain    Past Surgical History:   Procedure " "Laterality Date    APPENDECTOMY      CATARACT EXTRACTION W/  INTRAOCULAR LENS IMPLANT Left     CATARACT EXTRACTION W/  INTRAOCULAR LENS IMPLANT Right     Dr aSwant    COLON SURGERY      resection    COLONOSCOPY N/A 3/14/2017    Procedure: COLONOSCOPY;  Surgeon: Killian Guerrier MD;  Location: [facility];  Service: Endoscopy;  Laterality: N/A;    COLONOSCOPY  03/14/2017    Dr. Guerrier: hemorrhoids, one colon polyp removed, "Patent functional end-to-end ileo-colonic anastomosis"with healthy mucosa; biopsy: hyperplastic polyp; repeat in 3 years for surveillance    COLONOSCOPY N/A 1/21/2020    Procedure: COLONOSCOPY;  Surgeon: Timo Darling MD;  Location: [facility] ENDO;  Service: Endoscopy;  Laterality: N/A;    COLONOSCOPY N/A 7/8/2022    Procedure: COLONOSCOPY;  Surgeon: Anaid Washington MD;  Location: [facility];  Service: Endoscopy;  Laterality: N/A;    ECTOPIC PREGNANCY SURGERY      ERCP N/A 7/31/2023    Procedure: ERCP (ENDOSCOPIC RETROGRADE CHOLANGIOPANCREATOGRAPHY);  Surgeon: Nasim Cuadra MD;  Location: [facility];  Service: Endoscopy;  Laterality: N/A;    ERCP N/A 8/1/2023    Procedure: ERCP (ENDOSCOPIC RETROGRADE CHOLANGIOPANCREATOGRAPHY);  Surgeon: Олег Arreaga MD;  Location: [address]);  Service: Endoscopy;  Laterality: N/A;    ESOPHAGOGASTRODUODENOSCOPY N/A 5/16/2019    Procedure: EGD (ESOPHAGOGASTRODUODENOSCOPY);  Surgeon: Anaid Washington MD;  Location: [facility];  Service: Endoscopy;  Laterality: N/A;    ESOPHAGOGASTRODUODENOSCOPY N/A 7/11/2019    Procedure: EGD (ESOPHAGOGASTRODUODENOSCOPY);  Surgeon: Anaid Washington MD;  Location: [facility] ENDO;  Service: Endoscopy;  Laterality: N/A;    ESOPHAGOGASTRODUODENOSCOPY N/A 2/5/2021    Procedure: EGD (ESOPHAGOGASTRODUODENOSCOPY);  Surgeon: Anaid Washington MD;  Location: [facility] ENDO;  Service: Endoscopy;  Laterality: N/A;    ESOPHAGOGASTRODUODENOSCOPY N/A 11/11/2021    Procedure: EGD (ESOPHAGOGASTRODUODENOSCOPY);  Surgeon: " Anaid Washington MD;  Location: [facility];  Service: Endoscopy;  Laterality: N/A;    ESOPHAGOGASTRODUODENOSCOPY N/A 7/8/2022    Procedure: EGD (ESOPHAGOGASTRODUODENOSCOPY);  Surgeon: Anaid Washington MD;  Location: [facility] ENDO;  Service: Endoscopy;  Laterality: N/A;    ESOPHAGOGASTRODUODENOSCOPY N/A 4/12/2023    Procedure: EGD (ESOPHAGOGASTRODUODENOSCOPY);  Surgeon: Anaid Washington MD;  Location: [facility] ENDO;  Service: Endoscopy;  Laterality: N/A;    EYE SURGERY      bilateral cataracts    HYSTERECTOMY      complete    JOINT REPLACEMENT      Liban Knee    ROTATOR CUFF REPAIR Right     TOE SURGERY      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    UPPER GASTROINTESTINAL ENDOSCOPY  06/12/2017    Dr. Guerrier: gastritis and esophagitis, biopsy: chronic gastritis, negative for h pylori, esophageal- positive eosinophils, negative for barretts; repeat in 2 months    UPPER GASTROINTESTINAL ENDOSCOPY  07/27/2017    Dr. Guerrier       Review of patient's allergies indicates:   Allergen Reactions    Ace inhibitors Edema     Other reaction(s): Angioedema    Codeine Hives       No current facility-administered medications on file prior to encounter.     Current Outpatient Medications on File Prior to Encounter   Medication Sig    acetaminophen (TYLENOL) 650 MG TbSR Take 650 mg by mouth every 8 (eight) hours as needed (pain).    alendronate (FOSAMAX) 70 MG tablet TAKE 1 TABLET (70 MG TOTAL) BY MOUTH EVERY 7 DAYS. ON EMPTY STOMACH IN MORNING, REMAIN UPRIGHT FOR 30 MINUTES.    ascorbic acid, vitamin C, (VITAMIN C) 500 MG tablet Take 500 mg by mouth once daily.    b complex vitamins capsule Take 1 capsule by mouth once daily.    brimonidine 0.2% (ALPHAGAN) 0.2 % Drop INSTILL 1 DROP INTO BOTH EYES 3 TIMES A DAY (Patient taking differently: Place 1 drop into both eyes 3 (three) times daily.)    calcium carbonate-magnesium hydroxide (ROLAIDS) 550-110 mg Chew Take 1 tablet by mouth 2 (two) times daily as needed  (heartburn).    dorzolamide-timolol 2-0.5% (COSOPT) 22.3-6.8 mg/mL ophthalmic solution INSTILL 1 DROP INTO BOTH EYES TWICE A DAY    gabapentin (NEURONTIN) 300 MG capsule TAKE 1 CAP IN THE AM, 1 CAP MIDDAY, AND 2 CAPS AT NIGHT    mirtazapine (REMERON) 7.5 MG Tab TAKE 1 TABLET BY MOUTH EVERY EVENING.    omeprazole (PRILOSEC) 40 MG capsule Take 1 capsule (40 mg total) by mouth 2 (two) times daily before meals for 60 days, THEN 1 capsule (40 mg total) every morning. (Patient taking differently: Take 1 capsule (40 mg total) by mouth daily before meals )    potassium chloride SA (K-DUR,KLOR-CON) 20 MEQ tablet TAKE 1 TABLET BY MOUTH ONCE DAILY (Patient taking differently: Take 20 mEq by mouth once daily.)     Family History       Problem Relation (Age of Onset)    Arthritis Sister    Heart disease Mother, Father, Brother    No Known Problems Brother, Brother    Parkinsonism Sister          Tobacco Use    Smoking status: Every Day     Current packs/day: 0.00     Average packs/day: 0.3 packs/day for 65.0 years (21.5 ttl pk-yrs)     Types: Cigarettes     Start date: 10/5/1955     Last attempt to quit: 10/5/2020     Years since quittin.8    Smokeless tobacco: Never    Tobacco comments:     3/1/23--states smokes 1-2 cigarettes a day   Substance and Sexual Activity    Alcohol use: Yes     Alcohol/week: 2.0 standard drinks of alcohol     Types: 2 Shots of liquor per week     Comment: Daily, about 4 drinks per day (crown)    Drug use: No    Sexual activity: Not Currently     Review of Systems   Constitutional:  Positive for chills. Negative for fever.   HENT:  Negative for congestion.    Respiratory:  Positive for shortness of breath.    Cardiovascular:  Positive for chest pain and leg swelling (intermittent for years).   Gastrointestinal:  Positive for abdominal pain, nausea and vomiting.   Genitourinary:  Negative for dysuria and urgency.   Musculoskeletal:  Positive for back pain.   Skin:  Negative for wound.    Allergic/Immunologic: Negative for immunocompromised state.   Neurological:  Negative for seizures.   Psychiatric/Behavioral:          Mildly confused after morphine     Objective:     Vital Signs (Most Recent):  Temp: 98.5 °F (36.9 °C) (08/04/23 1608)  Pulse: 62 (08/04/23 1608)  Resp: 20 (08/04/23 1608)  BP: 136/65 (08/04/23 1608)  SpO2: 95 % (08/04/23 1608) Vital Signs (24h Range):  Temp:  [98.2 °F (36.8 °C)-98.9 °F (37.2 °C)] 98.5 °F (36.9 °C)  Pulse:  [62-94] 62  Resp:  [18-20] 20  SpO2:  [93 %-95 %] 95 %  BP: (136-178)/(65-86) 136/65     Weight: 67.6 kg (149 lb)  Body mass index is 26.39 kg/m².     Physical Exam  Vitals and nursing note reviewed.   Constitutional:       Appearance: Normal appearance. She is not ill-appearing.      Comments: Elderly female, frail, ill appearing, shaking, under the blankets   HENT:      Head: Normocephalic and atraumatic.      Mouth/Throat:      Mouth: Mucous membranes are moist.   Eyes:      General:         Right eye: No discharge.         Left eye: No discharge.      Comments: Wearing corrective lenses, senile arcus   Cardiovascular:      Rate and Rhythm: Regular rhythm. Tachycardia present.      Heart sounds: No murmur heard.     Comments: Warm peripheries, mild pedal edema  Pulmonary:      Effort: Pulmonary effort is normal. No respiratory distress.      Breath sounds: Normal breath sounds.   Abdominal:      General: Abdomen is flat. Bowel sounds are normal. There is no distension.      Palpations: Abdomen is soft.   Genitourinary:     Comments: No martines  Musculoskeletal:         General: No swelling.   Skin:     General: Skin is warm.   Neurological:      General: No focal deficit present.      Mental Status: She is alert and oriented to person, place, and time.   Psychiatric:      Comments: cooperative                Significant Labs: BMP:   Recent Labs   Lab 08/04/23  7598   GLU 81      K 4.2   *   CO2 16*   BUN 14   CREATININE 0.9   CALCIUM 8.8   MG 1.7  "      CBC:   Recent Labs   Lab 08/03/23  0549 08/04/23  0419   WBC 17.05* 17.04*   HGB 12.6 12.6   HCT 36.6* 36.3*   PLT 57* 60*       CMP:   Recent Labs   Lab 08/03/23  0549 08/04/23  0419    140   K 3.4* 4.2   * 115*   CO2 19* 16*   GLU 59* 81   BUN 21 14   CREATININE 1.2 0.9   CALCIUM 8.5* 8.8   PROT 5.1* 5.3*   ALBUMIN 1.8* 1.8*   BILITOT 1.1* 0.9   ALKPHOS 217* 291*   AST 36 39   ALT 47* 40   ANIONGAP 9 9       Cardiac Markers: No results for input(s): "CKMB", "MYOGLOBIN", "BNP", "TROPISTAT" in the last 48 hours.  Coagulation: No results for input(s): "PT", "INR", "APTT" in the last 48 hours.  Magnesium:   Recent Labs   Lab 08/03/23  0549 08/04/23 0419   MG 2.1 1.7       POCT Glucose:   Recent Labs   Lab 08/03/23  0832 08/04/23  0132 08/04/23  0807   POCTGLUCOSE 71 94 83       Troponin:   No results for input(s): "TROPONINI", "TROPONINIHS" in the last 48 hours.    TSH:   Recent Labs   Lab 07/30/23  1844   TSH 0.490       Urine Culture: No results for input(s): "LABURIN" in the last 48 hours.  Urine Studies: No results for input(s): "COLORU", "APPEARANCEUA", "PHUR", "SPECGRAV", "PROTEINUA", "GLUCUA", "KETONESU", "BILIRUBINUA", "OCCULTUA", "NITRITE", "UROBILINOGEN", "LEUKOCYTESUR", "RBCUA", "WBCUA", "BACTERIA", "SQUAMEPITHEL", "HYALINECASTS" in the last 48 hours.    Invalid input(s): "WRIGHTSUR"    Significant Imaging: I have reviewed all pertinent imaging results/findings within the past 24 hours.    X-Ray Chest AP Portable    Result Date: 7/30/2023  EXAMINATION: XR CHEST AP PORTABLE CLINICAL INDICATION: Female, 88 years old. Chest COMPARISON: None. FINDINGS: The heart and great vessels appear normal. There are some atherosclerotic changes of aorta. Both lungs are well expanded and clear. No pleural effusion can be seen. No pneumothorax can be seen. IMPRESSION: No x-ray evidence of active or acute disease. Electronically signed by:  Marcela Escalante Jr., MD  7/30/2023 9:22 PM CDT Workstation: " [id number]    CT Abdomen Pelvis With Contrast    Result Date: 7/30/2023  EXAMINATION: CT ABDOMEN PELVIS WITH CONTRAST CLINICAL INDICATION: Female, 88 years old. Epigastric pain TECHNIQUE: Helical CT scan examination of the abdomen and pelvis is performed from the domes of the diaphragm to the pubic symphysis with the administration of intravenous contrast material. CONTRAST: 75 mL of Omnipaque 300 IV. COMPARISON: None FINDINGS: Lower Chest: Visualized lung bases are clear other than hypoaeration changes. Heart is normal in size. No pericardial or pleural effusion. Liver: Normal in size and contour. No focal lesion. Bile Ducts: Common bile duct is  dilated. Gallbladder: There is marked dilation of the gallbladder and there may be pericolic cystic fluid. No definite gallstones can be seen. Pancreas: Pancreas is atrophic but demonstrates no other abnormality. Spleen: Normal in size and contour. Adrenals: Normal configuration. Kidneys and Ureters: Normal size and contour. No hydronephrosis. There are bilateral renal cysts. Bladder: Normal in appearance. Bowel: Stomach: Unremarkable. Small Intestine: Unremarkable. Appendix: No inflammatory changes. Colon: Unremarkable. Vessels: Abdominal aorta and inferior vena cava are normal in course and caliber. Reproductive Organs: Uterus is apparently surgically absent and no pelvic mass can be seen Lymph Nodes: No pathologic mesenteric or retroperitoneal lymph nodes. Peritoneum: No free air, free fluid, or fluid collection. Abdominal Wall: No hernia or mass. Musculoskeletal: No acute abnormality or suspicious bony lesion. There is considerable compression of the L1 vertebral body which appears chronic. There is some retropulsion of bone. Compression of the superior endplate of T12 also appears to be chronic. There are degenerative disc changes at T12-L1 to L2-L3. No aggressive skeletal lesions can be seen. IMPRESSION: Dilation of the gallbladder and dilation of the common bile  duct. There may be pericholecystic fluid. No gallstones can be seen but changes may represent  noncalculous acute cholecystitis. Small bilateral renal cysts. This exam was performed according to our departmental dose-optimization program which includes automated exposure control, adjustment of the mA and/or kV according to patient size and/or use of iterative reconstruction technique. Electronically signed by:  Marcela Escalante Jr., MD  7/30/2023 9:04 PM CDT Workstation: [id number]

## 2023-08-04 NOTE — PLAN OF CARE
Problem: Adult Inpatient Plan of Care  Goal: Plan of Care Review  Outcome: Ongoing, Progressing  Goal: Patient-Specific Goal (Individualized)  Outcome: Ongoing, Progressing  Goal: Absence of Hospital-Acquired Illness or Injury  Outcome: Ongoing, Progressing  Goal: Optimal Comfort and Wellbeing  Outcome: Ongoing, Progressing  Goal: Readiness for Transition of Care  Outcome: Ongoing, Progressing     Problem: Infection  Goal: Absence of Infection Signs and Symptoms  Outcome: Ongoing, Progressing     Problem: Fall Injury Risk  Goal: Absence of Fall and Fall-Related Injury  Outcome: Ongoing, Progressing     Problem: Skin Injury Risk Increased  Goal: Skin Health and Integrity  Outcome: Ongoing, Progressing      NPO NPO

## 2023-08-04 NOTE — CONSULTS
Double lumen PICC to right basilic vein.  36 cm in length, 27 cm arm circumference and 0 cm exposed.   Lot # [id number].

## 2023-08-04 NOTE — PLAN OF CARE
08/04/23 1403   Post-Acute Status   Post-Acute Authorization IV Infusion   IV Infusion Status Referral(s) sent   Discharge Delays None known at this time   Discharge Plan   Discharge Plan A Home Health   Discharge Plan B Skilled Nursing Facility     CM sent referral for IV infusion to [facility] via [facility].    Rosibel Luevano, RN    [facility]  [phone]

## 2023-08-04 NOTE — SUBJECTIVE & OBJECTIVE
"Interval history: NAEO. Tolerating abx. Asking about going home. Denies abdominal pain    Past Surgical History:   Procedure Laterality Date    APPENDECTOMY      CATARACT EXTRACTION W/  INTRAOCULAR LENS IMPLANT Left     CATARACT EXTRACTION W/  INTRAOCULAR LENS IMPLANT Right     Dr Sawant    COLON SURGERY      resection    COLONOSCOPY N/A 3/14/2017    Procedure: COLONOSCOPY;  Surgeon: Killian Guerrier MD;  Location: [facility];  Service: Endoscopy;  Laterality: N/A;    COLONOSCOPY  03/14/2017    Dr. Guerrier: hemorrhoids, one colon polyp removed, "Patent functional end-to-end ileo-colonic anastomosis"with healthy mucosa; biopsy: hyperplastic polyp; repeat in 3 years for surveillance    COLONOSCOPY N/A 1/21/2020    Procedure: COLONOSCOPY;  Surgeon: Timo Darling MD;  Location: [facility];  Service: Endoscopy;  Laterality: N/A;    COLONOSCOPY N/A 7/8/2022    Procedure: COLONOSCOPY;  Surgeon: Anaid Washington MD;  Location: [facility];  Service: Endoscopy;  Laterality: N/A;    ECTOPIC PREGNANCY SURGERY      ERCP N/A 7/31/2023    Procedure: ERCP (ENDOSCOPIC RETROGRADE CHOLANGIOPANCREATOGRAPHY);  Surgeon: Nasim Cuadra MD;  Location: [facility];  Service: Endoscopy;  Laterality: N/A;    ERCP N/A 8/1/2023    Procedure: ERCP (ENDOSCOPIC RETROGRADE CHOLANGIOPANCREATOGRAPHY);  Surgeon: Олег Arreaga MD;  Location: [facility] ([address] [place] [address]);  Service: Endoscopy;  Laterality: N/A;    ESOPHAGOGASTRODUODENOSCOPY N/A 5/16/2019    Procedure: EGD (ESOPHAGOGASTRODUODENOSCOPY);  Surgeon: Anaid Washington MD;  Location: [facility];  Service: Endoscopy;  Laterality: N/A;    ESOPHAGOGASTRODUODENOSCOPY N/A 7/11/2019    Procedure: EGD (ESOPHAGOGASTRODUODENOSCOPY);  Surgeon: Anaid Washington MD;  Location: [facility];  Service: Endoscopy;  Laterality: N/A;    ESOPHAGOGASTRODUODENOSCOPY N/A 2/5/2021    Procedure: EGD (ESOPHAGOGASTRODUODENOSCOPY);  Surgeon: Anaid Washington MD;  Location: [facility];  Service: Endoscopy;  Laterality: " N/A;    ESOPHAGOGASTRODUODENOSCOPY N/A 11/11/2021    Procedure: EGD (ESOPHAGOGASTRODUODENOSCOPY);  Surgeon: Anaid Washington MD;  Location: [facility] ENDO;  Service: Endoscopy;  Laterality: N/A;    ESOPHAGOGASTRODUODENOSCOPY N/A 7/8/2022    Procedure: EGD (ESOPHAGOGASTRODUODENOSCOPY);  Surgeon: Anaid Washington MD;  Location: [facility] ENDO;  Service: Endoscopy;  Laterality: N/A;    ESOPHAGOGASTRODUODENOSCOPY N/A 4/12/2023    Procedure: EGD (ESOPHAGOGASTRODUODENOSCOPY);  Surgeon: Anaid Washington MD;  Location: [facility] ENDO;  Service: Endoscopy;  Laterality: N/A;    EYE SURGERY      bilateral cataracts    HYSTERECTOMY      complete    JOINT REPLACEMENT      Liban Knee    ROTATOR CUFF REPAIR Right     TOE SURGERY      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    UPPER GASTROINTESTINAL ENDOSCOPY  06/12/2017    Dr. Guerrier: gastritis and esophagitis, biopsy: chronic gastritis, negative for h pylori, esophageal- positive eosinophils, negative for barretts; repeat in 2 months    UPPER GASTROINTESTINAL ENDOSCOPY  07/27/2017    Dr. Guerrier       Review of patient's allergies indicates:   Allergen Reactions    Ace inhibitors Edema     Other reaction(s): Angioedema    Codeine Hives       No current facility-administered medications on file prior to encounter.     Current Outpatient Medications on File Prior to Encounter   Medication Sig    acetaminophen (TYLENOL) 650 MG TbSR Take 650 mg by mouth every 8 (eight) hours as needed (pain).    alendronate (FOSAMAX) 70 MG tablet TAKE 1 TABLET (70 MG TOTAL) BY MOUTH EVERY 7 DAYS. ON EMPTY STOMACH IN MORNING, REMAIN UPRIGHT FOR 30 MINUTES.    ascorbic acid, vitamin C, (VITAMIN C) 500 MG tablet Take 500 mg by mouth once daily.    b complex vitamins capsule Take 1 capsule by mouth once daily.    brimonidine 0.2% (ALPHAGAN) 0.2 % Drop INSTILL 1 DROP INTO BOTH EYES 3 TIMES A DAY (Patient taking differently: Place 1 drop into both eyes 3 (three) times daily.)    calcium carbonate-magnesium  hydroxide (ROLAIDS) 550-110 mg Chew Take 1 tablet by mouth 2 (two) times daily as needed (heartburn).    dorzolamide-timolol 2-0.5% (COSOPT) 22.3-6.8 mg/mL ophthalmic solution INSTILL 1 DROP INTO BOTH EYES TWICE A DAY    gabapentin (NEURONTIN) 300 MG capsule TAKE 1 CAP IN THE AM, 1 CAP MIDDAY, AND 2 CAPS AT NIGHT    mirtazapine (REMERON) 7.5 MG Tab TAKE 1 TABLET BY MOUTH EVERY EVENING.    omeprazole (PRILOSEC) 40 MG capsule Take 1 capsule (40 mg total) by mouth 2 (two) times daily before meals for 60 days, THEN 1 capsule (40 mg total) every morning. (Patient taking differently: Take 1 capsule (40 mg total) by mouth daily before meals )    potassium chloride SA (K-DUR,KLOR-CON) 20 MEQ tablet TAKE 1 TABLET BY MOUTH ONCE DAILY (Patient taking differently: Take 20 mEq by mouth once daily.)     Family History       Problem Relation (Age of Onset)    Arthritis Sister    Heart disease Mother, Father, Brother    No Known Problems Brother, Brother    Parkinsonism Sister          Tobacco Use    Smoking status: Every Day     Current packs/day: 0.00     Average packs/day: 0.3 packs/day for 65.0 years (21.5 ttl pk-yrs)     Types: Cigarettes     Start date: 10/5/1955     Last attempt to quit: 10/5/2020     Years since quittin.8    Smokeless tobacco: Never    Tobacco comments:     3/1/23--states smokes 1-2 cigarettes a day   Substance and Sexual Activity    Alcohol use: Yes     Alcohol/week: 2.0 standard drinks of alcohol     Types: 2 Shots of liquor per week     Comment: Daily, about 4 drinks per day (crown)    Drug use: No    Sexual activity: Not Currently     Review of Systems   Constitutional:  Positive for chills. Negative for fever.   HENT:  Negative for congestion.    Respiratory:  Positive for shortness of breath.    Cardiovascular:  Positive for chest pain and leg swelling (intermittent for years).   Gastrointestinal:  Positive for abdominal pain, nausea and vomiting.   Genitourinary:  Negative for dysuria and  urgency.   Musculoskeletal:  Positive for back pain.   Skin:  Negative for wound.   Allergic/Immunologic: Negative for immunocompromised state.   Neurological:  Negative for seizures.   Psychiatric/Behavioral:          Mildly confused after morphine     Objective:     Vital Signs (Most Recent):  Temp: 98.5 °F (36.9 °C) (08/04/23 1608)  Pulse: 62 (08/04/23 1608)  Resp: 20 (08/04/23 1608)  BP: 136/65 (08/04/23 1608)  SpO2: 95 % (08/04/23 1608) Vital Signs (24h Range):  Temp:  [98.2 °F (36.8 °C)-98.9 °F (37.2 °C)] 98.5 °F (36.9 °C)  Pulse:  [62-94] 62  Resp:  [18-20] 20  SpO2:  [93 %-95 %] 95 %  BP: (136-178)/(65-86) 136/65     Weight: 67.6 kg (149 lb)  Body mass index is 26.39 kg/m².     Physical Exam  Vitals and nursing note reviewed.   Constitutional:       Appearance: Normal appearance. She is not ill-appearing.      Comments: Elderly female, frail, ill appearing, shaking, under the blankets   HENT:      Head: Normocephalic and atraumatic.      Mouth/Throat:      Mouth: Mucous membranes are moist.   Eyes:      General:         Right eye: No discharge.         Left eye: No discharge.      Comments: Wearing corrective lenses, senile arcus   Cardiovascular:      Rate and Rhythm: Regular rhythm. Tachycardia present.      Heart sounds: No murmur heard.     Comments: Warm peripheries, mild pedal edema  Pulmonary:      Effort: Pulmonary effort is normal. No respiratory distress.      Breath sounds: Normal breath sounds.   Abdominal:      General: Abdomen is flat. Bowel sounds are normal. There is no distension.      Palpations: Abdomen is soft.   Genitourinary:     Comments: No martines  Musculoskeletal:         General: No swelling.   Skin:     General: Skin is warm.   Neurological:      General: No focal deficit present.      Mental Status: She is alert and oriented to person, place, and time.   Psychiatric:      Comments: cooperative                Significant Labs: BMP:   Recent Labs   Lab 08/04/23  0419   GLU 81   NA  "140   K 4.2   *   CO2 16*   BUN 14   CREATININE 0.9   CALCIUM 8.8   MG 1.7       CBC:   Recent Labs   Lab 08/03/23  0549 08/04/23  0419   WBC 17.05* 17.04*   HGB 12.6 12.6   HCT 36.6* 36.3*   PLT 57* 60*       CMP:   Recent Labs   Lab 08/03/23  0549 08/04/23  0419    140   K 3.4* 4.2   * 115*   CO2 19* 16*   GLU 59* 81   BUN 21 14   CREATININE 1.2 0.9   CALCIUM 8.5* 8.8   PROT 5.1* 5.3*   ALBUMIN 1.8* 1.8*   BILITOT 1.1* 0.9   ALKPHOS 217* 291*   AST 36 39   ALT 47* 40   ANIONGAP 9 9       Cardiac Markers: No results for input(s): "CKMB", "MYOGLOBIN", "BNP", "TROPISTAT" in the last 48 hours.  Coagulation: No results for input(s): "PT", "INR", "APTT" in the last 48 hours.  Magnesium:   Recent Labs   Lab 08/03/23  0549 08/04/23  0419   MG 2.1 1.7       POCT Glucose:   Recent Labs   Lab 08/03/23  0832 08/04/23  0132 08/04/23  0807   POCTGLUCOSE 71 94 83       Troponin:   No results for input(s): "TROPONINI", "TROPONINIHS" in the last 48 hours.    TSH:   Recent Labs   Lab 07/30/23  1844   TSH 0.490       Urine Culture: No results for input(s): "LABURIN" in the last 48 hours.  Urine Studies: No results for input(s): "COLORU", "APPEARANCEUA", "PHUR", "SPECGRAV", "PROTEINUA", "GLUCUA", "KETONESU", "BILIRUBINUA", "OCCULTUA", "NITRITE", "UROBILINOGEN", "LEUKOCYTESUR", "RBCUA", "WBCUA", "BACTERIA", "SQUAMEPITHEL", "HYALINECASTS" in the last 48 hours.    Invalid input(s): "WRIGHTSUR"    Significant Imaging: I have reviewed all pertinent imaging results/findings within the past 24 hours.    X-Ray Chest AP Portable    Result Date: 7/30/2023  EXAMINATION: XR CHEST AP PORTABLE CLINICAL INDICATION: Female, 88 years old. Chest COMPARISON: None. FINDINGS: The heart and great vessels appear normal. There are some atherosclerotic changes of aorta. Both lungs are well expanded and clear. No pleural effusion can be seen. No pneumothorax can be seen. IMPRESSION: No x-ray evidence of active or acute disease. " Electronically signed by:  Marcela Escalante Jr., MD  7/30/2023 9:22 PM CDT Workstation: [id number]    CT Abdomen Pelvis With Contrast    Result Date: 7/30/2023  EXAMINATION: CT ABDOMEN PELVIS WITH CONTRAST CLINICAL INDICATION: Female, 88 years old. Epigastric pain TECHNIQUE: Helical CT scan examination of the abdomen and pelvis is performed from the domes of the diaphragm to the pubic symphysis with the administration of intravenous contrast material. CONTRAST: 75 mL of Omnipaque 300 IV. COMPARISON: None FINDINGS: Lower Chest: Visualized lung bases are clear other than hypoaeration changes. Heart is normal in size. No pericardial or pleural effusion. Liver: Normal in size and contour. No focal lesion. Bile Ducts: Common bile duct is  dilated. Gallbladder: There is marked dilation of the gallbladder and there may be pericolic cystic fluid. No definite gallstones can be seen. Pancreas: Pancreas is atrophic but demonstrates no other abnormality. Spleen: Normal in size and contour. Adrenals: Normal configuration. Kidneys and Ureters: Normal size and contour. No hydronephrosis. There are bilateral renal cysts. Bladder: Normal in appearance. Bowel: Stomach: Unremarkable. Small Intestine: Unremarkable. Appendix: No inflammatory changes. Colon: Unremarkable. Vessels: Abdominal aorta and inferior vena cava are normal in course and caliber. Reproductive Organs: Uterus is apparently surgically absent and no pelvic mass can be seen Lymph Nodes: No pathologic mesenteric or retroperitoneal lymph nodes. Peritoneum: No free air, free fluid, or fluid collection. Abdominal Wall: No hernia or mass. Musculoskeletal: No acute abnormality or suspicious bony lesion. There is considerable compression of the L1 vertebral body which appears chronic. There is some retropulsion of bone. Compression of the superior endplate of T12 also appears to be chronic. There are degenerative disc changes at T12-L1 to L2-L3. No aggressive skeletal lesions  can be seen. IMPRESSION: Dilation of the gallbladder and dilation of the common bile duct. There may be pericholecystic fluid. No gallstones can be seen but changes may represent  noncalculous acute cholecystitis. Small bilateral renal cysts. This exam was performed according to our departmental dose-optimization program which includes automated exposure control, adjustment of the mA and/or kV according to patient size and/or use of iterative reconstruction technique. Electronically signed by:  Marcela Escalante Jr., MD  7/30/2023 9:04 PM CDT Workstation: [id number]

## 2023-08-04 NOTE — PROGRESS NOTES
[facility]  Progress Note    Patient Name: Kenyetta Andersen  MRN: [id number]  Patient Class: IP- Inpatient   Admission Date: 8/1/2023  Length of Stay: 3 days  Attending Physician: Fawad Wheat MD  Primary Care Provider: Hever Arreola MD        Subjective:     Principal Problem:Cholangitis        HPI:  Kenyetta Andersen is a 88 y.o. female with PMHx GERD c/b esophagitis, hypothyroid, colon cancer (Dx 2009) s/p R hemicolectomy with end to side ileocolonic anastamosis and FOLFOX chemotherapy who presents to [facility] as transfer from [facility] for ERCP.     Per  transfer note: presented with chest pain and abdominal pain as well as generalized weakness, she was found to have evidence of possible acalculus cholecystitis on presentation on CT scan.  She had elevated bilirubin and transaminitis, she was also diagnosed with cholangitis spiking fevers and found to have polymicrobial bacteremia with Gram-positive and Gram-negative bacteremia.  Infectious Disease was consulted, they recommended continue with Zosyn repeating blood cultures.  She was IV fluid resuscitated had a midline placed. general surgery was consulted-they recommended consult for ERCP. GI consult for ERCP and duct clearance, can consider cholecystectomy afterwards as MRCP was suggestive of common bile duct stone.  Palliative Care was consulted and reviewed code status, she was made DNR, she also expressed that she would not want a PEG tube. She was taken to ERCP with GI, EGD findings was tortuous esophagus, narrowing at GE junction, distortion of antrum noted, gastroscope exchange for duodenal scope, scope advanced easily to the antrum and navigated around distorted antral thickening, scope advanced to the pylorus but unable to drop into the duodenal bulb, scope exchange back to gastroscope and unremarkable, GI is recommending transfer to [facility] for attempt at ERCP.    Labs and imaging at [facility]:  CBC WBC 12.2, Hgb 13-15, plts 14. BUN/Cr 23/1.3 (baseline cr ~1.0). Tibi 2.8 ->2.0, ALP wnl, AST/ /107-> 131/91. Procal 60. BCX w/ E coli, Klebsiella pneumoniae, and Bacteriodes fragilis 07/30. Repeat blood cultures drawn 7/31 NGTD. MRCP [place]-shaped low signal intensity mass which appears to lie within the distal segment of the common bile duct suggestive of choledocholithiasis. There is dilatation of the biliary tree proximal to this level.    On arrival, afebrile, HDS on 3L NC (not oxygen a baseline). She complains of some ongoing abdominal pain, but denies nausea, vomiting, diarrhea, fever, chills.         Overview/Hospital Course:  No notes on file    Interval Hx:  Blood cultures 7/21 growing E Coli. ID following, rec obtaining TTE. Continue Zosyn. Final ID recs pending   Patient noted to require assistance  with transfers. Pending PT/OT eval .     Review of Systems  Objective:     Vital Signs (Most Recent):  Temp: 98.4 °F (36.9 °C) (08/04/23 1228)  Pulse: 75 (08/04/23 1228)  Resp: 20 (08/04/23 0805)  BP: (!) 154/73 (08/04/23 1228)  SpO2: (!) 93 % (08/04/23 1228) Vital Signs (24h Range):  Temp:  [97.7 °F (36.5 °C)-98.9 °F (37.2 °C)] 98.4 °F (36.9 °C)  Pulse:  [72-94] 75  Resp:  [18-20] 20  SpO2:  [92 %-95 %] 93 %  BP: (130-178)/(62-86) 154/73     Weight: 67.6 kg (149 lb)  Body mass index is 26.39 kg/m².    Intake/Output Summary (Last 24 hours) at 8/4/2023 1446  Last data filed at 8/4/2023 0128  Gross per 24 hour   Intake 188.57 ml   Output 300 ml   Net -111.43 ml           Physical Exam  Vitals and nursing note reviewed.   Constitutional:       General: She is not in acute distress.  HENT:      Head: Normocephalic and atraumatic.      Nose:      Comments: NC in nares     Mouth/Throat:      Mouth: Mucous membranes are dry.   Eyes:      Extraocular Movements: Extraocular movements intact.      Conjunctiva/sclera: Conjunctivae normal.   Cardiovascular:      Rate and Rhythm: Normal rate and regular rhythm.       Heart sounds: Normal heart sounds.   Pulmonary:      Effort: Pulmonary effort is normal. No respiratory distress.   Abdominal:      General: There is no distension.      Palpations: Abdomen is soft.      Tenderness: There is no guarding or rebound.   Musculoskeletal:         General: Normal range of motion.      Cervical back: Normal range of motion.      Right lower leg: No edema.      Left lower leg: No edema.   Skin:     General: Skin is warm and dry.   Neurological:      General: No focal deficit present.      Mental Status: She is alert and oriented to person, place, and time.      Motor: No weakness.   Psychiatric:         Mood and Affect: Mood normal.         Behavior: Behavior normal.         Thought Content: Thought content normal.         Judgment: Judgment normal.               Assessment/Plan:      * Cholangitis  88F presenting at transfer from [facility] for ERCP in setting of cholangitis.    -Gentle IV fluids. Tolerating diet.  -S/p ERCP by [facility] on 8/1 - prior sphincterectomy stenosis and biliary stent placed.  -General surgery consulted for possible cholecystectomy. Per gen surg, given her medical history and overall clinical picture, do not think she would have much additional benefit from a cholecystectomy as cholangitis likely secondary to strictured sphincter and not true choledocholithiasis. There is a chance she could have recurrent cholangitis, but the risk of this is extremely low. Will defer surgical intervention at this time.   -IV zosyn. ID following.  -Monitor daily CBC, CMP  -Pain control       Bacteremia due to Gram-negative bacteria  -BCX 7/20 w/ E coli, Klebsiella pneumoniae, and Bacteriodes fragilis.   -Repeat blood cultures drawn 7/31 growing EColi.  -S/p vancomycin and zosyn at [facility]  -ID consulted. Rec Zosyn  -Obtain TTE    Cigarette nicotine dependence without complication  Dangers of cigarette smoking were reviewed with patient in detail.  Patient was Counseled for 3-10 minutes. Nicotine replacement options were discussed. Nicotine replacement was discussed- not prescribed per patient's request    Daily consumption of alcohol  -Reports drinking 1 shot liquor daily  -monitor CIWA qshift, prn ativan    Pulmonary emphysema, unspecified emphysema type  Pt reports smoking 2ppd previously, cut back to 1pack per week lately. Has chronic cough, not on home oxygen and denies COPD diagnosis.   -supplemental oxygen as needed  -prn duonebs    Personal history of colon cancer, stage III  -Noted prior history of stage III colorectal cancer, status post adjuvant chemotherapy   -Followed by Dr. Granado/Renu    Stage 3a chronic kidney disease  Creatine stable for now. BMP reviewed- noted Estimated Creatinine Clearance: 31.1 mL/min (based on SCr of 1.2 mg/dL). according to latest data. Based on current GFR, CKD stage is stage 3 - GFR 30-59.  Monitor UOP and serial BMP and adjust therapy as needed. Renally dose meds. Avoid nephrotoxic medications and procedures.   Cr 1.3 on admission. Baseline ~1.0. Improving with Continuous IVF hydration. Encourage PO hydration.     GERD (gastroesophageal reflux disease)  - ppi      VTE Risk Mitigation (From admission, onward)         Ordered     heparin (porcine) injection 5,000 Units  Every 8 hours         08/01/23 1051     IP VTE HIGH RISK PATIENT  Once         08/01/23 1051     Place sequential compression device  Until discontinued         08/01/23 1051                Discharge Planning   TIERA: 8/6/2023     Code Status: DNR   Is the patient medically ready for discharge?:     Reason for patient still in hospital (select all that apply): Patient trending condition  Discharge Plan A: Home Health   Discharge Delays: None known at this time              Fawad Wheat MD  Department of Hospital Medicine   [facility]

## 2023-08-04 NOTE — ASSESSMENT & PLAN NOTE
-BCX 7/20 w/ E coli, Klebsiella pneumoniae, and Bacteriodes fragilis.   -Repeat blood cultures drawn 7/31 growing EColi.  -S/p vancomycin and zosyn at [facility]  -ID consulted. Rec Zosyn  -Obtain TTE

## 2023-08-05 LAB
POCT GLUCOSE: 143 MG/DL (ref 70–110)
POCT GLUCOSE: 79 MG/DL (ref 70–110)
POCT GLUCOSE: 82 MG/DL (ref 70–110)

## 2023-08-05 PROCEDURE — A4216 STERILE WATER/SALINE, 10 ML: HCPCS | Performed by: STUDENT IN AN ORGANIZED HEALTH CARE EDUCATION/TRAINING PROGRAM

## 2023-08-05 PROCEDURE — 25000003 PHARM REV CODE 250: Performed by: STUDENT IN AN ORGANIZED HEALTH CARE EDUCATION/TRAINING PROGRAM

## 2023-08-05 PROCEDURE — 51798 US URINE CAPACITY MEASURE: CPT

## 2023-08-05 PROCEDURE — 99232 PR SUBSEQUENT HOSPITAL CARE,LEVL II: ICD-10-PCS | Mod: ,,, | Performed by: STUDENT IN AN ORGANIZED HEALTH CARE EDUCATION/TRAINING PROGRAM

## 2023-08-05 PROCEDURE — 63600175 PHARM REV CODE 636 W HCPCS

## 2023-08-05 PROCEDURE — 97161 PT EVAL LOW COMPLEX 20 MIN: CPT

## 2023-08-05 PROCEDURE — 25000003 PHARM REV CODE 250: Performed by: HOSPITALIST

## 2023-08-05 PROCEDURE — 99900035 HC TECH TIME PER 15 MIN (STAT)

## 2023-08-05 PROCEDURE — 20600001 HC STEP DOWN PRIVATE ROOM

## 2023-08-05 PROCEDURE — 99232 SBSQ HOSP IP/OBS MODERATE 35: CPT | Mod: ,,, | Performed by: STUDENT IN AN ORGANIZED HEALTH CARE EDUCATION/TRAINING PROGRAM

## 2023-08-05 PROCEDURE — 63600175 PHARM REV CODE 636 W HCPCS: Performed by: HOSPITALIST

## 2023-08-05 PROCEDURE — 97116 GAIT TRAINING THERAPY: CPT

## 2023-08-05 PROCEDURE — 94761 N-INVAS EAR/PLS OXIMETRY MLT: CPT

## 2023-08-05 RX ORDER — SODIUM,POTASSIUM PHOSPHATES 280-250MG
2 POWDER IN PACKET (EA) ORAL ONCE
Status: COMPLETED | OUTPATIENT
Start: 2023-08-05 | End: 2023-08-05

## 2023-08-05 RX ORDER — HYDRALAZINE HYDROCHLORIDE 25 MG/1
25 TABLET, FILM COATED ORAL EVERY 6 HOURS PRN
Status: DISCONTINUED | OUTPATIENT
Start: 2023-08-05 | End: 2023-08-08 | Stop reason: HOSPADM

## 2023-08-05 RX ORDER — OMEPRAZOLE 40 MG/1
40 CAPSULE, DELAYED RELEASE ORAL EVERY MORNING
Qty: 30 CAPSULE | Refills: 0
Start: 2023-08-05 | End: 2023-08-07 | Stop reason: SDUPTHER

## 2023-08-05 RX ADMIN — DORZOLAMIDE 1 DROP: 20 SOLUTION/ DROPS OPHTHALMIC at 08:08

## 2023-08-05 RX ADMIN — PIPERACILLIN SODIUM AND TAZOBACTAM SODIUM 4.5 G: 4; .5 INJECTION, POWDER, FOR SOLUTION INTRAVENOUS at 02:08

## 2023-08-05 RX ADMIN — GUAIFENESIN AND DEXTROMETHORPHAN HYDROBROMIDE 1 TABLET: 600; 30 TABLET, EXTENDED RELEASE ORAL at 08:08

## 2023-08-05 RX ADMIN — HYDRALAZINE HYDROCHLORIDE 25 MG: 25 TABLET, FILM COATED ORAL at 12:08

## 2023-08-05 RX ADMIN — HYDRALAZINE HYDROCHLORIDE 25 MG: 25 TABLET, FILM COATED ORAL at 05:08

## 2023-08-05 RX ADMIN — MIRTAZAPINE 7.5 MG: 7.5 TABLET, FILM COATED ORAL at 08:08

## 2023-08-05 RX ADMIN — BRIMONIDINE TARTRATE 1 DROP: 1.5 SOLUTION/ DROPS OPHTHALMIC at 01:08

## 2023-08-05 RX ADMIN — HEPARIN SODIUM 5000 UNITS: 5000 INJECTION INTRAVENOUS; SUBCUTANEOUS at 01:08

## 2023-08-05 RX ADMIN — Medication 10 ML: at 05:08

## 2023-08-05 RX ADMIN — TIMOLOL MALEATE 1 DROP: 5 SOLUTION OPHTHALMIC at 08:08

## 2023-08-05 RX ADMIN — PANTOPRAZOLE SODIUM 40 MG: 40 TABLET, DELAYED RELEASE ORAL at 08:08

## 2023-08-05 RX ADMIN — POLYETHYLENE GLYCOL 3350 17 G: 17 POWDER, FOR SOLUTION ORAL at 08:08

## 2023-08-05 RX ADMIN — PIPERACILLIN SODIUM AND TAZOBACTAM SODIUM 4.5 G: 4; .5 INJECTION, POWDER, FOR SOLUTION INTRAVENOUS at 05:08

## 2023-08-05 RX ADMIN — BRIMONIDINE TARTRATE 1 DROP: 1.5 SOLUTION/ DROPS OPHTHALMIC at 05:08

## 2023-08-05 RX ADMIN — HEPARIN SODIUM 5000 UNITS: 5000 INJECTION INTRAVENOUS; SUBCUTANEOUS at 10:08

## 2023-08-05 RX ADMIN — BRIMONIDINE TARTRATE 1 DROP: 1.5 SOLUTION/ DROPS OPHTHALMIC at 10:08

## 2023-08-05 RX ADMIN — Medication 10 ML: at 12:08

## 2023-08-05 RX ADMIN — SENNOSIDES AND DOCUSATE SODIUM 1 TABLET: 50; 8.6 TABLET ORAL at 08:08

## 2023-08-05 RX ADMIN — HEPARIN SODIUM 5000 UNITS: 5000 INJECTION INTRAVENOUS; SUBCUTANEOUS at 05:08

## 2023-08-05 RX ADMIN — POTASSIUM & SODIUM PHOSPHATES POWDER PACK 280-160-250 MG 2 PACKET: 280-160-250 PACK at 09:08

## 2023-08-05 RX ADMIN — PIPERACILLIN SODIUM AND TAZOBACTAM SODIUM 4.5 G: 4; .5 INJECTION, POWDER, FOR SOLUTION INTRAVENOUS at 09:08

## 2023-08-05 NOTE — SUBJECTIVE & OBJECTIVE
Interval Hx:  Blood cultures 7/21 growing E Coli. ID following, Rec Zosyn x 2 weeks. Per CM,  Patient lives alone and wont be able to do home infusions. Will need SNF for IV abx. Awaiting PT/OT eval .       Objective:     Vital Signs (Most Recent):  Temp: 98.8 °F (37.1 °C) (08/05/23 0836)  Pulse: 89 (08/05/23 0836)  Resp: 17 (08/05/23 0836)  BP: (!) 147/70 (08/05/23 0836)  SpO2: 96 % (08/05/23 0836) Vital Signs (24h Range):  Temp:  [97.7 °F (36.5 °C)-98.8 °F (37.1 °C)] 98.8 °F (37.1 °C)  Pulse:  [62-89] 89  Resp:  [17-20] 17  SpO2:  [93 %-96 %] 96 %  BP: (136-204)/(65-88) 147/70     Weight: 67.6 kg (149 lb)  Body mass index is 26.39 kg/m².    Intake/Output Summary (Last 24 hours) at 8/5/2023 1150  Last data filed at 8/5/2023 1053  Gross per 24 hour   Intake 350 ml   Output 950 ml   Net -600 ml           Physical Exam  Vitals and nursing note reviewed.   Constitutional:       General: She is not in acute distress.  HENT:      Head: Normocephalic and atraumatic.      Nose:      Comments: NC in nares     Mouth/Throat:      Mouth: Mucous membranes are dry.   Eyes:      Extraocular Movements: Extraocular movements intact.      Conjunctiva/sclera: Conjunctivae normal.   Cardiovascular:      Rate and Rhythm: Normal rate and regular rhythm.      Heart sounds: Normal heart sounds.   Pulmonary:      Effort: Pulmonary effort is normal. No respiratory distress.   Abdominal:      General: There is no distension.      Palpations: Abdomen is soft.      Tenderness: There is no guarding or rebound.   Musculoskeletal:         General: Normal range of motion.      Cervical back: Normal range of motion.      Right lower leg: No edema.      Left lower leg: No edema.   Skin:     General: Skin is warm and dry.   Neurological:      General: No focal deficit present.      Mental Status: She is alert and oriented to person, place, and time.      Motor: No weakness.   Psychiatric:         Mood and Affect: Mood normal.         Behavior:  Behavior normal.         Thought Content: Thought content normal.         Judgment: Judgment normal.

## 2023-08-05 NOTE — PROGRESS NOTES
[facility]  Progress Note    Patient Name: Kenyetta Andersen  MRN: [id number]  Patient Class: IP- Inpatient   Admission Date: 8/1/2023  Length of Stay: 4 days  Attending Physician: Fawad Wheat MD  Primary Care Provider: Hever Arreola MD        Subjective:     Principal Problem:Cholangitis        HPI:  Kenyetta Andersen is a 88 y.o. female with PMHx GERD c/b esophagitis, hypothyroid, colon cancer (Dx 2009) s/p R hemicolectomy with end to side ileocolonic anastamosis and FOLFOX chemotherapy who [place] [facility] [place] [facility].     Per  transfer note: presented with chest pain and abdominal pain as well as generalized weakness, she was found to have evidence of possible acalculus cholecystitis on presentation on CT scan.  She had elevated bilirubin and transaminitis, she was also diagnosed with cholangitis spiking fevers and found to have polymicrobial bacteremia with Gram-positive and Gram-negative bacteremia.  Infectious Disease was consulted, they recommended continue with Zosyn repeating blood cultures.  She was IV fluid resuscitated had a midline placed. general surgery was consulted-they recommended consult for ERCP. GI consult for ERCP and duct clearance, can consider cholecystectomy afterwards as MRCP was suggestive of common bile duct stone.  Palliative Care was consulted and reviewed code status, she was made DNR, she also expressed that she would not want a PEG tube. She was taken to ERCP with GI, EGD findings was tortuous esophagus, narrowing at GE junction, distortion of antrum noted, gastroscope exchange for duodenal scope, scope advanced easily to the antrum and navigated around distorted antral thickening, scope advanced to the pylorus but unable to drop into the duodenal bulb, scope exchange back to gastroscope and unremarkable, GI is recommending transfer to [facility] for attempt at ERCP.    Labs and imaging at [facility]:  CBC WBC 12.2, Hgb 13-15, plts 14. BUN/Cr 23/1.3 (baseline cr ~1.0). Tibi 2.8 ->2.0, ALP wnl, AST/ /107-> 131/91. Procal 60. BCX w/ E coli, Klebsiella pneumoniae, and Bacteriodes fragilis 07/30. Repeat blood cultures drawn 7/31 NGTD. MRCP [place]-shaped low signal intensity mass which appears to lie within the distal segment of the common bile duct suggestive of choledocholithiasis. There is dilatation of the biliary tree proximal to this level.    On arrival, afebrile, HDS on 3L NC (not oxygen a baseline). She complains of some ongoing abdominal pain, but denies nausea, vomiting, diarrhea, fever, chills.         Overview/Hospital Course:  No notes on file    Interval Hx:  Blood cultures 7/21 growing E Coli. ID following, Rec Zosyn x 2 weeks. Per CM,  Patient lives alone and wont be able to do home infusions. Will need SNF for IV abx. Awaiting PT/OT eval .       Objective:     Vital Signs (Most Recent):  Temp: 98.8 °F (37.1 °C) (08/05/23 0836)  Pulse: 89 (08/05/23 0836)  Resp: 17 (08/05/23 0836)  BP: (!) 147/70 (08/05/23 0836)  SpO2: 96 % (08/05/23 0836) Vital Signs (24h Range):  Temp:  [97.7 °F (36.5 °C)-98.8 °F (37.1 °C)] 98.8 °F (37.1 °C)  Pulse:  [62-89] 89  Resp:  [17-20] 17  SpO2:  [93 %-96 %] 96 %  BP: (136-204)/(65-88) 147/70     Weight: 67.6 kg (149 lb)  Body mass index is 26.39 kg/m².    Intake/Output Summary (Last 24 hours) at 8/5/2023 1150  Last data filed at 8/5/2023 1053  Gross per 24 hour   Intake 350 ml   Output 950 ml   Net -600 ml           Physical Exam  Vitals and nursing note reviewed.   Constitutional:       General: She is not in acute distress.  HENT:      Head: Normocephalic and atraumatic.      Nose:      Comments: NC in nares     Mouth/Throat:      Mouth: Mucous membranes are dry.   Eyes:      Extraocular Movements: Extraocular movements intact.      Conjunctiva/sclera: Conjunctivae normal.   Cardiovascular:      Rate and Rhythm: Normal rate and regular rhythm.      Heart sounds:  Normal heart sounds.   Pulmonary:      Effort: Pulmonary effort is normal. No respiratory distress.   Abdominal:      General: There is no distension.      Palpations: Abdomen is soft.      Tenderness: There is no guarding or rebound.   Musculoskeletal:         General: Normal range of motion.      Cervical back: Normal range of motion.      Right lower leg: No edema.      Left lower leg: No edema.   Skin:     General: Skin is warm and dry.   Neurological:      General: No focal deficit present.      Mental Status: She is alert and oriented to person, place, and time.      Motor: No weakness.   Psychiatric:         Mood and Affect: Mood normal.         Behavior: Behavior normal.         Thought Content: Thought content normal.         Judgment: Judgment normal.               Assessment/Plan:      * Cholangitis  88F presenting at transfer from [facility] for ERCP in setting of cholangitis.    -Gentle IV fluids. Tolerating diet.  -S/p ERCP by [facility] on 8/1 - prior sphincterectomy stenosis and biliary stent placed.  -General surgery consulted for possible cholecystectomy. Per gen surg, given her medical history and overall clinical picture, do not think she would have much additional benefit from a cholecystectomy as cholangitis likely secondary to strictured sphincter and not true choledocholithiasis. There is a chance she could have recurrent cholangitis, but the risk of this is extremely low. Will defer surgical intervention at this time.   -IV zosyn. ID following.  -Monitor daily CBC, CMP  -Pain control       Bacteremia due to Gram-negative bacteria  -BCX 7/20 w/ E coli, Klebsiella pneumoniae, and Bacteriodes fragilis.   -Repeat blood cultures drawn 7/31 growing EColi.  -S/p vancomycin and zosyn at [facility]  -ID consulted. Rec Zosyn x 2 weeks. PICC line placed on 8/3. Awaiting placement in SNF  -TTE with no vegetations     Outpatient Antibiotic Therapy Plan:     Please send referral to  [facility].     1) Infection: cholangitis, bacteremia     2) Discharge Antibiotics:     Intravenous antibiotics:   Zosyn 12gm daily continuous infusion     3) Therapy Duration:  2 weeks     Estimated end date of IV antibiotics: 8/15/23     4) Outpatient Weekly Labs:     Order the following labs to be drawn on Mondays:    CBC   CMP    CRP     5) Fax Lab Results to Infectious Diseases Provider: Dr duke     [facility] Fax Number: [phone]     6) Outpatient Infectious Diseases Follow-up      Follow-up appointment will be arranged by the ID clinic and will be found in the patient's appointments tab.      Prior to discharge, please ensure the patient's follow-up has been scheduled.     If there is still no follow-up scheduled prior to discharge, please send an EPIC message to Lisette Peralta in Infectious Diseases.          Cigarette nicotine dependence without complication  Dangers of cigarette smoking were reviewed with patient in detail. Patient was Counseled for 3-10 minutes. Nicotine replacement options were discussed. Nicotine replacement was discussed- not prescribed per patient's request    Daily consumption of alcohol  -Reports drinking 1 shot liquor daily  -monitor CIWA qshift, prn ativan    Pulmonary emphysema, unspecified emphysema type  Pt reports smoking 2ppd previously, cut back to 1pack per week lately. Has chronic cough, not on home oxygen and denies COPD diagnosis.   -supplemental oxygen as needed  -prn rosana    Personal history of colon cancer, stage III  -Noted prior history of stage III colorectal cancer, status post adjuvant chemotherapy   -Followed by Dr. Granado/Renu    Stage 3a chronic kidney disease  Creatine stable for now. BMP reviewed- noted Estimated Creatinine Clearance: 31.1 mL/min (based on SCr of 1.2 mg/dL). according to latest data. Based on current GFR, CKD stage is stage 3 - GFR 30-59.  Monitor UOP and serial BMP and adjust  therapy as needed. Renally dose meds. Avoid nephrotoxic medications and procedures.   Cr 1.3 on admission. Baseline ~1.0. Improving with Continuous IVF hydration. Encourage PO hydration.     GERD (gastroesophageal reflux disease)  - ppi      VTE Risk Mitigation (From admission, onward)         Ordered     heparin (porcine) injection 5,000 Units  Every 8 hours         08/01/23 1051     IP VTE HIGH RISK PATIENT  Once         08/01/23 1051     Place sequential compression device  Until discontinued         08/01/23 1051                Discharge Planning   TIERA: 8/6/2023     Code Status: DNR   Is the patient medically ready for discharge?:     Reason for patient still in hospital (select all that apply): Patient trending condition  Discharge Plan A: Home Health   Discharge Delays: None known at this time              Fawad Wheat MD  Department of Hospital Medicine   [facility]

## 2023-08-05 NOTE — PLAN OF CARE
CM met with patient to discuss discharge disposition. Patient lives alone and lives in [place]. So HH seems unlikely due to insurance of [employer]. Patient does not have anyone to admin IV abx. Will await therapy MD lai notified.    [id number] Dr Wheat stated patient will need to go to SNF for IV abx. Referrals sent in [facility] in [facility] (patient preference)

## 2023-08-05 NOTE — ASSESSMENT & PLAN NOTE
-BCX 7/20 w/ E coli, Klebsiella pneumoniae, and Bacteriodes fragilis.   -Repeat blood cultures drawn 7/31 growing EColi.  -[facility]  -ID consulted. Rec Zosyn x 2 weeks. PICC line placed on 8/3. Awaiting placement in SNF  -TTE with no vegetations     Outpatient Antibiotic Therapy Plan:     Please send referral to [facility].     1) Infection: cholangitis, bacteremia     2) Discharge Antibiotics:     Intravenous antibiotics:   Zosyn 12gm daily continuous infusion     3) Therapy Duration:  2 weeks     Estimated end date of IV antibiotics: 8/15/23     4) Outpatient Weekly Labs:     Order the following labs to be drawn on Mondays:    CBC   CMP    CRP     5) Fax Lab Results to Infectious Diseases Provider: Dr duke     [facility] Fax Number: [phone]     6) Outpatient Infectious Diseases Follow-up      Follow-up appointment will be arranged by the ID clinic and will be found in the patient's appointments tab.      Prior to discharge, please ensure the patient's follow-up has been scheduled.     If there is still no follow-up scheduled prior to discharge, please send an EPIC message to Lisette Peralta in Infectious Diseases.

## 2023-08-05 NOTE — PLAN OF CARE
Problem: Physical Therapy  Goal: Physical Therapy Goal  Description: Goals to be met by: 8/15    Patient will increase functional independence with mobility by performin. Supine to sit with Modified [place]  2. Sit to supine with Modified [place]  3. Sit to stand transfer with Modified [place]  4. Gait  x 300 feet with Modified [place] using LRAD or no AD.     Outcome: Ongoing, Progressing   Evaluation completed, initiated plan of care.   Zeinab Santos, PT  2023

## 2023-08-05 NOTE — PT/OT/SLP EVAL
Physical Therapy Evaluation    Patient Name:  Kenyetta Andersen   MRN:  [id number]    Recommendations:     Discharge Recommendations: nursing facility, skilled   Discharge Equipment Recommendations: walker, rolling, shower chair   Barriers to discharge: Decreased caregiver support and patient below functional baseline, fall risk    Assessment:     Kenyetta Andersen is a 88 y.o. female admitted with a medical diagnosis of Cholangitis.  She presents with the following impairments/functional limitations: weakness, gait instability, impaired endurance, impaired self care skills, impaired functional mobility, decreased upper extremity function, impaired balance, decreased lower extremity function, impaired cardiopulmonary response to activity. The patient stood from bedside chair with contact guard assist and no AD. She was unstable with static standing balance, wide ROBERT requiring HHA for stability. Patient was unable to maintain static balance with narrow ROBERT and lateral weight shift. She required minimum assistance for stability with gait short distances.  She demo'd improvement in stability with use of RW for gait. The patient lives alone and was independent with mobility prior to admission, did not use AD. She is not safe to return home due to risk of falls. She would benefit from SNF placement to address the above deficits and maximize their functional mobility.     Rehab Prognosis: Good; patient would benefit from acute skilled PT services to address these deficits and reach maximum level of function.    Recent Surgery: Procedure(s) (LRB):  ERCP (ENDOSCOPIC RETROGRADE CHOLANGIOPANCREATOGRAPHY) (N/A) 4 Days Post-Op    Plan:     During this hospitalization, patient to be seen 4 x/week to address the identified rehab impairments via therapeutic activities, gait training, therapeutic exercises, neuromuscular re-education and progress toward the following goals:    Plan of Care Expires:  09/04/23    Subjective     Chief  "Complaint: "I've been in the bed so long, I got weak"  Patient/Family Comments/goals: return to PLOF, maintain independence   Pain/Comfort:  Pain Rating 1: 0/10    Patients cultural, spiritual, Baptism conflicts given the current situation: no    Living Environment:  The patient lives alone in a SSH, 0 CHAN, T/S. Drives.   Prior to admission, patients level of function was independent.  Equipment used at home: none.  DME owned (not currently used): none.  Upon discharge, patient will have limited assist from family.    Objective:     Communicated with RN prior to session.  Patient found up in chair with peripheral IV, PureWick  upon PT entry to room. Friends at bedside.     General Precautions: Standard, fall  Orthopedic Precautions:N/A   Braces: N/A  Respiratory Status: Room air    Exams:    Cognitive Exam  Patient is A&O x4 and follows 100% of one -step commands    Fine Motor Coordination   -       WNL     Postural Exam Patient presented with the following abnormalities:    -       Rounded shoulders  -       Forward head  -       Kyphosis  -       Posterior pelvic tilt     Sensation    -       Light touch intact KE LE   Skin Integrity/Edema     -       Skin integrity: visibly intact  -       Edema: mild KE LE   R LE ROM WNL   R LE Strength 4-/5 hip flexion, knee ext/flex, and ankle DF/PF   L LE ROM WNL   L LE Strength  4-/5 hip flexion, knee ext/flex, and ankle DF/PF       Balance   Static Sitting independent    Dynamic Sitting independent    Static Standing contact guard assist with wide ROBERT and HHA   Dynamic Standing       minimum assistance for narrow ROBERT and lateral weight shift, HHA        Functional Mobility:    Bed Mobility  NA, sitting up in chair   Transfers Sit to Stand:  contact guard assistf from bedside chair   Gait  Gait Distance: 3 ft with L HHA and minimum assistance   -70' with RW and contact guard assist   Description: kyphotic posture, slow deliberate gait speed, decreased foot clearance, " gait tolerance limited by fatigue         AM-PAC 6 CLICK MOBILITY  Total Score:18       Treatment & Education:  Patient educated on:  -role of therapy  -goals of session  -PT POC  -benefits of out of bed mobility and consequences of immobility  -calling for staff assist to mobilize safely  -Patient educated on PT rec for post-acute therapy to maximize independence and safety, she is in agreement  Patient agreeable to mobilize with therapy.      Gait training: cued for upright posture, ambulating within RW ROBERT, reciprocal strides, pacing for energy conservation    Patient safe to ambulate with RN using RW. RW left in room.     Patient safe to ambulate with RN using RW.     Patient left up in chair with all lines intact, call button in reach, and MD notified of PT rec per request.    GOALS:   Multidisciplinary Problems       Physical Therapy Goals          Problem: Physical Therapy    Goal Priority Disciplines Outcome Goal Variances Interventions   Physical Therapy Goal     PT, PT/OT Ongoing, Progressing     Description: Goals to be met by: 8/15    Patient will increase functional independence with mobility by performin. Supine to sit with Modified [place]  2. Sit to supine with Modified [place]  3. Sit to stand transfer with Modified [place]  4. Gait  x 300 feet with Modified [place] using LRAD or no AD.                          History:     Past Medical History:   Diagnosis Date    Anatomical narrow angle 2012    Anxiety     Arthritis     Escalante esophagus     Blood transfusion     Cataract     Done OU    Colon cancer 2009    Colon polyps 3/14/2017    GERD (gastroesophageal reflux disease)     Glaucoma 2012    Nuclear sclerosis 2012    Osteoporosis     Primary hypothyroidism 2020    Pseudophakia - Left Eye 2012       Past Surgical History:   Procedure Laterality Date    APPENDECTOMY      CATARACT EXTRACTION W/  INTRAOCULAR LENS IMPLANT Left     CATARACT EXTRACTION  "W/  INTRAOCULAR LENS IMPLANT Right     Dr Sawant    COLON SURGERY      resection    COLONOSCOPY N/A 3/14/2017    Procedure: COLONOSCOPY;  Surgeon: Killian Guerrier MD;  Location: [facility] END[facility];  Service: Endoscopy;  Laterality: N/A;    COLONOSCOPY  03/14/2017    Dr. Guerrier: hemorrhoids, one colon polyp removed, "Patent functional end-to-end ileo-colonic anastomosis"with healthy mucosa; biopsy: hyperplastic polyp; repeat in 3 years for surveillance    COLONOSCOPY N/A 1/21/2020    Procedure: COLONOSCOPY;  Surgeon: Timo Darling MD;  Location: [facility] ENDO;  Service: Endoscopy;  Laterality: N/A;    COLONOSCOPY N/A 7/8/2022    Procedure: COLONOSCOPY;  Surgeon: Anaid Washington MD;  Location: [facility] END[facility];  Service: Endoscopy;  Laterality: N/A;    ECTOPIC PREGNANCY SURGERY      ERCP N/A 7/31/2023    Procedure: ERCP (ENDOSCOPIC RETROGRADE CHOLANGIOPANCREATOGRAPHY);  Surgeon: Nasim Cuadra MD;  Location: [facility];  Service: Endoscopy;  Laterality: N/A;    ERCP N/A 8/1/2023    Procedure: ERCP (ENDOSCOPIC RETROGRADE CHOLANGIOPANCREATOGRAPHY);  Surgeon: Олег Arreaga MD;  Location: [facility] ([address] [place] [address]);  Service: Endoscopy;  Laterality: N/A;    ESOPHAGOGASTRODUODENOSCOPY N/A 5/16/2019    Procedure: EGD (ESOPHAGOGASTRODUODENOSCOPY);  Surgeon: Anaid Washington MD;  Location: [facility] END[facility];  Service: Endoscopy;  Laterality: N/A;    ESOPHAGOGASTRODUODENOSCOPY N/A 7/11/2019    Procedure: EGD (ESOPHAGOGASTRODUODENOSCOPY);  Surgeon: Anaid Washington MD;  Location: [facility] END[facility];  Service: Endoscopy;  Laterality: N/A;    ESOPHAGOGASTRODUODENOSCOPY N/A 2/5/2021    Procedure: EGD (ESOPHAGOGASTRODUODENOSCOPY);  Surgeon: Anaid Washington MD;  Location: [facility] END[facility];  Service: Endoscopy;  Laterality: N/A;    ESOPHAGOGASTRODUODENOSCOPY N/A 11/11/2021    Procedure: EGD (ESOPHAGOGASTRODUODENOSCOPY);  Surgeon: Anaid Washington MD;  Location: [facility] END[facility];  Service: Endoscopy;  Laterality: N/A;    ESOPHAGOGASTRODUODENOSCOPY N/A 7/8/2022    " Procedure: EGD (ESOPHAGOGASTRODUODENOSCOPY);  Surgeon: Anaid Washington MD;  Location: [facility] END[facility];  Service: Endoscopy;  Laterality: N/A;    ESOPHAGOGASTRODUODENOSCOPY N/A 4/12/2023    Procedure: EGD (ESOPHAGOGASTRODUODENOSCOPY);  Surgeon: Anaid Washington MD;  Location: [facility] END[facility];  Service: Endoscopy;  Laterality: N/A;    EYE SURGERY      bilateral cataracts    HYSTERECTOMY      complete    JOINT REPLACEMENT      Liban Knee    ROTATOR CUFF REPAIR Right     TOE SURGERY      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    UPPER GASTROINTESTINAL ENDOSCOPY  06/12/2017    Dr. Guerrier: gastritis and esophagitis, biopsy: chronic gastritis, negative for h pylori, esophageal- positive eosinophils, negative for barretts; repeat in 2 months    UPPER GASTROINTESTINAL ENDOSCOPY  07/27/2017    Dr. Guerrier       Time Tracking:     PT Received On: 08/05/23  PT Start Time: 1135     PT Stop Time: 1200  PT Total Time (min): 25 min     Billable Minutes: Evaluation 10 and Gait Training 12 08/05/2023

## 2023-08-06 PROBLEM — R53.81 DEBILITY: Status: ACTIVE | Noted: 2023-08-06

## 2023-08-06 LAB
POCT GLUCOSE: 100 MG/DL (ref 70–110)
POCT GLUCOSE: 80 MG/DL (ref 70–110)
POCT GLUCOSE: 92 MG/DL (ref 70–110)

## 2023-08-06 PROCEDURE — 63600175 PHARM REV CODE 636 W HCPCS

## 2023-08-06 PROCEDURE — A4216 STERILE WATER/SALINE, 10 ML: HCPCS | Performed by: STUDENT IN AN ORGANIZED HEALTH CARE EDUCATION/TRAINING PROGRAM

## 2023-08-06 PROCEDURE — 97535 SELF CARE MNGMENT TRAINING: CPT

## 2023-08-06 PROCEDURE — 25000003 PHARM REV CODE 250: Performed by: HOSPITALIST

## 2023-08-06 PROCEDURE — 99231 SBSQ HOSP IP/OBS SF/LOW 25: CPT | Mod: ,,, | Performed by: STUDENT IN AN ORGANIZED HEALTH CARE EDUCATION/TRAINING PROGRAM

## 2023-08-06 PROCEDURE — 25000003 PHARM REV CODE 250: Performed by: STUDENT IN AN ORGANIZED HEALTH CARE EDUCATION/TRAINING PROGRAM

## 2023-08-06 PROCEDURE — 63600175 PHARM REV CODE 636 W HCPCS: Performed by: HOSPITALIST

## 2023-08-06 PROCEDURE — 97530 THERAPEUTIC ACTIVITIES: CPT | Mod: CQ

## 2023-08-06 PROCEDURE — 20600001 HC STEP DOWN PRIVATE ROOM

## 2023-08-06 PROCEDURE — 97165 OT EVAL LOW COMPLEX 30 MIN: CPT

## 2023-08-06 PROCEDURE — 99231 PR SUBSEQUENT HOSPITAL CARE,LEVL I: ICD-10-PCS | Mod: ,,, | Performed by: STUDENT IN AN ORGANIZED HEALTH CARE EDUCATION/TRAINING PROGRAM

## 2023-08-06 RX ADMIN — Medication 10 ML: at 12:08

## 2023-08-06 RX ADMIN — BRIMONIDINE TARTRATE 1 DROP: 1.5 SOLUTION/ DROPS OPHTHALMIC at 05:08

## 2023-08-06 RX ADMIN — PIPERACILLIN SODIUM AND TAZOBACTAM SODIUM 4.5 G: 4; .5 INJECTION, POWDER, FOR SOLUTION INTRAVENOUS at 09:08

## 2023-08-06 RX ADMIN — Medication 10 ML: at 11:08

## 2023-08-06 RX ADMIN — GUAIFENESIN AND DEXTROMETHORPHAN HYDROBROMIDE 1 TABLET: 600; 30 TABLET, EXTENDED RELEASE ORAL at 08:08

## 2023-08-06 RX ADMIN — PIPERACILLIN SODIUM AND TAZOBACTAM SODIUM 4.5 G: 4; .5 INJECTION, POWDER, FOR SOLUTION INTRAVENOUS at 01:08

## 2023-08-06 RX ADMIN — BRIMONIDINE TARTRATE 1 DROP: 1.5 SOLUTION/ DROPS OPHTHALMIC at 01:08

## 2023-08-06 RX ADMIN — BRIMONIDINE TARTRATE 1 DROP: 1.5 SOLUTION/ DROPS OPHTHALMIC at 09:08

## 2023-08-06 RX ADMIN — HEPARIN SODIUM 5000 UNITS: 5000 INJECTION INTRAVENOUS; SUBCUTANEOUS at 05:08

## 2023-08-06 RX ADMIN — HEPARIN SODIUM 5000 UNITS: 5000 INJECTION INTRAVENOUS; SUBCUTANEOUS at 09:08

## 2023-08-06 RX ADMIN — TIMOLOL MALEATE 1 DROP: 5 SOLUTION OPHTHALMIC at 08:08

## 2023-08-06 RX ADMIN — PIPERACILLIN SODIUM AND TAZOBACTAM SODIUM 4.5 G: 4; .5 INJECTION, POWDER, FOR SOLUTION INTRAVENOUS at 05:08

## 2023-08-06 RX ADMIN — DORZOLAMIDE 1 DROP: 20 SOLUTION/ DROPS OPHTHALMIC at 08:08

## 2023-08-06 RX ADMIN — PANTOPRAZOLE SODIUM 40 MG: 40 TABLET, DELAYED RELEASE ORAL at 08:08

## 2023-08-06 RX ADMIN — SENNOSIDES AND DOCUSATE SODIUM 1 TABLET: 50; 8.6 TABLET ORAL at 08:08

## 2023-08-06 RX ADMIN — Medication 10 ML: at 05:08

## 2023-08-06 RX ADMIN — HEPARIN SODIUM 5000 UNITS: 5000 INJECTION INTRAVENOUS; SUBCUTANEOUS at 01:08

## 2023-08-06 RX ADMIN — MIRTAZAPINE 7.5 MG: 7.5 TABLET, FILM COATED ORAL at 08:08

## 2023-08-06 RX ADMIN — POLYETHYLENE GLYCOL 3350 17 G: 17 POWDER, FOR SOLUTION ORAL at 08:08

## 2023-08-06 NOTE — NURSING
Dx: Cholangitis, Inflammation of bile duct system     Shift Events: Pending SNF placement.      Goals of Care: Pain management, CIWA, continue ABT     Neuro: AAOx3, disoriented to situation     Vital Signs: Stable  Respiratory: Pt on 3LNC     Diet: Dysphagia Mechanical Soft Diet, fair appetite.     Is patient tolerating current diet? Yes     GTTS: None, intermittent ABT in progress.     Urine Output/Bowel Movement: Purewick in use, BM 8/6/23.     Drains/Tubes/Tube Feeds (include total output/shift): None     Lines: Left upper arm ML & LAURI PICC     Accuchecks: ACHS     Skin: Intact, clean, dry     Fall Risk Score: 9     Activity level? Assistance x 1     Any scheduled procedures? No     Any safety concerns? Falls Risk     Other:

## 2023-08-06 NOTE — PT/OT/SLP EVAL
Occupational Therapy   Evaluation    Name: Kenyetta Andersen  MRN: [id number]  Admitting Diagnosis: Cholangitis  Recent Surgery: Procedure(s) (LRB):  ERCP (ENDOSCOPIC RETROGRADE CHOLANGIOPANCREATOGRAPHY) (N/A) 5 Days Post-Op    Recommendations:     Discharge Recommendations: nursing facility, skilled (may progress to  OT)  Discharge Equipment Recommendations:  walker, rolling, grab bar, shower chair  Barriers to discharge:  None    Assessment:     Kenyetta Andersen is a 88 y.o. female with a medical diagnosis of Cholangitis.  She presents alert and cooperative. Performance deficits affecting function: weakness, impaired functional mobility, impaired cardiopulmonary response to activity, gait instability, impaired endurance, impaired balance, impaired self care skills.      Rehab Prognosis: Good; patient would benefit from acute skilled OT services to address these deficits and reach maximum level of function.       Plan:     Patient to be seen 3 x/week to address the above listed problems via self-care/home management, therapeutic activities, therapeutic exercises  Plan of Care Expires: 09/05/23  Plan of Care Reviewed with: patient    Subjective     Chief Complaint: denies  Patient/Family Comments/goals: to get better and go home    Occupational Profile:  Living Environment:  The patient lives alone in a H, 0 CHAN, T/S. Drives.   Prior to admission, patients level of function was independent.  Equipment used at home: none.  DME owned (not currently used): none.  Upon discharge, patient will have limited assist from family      Pain/Comfort:  Pain Rating 1: 0/10    Patients cultural, spiritual, Caodaism conflicts given the current situation: no    Objective:     Communicated with: RN prior to session.  Patient found supine with PureWick, PICC line, peripheral IV upon OT entry to room.    General Precautions: Standard, fall  Orthopedic Precautions:    Braces:    Respiratory Status: Room air    Occupational  Performance:    Bed Mobility:    Patient completed Scooting/Bridging with contact guard assistance to EOB  Patient completed Supine to Sit with supervision    Functional Mobility/Transfers:  Patient completed Sit <> Stand Transfer with stand by assistance  with  rolling walker from EOB  Patient completed STS from Chair Transfer  with contact guard assistance with rolling walker  Patient completed Toilet Transfer STS technique with minimum assistance with  rolling walker and grab bars  Functional Mobility: CGA<>SBA to/from bathroom and around room using RW    Activities of Daily Living:  Grooming: independence    Upper Body Dressing: stand by assistance    Lower Body Dressing: contact guard assistance    Toileting: minimum assistance      Cognitive/Visual Perceptual:  Oriented to: Person, Place, Time and Situation  Follows Commands/attention: Follows multistep  commands  Communication: clear/fluent  Memory:  No Deficits noted  Safety awareness/insight to disability: intact  Coping skills/emotional control: Appropriate to situation    Physical Exam:  Postural examination/scapula alignment:    -       No postural abnormalities identified  Sensation:    -       Intact  Upper Extremity Range of Motion:     -       Right Upper Extremity: WFL  -       Left Upper Extremity: WFL  Upper Extremity Strength:    -       Right Upper Extremity: WFL  -       Left Upper Extremity: WFL   Strength:    -       Right Upper Extremity: WFL  -       Left Upper Extremity: WFL  Fine Motor Coordination:    -       Intact  Gross motor coordination:   WFL    AMPAC 6 Click ADL:  AMPAC Total Score: 20    Treatment & Education:  Pt ed on OT POC  Pt ed on OOBTC and ROM ex's for increased overall strength and endurance  Pt ed on safety and need for assistance    Patient left up in chair with all lines intact, call button in reach, and RN notified    GOALS:   Multidisciplinary Problems       Occupational Therapy Goals          Problem:  "Occupational Therapy    Goal Priority Disciplines Outcome Interventions   Occupational Therapy Goal     OT, PT/OT Ongoing, Progressing    Description: Goals to be met by: 8/20/23     Patient will increase functional independence with ADLs by performing:    Feeding with [place].  UE Dressing with set-up assistance  LE Dressing with Minimal Assistance.  Grooming while standing at sink with Stand-by Assistance.  Toileting from toilet with Stand-by Assistance for hygiene and clothing management.   Toilet transfer to toilet with Stand-by Assistance.                         History:     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    Anxiety     Arthritis     Escalante esophagus     Blood transfusion     Cataract     Done OU    Colon cancer 2009    Colon polyps 3/14/2017    GERD (gastroesophageal reflux disease)     Glaucoma 2/24/2012    Nuclear sclerosis 8/27/2012    Osteoporosis     Primary hypothyroidism 2/23/2020    Pseudophakia - Left Eye 2/24/2012         Past Surgical History:   Procedure Laterality Date    APPENDECTOMY      CATARACT EXTRACTION W/  INTRAOCULAR LENS IMPLANT Left     CATARACT EXTRACTION W/  INTRAOCULAR LENS IMPLANT Right     Dr Sawant    COLON SURGERY      resection    COLONOSCOPY N/A 3/14/2017    Procedure: COLONOSCOPY;  Surgeon: Killian Guerrier MD;  Location: [facility];  Service: Endoscopy;  Laterality: N/A;    COLONOSCOPY  03/14/2017    Dr. Guerrier: hemorrhoids, one colon polyp removed, "Patent functional end-to-end ileo-colonic anastomosis"with healthy mucosa; biopsy: hyperplastic polyp; repeat in 3 years for surveillance    COLONOSCOPY N/A 1/21/2020    Procedure: COLONOSCOPY;  Surgeon: Timo Darling MD;  Location: [facility] ENDO;  Service: Endoscopy;  Laterality: N/A;    COLONOSCOPY N/A 7/8/2022    Procedure: COLONOSCOPY;  Surgeon: Anaid Washington MD;  Location: [facility] ENDO;  Service: Endoscopy;  Laterality: N/A;    ECTOPIC PREGNANCY SURGERY      ERCP N/A " 7/31/2023    Procedure: ERCP (ENDOSCOPIC RETROGRADE CHOLANGIOPANCREATOGRAPHY);  Surgeon: Nasim Cuadra MD;  Location: [facility];  Service: Endoscopy;  Laterality: N/A;    ERCP N/A 8/1/2023    Procedure: ERCP (ENDOSCOPIC RETROGRADE CHOLANGIOPANCREATOGRAPHY);  Surgeon: Олег Arreaga MD;  Location: [facility] ([address] [place] [address]);  Service: Endoscopy;  Laterality: N/A;    ESOPHAGOGASTRODUODENOSCOPY N/A 5/16/2019    Procedure: EGD (ESOPHAGOGASTRODUODENOSCOPY);  Surgeon: Anaid Washington MD;  Location: [facility];  Service: Endoscopy;  Laterality: N/A;    ESOPHAGOGASTRODUODENOSCOPY N/A 7/11/2019    Procedure: EGD (ESOPHAGOGASTRODUODENOSCOPY);  Surgeon: Anaid Washington MD;  Location: [facility];  Service: Endoscopy;  Laterality: N/A;    ESOPHAGOGASTRODUODENOSCOPY N/A 2/5/2021    Procedure: EGD (ESOPHAGOGASTRODUODENOSCOPY);  Surgeon: Anaid Washington MD;  Location: [facility];  Service: Endoscopy;  Laterality: N/A;    ESOPHAGOGASTRODUODENOSCOPY N/A 11/11/2021    Procedure: EGD (ESOPHAGOGASTRODUODENOSCOPY);  Surgeon: Anaid Washington MD;  Location: [facility];  Service: Endoscopy;  Laterality: N/A;    ESOPHAGOGASTRODUODENOSCOPY N/A 7/8/2022    Procedure: EGD (ESOPHAGOGASTRODUODENOSCOPY);  Surgeon: Anaid Washington MD;  Location: [facility];  Service: Endoscopy;  Laterality: N/A;    ESOPHAGOGASTRODUODENOSCOPY N/A 4/12/2023    Procedure: EGD (ESOPHAGOGASTRODUODENOSCOPY);  Surgeon: Anaid Washington MD;  Location: [facility];  Service: Endoscopy;  Laterality: N/A;    EYE SURGERY      bilateral cataracts    HYSTERECTOMY      complete    JOINT REPLACEMENT      Liban Knee    ROTATOR CUFF REPAIR Right     TOE SURGERY      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    UPPER GASTROINTESTINAL ENDOSCOPY  06/12/2017    Dr. Guerrier: gastritis and esophagitis, biopsy: chronic gastritis, negative for h pylori, esophageal- positive eosinophils, negative for barretts; repeat in 2 months    UPPER GASTROINTESTINAL ENDOSCOPY  07/27/2017     Dr. Guerrier       Time Tracking:     OT Date of Treatment: 08/06/23  OT Start Time: 0930  OT Stop Time: 0955  OT Total Time (min): 25 min    Billable Minutes:Evaluation 10  Self Care/Home Management 15    8/6/2023

## 2023-08-06 NOTE — SUBJECTIVE & OBJECTIVE
Interval Hx:  Awaiting placement in SNF/   Blood cultures 7/21 growing E Coli. ID following, Rec Zosyn x 2 weeks.       Objective:     Vital Signs (Most Recent):  Temp: 98.5 °F (36.9 °C) (08/06/23 1159)  Pulse: (!) 53 (08/06/23 1159)  Resp: 18 (08/06/23 1159)  BP: 135/62 (08/06/23 1159)  SpO2: 95 % (08/06/23 1159) Vital Signs (24h Range):  Temp:  [97.9 °F (36.6 °C)-98.5 °F (36.9 °C)] 98.5 °F (36.9 °C)  Pulse:  [53-90] 53  Resp:  [16-20] 18  SpO2:  [92 %-95 %] 95 %  BP: (127-183)/(62-90) 135/62     Weight: 67.6 kg (149 lb)  Body mass index is 26.39 kg/m².    Intake/Output Summary (Last 24 hours) at 8/6/2023 1226  Last data filed at 8/6/2023 1010  Gross per 24 hour   Intake 10 ml   Output 400 ml   Net -390 ml           Physical Exam  Vitals and nursing note reviewed.   Constitutional:       General: She is not in acute distress.  HENT:      Head: Normocephalic and atraumatic.      Mouth/Throat:      Mouth: Mucous membranes are dry.   Eyes:      Extraocular Movements: Extraocular movements intact.      Conjunctiva/sclera: Conjunctivae normal.   Cardiovascular:      Rate and Rhythm: Normal rate and regular rhythm.      Heart sounds: Normal heart sounds.   Pulmonary:      Effort: Pulmonary effort is normal. No respiratory distress.   Abdominal:      General: There is no distension.      Palpations: Abdomen is soft.      Tenderness: There is no guarding or rebound.   Musculoskeletal:         General: Normal range of motion.      Cervical back: Normal range of motion.      Right lower leg: No edema.      Left lower leg: No edema.   Skin:     General: Skin is warm and dry.   Neurological:      General: No focal deficit present.      Mental Status: She is alert and oriented to person, place, and time.      Motor: No weakness.   Psychiatric:         Mood and Affect: Mood normal.         Behavior: Behavior normal.         Thought Content: Thought content normal.         Judgment: Judgment normal.           Review of Systems

## 2023-08-06 NOTE — PT/OT/SLP PROGRESS
Physical Therapy Treatment    Patient Name:  Kenyetta Andersen   MRN:  [id number]    Recommendations:     Discharge Recommendations: nursing facility, skilled  Discharge Equipment Recommendations: walker, rolling, grab bar, shower chair  Barriers to discharge: Decreased caregiver support and patient below functional baseline, fall risk    Assessment:     Kenyetta Andersen is a 88 y.o. female admitted with a medical diagnosis of Cholangitis.  She presents with the following impairments/functional limitations: weakness, gait instability, impaired endurance, impaired self care skills, impaired functional mobility, decreased upper extremity function, impaired balance, decreased lower extremity function, impaired cardiopulmonary response to activity .Pt  tolerated treatment fairly well and is progressing slowly with mobility. pt limited due to fatigue. Patient remains appropriate for continued skilled services within the acute environment and goals remain appropriate.      Rehab Prognosis: Good; patient would benefit from acute skilled PT services to address these deficits and reach maximum level of function.    Recent Surgery: Procedure(s) (LRB):  ERCP (ENDOSCOPIC RETROGRADE CHOLANGIOPANCREATOGRAPHY) (N/A) 5 Days Post-Op    Plan:     During this hospitalization, patient to be seen 4 x/week to address the identified rehab impairments via gait training, therapeutic activities, therapeutic exercises, neuromuscular re-education and progress toward the following goals:    Plan of Care Expires:  09/04/23    Subjective     Chief Complaint: I walked earlier, I am tired    Pain/Comfort:  Pain Rating 1: 0/10  Pain Rating Post-Intervention 1: 0/10      Objective:     Communicated with RN prior to session.  Patient found right sidelying with  (PureWick; PICC line; peripheral IV) upon PT entry to room.     General Precautions: Standard, fall  Orthopedic Precautions: N/A  Braces: N/A  Respiratory Status: Room air     Functional  Mobility:  Bed Mobility:     Rolling Left:  minimum assistance  Scooting: minimum assistance      AM-PAC 6 CLICK MOBILITY  Turning over in bed (including adjusting bedclothes, sheets and blankets)?: 3  Sitting down on and standing up from a chair with arms (e.g., wheelchair, bedside commode, etc.): 3  Moving from lying on back to sitting on the side of the bed?: 3  Moving to and from a bed to a chair (including a wheelchair)?: 3  Need to walk in hospital room?: 3  Climbing 3-5 steps with a railing?: 3  Basic Mobility Total Score: 18       Treatment & Education:  Therapist provided instruction and educated of  patient on progress, safety,d/c,PT POC,   proper body mechanics, energy conservation, and fall prevention strategies during tasks listed above, on the effects of prolonged immobility and the importance of performing OOB activity and exercises to promote healing and reduce recovery time  Patient  facilitated therex  LE AROM AP, HS, Hip Flexion, Hip Abd/Add. Patient required skilled PTA for instruction of exercises and appropriate cues to perform exercises safely, sequencing and appropriately.   Exercises performed to develop and maintain pt's strength, endurance and flexibility.  Updated white board with appropriate PT mobility information for medical team notification  Call nursing/pct to transfer to chair/use bathroom. Pt stated understanding      Bedside table in front of patient and area set up for function, convenience, and safety. RN aware of patient's mobility needs and status. Questions/concerns addressed within PTA scope of practice; patient  with no further questions. Time was provided for active listening, discussion of health disposition, and discussion of safe discharge. Pt?verbalized?agreement .    Patient left HOB elevated with all lines intact, call button in reach, and nsg notified..    GOALS:   Multidisciplinary Problems       Physical Therapy Goals          Problem: Physical Therapy    Goal  Priority Disciplines Outcome Goal Variances Interventions   Physical Therapy Goal     PT, PT/OT Ongoing, Progressing     Description: Goals to be met by: 8/15    Patient will increase functional independence with mobility by performin. Supine to sit with Modified [place]  2. Sit to supine with Modified [place]  3. Sit to stand transfer with Modified [place]  4. Gait  x 300 feet with Modified [place] using LRAD or no AD.                          Time Tracking:     PT Received On: 23  PT Start Time: 1140     PT Stop Time: 1148  PT Total Time (min): 8 min     Billable Minutes: Therapeutic Activity 8    Treatment Type: Treatment  PT/PTA: PTA     Number of PTA visits since last PT visit: 2023

## 2023-08-06 NOTE — CONSULTS
[facility] Medicine  Telemedicine Consult Note    Patient Name: Kenyetta Andersen  MRN: [id number]  Admission Date: 8/1/2023  Hospital Length of Stay: 4 days  Attending Physician: Fawad Wheat MD   Primary Care Provider: Hever Arreola MD       Thank you for your consult to [facility]. We have reviewed the patient chart. This patient does not meet criteria for [facility] service at this time due to [facility] service capacity limitations.  Will not assume care of the patient at this time.        Amanda Torres MD  Department of Hospital Medicine   [facility]

## 2023-08-06 NOTE — PLAN OF CARE
Problem: Occupational Therapy  Goal: Occupational Therapy Goal  Description: Goals to be met by: 8/20/23     Patient will increase functional independence with ADLs by performing:    Feeding with [place].  UE Dressing with set-up assistance  LE Dressing with Minimal Assistance.  Grooming while standing at sink with Stand-by Assistance.  Toileting from toilet with Stand-by Assistance for hygiene and clothing management.   Toilet transfer to toilet with Stand-by Assistance.    Outcome: Ongoing, Progressing

## 2023-08-06 NOTE — PROGRESS NOTES
[facility] [place] [facility]  Progress Note    Patient Name: Kenyetta Andersen  MRN: [id number]  Patient Class: IP- Inpatient   Admission Date: 8/1/2023  Length of Stay: 5 days  Attending Physician: Fawad Wheat MD  Primary Care Provider: Hever Arreola MD        Subjective:     Principal Problem:Cholangitis        HPI:  Kenyetta Andersen is a 88 y.o. female with PMHx GERD c/b esophagitis, hypothyroid, colon cancer (Dx 2009) s/p R hemicolectomy with end to side ileocolonic anastamosis and FOLFOX chemotherapy who presents to [facility] as transfer from [facility] for ERCP.     Per  transfer note: presented with chest pain and abdominal pain as well as generalized weakness, she was found to have evidence of possible acalculus cholecystitis on presentation on CT scan.  She had elevated bilirubin and transaminitis, she was also diagnosed with cholangitis spiking fevers and found to have polymicrobial bacteremia with Gram-positive and Gram-negative bacteremia.  Infectious Disease was consulted, they recommended continue with Zosyn repeating blood cultures.  She was IV fluid resuscitated had a midline placed. general surgery was consulted-they recommended consult for ERCP. GI consult for ERCP and duct clearance, can consider cholecystectomy afterwards as MRCP was suggestive of common bile duct stone.  Palliative Care was consulted and reviewed code status, she was made DNR, she also expressed that she would not want a PEG tube. She was taken to ERCP with GI, EGD findings was tortuous esophagus, narrowing at GE junction, distortion of antrum noted, gastroscope exchange for duodenal scope, scope advanced easily to the antrum and navigated around distorted antral thickening, scope advanced to the pylorus but unable to drop into the duodenal bulb, scope exchange back to gastroscope and unremarkable, GI is recommending transfer to [facility] for attempt at ERCP.    Labs and imaging at [facility]:  CBC WBC 12.2, Hgb 13-15, plts 14. BUN/Cr 23/1.3 (baseline cr ~1.0). Tibi 2.8 ->2.0, ALP wnl, AST/ /107-> 131/91. Procal 60. BCX w/ E coli, Klebsiella pneumoniae, and Bacteriodes fragilis 07/30. Repeat blood cultures drawn 7/31 NGTD. MRCP Aimwell-shaped low signal intensity mass which appears to lie within the distal segment of the common bile duct suggestive of choledocholithiasis. There is dilatation of the biliary tree proximal to this level.    On arrival, afebrile, HDS on 3L NC (not oxygen a baseline). She complains of some ongoing abdominal pain, but denies nausea, vomiting, diarrhea, fever, chills.         Overview/Hospital Course:  No notes on file    Interval Hx:  Awaiting placement in SNF/   Blood cultures 7/21 growing E Coli. ID following, Rec Zosyn x 2 weeks.       Objective:     Vital Signs (Most Recent):  Temp: 98.5 °F (36.9 °C) (08/06/23 1159)  Pulse: (!) 53 (08/06/23 1159)  Resp: 18 (08/06/23 1159)  BP: 135/62 (08/06/23 1159)  SpO2: 95 % (08/06/23 1159) Vital Signs (24h Range):  Temp:  [97.9 °F (36.6 °C)-98.5 °F (36.9 °C)] 98.5 °F (36.9 °C)  Pulse:  [53-90] 53  Resp:  [16-20] 18  SpO2:  [92 %-95 %] 95 %  BP: (127-183)/(62-90) 135/62     Weight: 67.6 kg (149 lb)  Body mass index is 26.39 kg/m².    Intake/Output Summary (Last 24 hours) at 8/6/2023 1226  Last data filed at 8/6/2023 1010  Gross per 24 hour   Intake 10 ml   Output 400 ml   Net -390 ml           Physical Exam  Vitals and nursing note reviewed.   Constitutional:       General: She is not in acute distress.  HENT:      Head: Normocephalic and atraumatic.      Mouth/Throat:      Mouth: Mucous membranes are dry.   Eyes:      Extraocular Movements: Extraocular movements intact.      Conjunctiva/sclera: Conjunctivae normal.   Cardiovascular:      Rate and Rhythm: Normal rate and regular rhythm.      Heart sounds: Normal heart sounds.   Pulmonary:      Effort: Pulmonary effort is normal. No respiratory distress.   Abdominal:      General:  There is no distension.      Palpations: Abdomen is soft.      Tenderness: There is no guarding or rebound.   Musculoskeletal:         General: Normal range of motion.      Cervical back: Normal range of motion.      Right lower leg: No edema.      Left lower leg: No edema.   Skin:     General: Skin is warm and dry.   Neurological:      General: No focal deficit present.      Mental Status: She is alert and oriented to person, place, and time.      Motor: No weakness.   Psychiatric:         Mood and Affect: Mood normal.         Behavior: Behavior normal.         Thought Content: Thought content normal.         Judgment: Judgment normal.           Review of Systems      Assessment/Plan:      * Cholangitis  88F presenting at transfer from [facility] for ERCP in setting of cholangitis.    -Gentle IV fluids. Tolerating diet.  -S/p ERCP by [facility] on 8/1 - prior sphincterectomy stenosis and biliary stent placed.  -General surgery consulted for possible cholecystectomy. Per gen surg, given her medical history and overall clinical picture, do not think she would have much additional benefit from a cholecystectomy as cholangitis likely secondary to strictured sphincter and not true choledocholithiasis. There is a chance she could have recurrent cholangitis, but the risk of this is extremely low. Will defer surgical intervention at this time.   -ID consulted. Zosyn for 2 weeks.     Debility  PT/OT rec SNF      Bacteremia due to Gram-negative bacteria  -BCX 7/20 w/ E coli, Klebsiella pneumoniae, and Bacteriodes fragilis.   -Repeat blood cultures drawn 7/31 growing EColi.  -S/p vancomycin and zosyn at [facility]  -ID consulted. Rec Zosyn x 2 weeks. PICC line placed on 8/3. Awaiting placement in SNF  -TTE with no vegetations     Outpatient Antibiotic Therapy Plan:     Please send referral to [facility].     1) Infection: cholangitis, bacteremia     2) Discharge  Antibiotics:     Intravenous antibiotics:   Zosyn 12gm daily continuous infusion     3) Therapy Duration:  2 weeks     Estimated end date of IV antibiotics: 8/15/23     4) Outpatient Weekly Labs:     Order the following labs to be drawn on Mondays:    CBC   CMP    CRP     5) Fax Lab Results to Infectious Diseases Provider: Dr duke     [facility] Fax Number: [phone]     6) Outpatient Infectious Diseases Follow-up      Follow-up appointment will be arranged by the ID clinic and will be found in the patient's appointments tab.      Prior to discharge, please ensure the patient's follow-up has been scheduled.     If there is still no follow-up scheduled prior to discharge, please send an EPIC message to Lisette Peralta in Infectious Diseases.          Cigarette nicotine dependence without complication  Dangers of cigarette smoking were reviewed with patient in detail. Patient was Counseled for 3-10 minutes. Nicotine replacement options were discussed. Nicotine replacement was discussed- not prescribed per patient's request    Daily consumption of alcohol  -Reports drinking 1 shot liquor daily  -monitor CIWA qshift, prn ativan    Pulmonary emphysema, unspecified emphysema type  Pt reports smoking 2ppd previously, cut back to 1pack per week lately. Has chronic cough, not on home oxygen and denies COPD diagnosis.   -supplemental oxygen as needed  -prn rosana    Personal history of colon cancer, stage III  -Noted prior history of stage III colorectal cancer, status post adjuvant chemotherapy   -Followed by Dr. Granado/Renu    Stage 3a chronic kidney disease  Creatine stable for now. BMP reviewed- noted Estimated Creatinine Clearance: 31.1 mL/min (based on SCr of 1.2 mg/dL). according to latest data. Based on current GFR, CKD stage is stage 3 - GFR 30-59.  Monitor UOP and serial BMP and adjust therapy as needed. Renally dose meds. Avoid nephrotoxic medications and procedures.   Cr 1.3 on admission.  Baseline ~1.0. Improving with Continuous IVF hydration. Encourage PO hydration.     GERD (gastroesophageal reflux disease)  - ppi      VTE Risk Mitigation (From admission, onward)         Ordered     heparin (porcine) injection 5,000 Units  Every 8 hours         08/01/23 1051     IP VTE HIGH RISK PATIENT  Once         08/01/23 1051     Place sequential compression device  Until discontinued         08/01/23 1051                Discharge Planning   TIERA: 8/6/2023     Code Status: DNR   Is the patient medically ready for discharge?: Yes    Reason for patient still in hospital (select all that apply): Patient trending condition  Discharge Plan A: Home Health   Discharge Delays: None known at this time              Fawad Wheat MD  Department of Hospital Medicine   [facility]

## 2023-08-06 NOTE — ASSESSMENT & PLAN NOTE
88F presenting at transfer from [facility] for ERCP in setting of cholangitis.    -Gentle IV fluids. Tolerating diet.  -S/p ERCP by [facility] on 8/1 - prior sphincterectomy stenosis and biliary stent placed.  -General surgery consulted for possible cholecystectomy. Per gen surg, given her medical history and overall clinical picture, do not think she would have much additional benefit from a cholecystectomy as cholangitis likely secondary to strictured sphincter and not true choledocholithiasis. There is a chance she could have recurrent cholangitis, but the risk of this is extremely low. Will defer surgical intervention at this time.   -ID consulted. Zosyn for 2 weeks.

## 2023-08-06 NOTE — ASSESSMENT & PLAN NOTE
-BCX 7/20 w/ E coli, Klebsiella pneumoniae, and Bacteriodes fragilis.   -Repeat blood cultures drawn 7/31 growing EColi.  -S/p vancomycin and zosyn at [facility]  -ID consulted. Rec Zosyn x 2 weeks. PICC line placed on 8/3. Awaiting placement in SNF  -TTE with no vegetations     Outpatient Antibiotic Therapy Plan:     Please send referral to [facility].     1) Infection: cholangitis, bacteremia     2) Discharge Antibiotics:     Intravenous antibiotics:   Zosyn 12gm daily continuous infusion     3) Therapy Duration:  2 weeks     Estimated end date of IV antibiotics: 8/15/23     4) Outpatient Weekly Labs:     Order the following labs to be drawn on Mondays:    CBC   CMP    CRP     5) Fax Lab Results to Infectious Diseases Provider: Dr duke     [facility] Fax Number: [phone]     6) Outpatient Infectious Diseases Follow-up      Follow-up appointment will be arranged by the ID clinic and will be found in the patient's appointments tab.      Prior to discharge, please ensure the patient's follow-up has been scheduled.     If there is still no follow-up scheduled prior to discharge, please send an EPIC message to Lisette Peralta in Infectious Diseases.

## 2023-08-07 ENCOUNTER — TELEPHONE (OUTPATIENT)
Dept: INFECTIOUS DISEASES | Facility: CLINIC | Age: 88
End: 2023-08-07
Payer: MEDICARE

## 2023-08-07 LAB
ALBUMIN SERPL BCP-MCNC: 1.7 G/DL (ref 3.5–5.2)
ALP SERPL-CCNC: 177 U/L (ref 55–135)
ALT SERPL W/O P-5'-P-CCNC: 13 U/L (ref 10–44)
ANION GAP SERPL CALC-SCNC: 9 MMOL/L (ref 8–16)
AST SERPL-CCNC: 18 U/L (ref 10–40)
BACTERIA BLD CULT: NORMAL
BACTERIA BLD CULT: NORMAL
BASOPHILS # BLD AUTO: 0.07 K/UL (ref 0–0.2)
BASOPHILS NFR BLD: 0.4 % (ref 0–1.9)
BILIRUB SERPL-MCNC: 0.7 MG/DL (ref 0.1–1)
BUN SERPL-MCNC: 7 MG/DL (ref 8–23)
CALCIUM SERPL-MCNC: 8.8 MG/DL (ref 8.7–10.5)
CHLORIDE SERPL-SCNC: 114 MMOL/L (ref 95–110)
CO2 SERPL-SCNC: 16 MMOL/L (ref 23–29)
CREAT SERPL-MCNC: 0.8 MG/DL (ref 0.5–1.4)
DIFFERENTIAL METHOD: ABNORMAL
EOSINOPHIL # BLD AUTO: 0.1 K/UL (ref 0–0.5)
EOSINOPHIL NFR BLD: 0.6 % (ref 0–8)
ERYTHROCYTE [DISTWIDTH] IN BLOOD BY AUTOMATED COUNT: 14.8 % (ref 11.5–14.5)
EST. GFR  (NO RACE VARIABLE): >60 ML/MIN/1.73 M^2
GLUCOSE SERPL-MCNC: 74 MG/DL (ref 70–110)
HCT VFR BLD AUTO: 38 % (ref 37–48.5)
HGB BLD-MCNC: 12.3 G/DL (ref 12–16)
IMM GRANULOCYTES # BLD AUTO: 0.25 K/UL (ref 0–0.04)
IMM GRANULOCYTES NFR BLD AUTO: 1.5 % (ref 0–0.5)
LYMPHOCYTES # BLD AUTO: 2.9 K/UL (ref 1–4.8)
LYMPHOCYTES NFR BLD: 17.6 % (ref 18–48)
MCH RBC QN AUTO: 31.1 PG (ref 27–31)
MCHC RBC AUTO-ENTMCNC: 32.4 G/DL (ref 32–36)
MCV RBC AUTO: 96 FL (ref 82–98)
MONOCYTES # BLD AUTO: 1.9 K/UL (ref 0.3–1)
MONOCYTES NFR BLD: 11.6 % (ref 4–15)
NEUTROPHILS # BLD AUTO: 11.1 K/UL (ref 1.8–7.7)
NEUTROPHILS NFR BLD: 68.3 % (ref 38–73)
NRBC BLD-RTO: 0 /100 WBC
PLATELET # BLD AUTO: 117 K/UL (ref 150–450)
PMV BLD AUTO: 11.6 FL (ref 9.2–12.9)
POTASSIUM SERPL-SCNC: 3.4 MMOL/L (ref 3.5–5.1)
PROT SERPL-MCNC: 5.1 G/DL (ref 6–8.4)
RBC # BLD AUTO: 3.96 M/UL (ref 4–5.4)
SODIUM SERPL-SCNC: 139 MMOL/L (ref 136–145)
WBC # BLD AUTO: 16.32 K/UL (ref 3.9–12.7)

## 2023-08-07 PROCEDURE — 63600175 PHARM REV CODE 636 W HCPCS: Performed by: HOSPITALIST

## 2023-08-07 PROCEDURE — 86580 TB INTRADERMAL TEST: CPT | Performed by: STUDENT IN AN ORGANIZED HEALTH CARE EDUCATION/TRAINING PROGRAM

## 2023-08-07 PROCEDURE — 63600175 PHARM REV CODE 636 W HCPCS

## 2023-08-07 PROCEDURE — 25000003 PHARM REV CODE 250: Performed by: STUDENT IN AN ORGANIZED HEALTH CARE EDUCATION/TRAINING PROGRAM

## 2023-08-07 PROCEDURE — 36415 COLL VENOUS BLD VENIPUNCTURE: CPT | Performed by: STUDENT IN AN ORGANIZED HEALTH CARE EDUCATION/TRAINING PROGRAM

## 2023-08-07 PROCEDURE — 80053 COMPREHEN METABOLIC PANEL: CPT | Performed by: STUDENT IN AN ORGANIZED HEALTH CARE EDUCATION/TRAINING PROGRAM

## 2023-08-07 PROCEDURE — 99231 PR SUBSEQUENT HOSPITAL CARE,LEVL I: ICD-10-PCS | Mod: ,,, | Performed by: STUDENT IN AN ORGANIZED HEALTH CARE EDUCATION/TRAINING PROGRAM

## 2023-08-07 PROCEDURE — 25000003 PHARM REV CODE 250: Performed by: HOSPITALIST

## 2023-08-07 PROCEDURE — 85025 COMPLETE CBC W/AUTO DIFF WBC: CPT | Performed by: STUDENT IN AN ORGANIZED HEALTH CARE EDUCATION/TRAINING PROGRAM

## 2023-08-07 PROCEDURE — 20600001 HC STEP DOWN PRIVATE ROOM

## 2023-08-07 PROCEDURE — A4216 STERILE WATER/SALINE, 10 ML: HCPCS | Performed by: STUDENT IN AN ORGANIZED HEALTH CARE EDUCATION/TRAINING PROGRAM

## 2023-08-07 PROCEDURE — 99231 SBSQ HOSP IP/OBS SF/LOW 25: CPT | Mod: ,,, | Performed by: STUDENT IN AN ORGANIZED HEALTH CARE EDUCATION/TRAINING PROGRAM

## 2023-08-07 PROCEDURE — 30200315 PPD INTRADERMAL TEST REV CODE 302: Performed by: STUDENT IN AN ORGANIZED HEALTH CARE EDUCATION/TRAINING PROGRAM

## 2023-08-07 RX ORDER — OMEPRAZOLE 40 MG/1
40 CAPSULE, DELAYED RELEASE ORAL EVERY MORNING
Qty: 30 CAPSULE | Refills: 0
Start: 2023-08-07

## 2023-08-07 RX ADMIN — DORZOLAMIDE 1 DROP: 20 SOLUTION/ DROPS OPHTHALMIC at 09:08

## 2023-08-07 RX ADMIN — Medication 10 ML: at 12:08

## 2023-08-07 RX ADMIN — MIRTAZAPINE 7.5 MG: 7.5 TABLET, FILM COATED ORAL at 09:08

## 2023-08-07 RX ADMIN — Medication 10 ML: at 05:08

## 2023-08-07 RX ADMIN — GUAIFENESIN AND DEXTROMETHORPHAN HYDROBROMIDE 1 TABLET: 600; 30 TABLET, EXTENDED RELEASE ORAL at 08:08

## 2023-08-07 RX ADMIN — TUBERCULIN PURIFIED PROTEIN DERIVATIVE 5 UNITS: 5 INJECTION, SOLUTION INTRADERMAL at 03:08

## 2023-08-07 RX ADMIN — TIMOLOL MALEATE 1 DROP: 5 SOLUTION OPHTHALMIC at 09:08

## 2023-08-07 RX ADMIN — PIPERACILLIN SODIUM AND TAZOBACTAM SODIUM 4.5 G: 4; .5 INJECTION, POWDER, FOR SOLUTION INTRAVENOUS at 02:08

## 2023-08-07 RX ADMIN — PIPERACILLIN SODIUM AND TAZOBACTAM SODIUM 4.5 G: 4; .5 INJECTION, POWDER, FOR SOLUTION INTRAVENOUS at 05:08

## 2023-08-07 RX ADMIN — BRIMONIDINE TARTRATE 1 DROP: 1.5 SOLUTION/ DROPS OPHTHALMIC at 05:08

## 2023-08-07 RX ADMIN — GUAIFENESIN AND DEXTROMETHORPHAN HYDROBROMIDE 1 TABLET: 600; 30 TABLET, EXTENDED RELEASE ORAL at 09:08

## 2023-08-07 RX ADMIN — BRIMONIDINE TARTRATE 1 DROP: 1.5 SOLUTION/ DROPS OPHTHALMIC at 09:08

## 2023-08-07 RX ADMIN — HEPARIN SODIUM 5000 UNITS: 5000 INJECTION INTRAVENOUS; SUBCUTANEOUS at 09:08

## 2023-08-07 RX ADMIN — PANTOPRAZOLE SODIUM 40 MG: 40 TABLET, DELAYED RELEASE ORAL at 08:08

## 2023-08-07 RX ADMIN — BRIMONIDINE TARTRATE 1 DROP: 1.5 SOLUTION/ DROPS OPHTHALMIC at 01:08

## 2023-08-07 RX ADMIN — HEPARIN SODIUM 5000 UNITS: 5000 INJECTION INTRAVENOUS; SUBCUTANEOUS at 05:08

## 2023-08-07 RX ADMIN — HEPARIN SODIUM 5000 UNITS: 5000 INJECTION INTRAVENOUS; SUBCUTANEOUS at 01:08

## 2023-08-07 RX ADMIN — PIPERACILLIN SODIUM AND TAZOBACTAM SODIUM 4.5 G: 4; .5 INJECTION, POWDER, FOR SOLUTION INTRAVENOUS at 09:08

## 2023-08-07 NOTE — PLAN OF CARE
NURSING HOME ORDERS    08/07/2023  [facility]  [address]  [place] [address]  Dept: [phone]  Loc: [phone]     Admit to Nursing Home:      Diagnoses:  Active Hospital Problems    Diagnosis  POA    *Cholangitis [K83.09]  Yes    Debility [R53.81]  Unknown    Cigarette nicotine dependence without complication [F17.210]  Yes    Bacteremia due to Gram-negative bacteria [R78.81]  Yes    Daily consumption of alcohol [Z78.9]  Yes     Chronic    Pulmonary emphysema, unspecified emphysema type [J43.9]  Yes    Personal history of colon cancer, stage III [Z85.038]  Yes    Stage 3a chronic kidney disease [N18.31]  Yes    GERD (gastroesophageal reflux disease) [K21.9]  Yes      Resolved Hospital Problems   No resolved problems to display.       Patient is homebound due to:  Cholangitis    Allergies:  Review of patient's allergies indicates:   Allergen Reactions    Ace inhibitors Edema     Other reaction(s): Angioedema    Codeine Hives       Vitals:  Routine    Diet: regular diet    Activities:   Activity as tolerated    Goals of Care Treatment Preferences:  Code Status: DNR    Health care agent: Tucker Murcia and [facility] agent number: [phone] / [phone] / [phone]       [place]: Yes           Nursing Precautions:  Fall and Pressure ulcer prevention    Consults:   PT to evaluate and treat- 5 times a week and OT to evaluate and treat- 5 times a week       Medications: Discontinue all previous medication orders, if any. See new list below.     Medication List        START taking these medications      dextrose 5 % in water (D5W) PgBk 100 mL with piperacillin-tazobactam 4.5 gram SolR 12 g  as zosyn 4.5 grams IV q 8 hours until 8/15/23            CHANGE how you take these medications      brimonidine 0.2% 0.2 % Drop  Commonly known as: ALPHAGAN  INSTILL 1 DROP INTO BOTH EYES 3 TIMES A DAY  What changed: See the new instructions.      omeprazole 40 MG capsule  Commonly known as: PRILOSEC  Take 1 capsule (40 mg total) by mouth every morning.  What changed: See the new instructions.     potassium chloride SA 20 MEQ tablet  Commonly known as: K-DUR,KLOR-CON  TAKE 1 TABLET BY MOUTH ONCE DAILY  What changed: when to take this            CONTINUE taking these medications      acetaminophen 650 MG Tbsr  Commonly known as: TYLENOL  Take 650 mg by mouth every 8 (eight) hours as needed (pain).     alendronate 70 MG tablet  Commonly known as: FOSAMAX  TAKE 1 TABLET (70 MG TOTAL) BY MOUTH EVERY 7 DAYS. ON EMPTY STOMACH IN MORNING, REMAIN UPRIGHT FOR 30 MINUTES.     ascorbic acid (vitamin C) 500 MG tablet  Commonly known as: VITAMIN C  Take 500 mg by mouth once daily.     b complex vitamins capsule  Take 1 capsule by mouth once daily.     calcium carbonate-magnesium hydroxide 550-110 mg Chew  Commonly known as: ROLAIDS  Take 1 tablet by mouth 2 (two) times daily as needed (heartburn).     dorzolamide-timolol 2-0.5% 22.3-6.8 mg/mL ophthalmic solution  Commonly known as: COSOPT  INSTILL 1 DROP INTO BOTH EYES TWICE A DAY     gabapentin 300 MG capsule  Commonly known as: NEURONTIN  TAKE 1 CAP IN THE AM, 1 CAP MIDDAY, AND 2 CAPS AT NIGHT     mirtazapine 7.5 MG Tab  Commonly known as: REMERON  TAKE 1 TABLET BY MOUTH EVERY EVENING.               Outpatient Antibiotic Therapy Plan:     Please send referral to [facility].     1) Infection: cholangitis, bacteremia     2) Discharge Antibiotics:     Intravenous antibiotics:  Zosyn 12gm daily continuous infusion     3) Therapy Duration:  2 weeks     Estimated end date of IV antibiotics: 8/15/23     4) Outpatient Weekly Labs:     Order the following labs to be drawn on Mondays:   CBC  CMP   CRP     5) Fax Lab Results to Infectious Diseases Provider: Dr duke     [facility] Fax Number: [phone]     6) Outpatient Infectious Diseases Follow-up     Follow-up appointment will be  arranged by the ID clinic and will be found in the patient's appointments tab.     Prior to discharge, please ensure the patient's follow-up has been scheduled.    If there is still no follow-up scheduled prior to discharge, please send an EPIC message to Lisette Peralta in Infectious Diseases.          Immunizations Administered as of 8/7/2023       Name Date Dose VIS Date Route Exp Date    COVID-19, MRNA, LN-S, PF (Pfizer) (Purple Cap) 12/10/2021 0.3 mL -- Intramuscular --    Site: Left arm     : Pf[employer]     Lot: [id number]     COVID-19, MRNA, LN-S, PF (Pfizer) (Purple Cap) 2/17/2021  8:47 AM 0.3 mL 12/12/2020 Intramuscular 5/31/2021    Site: Left arm     Given By: Lee Ann Babin MA     : [employer]     Lot: [id number]     COVID-19, MRNA, LN-S, PF (Pfizer) (Purple Cap) 1/25/2021 11:42 AM 0.3 mL 12/12/2020 Intramuscular 5/31/2021    Site: Left arm     Given By: Angela Bee RN     : Pf[employer]     Lot: [id number]               Some patients may experience side effects after vaccination.  These may include fever, headache, muscle or joint aches.  Most symptoms resolve with 24-48 hours and do not require urgent medical evaluation unless they persist for more than 72 hours or symptoms are concerning for an unrelated medical condition.          _________________________________  Fawad Wheat MD  08/07/2023

## 2023-08-07 NOTE — PLAN OF CARE
Patient AO x 4, no c/o pain, N/V. Ambulating with nursing in room. Reports poor appetite, encouraging eating small meals throughout the day. Accepted by SNF, awaiting paperwork. VSS will continue to monitor.     Problem: Adult Inpatient Plan of Care  Goal: Plan of Care Review  Outcome: Ongoing, Progressing  Goal: Patient-Specific Goal (Individualized)  Outcome: Ongoing, Progressing  Goal: Absence of Hospital-Acquired Illness or Injury  Outcome: Ongoing, Progressing  Goal: Optimal Comfort and Wellbeing  Outcome: Ongoing, Progressing  Goal: Readiness for Transition of Care  Outcome: Ongoing, Progressing     Problem: Infection  Goal: Absence of Infection Signs and Symptoms  Outcome: Ongoing, Progressing     Problem: Fall Injury Risk  Goal: Absence of Fall and Fall-Related Injury  Outcome: Ongoing, Progressing     Problem: Skin Injury Risk Increased  Goal: Skin Health and Integrity  Outcome: Ongoing, Progressing

## 2023-08-07 NOTE — SUBJECTIVE & OBJECTIVE
Interval Hx:  Awaiting placement in SNF.  Blood cultures 7/21 growing E Coli. ID following, Rec Zosyn x 2 weeks.       Objective:     Vital Signs (Most Recent):  Temp: 98.1 °F (36.7 °C) (08/07/23 1313)  Pulse: 79 (08/07/23 1313)  Resp: 18 (08/07/23 1313)  BP: (!) 145/74 (08/07/23 1313)  SpO2: (!) 94 % (08/07/23 1313) Vital Signs (24h Range):  Temp:  [98.1 °F (36.7 °C)-98.6 °F (37 °C)] 98.1 °F (36.7 °C)  Pulse:  [74-88] 79  Resp:  [17-18] 18  SpO2:  [93 %-97 %] 94 %  BP: (137-173)/(63-79) 145/74     Weight: 67.6 kg (149 lb)  Body mass index is 26.39 kg/m².    Intake/Output Summary (Last 24 hours) at 8/7/2023 1417  Last data filed at 8/6/2023 2143  Gross per 24 hour   Intake --   Output 400 ml   Net -400 ml           Physical Exam  Vitals and nursing note reviewed.   Constitutional:       General: She is not in acute distress.  HENT:      Head: Normocephalic and atraumatic.      Mouth/Throat:      Mouth: Mucous membranes are dry.   Eyes:      Extraocular Movements: Extraocular movements intact.      Conjunctiva/sclera: Conjunctivae normal.   Cardiovascular:      Rate and Rhythm: Normal rate and regular rhythm.      Heart sounds: Normal heart sounds.   Pulmonary:      Effort: Pulmonary effort is normal. No respiratory distress.   Abdominal:      General: There is no distension.      Palpations: Abdomen is soft.      Tenderness: There is no guarding or rebound.   Musculoskeletal:         General: Normal range of motion.      Cervical back: Normal range of motion.      Right lower leg: No edema.      Left lower leg: No edema.   Skin:     General: Skin is warm and dry.   Neurological:      General: No focal deficit present.      Mental Status: She is alert and oriented to person, place, and time.      Motor: No weakness.   Psychiatric:         Mood and Affect: Mood normal.         Behavior: Behavior normal.         Thought Content: Thought content normal.         Judgment: Judgment normal.           Review of Systems

## 2023-08-07 NOTE — TELEPHONE ENCOUNTER
Giovana Oh MD Fernandez, Tiffany L., LOKIN  Doesn't necessarily need f/u, but please make sure picc gets removed

## 2023-08-07 NOTE — PROGRESS NOTES
[facility] Medicine  Progress Note    Patient Name: Kenyetta Andersen  MRN: [id number]  Patient Class: IP- Inpatient   Admission Date: 8/1/2023  Length of Stay: 6 days  Attending Physician: Fawad Wheat MD  Primary Care Provider: Hever Arreola MD        Subjective:     Principal Problem:Cholangitis        HPI:  Kenyetta Andersen is a 88 y.o. female with PMHx GERD c/b esophagitis, hypothyroid, colon cancer (Dx 2009) s/p R hemicolectomy with end to side ileocolonic anastamosis and FOLFOX chemotherapy [place] [facility] [facility] for ERCP.     Per  transfer note: presented with chest pain and abdominal pain as well as generalized weakness, she was found to have evidence of possible acalculus cholecystitis on presentation on CT scan.  She had elevated bilirubin and transaminitis, she was also diagnosed with cholangitis spiking fevers and found to have polymicrobial bacteremia with Gram-positive and Gram-negative bacteremia.  Infectious Disease was consulted, they recommended continue with Zosyn repeating blood cultures.  She was IV fluid resuscitated had a midline placed. general surgery was consulted-they recommended consult for ERCP. GI consult for ERCP and duct clearance, can consider cholecystectomy afterwards as MRCP was suggestive of common bile duct stone.  Palliative Care was consulted and reviewed code status, she was made DNR, she also expressed that she would not want a PEG tube. She was taken to ERCP with GI, EGD findings was tortuous esophagus, narrowing at GE junction, distortion of antrum noted, gastroscope exchange for duodenal scope, scope advanced easily to the antrum and navigated around distorted antral thickening, scope advanced to the pylorus but unable to drop into the duodenal bulb, scope exchange back to gastroscope and unremarkable, GI is recommending transfer to [facility] for attempt at ERCP.    Labs and imaging at [facility]:  CBC WBC 12.2, Hgb 13-15, plts 14. BUN/Cr 23/1.3 (baseline cr ~1.0). Tibi 2.8 ->2.0, ALP wnl, AST/ /107-> 131/91. Procal 60. BCX w/ E coli, Klebsiella pneumoniae, and Bacteriodes fragilis 07/30. Repeat blood cultures drawn 7/31 NGTD. MRCP Aston-shaped low signal intensity mass which appears to lie within the distal segment of the common bile duct suggestive of choledocholithiasis. There is dilatation of the biliary tree proximal to this level.    On arrival, afebrile, HDS on 3L NC (not oxygen a baseline). She complains of some ongoing abdominal pain, but denies nausea, vomiting, diarrhea, fever, chills.         Overview/Hospital Course:  No notes on file    Interval Hx:  Awaiting placement in SNF.  Blood cultures 7/21 growing E Coli. ID following, Rec Zosyn x 2 weeks.       Objective:     Vital Signs (Most Recent):  Temp: 98.1 °F (36.7 °C) (08/07/23 1313)  Pulse: 79 (08/07/23 1313)  Resp: 18 (08/07/23 1313)  BP: (!) 145/74 (08/07/23 1313)  SpO2: (!) 94 % (08/07/23 1313) Vital Signs (24h Range):  Temp:  [98.1 °F (36.7 °C)-98.6 °F (37 °C)] 98.1 °F (36.7 °C)  Pulse:  [74-88] 79  Resp:  [17-18] 18  SpO2:  [93 %-97 %] 94 %  BP: (137-173)/(63-79) 145/74     Weight: 67.6 kg (149 lb)  Body mass index is 26.39 kg/m².    Intake/Output Summary (Last 24 hours) at 8/7/2023 1417  Last data filed at 8/6/2023 2143  Gross per 24 hour   Intake --   Output 400 ml   Net -400 ml           Physical Exam  Vitals and nursing note reviewed.   Constitutional:       General: She is not in acute distress.  HENT:      Head: Normocephalic and atraumatic.      Mouth/Throat:      Mouth: Mucous membranes are dry.   Eyes:      Extraocular Movements: Extraocular movements intact.      Conjunctiva/sclera: Conjunctivae normal.   Cardiovascular:      Rate and Rhythm: Normal rate and regular rhythm.      Heart sounds: Normal heart sounds.   Pulmonary:      Effort: Pulmonary effort is normal. No respiratory distress.   Abdominal:      General:  There is no distension.      Palpations: Abdomen is soft.      Tenderness: There is no guarding or rebound.   Musculoskeletal:         General: Normal range of motion.      Cervical back: Normal range of motion.      Right lower leg: No edema.      Left lower leg: No edema.   Skin:     General: Skin is warm and dry.   Neurological:      General: No focal deficit present.      Mental Status: She is alert and oriented to person, place, and time.      Motor: No weakness.   Psychiatric:         Mood and Affect: Mood normal.         Behavior: Behavior normal.         Thought Content: Thought content normal.         Judgment: Judgment normal.           Review of Systems      Assessment/Plan:      * Cholangitis  88F presenting at transfer from [facility] for ERCP in setting of cholangitis.    -Gentle IV fluids. Tolerating diet.  -S/p ERCP by [facility] on 8/1 - prior sphincterectomy stenosis and biliary stent placed.  -General surgery consulted for possible cholecystectomy. Per gen surg, given her medical history and overall clinical picture, do not think she would have much additional benefit from a cholecystectomy as cholangitis likely secondary to strictured sphincter and not true choledocholithiasis. There is a chance she could have recurrent cholangitis, but the risk of this is extremely low. Will defer surgical intervention at this time.   -ID consulted. Zosyn for 2 weeks.     Debility  PT/OT rec SNF      Bacteremia due to Gram-negative bacteria  -BCX 7/20 w/ E coli, Klebsiella pneumoniae, and Bacteriodes fragilis.   -Repeat blood cultures drawn 7/31 growing EColi.  -S/p vancomycin and zosyn at [facility]  -ID consulted. Rec Zosyn x 2 weeks. PICC line placed on 8/3. Awaiting placement in SNF  -TTE with no vegetations     Outpatient Antibiotic Therapy Plan:     Please send referral to [facility].     1) Infection: cholangitis, bacteremia     2) Discharge  Antibiotics:     Intravenous antibiotics:   Zosyn 12gm daily continuous infusion     3) Therapy Duration:  2 weeks     Estimated end date of IV antibiotics: 8/15/23     4) Outpatient Weekly Labs:     Order the following labs to be drawn on Mondays:    CBC   CMP    CRP     5) Fax Lab Results to Infectious Diseases Provider: Dr duke     [facility] Fax Number: [phone]     6) Outpatient Infectious Diseases Follow-up      Follow-up appointment will be arranged by the ID clinic and will be found in the patient's appointments tab.      Prior to discharge, please ensure the patient's follow-up has been scheduled.     If there is still no follow-up scheduled prior to discharge, please send an EPIC message to Lisette Peralta in Infectious Diseases.          Cigarette nicotine dependence without complication  Dangers of cigarette smoking were reviewed with patient in detail. Patient was Counseled for 3-10 minutes. Nicotine replacement options were discussed. Nicotine replacement was discussed- not prescribed per patient's request    Daily consumption of alcohol  -Reports drinking 1 shot liquor daily  -monitor CIWA qshift, prn ativan    Pulmonary emphysema, unspecified emphysema type  Pt reports smoking 2ppd previously, cut back to 1pack per week lately. Has chronic cough, not on home oxygen and denies COPD diagnosis.   -supplemental oxygen as needed  -prn rosana    Personal history of colon cancer, stage III  -Noted prior history of stage III colorectal cancer, status post adjuvant chemotherapy   -Followed by Dr. Granado/Renu    Stage 3a chronic kidney disease  Creatine stable for now. BMP reviewed- noted Estimated Creatinine Clearance: 31.1 mL/min (based on SCr of 1.2 mg/dL). according to latest data. Based on current GFR, CKD stage is stage 3 - GFR 30-59.  Monitor UOP and serial BMP and adjust therapy as needed. Renally dose meds. Avoid nephrotoxic medications and procedures.   Cr 1.3 on admission.  Baseline ~1.0. Improving with Continuous IVF hydration. Encourage PO hydration.     GERD (gastroesophageal reflux disease)  - ppi      VTE Risk Mitigation (From admission, onward)         Ordered     heparin (porcine) injection 5,000 Units  Every 8 hours         08/01/23 1051     IP VTE HIGH RISK PATIENT  Once         08/01/23 1051     Place sequential compression device  Until discontinued         08/01/23 1051                Discharge Planning   TIERA: 8/9/2023     Code Status: DNR   Is the patient medically ready for discharge?: Yes    Reason for patient still in hospital (select all that apply): Patient trending condition  Discharge Plan A: Skilled Nursing Facility   Discharge Delays: None known at this time              Fawad Wheat MD  Department of Hospital Medicine   Rajesh Cook  MAJOR

## 2023-08-07 NOTE — PLAN OF CARE
08/07/23 1004   Post-Acute Status   Post-Acute Authorization Placement   Post-Acute Placement Status Patient List Provided   Patient choice form signed by patient/caregiver List with quality metrics by geographic area provided   Discharge Delays None known at this time   Discharge Plan   Discharge Plan A Skilled Nursing Facility   Discharge Plan B Home Health     Pt accepted to 2 SNF's. Pt choice is Med. CM informed facility of Pt choice.    Rosibel Luevano, RN    [facility]  [phone]

## 2023-08-08 VITALS
SYSTOLIC BLOOD PRESSURE: 163 MMHG | HEART RATE: 75 BPM | OXYGEN SATURATION: 92 % | BODY MASS INDEX: 26.4 KG/M2 | RESPIRATION RATE: 18 BRPM | HEIGHT: 63 IN | DIASTOLIC BLOOD PRESSURE: 68 MMHG | WEIGHT: 149 LBS | TEMPERATURE: 99 F

## 2023-08-08 PROCEDURE — 94761 N-INVAS EAR/PLS OXIMETRY MLT: CPT

## 2023-08-08 PROCEDURE — 99239 PR HOSPITAL DISCHARGE DAY,>30 MIN: ICD-10-PCS | Mod: ,,, | Performed by: STUDENT IN AN ORGANIZED HEALTH CARE EDUCATION/TRAINING PROGRAM

## 2023-08-08 PROCEDURE — 1111F PR DISCHARGE MEDS RECONCILED W/ CURRENT OUTPATIENT MED LIST: ICD-10-PCS | Mod: CPTII,,, | Performed by: STUDENT IN AN ORGANIZED HEALTH CARE EDUCATION/TRAINING PROGRAM

## 2023-08-08 PROCEDURE — 99239 HOSP IP/OBS DSCHRG MGMT >30: CPT | Mod: ,,, | Performed by: STUDENT IN AN ORGANIZED HEALTH CARE EDUCATION/TRAINING PROGRAM

## 2023-08-08 PROCEDURE — 51798 US URINE CAPACITY MEASURE: CPT

## 2023-08-08 PROCEDURE — 25000003 PHARM REV CODE 250: Performed by: STUDENT IN AN ORGANIZED HEALTH CARE EDUCATION/TRAINING PROGRAM

## 2023-08-08 PROCEDURE — 63600175 PHARM REV CODE 636 W HCPCS: Performed by: HOSPITALIST

## 2023-08-08 PROCEDURE — 25000003 PHARM REV CODE 250: Performed by: HOSPITALIST

## 2023-08-08 PROCEDURE — 63600175 PHARM REV CODE 636 W HCPCS

## 2023-08-08 PROCEDURE — 1111F DSCHRG MED/CURRENT MED MERGE: CPT | Mod: CPTII,,, | Performed by: STUDENT IN AN ORGANIZED HEALTH CARE EDUCATION/TRAINING PROGRAM

## 2023-08-08 PROCEDURE — A4216 STERILE WATER/SALINE, 10 ML: HCPCS | Performed by: STUDENT IN AN ORGANIZED HEALTH CARE EDUCATION/TRAINING PROGRAM

## 2023-08-08 PROCEDURE — 97116 GAIT TRAINING THERAPY: CPT

## 2023-08-08 PROCEDURE — 97530 THERAPEUTIC ACTIVITIES: CPT

## 2023-08-08 RX ORDER — POTASSIUM CHLORIDE 20 MEQ/1
40 TABLET, EXTENDED RELEASE ORAL ONCE
Status: COMPLETED | OUTPATIENT
Start: 2023-08-08 | End: 2023-08-08

## 2023-08-08 RX ADMIN — GUAIFENESIN AND DEXTROMETHORPHAN HYDROBROMIDE 1 TABLET: 600; 30 TABLET, EXTENDED RELEASE ORAL at 09:08

## 2023-08-08 RX ADMIN — SENNOSIDES AND DOCUSATE SODIUM 1 TABLET: 50; 8.6 TABLET ORAL at 09:08

## 2023-08-08 RX ADMIN — PIPERACILLIN SODIUM AND TAZOBACTAM SODIUM 4.5 G: 4; .5 INJECTION, POWDER, FOR SOLUTION INTRAVENOUS at 02:08

## 2023-08-08 RX ADMIN — Medication 10 ML: at 02:08

## 2023-08-08 RX ADMIN — BRIMONIDINE TARTRATE 1 DROP: 1.5 SOLUTION/ DROPS OPHTHALMIC at 02:08

## 2023-08-08 RX ADMIN — BRIMONIDINE TARTRATE 1 DROP: 1.5 SOLUTION/ DROPS OPHTHALMIC at 05:08

## 2023-08-08 RX ADMIN — Medication 10 ML: at 12:08

## 2023-08-08 RX ADMIN — PANTOPRAZOLE SODIUM 40 MG: 40 TABLET, DELAYED RELEASE ORAL at 09:08

## 2023-08-08 RX ADMIN — PIPERACILLIN SODIUM AND TAZOBACTAM SODIUM 4.5 G: 4; .5 INJECTION, POWDER, FOR SOLUTION INTRAVENOUS at 09:08

## 2023-08-08 RX ADMIN — POLYETHYLENE GLYCOL 3350 17 G: 17 POWDER, FOR SOLUTION ORAL at 09:08

## 2023-08-08 RX ADMIN — POTASSIUM CHLORIDE 40 MEQ: 1500 TABLET, EXTENDED RELEASE ORAL at 09:08

## 2023-08-08 RX ADMIN — Medication 10 ML: at 05:08

## 2023-08-08 RX ADMIN — DORZOLAMIDE 1 DROP: 20 SOLUTION/ DROPS OPHTHALMIC at 09:08

## 2023-08-08 RX ADMIN — HEPARIN SODIUM 5000 UNITS: 5000 INJECTION INTRAVENOUS; SUBCUTANEOUS at 05:08

## 2023-08-08 RX ADMIN — HEPARIN SODIUM 5000 UNITS: 5000 INJECTION INTRAVENOUS; SUBCUTANEOUS at 02:08

## 2023-08-08 RX ADMIN — TIMOLOL MALEATE 1 DROP: 5 SOLUTION OPHTHALMIC at 09:08

## 2023-08-08 NOTE — PLAN OF CARE
08/08/23 1258   Final Note   Assessment Type Final Discharge Note   Anticipated Discharge Disposition SNF   Hospital Resources/Appts/Education Provided Provided patient/caregiver with written discharge plan information  (per bedside nurse)   Post-Acute Status   Post-Acute Placement Status Set-up Complete/Auth obtained   Discharge Delays None known at this time     Med ready to receive Pt. Report to be called to nurse for room A17. Transport set up and informed bedside nurse.    Rosibel Luevano, RN    [facility]  [phone]

## 2023-08-08 NOTE — PT/OT/SLP PROGRESS
Physical Therapy  Treatment    Patient Name:  Kenyetta Andersen   MRN:  [id number]    Recommendations:     Discharge Recommendations:  nursing facility, skilled   Discharge Equipment Recommendations: walker, rolling, grab bar, shower chair   Barriers to discharge: decreased functional mobility and fall risk    Assessment:     Kenyetta Andersen is a 88 y.o. female admitted with a medical diagnosis of Cholangitis.  Pt demonstrates the below listed impairments with decreased tolerance to functional mobility, impaired endurance, and gait instability being the most limiting.  Pt demonstrates fair tolerance to out of bed mobility and is willing to ambulate in the hallway.  Increased time required to allow pt to performs self care on the commode.  Pt is not safe for home discharge at this time due to patient's status as: a fall risk and requires skilled PT.      Impairments and functional limitations:  weakness, impaired endurance, impaired self care skills, impaired functional mobility, gait instability, impaired balance, decreased lower extremity function.  These deficits affect their roles and responsibilities in which they were able to complete prior to admit.  Rehab Prognosis:   Good ; patient would benefit from acute skilled PT services 4 x/week to address these deficits, improve quality of life, focus on recovery of impairments, provide patient/caregiver education, reduce fall risk, and reach maximum level of function.  Pt is moderately motivated to participated in skilled PT.    Recent Surgery:   Procedure(s) (LRB):  ERCP (ENDOSCOPIC RETROGRADE CHOLANGIOPANCREATOGRAPHY) (N/A) 7 Days Post-Op    Plan:     During this hospitalization, patient to be seen 4 x/week to address the identified rehab impairments via gait training, therapeutic activities, therapeutic exercises, neuromuscular re-education and progress toward the following goals:    Plan of Care Expires:  09/04/23    Subjective     Chief Complaint: decreased  tolerance to functional mobility  Patient/Family Comments/Goals: Progress to SNF  Pain/Comfort:  Pain Rating 1: 0/10    Objective:     Communicated with RN prior to session.  Patient found HOB elevated with telemetry, peripheral IV upon PT entry to room.     General Precautions: Standard, fall   Orthopedic Precautions:N/A   Braces: N/A  Oxygen Device:      Functional Mobility:  Bed Mobility:  Rolling Left: Sup  Scooting: Sup  Supine to Sit: Sup  Sit to Supine: Sup  Head of bed position: HOB elevated    Transfers:  Sit to Stand: CGA with RW  Toilet: sit to stand with RW and CGA    Gait: Patient ambulated 90' with RW and CGA. Patient demonstrates occasional unsteady gait, decreased step length, flexed posture, and decreased angel. All lines remained intact throughout ambulation trial.  Pt has x1 LOB to the L while turning R.  She is self correcting however minimum assisted provided by therapist for safety    Balance:   Position Score Time   Static Sitting GOOD: Takes MODERATE challenges n/a   Dynamic Sitting GOOD-: Maintains balance through MODERATE excursions of active trunk motion, but inconstantly  n/a   Static Standing FAIR+: Takes MINIMAL challenges n/a   Dynamic Standing FAIR-: Maintains without assist but is inconsistent n/a       AM-PAC 6 CLICK MOBILITY  Turning over in bed (including adjusting bedclothes, sheets and blankets)?: 3  Sitting down on and standing up from a chair with arms (e.g., wheelchair, bedside commode, etc.): 3  Moving from lying on back to sitting on the side of the bed?: 3  Moving to and from a bed to a chair (including a wheelchair)?: 3  Need to walk in hospital room?: 3  Climbing 3-5 steps with a railing?: 3  Basic Mobility Total Score: 18     Therapeutic Activities:  Patient educated on role of acute care PT and PT POC, safety while in hospital including calling nurse for mobility, call light usage, benefits of out of bed mobility, walker management, breathing technique, fall risk,  assistive device use, bed mobility , transfers, gait technique, positioning, posture, risks of prolonged bed rest, possible discharge disposition , and benefits of continued PT by explanation and demonstration.    Patient demonstrates good understanding of education provided this day.   Whiteboard updated  Pt performs self care on commode following bout of ambulation  Supervision for ADLs    Therapeutic Exercises:  n/a    Patient left HOB elevated with all lines intact and call button in reach.    GOALS:   Multidisciplinary Problems       Physical Therapy Goals          Problem: Physical Therapy    Goal Priority Disciplines Outcome Goal Variances Interventions   Physical Therapy Goal     PT, PT/OT Ongoing, Progressing     Description: Goals to be met by: 8/15    Patient will increase functional independence with mobility by performin. Supine to sit with Modified [place]anch[place]  2. Sit to supine with Modified [place]  3. Sit to stand transfer with Modified Blanch[place]  4. Gait  x 300 feet with Modified Blanchard using LRAD or no AD.                          Time Tracking:     PT Received On: 23  PT Start Time: 854     PT Stop Time: 917  PT Total Time (min): 23 min     Billable Minutes: Gait Training 15 and Therapeutic Activity 8    Treatment Type: Treatment  PT/PTA: PT     Number of PTA visits since last PT visit: 0     2023

## 2023-08-08 NOTE — DISCHARGE SUMMARY
Discharge    Today's date: 2023  Patient name: Hallie Pate  : 1961  MRN: [id number]  Referring provider: Gm Bates DPM  Dx:   Encounter Diagnosis     ICD-10-CM    1. Traumatic rupture of peroneal tendon of left foot, initial encounter  N90.201U                      Subjective: Pt arrives with no complaints of pain. She states that she did 6 miles of walking without any discomfort post. Pt feels appropriate for discharge from PT. Objective: See treatment diary below      Assessment: Pt tolerates treatment well today without significant complaints. Progressions made in single leg stance and ankle stability. Pt able to perform all exercises without pain noted. Pt is appropriate for discharge from skilled PT. Plan: Discharge     Precautions: None      Manuals        TCJ AP mob NV KT     DL MD kl DL +tennis ball roll arch. Laser  Lateral ankle 4' 10W 4' 10W 4'  14W  JLW 4'                                  Neuro Re-Ed                                                                                                        Ther Ex             Ankle 4 way DF/ EV/INV GTB  20x  ea Blue 20x ea. blue  20 ea Blue x30ea        B heel raise NV 2x10 U/l 2x10 U/L  2x15 2x10 unilateral 2x10 u/l       Baps Board seated CW/CCW 15x ea  stand  a/p, m/l  20 ea 30ea 20x ea. S/l ankle eversion W/ ankle weight on forefoot            Lateral band walk Band around midfoot   NV red tb  12ft x4  Heel raise w/ Red tband midfoot 20'x3 B       Soleus stretch             Standing hip abduction   W/ red TB midfoot 2x10 ea. w red tb midfoot  2x15 ea nahum 7.0 x20        SLS   trampoline ball toss 20x 2kg fwd/lat ea.    On airex 30"x4 SLS squigz 1/2 jar                    Ther Activity             Bike  5' NV          Sit to stand/squat   NV single leg   10 single leg x10 Nigerien split squat 2x10       Leg press heel raise   NV          U/l RDL   May trial cone  NV [facility] Medicine  Discharge Summary      Patient Name: Kenyetta Andersen  MRN: [id number]  MILES: [id number]  Patient Class: IP- Inpatient  Admission Date: 8/1/2023  Hospital Length of Stay: 7 days  Discharge Date and Time:  08/08/2023 2:26 PM  Attending Physician: Fawad Wheat MD   Discharging Provider: Fawad Wheat MD  Primary Care Provider: Hever Arreola MD  [facility] Medicine Team: [facility] MED X Fawad Wheat MD  Primary Care Team: [facility] X    HPI:   Kenyetta Andersen is a 88 y.o. female with PMHx GERD c/b esophagitis, hypothyroid, colon cancer (Dx 2009) s/p R hemicolectomy with end to side ileocolonic anastamosis and FOLFOX chemotherapy who presents to [facility] as transfer from [facility] for ERCP.     Per  transfer note: presented with chest pain and abdominal pain as well as generalized weakness, she was found to have evidence of possible acalculus cholecystitis on presentation on CT scan.  She had elevated bilirubin and transaminitis, she was also diagnosed with cholangitis spiking fevers and found to have polymicrobial bacteremia with Gram-positive and Gram-negative bacteremia.  Infectious Disease was consulted, they recommended continue with Zosyn repeating blood cultures.  She was IV fluid resuscitated had a midline placed. general surgery was consulted-they recommended consult for ERCP. GI consult for ERCP and duct clearance, can consider cholecystectomy afterwards as MRCP was suggestive of common bile duct stone.  Palliative Care was consulted and reviewed code status, she was made DNR, she also expressed that she would not want a PEG tube. She was taken to ERCP with GI, EGD findings was tortuous esophagus, narrowing at GE junction, distortion of antrum noted, gastroscope exchange for duodenal scope, scope advanced easily to the antrum and navigated around distorted antral thickening, scope advanced to the pylorus but unable to drop into the duodenal  12 cones  10 cones u/l /drop down       Gait Training                                       Modalities bulb, scope exchange back to gastroscope and unremarkable, GI is recommending transfer to [facility] for attempt at ERCP.    Labs and imaging at [facility]: CBC WBC 12.2, Hgb 13-15, plts 14. BUN/Cr 23/1.3 (baseline cr ~1.0). Tibi 2.8 ->2.0, ALP wnl, AST/ /107-> 131/91. Procal 60. BCX w/ E coli, Klebsiella pneumoniae, and Bacteriodes fragilis 07/30. Repeat blood cultures drawn 7/31 NGTD. MRCP [place]-shaped low signal intensity mass which appears to lie within the distal segment of the common bile duct suggestive of choledocholithiasis. There is dilatation of the biliary tree proximal to this level.    On arrival, afebrile, HDS on 3L NC (not oxygen a baseline). She complains of some ongoing abdominal pain, but denies nausea, vomiting, diarrhea, fever, chills.         Procedure(s) (LRB):  ERCP (ENDOSCOPIC RETROGRADE CHOLANGIOPANCREATOGRAPHY) (N/A)      Hospital Course:      * Cholangitis  88F presenting at transfer from [facility] for ERCP in setting of cholangitis.  -S/p ERCP by [facility] on 8/1 - prior sphincterectomy stenosis and biliary stent placed.  -General surgery consulted for possible cholecystectomy. Per gen surg, given her medical history and overall clinical picture, do not think she would have much additional benefit from a cholecystectomy as cholangitis likely secondary to strictured sphincter and not true choledocholithiasis. There is a chance she could have recurrent cholangitis, but the risk of this is extremely low. Will defer surgical intervention at this time.   -ID consulted. Zosyn for 2 weeks.      Bacteremia due to Gram-negative bacteria  -BCX 7/20 w/ E coli, Klebsiella pneumoniae, and Bacteriodes fragilis.   -Repeat blood cultures drawn 7/31 growing EColi.  -S/p vancomycin and zosyn at [facility]  -ID consulted. Rec Zosyn x 2 weeks. PICC line placed on 8/3.  -TTE with no vegetations     Outpatient Antibiotic Therapy Plan:     Please send referral to [facility].     1) Infection: cholangitis, bacteremia     2) Discharge Antibiotics:     Intravenous antibiotics:   Zosyn 4.5g Q8hrs      3) Therapy Duration:  2 weeks     Estimated end date of IV antibiotics: 8/15/23     4) Outpatient Weekly Labs:     Order the following labs to be drawn on Mondays:    CBC   CMP    CRP     5) Fax Lab Results to Infectious Diseases Provider: Dr duke     [facility] Fax Number: [phone]     6) Outpatient Infectious Diseases Follow-up      Follow-up appointment will be arranged by the ID clinic and will be found in the patient's appointments tab.      Prior to discharge, please ensure the patient's follow-up has been scheduled.     If there is still no follow-up scheduled prior to discharge, please send an EPIC message to Lisette Peralta in Infectious Diseases.         Personal history of colon cancer, stage III  -Noted prior history of stage III colorectal cancer, status post adjuvant chemotherapy   -Followed by Dr. Granado/Renu     Patient is currently medically and HDS. She is being d/c to SNF per PT/OT recs. FU with PCP, ID and heme onc as OP. Plan of care discussed with patient, verbalized understanding. All questions were answered.         Goals of Care Treatment Preferences:  Code Status: DNR    Health care agent: Tucker Murcia and Nalini Granado  Health care agent number: [phone] / [phone] / [phone]       [place]: Yes           Consults:   Consults (From admission, onward)        Status Ordering Provider     Inpatient virtual consult to Hospital Medicine  Once        Provider:  (Not yet assigned)    Completed INES HOOKS     Inpatient consult to PICC team (FERNANDOS)  Once        Provider:  (Not yet assigned)    Completed INES HOOKS     Inpatient consult to Infectious Diseases  Once        Provider:  (Not yet assigned)    Completed ALFA RODRIGUEZ     Inpatient consult to General Surgery  Once        Provider:   (Not yet assigned)    Completed ALFA RODRIGUEZ     Inpatient consult to [facility] ([facility])  Once        Provider:  (Not yet assigned)    Completed ALFA RODRIGUEZ          No new Assessment & Plan notes have been filed under this hospital service since the last note was generated.  Service: Hospital Medicine    Final Active Diagnoses:    Diagnosis Date Noted POA    PRINCIPAL PROBLEM:  Cholangitis [K83.09] 08/01/2023 Yes    Debility [R53.81] 08/06/2023 Unknown    Cigarette nicotine dependence without complication [F17.210] 08/01/2023 Yes    Bacteremia due to Gram-negative bacteria [R78.81] 08/01/2023 Yes    Daily consumption of alcohol [Z78.9] 07/30/2023 Yes     Chronic    Pulmonary emphysema, unspecified emphysema type [J43.9] 05/19/2022 Yes    Personal history of colon cancer, stage III [Z85.038] 04/01/2019 Yes    Stage 3a chronic kidney disease [N18.31] 04/04/2018 Yes    GERD (gastroesophageal reflux disease) [K21.9] 06/12/2017 Yes      Problems Resolved During this Admission:       Discharged Condition: stable    Disposition: Skilled Nursing Facility    Follow Up:   Follow-up Information     Hever Arreola MD Follow up in 3 day(s).    Specialty: Family Medicine  Contact information:  [address] [place] [address]  [phone]             Hever Arreola MD Follow up in 3 day(s).    Specialty: Family Medicine  Contact information:  [address]  [place] [address]  [phone]                       Patient Instructions:      Ambulatory referral/consult to Infectious Disease   Standing Status: Future   Referral Priority: Routine Referral Type: Consultation   Referral Reason: Specialty Services Required   Requested Specialty: Infectious Diseases   Number of Visits Requested: 1     Ambulatory referral/consult to Outpatient Case Management   Referral Priority: Routine Referral Type: Consultation   Referral Reason: Specialty Services Required   Number of  Visits Requested: 1     Reason for not Prescribing Nicotine Replacement     Order Specific Question Answer Comments   Reason for not Prescribing: Patient refused        Significant Diagnostic Studies: N/A    Pending Diagnostic Studies:     None         Medications:  Reconciled Home Medications:      Medication List      START taking these medications    dextrose 5 % in water (D5W) PgBk 100 mL with piperacillin-tazobactam 4.5 gram SolR 4.5 g  Inject 4.5 g into the vein every 8 (eight) hours. for 7 days        CHANGE how you take these medications    brimonidine 0.2% 0.2 % Drop  Commonly known as: ALPHAGAN  INSTILL 1 DROP INTO BOTH EYES 3 TIMES A DAY  What changed: See the new instructions.     omeprazole 40 MG capsule  Commonly known as: PRILOSEC  Take 1 capsule (40 mg total) by mouth every morning.  What changed: See the new instructions.     potassium chloride SA 20 MEQ tablet  Commonly known as: K-DUR,KLOR-CON  TAKE 1 TABLET BY MOUTH ONCE DAILY  What changed: when to take this        CONTINUE taking these medications    acetaminophen 650 MG Tbsr  Commonly known as: TYLENOL  Take 650 mg by mouth every 8 (eight) hours as needed (pain).     alendronate 70 MG tablet  Commonly known as: FOSAMAX  TAKE 1 TABLET (70 MG TOTAL) BY MOUTH EVERY 7 DAYS. ON EMPTY STOMACH IN MORNING, REMAIN UPRIGHT FOR 30 MINUTES.     ascorbic acid (vitamin C) 500 MG tablet  Commonly known as: VITAMIN C  Take 500 mg by mouth once daily.     b complex vitamins capsule  Take 1 capsule by mouth once daily.     calcium carbonate-magnesium hydroxide 550-110 mg Chew  Commonly known as: ROLAIDS  Take 1 tablet by mouth 2 (two) times daily as needed (heartburn).     dorzolamide-timolol 2-0.5% 22.3-6.8 mg/mL ophthalmic solution  Commonly known as: COSOPT  INSTILL 1 DROP INTO BOTH EYES TWICE A DAY     gabapentin 300 MG capsule  Commonly known as: NEURONTIN  TAKE 1 CAP IN THE AM, 1 CAP MIDDAY, AND 2 CAPS AT NIGHT     mirtazapine 7.5 MG Tab  Commonly known  as: REMERON  TAKE 1 TABLET BY MOUTH EVERY EVENING.            Indwelling Lines/Drains at time of discharge:   Lines/Drains/Airways     Peripherally Inserted Central Catheter Line  Duration           PICC Double Lumen 08/04/23 1806 right basilic 3 days          Drain  Duration           Female External Urinary Catheter 07/31/23 1250 8 days                Time spent on the discharge of patient: 35 minutes         Fawad Wheat MD  Department of Hospital Medicine  [facility]

## 2023-08-08 NOTE — PLAN OF CARE
CHW met with patient/family at bedside. Patient experience rounding completed and reviewed the following.     Do you know your discharge plan? Yes or No,    If yes, what is the plan? (Home, Home Health, Rehab, SNF, LTAC, or Other      SNF     Have you discussed your needs and preferences with your SW/CM? Yes or No      Yes    If you are discharging home, do you have help at home? Yes or No       SNF    Do you think you will need help additional at home at discharge? Yes or No        SNF    Do you currently have difficulty keeping up with bills, affording medicine or buying food? Yes or No       No    Assigned SW/CM notified of any patient/family needs or concerns. Appropriate resources provided to address patient's needs.        Ilda Griffin CHW  Case Management   [phone]

## 2023-08-08 NOTE — HOSPITAL COURSE
* Cholangitis  88F presenting at transfer from [facility] for ERCP in setting of cholangitis.  -S/p ERCP by [facility] on 8/1 - prior sphincterectomy stenosis and biliary stent placed.  -General surgery consulted for possible cholecystectomy. Per gen surg, given her medical history and overall clinical picture, do not think she would have much additional benefit from a cholecystectomy as cholangitis likely secondary to strictured sphincter and not true choledocholithiasis. There is a chance she could have recurrent cholangitis, but the risk of this is extremely low. Will defer surgical intervention at this time.   -ID consulted. Zosyn for 2 weeks.      Bacteremia due to Gram-negative bacteria  -BCX 7/20 w/ E coli, Klebsiella pneumoniae, and Bacteriodes fragilis.   -Repeat blood cultures drawn 7/31 growing EColi.  -S/p vancomycin and zosyn at [facility]  -ID consulted. Rec Zosyn x 2 weeks. PICC line placed on 8/3.  -TTE with no vegetations     Outpatient Antibiotic Therapy Plan:     Please send referral to [facility].     1) Infection: cholangitis, bacteremia     2) Discharge Antibiotics:     Intravenous antibiotics:  Zosyn 4.5g Q8hrs      3) Therapy Duration:  2 weeks     Estimated end date of IV antibiotics: 8/15/23     4) Outpatient Weekly Labs:     Order the following labs to be drawn on Mondays:   CBC  CMP   CRP     5) Fax Lab Results to Infectious Diseases Provider: Dr duke     [facility] Fax Number: [phone]     6) Outpatient Infectious Diseases Follow-up     Follow-up appointment will be arranged by the ID clinic and will be found in the patient's appointments tab.     Prior to discharge, please ensure the patient's follow-up has been scheduled.    If there is still no follow-up scheduled prior to discharge, please send an EPIC message to Lisette Peralta in Infectious Diseases.         Personal history of colon cancer, stage  III  -Noted prior history of stage III colorectal cancer, status post adjuvant chemotherapy   -Followed by Dr. Granado/Renu     Patient is currently medically and HDS. She is being d/c to SNF per PT/OT recs. FU with PCP, ID and heme onc as OP. Plan of care discussed with patient, verbalized understanding. All questions were answered.

## 2023-08-09 ENCOUNTER — OUTPATIENT CASE MANAGEMENT (OUTPATIENT)
Dept: ADMINISTRATIVE | Facility: OTHER | Age: 88
End: 2023-08-09
Payer: MEDICARE

## 2023-08-09 NOTE — PROGRESS NOTES
08/09/23-Discharged to [facility] on 08/08/23. OPCM Case Closure.

## 2023-08-24 ENCOUNTER — TELEPHONE (OUTPATIENT)
Dept: ENDOSCOPY | Facility: HOSPITAL | Age: 88
End: 2023-08-24
Payer: MEDICARE

## 2023-08-31 ENCOUNTER — TELEPHONE (OUTPATIENT)
Dept: FAMILY MEDICINE | Facility: CLINIC | Age: 88
End: 2023-08-31
Payer: MEDICARE

## 2023-08-31 NOTE — TELEPHONE ENCOUNTER
..I called the patient to confirm her appointment that she has with Dr. Arreola on 09/01/2023 at 1:40pm, no answer left voicemail to return our call. I will send the patient a portal message as well.

## 2023-09-05 NOTE — PROGRESS NOTES
Infectious Disease Clinic Note  09/06/2023       Subjective:       Patient ID: Kenyetta Andersen is a 88 y.o. female being seen for an new visit.    Chief Complaint: No chief complaint on file.    HPI    Ms. Andersen is a 88F with h/o GERD, colon cancer (Dx 2009; with lung nodule; adj folfox) s/p R hemicolectomy with end to side ileocolonic anastamosis with recent hospital admission 7/30-8/8  for bacteremia due to cholangitis.     BCx  7/30 grew pan-susceptible E.coli and enterococcus, kleb pneumo, bacteroides. BCx cleared on 8/2. underwent ERCP on 8/1/23 with biliary stent placement. TTE neg for IE.  Per procedure note, plan was to repeat ERCP in 10 wks for stent removal. Discharged on 2wks zosyn - planned last day 8/15. Per pt and niece she went to [facility] to complete therapy and was discharged home approx 2 wks ago. She is not sure when she completed abx, but has been doing well since.  Denies fevers, chills, sweats, abdominal pain, diarrhea, dysuria. Complains of a chronic productive cough. Niece reports pt has been more sleepy since hospital discharge.                  Family History   Problem Relation Age of Onset    Heart disease Mother         heart attack    Heart disease Father     Heart disease Brother         MI    Parkinsonism Sister     Arthritis Sister     No Known Problems Brother     No Known Problems Brother     Amblyopia Neg Hx     Blindness Neg Hx     Cancer Neg Hx     Cataracts Neg Hx     Glaucoma Neg Hx     Hypertension Neg Hx     Macular degeneration Neg Hx     Retinal detachment Neg Hx     Strabismus Neg Hx     Stroke Neg Hx     Thyroid disease Neg Hx     Diabetes Neg Hx     Colon cancer Neg Hx     Crohn's disease Neg Hx     Esophageal cancer Neg Hx     Stomach cancer Neg Hx     Ulcerative colitis Neg Hx      Social History     Socioeconomic History    Marital status:     Number of children: 0   Tobacco Use    Smoking status: Every Day     Current packs/day: 0.00      Average packs/day: 0.3 packs/day for 65.0 years (21.5 ttl pk-yrs)     Types: Cigarettes     Start date: 10/5/1955     Last attempt to quit: 10/5/2020     Years since quittin.9    Smokeless tobacco: Never    Tobacco comments:     3/1/23--states smokes 1-2 cigarettes a day   Substance and Sexual Activity    Alcohol use: Yes     Alcohol/week: 2.0 standard drinks of alcohol     Types: 2 Shots of liquor per week     Comment: Daily, about 4 drinks per day (crown)    Drug use: No    Sexual activity: Not Currently     Social Determinants of Health     Financial Resource Strain: Low Risk  (2023)    Overall Financial Resource Strain (CARDIA)     Difficulty of Paying Living Expenses: Not very hard   Food Insecurity: No Food Insecurity (2023)    Hunger Vital Sign     Worried About Running Out of Food in the Last Year: Never true     Ran Out of Food in the Last Year: Never true   Transportation Needs: No Transportation Needs (2023)    PRAPARE - Transportation     Lack of Transportation (Medical): No     Lack of Transportation (Non-Medical): No   Physical Activity: Inactive (2023)    Exercise Vital Sign     Days of Exercise per Week: 0 days     Minutes of Exercise per Session: 0 min   Stress: No Stress Concern Present (2023)    [employer] [place] [employer] - Occupational Stress Questionnaire     Feeling of Stress : Not at all   Social Connections: Unknown (2023)    Social Connection and Isolation Panel [NHANES]     Frequency of Communication with Friends and Family: Three times a week     Frequency of Social Gatherings with Friends and Family: Three times a week     Attends Gnosticism Services: More than 4 times per year     Active Member of Clubs or Organizations: No     Attends Club or Organization Meetings: Never   Housing Stability: Unknown (2023)    Housing Stability Vital Sign     Unable to Pay for Housing in the Last Year: No     Unstable Housing in the Last Year: No     Past  "Surgical History:   Procedure Laterality Date    APPENDECTOMY      CATARACT EXTRACTION W/  INTRAOCULAR LENS IMPLANT Left     CATARACT EXTRACTION W/  INTRAOCULAR LENS IMPLANT Right     Dr Sawant    COLON SURGERY      resection    COLONOSCOPY N/A 3/14/2017    Procedure: COLONOSCOPY;  Surgeon: Killian Guerrier MD;  Location: [facility];  Service: Endoscopy;  Laterality: N/A;    COLONOSCOPY  03/14/2017    Dr. Guerrier: hemorrhoids, one colon polyp removed, "Patent functional end-to-end ileo-colonic anastomosis"with healthy mucosa; biopsy: hyperplastic polyp; repeat in 3 years for surveillance    COLONOSCOPY N/A 1/21/2020    Procedure: COLONOSCOPY;  Surgeon: Timo Darling MD;  Location: [facility] ENDO;  Service: Endoscopy;  Laterality: N/A;    COLONOSCOPY N/A 7/8/2022    Procedure: COLONOSCOPY;  Surgeon: Anaid Washington MD;  Location: [facility];  Service: Endoscopy;  Laterality: N/A;    ECTOPIC PREGNANCY SURGERY      ERCP N/A 7/31/2023    Procedure: ERCP (ENDOSCOPIC RETROGRADE CHOLANGIOPANCREATOGRAPHY);  Surgeon: Nasim Cuadra MD;  Location: [facility];  Service: Endoscopy;  Laterality: N/A;    ERCP N/A 8/1/2023    Procedure: ERCP (ENDOSCOPIC RETROGRADE CHOLANGIOPANCREATOGRAPHY);  Surgeon: Олег Arreaga MD;  Location: [facility] ([address] [place] [address]);  Service: Endoscopy;  Laterality: N/A;    ESOPHAGOGASTRODUODENOSCOPY N/A 5/16/2019    Procedure: EGD (ESOPHAGOGASTRODUODENOSCOPY);  Surgeon: Anaid Washington MD;  Location: [facility];  Service: Endoscopy;  Laterality: N/A;    ESOPHAGOGASTRODUODENOSCOPY N/A 7/11/2019    Procedure: EGD (ESOPHAGOGASTRODUODENOSCOPY);  Surgeon: Anaid Washington MD;  Location: [facility] ENDO;  Service: Endoscopy;  Laterality: N/A;    ESOPHAGOGASTRODUODENOSCOPY N/A 2/5/2021    Procedure: EGD (ESOPHAGOGASTRODUODENOSCOPY);  Surgeon: Anaid Washington MD;  Location: [facility] ENDO;  Service: Endoscopy;  Laterality: N/A;    ESOPHAGOGASTRODUODENOSCOPY N/A 11/11/2021    Procedure: EGD " (ESOPHAGOGASTRODUODENOSCOPY);  Surgeon: Anaid Washington MD;  Location: [facility] ENDO;  Service: Endoscopy;  Laterality: N/A;    ESOPHAGOGASTRODUODENOSCOPY N/A 7/8/2022    Procedure: EGD (ESOPHAGOGASTRODUODENOSCOPY);  Surgeon: Anaid Washington MD;  Location: [facility] ENDO;  Service: Endoscopy;  Laterality: N/A;    ESOPHAGOGASTRODUODENOSCOPY N/A 4/12/2023    Procedure: EGD (ESOPHAGOGASTRODUODENOSCOPY);  Surgeon: Anaid Washington MD;  Location: [facility] ENDO;  Service: Endoscopy;  Laterality: N/A;    EYE SURGERY      bilateral cataracts    HYSTERECTOMY      complete    JOINT REPLACEMENT      Liban Knee    ROTATOR CUFF REPAIR Right     TOE SURGERY      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    UPPER GASTROINTESTINAL ENDOSCOPY  06/12/2017    Dr. Guerrier: gastritis and esophagitis, biopsy: chronic gastritis, negative for h pylori, esophageal- positive eosinophils, negative for barretts; repeat in 2 months    UPPER GASTROINTESTINAL ENDOSCOPY  07/27/2017    Dr. Guerrier       Patient's Medications   New Prescriptions    No medications on file   Previous Medications    ACETAMINOPHEN (TYLENOL) 650 MG TBSR    Take 650 mg by mouth every 8 (eight) hours as needed (pain).    ALENDRONATE (FOSAMAX) 70 MG TABLET    TAKE 1 TABLET (70 MG TOTAL) BY MOUTH EVERY 7 DAYS. ON EMPTY STOMACH IN MORNING, REMAIN UPRIGHT FOR 30 MINUTES.    ASCORBIC ACID, VITAMIN C, (VITAMIN C) 500 MG TABLET    Take 500 mg by mouth once daily.    B COMPLEX VITAMINS CAPSULE    Take 1 capsule by mouth once daily.    BRIMONIDINE 0.2% (ALPHAGAN) 0.2 % DROP    INSTILL 1 DROP INTO BOTH EYES 3 TIMES A DAY    CALCIUM CARBONATE-MAGNESIUM HYDROXIDE (ROLAIDS) 550-110 MG CHEW    Take 1 tablet by mouth 2 (two) times daily as needed (heartburn).    DORZOLAMIDE-TIMOLOL 2-0.5% (COSOPT) 22.3-6.8 MG/ML OPHTHALMIC SOLUTION    INSTILL 1 DROP INTO BOTH EYES TWICE A DAY    GABAPENTIN (NEURONTIN) 300 MG CAPSULE    TAKE 1 CAP IN THE AM, 1 CAP MIDDAY, AND 2 CAPS AT NIGHT    HEPARIN, PORCINE,  PF, (HEPARIN FLUSH 100 UNITS/ML) 100 UNIT/ML SYRG    Inject into the vein.    MIRTAZAPINE (REMERON) 7.5 MG TAB    TAKE 1 TABLET BY MOUTH EVERY DAY IN THE EVENING    NORMAL SALINE FLUSH INJECTION        OMEPRAZOLE (PRILOSEC) 40 MG CAPSULE    Take 1 capsule (40 mg total) by mouth every morning.    PIPERACILLIN-TAZOBACTAM (ZOSYN) 40.5 GRAM INJECTION    Inject into the vein.    POTASSIUM CHLORIDE SA (K-DUR,KLOR-CON) 20 MEQ TABLET    TAKE 1 TABLET BY MOUTH ONCE DAILY   Modified Medications    No medications on file   Discontinued Medications    No medications on file       Patient Active Problem List    Diagnosis Date Noted    Debility 08/06/2023    Cigarette nicotine dependence without complication 08/01/2023    Bacteremia due to Gram-negative bacteria 08/01/2023    Cholangitis 08/01/2023    Acalculous cholecystitis 07/30/2023    Lactic acid acidosis 07/30/2023    Daily consumption of alcohol 07/30/2023    Tachycardia 07/30/2023    Elevated LFTs 07/30/2023    Pulmonary emphysema, unspecified emphysema type 05/19/2022    Hypokalemia 10/15/2021    Dysphagia 02/05/2021    Chemotherapy-induced peripheral neuropathy 02/23/2020    Primary hypothyroidism 02/23/2020    Vitamin D deficiency 02/23/2020    Primary insomnia 02/23/2020    Stress incontinence of urine 02/23/2020    History of colon cancer 01/21/2020    Escalante's esophagus 08/14/2019    Acid reflux 07/11/2019    Personal history of colon cancer, stage III 04/01/2019    Calcified granuloma of lung 03/26/2019    Osteopenia of multiple sites 02/07/2019    BMI 27.0-27.9,adult 02/07/2019    Bilateral renal cysts 10/22/2018    Smoker 10/22/2018    Stage 3a chronic kidney disease 04/04/2018    GERD (gastroesophageal reflux disease) 06/12/2017    Abdominal aortic atherosclerosis 11/08/2016    Macrocytosis 10/19/2016    B12 deficiency 10/19/2016    Hypertriglyceridemia 07/26/2016    Chronic angle-closure glaucoma of both eyes 02/24/2012       Review of Systems   Review of  "Systems   Constitutional:  Negative for chills and fever.   Respiratory:  Positive for cough and sputum production. Negative for hemoptysis.    Gastrointestinal:  Negative for abdominal pain, diarrhea, nausea and vomiting.   Genitourinary:  Negative for dysuria.   Musculoskeletal:  Negative for myalgias.   All other systems reviewed and are negative.          Objective:      /72   Pulse 102   Temp 98.3 °F (36.8 °C)   Wt 57.2 kg (126 lb 1.7 oz)   BMI 22.34 kg/m²   Estimated body mass index is 22.34 kg/m² as calculated from the following:    Height as of 8/4/23: 5' 3" (1.6 m).    Weight as of this encounter: 57.2 kg (126 lb 1.7 oz).    Physical Exam  Vitals reviewed.   Constitutional:       Appearance: Normal appearance.   HENT:      Head: Normocephalic and atraumatic.   Eyes:      Conjunctiva/sclera: Conjunctivae normal.      Pupils: Pupils are equal, round, and reactive to light.   Cardiovascular:      Rate and Rhythm: Normal rate and regular rhythm.   Pulmonary:      Effort: Pulmonary effort is normal. No respiratory distress.      Breath sounds: Normal breath sounds.   Abdominal:      General: Abdomen is flat. There is no distension.      Palpations: Abdomen is soft.      Tenderness: There is no abdominal tenderness. There is no guarding.   Musculoskeletal:      Cervical back: Normal range of motion.   Skin:     General: Skin is warm and dry.   Neurological:      General: No focal deficit present.      Mental Status: She is alert and oriented to person, place, and time.   Psychiatric:         Mood and Affect: Mood normal.         Behavior: Behavior normal.         Assessment:         1. Bacteremia due to Gram-negative bacteria        2. Cholangitis              Plan:       Diagnoses and all orders for this visit:    Bacteremia due to Gram-negative bacteria  Cholangitis  Polymicrobial bacteremia due to ascending cholangitis S/p ERCP with stent placement on 8/1. Blood cleared on 8/2. S/p 2 wks pip-tazo. " Currently without signs or symptoms of acute infection.    Plan:  --no indication for additional antibiotics  --f/u up with PCP, GI for repeat ERCP (per op note) if in accordance with GOC  --recommended annual flu shot and covid booster (when available)    RTC PRN  Teena Love MD  Infectious Disease     Total professional time spent for the encounter: 40 minutes  Time was spent preparing to see the patient, reviewing results of prior testing, obtaining and/or reviewing separately obtained history, performing a medically appropriate examination and interview, counseling and educating the patient/family, ordering medications/tests/procedures, referring and communicating with other health care professionals, documenting clinical information in the electronic health record, and independently interpreting results.

## 2023-09-06 ENCOUNTER — OFFICE VISIT (OUTPATIENT)
Dept: INFECTIOUS DISEASES | Facility: CLINIC | Age: 88
End: 2023-09-06
Payer: MEDICARE

## 2023-09-06 VITALS
SYSTOLIC BLOOD PRESSURE: 123 MMHG | HEART RATE: 102 BPM | TEMPERATURE: 98 F | DIASTOLIC BLOOD PRESSURE: 72 MMHG | BODY MASS INDEX: 22.34 KG/M2 | WEIGHT: 126.13 LBS

## 2023-09-06 DIAGNOSIS — K83.09 CHOLANGITIS: ICD-10-CM

## 2023-09-06 DIAGNOSIS — R78.81 BACTEREMIA DUE TO GRAM-NEGATIVE BACTERIA: Primary | ICD-10-CM

## 2023-09-06 PROCEDURE — 1157F PR ADVANCE CARE PLAN OR EQUIV PRESENT IN MEDICAL RECORD: ICD-10-PCS | Mod: HCNC,CPTII,S$GLB, | Performed by: INTERNAL MEDICINE

## 2023-09-06 PROCEDURE — 1125F AMNT PAIN NOTED PAIN PRSNT: CPT | Mod: HCNC,CPTII,S$GLB, | Performed by: INTERNAL MEDICINE

## 2023-09-06 PROCEDURE — 1111F DSCHRG MED/CURRENT MED MERGE: CPT | Mod: HCNC,CPTII,S$GLB, | Performed by: INTERNAL MEDICINE

## 2023-09-06 PROCEDURE — 1125F PR PAIN SEVERITY QUANTIFIED, PAIN PRESENT: ICD-10-PCS | Mod: HCNC,CPTII,S$GLB, | Performed by: INTERNAL MEDICINE

## 2023-09-06 PROCEDURE — 99999 PR PBB SHADOW E&M-EST. PATIENT-LVL II: ICD-10-PCS | Mod: PBBFAC,HCNC,, | Performed by: INTERNAL MEDICINE

## 2023-09-06 PROCEDURE — 1111F PR DISCHARGE MEDS RECONCILED W/ CURRENT OUTPATIENT MED LIST: ICD-10-PCS | Mod: HCNC,CPTII,S$GLB, | Performed by: INTERNAL MEDICINE

## 2023-09-06 PROCEDURE — 1157F ADVNC CARE PLAN IN RCRD: CPT | Mod: HCNC,CPTII,S$GLB, | Performed by: INTERNAL MEDICINE

## 2023-09-06 PROCEDURE — 99999 PR PBB SHADOW E&M-EST. PATIENT-LVL II: CPT | Mod: PBBFAC,HCNC,, | Performed by: INTERNAL MEDICINE

## 2023-09-06 PROCEDURE — 99215 OFFICE O/P EST HI 40 MIN: CPT | Mod: HCNC,S$GLB,, | Performed by: INTERNAL MEDICINE

## 2023-09-06 PROCEDURE — 99215 PR OFFICE/OUTPT VISIT, EST, LEVL V, 40-54 MIN: ICD-10-PCS | Mod: HCNC,S$GLB,, | Performed by: INTERNAL MEDICINE

## 2023-09-06 RX ORDER — PIPERACILLIN SODIUM, TAZOBACTAM SODIUM 36; 4.5 G/152ML; G/152ML
INJECTION, POWDER, LYOPHILIZED, FOR SOLUTION INTRAVENOUS
COMMUNITY
Start: 2023-08-04 | End: 2023-09-18 | Stop reason: ALTCHOICE

## 2023-09-06 RX ORDER — SODIUM CHLORIDE 0.9 % (FLUSH) 0.9 %
SYRINGE (ML) INJECTION
COMMUNITY
Start: 2023-08-07 | End: 2023-09-18

## 2023-09-06 RX ORDER — HEPARIN 100 UNIT/ML
SYRINGE INTRAVENOUS
COMMUNITY
Start: 2023-08-07 | End: 2023-09-18

## 2023-09-08 ENCOUNTER — TELEPHONE (OUTPATIENT)
Dept: FAMILY MEDICINE | Facility: CLINIC | Age: 88
End: 2023-09-08
Payer: MEDICARE

## 2023-09-08 NOTE — TELEPHONE ENCOUNTER
----- Message from Cammy Josep sent at 9/8/2023 10:21 AM CDT -----  Contact: pt  Type:  Sooner Apoointment Request    Caller is requesting a sooner appointment.  Caller declined first available appointment listed below.  Caller will not accept being placed on the waitlist and is requesting a message be sent to doctor.  Name of Caller:pt  When is the first available appointment?11/10  Symptoms:Hosp f/u  Would the patient rather a call back or a response via [facility]? call  Best Call Back Number:[phone]  Additional Information: Pt states that she need a callback as soon as possible. States that she was discharged a week ago from [facility] and need to schedule her [facility] f/u appt. Please advise thank you

## 2023-09-18 ENCOUNTER — OFFICE VISIT (OUTPATIENT)
Dept: FAMILY MEDICINE | Facility: CLINIC | Age: 88
End: 2023-09-18
Payer: MEDICARE

## 2023-09-18 VITALS
HEART RATE: 89 BPM | HEIGHT: 63 IN | DIASTOLIC BLOOD PRESSURE: 62 MMHG | BODY MASS INDEX: 21.84 KG/M2 | RESPIRATION RATE: 17 BRPM | SYSTOLIC BLOOD PRESSURE: 101 MMHG | OXYGEN SATURATION: 95 % | WEIGHT: 123.25 LBS | TEMPERATURE: 98 F

## 2023-09-18 DIAGNOSIS — Z09 HOSPITAL DISCHARGE FOLLOW-UP: Primary | ICD-10-CM

## 2023-09-18 DIAGNOSIS — K83.09 CHOLANGITIS: ICD-10-CM

## 2023-09-18 DIAGNOSIS — R78.81 BACTEREMIA: ICD-10-CM

## 2023-09-18 PROCEDURE — 1160F RVW MEDS BY RX/DR IN RCRD: CPT | Mod: HCNC,CPTII,S$GLB, | Performed by: PHYSICIAN ASSISTANT

## 2023-09-18 PROCEDURE — 99214 OFFICE O/P EST MOD 30 MIN: CPT | Mod: HCNC,S$GLB,, | Performed by: PHYSICIAN ASSISTANT

## 2023-09-18 PROCEDURE — 1157F PR ADVANCE CARE PLAN OR EQUIV PRESENT IN MEDICAL RECORD: ICD-10-PCS | Mod: HCNC,CPTII,S$GLB, | Performed by: PHYSICIAN ASSISTANT

## 2023-09-18 PROCEDURE — 1159F MED LIST DOCD IN RCRD: CPT | Mod: HCNC,CPTII,S$GLB, | Performed by: PHYSICIAN ASSISTANT

## 2023-09-18 PROCEDURE — 1159F PR MEDICATION LIST DOCUMENTED IN MEDICAL RECORD: ICD-10-PCS | Mod: HCNC,CPTII,S$GLB, | Performed by: PHYSICIAN ASSISTANT

## 2023-09-18 PROCEDURE — 99999 PR PBB SHADOW E&M-EST. PATIENT-LVL IV: ICD-10-PCS | Mod: PBBFAC,HCNC,, | Performed by: PHYSICIAN ASSISTANT

## 2023-09-18 PROCEDURE — 3288F FALL RISK ASSESSMENT DOCD: CPT | Mod: HCNC,CPTII,S$GLB, | Performed by: PHYSICIAN ASSISTANT

## 2023-09-18 PROCEDURE — 1101F PT FALLS ASSESS-DOCD LE1/YR: CPT | Mod: HCNC,CPTII,S$GLB, | Performed by: PHYSICIAN ASSISTANT

## 2023-09-18 PROCEDURE — 1157F ADVNC CARE PLAN IN RCRD: CPT | Mod: HCNC,CPTII,S$GLB, | Performed by: PHYSICIAN ASSISTANT

## 2023-09-18 PROCEDURE — 1101F PR PT FALLS ASSESS DOC 0-1 FALLS W/OUT INJ PAST YR: ICD-10-PCS | Mod: HCNC,CPTII,S$GLB, | Performed by: PHYSICIAN ASSISTANT

## 2023-09-18 PROCEDURE — 1160F PR REVIEW ALL MEDS BY PRESCRIBER/CLIN PHARMACIST DOCUMENTED: ICD-10-PCS | Mod: HCNC,CPTII,S$GLB, | Performed by: PHYSICIAN ASSISTANT

## 2023-09-18 PROCEDURE — 1126F PR PAIN SEVERITY QUANTIFIED, NO PAIN PRESENT: ICD-10-PCS | Mod: HCNC,CPTII,S$GLB, | Performed by: PHYSICIAN ASSISTANT

## 2023-09-18 PROCEDURE — 99214 PR OFFICE/OUTPT VISIT, EST, LEVL IV, 30-39 MIN: ICD-10-PCS | Mod: HCNC,S$GLB,, | Performed by: PHYSICIAN ASSISTANT

## 2023-09-18 PROCEDURE — 1126F AMNT PAIN NOTED NONE PRSNT: CPT | Mod: HCNC,CPTII,S$GLB, | Performed by: PHYSICIAN ASSISTANT

## 2023-09-18 PROCEDURE — 3288F PR FALLS RISK ASSESSMENT DOCUMENTED: ICD-10-PCS | Mod: HCNC,CPTII,S$GLB, | Performed by: PHYSICIAN ASSISTANT

## 2023-09-18 PROCEDURE — 99999 PR PBB SHADOW E&M-EST. PATIENT-LVL IV: CPT | Mod: PBBFAC,HCNC,, | Performed by: PHYSICIAN ASSISTANT

## 2023-09-18 NOTE — PROGRESS NOTES
Subjective     Patient ID: Kenyetta Andersen is a 88 y.o. female.    Chief Complaint: Follow-up (Pt states that she is here for a fu from [facility])    Presents for hospital discharge follow-up.  Her hospital course is described below.  Patient has already followed up with Infectious Disease and has completed her Zosyn antibiotic.  She spent 2 weeks in [facility] receiving Zosyn as well as physical therapy for strengthening.  Patient denies any recurrence of abdominal pain and states she feels great and is back to her normal daily activities.  She would like permission to start driving again and she would like to regain some of her independence.  She did not have any trouble driving prior to becoming ill.  Patient denies fever, changes in stool, abdominal pain, nausea or weight loss.  No complaints today.      HPI:   Kenyetta Andersen is a 88 y.o. female with PMHx GERD c/b esophagitis, hypothyroid, colon cancer (Dx 2009) s/p R hemicolectomy with end to side ileocolonic anastamosis and FOLFOX chemotherapy who [place] [facility] [place] [facility] for ERCP.      Per  transfer note: presented with chest pain and abdominal pain as well as generalized weakness, she was found to have evidence of possible acalculus cholecystitis on presentation on CT scan.  She had elevated bilirubin and transaminitis, she was also diagnosed with cholangitis spiking fevers and found to have polymicrobial bacteremia with Gram-positive and Gram-negative bacteremia.  Infectious Disease was consulted, they recommended continue with Zosyn repeating blood cultures.  She was IV fluid resuscitated had a midline placed. general surgery was consulted-they recommended consult for ERCP. GI consult for ERCP and duct clearance, can consider cholecystectomy afterwards as MRCP was suggestive of common bile duct stone.  Palliative Care was consulted and reviewed code status, she was made DNR, she also expressed that she would  not want a PEG tube. She was taken to ERCP with GI, EGD findings was tortuous esophagus, narrowing at GE junction, distortion of antrum noted, gastroscope exchange for duodenal scope, scope advanced easily to the antrum and navigated around distorted antral thickening, scope advanced to the pylorus but unable to drop into the duodenal bulb, scope exchange back to gastroscope and unremarkable, GI is recommending transfer to [facility] for attempt at ERCP.     Labs and imaging at [facility]: CBC WBC 12.2, Hgb 13-15, plts 14. BUN/Cr 23/1.3 (baseline cr ~1.0). Tibi 2.8 ->2.0, ALP wnl, AST/ /107-> 131/91. Procal 60. BCX w/ E coli, Klebsiella pneumoniae, and Bacteriodes fragilis 07/30. Repeat blood cultures drawn 7/31 NGTD. MRCP Sheldon-shaped low signal intensity mass which appears to lie within the distal segment of the common bile duct suggestive of choledocholithiasis. There is dilatation of the biliary tree proximal to this level.     On arrival, afebrile, HDS on 3L NC (not oxygen a baseline). She complains of some ongoing abdominal pain, but denies nausea, vomiting, diarrhea, fever, chills.            Procedure(s) (LRB):  ERCP (ENDOSCOPIC RETROGRADE CHOLANGIOPANCREATOGRAPHY) (N/A)       Hospital Course:      * Cholangitis  88F presenting at transfer from [facility] for ERCP in setting of cholangitis.  -S/p ERCP by [facility] on 8/1 - prior sphincterectomy stenosis and biliary stent placed.  -General surgery consulted for possible cholecystectomy. Per gen surg, given her medical history and overall clinical picture, do not think she would have much additional benefit from a cholecystectomy as cholangitis likely secondary to strictured sphincter and not true choledocholithiasis. There is a chance she could have recurrent cholangitis, but the risk of this is extremely low. Will defer surgical intervention at this time.   -ID consulted. Zosyn for 2 weeks.      Bacteremia due to Gram-negative  bacteria  -BCX 7/20 w/ E coli, Klebsiella pneumoniae, and Bacteriodes fragilis.   -Repeat blood cultures drawn 7/31 growing EColi.  -S/p vancomycin and zosyn at [facility]  -ID consulted. Rec Zosyn x 2 weeks. PICC line placed on 8/3.  -TTE with no vegetations     Outpatient Antibiotic Therapy Plan:     Please send referral to [facility].     1) Infection: cholangitis, bacteremia     2) Discharge Antibiotics:     Intravenous antibiotics:  Zosyn 4.5g Q8hrs         Current Outpatient Medications:     acetaminophen (TYLENOL) 650 MG TbSR, Take 650 mg by mouth every 8 (eight) hours as needed (pain)., Disp: , Rfl:     alendronate (FOSAMAX) 70 MG tablet, TAKE 1 TABLET (70 MG TOTAL) BY MOUTH EVERY 7 DAYS. ON EMPTY STOMACH IN MORNING, REMAIN UPRIGHT FOR 30 MINUTES., Disp: 12 tablet, Rfl: 3    ascorbic acid, vitamin C, (VITAMIN C) 500 MG tablet, Take 500 mg by mouth once daily., Disp: , Rfl:     b complex vitamins capsule, Take 1 capsule by mouth once daily., Disp: , Rfl:     brimonidine 0.2% (ALPHAGAN) 0.2 % Drop, INSTILL 1 DROP INTO BOTH EYES 3 TIMES A DAY (Patient taking differently: Place 1 drop into both eyes 3 (three) times daily.), Disp: 10 mL, Rfl: 6    calcium carbonate-magnesium hydroxide (ROLAIDS) 550-110 mg Chew, Take 1 tablet by mouth 2 (two) times daily as needed (heartburn)., Disp: , Rfl:     dorzolamide-timolol 2-0.5% (COSOPT) 22.3-6.8 mg/mL ophthalmic solution, INSTILL 1 DROP INTO BOTH EYES TWICE A DAY, Disp: 10 mL, Rfl: 4    gabapentin (NEURONTIN) 300 MG capsule, TAKE 1 CAP IN THE AM, 1 CAP MIDDAY, AND 2 CAPS AT NIGHT, Disp: 360 capsule, Rfl: 3    mirtazapine (REMERON) 7.5 MG Tab, TAKE 1 TABLET BY MOUTH EVERY DAY IN THE EVENING, Disp: 90 tablet, Rfl: 0    omeprazole (PRILOSEC) 40 MG capsule, Take 1 capsule (40 mg total) by mouth every morning., Disp: 30 capsule, Rfl: 0    potassium chloride SA (K-DUR,KLOR-CON) 20 MEQ tablet, TAKE 1 TABLET BY MOUTH ONCE DAILY  (Patient taking differently: Take 20 mEq by mouth once daily.), Disp: 90 tablet, Rfl: 3     Patient Active Problem List   Diagnosis    Chronic angle-closure glaucoma of both eyes    Hypertriglyceridemia    Macrocytosis    B12 deficiency    Abdominal aortic atherosclerosis    GERD (gastroesophageal reflux disease)    Stage 3a chronic kidney disease    Bilateral renal cysts    Smoker    Osteopenia of multiple sites    BMI 27.0-27.9,adult    Calcified granuloma of lung    Personal history of colon cancer, stage III    Acid reflux    Escalante's esophagus    History of colon cancer    Chemotherapy-induced peripheral neuropathy    Primary hypothyroidism    Vitamin D deficiency    Primary insomnia    Stress incontinence of urine    Dysphagia    Hypokalemia    Pulmonary emphysema, unspecified emphysema type    Acalculous cholecystitis    Lactic acid acidosis    Daily consumption of alcohol    Tachycardia    Elevated LFTs    Cigarette nicotine dependence without complication    Bacteremia due to Gram-negative bacteria    Cholangitis    Debility          Review of Systems   Constitutional:  Negative for activity change, appetite change, fatigue, fever and unexpected weight change.   HENT:  Negative for nasal congestion, dental problem, hearing loss, postnasal drip, rhinorrhea, sinus pressure/congestion and trouble swallowing.    Eyes:  Negative for photophobia, discharge and visual disturbance.   Respiratory:  Negative for cough, chest tightness, shortness of breath and wheezing.    Cardiovascular:  Negative for chest pain, palpitations and leg swelling.   Gastrointestinal:  Negative for abdominal pain, blood in stool, constipation, diarrhea, nausea and vomiting.   Genitourinary:  Negative for difficulty urinating, dyspareunia, dysuria, hematuria, menstrual problem, pelvic pain, vaginal bleeding, vaginal discharge and vaginal pain.   Musculoskeletal:  Negative for arthralgias, back pain, joint swelling and neck pain.  "  Integumentary:  Negative for color change and rash.   Neurological:  Negative for dizziness, seizures, weakness, light-headedness, numbness and headaches.   Hematological:  Does not bruise/bleed easily.   Psychiatric/Behavioral:  Negative for sleep disturbance and suicidal ideas. The patient is not nervous/anxious.           Objective   Vitals:    09/18/23 0816   BP: 101/62   BP Location: Right arm   Patient Position: Sitting   BP Method: Medium (Automatic)   Pulse: 89   Resp: 17   Temp: 98.1 °F (36.7 °C)   TempSrc: Oral   SpO2: 95%   Weight: 55.9 kg (123 lb 3.8 oz)   Height: 5' 3" (1.6 m)      Physical Exam  Constitutional:       Appearance: She is well-developed.   HENT:      Head: Normocephalic and atraumatic.   Eyes:      Conjunctiva/sclera: Conjunctivae normal.      Pupils: Pupils are equal, round, and reactive to light.   Neck:      Vascular: No JVD.   Cardiovascular:      Rate and Rhythm: Normal rate and regular rhythm.      Heart sounds: No murmur heard.     No friction rub. No gallop.   Pulmonary:      Effort: Pulmonary effort is normal. No respiratory distress.      Breath sounds: Normal breath sounds. No wheezing or rales.   Musculoskeletal:      Cervical back: Normal range of motion and neck supple.   Skin:     General: Skin is warm and dry.   Neurological:      Mental Status: She is alert and oriented to person, place, and time.   Psychiatric:         Mood and Affect: Mood normal.         Behavior: Behavior normal.         Thought Content: Thought content normal.         Judgment: Judgment normal.            Assessment and Plan     1. Hospital discharge follow-up    2. Bacteremia  Comments:  Resolved    3. Cholangitis  Comments:  Resolved    Patient still needs to follow-up with GI.  Otherwise she will let us know if any symptoms recur or if she needs any other resources.  She has completed home health.         No follow-ups on file.    "

## 2023-10-14 DIAGNOSIS — H40.1131 PRIMARY OPEN ANGLE GLAUCOMA (POAG) OF BOTH EYES, MILD STAGE: ICD-10-CM

## 2023-10-14 DIAGNOSIS — Z85.038 HISTORY OF COLON CANCER: ICD-10-CM

## 2023-10-16 RX ORDER — POTASSIUM CHLORIDE 20 MEQ/1
20 TABLET, EXTENDED RELEASE ORAL
Qty: 90 TABLET | Refills: 3 | Status: SHIPPED | OUTPATIENT
Start: 2023-10-16

## 2023-10-17 ENCOUNTER — TELEPHONE (OUTPATIENT)
Dept: FAMILY MEDICINE | Facility: CLINIC | Age: 88
End: 2023-10-17
Payer: MEDICARE

## 2023-10-17 RX ORDER — LATANOPROST 50 UG/ML
1 SOLUTION/ DROPS OPHTHALMIC
Qty: 7.5 ML | Refills: 3 | Status: SHIPPED | OUTPATIENT
Start: 2023-10-17

## 2023-10-17 NOTE — TELEPHONE ENCOUNTER
Left message on machine to call.     ---- Message from Dawit Rowland sent at 10/17/2023  1:10 PM CDT -----  Regarding: sooner appt  Contact: kenyetta at [phone]  Type:  Sooner Appointment Request    Caller is requesting a sooner appointment.  Caller declined first available appointment listed below.  Caller will not accept being placed on the waitlist and is requesting a message be sent to doctor.    Name of Caller:  Kenyetta    When is the first available appointment?  2/27/24    Symptoms:  pt still not feeling well. Was seen by TEOFILO Estrada for hosp f/u 9/18    Would the patient rather a call back or a response via [facility]? Call    Best Call Back Number:  [phone]    Additional Information:

## 2023-10-18 ENCOUNTER — TELEPHONE (OUTPATIENT)
Dept: FAMILY MEDICINE | Facility: CLINIC | Age: 88
End: 2023-10-18
Payer: MEDICARE

## 2023-10-18 NOTE — TELEPHONE ENCOUNTER
See other message        ----- Message from Erendira Garces sent at 10/18/2023  1:00 PM CDT -----  Type:  Appointment Request    Caller is requesting an appointment.      Name of Caller:  Pt    Symptoms:  f/u    Would the patient rather a call back or a response via [facility]?  Call back    Best Call Back Number:  [phone]    Additional Information:  Pt has appt 9/1 to see  for hospital f/u, was called and told that dr would be out until 10/20 and appt was going to be canceled and it did not get cancelled, marked as no show. She is wanting to get back in with Dr Arreola as soon as possible.  Please call back to advise. Thanks!

## 2023-10-18 NOTE — TELEPHONE ENCOUNTER
----- Message from Mer Kohli sent at 10/18/2023 12:43 PM CDT -----  Type:  Patient Returning Call    Who Called:pt     Who Left Message for Patient:Radha Webb     Does the patient know what this is regarding?:yes     Would the patient rather a call back or a response via [facility]? Callback     Best Call Back Number:[phone] ([place])       Additional Information:  please advise thank you

## 2023-10-18 NOTE — TELEPHONE ENCOUNTER
Spoke to patient    She is requesting appointment only with Dr Arreola.  Refuses to see anyone else.   Appointment scheduled with Dr Arreola added to cancellation list.  Instructed to call back if she wants to rescheduled.   Verbalizes understanding

## 2023-11-28 NOTE — PROGRESS NOTES
[facility]    Subjective:      Patient: Kenyetta Andersen Patient PCP: Hever Arreola MD         :  1935     Sex:  female      MRN:  [id number]          Date of Visit: 2023      Chief Complaint: Otitis Media (Left ear for a little over a week. Went to Urgent Care last Monday-was given Amoxil 875 twice a day for 1 week with Ciprodex drops. Went to [facility] ER 23. States ear was hurting. Now states has buzzing in the ear. Causing dizziness. States that room spins at times. States sometimes when gets up will get dizzy. )      Patient ID: Kenyetta Andersen is a 88 y.o. female current occasional cigarette smoker of less than 1 pack per day for 65 years with a personal history of colon cancer with chemotherapy-induced neuropathy, HLD, CKD 3, primary hypothyroidism, GERD with Escalante's and COPD referred to me by Yasmin Hernandez in consultation for ear infection.  Patient seen in the ER 1 week ago for left-sided ear pain x4 days presenting to the ER with worsening/persistent ear pain in spite of treatment with Ciprodex and Augmentin examination at that time revealed mild swelling of the external auditory canal and active drainage.    Ear pain has resolved.  There is no more drainage.  Everything began with a slight sore throat and upper respiratory tract illness symptoms.  She does not have any ongoing pain but does feel a sense of fullness which is quite bothersome.  Her family member who accompanies her today feels that she is had some longstanding left-sided hearing loss which predates this acute event.  Patient denies any ongoing sinonasal disease, unilateral nasal obstruction, epistaxis or neck mass.    History of thyroid nodule on the right side of 3+ cm with Belden 2 benign biopsy in  appears stable on repeat ultrasounds dating back to .      Lab Results   Component Value Date    TSH 0.835 2022       Review of Systems     Past Medical History  She has a past medical history of  Continue present medications   Begin regular exercise and monitor blood pressure regularly.  CT Heart Scan    Call us at [phone], if:   Instructions are unclear  or   You have any questions/ concerns  or    1 week after tests, if you have not received your test results.    Follow up in 1 year or sooner if needed.    Thank you for allowing us to be a part of your care today.  We strive to provide the best care for you today and in the future.  Dr. Brian Forrester and team.    Recommendations for exercise:  Adults should get at least 150 minutes of moderate-intensity exercise per week. This is 30 minutes of moderate exercise 5-6 days per week  If you are trying to lose weight, the recommendation is 5-6 days per week for 45-60 minutes.  The 30 minutes can be in three 10 minute sessions a day, or two 15 minute sessions. Start where ever you are, even if you can do just five minutes a day, and increase your time as you are able.  Exercise will improve your mood, decrease anxiety, improve sleep, improve cholesterol and blood sugar, help with digestion, treat or prevent constipation, and help you deal with stress. It also improves blood pressure, improves memory and mental abilities, and helps to prevent heart disease, diabetes, cancer, and many other chronic conditions. WOW!   "Anatomical narrow angle, Anxiety, Arthritis, Escalante esophagus, Blood transfusion, Cataract, Colon cancer, Colon polyps, GERD (gastroesophageal reflux disease), Glaucoma, Nuclear sclerosis, Osteoporosis, Primary hypothyroidism, and Pseudophakia - Left Eye.    Family / Surgical / Social History  Her family history includes Arthritis in her sister; Heart disease in her brother, father, and mother; No Known Problems in her brother and brother; Parkinsonism in her sister.    Past Surgical History:   Procedure Laterality Date    APPENDECTOMY      CATARACT EXTRACTION W/  INTRAOCULAR LENS IMPLANT Left     CATARACT EXTRACTION W/  INTRAOCULAR LENS IMPLANT Right     Dr Sawant    COLON SURGERY      resection    COLONOSCOPY N/A 3/14/2017    Procedure: COLONOSCOPY;  Surgeon: Killian Guerrier MD;  Location: [facility] ENDO;  Service: Endoscopy;  Laterality: N/A;    COLONOSCOPY  03/14/2017    Dr. Guerrier: hemorrhoids, one colon polyp removed, "Patent functional end-to-end ileo-colonic anastomosis"with healthy mucosa; biopsy: hyperplastic polyp; repeat in 3 years for surveillance    COLONOSCOPY N/A 1/21/2020    Procedure: COLONOSCOPY;  Surgeon: Timo Darling MD;  Location: [facility] ENDO;  Service: Endoscopy;  Laterality: N/A;    COLONOSCOPY N/A 7/8/2022    Procedure: COLONOSCOPY;  Surgeon: Anaid Washington MD;  Location: [facility] ENDO;  Service: Endoscopy;  Laterality: N/A;    ECTOPIC PREGNANCY SURGERY      ESOPHAGOGASTRODUODENOSCOPY N/A 5/16/2019    Procedure: EGD (ESOPHAGOGASTRODUODENOSCOPY);  Surgeon: Anaid Washington MD;  Location: [facility] ENDO;  Service: Endoscopy;  Laterality: N/A;    ESOPHAGOGASTRODUODENOSCOPY N/A 7/11/2019    Procedure: EGD (ESOPHAGOGASTRODUODENOSCOPY);  Surgeon: Anaid Washington MD;  Location: [facility] ENDO;  Service: Endoscopy;  Laterality: N/A;    ESOPHAGOGASTRODUODENOSCOPY N/A 2/5/2021    Procedure: EGD (ESOPHAGOGASTRODUODENOSCOPY);  Surgeon: Anaid Washington MD;  Location: [facility] ENDO;  Service: Endoscopy;  " Laterality: N/A;    ESOPHAGOGASTRODUODENOSCOPY N/A 2021    Procedure: EGD (ESOPHAGOGASTRODUODENOSCOPY);  Surgeon: Anaid Washington MD;  Location: [facility];  Service: Endoscopy;  Laterality: N/A;    ESOPHAGOGASTRODUODENOSCOPY N/A 2022    Procedure: EGD (ESOPHAGOGASTRODUODENOSCOPY);  Surgeon: Anaid Washington MD;  Location: [facility];  Service: Endoscopy;  Laterality: N/A;    EYE SURGERY      bilateral cataracts    HYSTERECTOMY      complete    JOINT REPLACEMENT      Liban Knee    ROTATOR CUFF REPAIR Right     TOE SURGERY      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    UPPER GASTROINTESTINAL ENDOSCOPY  2017    Dr. Guerrier: gastritis and esophagitis, biopsy: chronic gastritis, negative for h pylori, esophageal- positive eosinophils, negative for barretts; repeat in 2 months    UPPER GASTROINTESTINAL ENDOSCOPY  2017    Dr. Guerrier       Social History     Tobacco Use    Smoking status: Some Days     Packs/day: 0.33     Years: 65.00     Pack years: 21.45     Types: Cigarettes     Last attempt to quit: 10/5/2020     Years since quittin.4    Smokeless tobacco: Never    Tobacco comments:     3/1/23--states smokes 1-2 cigarettes a day   Substance and Sexual Activity    Alcohol use: Yes     Alcohol/week: 2.0 standard drinks     Types: 2 Shots of liquor per week     Comment: occasional (crown)    Drug use: No    Sexual activity: Not Currently       Medications  She has a current medication list which includes the following prescription(s): alendronate, ascorbic acid (vitamin c), b complex vitamins, brimonidine 0.2%, calcium carbonate-magnesium hydroxide, cyanocobalamin, dorzolamide-timolol 2-0.5%, gabapentin, hydrochlorothiazide, latanoprost, lidocaine, meloxicam, mirtazapine, mometasone 0.1%, potassium chloride sa, and rabeprazole.      Allergies  Review of patient's allergies indicates:   Allergen Reactions    Ace inhibitors Edema     Other reaction(s): Angioedema    Codeine Hives       All  medications, allergies, and past history have been reviewed.    Objective:      Vitals:  Vitals - 1 value per visit 2/23/2023 3/1/2023 3/1/2023   SYSTOLIC 100 - 138   DIASTOLIC 82 - 72   Pulse 82 - 86   Temp 98 - -   Resp 18 - -   SPO2 99 - -   Weight (lb) 146 - 140.65   Weight (kg) 66.225 - 63.8   Height 63 - 63   BMI (Calculated) 25.9 - 24.9   VISIT REPORT - - -   Pain Score  - 8 -   Some recent data might be hidden       Body surface area is 1.68 meters squared.    Physical Exam:    GENERAL  APPEARANCE -  alert, appears stated age, and cooperative  BARRIER(S) TO COMMUNICATION -  none VOICE - appropriate for age and gender    INTEGUMENTARY  no suspicious head and neck lesions    HEENT  HEAD: Normocephalic, without obvious abnormality, atraumatic  FACE: INSPECTION - Symmetric, no signs of trauma, no suspicious lesion(s)  PALPATION -  No masses SALIVARY GLANDS - non-tender with no appreciable mass  STRENGTH - facial symmetry  NECK/THYROID: normal atraumatic, no neck masses, normal thyroid, no jvd    EYES  Normal occular alignment and mobility with no visible nystagmus at rest    EARS/NOSE/MOUTH/THROAT  EARS  PINNAE AND EXTERNAL EARS - no suspicious lesion OTOSCOPIC EXAM (surgical microscopy was used for visualization/instrumentation): EAR EXAM - right ear completely normal, left ear mild global retraction ever so slightly hyperemesis at the vascular strip but no purulence.  Some blood and crust of the posterior annulus implying a possible prior perforation and drainage.  No cholesteatoma.  HEARING - subjectively decreased on the left    NOSE AND SINUSES  EXTERNAL NOSE - Grossly normal for age/sex  SEPTUM - normal/no obstruction on anterior exam without decongestion TURBINATES - within normal limits MUCOSA - within normal limits     MOUTH AND THROAT   ORAL CAVITY, LIPS, TEETH, GUMS & TONGUE - moist, no suspicious lesions  OROPHARYNX /TONSILS/PHARYNGEAL WALLS/HYPOPHARYNX - 1+ tonsils without asymmetry exudate or any  mucosal lesion  NASOPHARYNX - limited mirror exam - unable to visualize due to anatomy/gag  LARYNX -  - limited mirror exam - unable to visualize due to anatomy/gag      CHEST AND LUNG   INSPECTION & AUSCULTATION - normal effort, no stridor    CARDIOVASCULAR  AUSCULTATION & PERIPHERAL VASCULAR - regular rate and rhythm.    NEUROLOGIC  MENTAL STATUS - alert, interactive CRANIAL NERVES - normal    LYMPHATIC  HEAD AND NECK - non-palpable      Procedure(s):  None    Labs:  WBC   Date Value Ref Range Status   01/26/2023 8.56 3.90 - 12.70 K/uL Final     Hemoglobin   Date Value Ref Range Status   01/26/2023 13.4 12.0 - 16.0 g/dL Final     Platelets   Date Value Ref Range Status   01/26/2023 195 150 - 450 K/uL Final     Creatinine   Date Value Ref Range Status   01/26/2023 0.9 0.5 - 1.4 mg/dL Final     TSH   Date Value Ref Range Status   08/24/2022 0.835 0.400 - 4.000 uIU/mL Final     Glucose   Date Value Ref Range Status   01/26/2023 138 (H) 70 - 110 mg/dL Final     Hemoglobin A1C   Date Value Ref Range Status   07/01/2022 5.5 4.0 - 5.6 % Final     Comment:     ADA Screening Guidelines:  5.7-6.4%  Consistent with prediabetes  >or=6.5%  Consistent with diabetes    High levels of fetal hemoglobin interfere with the HbA1C  assay. Heterozygous hemoglobin variants (HbS, HgC, etc)do  not significantly interfere with this assay.   However, presence of multiple variants may affect accuracy.           Assessment:      Problem List Items Addressed This Visit    None  Visit Diagnoses       Acute seromucinous otitis media, left    -  Primary    Right thyroid nodule                     Plan:      Left ear appears to be filled with fluid which based on history seems to be acute.  Additional family history implies that there maybe some left-greater-than-right underlying prior hearing loss with no hearing testing for documentation.      Based on the acute illness with sore throat pain and drainage which improve with drops and antibiotics  this appears to be fluid in the middle ear that may take a few weeks to resolve.      As discussed there is no proven benefit of additional antibiotics, steroids, nasal steroids or decongestants.  Gentle blowing of the nose to try and pop the ear may be of benefit.  Avoiding nasal irritants including cigarette smoke may be of benefit.      As long as symptoms do not worsen with pain drainage fever or worsening hearing loss, we plan to return for hearing evaluation and recheck in 3 weeks.  If symptoms of fullness and hearing loss are not improved at that time or at least improving, we will proceed with eardrum incision (myringotomy) with or without placement of a ventilation tube in the eardrum as discussed.      Further workup including nasopharyngoscopy and imaging of the head and skull base may be appropriate at that time.    With respect to the 3.5 cm right thyroid nodule which has not changed.  Further ultrasound is not recommended.

## 2023-11-29 ENCOUNTER — TELEPHONE (OUTPATIENT)
Dept: ENDOSCOPY | Facility: HOSPITAL | Age: 88
End: 2023-11-29
Payer: MEDICARE

## 2023-11-29 NOTE — TELEPHONE ENCOUNTER
Called py to schedule overdue ERCP stent removal procedure. Pt states that she would prefer someone who is closer to her home. Direct phone number provided to pt to call back and either schedule or cancel. Stressed importance of stent removal

## 2023-12-12 ENCOUNTER — ANESTHESIA EVENT (OUTPATIENT)
Dept: ENDOSCOPY | Facility: HOSPITAL | Age: 88
End: 2023-12-12
Payer: MEDICARE

## 2023-12-12 ENCOUNTER — ANESTHESIA (OUTPATIENT)
Dept: ENDOSCOPY | Facility: HOSPITAL | Age: 88
End: 2023-12-12
Payer: MEDICARE

## 2023-12-12 ENCOUNTER — HOSPITAL ENCOUNTER (OUTPATIENT)
Facility: HOSPITAL | Age: 88
Discharge: HOME | End: 2023-12-12
Attending: INTERNAL MEDICINE | Admitting: INTERNAL MEDICINE
Payer: MEDICARE

## 2023-12-12 VITALS
TEMPERATURE: 98 F | OXYGEN SATURATION: 95 % | HEART RATE: 66 BPM | RESPIRATION RATE: 16 BRPM | WEIGHT: 123 LBS | HEIGHT: 63 IN | SYSTOLIC BLOOD PRESSURE: 145 MMHG | BODY MASS INDEX: 21.79 KG/M2 | DIASTOLIC BLOOD PRESSURE: 66 MMHG

## 2023-12-12 DIAGNOSIS — Z46.89 ENCOUNTER FOR REMOVAL OF BILIARY STENT: ICD-10-CM

## 2023-12-12 PROCEDURE — 74328 X-RAY BILE DUCT ENDOSCOPY: CPT | Mod: TC,HCNC | Performed by: INTERNAL MEDICINE

## 2023-12-12 PROCEDURE — 43264 PR ERCP,W/REMOVAL STONE,BIL/PANCR DUCTS: ICD-10-PCS | Mod: 51,HCNC,, | Performed by: INTERNAL MEDICINE

## 2023-12-12 PROCEDURE — 74328 X-RAY BILE DUCT ENDOSCOPY: CPT | Mod: 26,HCNC,, | Performed by: INTERNAL MEDICINE

## 2023-12-12 PROCEDURE — C1769 GUIDE WIRE: HCPCS | Mod: HCNC | Performed by: INTERNAL MEDICINE

## 2023-12-12 PROCEDURE — 37000008 HC ANESTHESIA 1ST 15 MINUTES: Mod: HCNC | Performed by: INTERNAL MEDICINE

## 2023-12-12 PROCEDURE — 25000003 PHARM REV CODE 250: Mod: HCNC | Performed by: INTERNAL MEDICINE

## 2023-12-12 PROCEDURE — 25500020 PHARM REV CODE 255: Mod: HCNC | Performed by: INTERNAL MEDICINE

## 2023-12-12 PROCEDURE — 43264 ERCP REMOVE DUCT CALCULI: CPT | Mod: HCNC | Performed by: INTERNAL MEDICINE

## 2023-12-12 PROCEDURE — 27202125 HC BALLOON, EXTRACTION (ANY): Mod: HCNC | Performed by: INTERNAL MEDICINE

## 2023-12-12 PROCEDURE — 37000009 HC ANESTHESIA EA ADD 15 MINS: Mod: HCNC | Performed by: INTERNAL MEDICINE

## 2023-12-12 PROCEDURE — 74328 PR  X-RAY FOR BILE DUCT ENDOSCOPY: ICD-10-PCS | Mod: 26,HCNC,, | Performed by: INTERNAL MEDICINE

## 2023-12-12 PROCEDURE — 63600175 PHARM REV CODE 636 W HCPCS: Mod: HCNC | Performed by: NURSE ANESTHETIST, CERTIFIED REGISTERED

## 2023-12-12 PROCEDURE — 27201089 HC SNARE, DISP (ANY): Mod: HCNC | Performed by: INTERNAL MEDICINE

## 2023-12-12 PROCEDURE — D9220A PRA ANESTHESIA: ICD-10-PCS | Mod: HCNC,CRNA,, | Performed by: NURSE ANESTHETIST, CERTIFIED REGISTERED

## 2023-12-12 PROCEDURE — 43264 ERCP REMOVE DUCT CALCULI: CPT | Mod: 51,HCNC,, | Performed by: INTERNAL MEDICINE

## 2023-12-12 PROCEDURE — 43275 PR ERCP W/REMOVAL FOREIGN BODY/STENT FROM BILIARY/PANCREATIC DUCT: ICD-10-PCS | Mod: HCNC,,, | Performed by: INTERNAL MEDICINE

## 2023-12-12 PROCEDURE — 25000003 PHARM REV CODE 250: Mod: HCNC | Performed by: NURSE ANESTHETIST, CERTIFIED REGISTERED

## 2023-12-12 PROCEDURE — D9220A PRA ANESTHESIA: Mod: HCNC,ANES,, | Performed by: ANESTHESIOLOGY

## 2023-12-12 PROCEDURE — D9220A PRA ANESTHESIA: Mod: HCNC,CRNA,, | Performed by: NURSE ANESTHETIST, CERTIFIED REGISTERED

## 2023-12-12 PROCEDURE — 43275 ERCP REMOVE FORGN BODY DUCT: CPT | Mod: HCNC | Performed by: INTERNAL MEDICINE

## 2023-12-12 PROCEDURE — D9220A PRA ANESTHESIA: ICD-10-PCS | Mod: HCNC,ANES,, | Performed by: ANESTHESIOLOGY

## 2023-12-12 PROCEDURE — 43275 ERCP REMOVE FORGN BODY DUCT: CPT | Mod: HCNC,,, | Performed by: INTERNAL MEDICINE

## 2023-12-12 RX ORDER — SODIUM CHLORIDE 9 MG/ML
INJECTION, SOLUTION INTRAVENOUS CONTINUOUS
Status: DISCONTINUED | OUTPATIENT
Start: 2023-12-12 | End: 2023-12-12 | Stop reason: HOSPADM

## 2023-12-12 RX ORDER — SODIUM CHLORIDE 0.9 % (FLUSH) 0.9 %
3 SYRINGE (ML) INJECTION
Status: DISCONTINUED | OUTPATIENT
Start: 2023-12-12 | End: 2023-12-12 | Stop reason: HOSPADM

## 2023-12-12 RX ORDER — LIDOCAINE HYDROCHLORIDE 20 MG/ML
INJECTION, SOLUTION EPIDURAL; INFILTRATION; INTRACAUDAL; PERINEURAL
Status: DISCONTINUED | OUTPATIENT
Start: 2023-12-12 | End: 2023-12-12

## 2023-12-12 RX ORDER — PROPOFOL 10 MG/ML
VIAL (ML) INTRAVENOUS
Status: DISCONTINUED | OUTPATIENT
Start: 2023-12-12 | End: 2023-12-12

## 2023-12-12 RX ADMIN — LIDOCAINE HYDROCHLORIDE 100 MG: 20 INJECTION, SOLUTION EPIDURAL; INFILTRATION; INTRACAUDAL at 02:12

## 2023-12-12 RX ADMIN — PROPOFOL 50 MG: 10 INJECTION, EMULSION INTRAVENOUS at 02:12

## 2023-12-12 RX ADMIN — SODIUM CHLORIDE: 9 INJECTION, SOLUTION INTRAVENOUS at 01:12

## 2023-12-12 NOTE — ANESTHESIA POSTPROCEDURE EVALUATION
Anesthesia Post Evaluation    Patient: Kenyetta Andersen    Procedure(s) Performed: Procedure(s) (LRB):  ERCP (ENDOSCOPIC RETROGRADE CHOLANGIOPANCREATOGRAPHY) (N/A)    Final Anesthesia Type: general      Patient location during evaluation: PACU  Patient participation: Yes- Able to Participate  Level of consciousness: awake and alert  Post-procedure vital signs: reviewed and stable  Pain management: adequate  Airway patency: patent    PONV status at discharge: No PONV  Anesthetic complications: no      Cardiovascular status: blood pressure returned to baseline  Respiratory status: unassisted  Hydration status: euvolemic  Follow-up not needed.              Vitals Value Taken Time   /66 12/12/23 1531   Temp 36.6 °C (97.8 °F) 12/12/23 1530   Pulse 76 12/12/23 1535   Resp 16 12/12/23 1530   SpO2 97 % 12/12/23 1535   Vitals shown include unvalidated device data.      No case tracking events are documented in the log.      Pain/Amanda Score: Amanda Score: 10 (12/12/2023  3:15 PM)

## 2023-12-12 NOTE — H&P
Short Stay Endoscopy History and Physical    PCP - Hever Arreola MD  Referring Physician - Олег Arreaga MD  [address] [place] [address]    Procedure - ercp  ASA - per anesthesia  Mallampati - per anesthesia  History of Anesthesia problems - no  Family history Anesthesia problems -  no   Plan of anesthesia - General    HPI:  This is a 88 y.o. female here for evaluation of: stent removal    Reflux - no  Dysphagia - no  Abdominal pain - no  Diarrhea - no    ROS:  Constitutional: No fevers, chills, No weight loss  CV: No chest pain  Pulm: No cough, No shortness of breath  Ophtho: No vision changes  GI: see HPI  Derm: No rash    Medical History:  has a past medical history of Anatomical narrow angle (2/24/2012), Anxiety, Arthritis, Escalante esophagus, Blood transfusion, Cataract, Colon cancer (2009), Colon polyps (3/14/2017), GERD (gastroesophageal reflux disease), Glaucoma (2/24/2012), Nuclear sclerosis (8/27/2012), Osteoporosis, Primary hypothyroidism (2/23/2020), and Pseudophakia - Left Eye (2/24/2012).    Surgical History:  has a past surgical history that includes Appendectomy; Colon surgery; Ectopic pregnancy surgery; Rotator cuff repair (Right); Toe Surgery; Hysterectomy; Colonoscopy (N/A, 3/14/2017); Colonoscopy (03/14/2017); Upper gastrointestinal endoscopy (06/12/2017); Upper gastrointestinal endoscopy (07/27/2017); Eye surgery; Joint replacement; Esophagogastroduodenoscopy (N/A, 5/16/2019); Esophagogastroduodenoscopy (N/A, 7/11/2019); Cataract extraction w/  intraocular lens implant (Left); Cataract extraction w/  intraocular lens implant (Right); Colonoscopy (N/A, 1/21/2020); Esophagogastroduodenoscopy (N/A, 2/5/2021); Esophagogastroduodenoscopy (N/A, 11/11/2021); Esophagogastroduodenoscopy (N/A, 7/8/2022); Colonoscopy (N/A, 7/8/2022); Esophagogastroduodenoscopy (N/A, 4/12/2023); ERCP (N/A, 7/31/2023); and ERCP (N/A, 8/1/2023).    Family History: family history includes Arthritis  in her sister; Heart disease in her brother, father, and mother; No Known Problems in her brother and brother; Parkinsonism in her sister..    Social History:  reports that she has been smoking cigarettes. She started smoking about 68 years ago. She has a 21.5 pack-year smoking history. She has never used smokeless tobacco. She reports current alcohol use of about 2.0 standard drinks of alcohol per week. She reports that she does not use drugs.    Review of patient's allergies indicates:   Allergen Reactions    Ace inhibitors Edema     Other reaction(s): Angioedema    Codeine Hives       Medications:   Medications Prior to Admission   Medication Sig Dispense Refill Last Dose    ascorbic acid, vitamin C, (VITAMIN C) 500 MG tablet Take 500 mg by mouth once daily.   12/11/2023    b complex vitamins capsule Take 1 capsule by mouth once daily.   12/11/2023    gabapentin (NEURONTIN) 300 MG capsule TAKE 1 CAP IN THE AM, 1 CAP MIDDAY, AND 2 CAPS AT NIGHT 360 capsule 3 12/11/2023    mirtazapine (REMERON) 7.5 MG Tab TAKE 1 TABLET BY MOUTH EVERY DAY IN THE EVENING 90 tablet 0 12/11/2023    omeprazole (PRILOSEC) 40 MG capsule Take 1 capsule (40 mg total) by mouth every morning. 30 capsule 0 12/11/2023    potassium chloride SA (K-DUR,KLOR-CON) 20 MEQ tablet TAKE 1 TABLET BY MOUTH EVERY DAY 90 tablet 3 12/11/2023    acetaminophen (TYLENOL) 650 MG TbSR Take 650 mg by mouth every 8 (eight) hours as needed (pain).       alendronate (FOSAMAX) 70 MG tablet TAKE 1 TABLET (70 MG TOTAL) BY MOUTH EVERY 7 DAYS. ON EMPTY STOMACH IN MORNING, REMAIN UPRIGHT FOR 30 MINUTES. 12 tablet 3     brimonidine 0.2% (ALPHAGAN) 0.2 % Drop INSTILL 1 DROP INTO BOTH EYES 3 TIMES A DAY (Patient taking differently: Place 1 drop into both eyes 3 (three) times daily.) 10 mL 6     calcium carbonate-magnesium hydroxide (ROLAIDS) 550-110 mg Chew Take 1 tablet by mouth 2 (two) times daily as needed (heartburn).       dorzolamide-timolol 2-0.5% (COSOPT) 22.3-6.8  mg/mL ophthalmic solution INSTILL 1 DROP INTO BOTH EYES TWICE A DAY 10 mL 4     latanoprost 0.005 % ophthalmic solution INSTILL 1 DROP INTO BOTH EYES EVERY EVENING 7.5 mL 3        Physical Exam:    Vital Signs:   Vitals:    12/12/23 1320   BP: (!) 151/65   Pulse: 80   Resp: 16   Temp: 98.1 °F (36.7 °C)       General Appearance: Well appearing in no acute distress    Labs:  Lab Results   Component Value Date    WBC 16.32 (H) 08/07/2023    HGB 12.3 08/07/2023    HCT 38.0 08/07/2023     (L) 08/07/2023    CHOL 184 07/01/2022    TRIG 74 07/01/2022    HDL 65 07/01/2022    ALT 13 08/07/2023    AST 18 08/07/2023     08/07/2023    K 3.4 (L) 08/07/2023     (H) 08/07/2023    CREATININE 0.8 08/07/2023    BUN 7 (L) 08/07/2023    CO2 16 (L) 08/07/2023    TSH 0.490 07/30/2023    INR 1.1 07/31/2023    HGBA1C 5.5 07/01/2022       I have explained the risks and benefits of this endoscopic procedure to the patient including but not limited to bleeding, inflammation, infection, perforation, and death.      Dimitri Anaya MD

## 2023-12-12 NOTE — ANESTHESIA PREPROCEDURE EVALUATION
12/12/2023  Kenyetta Andersen is a 88 y.o., female.  [facility]  Anesthesia Pre-Operative Evaluation         Patient Name: Kenyetta Andersen  YOB: 1935  MRN: [id number]    SUBJECTIVE:     Pre-operative evaluation for Procedure(s) (LRB):  ERCP (ENDOSCOPIC RETROGRADE CHOLANGIOPANCREATOGRAPHY) (N/A)     12/12/2023    Kenyetta Andersen is a 88 y.o. female     Patient now presents for the above procedure(s).      LDA:  PICC Double Lumen 08/04/23 1806 right basilic (Active)   Number of days: 129            Peripheral IV - Single Lumen 12/12/23 1327 22 G Right Forearm (Active)   Site Assessment Clean;Dry;Intact 12/12/23 1328   Line Status Infusing 12/12/23 1328   Dressing Status Clean;Dry;Intact 12/12/23 1328   Number of days: 0            Midline Catheter Insertion/Assessment  - Single Lumen 07/31/23 1247 Left basilic vein (medial side of arm) (Active)   Number of days: 134       Prev airway:     Drips:    sodium chloride 0.9% 10 mL/hr at 12/12/23 1328       Patient Active Problem List   Diagnosis    Chronic angle-closure glaucoma of both eyes    Hypertriglyceridemia    Macrocytosis    B12 deficiency    Abdominal aortic atherosclerosis    GERD (gastroesophageal reflux disease)    Stage 3a chronic kidney disease    Bilateral renal cysts    Smoker    Osteopenia of multiple sites    BMI 27.0-27.9,adult    Calcified granuloma of lung    Personal history of colon cancer, stage III    Acid reflux    Escalante's esophagus    History of colon cancer    Chemotherapy-induced peripheral neuropathy    Primary hypothyroidism    Vitamin D deficiency    Primary insomnia    Stress incontinence of urine    Dysphagia    Hypokalemia    Pulmonary emphysema, unspecified emphysema type    Acalculous cholecystitis    Lactic acid acidosis    Daily consumption of alcohol    Tachycardia    Elevated LFTs     Cigarette nicotine dependence without complication    Bacteremia due to Gram-negative bacteria    Cholangitis    Debility       Review of patient's allergies indicates:   Allergen Reactions    Ace inhibitors Edema     Other reaction(s): Angioedema    Codeine Hives       Current Inpatient Medications:      No current facility-administered medications on file prior to encounter.     Current Outpatient Medications on File Prior to Encounter   Medication Sig Dispense Refill    ascorbic acid, vitamin C, (VITAMIN C) 500 MG tablet Take 500 mg by mouth once daily.      b complex vitamins capsule Take 1 capsule by mouth once daily.      gabapentin (NEURONTIN) 300 MG capsule TAKE 1 CAP IN THE AM, 1 CAP MIDDAY, AND 2 CAPS AT NIGHT 360 capsule 3    mirtazapine (REMERON) 7.5 MG Tab TAKE 1 TABLET BY MOUTH EVERY DAY IN THE EVENING 90 tablet 0    omeprazole (PRILOSEC) 40 MG capsule Take 1 capsule (40 mg total) by mouth every morning. 30 capsule 0    potassium chloride SA (K-DUR,KLOR-CON) 20 MEQ tablet TAKE 1 TABLET BY MOUTH EVERY DAY 90 tablet 3    acetaminophen (TYLENOL) 650 MG TbSR Take 650 mg by mouth every 8 (eight) hours as needed (pain).      alendronate (FOSAMAX) 70 MG tablet TAKE 1 TABLET (70 MG TOTAL) BY MOUTH EVERY 7 DAYS. ON EMPTY STOMACH IN MORNING, REMAIN UPRIGHT FOR 30 MINUTES. 12 tablet 3    brimonidine 0.2% (ALPHAGAN) 0.2 % Drop INSTILL 1 DROP INTO BOTH EYES 3 TIMES A DAY (Patient taking differently: Place 1 drop into both eyes 3 (three) times daily.) 10 mL 6    calcium carbonate-magnesium hydroxide (ROLAIDS) 550-110 mg Chew Take 1 tablet by mouth 2 (two) times daily as needed (heartburn).      dorzolamide-timolol 2-0.5% (COSOPT) 22.3-6.8 mg/mL ophthalmic solution INSTILL 1 DROP INTO BOTH EYES TWICE A DAY 10 mL 4    latanoprost 0.005 % ophthalmic solution INSTILL 1 DROP INTO BOTH EYES EVERY EVENING 7.5 mL 3       Past Surgical History:   Procedure Laterality Date    APPENDECTOMY      CATARACT EXTRACTION W/   "INTRAOCULAR LENS IMPLANT Left     CATARACT EXTRACTION W/  INTRAOCULAR LENS IMPLANT Right     Dr Sawant    COLON SURGERY      resection    COLONOSCOPY N/A 3/14/2017    Procedure: COLONOSCOPY;  Surgeon: Killian Guerrier MD;  Location: [facility];  Service: Endoscopy;  Laterality: N/A;    COLONOSCOPY  03/14/2017    Dr. Guerrier: hemorrhoids, one colon polyp removed, "Patent functional end-to-end ileo-colonic anastomosis"with healthy mucosa; biopsy: hyperplastic polyp; repeat in 3 years for surveillance    COLONOSCOPY N/A 1/21/2020    Procedure: COLONOSCOPY;  Surgeon: Timo Darling MD;  Location: [facility] ENDO;  Service: Endoscopy;  Laterality: N/A;    COLONOSCOPY N/A 7/8/2022    Procedure: COLONOSCOPY;  Surgeon: Anaid Washington MD;  Location: [facility];  Service: Endoscopy;  Laterality: N/A;    ECTOPIC PREGNANCY SURGERY      ERCP N/A 7/31/2023    Procedure: ERCP (ENDOSCOPIC RETROGRADE CHOLANGIOPANCREATOGRAPHY);  Surgeon: Nasim Cuadra MD;  Location: [facility];  Service: Endoscopy;  Laterality: N/A;    ERCP N/A 8/1/2023    Procedure: ERCP (ENDOSCOPIC RETROGRADE CHOLANGIOPANCREATOGRAPHY);  Surgeon: Олег Arreaga MD;  Location: [facility] ([address] [place] [address]);  Service: Endoscopy;  Laterality: N/A;    ESOPHAGOGASTRODUODENOSCOPY N/A 5/16/2019    Procedure: EGD (ESOPHAGOGASTRODUODENOSCOPY);  Surgeon: Anaid Washington MD;  Location: [facility];  Service: Endoscopy;  Laterality: N/A;    ESOPHAGOGASTRODUODENOSCOPY N/A 7/11/2019    Procedure: EGD (ESOPHAGOGASTRODUODENOSCOPY);  Surgeon: Anaid Washington MD;  Location: [facility] ENDO;  Service: Endoscopy;  Laterality: N/A;    ESOPHAGOGASTRODUODENOSCOPY N/A 2/5/2021    Procedure: EGD (ESOPHAGOGASTRODUODENOSCOPY);  Surgeon: Anaid Washington MD;  Location: [facility] ENDO;  Service: Endoscopy;  Laterality: N/A;    ESOPHAGOGASTRODUODENOSCOPY N/A 11/11/2021    Procedure: EGD (ESOPHAGOGASTRODUODENOSCOPY);  Surgeon: Anaid Washington MD;  Location: [facility];  Service: Endoscopy;  Laterality: " N/A;    ESOPHAGOGASTRODUODENOSCOPY N/A 7/8/2022    Procedure: EGD (ESOPHAGOGASTRODUODENOSCOPY);  Surgeon: Anaid Washington MD;  Location: [facility] ENDO;  Service: Endoscopy;  Laterality: N/A;    ESOPHAGOGASTRODUODENOSCOPY N/A 4/12/2023    Procedure: EGD (ESOPHAGOGASTRODUODENOSCOPY);  Surgeon: Anaid Washington MD;  Location: [facility] ENDO;  Service: Endoscopy;  Laterality: N/A;    EYE SURGERY      bilateral cataracts    HYSTERECTOMY      complete    JOINT REPLACEMENT      Liban Knee    ROTATOR CUFF REPAIR Right     TOE SURGERY      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    UPPER GASTROINTESTINAL ENDOSCOPY  06/12/2017    Dr. Guerrier: gastritis and esophagitis, biopsy: chronic gastritis, negative for h pylori, esophageal- positive eosinophils, negative for barretts; repeat in 2 months    UPPER GASTROINTESTINAL ENDOSCOPY  07/27/2017    Dr. Guerrier       Social History     Socioeconomic History    Marital status:     Number of children: 0   Tobacco Use    Smoking status: Every Day     Current packs/day: 0.00     Average packs/day: 0.3 packs/day for 65.0 years (21.5 ttl pk-yrs)     Types: Cigarettes     Start date: 10/5/1955     Last attempt to quit: 10/5/2020     Years since quitting: 3.1    Smokeless tobacco: Never    Tobacco comments:     3/1/23--states smokes 1-2 cigarettes a day   Substance and Sexual Activity    Alcohol use: Yes     Alcohol/week: 2.0 standard drinks of alcohol     Types: 2 Shots of liquor per week     Comment: Daily, about 4 drinks per day (crown)    Drug use: No    Sexual activity: Not Currently     Social Determinants of Health     Financial Resource Strain: Low Risk  (8/2/2023)    Overall Financial Resource Strain (CARDIA)     Difficulty of Paying Living Expenses: Not very hard   Food Insecurity: No Food Insecurity (8/2/2023)    Hunger Vital Sign     Worried About Running Out of Food in the Last Year: Never true     Ran Out of Food in the Last Year: Never true   Transportation Needs: No  Transportation Needs (8/2/2023)    PRAPARE - Transportation     Lack of Transportation (Medical): No     Lack of Transportation (Non-Medical): No   Physical Activity: Inactive (8/2/2023)    Exercise Vital Sign     Days of Exercise per Week: 0 days     Minutes of Exercise per Session: 0 min   Stress: No Stress Concern Present (6/16/2023)    [place] [employer] - Occupational Stress Questionnaire     Feeling of Stress : Not at all   Social Connections: Unknown (8/2/2023)    Social Connection and Isolation Panel [NHANES]     Frequency of Communication with Friends and Family: Three times a week     Frequency of Social Gatherings with Friends and Family: Three times a week     Attends [employer] Services: More than 4 times per year     Active Member of Clubs or Organizations: No     Attends Club or Organization Meetings: Never   Housing Stability: Unknown (8/2/2023)    Housing Stability Vital Sign     Unable to Pay for Housing in the Last Year: No     Unstable Housing in the Last Year: No       OBJECTIVE:     Vital Signs Range (Last 24H):  Temp:  [36.7 °C (98.1 °F)]   Pulse:  [80]   Resp:  [16]   BP: (151)/(65)   SpO2:  [96 %]       Significant Labs:  Lab Results   Component Value Date    WBC 16.32 (H) 08/07/2023    HGB 12.3 08/07/2023    HCT 38.0 08/07/2023     (L) 08/07/2023    CHOL 184 07/01/2022    TRIG 74 07/01/2022    HDL 65 07/01/2022    ALT 13 08/07/2023    AST 18 08/07/2023     08/07/2023    K 3.4 (L) 08/07/2023     (H) 08/07/2023    CREATININE 0.8 08/07/2023    BUN 7 (L) 08/07/2023    CO2 16 (L) 08/07/2023    TSH 0.490 07/30/2023    INR 1.1 07/31/2023    HGBA1C 5.5 07/01/2022       Diagnostic Studies: No relevant studies.    EKG:   Results for orders placed or performed during the hospital encounter of 07/30/23   EKG 12-lead    Collection Time: 07/30/23  6:17 PM    Narrative    Test Reason : L03.90,    Vent. Rate : 094 BPM     Atrial Rate : 094 BPM     P-R Int : 174 ms           QRS Dur : 080 ms      QT Int : 344 ms       P-R-T Axes : 074 -44 067 degrees     QTc Int : 430 ms    Sinus rhythm with sinus arrhythmia with occasional Premature ventricular  complexes  Left axis deviation  Nonspecific T wave abnormality  Abnormal ECG    Confirmed by Barbara RAINEY, Yves Lara ([id number]) on 8/5/2023 12:32:17 PM    Referred By: [email or web address]   SELF           Confirmed By:Yves Jimenez MD       2D ECHO:  TTE:  Results for orders placed or performed during the hospital encounter of 08/01/23   Echo   Result Value Ref Range    BSA 1.73 m2    LVOT stroke volume 52.20 cm3    LVIDd 5.09 3.5 - 6.0 cm    LV Systolic Volume 57.88 mL    LV Systolic Volume Index 33.8 mL/m2    LVIDs 3.69 2.1 - 4.0 cm    LV Diastolic Volume 123.35 mL    LV Diastolic Volume Index 72.13 mL/m2    IVS 0.79 0.6 - 1.1 cm    LVOT diameter 1.84 cm    LVOT area 2.7 cm2    FS 28 28 - 44 %    Left Ventricle Relative Wall Thickness 0.34 cm    Posterior Wall 0.86 0.6 - 1.1 cm    LV mass 145.63 g    LV Mass Index 85 g/m2    MV Peak E Christopher 0.69 m/s    TDI LATERAL 0.08 m/s    TDI SEPTAL 0.07 m/s    E/E' ratio 9.20 m/s    MV Peak A Christopher 0.72 m/s    TR Max Christopher 3.32 m/s    E/A ratio 0.96     E wave deceleration time 194.12 msec    LV SEPTAL E/E' RATIO 9.86 m/s    LA Volume Index 37.2 mL/m2    LV LATERAL E/E' RATIO 8.63 m/s    LA volume 63.66 cm3    LVOT peak christopher 0.82 m/s    LA volume (mod) 66.18 cm3    LA Volume Index (Mod) 38.7 mL/m2    LA size 3.46 cm    Left Atrium Major Axis 5.24 cm    Left Atrium Minor Axis 5.32 cm    LA WIDTH 4.10 cm    TAPSE 1.41 cm    RA Major Axis 4.83 cm    RA Width 3.87 cm    AV mean gradient 5 mmHg    AV peak gradient 10 mmHg    Ao peak christopher 1.59 m/s    Ao VTI 36.23 cm    LVOT peak VTI 19.64 cm    AV valve area 1.44 cm²    AV Velocity Ratio 0.52     AV index (prosthetic) 0.54     OLI by Velocity Ratio 1.37 cm²    Triscuspid Valve Regurgitation Peak Gradient 44 mmHg    Sinus 2.87 cm    STJ 2.67 cm    Ascending aorta 2.83 cm     Mean e' 0.08 m/s    ZLVIDS 1.80     ZLVIDD 0.69     RVDD 4.08 cm    TV resting pulmonary artery pressure 59 mmHg    RV TB RVSP 18 mmHg    Est. RA pres 15 mmHg    Narrative      Left Ventricle: The left ventricle is normal in size. Normal wall   thickness. Normal wall motion. There is low normal systolic function with   a visually estimated ejection fraction of 50 - 55%. Grade I diastolic   dysfunction.    Right Ventricle: Normal right ventricular cavity size. Systolic   function is normal.    Left Atrium: Left atrium is mildly dilated.    Pulmonic Valve: The estimated PA systolic pressure is 59 mmHg.    IVC/SVC: Elevated venous pressure at 15 mmHg.         VELIA:  No results found. However, due to the size of the patient record, not all encounters were searched. Please check Results Review for a complete set of results.    ASSESSMENT/PLAN:         Pre-op Assessment    I have reviewed the Patient Summary Reports.       I have reviewed the Medications.     Review of Systems  Pulmonary:   COPD         Chronic Obstructive Pulmonary Disease (COPD):                      Renal/:  Chronic Renal Disease        Kidney Function/Disease             Hepatic/GI:     GERD      Gerd          Neurological:    Neuromuscular Disease,                                 Neuromuscular Disease   Endocrine:   Hypothyroidism       Hypothyroidism              Physical Exam  General: Well nourished, Cooperative, Alert and Oriented    Airway:  Mallampati: II   Mouth Opening: Normal  TM Distance: Normal  Tongue: Normal  Neck ROM: Normal ROM    Dental:  Intact        Anesthesia Plan  Type of Anesthesia, risks & benefits discussed:    Anesthesia Type: Gen Natural Airway  Intra-op Monitoring Plan: Standard ASA Monitors  Post Op Pain Control Plan: multimodal analgesia and IV/PO Opioids PRN  Induction:  IV  Airway Plan: , Post-Induction  Informed Consent: Informed consent signed with the Patient and all parties understand the risks and agree with  anesthesia plan.  All questions answered.   ASA Score: 3    Ready For Surgery From Anesthesia Perspective.     .

## 2023-12-12 NOTE — TRANSFER OF CARE
"Anesthesia Transfer of Care Note    Patient: Kenyetta Andersen    Procedure(s) Performed: Procedure(s) (LRB):  ERCP (ENDOSCOPIC RETROGRADE CHOLANGIOPANCREATOGRAPHY) (N/A)    Patient location: [facility]    Anesthesia Type: general    Transport from OR: Transported from OR on 2-3 L/min O2 by NC with adequate spontaneous ventilation    Post pain: adequate analgesia    Post assessment: no apparent anesthetic complications    Post vital signs: stable    Level of consciousness: awake    Nausea/Vomiting: no nausea/vomiting    Complications: none    Transfer of care protocol was followed      Last vitals: Visit Vitals  BP (!) 154/68   Pulse 82   Temp 36.6 °C (97.9 °F) (Temporal)   Resp 16   Ht 5' 3" (1.6 m)   Wt 55.8 kg (123 lb)   SpO2 100%   Breastfeeding No   BMI 21.79 kg/m²     "

## 2023-12-13 DIAGNOSIS — Z78.0 MENOPAUSE: Primary | ICD-10-CM

## 2023-12-13 RX ORDER — ALENDRONATE SODIUM 70 MG/1
TABLET ORAL
Qty: 12 TABLET | Refills: 0 | Status: SHIPPED | OUTPATIENT
Start: 2023-12-13 | End: 2024-01-16

## 2023-12-13 NOTE — TELEPHONE ENCOUNTER
Care Due:                  Date            Visit Type   Department     Provider  --------------------------------------------------------------------------------                                EP -                              PRIMARY      [facility]  Last Visit: 04-      CARE ([facility])   PRACTICE       Hever Arreola                              EP -                              PRIMARY      [facility]  Next Visit: 02-      CARE ([facility])   PRACTICE       Hever Powers  Test          Frequency    Reason                     Performed    Due Date  --------------------------------------------------------------------------------    Vitamin D...  12 months..  alendronate..............  07- 06-    [facility] Embedded Care Due Messages. Reference number: [id number].   12/13/2023 2:35:07 PM CST

## 2023-12-13 NOTE — TELEPHONE ENCOUNTER
Refill Routing Note   Medication(s) are not appropriate for processing by [facility] for the following reason(s):        Outside of protocol    [facility] action(s):  Route     Requires labs : Yes             Appointments  past 12m or future 3m with PCP    Date Provider   Last Visit   4/14/2023 Hever Arreola MD   Next Visit   2/27/2024 Hever Arreola MD   ED visits in past 90 days: 0        Note composed:4:06 PM 12/13/2023

## 2023-12-13 NOTE — PROVATION PATIENT INSTRUCTIONS
Discharge Summary/Instructions after an Endoscopic Procedure  Patient Name: Kenyetta Andersen  Patient MRN: [id number]  Patient YOB: 1935 Tuesday, December 12, 2023  Dimitri Anaya MD  Dear patient,  As a result of recent federal legislation (The Federal Cures Act), you may   receive lab or pathology results from your procedure in your [facility]   account before your physician is able to contact you. Your physician or   their representative will relay the results to you with their   recommendations at their soonest availability.  Thank you,  RESTRICTIONS:  During your procedure today, you received medications for sedation.  These   medications may affect your judgment, balance and coordination.  Therefore,   for 24 hours, you have the following restrictions:   - DO NOT drive a car, operate machinery, make legal/financial decisions,   sign important papers or drink alcohol.    ACTIVITY:  Today: no heavy lifting, straining or running due to procedural   sedation/anesthesia.  The following day: return to full activity including work.  DIET:  Eat and drink normally unless instructed otherwise.     TREATMENT FOR COMMON SIDE EFFECTS:  - Mild abdominal pain, nausea, belching, bloating or excessive gas:  rest,   eat lightly and use a heating pad.  - Sore Throat: treat with throat lozenges and/or gargle with warm salt   water.  - Because air was used during the procedure, expelling large amounts of air   from your rectum or belching is normal.  - If a bowel prep was taken, you may not have a bowel movement for 1-3 days.    This is normal.  SYMPTOMS TO WATCH FOR AND REPORT TO YOUR PHYSICIAN:  1. Abdominal pain or bloating, other than gas cramps.  2. Chest pain.  3. Back pain.  4. Signs of infection such as: chills or fever occurring within 24 hours   after the procedure.  5. Rectal bleeding, which would show as bright red, maroon, or black stools.   (A tablespoon of blood from the rectum is not serious, especially  if   hemorrhoids are present.)  6. Vomiting.  7. Weakness or dizziness.  GO DIRECTLY TO THE NEAREST EMERGENCY ROOM IF YOU HAVE ANY OF THE FOLLOWING:      Difficulty breathing              Chills and/or fever over 101 F   Persistent vomiting and/or vomiting blood   Severe abdominal pain   Severe chest pain   Black, tarry stools   Bleeding- more than one tablespoon   Any other symptom or condition that you feel may need urgent attention  Your doctor recommends these additional instructions:  If any biopsies were taken, your doctors clinic will contact you in 1 to 2   weeks with any results.  - Discharge patient to home.   - Resume previous diet.   - Continue present medications.   - Return to primary care physician at appointment to be scheduled.  For questions, problems or results please call your physician - Dimitri Anaya MD at Work:  [phone].  [facility], EMERGENCY ROOM PHONE NUMBER: [phone]  IF A COMPLICATION OR EMERGENCY SITUATION ARISES AND YOU ARE UNABLE TO REACH   YOUR PHYSICIAN - GO DIRECTLY TO THE EMERGENCY ROOM.  Dimitri Anaya MD  12/13/2023 8:01:02 AM  [employer]ATION

## 2023-12-15 ENCOUNTER — HOSPITAL ENCOUNTER (OUTPATIENT)
Dept: RADIOLOGY | Facility: HOSPITAL | Age: 88
Discharge: HOME | End: 2023-12-15
Attending: FAMILY MEDICINE
Payer: MEDICARE

## 2023-12-15 DIAGNOSIS — Z78.0 MENOPAUSE: ICD-10-CM

## 2023-12-15 PROCEDURE — 77080 DXA BONE DENSITY AXIAL: CPT | Mod: TC,PO

## 2024-01-13 NOTE — TELEPHONE ENCOUNTER
No care due was identified.  [facility] Embedded Care Due Messages. Reference number: [id number].   1/13/2024 9:42:29 AM CST   DANAY (acute kidney injury)

## 2024-01-16 ENCOUNTER — OFFICE VISIT (OUTPATIENT)
Dept: OPTOMETRY | Facility: CLINIC | Age: 89
End: 2024-01-16
Payer: MEDICARE

## 2024-01-16 DIAGNOSIS — H52.7 REFRACTIVE ERROR: ICD-10-CM

## 2024-01-16 DIAGNOSIS — Z96.1 PSEUDOPHAKIA: ICD-10-CM

## 2024-01-16 DIAGNOSIS — H40.1131 PRIMARY OPEN ANGLE GLAUCOMA (POAG) OF BOTH EYES, MILD STAGE: Primary | ICD-10-CM

## 2024-01-16 PROCEDURE — 92014 COMPRE OPH EXAM EST PT 1/>: CPT | Mod: HCNC,S$GLB,, | Performed by: OPTOMETRIST

## 2024-01-16 PROCEDURE — 1101F PT FALLS ASSESS-DOCD LE1/YR: CPT | Mod: HCNC,CPTII,S$GLB, | Performed by: OPTOMETRIST

## 2024-01-16 PROCEDURE — 99999 PR PBB SHADOW E&M-EST. PATIENT-LVL III: CPT | Mod: PBBFAC,HCNC,, | Performed by: OPTOMETRIST

## 2024-01-16 PROCEDURE — 92015 DETERMINE REFRACTIVE STATE: CPT | Mod: HCNC,S$GLB,, | Performed by: OPTOMETRIST

## 2024-01-16 PROCEDURE — 1159F MED LIST DOCD IN RCRD: CPT | Mod: HCNC,CPTII,S$GLB, | Performed by: OPTOMETRIST

## 2024-01-16 PROCEDURE — 3288F FALL RISK ASSESSMENT DOCD: CPT | Mod: HCNC,CPTII,S$GLB, | Performed by: OPTOMETRIST

## 2024-01-16 PROCEDURE — 1126F AMNT PAIN NOTED NONE PRSNT: CPT | Mod: HCNC,CPTII,S$GLB, | Performed by: OPTOMETRIST

## 2024-01-16 PROCEDURE — 1160F RVW MEDS BY RX/DR IN RCRD: CPT | Mod: HCNC,CPTII,S$GLB, | Performed by: OPTOMETRIST

## 2024-01-16 PROCEDURE — 1157F ADVNC CARE PLAN IN RCRD: CPT | Mod: HCNC,CPTII,S$GLB, | Performed by: OPTOMETRIST

## 2024-01-16 RX ORDER — ALENDRONATE SODIUM 70 MG/1
TABLET ORAL
Qty: 12 TABLET | Refills: 0 | Status: SHIPPED | OUTPATIENT
Start: 2024-01-16 | End: 2024-05-28

## 2024-01-16 NOTE — PROGRESS NOTES
HPI    89 YO female presents today for an annual eye exam. Patient states that   she is noticing change in vision would like updated glasses prescription   today. Compliance with drops.     Cosopt OU bid   Brimonidine OU tid   Latanoprost OU qhs       Last edited by Fannie Barker on 1/16/2024 11:41 AM.            Assessment /Plan     For exam results, see Encounter Report.    Primary open angle glaucoma (POAG) of both eyes, mild stage    Pseudophakia    Refractive error      1. Primary open angle glaucoma (POAG) of both eyes, mild stage  Tmax 23 / 21    / 594    Overdue for hvf 24-2 sf / oct rnfl    IOP stable with use of drops -- target low to mid teens  Continue cosopt bid OU / alphagan tid OU / latanoprost qpm OU  Declines refill at this time    F/u as scheduled for hvf / oct  IOP check in 6 months    2. Pseudophakia  S/p cataract extraction    3. Refractive error  Dispensed updated spectacle Rx. Discussed various spectacle lens options. Discussed adaptation period to new specs.

## 2024-01-25 ENCOUNTER — TELEPHONE (OUTPATIENT)
Dept: HEMATOLOGY/ONCOLOGY | Facility: CLINIC | Age: 89
End: 2024-01-25
Payer: MEDICARE

## 2024-01-25 NOTE — TELEPHONE ENCOUNTER
Spoke to pt to notify lab missing for appt 01/26/24, pt states she could not make it due to weather, lab appt rescheduled 02/14/24 and appt w/Dr Granado 02/16/24, pt agrees.

## 2024-02-14 ENCOUNTER — LAB VISIT (OUTPATIENT)
Dept: LAB | Facility: HOSPITAL | Age: 89
End: 2024-02-14
Attending: INTERNAL MEDICINE
Payer: MEDICARE

## 2024-02-14 DIAGNOSIS — E53.8 B12 DEFICIENCY: ICD-10-CM

## 2024-02-14 DIAGNOSIS — D75.89 MACROCYTOSIS: ICD-10-CM

## 2024-02-14 DIAGNOSIS — Z85.038 HISTORY OF COLON CANCER: ICD-10-CM

## 2024-02-14 DIAGNOSIS — N18.31 STAGE 3A CHRONIC KIDNEY DISEASE: ICD-10-CM

## 2024-02-14 DIAGNOSIS — T45.1X5A CHEMOTHERAPY-INDUCED PERIPHERAL NEUROPATHY: ICD-10-CM

## 2024-02-14 DIAGNOSIS — G62.0 CHEMOTHERAPY-INDUCED PERIPHERAL NEUROPATHY: ICD-10-CM

## 2024-02-14 LAB
ALBUMIN SERPL BCP-MCNC: 3.3 G/DL (ref 3.5–5.2)
ALP SERPL-CCNC: 89 U/L (ref 55–135)
ALT SERPL W/O P-5'-P-CCNC: 11 U/L (ref 10–44)
ANION GAP SERPL CALC-SCNC: 7 MMOL/L (ref 8–16)
AST SERPL-CCNC: 22 U/L (ref 10–40)
BASOPHILS # BLD AUTO: 0.03 K/UL (ref 0–0.2)
BASOPHILS NFR BLD: 0.4 % (ref 0–1.9)
BILIRUB SERPL-MCNC: 0.3 MG/DL (ref 0.1–1)
BUN SERPL-MCNC: 15 MG/DL (ref 8–23)
CALCIUM SERPL-MCNC: 9.3 MG/DL (ref 8.7–10.5)
CEA SERPL-MCNC: 3.5 NG/ML (ref 0–5)
CHLORIDE SERPL-SCNC: 109 MMOL/L (ref 95–110)
CO2 SERPL-SCNC: 24 MMOL/L (ref 23–29)
CREAT SERPL-MCNC: 1.1 MG/DL (ref 0.5–1.4)
DIFFERENTIAL METHOD BLD: ABNORMAL
EOSINOPHIL # BLD AUTO: 0.2 K/UL (ref 0–0.5)
EOSINOPHIL NFR BLD: 2.2 % (ref 0–8)
ERYTHROCYTE [DISTWIDTH] IN BLOOD BY AUTOMATED COUNT: 12.7 % (ref 11.5–14.5)
EST. GFR  (NO RACE VARIABLE): 49 ML/MIN/1.73 M^2
FERRITIN SERPL-MCNC: 367 NG/ML (ref 20–300)
GLUCOSE SERPL-MCNC: 88 MG/DL (ref 70–110)
HCT VFR BLD AUTO: 42.2 % (ref 37–48.5)
HGB BLD-MCNC: 13.7 G/DL (ref 12–16)
IMM GRANULOCYTES # BLD AUTO: 0.01 K/UL (ref 0–0.04)
IMM GRANULOCYTES NFR BLD AUTO: 0.1 % (ref 0–0.5)
IRON SERPL-MCNC: 62 UG/DL (ref 30–160)
LYMPHOCYTES # BLD AUTO: 4.2 K/UL (ref 1–4.8)
LYMPHOCYTES NFR BLD: 53 % (ref 18–48)
MCH RBC QN AUTO: 30.9 PG (ref 27–31)
MCHC RBC AUTO-ENTMCNC: 32.5 G/DL (ref 32–36)
MCV RBC AUTO: 95 FL (ref 82–98)
MONOCYTES # BLD AUTO: 0.7 K/UL (ref 0.3–1)
MONOCYTES NFR BLD: 8.3 % (ref 4–15)
NEUTROPHILS # BLD AUTO: 2.8 K/UL (ref 1.8–7.7)
NEUTROPHILS NFR BLD: 36 % (ref 38–73)
NRBC BLD-RTO: 0 /100 WBC
PLATELET # BLD AUTO: 198 K/UL (ref 150–450)
PMV BLD AUTO: 9.5 FL (ref 9.2–12.9)
POTASSIUM SERPL-SCNC: 4.7 MMOL/L (ref 3.5–5.1)
PROT SERPL-MCNC: 6.8 G/DL (ref 6–8.4)
RBC # BLD AUTO: 4.44 M/UL (ref 4–5.4)
SATURATED IRON: 23 % (ref 20–50)
SODIUM SERPL-SCNC: 140 MMOL/L (ref 136–145)
TOTAL IRON BINDING CAPACITY: 266 UG/DL (ref 250–450)
TRANSFERRIN SERPL-MCNC: 190 MG/DL (ref 200–375)
VIT B12 SERPL-MCNC: 1608 PG/ML (ref 210–950)
WBC # BLD AUTO: 7.87 K/UL (ref 3.9–12.7)

## 2024-02-14 PROCEDURE — 83540 ASSAY OF IRON: CPT | Performed by: INTERNAL MEDICINE

## 2024-02-14 PROCEDURE — 85025 COMPLETE CBC W/AUTO DIFF WBC: CPT | Performed by: INTERNAL MEDICINE

## 2024-02-14 PROCEDURE — 82378 CARCINOEMBRYONIC ANTIGEN: CPT | Performed by: INTERNAL MEDICINE

## 2024-02-14 PROCEDURE — 36415 COLL VENOUS BLD VENIPUNCTURE: CPT | Performed by: INTERNAL MEDICINE

## 2024-02-14 PROCEDURE — 82728 ASSAY OF FERRITIN: CPT | Performed by: INTERNAL MEDICINE

## 2024-02-14 PROCEDURE — 82607 VITAMIN B-12: CPT | Performed by: INTERNAL MEDICINE

## 2024-02-14 PROCEDURE — 80053 COMPREHEN METABOLIC PANEL: CPT | Performed by: INTERNAL MEDICINE

## 2024-02-16 ENCOUNTER — OFFICE VISIT (OUTPATIENT)
Dept: HEMATOLOGY/ONCOLOGY | Facility: CLINIC | Age: 89
End: 2024-02-16
Payer: MEDICARE

## 2024-02-16 VITALS
WEIGHT: 139.13 LBS | RESPIRATION RATE: 12 BRPM | HEIGHT: 63 IN | DIASTOLIC BLOOD PRESSURE: 66 MMHG | HEART RATE: 81 BPM | TEMPERATURE: 97 F | BODY MASS INDEX: 24.65 KG/M2 | OXYGEN SATURATION: 96 % | SYSTOLIC BLOOD PRESSURE: 143 MMHG

## 2024-02-16 DIAGNOSIS — K22.70 BARRETT'S ESOPHAGUS WITHOUT DYSPLASIA: ICD-10-CM

## 2024-02-16 DIAGNOSIS — E07.9 THYROID MASS: ICD-10-CM

## 2024-02-16 DIAGNOSIS — E53.8 B12 DEFICIENCY: Primary | ICD-10-CM

## 2024-02-16 DIAGNOSIS — Z85.038 HISTORY OF COLON CANCER: ICD-10-CM

## 2024-02-16 DIAGNOSIS — Z85.038 PERSONAL HISTORY OF COLON CANCER, STAGE III: ICD-10-CM

## 2024-02-16 DIAGNOSIS — I70.0 ABDOMINAL AORTIC ATHEROSCLEROSIS: ICD-10-CM

## 2024-02-16 PROCEDURE — 1126F AMNT PAIN NOTED NONE PRSNT: CPT | Mod: HCNC,CPTII,S$GLB, | Performed by: INTERNAL MEDICINE

## 2024-02-16 PROCEDURE — 1159F MED LIST DOCD IN RCRD: CPT | Mod: HCNC,CPTII,S$GLB, | Performed by: INTERNAL MEDICINE

## 2024-02-16 PROCEDURE — 99999 PR PBB SHADOW E&M-EST. PATIENT-LVL III: CPT | Mod: PBBFAC,HCNC,, | Performed by: INTERNAL MEDICINE

## 2024-02-16 PROCEDURE — 1157F ADVNC CARE PLAN IN RCRD: CPT | Mod: HCNC,CPTII,S$GLB, | Performed by: INTERNAL MEDICINE

## 2024-02-16 PROCEDURE — 99214 OFFICE O/P EST MOD 30 MIN: CPT | Mod: HCNC,S$GLB,, | Performed by: INTERNAL MEDICINE
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
## 2024-02-22 ENCOUNTER — HOSPITAL ENCOUNTER (OUTPATIENT)
Dept: RADIOLOGY | Facility: HOSPITAL | Age: 89
Discharge: HOME | End: 2024-02-22
Attending: INTERNAL MEDICINE
Payer: MEDICARE

## 2024-02-22 DIAGNOSIS — E07.9 THYROID MASS: ICD-10-CM

## 2024-02-22 PROCEDURE — 76536 US EXAM OF HEAD AND NECK: CPT | Mod: TC

## 2024-02-27 ENCOUNTER — OFFICE VISIT (OUTPATIENT)
Dept: FAMILY MEDICINE | Facility: CLINIC | Age: 89
End: 2024-02-27
Attending: FAMILY MEDICINE
Payer: MEDICARE

## 2024-02-27 ENCOUNTER — TELEPHONE (OUTPATIENT)
Dept: SURGERY | Facility: CLINIC | Age: 89
End: 2024-02-27
Payer: MEDICARE

## 2024-02-27 ENCOUNTER — OFFICE VISIT (OUTPATIENT)
Dept: HEMATOLOGY/ONCOLOGY | Facility: CLINIC | Age: 89
End: 2024-02-27
Payer: MEDICARE

## 2024-02-27 ENCOUNTER — DOCUMENTATION ONLY (OUTPATIENT)
Dept: HEMATOLOGY/ONCOLOGY | Facility: CLINIC | Age: 89
End: 2024-02-27

## 2024-02-27 VITALS
OXYGEN SATURATION: 94 % | TEMPERATURE: 100 F | HEART RATE: 97 BPM | DIASTOLIC BLOOD PRESSURE: 66 MMHG | BODY MASS INDEX: 24.45 KG/M2 | WEIGHT: 138 LBS | HEIGHT: 63 IN | SYSTOLIC BLOOD PRESSURE: 112 MMHG

## 2024-02-27 VITALS
WEIGHT: 138.44 LBS | DIASTOLIC BLOOD PRESSURE: 76 MMHG | BODY MASS INDEX: 24.53 KG/M2 | HEART RATE: 72 BPM | RESPIRATION RATE: 12 BRPM | SYSTOLIC BLOOD PRESSURE: 143 MMHG | HEIGHT: 63 IN | TEMPERATURE: 97 F | OXYGEN SATURATION: 95 %

## 2024-02-27 DIAGNOSIS — R73.03 PREDIABETES: ICD-10-CM

## 2024-02-27 DIAGNOSIS — R42 ORTHOSTATIC DIZZINESS: ICD-10-CM

## 2024-02-27 DIAGNOSIS — I70.0 ABDOMINAL AORTIC ATHEROSCLEROSIS: ICD-10-CM

## 2024-02-27 DIAGNOSIS — G62.0 CHEMOTHERAPY-INDUCED PERIPHERAL NEUROPATHY: ICD-10-CM

## 2024-02-27 DIAGNOSIS — E04.2 MULTIPLE THYROID NODULES: ICD-10-CM

## 2024-02-27 DIAGNOSIS — N18.31 STAGE 3A CHRONIC KIDNEY DISEASE: ICD-10-CM

## 2024-02-27 DIAGNOSIS — M85.89 OSTEOPENIA OF MULTIPLE SITES: ICD-10-CM

## 2024-02-27 DIAGNOSIS — Z85.038 HISTORY OF COLON CANCER: ICD-10-CM

## 2024-02-27 DIAGNOSIS — H40.2230 CHRONIC ANGLE-CLOSURE GLAUCOMA OF BOTH EYES, UNSPECIFIED GLAUCOMA STAGE: ICD-10-CM

## 2024-02-27 DIAGNOSIS — J43.9 PULMONARY EMPHYSEMA, UNSPECIFIED EMPHYSEMA TYPE: ICD-10-CM

## 2024-02-27 DIAGNOSIS — E53.8 B12 DEFICIENCY: Primary | ICD-10-CM

## 2024-02-27 DIAGNOSIS — Z78.9 DAILY CONSUMPTION OF ALCOHOL: Chronic | ICD-10-CM

## 2024-02-27 DIAGNOSIS — J84.10 CALCIFIED GRANULOMA OF LUNG: ICD-10-CM

## 2024-02-27 DIAGNOSIS — Z00.00 PREVENTATIVE HEALTH CARE: Primary | ICD-10-CM

## 2024-02-27 DIAGNOSIS — T45.1X5A CHEMOTHERAPY-INDUCED PERIPHERAL NEUROPATHY: ICD-10-CM

## 2024-02-27 DIAGNOSIS — E03.9 PRIMARY HYPOTHYROIDISM: ICD-10-CM

## 2024-02-27 PROBLEM — K83.09 CHOLANGITIS: Status: RESOLVED | Noted: 2023-08-01 | Resolved: 2024-02-27

## 2024-02-27 PROBLEM — E87.20 LACTIC ACID ACIDOSIS: Status: RESOLVED | Noted: 2023-07-30 | Resolved: 2024-02-27

## 2024-02-27 PROCEDURE — 1157F ADVNC CARE PLAN IN RCRD: CPT | Mod: HCNC,CPTII,S$GLB, | Performed by: INTERNAL MEDICINE

## 2024-02-27 PROCEDURE — 99214 OFFICE O/P EST MOD 30 MIN: CPT | Mod: HCNC,S$GLB,, | Performed by: INTERNAL MEDICINE

## 2024-02-27 PROCEDURE — 1126F AMNT PAIN NOTED NONE PRSNT: CPT | Mod: HCNC,CPTII,S$GLB, | Performed by: INTERNAL MEDICINE

## 2024-02-27 PROCEDURE — 99999 PR PBB SHADOW E&M-EST. PATIENT-LVL III: CPT | Mod: PBBFAC,HCNC,, | Performed by: FAMILY MEDICINE

## 2024-02-27 PROCEDURE — 1160F RVW MEDS BY RX/DR IN RCRD: CPT | Mod: HCNC,CPTII,S$GLB, | Performed by: FAMILY MEDICINE

## 2024-02-27 PROCEDURE — 1157F ADVNC CARE PLAN IN RCRD: CPT | Mod: HCNC,CPTII,S$GLB, | Performed by: FAMILY MEDICINE

## 2024-02-27 PROCEDURE — 99397 PER PM REEVAL EST PAT 65+ YR: CPT | Mod: HCNC,S$GLB,, | Performed by: FAMILY MEDICINE

## 2024-02-27 PROCEDURE — 1159F MED LIST DOCD IN RCRD: CPT | Mod: HCNC,CPTII,S$GLB, | Performed by: INTERNAL MEDICINE

## 2024-02-27 PROCEDURE — 3288F FALL RISK ASSESSMENT DOCD: CPT | Mod: HCNC,CPTII,S$GLB, | Performed by: FAMILY MEDICINE

## 2024-02-27 PROCEDURE — 99999 PR PBB SHADOW E&M-EST. PATIENT-LVL IV: CPT | Mod: PBBFAC,HCNC,, | Performed by: INTERNAL MEDICINE

## 2024-02-27 PROCEDURE — 1101F PT FALLS ASSESS-DOCD LE1/YR: CPT | Mod: HCNC,CPTII,S$GLB, | Performed by: FAMILY MEDICINE

## 2024-02-27 PROCEDURE — 1125F AMNT PAIN NOTED PAIN PRSNT: CPT | Mod: HCNC,CPTII,S$GLB, | Performed by: FAMILY MEDICINE

## 2024-02-27 PROCEDURE — 1159F MED LIST DOCD IN RCRD: CPT | Mod: HCNC,CPTII,S$GLB, | Performed by: FAMILY MEDICINE

## 2024-02-27 NOTE — PROGRESS NOTES
Subjective:       Patient ID: Kenyetta Andersen is a 89 y.o. female.    Chief Complaint: Annual Exam    89-year-old female originally scheduled an appointment to discuss some dizziness reports that her problem is largely resolved and she would instead like to have her annual physical.  The dizziness is reported to be in relationship to standing up from a seated position resulting in some loss of balance and slight vertigo that is easily resolve by just standing still for a few moments and regaining her balance.  This was more frequent several months ago when she was hospitalized with cholangitis and was probably related to some dehydration noting she had a down turn in kidney function also.  Most of those problems have now resolved as well.  She has had extensive recent lab work which was reviewed.  She is due for a lipid panel and also an A1c as a follow-up of prediabetes.  She has a history of neuropathy secondary to chemotherapy, daily alcohol use, angle closure glaucoma bilaterally, emphysema with an extensive history of smoking, calcified granuloma of the lung, elevated triglyceride levels which were largely resolved on recent lab work, abdominal aortic atherosclerosis, stage IIIA chronic kidney disease with a brief excursion into stage IIIB with her recent illness.  She has a history colon cancer stage III resected and most recently checked with colonoscopy in July of 2022 by Dr. Maya.  She has a history of hypothyroidism not currently taking any thyroid supplements and recent labs were euthyroid.  She has a history of gastroesophageal reflux disease and there is mention of Barretts esophagus but the only documentation I could find was a patient results review from GI with a personal interpretation of the results as consistent with Barretts esophagus but it was not the report of the pathologist.  She had the elevated liver function tests which have returned to normal with the resolution of her cholangitis.   "She has osteopenia of multiple joints on Fosamax 70 mg weekly.  She is somewhat tired of medical evaluations and does wish to put off any further blood tests at this time.  She just had some extensive test for Dr. Granado and will be repeating those probably in about six months so we will defer until then and we could tie in and link the needed labs to another lab appointment.    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  No date: Blood transfusion  No date: Cataract      Comment:  Done OU  2009: Colon cancer  3/14/2017: Colon polyps  No date: GERD (gastroesophageal reflux disease)  2/24/2012: Glaucoma  8/27/2012: Nuclear sclerosis  No date: Osteoporosis  2/23/2020: Primary hypothyroidism  2/24/2012: Pseudophakia - Left Eye    Past Surgical History:  No date: APPENDECTOMY  No date: CATARACT EXTRACTION W/  INTRAOCULAR LENS IMPLANT; Left  No date: CATARACT EXTRACTION W/  INTRAOCULAR LENS IMPLANT; Right      Comment:  Dr Sawant  No date: COLON SURGERY      Comment:  resection  3/14/2017: COLONOSCOPY; N/A      Comment:  Procedure: COLONOSCOPY;  Surgeon: Killian Guerrier MD;                 Location: [facility];  Service: Endoscopy;  Laterality:                N/A;  03/14/2017: COLONOSCOPY      Comment:  Dr. Guerrier: hemorrhoids, one colon polyp removed, "Patent               functional end-to-end ileo-colonic anastomosis"with                healthy mucosa; biopsy: hyperplastic polyp; repeat in 3                years for surveillance  1/21/2020: COLONOSCOPY; N/A      Comment:  Procedure: COLONOSCOPY;  Surgeon: Timo Darling MD;               Location: [facility] ENDO;  Service: Endoscopy;  Laterality:                N/A;  7/8/2022: COLONOSCOPY; N/A      Comment:  Procedure: COLONOSCOPY;  Surgeon: Anaid Washington MD;               Location: [facility] ENDO;  Service: Endoscopy;  Laterality:                N/A;  No date: ECTOPIC PREGNANCY " SURGERY  7/31/2023: ERCP; N/A      Comment:  Procedure: ERCP (ENDOSCOPIC RETROGRADE                CHOLANGIOPANCREATOGRAPHY);  Surgeon: Nasim Cuadra MD;                Location: [facility] ENDO;  Service: Endoscopy;  Laterality:                N/A;  8/1/2023: ERCP; N/A      Comment:  Procedure: ERCP (ENDOSCOPIC RETROGRADE                CHOLANGIOPANCREATOGRAPHY);  Surgeon: Олег Arreaga MD;  Location: [facility] ENDO (2ND FLR);  Service: Endoscopy;                Laterality: N/A;  12/12/2023: ERCP; N/A      Comment:  Procedure: ERCP (ENDOSCOPIC RETROGRADE                CHOLANGIOPANCREATOGRAPHY);  Surgeon: Dimitri Anaya MD;                Location: [facility] ENDO (2ND FLR);  Service: Endoscopy;                 Laterality: N/A;  5/16/2019: ESOPHAGOGASTRODUODENOSCOPY; N/A      Comment:  Procedure: EGD (ESOPHAGOGASTRODUODENOSCOPY);  Surgeon:                Anaid Washington MD;  Location: [facility] ENDO;  Service:                Endoscopy;  Laterality: N/A;  7/11/2019: ESOPHAGOGASTRODUODENOSCOPY; N/A      Comment:  Procedure: EGD (ESOPHAGOGASTRODUODENOSCOPY);  Surgeon:                Anaid Washington MD;  Location: [facility] ENDO;  Service:                Endoscopy;  Laterality: N/A;  2/5/2021: ESOPHAGOGASTRODUODENOSCOPY; N/A      Comment:  Procedure: EGD (ESOPHAGOGASTRODUODENOSCOPY);  Surgeon:                Anaid Washington MD;  Location: [facility] ENDO;  Service:                Endoscopy;  Laterality: N/A;  11/11/2021: ESOPHAGOGASTRODUODENOSCOPY; N/A      Comment:  Procedure: EGD (ESOPHAGOGASTRODUODENOSCOPY);  Surgeon:                Anaid Washington MD;  Location: [facility] ENDO;  Service:                Endoscopy;  Laterality: N/A;  7/8/2022: ESOPHAGOGASTRODUODENOSCOPY; N/A      Comment:  Procedure: EGD (ESOPHAGOGASTRODUODENOSCOPY);  Surgeon:                Anaid Washington MD;  Location: [facility] ENDO;  Service:                Endoscopy;  Laterality: N/A;  4/12/2023: ESOPHAGOGASTRODUODENOSCOPY; N/A      Comment:   Procedure: EGD (ESOPHAGOGASTRODUODENOSCOPY);  Surgeon:                Anaid Washington MD;  Location: [facility];  Service:                Endoscopy;  Laterality: N/A;  No date: EYE SURGERY      Comment:  bilateral cataracts  No date: HYSTERECTOMY      Comment:  complete  No date: JOINT REPLACEMENT      Comment:  Liban Knee  No date: ROTATOR CUFF REPAIR; Right  No date: TOE SURGERY      Comment:  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  06/12/2017: UPPER GASTROINTESTINAL ENDOSCOPY      Comment:  Dr. Guerrier: gastritis and esophagitis, biopsy: chronic                gastritis, negative for h pylori, esophageal- positive                eosinophils, negative for barretts; repeat in 2 months  07/27/2017: UPPER GASTROINTESTINAL ENDOSCOPY      Comment:  Dr. Guerrier]    Review of patient's family history indicates:  Problem: Heart disease      Relation: Mother          Age of Onset: (Not Specified)          Comment: heart attack  Problem: Heart disease      Relation: Father          Age of Onset: (Not Specified)  Problem: Heart disease      Relation: Brother          Age of Onset: (Not Specified)          Comment: MI  Problem: Parkinsonism      Relation: Sister          Age of Onset: (Not Specified)  Problem: Arthritis      Relation: Sister          Age of Onset: (Not Specified)  Problem: No Known Problems      Relation: Brother          Age of Onset: (Not Specified)  Problem: No Known Problems      Relation: Brother          Age of Onset: (Not Specified)  Problem: Amblyopia      Relation: Neg Hx          Age of Onset: (Not Specified)  Problem: Blindness      Relation: Neg Hx          Age of Onset: (Not Specified)  Problem: Cancer      Relation: Neg Hx          Age of Onset: (Not Specified)  Problem: Cataracts      Relation: Neg Hx          Age of Onset: (Not Specified)  Problem: Glaucoma      Relation: Neg Hx          Age of Onset: (Not Specified)  Problem: Hypertension      Relation: Neg Hx          Age of Onset: (Not  Specified)  Problem: Macular degeneration      Relation: Neg Hx          Age of Onset: (Not Specified)  Problem: Retinal detachment      Relation: Neg Hx          Age of Onset: (Not Specified)  Problem: Strabismus      Relation: Neg Hx          Age of Onset: (Not Specified)  Problem: Stroke      Relation: Neg Hx          Age of Onset: (Not Specified)  Problem: Thyroid disease      Relation: Neg Hx          Age of Onset: (Not Specified)  Problem: Diabetes      Relation: Neg Hx          Age of Onset: (Not Specified)  Problem: Colon cancer      Relation: Neg Hx          Age of Onset: (Not Specified)  Problem: Crohn's disease      Relation: Neg Hx          Age of Onset: (Not Specified)  Problem: Esophageal cancer      Relation: Neg Hx          Age of Onset: (Not Specified)  Problem: Stomach cancer      Relation: Neg Hx          Age of Onset: (Not Specified)  Problem: Ulcerative colitis      Relation: Neg Hx          Age of Onset: (Not Specified)    Social History    Tobacco Use      Smoking status: Every Day        Packs/day: 0.00        Years: 0.3 packs/day for 65.0 years (21.5 ttl pk-yrs)        Types: Cigarettes        Start date: 10/5/1955        Last attempt to quit: 10/5/2020        Years since quitting: 3.3      Smokeless tobacco: Never      Tobacco comments: 3/1/23--states smokes 1-2 cigarettes a day    Alcohol use: Yes      Alcohol/week: 2.0 standard drinks of alcohol      Types: 2 Shots of liquor per week      Comment: Daily, about 4 drinks per day (crown)    Drug use: No    Current Outpatient Medications on File Prior to Visit:  acetaminophen (TYLENOL) 650 MG TbSR, Take 650 mg by mouth every 8 (eight) hours as needed (pain)., Disp: , Rfl:   alendronate (FOSAMAX) 70 MG tablet, TAKE 1 TABLET BY MOUTH EVERY 7 DAYS. ON EMPTY STOMACH IN MORNING, REMAIN UPRIGHT FOR 30 MINUTES., Disp: 12 tablet, Rfl: 0  ascorbic acid, vitamin C, (VITAMIN C) 500 MG tablet, Take 500 mg by mouth once daily., Disp: , Rfl:   b complex  vitamins capsule, Take 1 capsule by mouth once daily., Disp: , Rfl:   brimonidine 0.2% (ALPHAGAN) 0.2 % Drop, INSTILL 1 DROP INTO BOTH EYES 3 TIMES A DAY (Patient taking differently: Place 1 drop into both eyes 3 (three) times daily.), Disp: 10 mL, Rfl: 6  calcium carbonate-magnesium hydroxide (ROLAIDS) 550-110 mg Chew, Take 1 tablet by mouth 2 (two) times daily as needed (heartburn)., Disp: , Rfl:   dorzolamide-timolol 2-0.5% (COSOPT) 22.3-6.8 mg/mL ophthalmic solution, INSTILL 1 DROP INTO BOTH EYES TWICE A DAY, Disp: 10 mL, Rfl: 4  gabapentin (NEURONTIN) 300 MG capsule, TAKE 1 CAP IN THE AM, 1 CAP MIDDAY, AND 2 CAPS AT NIGHT, Disp: 360 capsule, Rfl: 3  latanoprost 0.005 % ophthalmic solution, INSTILL 1 DROP INTO BOTH EYES EVERY EVENING, Disp: 7.5 mL, Rfl: 3  mirtazapine (REMERON) 7.5 MG Tab, TAKE 1 TABLET BY MOUTH EVERY DAY IN THE EVENING, Disp: 90 tablet, Rfl: 0  omeprazole (PRILOSEC) 40 MG capsule, Take 1 capsule (40 mg total) by mouth every morning., Disp: 30 capsule, Rfl: 0  potassium chloride SA (K-DUR,KLOR-CON) 20 MEQ tablet, TAKE 1 TABLET BY MOUTH EVERY DAY, Disp: 90 tablet, Rfl: 3    No current facility-administered medications on file prior to visit.          Review of Systems   Constitutional:  Negative for chills, diaphoresis, fatigue, fever and unexpected weight change.   HENT:  Negative for congestion, ear pain, hearing loss, postnasal drip and sinus pressure.    Eyes:  Negative for itching and visual disturbance.   Respiratory:  Negative for cough, chest tightness, shortness of breath and wheezing.    Cardiovascular:  Negative for chest pain, palpitations and leg swelling.   Gastrointestinal:  Negative for abdominal pain, blood in stool, constipation, diarrhea, nausea and vomiting.   Genitourinary:  Negative for dysuria, frequency and hematuria.   Musculoskeletal:  Negative for arthralgias, back pain, joint swelling and myalgias.   Neurological:  Negative for dizziness, light-headedness and  headaches.   Hematological:  Negative for adenopathy.   Psychiatric/Behavioral:  Negative for sleep disturbance. The patient is not nervous/anxious.        Objective:      Physical Exam  Vitals and nursing note reviewed.   Constitutional:       General: She is not in acute distress.     Appearance: Normal appearance. She is well-developed and normal weight. She is not ill-appearing, toxic-appearing or diaphoretic.      Comments: Blood pressures are well inside the normal range but not excessively low with orthostatic pressures of 118/70 seated and 112/66 standing.  Normal weight for age with a BMI of 24.5 she is down 7.4 lb from her April 14, 2023 visit probably reflecting her recent illness   HENT:      Head: Normocephalic and atraumatic.      Right Ear: Tympanic membrane, ear canal and external ear normal. There is no impacted cerumen.      Left Ear: Tympanic membrane, ear canal and external ear normal. There is no impacted cerumen.      Nose: Nose normal. No congestion or rhinorrhea.      Mouth/Throat:      Mouth: Mucous membranes are moist.      Pharynx: Oropharynx is clear. No oropharyngeal exudate or posterior oropharyngeal erythema.   Eyes:      General: No scleral icterus.        Right eye: No discharge.         Left eye: No discharge.      Conjunctiva/sclera: Conjunctivae normal.      Pupils: Pupils are equal, round, and reactive to light.   Neck:      Thyroid: No thyromegaly.      Vascular: No carotid bruit or JVD.   Cardiovascular:      Rate and Rhythm: Normal rate and regular rhythm.      Heart sounds: Normal heart sounds. No murmur heard.     No friction rub. No gallop.   Pulmonary:      Effort: Pulmonary effort is normal. No respiratory distress.      Breath sounds: Normal breath sounds. No stridor. No wheezing, rhonchi or rales.   Chest:      Chest wall: No tenderness.   Abdominal:      General: Bowel sounds are normal. There is no distension.      Palpations: Abdomen is soft. There is no mass.       Tenderness: There is no abdominal tenderness. There is no guarding or rebound.      Hernia: No hernia is present.   Genitourinary:     Vagina: Normal. No vaginal discharge.   Musculoskeletal:         General: No swelling, tenderness, deformity or signs of injury. Normal range of motion.      Cervical back: Normal range of motion and neck supple. No rigidity or tenderness.      Right lower leg: Edema (1+) present.      Left lower leg: Edema (1+) present.   Lymphadenopathy:      Cervical: No cervical adenopathy.   Skin:     General: Skin is warm and dry.      Coloration: Skin is not jaundiced or pale.      Findings: No bruising, erythema, lesion or rash.   Neurological:      General: No focal deficit present.      Mental Status: She is alert and oriented to person, place, and time. Mental status is at baseline.      Cranial Nerves: No cranial nerve deficit.      Sensory: No sensory deficit.      Motor: No weakness.      Coordination: Coordination normal.      Gait: Gait normal.      Deep Tendon Reflexes: Reflexes are normal and symmetric. Reflexes normal.   Psychiatric:         Mood and Affect: Mood normal.         Behavior: Behavior normal.         Thought Content: Thought content normal.         Judgment: Judgment normal.         Assessment:       1. Preventative health care    2. Orthostatic dizziness    3. Osteopenia of multiple sites    4. Chemotherapy-induced peripheral neuropathy    5. Chronic angle-closure glaucoma of both eyes, unspecified glaucoma stage    6. Pulmonary emphysema, unspecified emphysema type    7. Calcified granuloma of lung    8. Abdominal aortic atherosclerosis    9. Stage 3a chronic kidney disease    10. Primary hypothyroidism    11. Multiple thyroid nodules    12. Daily consumption of alcohol    13. Prediabetes    14. BMI 24.0-24.9, adult        Plan:       1. Preventative health care    2. Orthostatic dizziness  Appears to be nearly completely resolved.  Discussed maintaining good  hydration and avoid getting up quickly    3. Osteopenia of multiple sites  Continue Fosamax 70 mg weekly.  Monitor renal function, if it decreases significantly we may have to discontinue the Fosamax    4. Chemotherapy-induced peripheral neuropathy  Stable    5. Chronic angle-closure glaucoma of both eyes, unspecified glaucoma stage  Followed by Ophthalmology, stable    6. Pulmonary emphysema, unspecified emphysema type  Continuing to smoke a proximally 1/3 pack per day and is not ready to quit    7. Calcified granuloma of lung  There are multiple calcified nodules in the left lung reported in CT of the chest dated July 1, 2022.  It also reports centrilobular emphysema and few scattered right pulmonary nodules.  A more recent CT of chest, abdomen, and pelvis done April 27, 2023 does not include any results from the chest portion of the test    8. Abdominal aortic atherosclerosis  Stable, try to maintain good cholesterol control, blood pressure control, and blood glucose control.    9. Stage 3a chronic kidney disease  BMP  Lab Results   Component Value Date     02/14/2024    K 4.7 02/14/2024     02/14/2024    CO2 24 02/14/2024    BUN 15 02/14/2024    CREATININE 1.1 02/14/2024    CALCIUM 9.3 02/14/2024    ANIONGAP 7 (L) 02/14/2024    EGFRNORACEVR 49 (A) 02/14/2024     Improving and approaching baseline levels prior to her illness    10. Primary hypothyroidism  Lab Results   Component Value Date    TSH 0.490 07/30/2023     Stable and normal without thyroid supplementation    11. Multiple thyroid nodules  Recent ultrasound of the thyroid dated February 22, 2024 ordered by Dr. Granado in oncology showed a number of nodules with one in the right upper lobe 1.2 cm in longest diameter was referred to as moderately suspicious and recommendation was for follow-up to monitor for enlargement.  A 2nd nodule in the left lower lobe was 2.5 cm in longest diameter and recommended as highly suspicious biopsy was recommended  unless outside prior biopsy results were available.  As of yet I see no orders resulting from this test from Dr. Granado.    12. Daily consumption of alcohol  Patient is not ready to quit drinking    13. Prediabetes  Lab Results   Component Value Date    HGBA1C 5.5 07/01/2022     Will try to obtain an A1c a little later on this year.  Patient is deferring it at this time.    14. BMI 24.0-24.9, adult  Good weight, no change needed
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
## 2024-03-01 ENCOUNTER — TELEPHONE (OUTPATIENT)
Dept: OPHTHALMOLOGY | Facility: CLINIC | Age: 89
End: 2024-03-01
Payer: MEDICARE

## 2024-03-04 ENCOUNTER — TELEPHONE (OUTPATIENT)
Dept: SURGERY | Facility: CLINIC | Age: 89
End: 2024-03-04
Payer: MEDICARE

## 2024-03-12 ENCOUNTER — TELEPHONE (OUTPATIENT)
Dept: HEMATOLOGY/ONCOLOGY | Facility: CLINIC | Age: 89
End: 2024-03-12
Payer: MEDICARE

## 2024-03-12 ENCOUNTER — TELEPHONE (OUTPATIENT)
Dept: SURGERY | Facility: CLINIC | Age: 89
End: 2024-03-12
Payer: MEDICARE

## 2024-03-12 DIAGNOSIS — Z85.038 PERSONAL HISTORY OF COLON CANCER, STAGE III: Primary | ICD-10-CM

## 2024-03-12 RX ORDER — DRONABINOL 5 MG/1
5 CAPSULE ORAL
Qty: 90 CAPSULE | Refills: 3 | Status: SHIPPED | OUTPATIENT
Start: 2024-03-12 | End: 2024-03-13 | Stop reason: SDUPTHER

## 2024-03-12 NOTE — TELEPHONE ENCOUNTER
Called and advised patient that Marinol medication sent to [facility] pharmacy. Patient verbalized understanding.     ----- Message from Blossom Miles sent at 3/12/2024  3:08 PM CDT -----  Regarding: Needs return call today  Type: Needs Medical Advice  Who Called:  Kenyettaadan Bill Call Back Number: [phone]    Additional Information: Pt states that dr wang was supposed to be putting in a medication for her to take for her appetite, please call to krystle

## 2024-03-13 DIAGNOSIS — Z85.038 PERSONAL HISTORY OF COLON CANCER, STAGE III: ICD-10-CM

## 2024-03-13 RX ORDER — DRONABINOL 5 MG/1
5 CAPSULE ORAL
Qty: 60 CAPSULE | Refills: 1 | Status: SHIPPED | OUTPATIENT
Start: 2024-03-13 | End: 2024-05-03

## 2024-03-14 ENCOUNTER — OFFICE VISIT (OUTPATIENT)
Dept: SURGERY | Facility: CLINIC | Age: 89
End: 2024-03-14
Payer: MEDICARE

## 2024-03-14 VITALS — SYSTOLIC BLOOD PRESSURE: 143 MMHG | TEMPERATURE: 98 F | HEART RATE: 87 BPM | DIASTOLIC BLOOD PRESSURE: 81 MMHG

## 2024-03-14 DIAGNOSIS — E04.2 MULTIPLE THYROID NODULES: Primary | ICD-10-CM

## 2024-03-14 PROCEDURE — 99213 OFFICE O/P EST LOW 20 MIN: CPT | Mod: S$GLB,,, | Performed by: STUDENT IN AN ORGANIZED HEALTH CARE EDUCATION/TRAINING PROGRAM

## 2024-03-14 PROCEDURE — 1159F MED LIST DOCD IN RCRD: CPT | Mod: CPTII,S$GLB,, | Performed by: STUDENT IN AN ORGANIZED HEALTH CARE EDUCATION/TRAINING PROGRAM

## 2024-03-14 PROCEDURE — 99999 PR PBB SHADOW E&M-EST. PATIENT-LVL III: CPT | Mod: PBBFAC,HCNC,, | Performed by: STUDENT IN AN ORGANIZED HEALTH CARE EDUCATION/TRAINING PROGRAM

## 2024-03-14 PROCEDURE — 1126F AMNT PAIN NOTED NONE PRSNT: CPT | Mod: CPTII,S$GLB,, | Performed by: STUDENT IN AN ORGANIZED HEALTH CARE EDUCATION/TRAINING PROGRAM

## 2024-03-14 PROCEDURE — 1157F ADVNC CARE PLAN IN RCRD: CPT | Mod: CPTII,S$GLB,, | Performed by: STUDENT IN AN ORGANIZED HEALTH CARE EDUCATION/TRAINING PROGRAM

## 2024-03-14 NOTE — PROGRESS NOTES
History & Physical    SUBJECTIVE:     History of Present Illness:  Patient is a 89 y.o. female presents with a history of colon cancer.  She has a history of thyroid nodules which were previously imaged in 2021 without detrimental interval change.  She re-presented with a follow-up ultrasound that shows a new TI-RADS 5 left lower lobe nodule that was previously not present.  No history of head or neck radiation, neck surgery, or family history of thyroid cancer.  No other personal history of cancer besides colon cancer.  No complaints.    Chief Complaint   Patient presents with    Consult       Review of patient's allergies indicates:   Allergen Reactions    Ace inhibitors Edema     Other reaction(s): Angioedema    Codeine Hives       Current Outpatient Medications   Medication Sig Dispense Refill    acetaminophen (TYLENOL) 650 MG TbSR Take 650 mg by mouth every 8 (eight) hours as needed (pain).      alendronate (FOSAMAX) 70 MG tablet TAKE 1 TABLET BY MOUTH EVERY 7 DAYS. ON EMPTY STOMACH IN MORNING, REMAIN UPRIGHT FOR 30 MINUTES. 12 tablet 0    ascorbic acid, vitamin C, (VITAMIN C) 500 MG tablet Take 500 mg by mouth once daily.      b complex vitamins capsule Take 1 capsule by mouth once daily.      brimonidine 0.2% (ALPHAGAN) 0.2 % Drop INSTILL 1 DROP INTO BOTH EYES 3 TIMES A DAY (Patient taking differently: Place 1 drop into both eyes 3 (three) times daily.) 10 mL 6    calcium carbonate-magnesium hydroxide (ROLAIDS) 550-110 mg Chew Take 1 tablet by mouth 2 (two) times daily as needed (heartburn).      dorzolamide-timolol 2-0.5% (COSOPT) 22.3-6.8 mg/mL ophthalmic solution INSTILL 1 DROP INTO BOTH EYES TWICE A DAY 10 mL 4    droNABinol (MARINOL) 5 MG capsule Take 1 capsule (5 mg total) by mouth 2 (two) times daily before meals. 60 capsule 1    gabapentin (NEURONTIN) 300 MG capsule TAKE 1 CAP IN THE AM, 1 CAP MIDDAY, AND 2 CAPS AT NIGHT 360 capsule 3    latanoprost 0.005 % ophthalmic solution INSTILL 1 DROP INTO BOTH  "EYES EVERY EVENING 7.5 mL 3    mirtazapine (REMERON) 7.5 MG Tab TAKE 1 TABLET BY MOUTH EVERY DAY IN THE EVENING 90 tablet 0    omeprazole (PRILOSEC) 40 MG capsule Take 1 capsule (40 mg total) by mouth every morning. 30 capsule 0    potassium chloride SA (K-DUR,KLOR-CON) 20 MEQ tablet TAKE 1 TABLET BY MOUTH EVERY DAY 90 tablet 3     No current facility-administered medications for this visit.       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    Anxiety     Arthritis     Escalante esophagus     Blood transfusion     Cataract     Done OU    Colon cancer 2009    Colon polyps 3/14/2017    GERD (gastroesophageal reflux disease)     Glaucoma 2/24/2012    Nuclear sclerosis 8/27/2012    Osteoporosis     Primary hypothyroidism 2/23/2020    Pseudophakia - Left Eye 2/24/2012     Past Surgical History:   Procedure Laterality Date    APPENDECTOMY      CATARACT EXTRACTION W/  INTRAOCULAR LENS IMPLANT Left     CATARACT EXTRACTION W/  INTRAOCULAR LENS IMPLANT Right     Dr Sawant    COLON SURGERY      resection    COLONOSCOPY N/A 3/14/2017    Procedure: COLONOSCOPY;  Surgeon: Killian Guerrier MD;  Location: [facility];  Service: Endoscopy;  Laterality: N/A;    COLONOSCOPY  03/14/2017    Dr. Guerrier: hemorrhoids, one colon polyp removed, "Patent functional end-to-end ileo-colonic anastomosis"with healthy mucosa; biopsy: hyperplastic polyp; repeat in 3 years for surveillance    COLONOSCOPY N/A 1/21/2020    Procedure: COLONOSCOPY;  Surgeon: Timo Darling MD;  Location: [facility];  Service: Endoscopy;  Laterality: N/A;    COLONOSCOPY N/A 7/8/2022    Procedure: COLONOSCOPY;  Surgeon: Anaid Washington MD;  Location: [facility];  Service: Endoscopy;  Laterality: N/A;    ECTOPIC PREGNANCY SURGERY      ERCP N/A 7/31/2023    Procedure: ERCP (ENDOSCOPIC RETROGRADE CHOLANGIOPANCREATOGRAPHY);  Surgeon: Nasim Cuadra MD;  Location: [facility];  Service: Endoscopy;  Laterality: N/A;    ERCP N/A 8/1/2023    " Procedure: ERCP (ENDOSCOPIC RETROGRADE CHOLANGIOPANCREATOGRAPHY);  Surgeon: Олег Arreaga MD;  Location: [facility] (2ND FLR);  Service: Endoscopy;  Laterality: N/A;    ERCP N/A 12/12/2023    Procedure: ERCP (ENDOSCOPIC RETROGRADE CHOLANGIOPANCREATOGRAPHY);  Surgeon: Dimitri Anaya MD;  Location: [facility] ENDO (2ND FLR);  Service: Endoscopy;  Laterality: N/A;    ESOPHAGOGASTRODUODENOSCOPY N/A 5/16/2019    Procedure: EGD (ESOPHAGOGASTRODUODENOSCOPY);  Surgeon: Anaid Washington MD;  Location: [facility];  Service: Endoscopy;  Laterality: N/A;    ESOPHAGOGASTRODUODENOSCOPY N/A 7/11/2019    Procedure: EGD (ESOPHAGOGASTRODUODENOSCOPY);  Surgeon: Anaid Washington MD;  Location: [facility];  Service: Endoscopy;  Laterality: N/A;    ESOPHAGOGASTRODUODENOSCOPY N/A 2/5/2021    Procedure: EGD (ESOPHAGOGASTRODUODENOSCOPY);  Surgeon: Anaid Washington MD;  Location: [facility];  Service: Endoscopy;  Laterality: N/A;    ESOPHAGOGASTRODUODENOSCOPY N/A 11/11/2021    Procedure: EGD (ESOPHAGOGASTRODUODENOSCOPY);  Surgeon: Anaid Washington MD;  Location: [facility];  Service: Endoscopy;  Laterality: N/A;    ESOPHAGOGASTRODUODENOSCOPY N/A 7/8/2022    Procedure: EGD (ESOPHAGOGASTRODUODENOSCOPY);  Surgeon: Anaid Washington MD;  Location: [facility];  Service: Endoscopy;  Laterality: N/A;    ESOPHAGOGASTRODUODENOSCOPY N/A 4/12/2023    Procedure: EGD (ESOPHAGOGASTRODUODENOSCOPY);  Surgeon: Anaid Washington MD;  Location: [facility];  Service: Endoscopy;  Laterality: N/A;    EYE SURGERY      bilateral cataracts    HYSTERECTOMY      complete    JOINT REPLACEMENT      Liban Knee    ROTATOR CUFF REPAIR Right     TOE SURGERY      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    UPPER GASTROINTESTINAL ENDOSCOPY  06/12/2017    Dr. Guerrier: gastritis and esophagitis, biopsy: chronic gastritis, negative for h pylori, esophageal- positive eosinophils, negative for barretts; repeat in 2 months    UPPER GASTROINTESTINAL ENDOSCOPY  07/27/2017      Chey     Family History   Problem Relation Age of Onset    Heart disease Mother         heart attack    Heart disease Father     Heart disease Brother         MI    Parkinsonism Sister     Arthritis Sister     No Known Problems Brother     No Known Problems Brother     Amblyopia Neg Hx     Blindness Neg Hx     Cancer Neg Hx     Cataracts Neg Hx     Glaucoma Neg Hx     Hypertension Neg Hx     Macular degeneration Neg Hx     Retinal detachment Neg Hx     Strabismus Neg Hx     Stroke Neg Hx     Thyroid disease Neg Hx     Diabetes Neg Hx     Colon cancer Neg Hx     Crohn's disease Neg Hx     Esophageal cancer Neg Hx     Stomach cancer Neg Hx     Ulcerative colitis Neg Hx      Social History     Tobacco Use    Smoking status: Every Day     Current packs/day: 0.00     Average packs/day: 0.3 packs/day for 65.0 years (21.5 ttl pk-yrs)     Types: Cigarettes     Start date: 10/5/1955     Last attempt to quit: 10/5/2020     Years since quitting: 3.4    Smokeless tobacco: Never    Tobacco comments:     3/1/23--states smokes 1-2 cigarettes a day   Substance Use Topics    Alcohol use: Yes     Alcohol/week: 2.0 standard drinks of alcohol     Types: 2 Shots of liquor per week     Comment: Daily, about 4 drinks per day (crown)    Drug use: No        Review of Systems:  Review of Systems   Constitutional:  Negative for fever.   HENT: Negative.     Eyes: Negative.    Respiratory: Negative.     Cardiovascular: Negative.    Gastrointestinal: Negative.    Endocrine: Negative.    Genitourinary: Negative.    Musculoskeletal: Negative.    Skin: Negative.    Allergic/Immunologic: Negative.    Neurological: Negative.    Hematological: Negative.    Psychiatric/Behavioral: Negative.         OBJECTIVE:     Vital Signs (Most Recent)  Temp: 97.5 °F (36.4 °C) (03/14/24 1127)  Pulse: 87 (03/14/24 1127)  BP: (!) 143/81 (03/14/24 1127)           Physical Exam:  Physical Exam  Constitutional:       General: She is not in acute distress.      Appearance: Normal appearance. She is not ill-appearing, toxic-appearing or diaphoretic.   HENT:      Head: Normocephalic.      Nose: Nose normal.   Eyes:      Conjunctiva/sclera: Conjunctivae normal.   Cardiovascular:      Rate and Rhythm: Normal rate and regular rhythm.   Pulmonary:      Effort: Pulmonary effort is normal.   Abdominal:      Palpations: Abdomen is soft.   Musculoskeletal:         General: Normal range of motion.      Cervical back: Normal range of motion.   Skin:     General: Skin is warm.   Neurological:      General: No focal deficit present.      Mental Status: She is alert.   Psychiatric:         Mood and Affect: Mood normal.         Laboratory  No TSH or T4    Diagnostic Results:  Ultrasound was reviewed.  There has a new left lower lobe thyroid nodule that is TI-RADS 5 and meets criteria for FNA.    ASSESSMENT/PLAN:     89-year-old female who looks well for her age who presents with a new left lower thyroid lobe nodule, TI-RADS 5.     PLAN:  Recommend FNA of the new thyroid nodule.  Patient will follow up with me after to discuss results.

## 2024-03-15 ENCOUNTER — TELEPHONE (OUTPATIENT)
Dept: RADIOLOGY | Facility: HOSPITAL | Age: 89
End: 2024-03-15

## 2024-03-15 NOTE — NURSING
Pt scheduled for us guided thyroid FNA.  Given arrival date and time of 3/28/24 at 1230.  Pt denies taking blood thinner or aspirin products.    Pt does NOT have to fast for procedure.      Pt verbalized understanding of above instructions.

## 2024-03-26 DIAGNOSIS — Z85.038 PERSONAL HISTORY OF COLON CANCER, STAGE III: Primary | ICD-10-CM

## 2024-03-28 ENCOUNTER — HOSPITAL ENCOUNTER (OUTPATIENT)
Dept: RADIOLOGY | Facility: HOSPITAL | Age: 89
Discharge: HOME | End: 2024-03-28
Attending: STUDENT IN AN ORGANIZED HEALTH CARE EDUCATION/TRAINING PROGRAM
Payer: MEDICARE

## 2024-03-28 DIAGNOSIS — E04.2 MULTIPLE THYROID NODULES: ICD-10-CM

## 2024-03-28 PROCEDURE — 88173 CYTOPATH EVAL FNA REPORT: CPT | Performed by: STUDENT IN AN ORGANIZED HEALTH CARE EDUCATION/TRAINING PROGRAM

## 2024-03-28 PROCEDURE — 25000003 PHARM REV CODE 250: Performed by: RADIOLOGY

## 2024-03-28 PROCEDURE — 10005 FNA BX W/US GDN 1ST LES: CPT

## 2024-03-28 PROCEDURE — 27000342 US FINE NEEDLE ASPIRATION THYROID, FIRST LESION

## 2024-03-28 RX ORDER — LIDOCAINE HYDROCHLORIDE 10 MG/ML
INJECTION, SOLUTION EPIDURAL; INFILTRATION; INTRACAUDAL; PERINEURAL
Status: COMPLETED | OUTPATIENT
Start: 2024-03-28 | End: 2024-03-28

## 2024-03-28 RX ADMIN — LIDOCAINE HYDROCHLORIDE 5 ML: 10 INJECTION, SOLUTION EPIDURAL; INFILTRATION; INTRACAUDAL; PERINEURAL at 01:03

## 2024-03-28 NOTE — PLAN OF CARE
Pt amb to us.  Awake, alert, cooperative.  ID x2 identifiers.  Procedure explained, informed consent obtained.  Pre procedure imaging initiated.

## 2024-03-28 NOTE — PLAN OF CARE
Right thyroid fna completed.  Band aid to right neck.  No bleeding noted. Pt given d/c instructions and verbalized understanding.  Pt amb out of dept with steady gait.

## 2024-04-02 ENCOUNTER — TELEPHONE (OUTPATIENT)
Dept: HEMATOLOGY/ONCOLOGY | Facility: CLINIC | Age: 89
End: 2024-04-02
Payer: MEDICARE

## 2024-04-11 ENCOUNTER — LAB VISIT (OUTPATIENT)
Dept: LAB | Facility: HOSPITAL | Age: 89
End: 2024-04-11
Attending: INTERNAL MEDICINE
Payer: MEDICARE

## 2024-04-11 ENCOUNTER — TELEPHONE (OUTPATIENT)
Dept: HEMATOLOGY/ONCOLOGY | Facility: CLINIC | Age: 89
End: 2024-04-11
Payer: MEDICARE

## 2024-04-11 DIAGNOSIS — Z85.038 PERSONAL HISTORY OF COLON CANCER, STAGE III: ICD-10-CM

## 2024-04-11 LAB
ALBUMIN SERPL BCP-MCNC: 3.6 G/DL (ref 3.5–5.2)
ALP SERPL-CCNC: 81 U/L (ref 55–135)
ALT SERPL W/O P-5'-P-CCNC: 8 U/L (ref 10–44)
ANION GAP SERPL CALC-SCNC: 6 MMOL/L (ref 8–16)
AST SERPL-CCNC: 19 U/L (ref 10–40)
BASOPHILS # BLD AUTO: 0.02 K/UL (ref 0–0.2)
BASOPHILS NFR BLD: 0.2 % (ref 0–1.9)
BILIRUB SERPL-MCNC: 0.4 MG/DL (ref 0.1–1)
BUN SERPL-MCNC: 13 MG/DL (ref 8–23)
CALCIUM SERPL-MCNC: 9.4 MG/DL (ref 8.7–10.5)
CHLORIDE SERPL-SCNC: 107 MMOL/L (ref 95–110)
CO2 SERPL-SCNC: 28 MMOL/L (ref 23–29)
CREAT SERPL-MCNC: 1 MG/DL (ref 0.5–1.4)
DIFFERENTIAL METHOD BLD: NORMAL
EOSINOPHIL # BLD AUTO: 0.1 K/UL (ref 0–0.5)
EOSINOPHIL NFR BLD: 1.5 % (ref 0–8)
ERYTHROCYTE [DISTWIDTH] IN BLOOD BY AUTOMATED COUNT: 13.3 % (ref 11.5–14.5)
EST. GFR  (NO RACE VARIABLE): 54.5 ML/MIN/1.73 M^2
GLUCOSE SERPL-MCNC: 99 MG/DL (ref 70–110)
HCT VFR BLD AUTO: 41.9 % (ref 37–48.5)
HGB BLD-MCNC: 13.5 G/DL (ref 12–16)
IMM GRANULOCYTES # BLD AUTO: 0.02 K/UL (ref 0–0.04)
IMM GRANULOCYTES NFR BLD AUTO: 0.2 % (ref 0–0.5)
LYMPHOCYTES # BLD AUTO: 3.8 K/UL (ref 1–4.8)
LYMPHOCYTES NFR BLD: 40.6 % (ref 18–48)
MCH RBC QN AUTO: 30.8 PG (ref 27–31)
MCHC RBC AUTO-ENTMCNC: 32.2 G/DL (ref 32–36)
MCV RBC AUTO: 95 FL (ref 82–98)
MONOCYTES # BLD AUTO: 0.8 K/UL (ref 0.3–1)
MONOCYTES NFR BLD: 8.3 % (ref 4–15)
NEUTROPHILS # BLD AUTO: 4.6 K/UL (ref 1.8–7.7)
NEUTROPHILS NFR BLD: 49.2 % (ref 38–73)
NRBC BLD-RTO: 0 /100 WBC
PLATELET # BLD AUTO: 184 K/UL (ref 150–450)
PMV BLD AUTO: 9.5 FL (ref 9.2–12.9)
POTASSIUM SERPL-SCNC: 4.6 MMOL/L (ref 3.5–5.1)
PROT SERPL-MCNC: 6.8 G/DL (ref 6–8.4)
RBC # BLD AUTO: 4.39 M/UL (ref 4–5.4)
SODIUM SERPL-SCNC: 141 MMOL/L (ref 136–145)
WBC # BLD AUTO: 9.29 K/UL (ref 3.9–12.7)

## 2024-04-11 PROCEDURE — 85025 COMPLETE CBC W/AUTO DIFF WBC: CPT | Performed by: INTERNAL MEDICINE

## 2024-04-11 PROCEDURE — 80053 COMPREHEN METABOLIC PANEL: CPT | Performed by: INTERNAL MEDICINE

## 2024-04-11 PROCEDURE — 36415 COLL VENOUS BLD VENIPUNCTURE: CPT | Performed by: INTERNAL MEDICINE

## 2024-04-11 NOTE — TELEPHONE ENCOUNTER
Lft msg to notify lab missing for appt 04/12/24 and will need to complete before appt w/Dr Granado.

## 2024-04-12 ENCOUNTER — OFFICE VISIT (OUTPATIENT)
Dept: HEMATOLOGY/ONCOLOGY | Facility: CLINIC | Age: 89
End: 2024-04-12
Payer: MEDICARE

## 2024-04-12 VITALS
HEART RATE: 74 BPM | RESPIRATION RATE: 12 BRPM | SYSTOLIC BLOOD PRESSURE: 147 MMHG | HEIGHT: 63 IN | DIASTOLIC BLOOD PRESSURE: 67 MMHG | TEMPERATURE: 97 F | OXYGEN SATURATION: 95 % | WEIGHT: 139.75 LBS | BODY MASS INDEX: 24.76 KG/M2

## 2024-04-12 DIAGNOSIS — Z85.038 HISTORY OF COLON CANCER: ICD-10-CM

## 2024-04-12 DIAGNOSIS — E53.8 B12 DEFICIENCY: Primary | ICD-10-CM

## 2024-04-12 DIAGNOSIS — E04.2 MULTIPLE THYROID NODULES: ICD-10-CM

## 2024-04-12 DIAGNOSIS — E87.6 HYPOKALEMIA: ICD-10-CM

## 2024-04-12 DIAGNOSIS — D75.89 MACROCYTOSIS: ICD-10-CM

## 2024-04-12 PROCEDURE — 1157F ADVNC CARE PLAN IN RCRD: CPT | Mod: CPTII,S$GLB,, | Performed by: INTERNAL MEDICINE

## 2024-04-12 PROCEDURE — 99214 OFFICE O/P EST MOD 30 MIN: CPT | Mod: S$GLB,,, | Performed by: INTERNAL MEDICINE

## 2024-04-12 PROCEDURE — 1126F AMNT PAIN NOTED NONE PRSNT: CPT | Mod: CPTII,S$GLB,, | Performed by: INTERNAL MEDICINE

## 2024-04-12 PROCEDURE — 1159F MED LIST DOCD IN RCRD: CPT | Mod: CPTII,S$GLB,, | Performed by: INTERNAL MEDICINE

## 2024-04-12 PROCEDURE — 99999 PR PBB SHADOW E&M-EST. PATIENT-LVL III: CPT | Mod: PBBFAC,,, | Performed by: INTERNAL MEDICINE
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
## 2024-04-15 ENCOUNTER — OFFICE VISIT (OUTPATIENT)
Dept: SURGERY | Facility: CLINIC | Age: 89
End: 2024-04-15
Payer: MEDICARE

## 2024-04-15 VITALS — BODY MASS INDEX: 24.76 KG/M2 | WEIGHT: 139.75 LBS | HEIGHT: 63 IN

## 2024-04-15 DIAGNOSIS — E04.1 THYROID NODULE: Primary | ICD-10-CM

## 2024-04-15 PROCEDURE — 1126F AMNT PAIN NOTED NONE PRSNT: CPT | Mod: CPTII,S$GLB,, | Performed by: STUDENT IN AN ORGANIZED HEALTH CARE EDUCATION/TRAINING PROGRAM

## 2024-04-15 PROCEDURE — 1159F MED LIST DOCD IN RCRD: CPT | Mod: CPTII,S$GLB,, | Performed by: STUDENT IN AN ORGANIZED HEALTH CARE EDUCATION/TRAINING PROGRAM

## 2024-04-15 PROCEDURE — 1157F ADVNC CARE PLAN IN RCRD: CPT | Mod: CPTII,S$GLB,, | Performed by: STUDENT IN AN ORGANIZED HEALTH CARE EDUCATION/TRAINING PROGRAM

## 2024-04-15 PROCEDURE — 99213 OFFICE O/P EST LOW 20 MIN: CPT | Mod: S$GLB,,, | Performed by: STUDENT IN AN ORGANIZED HEALTH CARE EDUCATION/TRAINING PROGRAM

## 2024-04-15 PROCEDURE — 99999 PR PBB SHADOW E&M-EST. PATIENT-LVL II: CPT | Mod: PBBFAC,,, | Performed by: STUDENT IN AN ORGANIZED HEALTH CARE EDUCATION/TRAINING PROGRAM

## 2024-04-15 NOTE — PROGRESS NOTES
History & Physical    SUBJECTIVE:     History of Present Illness:  Patient is a 89 y.o. female presents with a history of colon cancer.  She has a history of thyroid nodules which were previously imaged in 2021 without detrimental interval change.  She re-presented with a follow-up ultrasound that shows a new TI-RADS 5 left lower lobe nodule that was previously not present.  No history of head or neck radiation, neck surgery, or family history of thyroid cancer.  No other personal history of cancer besides colon cancer.  No complaints.    Interval history:   No significant changes.  Patient presents after FNA.    Chief Complaint   Patient presents with    Follow-up       Review of patient's allergies indicates:   Allergen Reactions    Ace inhibitors Edema     Other reaction(s): Angioedema    Codeine Hives       Current Outpatient Medications   Medication Sig Dispense Refill    acetaminophen (TYLENOL) 650 MG TbSR Take 650 mg by mouth every 8 (eight) hours as needed (pain).      alendronate (FOSAMAX) 70 MG tablet TAKE 1 TABLET BY MOUTH EVERY 7 DAYS. ON EMPTY STOMACH IN MORNING, REMAIN UPRIGHT FOR 30 MINUTES. 12 tablet 0    ascorbic acid, vitamin C, (VITAMIN C) 500 MG tablet Take 500 mg by mouth once daily.      b complex vitamins capsule Take 1 capsule by mouth once daily.      brimonidine 0.2% (ALPHAGAN) 0.2 % Drop INSTILL 1 DROP INTO BOTH EYES 3 TIMES A DAY (Patient taking differently: Place 1 drop into both eyes 3 (three) times daily.) 10 mL 6    calcium carbonate-magnesium hydroxide (ROLAIDS) 550-110 mg Chew Take 1 tablet by mouth 2 (two) times daily as needed (heartburn).      dorzolamide-timolol 2-0.5% (COSOPT) 22.3-6.8 mg/mL ophthalmic solution INSTILL 1 DROP INTO BOTH EYES TWICE A DAY 10 mL 4    droNABinol (MARINOL) 5 MG capsule Take 1 capsule (5 mg total) by mouth 2 (two) times daily before meals. 60 capsule 1    gabapentin (NEURONTIN) 300 MG capsule TAKE 1 CAP IN THE AM, 1 CAP MIDDAY, AND 2 CAPS AT NIGHT 360  "capsule 3    latanoprost 0.005 % ophthalmic solution INSTILL 1 DROP INTO BOTH EYES EVERY EVENING 7.5 mL 3    mirtazapine (REMERON) 7.5 MG Tab TAKE 1 TABLET BY MOUTH EVERY DAY IN THE EVENING 90 tablet 0    omeprazole (PRILOSEC) 40 MG capsule Take 1 capsule (40 mg total) by mouth every morning. 30 capsule 0    potassium chloride SA (K-DUR,KLOR-CON) 20 MEQ tablet TAKE 1 TABLET BY MOUTH EVERY DAY 90 tablet 3     No current facility-administered medications for this visit.       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    Anxiety     Arthritis     Escalante esophagus     Blood transfusion     Cataract     Done OU    Colon cancer 2009    Colon polyps 3/14/2017    GERD (gastroesophageal reflux disease)     Glaucoma 2/24/2012    Nuclear sclerosis 8/27/2012    Osteoporosis     Primary hypothyroidism 2/23/2020    Pseudophakia - Left Eye 2/24/2012     Past Surgical History:   Procedure Laterality Date    APPENDECTOMY      CATARACT EXTRACTION W/  INTRAOCULAR LENS IMPLANT Left     CATARACT EXTRACTION W/  INTRAOCULAR LENS IMPLANT Right     Dr Sawant    COLON SURGERY      resection    COLONOSCOPY N/A 3/14/2017    Procedure: COLONOSCOPY;  Surgeon: Killian Guerrier MD;  Location: [facility];  Service: Endoscopy;  Laterality: N/A;    COLONOSCOPY  03/14/2017    Dr. Guerrier: hemorrhoids, one colon polyp removed, "Patent functional end-to-end ileo-colonic anastomosis"with healthy mucosa; biopsy: hyperplastic polyp; repeat in 3 years for surveillance    COLONOSCOPY N/A 1/21/2020    Procedure: COLONOSCOPY;  Surgeon: Timo Darling MD;  Location: [facility];  Service: Endoscopy;  Laterality: N/A;    COLONOSCOPY N/A 7/8/2022    Procedure: COLONOSCOPY;  Surgeon: Anaid Washington MD;  Location: [facility];  Service: Endoscopy;  Laterality: N/A;    ECTOPIC PREGNANCY SURGERY      ERCP N/A 7/31/2023    Procedure: ERCP (ENDOSCOPIC RETROGRADE CHOLANGIOPANCREATOGRAPHY);  Surgeon: Nasim Cuadra MD;  Location: " [facility] ENDO;  Service: Endoscopy;  Laterality: N/A;    ERCP N/A 8/1/2023    Procedure: ERCP (ENDOSCOPIC RETROGRADE CHOLANGIOPANCREATOGRAPHY);  Surgeon: Олег Arreaga MD;  Location: [facility] ENDO (2ND FLR);  Service: Endoscopy;  Laterality: N/A;    ERCP N/A 12/12/2023    Procedure: ERCP (ENDOSCOPIC RETROGRADE CHOLANGIOPANCREATOGRAPHY);  Surgeon: Dimitri Anaya MD;  Location: [facility] ENDO (2ND FLR);  Service: Endoscopy;  Laterality: N/A;    ESOPHAGOGASTRODUODENOSCOPY N/A 5/16/2019    Procedure: EGD (ESOPHAGOGASTRODUODENOSCOPY);  Surgeon: Anaid Washington MD;  Location: [facility];  Service: Endoscopy;  Laterality: N/A;    ESOPHAGOGASTRODUODENOSCOPY N/A 7/11/2019    Procedure: EGD (ESOPHAGOGASTRODUODENOSCOPY);  Surgeon: Anaid Washington MD;  Location: [facility] ENDO;  Service: Endoscopy;  Laterality: N/A;    ESOPHAGOGASTRODUODENOSCOPY N/A 2/5/2021    Procedure: EGD (ESOPHAGOGASTRODUODENOSCOPY);  Surgeon: Anaid Washington MD;  Location: [facility] ENDO;  Service: Endoscopy;  Laterality: N/A;    ESOPHAGOGASTRODUODENOSCOPY N/A 11/11/2021    Procedure: EGD (ESOPHAGOGASTRODUODENOSCOPY);  Surgeon: Anaid Washington MD;  Location: [facility] ENDO;  Service: Endoscopy;  Laterality: N/A;    ESOPHAGOGASTRODUODENOSCOPY N/A 7/8/2022    Procedure: EGD (ESOPHAGOGASTRODUODENOSCOPY);  Surgeon: Anaid Washington MD;  Location: [facility] ENDO;  Service: Endoscopy;  Laterality: N/A;    ESOPHAGOGASTRODUODENOSCOPY N/A 4/12/2023    Procedure: EGD (ESOPHAGOGASTRODUODENOSCOPY);  Surgeon: Anaid Washington MD;  Location: [facility] ENDO;  Service: Endoscopy;  Laterality: N/A;    EYE SURGERY      bilateral cataracts    HYSTERECTOMY      complete    JOINT REPLACEMENT      Liban Knee    ROTATOR CUFF REPAIR Right     TOE SURGERY      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    UPPER GASTROINTESTINAL ENDOSCOPY  06/12/2017    Dr. Guerrier: gastritis and esophagitis, biopsy: chronic gastritis, negative for h pylori, esophageal- positive eosinophils, negative for barretts;  "repeat in 2 months    UPPER GASTROINTESTINAL ENDOSCOPY  07/27/2017    Dr. Guerrier     Family History   Problem Relation Name Age of Onset    Heart disease Mother          heart attack    Heart disease Father      Heart disease Brother          MI    Parkinsonism Sister      Arthritis Sister      No Known Problems Brother      No Known Problems Brother      Amblyopia Neg Hx      Blindness Neg Hx      Cancer Neg Hx      Cataracts Neg Hx      Glaucoma Neg Hx      Hypertension Neg Hx      Macular degeneration Neg Hx      Retinal detachment Neg Hx      Strabismus Neg Hx      Stroke Neg Hx      Thyroid disease Neg Hx      Diabetes Neg Hx      Colon cancer Neg Hx      Crohn's disease Neg Hx      Esophageal cancer Neg Hx      Stomach cancer Neg Hx      Ulcerative colitis Neg Hx       Social History     Tobacco Use    Smoking status: Every Day     Current packs/day: 0.00     Average packs/day: 0.3 packs/day for 65.0 years (21.5 ttl pk-yrs)     Types: Cigarettes     Start date: 10/5/1955     Last attempt to quit: 10/5/2020     Years since quitting: 3.5    Smokeless tobacco: Never    Tobacco comments:     3/1/23--states smokes 1-2 cigarettes a day   Substance Use Topics    Alcohol use: Yes     Alcohol/week: 2.0 standard drinks of alcohol     Types: 2 Shots of liquor per week     Comment: Daily, about 4 drinks per day (crown)    Drug use: No        Review of Systems:  Review of Systems   Constitutional:  Negative for fever.   HENT: Negative.     Eyes: Negative.    Respiratory: Negative.     Cardiovascular: Negative.    Gastrointestinal: Negative.    Endocrine: Negative.    Genitourinary: Negative.    Musculoskeletal: Negative.    Skin: Negative.    Allergic/Immunologic: Negative.    Neurological: Negative.    Hematological: Negative.    Psychiatric/Behavioral: Negative.         OBJECTIVE:     Vital Signs (Most Recent)     5' 3" (1.6 m)  63.4 kg (139 lb 12.4 oz)     Physical Exam:  Physical Exam  Constitutional:       General: She " is not in acute distress.     Appearance: Normal appearance. She is not ill-appearing, toxic-appearing or diaphoretic.   HENT:      Head: Normocephalic.      Nose: Nose normal.   Eyes:      Conjunctiva/sclera: Conjunctivae normal.   Cardiovascular:      Rate and Rhythm: Normal rate and regular rhythm.   Pulmonary:      Effort: Pulmonary effort is normal.   Abdominal:      Palpations: Abdomen is soft.   Musculoskeletal:         General: Normal range of motion.      Cervical back: Normal range of motion.   Skin:     General: Skin is warm.   Neurological:      General: No focal deficit present.      Mental Status: She is alert.   Psychiatric:         Mood and Affect: Mood normal.         Laboratory  No TSH or T4    Diagnostic Results:  Ultrasound was reviewed.  There has a new left lower lobe thyroid nodule that is TI-RADS 5 and meets criteria for FNA.    Pathology from FNA was benign    ASSESSMENT/PLAN:     89-year-old female who looks well for her age who presents with a new left lower thyroid lobe nodule, TI-RADS 5.     FNA shows benign pathology    PLAN:  Follow up with me as needed.

## 2024-04-24 LAB
CYTOLOGY TISS FNA DOC CYTO: NORMAL
DX ICD CODE: NORMAL
FNA PERFORMED BY: NORMAL
PATH REPORT.SITE OF ORIGIN SPEC: NORMAL
PATH REPORT.SITE OF ORIGIN SPEC: NORMAL
PATHOLOGIST NAME: NORMAL

## 2024-04-25 DIAGNOSIS — H40.1131 PRIMARY OPEN ANGLE GLAUCOMA (POAG) OF BOTH EYES, MILD STAGE: ICD-10-CM

## 2024-04-25 RX ORDER — DORZOLAMIDE HYDROCHLORIDE AND TIMOLOL MALEATE 20; 5 MG/ML; MG/ML
SOLUTION/ DROPS OPHTHALMIC
Qty: 10 ML | Refills: 4 | Status: SHIPPED | OUTPATIENT
Start: 2024-04-25

## 2024-05-02 ENCOUNTER — LAB VISIT (OUTPATIENT)
Dept: LAB | Facility: HOSPITAL | Age: 89
End: 2024-05-02
Attending: INTERNAL MEDICINE
Payer: MEDICARE

## 2024-05-02 DIAGNOSIS — E53.8 B12 DEFICIENCY: ICD-10-CM

## 2024-05-02 DIAGNOSIS — E04.2 MULTIPLE THYROID NODULES: ICD-10-CM

## 2024-05-02 DIAGNOSIS — Z85.038 HISTORY OF COLON CANCER: ICD-10-CM

## 2024-05-02 LAB
ALBUMIN SERPL BCP-MCNC: 3.5 G/DL (ref 3.5–5.2)
ALP SERPL-CCNC: 73 U/L (ref 55–135)
ALT SERPL W/O P-5'-P-CCNC: 8 U/L (ref 10–44)
ANION GAP SERPL CALC-SCNC: 4 MMOL/L (ref 8–16)
AST SERPL-CCNC: 17 U/L (ref 10–40)
BASOPHILS # BLD AUTO: 0.03 K/UL (ref 0–0.2)
BASOPHILS NFR BLD: 0.4 % (ref 0–1.9)
BILIRUB SERPL-MCNC: 0.4 MG/DL (ref 0.1–1)
BUN SERPL-MCNC: 17 MG/DL (ref 8–23)
CALCIUM SERPL-MCNC: 9.2 MG/DL (ref 8.7–10.5)
CHLORIDE SERPL-SCNC: 108 MMOL/L (ref 95–110)
CO2 SERPL-SCNC: 28 MMOL/L (ref 23–29)
CREAT SERPL-MCNC: 1.2 MG/DL (ref 0.5–1.4)
DIFFERENTIAL METHOD BLD: ABNORMAL
EOSINOPHIL # BLD AUTO: 0.2 K/UL (ref 0–0.5)
EOSINOPHIL NFR BLD: 2 % (ref 0–8)
ERYTHROCYTE [DISTWIDTH] IN BLOOD BY AUTOMATED COUNT: 13.5 % (ref 11.5–14.5)
EST. GFR  (NO RACE VARIABLE): 43.8 ML/MIN/1.73 M^2
GLUCOSE SERPL-MCNC: 95 MG/DL (ref 70–110)
HCT VFR BLD AUTO: 41.5 % (ref 37–48.5)
HGB BLD-MCNC: 13.3 G/DL (ref 12–16)
IMM GRANULOCYTES # BLD AUTO: 0.01 K/UL (ref 0–0.04)
IMM GRANULOCYTES NFR BLD AUTO: 0.1 % (ref 0–0.5)
LYMPHOCYTES # BLD AUTO: 3.6 K/UL (ref 1–4.8)
LYMPHOCYTES NFR BLD: 43.6 % (ref 18–48)
MCH RBC QN AUTO: 31.2 PG (ref 27–31)
MCHC RBC AUTO-ENTMCNC: 32 G/DL (ref 32–36)
MCV RBC AUTO: 97 FL (ref 82–98)
MONOCYTES # BLD AUTO: 0.7 K/UL (ref 0.3–1)
MONOCYTES NFR BLD: 8.3 % (ref 4–15)
NEUTROPHILS # BLD AUTO: 3.7 K/UL (ref 1.8–7.7)
NEUTROPHILS NFR BLD: 45.6 % (ref 38–73)
NRBC BLD-RTO: 0 /100 WBC
PLATELET # BLD AUTO: 201 K/UL (ref 150–450)
PMV BLD AUTO: 9.5 FL (ref 9.2–12.9)
POTASSIUM SERPL-SCNC: 4.8 MMOL/L (ref 3.5–5.1)
PROT SERPL-MCNC: 6.6 G/DL (ref 6–8.4)
RBC # BLD AUTO: 4.26 M/UL (ref 4–5.4)
SODIUM SERPL-SCNC: 140 MMOL/L (ref 136–145)
WBC # BLD AUTO: 8.18 K/UL (ref 3.9–12.7)

## 2024-05-02 PROCEDURE — 85025 COMPLETE CBC W/AUTO DIFF WBC: CPT | Performed by: INTERNAL MEDICINE

## 2024-05-02 PROCEDURE — 80053 COMPREHEN METABOLIC PANEL: CPT | Performed by: INTERNAL MEDICINE

## 2024-05-02 PROCEDURE — 36415 COLL VENOUS BLD VENIPUNCTURE: CPT | Performed by: INTERNAL MEDICINE

## 2024-05-03 ENCOUNTER — OFFICE VISIT (OUTPATIENT)
Dept: HEMATOLOGY/ONCOLOGY | Facility: CLINIC | Age: 89
End: 2024-05-03
Payer: MEDICARE

## 2024-05-03 VITALS
SYSTOLIC BLOOD PRESSURE: 138 MMHG | DIASTOLIC BLOOD PRESSURE: 66 MMHG | WEIGHT: 142 LBS | HEIGHT: 63 IN | HEART RATE: 68 BPM | OXYGEN SATURATION: 96 % | RESPIRATION RATE: 14 BRPM | BODY MASS INDEX: 25.16 KG/M2 | TEMPERATURE: 97 F

## 2024-05-03 DIAGNOSIS — D50.0 IRON DEFICIENCY ANEMIA DUE TO CHRONIC BLOOD LOSS: Primary | ICD-10-CM

## 2024-05-03 DIAGNOSIS — Z85.038 PERSONAL HISTORY OF COLON CANCER, STAGE III: ICD-10-CM

## 2024-05-03 DIAGNOSIS — T45.1X5A CHEMOTHERAPY-INDUCED PERIPHERAL NEUROPATHY: ICD-10-CM

## 2024-05-03 DIAGNOSIS — G62.9 NEUROPATHY: Primary | ICD-10-CM

## 2024-05-03 DIAGNOSIS — E53.8 B12 DEFICIENCY: ICD-10-CM

## 2024-05-03 DIAGNOSIS — G62.0 CHEMOTHERAPY-INDUCED PERIPHERAL NEUROPATHY: ICD-10-CM

## 2024-05-03 PROCEDURE — 1157F ADVNC CARE PLAN IN RCRD: CPT | Mod: CPTII,S$GLB,, | Performed by: INTERNAL MEDICINE

## 2024-05-03 PROCEDURE — 1159F MED LIST DOCD IN RCRD: CPT | Mod: CPTII,S$GLB,, | Performed by: INTERNAL MEDICINE

## 2024-05-03 PROCEDURE — 99999 PR PBB SHADOW E&M-EST. PATIENT-LVL IV: CPT | Mod: PBBFAC,,, | Performed by: INTERNAL MEDICINE

## 2024-05-03 PROCEDURE — 99214 OFFICE O/P EST MOD 30 MIN: CPT | Mod: S$GLB,,, | Performed by: INTERNAL MEDICINE

## 2024-05-03 PROCEDURE — 1125F AMNT PAIN NOTED PAIN PRSNT: CPT | Mod: CPTII,S$GLB,, | Performed by: INTERNAL MEDICINE

## 2024-05-03 PROCEDURE — 1101F PT FALLS ASSESS-DOCD LE1/YR: CPT | Mod: CPTII,S$GLB,, | Performed by: INTERNAL MEDICINE

## 2024-05-03 PROCEDURE — 3288F FALL RISK ASSESSMENT DOCD: CPT | Mod: CPTII,S$GLB,, | Performed by: INTERNAL MEDICINE

## 2024-05-03 RX ORDER — CYPROHEPTADINE HYDROCHLORIDE 4 MG/1
4 TABLET ORAL 3 TIMES DAILY PRN
Qty: 50 TABLET | Refills: 3 | Status: SHIPPED | OUTPATIENT
Start: 2024-05-03
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
## 2024-05-06 ENCOUNTER — TELEPHONE (OUTPATIENT)
Dept: NEUROLOGY | Facility: CLINIC | Age: 89
End: 2024-05-06
Payer: MEDICARE

## 2024-05-06 NOTE — TELEPHONE ENCOUNTER
Pt has a referral for neuropathy, but is already established with Naty Beckwith for that problem.  Appt scheduled 5/8/2 at 1430.

## 2024-05-08 ENCOUNTER — OFFICE VISIT (OUTPATIENT)
Dept: NEUROLOGY | Facility: CLINIC | Age: 89
End: 2024-05-08
Payer: MEDICARE

## 2024-05-08 VITALS
RESPIRATION RATE: 18 BRPM | HEIGHT: 63 IN | HEART RATE: 78 BPM | DIASTOLIC BLOOD PRESSURE: 72 MMHG | WEIGHT: 139.56 LBS | SYSTOLIC BLOOD PRESSURE: 160 MMHG | BODY MASS INDEX: 24.73 KG/M2

## 2024-05-08 DIAGNOSIS — G62.9 NEUROPATHY: ICD-10-CM

## 2024-05-08 DIAGNOSIS — G62.0 CHEMOTHERAPY-INDUCED PERIPHERAL NEUROPATHY: Primary | ICD-10-CM

## 2024-05-08 DIAGNOSIS — T45.1X5A CHEMOTHERAPY-INDUCED PERIPHERAL NEUROPATHY: Primary | ICD-10-CM

## 2024-05-08 PROCEDURE — 99999 PR PBB SHADOW E&M-EST. PATIENT-LVL V: CPT | Mod: PBBFAC,HCNC,, | Performed by: NURSE PRACTITIONER

## 2024-05-08 PROCEDURE — 1157F ADVNC CARE PLAN IN RCRD: CPT | Mod: HCNC,CPTII,S$GLB, | Performed by: NURSE PRACTITIONER

## 2024-05-08 PROCEDURE — 1126F AMNT PAIN NOTED NONE PRSNT: CPT | Mod: HCNC,CPTII,S$GLB, | Performed by: NURSE PRACTITIONER

## 2024-05-08 PROCEDURE — 99214 OFFICE O/P EST MOD 30 MIN: CPT | Mod: HCNC,S$GLB,, | Performed by: NURSE PRACTITIONER

## 2024-05-08 PROCEDURE — 1159F MED LIST DOCD IN RCRD: CPT | Mod: HCNC,CPTII,S$GLB, | Performed by: NURSE PRACTITIONER

## 2024-05-08 PROCEDURE — 3288F FALL RISK ASSESSMENT DOCD: CPT | Mod: HCNC,CPTII,S$GLB, | Performed by: NURSE PRACTITIONER

## 2024-05-08 PROCEDURE — 1160F RVW MEDS BY RX/DR IN RCRD: CPT | Mod: HCNC,CPTII,S$GLB, | Performed by: NURSE PRACTITIONER

## 2024-05-08 PROCEDURE — 1101F PT FALLS ASSESS-DOCD LE1/YR: CPT | Mod: HCNC,CPTII,S$GLB, | Performed by: NURSE PRACTITIONER

## 2024-05-08 RX ORDER — GABAPENTIN 300 MG/1
CAPSULE ORAL
Qty: 360 CAPSULE | Refills: 3 | Status: SHIPPED | OUTPATIENT
Start: 2024-05-08

## 2024-05-08 NOTE — ASSESSMENT & PLAN NOTE
Patient is a 88 y/o female that presents for neuropathy follow up.   Received adjuvant folfox post colorectal CA dx ~ 2009 and has suffered neuropathy symptoms ever since.   She reports only numbness and very mild tingling to her feet and hands. She denies burning sensation, pain or weakness.   She has been maintained on Gabapentin for many years   Gabapentin dose previously increased to 300, 300, 600 for increased tingling to feet at night.  Pt reports stability of symptoms  -  Continue!  Educated patient again on the role of Gabapentin and how this medication does not aid with numbness but rather helps with tingling, pain etc.   Recent Crt WNL  Encouraged OTC creams and supplements for breakthrough symptoms   Compound cream did not offer benefit  If symptoms dont hold stable then could ultimately try Lyrica instead of Gabapentin but seems as though gabapentin is aiding with symptoms

## 2024-05-08 NOTE — PATIENT INSTRUCTIONS
You can try taking Beragmot oil and/or alpha lipoic acid daily along with applying vicks vapor rub to hands and feet.

## 2024-05-08 NOTE — PROGRESS NOTES
NEUROLOGY  Outpatient Follow Up    [facility]  [address] [facility], [place] [address]  [phone] (office) / [phone] (fax)    Patient Name:  Kenyetta Andersen  :  1935  MR #:  [id number]  Acct #:  [id number]    Date of Neurology Visit: 2024  Name of Provider: BEATRIZ Clarke    Other Physicians:  Hever Arreola MD (Primary Care Physician); Yessica Granado MD (Referring)      Chief Complaint: Peripheral Neuropathy      History of Present Illness (HPI):  Kenyetta Andersen is a right handed 86 y.o. female.    Patient is here today management of Neuropathy. She has had this for about 12 years after receiving chemotherapy. She had stage III colorectal carcinoma in  and received adjuvant folfox. She reports symptoms are to her hands and feet and feel numb. There is no burning pain but she does report tingling. It is constant.   Her feet feel as though they are slippery. She may walk out of slippers and not even know it. She denies recent falls. She is maintained on Gabapentin and believes she is currenlty taking 600 mg TID but reports that her prescription was recently changed. She states she has been on this medication for ~ 5 years and doesn't report much aid. She has never tried any creams.        Interval Hx 3/18/2022:   Patient is here today for neuropathy follow up. She still reports constant tingling. Her Gabapentin is dosed at 100 mg TID and is unsure why her Gabapentin was decreased. She does not find this dose aids her well but also denies any side effects. She denies recent falls.       Interval Hx 2022:  Patient is here today for neuropathy follow up. She reports that she is taking gabapentin 300 mg TID. She still reports numbness but pain is mostly controlled.  She denies any side effects to this medications. She reports numbness to her fingertip and a swelling feeling to her feet but they are not swollen. She denies significant pain  (burning, tingling, pins/needles) or weakness. She may have intermittent tingling to her feet at night. She did try OTC cream but doesn't believe it offered aid.      Interval Hx 6/17/2022:  She is here today for neuropathy follow up. She is taking Gabapentin 300, 300, 600 mg. She reports ongoing tingling to the fingertips but no significant pain and no weakness. She denies recent falls. Overall, the increased dose of Gabapentin at night has helped. She feels as though her symptoms are stable and controlled.     Interval Hx 5/10/2023:  Patient is here today for neuropathy. She reports stability overall on Gabapentin. She continues to report mild tingling to feet and hands that is not painful. She is not interested in increasing the Gabapentin.     Interval Hx 5/8/2024:  Patient is here today for neuropathy. She continues to report constant numbness and tingling to both feet and both hands. She is maintained on Gabapentin 300, 30, 600 and tolerating it well. She denies pain.         Past Medical, Surgical, Family & Social History:   Reviewed and updated.    Home Medications:     Current Outpatient Medications:     acetaminophen (TYLENOL) 650 MG TbSR, Take 650 mg by mouth every 8 (eight) hours as needed (pain)., Disp: , Rfl:     alendronate (FOSAMAX) 70 MG tablet, TAKE 1 TABLET BY MOUTH EVERY 7 DAYS. ON EMPTY STOMACH IN MORNING, REMAIN UPRIGHT FOR 30 MINUTES., Disp: 12 tablet, Rfl: 0    ascorbic acid, vitamin C, (VITAMIN C) 500 MG tablet, Take 500 mg by mouth once daily., Disp: , Rfl:     b complex vitamins capsule, Take 1 capsule by mouth once daily., Disp: , Rfl:     brimonidine 0.2% (ALPHAGAN) 0.2 % Drop, INSTILL 1 DROP INTO BOTH EYES 3 TIMES A DAY (Patient taking differently: Place 1 drop into both eyes 3 (three) times daily.), Disp: 10 mL, Rfl: 6    calcium carbonate-magnesium hydroxide (ROLAIDS) 550-110 mg Chew, Take 1 tablet by mouth 2 (two) times daily as needed (heartburn)., Disp: , Rfl:     cyproheptadine  "(PERIACTIN) 4 mg tablet, Take 1 tablet (4 mg total) by mouth 3 (three) times daily as needed (appetite)., Disp: 50 tablet, Rfl: 3    dorzolamide-timolol 2-0.5% (COSOPT) 22.3-6.8 mg/mL ophthalmic solution, INSTILL 1 DROP INTO BOTH EYES TWICE A DAY, Disp: 10 mL, Rfl: 4    gabapentin (NEURONTIN) 300 MG capsule, TAKE 1 CAP IN THE AM, 1 CAP MIDDAY, AND 2 CAPS AT NIGHT, Disp: 360 capsule, Rfl: 3    latanoprost 0.005 % ophthalmic solution, INSTILL 1 DROP INTO BOTH EYES EVERY EVENING, Disp: 7.5 mL, Rfl: 3    mirtazapine (REMERON) 7.5 MG Tab, TAKE 1 TABLET BY MOUTH EVERY DAY IN THE EVENING, Disp: 90 tablet, Rfl: 0    omeprazole (PRILOSEC) 40 MG capsule, Take 1 capsule (40 mg total) by mouth every morning., Disp: 30 capsule, Rfl: 0    potassium chloride SA (K-DUR,KLOR-CON) 20 MEQ tablet, TAKE 1 TABLET BY MOUTH EVERY DAY, Disp: 90 tablet, Rfl: 3    Physical Examination:  BP (!) 160/72 (BP Location: Left arm, Patient Position: Sitting, BP Method: Small (Automatic))   Pulse 78   Resp 18   Ht 5' 3" (1.6 m)   Wt 63.3 kg (139 lb 8.8 oz)   BMI 24.72 kg/m²     GENERAL:  General appearance: Well, non-toxic appearing.  No apparent distress.  Neck: supple.  .     MENTAL STATUS:  Alertness, attention span & concentration: normal.  Language: normal.  Orientation to self, place & time:  normal.  Memory, recent & remote: normal.  Fund of knowledge: normal.        SPEECH:  Clear and fluent.  Follows complex commands.     CRANIAL NERVES:  Cranial Nerves II-XII were examined.  II - Visual fields: normal.  III, IV, VI: PERRL, EOMI, No ptosis, No nystagmus.  V - Facial sensation: normal.  VII - Face symmetry & mobility: not assessed d/t COVID precautions  VIII - Hearing: normal.  IX, X - Palate: not assessed d/t COVID precautions  XI - Shoulder shrug: normal.  XII - Tongue protrusion: not assessed d/t COVID precautions     GROSS MOTOR:  Gait & station: non focal; mildly antalgic  Tone: normal.  Abnormal movements: none.  Finger-nose: " normal.  Rapid alternating movements: normal.  Pronator drift: normal        MUSCLE STRENGTH:      RIGHT      LEFT   5 Biceps 5   5 Triceps 5   5 Forearm.Pr. 5           4+ Iliopsoas flex    4+   5 Hip Abduct 5   5 Hip Adduct 5   5 Quads 5   5 Hams 5   5 Dorsiflex 5   5 Plantar Flex 5      REFLEXES:    RIGHT Reflex    LEFT   2+ Biceps 2+   2+ Brachiorad. 2+           2+ Patellar 2+      SENSORY:  Light touch: Normal throughout.   Sharp touch: hyperintense to both feet> both hands  Vibration: no vibratory sensation to big toe left foot  Temperature: Normal throughout.   Joint Position: Normal throughout.  Proprioception: abnormal to left foot             Diagnostic Data Reviewed:      Lab Results   Component Value Date    WBC 8.18 05/02/2024    HGB 13.3 05/02/2024    HCT 41.5 05/02/2024    MCV 97 05/02/2024     05/02/2024        CMP  Sodium   Date Value Ref Range Status   05/02/2024 140 136 - 145 mmol/L Final     Potassium   Date Value Ref Range Status   05/02/2024 4.8 3.5 - 5.1 mmol/L Final     Chloride   Date Value Ref Range Status   05/02/2024 108 95 - 110 mmol/L Final     CO2   Date Value Ref Range Status   05/02/2024 28 23 - 29 mmol/L Final     Glucose   Date Value Ref Range Status   05/02/2024 95 70 - 110 mg/dL Final     BUN   Date Value Ref Range Status   05/02/2024 17 8 - 23 mg/dL Final     Creatinine   Date Value Ref Range Status   05/02/2024 1.2 0.5 - 1.4 mg/dL Final     Calcium   Date Value Ref Range Status   05/02/2024 9.2 8.7 - 10.5 mg/dL Final     Total Protein   Date Value Ref Range Status   05/02/2024 6.6 6.0 - 8.4 g/dL Final     Albumin   Date Value Ref Range Status   05/02/2024 3.5 3.5 - 5.2 g/dL Final     Total Bilirubin   Date Value Ref Range Status   05/02/2024 0.4 0.1 - 1.0 mg/dL Final     Comment:     For infants and newborns, interpretation of results should be based  on gestational age, weight and in agreement with clinical  observations.    Premature Infant recommended reference  ranges:  Up to 24 hours.............<8.0 mg/dL  Up to 48 hours............<12.0 mg/dL  3-5 days..................<15.0 mg/dL  6-29 days.................<15.0 mg/dL       Alkaline Phosphatase   Date Value Ref Range Status   05/02/2024 73 55 - 135 U/L Final     AST   Date Value Ref Range Status   05/02/2024 17 10 - 40 U/L Final     ALT   Date Value Ref Range Status   05/02/2024 8 (L) 10 - 44 U/L Final     Anion Gap   Date Value Ref Range Status   05/02/2024 4 (L) 8 - 16 mmol/L Final     eGFR   Date Value Ref Range Status   05/02/2024 43.8 (A) >60 mL/min/1.73 m^2 Final                   Assessment and Plan:      Problem List Items Addressed This Visit          Neuro    Chemotherapy-induced peripheral neuropathy - Primary    Current Assessment & Plan     Patient is a 88 y/o female that presents for neuropathy follow up.   Received adjuvant folfox post colorectal CA dx ~ 2009 and has suffered neuropathy symptoms ever since.   She reports only numbness and very mild tingling to her feet and hands. She denies burning sensation, pain or weakness.   She has been maintained on Gabapentin for many years   Gabapentin dose previously increased to 300, 300, 600 for increased tingling to feet at night.  Pt reports stability of symptoms  -  Continue!  Educated patient again on the role of Gabapentin and how this medication does not aid with numbness but rather helps with tingling, pain etc.   Recent Crt WNL  Encouraged OTC creams and supplements for breakthrough symptoms   Compound cream did not offer benefit  If symptoms dont hold stable then could ultimately try Lyrica instead of Gabapentin but seems as though gabapentin is aiding with symptoms           Other Visit Diagnoses       Neuropathy                              Important to note, also  has a past medical history of Anatomical narrow angle (2/24/2012), Anxiety, Arthritis, Escalante esophagus, Blood transfusion, Cataract, Colon cancer (2009), Colon polyps (3/14/2017), GERD  (gastroesophageal reflux disease), Glaucoma (2/24/2012), Nuclear sclerosis (8/27/2012), Osteoporosis, Primary hypothyroidism (2/23/2020), and Pseudophakia - Left Eye (2/24/2012).          The patient will return to clinic in 12 mths    All questions were answered and patient is comfortable with the plan.         Thank you very much for the opportunity to assist in this patient's care.    If you have any questions or concerns, please do not hesitate to contact me at any time.      Sincerely,     BEATRIZ Clarke  [facility] [place]

## 2024-05-17 ENCOUNTER — CLINICAL SUPPORT (OUTPATIENT)
Dept: OPHTHALMOLOGY | Facility: CLINIC | Age: 89
End: 2024-05-17
Attending: OPTOMETRIST
Payer: MEDICARE

## 2024-05-17 DIAGNOSIS — H40.1131 PRIMARY OPEN ANGLE GLAUCOMA (POAG) OF BOTH EYES, MILD STAGE: ICD-10-CM

## 2024-05-17 NOTE — PROGRESS NOTES
24-2 HVF done. OCT nerve done.         Assessment /Plan     For exam results, see Encounter Report.    Primary open angle glaucoma (POAG) of both eyes, mild stage  -     OCT - Optic Nerve  -     [place] Visual Field - OU - Extended - Both Eyes

## 2024-05-21 ENCOUNTER — TELEPHONE (OUTPATIENT)
Dept: OPTOMETRY | Facility: CLINIC | Age: 89
End: 2024-05-21
Payer: MEDICARE

## 2024-05-28 DIAGNOSIS — M85.89 OSTEOPENIA OF MULTIPLE SITES: Primary | ICD-10-CM

## 2024-05-28 RX ORDER — ALENDRONATE SODIUM 70 MG/1
TABLET ORAL
Qty: 12 TABLET | Refills: 1 | Status: SHIPPED | OUTPATIENT
Start: 2024-05-28

## 2024-05-28 NOTE — TELEPHONE ENCOUNTER
Refill Routing Note   Medication(s) are not appropriate for processing by [facility] for the following reason(s):        Outside of protocol    [facility] action(s):  Route               Appointments  past 12m or future 3m with PCP    Date Provider   Last Visit   2/27/2024 Hever Arreola MD   Next Visit   Visit date not found Hever Arreola MD   ED visits in past 90 days: 0        Note composed:4:46 PM 05/28/2024

## 2024-06-03 ENCOUNTER — OFFICE VISIT (OUTPATIENT)
Dept: HOME HEALTH SERVICES | Facility: CLINIC | Age: 89
End: 2024-06-03
Payer: MEDICARE

## 2024-06-03 VITALS
OXYGEN SATURATION: 95 % | DIASTOLIC BLOOD PRESSURE: 71 MMHG | HEART RATE: 83 BPM | WEIGHT: 135 LBS | BODY MASS INDEX: 23.91 KG/M2 | SYSTOLIC BLOOD PRESSURE: 133 MMHG

## 2024-06-03 DIAGNOSIS — J84.10 CALCIFIED GRANULOMA OF LUNG: ICD-10-CM

## 2024-06-03 DIAGNOSIS — N18.31 STAGE 3A CHRONIC KIDNEY DISEASE: ICD-10-CM

## 2024-06-03 DIAGNOSIS — K21.00 GASTROESOPHAGEAL REFLUX DISEASE WITH ESOPHAGITIS WITHOUT HEMORRHAGE: ICD-10-CM

## 2024-06-03 DIAGNOSIS — I70.0 ABDOMINAL AORTIC ATHEROSCLEROSIS: ICD-10-CM

## 2024-06-03 DIAGNOSIS — G62.0 CHEMOTHERAPY-INDUCED PERIPHERAL NEUROPATHY: ICD-10-CM

## 2024-06-03 DIAGNOSIS — F17.200 SMOKER: ICD-10-CM

## 2024-06-03 DIAGNOSIS — J43.9 PULMONARY EMPHYSEMA, UNSPECIFIED EMPHYSEMA TYPE: ICD-10-CM

## 2024-06-03 DIAGNOSIS — H40.2230 CHRONIC ANGLE-CLOSURE GLAUCOMA OF BOTH EYES, UNSPECIFIED GLAUCOMA STAGE: ICD-10-CM

## 2024-06-03 DIAGNOSIS — M85.89 OSTEOPENIA OF MULTIPLE SITES: ICD-10-CM

## 2024-06-03 DIAGNOSIS — E87.6 HYPOKALEMIA: ICD-10-CM

## 2024-06-03 DIAGNOSIS — Z00.00 ENCOUNTER FOR PREVENTIVE HEALTH EXAMINATION: Primary | ICD-10-CM

## 2024-06-03 DIAGNOSIS — T45.1X5A CHEMOTHERAPY-INDUCED PERIPHERAL NEUROPATHY: ICD-10-CM

## 2024-06-03 PROCEDURE — 1159F MED LIST DOCD IN RCRD: CPT | Mod: CPTII,S$GLB,, | Performed by: NURSE PRACTITIONER

## 2024-06-03 PROCEDURE — 1170F FXNL STATUS ASSESSED: CPT | Mod: CPTII,S$GLB,, | Performed by: NURSE PRACTITIONER

## 2024-06-03 PROCEDURE — 1101F PT FALLS ASSESS-DOCD LE1/YR: CPT | Mod: CPTII,S$GLB,, | Performed by: NURSE PRACTITIONER

## 2024-06-03 PROCEDURE — G0439 PPPS, SUBSEQ VISIT: HCPCS | Mod: S$GLB,,, | Performed by: NURSE PRACTITIONER

## 2024-06-03 PROCEDURE — 1157F ADVNC CARE PLAN IN RCRD: CPT | Mod: CPTII,S$GLB,, | Performed by: NURSE PRACTITIONER

## 2024-06-03 PROCEDURE — 3288F FALL RISK ASSESSMENT DOCD: CPT | Mod: CPTII,S$GLB,, | Performed by: NURSE PRACTITIONER

## 2024-06-03 PROCEDURE — 1160F RVW MEDS BY RX/DR IN RCRD: CPT | Mod: CPTII,S$GLB,, | Performed by: NURSE PRACTITIONER

## 2024-06-03 PROCEDURE — 1126F AMNT PAIN NOTED NONE PRSNT: CPT | Mod: CPTII,S$GLB,, | Performed by: NURSE PRACTITIONER

## 2024-06-03 RX ORDER — RABEPRAZOLE SODIUM 20 MG/1
20 TABLET, DELAYED RELEASE ORAL DAILY
COMMUNITY

## 2024-06-03 NOTE — PROGRESS NOTES
Kenyetta Andersen presented for a follow-up Medicare AWV today. The following components were reviewed and updated:    Medical history  Family History  Social history  Allergies and Current Medications  Health Risk Assessment  Health Maintenance  Care Team    **See Completed Assessments for Annual Wellness visit with in the encounter summary    The following assessments were completed:  Depression Screening  Cognitive function Screening - pt declined  Timed Get Up Test  Whisper Test      Opioid documentation:      Patient does not have a current opioid prescription.          Vitals:    06/03/24 1003   BP: 133/71   Pulse: 83   SpO2: 95%   Weight: 61.2 kg (135 lb)     Body mass index is 23.91 kg/m².       Physical Exam  HENT:      Head: Normocephalic.      Right Ear: Decreased hearing noted.      Left Ear: Decreased hearing noted.      Nose: Nose normal.   Eyes:      Pupils: Pupils are equal, round, and reactive to light.   Cardiovascular:      Pulses: Normal pulses.      Heart sounds: Normal heart sounds.   Pulmonary:      Effort: Pulmonary effort is normal.      Breath sounds: Normal breath sounds.   Abdominal:      General: Bowel sounds are normal.   Musculoskeletal:         General: Normal range of motion.      Cervical back: Normal range of motion.      Right lower leg: No edema.      Left lower leg: No edema.   Skin:     General: Skin is warm and dry.   Neurological:      Mental Status: She is alert and oriented to person, place, and time.      Gait: Gait abnormal.           Diagnoses and health risks identified today and associated recommendations/orders:  1. Encounter for preventive health examination  - Above assessments completed. Preventive measures and health maintenance reviewed with patient.  -discussed overdue vaccines, can have done at any pharmacy  - Ambulatory referral/consult to Outpatient Case Management    2. Pulmonary emphysema, unspecified emphysema type  Stable, followed by PCP  -encouraged  tobacco cessation    3. Calcified granuloma of lung  Stable, followed by PCP  -encouraged tobacco cessation    4. Abdominal aortic atherosclerosis  Stable, followed by PCP  -continue exercise and low chol/low fat diet    5. Stage 3a chronic kidney disease  Stable, followed by PCP  -encouraged hydration and avoidance of NSAIDs    6. Chemotherapy-induced peripheral neuropathy  Stable, followed by PCP and Neurology  -on gabapentin    7. Chronic angle-closure glaucoma of both eyes, unspecified glaucoma stage  Stable, followed by Ophthalmology  -eye gtts    8. Hypokalemia  Stable, followed by PCP  -on K+    9. Gastroesophageal reflux disease with esophagitis without hemorrhage  Stable, followed by PCP  -on rabeprazole    10. Smoker  -encouraged cessation, declines, counseled    11. Osteopenia of multiple sites  Stable, followed by PCP  -on krishan/d, alendronate      Provided Kenyetta with a 5-10 year written screening schedule and personal prevention plan. Recommendations were developed using the USPSTF age appropriate recommendations. Education, counseling, and referrals were provided as needed.  After Visit Summary printed and given to patient which includes a list of additional screenings\tests needed.    Follow up in about 1 year (around 6/3/2025) for your next annual wellness visit.      Gricelda Bailey NP

## 2024-06-03 NOTE — PATIENT INSTRUCTIONS
Counseling and Referral of Other Preventative  (Italic type indicates deductible and co-insurance are waived)    Patient Name: Kenyetta Andersen  Today's Date: 6/3/2024    Health Maintenance       Date Due Completion Date    RSV Vaccine (Age 60+ and Pregnant patients) (1 - 1-dose 60+ series) Never done ---    TETANUS VACCINE 09/13/2022 9/13/2012    Hemoglobin A1c (Prediabetes) 07/01/2023 7/1/2022    COVID-19 Vaccine (5 - 2023-24 season) 05/13/2024 1/13/2024    DEXA Scan 12/15/2025 12/15/2023    Lipid Panel 07/01/2027 7/1/2022        Orders Placed This Encounter   Procedures    Ambulatory referral/consult to Outpatient Case Management       The following information is provided to all patients.  This information is to help you find resources for any of the problems found today that may be affecting your health:                  Living healthy guide: www.[facility].[place].[facility]      Understanding Diabetes: www.diabetes.org      Eating healthy: www.cdc.gov/healthyweight      CDC home safety checklist: www.cdc.gov/steadi/patient.html      Agency on Aging: www.goea.[place].[facility]      Alcoholics anonymous (AA): www.aa.org      Physical Activity: www.zulma.nih.gov/zm4tihv      Tobacco use: www.quitwithusla.org

## 2024-06-25 ENCOUNTER — OUTPATIENT CASE MANAGEMENT (OUTPATIENT)
Dept: ADMINISTRATIVE | Facility: OTHER | Age: 89
End: 2024-06-25
Payer: MEDICARE

## 2024-06-25 NOTE — LETTER
June 26, 2024    Kenyetta Andersen  [address]  [place] [address]             [facility]  [address] [address] Dear : Kenyetta Andersen    I am writing from the Outpatient Complex Care Management Department at [facility].  I received a referral from Gricelda Bailey NP  to contact you  regarding any needs you may have. I have attempted to contact you  by phone two times unsuccessfully.  Please contact the Outpatient Complex Care Management Department at [phone] if you would like to discuss your needs.      Sincerely,         Lizzie Arreola LMSW

## 2024-06-26 NOTE — PROGRESS NOTES
SW received a return call from patient . SW explained to patient reason for referral to assist with social needs. Patient reports needing assistance with affordable housing while her home receives modification. Pam reports she's in the process of elevating home and need temp housing. SW explained to patient affordable housing that is based on income has a wait list. Patient denied she's able to afford [employer] rent. SW explained other options such as shelter and or residing with family members. Patient thanked SW for call. SW will close case resources provided.

## 2024-07-24 ENCOUNTER — TELEPHONE (OUTPATIENT)
Dept: OPTOMETRY | Facility: CLINIC | Age: 89
End: 2024-07-24
Payer: MEDICARE

## 2024-07-24 ENCOUNTER — OFFICE VISIT (OUTPATIENT)
Dept: OPTOMETRY | Facility: CLINIC | Age: 89
End: 2024-07-24
Payer: MEDICARE

## 2024-07-24 DIAGNOSIS — H04.123 DRY EYE SYNDROME, BILATERAL: ICD-10-CM

## 2024-07-24 DIAGNOSIS — H40.1131 PRIMARY OPEN ANGLE GLAUCOMA (POAG) OF BOTH EYES, MILD STAGE: Primary | ICD-10-CM

## 2024-07-24 PROCEDURE — 1101F PT FALLS ASSESS-DOCD LE1/YR: CPT | Mod: HCNC,CPTII,S$GLB, | Performed by: OPTOMETRIST

## 2024-07-24 PROCEDURE — 1126F AMNT PAIN NOTED NONE PRSNT: CPT | Mod: HCNC,CPTII,S$GLB, | Performed by: OPTOMETRIST

## 2024-07-24 PROCEDURE — 99999 PR PBB SHADOW E&M-EST. PATIENT-LVL III: CPT | Mod: PBBFAC,HCNC,, | Performed by: OPTOMETRIST

## 2024-07-24 PROCEDURE — 3288F FALL RISK ASSESSMENT DOCD: CPT | Mod: HCNC,CPTII,S$GLB, | Performed by: OPTOMETRIST

## 2024-07-24 PROCEDURE — 1157F ADVNC CARE PLAN IN RCRD: CPT | Mod: HCNC,CPTII,S$GLB, | Performed by: OPTOMETRIST

## 2024-07-24 PROCEDURE — 1160F RVW MEDS BY RX/DR IN RCRD: CPT | Mod: HCNC,CPTII,S$GLB, | Performed by: OPTOMETRIST

## 2024-07-24 PROCEDURE — G2211 COMPLEX E/M VISIT ADD ON: HCPCS | Mod: HCNC,S$GLB,, | Performed by: OPTOMETRIST

## 2024-07-24 PROCEDURE — 1159F MED LIST DOCD IN RCRD: CPT | Mod: HCNC,CPTII,S$GLB, | Performed by: OPTOMETRIST

## 2024-07-24 PROCEDURE — 99213 OFFICE O/P EST LOW 20 MIN: CPT | Mod: HCNC,S$GLB,, | Performed by: OPTOMETRIST

## 2024-07-24 RX ORDER — BRIMONIDINE TARTRATE 2 MG/ML
1 SOLUTION/ DROPS OPHTHALMIC 3 TIMES DAILY
Qty: 10 ML | Refills: 6 | Status: SHIPPED | OUTPATIENT
Start: 2024-07-24

## 2024-07-24 RX ORDER — BRIMONIDINE TARTRATE 2 MG/ML
1 SOLUTION/ DROPS OPHTHALMIC 3 TIMES DAILY
Qty: 10 ML | Refills: 6 | Status: SHIPPED | OUTPATIENT
Start: 2024-07-24 | End: 2024-07-24 | Stop reason: SDUPTHER

## 2024-07-24 NOTE — PROGRESS NOTES
HPI     Glaucoma     Additional comments: DLE 1-2024           Comments    Pt here today for 6 month IOP check.   States no visual changes or   complaints.    Denies any headaches, eye pain or pressure.    Good compliance with gtts:    Cosopt OU bid  Brimonidine OU tid  Latanoprost OU qhs          Last edited by Fadumo Mcgarry on 7/24/2024  1:49 PM.            Assessment /Plan     For exam results, see Encounter Report.    Primary open angle glaucoma (POAG) of both eyes, mild stage  -     Discontinue: brimonidine 0.2% (ALPHAGAN) 0.2 % Drop; Place 1 drop into both eyes 3 (three) times daily. INSTILL 1 DROP INTO BOTH EYES 3 TIMES A DAY  Strength: 0.2 %  Dispense: 10 mL; Refill: 6  -     brimonidine 0.2% (ALPHAGAN) 0.2 % Drop; Place 1 drop into both eyes 3 (three) times daily.  Dispense: 10 mL; Refill: 6    Dry eye syndrome, bilateral      1. Primary open angle glaucoma (POAG) of both eyes, mild stage  Tmax 23 / 21    / 594    Last HVF / OCT 05/2024    IOP stable with use of drops -- target low to mid teens  Continue cosopt bid OU / alphagan tid OU / latanoprost qpm OU  Refilled drops today    Visit today is associated with current or anticipated ongoing medical care related to this patients single serious condition/complex condition (primary open angle of glaucoma of both eyes, mild stage)     RTC in 6 months for DFE / IOP check     - brimonidine 0.2% (ALPHAGAN) 0.2 % Drop; Place 1 drop into both eyes 3 (three) times daily.  Dispense: 10 mL; Refill: 6  - OCT - Optic Nerve; Future  - Gracia Visual Field - OU - Extended - Both Eyes; Future    2. Dry eye syndrome, bilateral  Well managed  Continue otc ATs prn  Observe

## 2024-07-26 ENCOUNTER — HOSPITAL ENCOUNTER (OUTPATIENT)
Dept: RADIOLOGY | Facility: CLINIC | Age: 89
Discharge: HOME | End: 2024-07-26
Payer: MEDICARE

## 2024-07-26 ENCOUNTER — OFFICE VISIT (OUTPATIENT)
Dept: FAMILY MEDICINE | Facility: CLINIC | Age: 89
End: 2024-07-26
Payer: MEDICARE

## 2024-07-26 ENCOUNTER — TELEPHONE (OUTPATIENT)
Dept: FAMILY MEDICINE | Facility: CLINIC | Age: 89
End: 2024-07-26

## 2024-07-26 ENCOUNTER — ANCILLARY ORDERS (OUTPATIENT)
Dept: FAMILY MEDICINE | Facility: CLINIC | Age: 89
End: 2024-07-26

## 2024-07-26 VITALS
HEIGHT: 63 IN | SYSTOLIC BLOOD PRESSURE: 116 MMHG | HEART RATE: 96 BPM | TEMPERATURE: 98 F | BODY MASS INDEX: 24.8 KG/M2 | OXYGEN SATURATION: 95 % | WEIGHT: 140 LBS | DIASTOLIC BLOOD PRESSURE: 78 MMHG

## 2024-07-26 DIAGNOSIS — M79.672 LEFT FOOT PAIN: ICD-10-CM

## 2024-07-26 DIAGNOSIS — M79.642 LEFT HAND PAIN: ICD-10-CM

## 2024-07-26 DIAGNOSIS — S92.352A CLOSED DISPLACED FRACTURE OF FIFTH METATARSAL BONE OF LEFT FOOT, INITIAL ENCOUNTER: ICD-10-CM
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
## 2024-07-26 DIAGNOSIS — N18.31 STAGE 3A CHRONIC KIDNEY DISEASE: ICD-10-CM
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
## 2024-07-26 PROCEDURE — 99999 PR PBB SHADOW E&M-EST. PATIENT-LVL IV: CPT | Mod: PBBFAC,,,

## 2024-07-26 PROCEDURE — 73130 X-RAY EXAM OF HAND: CPT | Mod: TC,FY,PO,LT

## 2024-07-26 PROCEDURE — 73630 X-RAY EXAM OF FOOT: CPT | Mod: TC,FY,PO,LT

## 2024-07-26 PROCEDURE — 73630 X-RAY EXAM OF FOOT: CPT | Mod: 26,LT,S$GLB, | Performed by: RADIOLOGY

## 2024-07-26 PROCEDURE — 73130 X-RAY EXAM OF HAND: CPT | Mod: 26,LT,S$GLB, | Performed by: RADIOLOGY

## 2024-07-26 NOTE — TELEPHONE ENCOUNTER
----- Message from Yahaira Anand PA-C sent at 7/26/2024  3:36 PM CDT -----  Xray of the foot reveals fracture, near the pinky toe/ side of the foot where her pain is localized. Recommend she come in to get a walking boot. Referral to orthopedics placed.

## 2024-07-26 NOTE — TELEPHONE ENCOUNTER
Paula Gonzalez Staff     ----- Message from Paula Gonzalez sent at 7/26/2024  2:17 PM CDT -----  Regarding: return call  Contact: patient  Type:  Patient Returning Call    Who Called:  patient  Who Left Message for Patient:    Does the patient know what this is regarding?:  results  Best Call Back Number:  [phone]    Additional Information:  Please call patient to advise.  Thanks!

## 2024-07-26 NOTE — TELEPHONE ENCOUNTER
Patient notified of Fx to left foot & need for walking boot. States she lives in [place] & it's raining. Would prefer to return Monday for boot. Ortho scheduled.

## 2024-07-26 NOTE — PROGRESS NOTES
Subjective:       Patient ID: Kenyetta Andersen is a 89 y.o. female.    Chief Complaint: fall    Kenyetta Andersen is a 89 y.o. female with osteoporosis who presents to clinic for evaluation of fall. The patient fell last Tuesday pulling her trash can to curb while wearing slippers. She slipped on rocks in her driveway. She fell onto her left hand and left foot. Reports the left hand is swollen and painful. No head injury. No LOC. She has been taking Tylenol as needed for pain.         Review of Systems   Constitutional:  Negative for fatigue.   Musculoskeletal:         Left hand pain  Left foot pain   Skin:  Negative for color change and rash.   Neurological:  Negative for dizziness, light-headedness and headaches.         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    Anxiety     Arthritis     Escalante esophagus     Blood transfusion     Cataract     Done OU    Colon cancer 2009    Colon polyps 3/14/2017    GERD (gastroesophageal reflux disease)     Glaucoma 2/24/2012    Nuclear sclerosis 8/27/2012    Osteoporosis     Primary hypothyroidism 2/23/2020    Pseudophakia - Left Eye 2/24/2012       Review of patient's allergies indicates:   Allergen Reactions    Ace inhibitors Edema     Other reaction(s): Angioedema    Codeine Hives         Current Outpatient Medications:     acetaminophen (TYLENOL) 650 MG TbSR, Take 650 mg by mouth every 8 (eight) hours as needed (pain)., Disp: , Rfl:     alendronate (FOSAMAX) 70 MG tablet, TAKE 1 TABLET BY MOUTH EVERY 7 DAYS. ON EMPTY STOMACH IN MORNING, REMAIN UPRIGHT FOR 30 MINUTES., Disp: 12 tablet, Rfl: 1    ascorbic acid, vitamin C, (VITAMIN C) 500 MG tablet, Take 500 mg by mouth once daily., Disp: , Rfl:     b complex vitamins capsule, Take 1 capsule by mouth once daily., Disp: , Rfl:     brimonidine 0.2% (ALPHAGAN) 0.2 % Drop, Place 1 drop into both eyes 3 (three) times daily., Disp: 10 mL, Rfl: 6    calcium carbonate-magnesium hydroxide  (ROLAIDS) 550-110 mg Chew, Take 1 tablet by mouth 2 (two) times daily as needed (heartburn)., Disp: , Rfl:     cyproheptadine (PERIACTIN) 4 mg tablet, Take 1 tablet (4 mg total) by mouth 3 (three) times daily as needed (appetite)., Disp: 50 tablet, Rfl: 3    dorzolamide-timolol 2-0.5% (COSOPT) 22.3-6.8 mg/mL ophthalmic solution, INSTILL 1 DROP INTO BOTH EYES TWICE A DAY, Disp: 10 mL, Rfl: 4    gabapentin (NEURONTIN) 300 MG capsule, TAKE 1 CAP IN THE AM, 1 CAP MIDDAY, AND 2 CAPS AT NIGHT, Disp: 360 capsule, Rfl: 3    latanoprost 0.005 % ophthalmic solution, INSTILL 1 DROP INTO BOTH EYES EVERY EVENING, Disp: 7.5 mL, Rfl: 3    mirtazapine (REMERON) 7.5 MG Tab, TAKE 1 TABLET BY MOUTH EVERY DAY IN THE EVENING, Disp: 90 tablet, Rfl: 0    omeprazole (PRILOSEC) 40 MG capsule, Take 1 capsule (40 mg total) by mouth every morning., Disp: 30 capsule, Rfl: 0    potassium chloride SA (K-DUR,KLOR-CON) 20 MEQ tablet, TAKE 1 TABLET BY MOUTH EVERY DAY, Disp: 90 tablet, Rfl: 3    RABEprazole (ACIPHEX) 20 mg tablet, Take 20 mg by mouth once daily., Disp: , Rfl:     Objective:        Physical Exam  Vitals reviewed.   Constitutional:       Appearance: Normal appearance.   HENT:      Head: Normocephalic and atraumatic.   Eyes:      Conjunctiva/sclera: Conjunctivae normal.   Cardiovascular:      Rate and Rhythm: Normal rate.   Pulmonary:      Effort: Pulmonary effort is normal.   Musculoskeletal:      Left wrist: Normal. No tenderness, bony tenderness or snuff box tenderness. Normal range of motion. Normal pulse.      Left hand: Swelling and tenderness present. Normal range of motion. Normal sensation. Normal capillary refill. Normal pulse.      Cervical back: Neck supple.      Left foot: Normal range of motion and normal capillary refill. Swelling (mild) and tenderness present. Normal pulse.        Feet:       Comments: Tenderness to palpation of left 5th digit MCP and PIP. Full range of motion of the 5th digit.    Skin:     General: Skin  "is warm and dry.   Neurological:      Mental Status: She is alert and oriented to person, place, and time.   Psychiatric:         Behavior: Behavior normal.           Visit Vitals  /78 (BP Location: Right arm, Patient Position: Sitting, BP Method: Small (Manual))   Pulse 96   Temp 97.9 °F (36.6 °C) (Oral)   Ht 5' 3" (1.6 m)   Wt 63.5 kg (139 lb 15.9 oz)   SpO2 95%   BMI 24.80 kg/m²      Assessment:         1. Fall, initial encounter    2. Left hand pain    3. Left foot pain        Plan:         Diagnoses and all orders for this visit:    Fall, initial encounter  -     X-Ray Hand 3 view Left; Future  -     X-Ray Foot 2 View Left; Future  -     Recommend she continue Tylenol as needed for pain. Further recommendations made pending imaging.    Left hand pain  -     X-Ray Hand 3 view Left; Future    Left foot pain  -     X-Ray Foot 2 View Left; Future    Stage 3a chronic kidney disease         -    Recommend she avoid NSAIDs for pain relief.      Imaging will be reviewed, and further recommendations will be made based on results.         Family Medicine Physician Assistant     Future Appointments       Date Provider Specialty Appt Notes    7/26/2024  Radiology Fall, initial encounter [W19.XXXA]; Left foot pain [M79.672]    8/2/2024 Yessica Granado MD Hematology and Oncology HxColonCa/Labs/3mofu    1/17/2025  Ophthalmology 24-2 sitafast & oct nerve // ninh to see             Tests to Keep You Healthy    Tobacco Cessation: NO       I spent a total of 15 minutes on the day of the visit.This includes face to face time and non-face to face time preparing to see the patient (eg, review of tests), obtaining and/or reviewing separately obtained history, documenting clinical information in the electronic or other health record, independently interpreting results and communicating results to the patient/family/caregiver, or care coordinator.    "

## 2024-07-29 DIAGNOSIS — H40.1131 PRIMARY OPEN ANGLE GLAUCOMA (POAG) OF BOTH EYES, MILD STAGE: ICD-10-CM

## 2024-07-30 RX ORDER — BRIMONIDINE TARTRATE 2 MG/ML
SOLUTION/ DROPS OPHTHALMIC
Qty: 10 ML | Refills: 6 | Status: SHIPPED | OUTPATIENT
Start: 2024-07-30

## 2024-07-31 ENCOUNTER — OFFICE VISIT (OUTPATIENT)
Dept: ORTHOPEDICS | Facility: CLINIC | Age: 89
End: 2024-07-31
Payer: MEDICARE

## 2024-07-31 DIAGNOSIS — S92.352A CLOSED DISPLACED FRACTURE OF FIFTH METATARSAL BONE OF LEFT FOOT, INITIAL ENCOUNTER: Primary | ICD-10-CM

## 2024-07-31 DIAGNOSIS — S92.352A CLOSED DISPLACED FRACTURE OF FIFTH METATARSAL BONE OF LEFT FOOT, INITIAL ENCOUNTER: ICD-10-CM

## 2024-07-31 PROCEDURE — 1157F ADVNC CARE PLAN IN RCRD: CPT | Mod: CPTII,S$GLB,, | Performed by: ORTHOPAEDIC SURGERY

## 2024-07-31 PROCEDURE — 99204 OFFICE O/P NEW MOD 45 MIN: CPT | Mod: S$GLB,,, | Performed by: ORTHOPAEDIC SURGERY

## 2024-07-31 PROCEDURE — 1100F PTFALLS ASSESS-DOCD GE2>/YR: CPT | Mod: CPTII,S$GLB,, | Performed by: ORTHOPAEDIC SURGERY

## 2024-07-31 PROCEDURE — 3288F FALL RISK ASSESSMENT DOCD: CPT | Mod: CPTII,S$GLB,, | Performed by: ORTHOPAEDIC SURGERY

## 2024-07-31 PROCEDURE — 99999 PR PBB SHADOW E&M-EST. PATIENT-LVL III: CPT | Mod: PBBFAC,,, | Performed by: ORTHOPAEDIC SURGERY

## 2024-07-31 PROCEDURE — 1160F RVW MEDS BY RX/DR IN RCRD: CPT | Mod: CPTII,S$GLB,, | Performed by: ORTHOPAEDIC SURGERY

## 2024-07-31 PROCEDURE — 1159F MED LIST DOCD IN RCRD: CPT | Mod: CPTII,S$GLB,, | Performed by: ORTHOPAEDIC SURGERY

## 2024-07-31 NOTE — PROGRESS NOTES
Subjective:      Patient ID: Kenyetta Andersen is a 89 y.o. female.    Chief Complaint: Injury and Pain of the Left Foot (Fall on 7/16. Pain 8/10.)    HPI  89-year-old female with a approximate 10 day history of left foot pain.  She states she was putting out her garage when she stepped on her rough driveway in house slippers landed awkwardly resulting in acute pain in her foot.  Was seen by her PCP placed into a walking boot and referred for further evaluation.  Denies any other complaints or prior problems.  Pain has improved with relative rest and immobilization.  She has had a long history of bilateral feet pain.  ROS      Objective:    Ortho Exam     Constitutional:   Patient is alert  and oriented in no acute distress  HEENT:  normocephalic atraumatic; PERRL EOMI  Neck:  Supple without adenopathy  Cardiovascular:  Normal rate and rhythm  Pulmonary:  Normal respiratory effort normal chest wall expansion  Abdominal:  Nonprotuberant nondistended  Musculoskeletal:  Patient was ambulating with a cane and a walking boot  She has moderate forefoot swelling diffuse tenderness over the distal aspect of the lateral forefoot  She has intact skin, sensation, and brisk capillary refill of the digits.  Neurological:  No focal defect; cranial nerves 2-12 grossly intact  Psychiatric/behavioral:  Mood and behavior normal      X-Ray Hand 3 view Left  Narrative: EXAMINATION:  XR HAND COMPLETE 3 VIEW LEFT    CLINICAL HISTORY:  . Unspecified fall, initial encounter    TECHNIQUE:  PA, lateral, and oblique views of the left hand were performed.    COMPARISON:  None    FINDINGS:  Interphalangeal joint space loss is noted diffusely.  An unusual contour is noted to the 5th metacarpal neck suggestive of prior healed fracture.  Osseous demineralization is suspected diffusely.  Radiocarpal joint space loss is noted suggesting degenerative change.  Impression: See above    Electronically signed by: Conor Munguia  MD  Date:    07/26/2024  Time:    15:55  X-Ray Foot Complete 3 view Left  Narrative: EXAMINATION:  XR FOOT COMPLETE 3 VIEW LEFT    CLINICAL HISTORY:  .  Unspecified fall, initial encounter    TECHNIQUE:  AP, lateral and oblique views of the left foot were performed.    COMPARISON:  Left foot radiographs 05/16/2013    FINDINGS:  Hallux valgus.  Acute, mildly displaced fracture of the distal 5th metatarsal with overlying soft tissue swelling.  No dislocation.  Osteopenia.  Vascular calcifications.  Degenerative changes of dorsal midfoot.  Impression: Acute, mildly displaced fracture of the distal 5th metatarsal.    Electronically signed by: Dia Valdez  Date:    07/26/2024  Time:    12:24       My Radiographs Findings:    I have personally reviewed radiographs and concur with above findings    Assessment:       Encounter Diagnosis   Name Primary?    Closed displaced fracture of fifth metatarsal bone of left foot, initial encounter          Plan:       I have discussed medical condition treatment options with her at length.  We have discussed ice elevation compressive wrapping maintaining her in her walking boot and have her follow up for repeat radiographs in 3-4 weeks I will see her sooner if any questions or problems        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    Anxiety     Arthritis     Escalante esophagus     Blood transfusion     Cataract     Done OU    Colon cancer 2009    Colon polyps 3/14/2017    GERD (gastroesophageal reflux disease)     Glaucoma 2/24/2012    Nuclear sclerosis 8/27/2012    Osteoporosis     Primary hypothyroidism 2/23/2020    Pseudophakia - Left Eye 2/24/2012     Past Surgical History:   Procedure Laterality Date    APPENDECTOMY      CATARACT EXTRACTION W/  INTRAOCULAR LENS IMPLANT Left     CATARACT EXTRACTION W/  INTRAOCULAR LENS IMPLANT Right     Dr Sawant    COLON SURGERY      resection    COLONOSCOPY N/A 3/14/2017    Procedure: COLONOSCOPY;  Surgeon:  "Killian Guerrier MD;  Location: [facility];  Service: Endoscopy;  Laterality: N/A;    COLONOSCOPY  03/14/2017    Dr. Guerrier: hemorrhoids, one colon polyp removed, "Patent functional end-to-end ileo-colonic anastomosis"with healthy mucosa; biopsy: hyperplastic polyp; repeat in 3 years for surveillance    COLONOSCOPY N/A 1/21/2020    Procedure: COLONOSCOPY;  Surgeon: Timo Darling MD;  Location: [facility] ENDO;  Service: Endoscopy;  Laterality: N/A;    COLONOSCOPY N/A 7/8/2022    Procedure: COLONOSCOPY;  Surgeon: Anaid Washington MD;  Location: [facility] ENDO;  Service: Endoscopy;  Laterality: N/A;    ECTOPIC PREGNANCY SURGERY      ERCP N/A 7/31/2023    Procedure: ERCP (ENDOSCOPIC RETROGRADE CHOLANGIOPANCREATOGRAPHY);  Surgeon: Nasim Cuadra MD;  Location: [facility] ENDO;  Service: Endoscopy;  Laterality: N/A;    ERCP N/A 8/1/2023    Procedure: ERCP (ENDOSCOPIC RETROGRADE CHOLANGIOPANCREATOGRAPHY);  Surgeon: Олег Arreaga MD;  Location: [facility] (2ND FLR);  Service: Endoscopy;  Laterality: N/A;    ERCP N/A 12/12/2023    Procedure: ERCP (ENDOSCOPIC RETROGRADE CHOLANGIOPANCREATOGRAPHY);  Surgeon: Dimitri Anaya MD;  Location: [facility] (2ND FLR);  Service: Endoscopy;  Laterality: N/A;    ESOPHAGOGASTRODUODENOSCOPY N/A 5/16/2019    Procedure: EGD (ESOPHAGOGASTRODUODENOSCOPY);  Surgeon: Anaid Washington MD;  Location: [facility];  Service: Endoscopy;  Laterality: N/A;    ESOPHAGOGASTRODUODENOSCOPY N/A 7/11/2019    Procedure: EGD (ESOPHAGOGASTRODUODENOSCOPY);  Surgeon: Anaid Washington MD;  Location: [facility] ENDO;  Service: Endoscopy;  Laterality: N/A;    ESOPHAGOGASTRODUODENOSCOPY N/A 2/5/2021    Procedure: EGD (ESOPHAGOGASTRODUODENOSCOPY);  Surgeon: Anaid Washington MD;  Location: [facility] ENDO;  Service: Endoscopy;  Laterality: N/A;    ESOPHAGOGASTRODUODENOSCOPY N/A 11/11/2021    Procedure: EGD (ESOPHAGOGASTRODUODENOSCOPY);  Surgeon: Anaid Washington MD;  Location: [facility];  Service: Endoscopy;  Laterality: N/A;    " ESOPHAGOGASTRODUODENOSCOPY N/A 7/8/2022    Procedure: EGD (ESOPHAGOGASTRODUODENOSCOPY);  Surgeon: Anaid Washington MD;  Location: [facility] ENDO;  Service: Endoscopy;  Laterality: N/A;    ESOPHAGOGASTRODUODENOSCOPY N/A 4/12/2023    Procedure: EGD (ESOPHAGOGASTRODUODENOSCOPY);  Surgeon: Anaid Washington MD;  Location: [facility] ENDO;  Service: Endoscopy;  Laterality: N/A;    EYE SURGERY      bilateral cataracts    HYSTERECTOMY      complete    JOINT REPLACEMENT      Liban Knee    ROTATOR CUFF REPAIR Right     TOE SURGERY      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    UPPER GASTROINTESTINAL ENDOSCOPY  06/12/2017    Dr. Guerrier: gastritis and esophagitis, biopsy: chronic gastritis, negative for h pylori, esophageal- positive eosinophils, negative for barretts; repeat in 2 months    UPPER GASTROINTESTINAL ENDOSCOPY  07/27/2017    Dr. Guerrier         Current Outpatient Medications:     acetaminophen (TYLENOL) 650 MG TbSR, Take 650 mg by mouth every 8 (eight) hours as needed (pain)., Disp: , Rfl:     alendronate (FOSAMAX) 70 MG tablet, TAKE 1 TABLET BY MOUTH EVERY 7 DAYS. ON EMPTY STOMACH IN MORNING, REMAIN UPRIGHT FOR 30 MINUTES., Disp: 12 tablet, Rfl: 1    ascorbic acid, vitamin C, (VITAMIN C) 500 MG tablet, Take 500 mg by mouth once daily., Disp: , Rfl:     b complex vitamins capsule, Take 1 capsule by mouth once daily., Disp: , Rfl:     brimonidine 0.2% (ALPHAGAN) 0.2 % Drop, INSTILL 1 DROP INTO BOTH EYES 3 TIMES A DAY, Disp: 10 mL, Rfl: 6    calcium carbonate-magnesium hydroxide (ROLAIDS) 550-110 mg Chew, Take 1 tablet by mouth 2 (two) times daily as needed (heartburn)., Disp: , Rfl:     cyproheptadine (PERIACTIN) 4 mg tablet, Take 1 tablet (4 mg total) by mouth 3 (three) times daily as needed (appetite)., Disp: 50 tablet, Rfl: 3    dorzolamide-timolol 2-0.5% (COSOPT) 22.3-6.8 mg/mL ophthalmic solution, INSTILL 1 DROP INTO BOTH EYES TWICE A DAY, Disp: 10 mL, Rfl: 4    gabapentin (NEURONTIN) 300 MG capsule, TAKE 1 CAP IN  THE AM, 1 CAP MIDDAY, AND 2 CAPS AT NIGHT, Disp: 360 capsule, Rfl: 3    latanoprost 0.005 % ophthalmic solution, INSTILL 1 DROP INTO BOTH EYES EVERY EVENING, Disp: 7.5 mL, Rfl: 3    mirtazapine (REMERON) 7.5 MG Tab, TAKE 1 TABLET BY MOUTH EVERY DAY IN THE EVENING, Disp: 90 tablet, Rfl: 0    omeprazole (PRILOSEC) 40 MG capsule, Take 1 capsule (40 mg total) by mouth every morning., Disp: 30 capsule, Rfl: 0    potassium chloride SA (K-DUR,KLOR-CON) 20 MEQ tablet, TAKE 1 TABLET BY MOUTH EVERY DAY, Disp: 90 tablet, Rfl: 3    RABEprazole (ACIPHEX) 20 mg tablet, Take 20 mg by mouth once daily., Disp: , Rfl:     Review of patient's allergies indicates:   Allergen Reactions    Ace inhibitors Edema     Other reaction(s): Angioedema    Codeine Hives       Family History   Problem Relation Name Age of Onset    Heart disease Mother          heart attack    Heart disease Father      Heart disease Brother          MI    Parkinsonism Sister      Arthritis Sister      No Known Problems Brother      No Known Problems Brother      Amblyopia Neg Hx      Blindness Neg Hx      Cancer Neg Hx      Cataracts Neg Hx      Glaucoma Neg Hx      Hypertension Neg Hx      Macular degeneration Neg Hx      Retinal detachment Neg Hx      Strabismus Neg Hx      Stroke Neg Hx      Thyroid disease Neg Hx      Diabetes Neg Hx      Colon cancer Neg Hx      Crohn's disease Neg Hx      Esophageal cancer Neg Hx      Stomach cancer Neg Hx      Ulcerative colitis Neg Hx       Social History     Occupational History    Not on file   Tobacco Use    Smoking status: Every Day     Current packs/day: 0.00     Average packs/day: 0.3 packs/day for 65.0 years (21.5 ttl pk-yrs)     Types: Cigarettes     Start date: 10/5/1955     Last attempt to quit: 10/5/2020     Years since quitting: 3.8    Smokeless tobacco: Never    Tobacco comments:     3/1/23--states smokes 1-2 cigarettes a day   Substance and Sexual Activity    Alcohol use: Yes     Alcohol/week: 2.0 standard  drinks of alcohol     Types: 2 Shots of liquor per week     Comment: Daily, about 4 drinks per day (crown)    Drug use: No    Sexual activity: Not Currently

## 2024-08-01 ENCOUNTER — LAB VISIT (OUTPATIENT)
Dept: LAB | Facility: HOSPITAL | Age: 89
End: 2024-08-01
Attending: INTERNAL MEDICINE
Payer: MEDICARE

## 2024-08-01 DIAGNOSIS — D50.0 IRON DEFICIENCY ANEMIA DUE TO CHRONIC BLOOD LOSS: ICD-10-CM

## 2024-08-01 LAB
ALBUMIN SERPL BCP-MCNC: 3.5 G/DL (ref 3.5–5.2)
ALP SERPL-CCNC: 87 U/L (ref 55–135)
ALT SERPL W/O P-5'-P-CCNC: 8 U/L (ref 10–44)
ANION GAP SERPL CALC-SCNC: 4 MMOL/L (ref 8–16)
AST SERPL-CCNC: 18 U/L (ref 10–40)
BASOPHILS # BLD AUTO: 0.04 K/UL (ref 0–0.2)
BASOPHILS NFR BLD: 0.5 % (ref 0–1.9)
BILIRUB SERPL-MCNC: 0.4 MG/DL (ref 0.1–1)
BUN SERPL-MCNC: 12 MG/DL (ref 8–23)
CALCIUM SERPL-MCNC: 9.1 MG/DL (ref 8.7–10.5)
CHLORIDE SERPL-SCNC: 110 MMOL/L (ref 95–110)
CO2 SERPL-SCNC: 27 MMOL/L (ref 23–29)
CREAT SERPL-MCNC: 1.1 MG/DL (ref 0.5–1.4)
DIFFERENTIAL METHOD BLD: ABNORMAL
EOSINOPHIL # BLD AUTO: 0.3 K/UL (ref 0–0.5)
EOSINOPHIL NFR BLD: 3.6 % (ref 0–8)
ERYTHROCYTE [DISTWIDTH] IN BLOOD BY AUTOMATED COUNT: 12.8 % (ref 11.5–14.5)
EST. GFR  (NO RACE VARIABLE): 48.6 ML/MIN/1.73 M^2
GLUCOSE SERPL-MCNC: 94 MG/DL (ref 70–110)
HCT VFR BLD AUTO: 42.3 % (ref 37–48.5)
HGB BLD-MCNC: 14.1 G/DL (ref 12–16)
IMM GRANULOCYTES # BLD AUTO: 0.01 K/UL (ref 0–0.04)
IMM GRANULOCYTES NFR BLD AUTO: 0.1 % (ref 0–0.5)
LYMPHOCYTES # BLD AUTO: 3.7 K/UL (ref 1–4.8)
LYMPHOCYTES NFR BLD: 44.7 % (ref 18–48)
MCH RBC QN AUTO: 32.3 PG (ref 27–31)
MCHC RBC AUTO-ENTMCNC: 33.3 G/DL (ref 32–36)
MCV RBC AUTO: 97 FL (ref 82–98)
MONOCYTES # BLD AUTO: 0.8 K/UL (ref 0.3–1)
MONOCYTES NFR BLD: 9.4 % (ref 4–15)
NEUTROPHILS # BLD AUTO: 3.4 K/UL (ref 1.8–7.7)
NEUTROPHILS NFR BLD: 41.7 % (ref 38–73)
NRBC BLD-RTO: 0 /100 WBC
PLATELET # BLD AUTO: 209 K/UL (ref 150–450)
PMV BLD AUTO: 9.4 FL (ref 9.2–12.9)
POTASSIUM SERPL-SCNC: 4.6 MMOL/L (ref 3.5–5.1)
PROT SERPL-MCNC: 6.7 G/DL (ref 6–8.4)
RBC # BLD AUTO: 4.37 M/UL (ref 4–5.4)
SODIUM SERPL-SCNC: 141 MMOL/L (ref 136–145)
WBC # BLD AUTO: 8.23 K/UL (ref 3.9–12.7)

## 2024-08-01 PROCEDURE — 85025 COMPLETE CBC W/AUTO DIFF WBC: CPT | Performed by: INTERNAL MEDICINE

## 2024-08-01 PROCEDURE — 36415 COLL VENOUS BLD VENIPUNCTURE: CPT | Performed by: INTERNAL MEDICINE

## 2024-08-01 PROCEDURE — 80053 COMPREHEN METABOLIC PANEL: CPT | Performed by: INTERNAL MEDICINE

## 2024-08-02 ENCOUNTER — OFFICE VISIT (OUTPATIENT)
Dept: HEMATOLOGY/ONCOLOGY | Facility: CLINIC | Age: 89
End: 2024-08-02
Payer: MEDICARE

## 2024-08-02 VITALS
TEMPERATURE: 96 F | HEART RATE: 94 BPM | BODY MASS INDEX: 24.84 KG/M2 | HEIGHT: 63 IN | WEIGHT: 140.19 LBS | OXYGEN SATURATION: 95 % | RESPIRATION RATE: 14 BRPM | DIASTOLIC BLOOD PRESSURE: 73 MMHG | SYSTOLIC BLOOD PRESSURE: 152 MMHG

## 2024-08-02 DIAGNOSIS — E04.2 MULTIPLE THYROID NODULES: ICD-10-CM

## 2024-08-02 DIAGNOSIS — R91.1 LUNG NODULE: ICD-10-CM

## 2024-08-02 DIAGNOSIS — E03.9 PRIMARY HYPOTHYROIDISM: ICD-10-CM

## 2024-08-02 DIAGNOSIS — R79.89 ELEVATED LFTS: ICD-10-CM

## 2024-08-02 DIAGNOSIS — C18.2 MALIGNANT NEOPLASM OF ASCENDING COLON: ICD-10-CM

## 2024-08-02 DIAGNOSIS — Z96.651 STATUS POST TOTAL RIGHT KNEE REPLACEMENT: ICD-10-CM

## 2024-08-02 DIAGNOSIS — E78.1 HYPERTRIGLYCERIDEMIA: ICD-10-CM

## 2024-08-02 DIAGNOSIS — E53.8 B12 DEFICIENCY: Primary | ICD-10-CM

## 2024-08-02 DIAGNOSIS — N18.31 STAGE 3A CHRONIC KIDNEY DISEASE: ICD-10-CM

## 2024-08-02 DIAGNOSIS — T45.1X5A CHEMOTHERAPY-INDUCED PERIPHERAL NEUROPATHY: ICD-10-CM

## 2024-08-02 DIAGNOSIS — G62.0 CHEMOTHERAPY-INDUCED PERIPHERAL NEUROPATHY: ICD-10-CM

## 2024-08-02 DIAGNOSIS — K22.70 BARRETT'S ESOPHAGUS WITHOUT DYSPLASIA: ICD-10-CM

## 2024-08-02 DIAGNOSIS — Z85.038 PERSONAL HISTORY OF COLON CANCER, STAGE III: ICD-10-CM

## 2024-08-02 PROCEDURE — 99999 PR PBB SHADOW E&M-EST. PATIENT-LVL IV: CPT | Mod: PBBFAC,HCNC,, | Performed by: INTERNAL MEDICINE
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
## 2024-09-04 ENCOUNTER — HOSPITAL ENCOUNTER (OUTPATIENT)
Dept: RADIOLOGY | Facility: HOSPITAL | Age: 89
Discharge: HOME | End: 2024-09-04
Attending: ORTHOPAEDIC SURGERY
Payer: MEDICARE

## 2024-09-04 ENCOUNTER — OFFICE VISIT (OUTPATIENT)
Dept: ORTHOPEDICS | Facility: CLINIC | Age: 89
End: 2024-09-04
Payer: MEDICARE

## 2024-09-04 VITALS — BODY MASS INDEX: 24.8 KG/M2 | HEIGHT: 63 IN | WEIGHT: 140 LBS

## 2024-09-04 DIAGNOSIS — S92.352A CLOSED DISPLACED FRACTURE OF FIFTH METATARSAL BONE OF LEFT FOOT, INITIAL ENCOUNTER: ICD-10-CM

## 2024-09-04 DIAGNOSIS — S92.352D CLOSED DISPLACED FRACTURE OF FIFTH METATARSAL BONE OF LEFT FOOT WITH ROUTINE HEALING, SUBSEQUENT ENCOUNTER: Primary | ICD-10-CM

## 2024-09-04 PROCEDURE — 99213 OFFICE O/P EST LOW 20 MIN: CPT | Mod: HCNC,57,S$GLB, | Performed by: ORTHOPAEDIC SURGERY

## 2024-09-04 PROCEDURE — 99999 PR PBB SHADOW E&M-EST. PATIENT-LVL III: CPT | Mod: PBBFAC,HCNC,, | Performed by: ORTHOPAEDIC SURGERY

## 2024-09-04 PROCEDURE — 73630 X-RAY EXAM OF FOOT: CPT | Mod: TC,HCNC,PO,LT

## 2024-09-04 PROCEDURE — 1160F RVW MEDS BY RX/DR IN RCRD: CPT | Mod: HCNC,CPTII,S$GLB, | Performed by: ORTHOPAEDIC SURGERY

## 2024-09-04 PROCEDURE — 1159F MED LIST DOCD IN RCRD: CPT | Mod: HCNC,CPTII,S$GLB, | Performed by: ORTHOPAEDIC SURGERY

## 2024-09-04 PROCEDURE — 1125F AMNT PAIN NOTED PAIN PRSNT: CPT | Mod: HCNC,CPTII,S$GLB, | Performed by: ORTHOPAEDIC SURGERY

## 2024-09-04 PROCEDURE — 1101F PT FALLS ASSESS-DOCD LE1/YR: CPT | Mod: HCNC,CPTII,S$GLB, | Performed by: ORTHOPAEDIC SURGERY

## 2024-09-04 PROCEDURE — 1157F ADVNC CARE PLAN IN RCRD: CPT | Mod: HCNC,CPTII,S$GLB, | Performed by: ORTHOPAEDIC SURGERY

## 2024-09-04 PROCEDURE — 73630 X-RAY EXAM OF FOOT: CPT | Mod: 26,HCNC,LT, | Performed by: RADIOLOGY

## 2024-09-04 PROCEDURE — 28470 CLTX METATARSAL FX WO MNP EA: CPT | Mod: HCNC,LT,S$GLB, | Performed by: ORTHOPAEDIC SURGERY

## 2024-09-04 PROCEDURE — 3288F FALL RISK ASSESSMENT DOCD: CPT | Mod: HCNC,CPTII,S$GLB, | Performed by: ORTHOPAEDIC SURGERY

## 2024-09-04 NOTE — H&P
[facility] - Emergency Dept  Hospital Medicine  History & Physical    Patient Name: Kenyetta Andersen  MRN: [id number]  Patient Class: IP- Inpatient  Admission Date: 7/30/2023  Attending Physician: Flavia Carroll MD   Primary Care Provider: Hever Arreola MD         Patient information was obtained from patient, relative(s), past medical records and ER records.     Subjective:     Principal Problem:Acalculous cholecystitis    Chief Complaint:   Chief Complaint   Patient presents with    Chest Pain        HPI: Ms. Andersen is an 88-year-old female who presented to the ED with chest/abdominal pain.  Patient just received morphine, somnolent but does briefly awaken but back to sleep, hard of hearing, collateral from family members present at bedside.  States she went to [employer] earlier this morning, this afternoon around 2:00 p.m. developed chest pain, described as someone sitting on her chest, radiating down into her epigastric/abdominal area, associated with nausea and 3 episodes of emesis PTA.  Denies constipation or diarrhea.  Patient did report mild shortness of breath, current smoker at least 2 pack per day, does not have home oxygen, no formal diagnosis of COPD.  Patient has had multiple prior abdominal surgery, remote history of stage III colorectal cancer status post adjuvant chemotherapy.  Daily alcohol consumption, quantified by at least 4 drinks of crown daily.  In the ED afebrile, blood pressure elevated, tachycardic.  Labs with no leukocytosis with left shift present, hemoglobin 15.3, BUN/creatinine 14/1.1, T bilirubin 1.8, , , , lipase 28, lactic acid 3.2, troponin 7.2.  EKG sinus with sinus arrhythmia with PVCs.  Chest x-ray no focal infiltrates or consolidation.  CT abdomen with marked distended gallbladder with dilatation of CBD, concerning for acalculous cholecystitis.  She received 2 L normal saline, famotidine 20 mg, Zofran, morphine 2 mg and ordered for  "piperacillin/tazobactam.  Case discussed with ED provider who states he discussed with on-call general surgery, Dr. Brewer, recommends MRCP and patient will be seen in consultation tomorrow.  Plan of care discussed with patient and visitors at bedside as best able.      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    Anxiety     Arthritis     Escalante esophagus     Blood transfusion     Cataract     Done OU    Colon cancer 2009    Colon polyps 3/14/2017    GERD (gastroesophageal reflux disease)     Glaucoma 2/24/2012    Nuclear sclerosis 8/27/2012    Osteoporosis     Primary hypothyroidism 2/23/2020    Pseudophakia - Left Eye 2/24/2012       Past Surgical History:   Procedure Laterality Date    APPENDECTOMY      CATARACT EXTRACTION W/  INTRAOCULAR LENS IMPLANT Left     CATARACT EXTRACTION W/  INTRAOCULAR LENS IMPLANT Right     Dr Sawant    COLON SURGERY      resection    COLONOSCOPY N/A 3/14/2017    Procedure: COLONOSCOPY;  Surgeon: Killian Guerrier MD;  Location: [facility] ENDO;  Service: Endoscopy;  Laterality: N/A;    COLONOSCOPY  03/14/2017    Dr. Guerrier: hemorrhoids, one colon polyp removed, "Patent functional end-to-end ileo-colonic anastomosis"with healthy mucosa; biopsy: hyperplastic polyp; repeat in 3 years for surveillance    COLONOSCOPY N/A 1/21/2020    Procedure: COLONOSCOPY;  Surgeon: Timo Darling MD;  Location: [facility] ENDO;  Service: Endoscopy;  Laterality: N/A;    COLONOSCOPY N/A 7/8/2022    Procedure: COLONOSCOPY;  Surgeon: Anaid Washington MD;  Location: [facility] ENDO;  Service: Endoscopy;  Laterality: N/A;    ECTOPIC PREGNANCY SURGERY      ESOPHAGOGASTRODUODENOSCOPY N/A 5/16/2019    Procedure: EGD (ESOPHAGOGASTRODUODENOSCOPY);  Surgeon: Anaid Washington MD;  Location: [facility] ENDO;  Service: Endoscopy;  Laterality: N/A;    ESOPHAGOGASTRODUODENOSCOPY N/A 7/11/2019    Procedure: EGD (ESOPHAGOGASTRODUODENOSCOPY);  Surgeon: Anaid Washington MD;  " Location: [facility] ENDO;  Service: Endoscopy;  Laterality: N/A;    ESOPHAGOGASTRODUODENOSCOPY N/A 2/5/2021    Procedure: EGD (ESOPHAGOGASTRODUODENOSCOPY);  Surgeon: Anaid Washington MD;  Location: [facility] ENDO;  Service: Endoscopy;  Laterality: N/A;    ESOPHAGOGASTRODUODENOSCOPY N/A 11/11/2021    Procedure: EGD (ESOPHAGOGASTRODUODENOSCOPY);  Surgeon: Anaid Washington MD;  Location: [facility] ENDO;  Service: Endoscopy;  Laterality: N/A;    ESOPHAGOGASTRODUODENOSCOPY N/A 7/8/2022    Procedure: EGD (ESOPHAGOGASTRODUODENOSCOPY);  Surgeon: Anaid Washington MD;  Location: [facility] ENDO;  Service: Endoscopy;  Laterality: N/A;    ESOPHAGOGASTRODUODENOSCOPY N/A 4/12/2023    Procedure: EGD (ESOPHAGOGASTRODUODENOSCOPY);  Surgeon: Anaid Washington MD;  Location: [facility] ENDO;  Service: Endoscopy;  Laterality: N/A;    EYE SURGERY      bilateral cataracts    HYSTERECTOMY      complete    JOINT REPLACEMENT      Liban Knee    ROTATOR CUFF REPAIR Right     TOE SURGERY      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    UPPER GASTROINTESTINAL ENDOSCOPY  06/12/2017    Dr. Guerrier: gastritis and esophagitis, biopsy: chronic gastritis, negative for h pylori, esophageal- positive eosinophils, negative for barretts; repeat in 2 months    UPPER GASTROINTESTINAL ENDOSCOPY  07/27/2017    Dr. Guerrier       Review of patient's allergies indicates:   Allergen Reactions    Ace inhibitors Edema     Other reaction(s): Angioedema    Codeine Hives       No current facility-administered medications on file prior to encounter.     Current Outpatient Medications on File Prior to Encounter   Medication Sig    alendronate (FOSAMAX) 70 MG tablet TAKE 1 TABLET (70 MG TOTAL) BY MOUTH EVERY 7 DAYS. ON EMPTY STOMACH IN MORNING, REMAIN UPRIGHT FOR 30 MINUTES.    amoxicillin (AMOXIL) 875 MG tablet Take 875 mg by mouth 2 (two) times daily.    ascorbic acid, vitamin C, (VITAMIN C) 500 MG tablet Take 500 mg by mouth once daily.    b complex vitamins capsule Take  1 capsule by mouth once daily.    brimonidine 0.2% (ALPHAGAN) 0.2 % Drop INSTILL 1 DROP INTO BOTH EYES 3 TIMES A DAY    calcium carbonate-magnesium hydroxide (ROLAIDS) 550-110 mg Chew Take 1 tablet by mouth 2 (two) times daily with meals.    ciprofloxacin-dexAMETHasone 0.3-0.1% (CIPRODEX) 0.3-0.1 % DrpS Place into both ears.    cyanocobalamin (VITAMIN B-12) 1000 MCG tablet Take 100 mcg by mouth once daily.    dorzolamide-timolol 2-0.5% (COSOPT) 22.3-6.8 mg/mL ophthalmic solution INSTILL 1 DROP INTO BOTH EYES TWICE A DAY    gabapentin (NEURONTIN) 300 MG capsule TAKE 1 CAP IN THE AM, 1 CAP MIDDAY, AND 2 CAPS AT NIGHT    hydroCHLOROthiazide (HYDRODIURIL) 12.5 MG Tab TAKE 1 TABLET BY MOUTH EVERY DAY (Patient not taking: Reported on 2023)    latanoprost 0.005 % ophthalmic solution PLACE 1 DROP INTO BOTH EYES EVERY EVENING.    meloxicam (MOBIC) 15 MG tablet TAKE 1 TABLET BY MOUTH EVERY DAY AS NEEDED FOR PAIN    mirtazapine (REMERON) 7.5 MG Tab TAKE 1 TABLET BY MOUTH EVERY EVENING.    omeprazole (PRILOSEC) 40 MG capsule Take 1 capsule (40 mg total) by mouth 2 (two) times daily before meals for 60 days, THEN 1 capsule (40 mg total) every morning.    potassium chloride SA (K-DUR,KLOR-CON) 20 MEQ tablet TAKE 1 TABLET BY MOUTH ONCE DAILY     Family History       Problem Relation (Age of Onset)    Arthritis Sister    Heart disease Mother, Father, Brother    No Known Problems Brother, Brother    Parkinsonism Sister          Tobacco Use    Smoking status: Every Day     Current packs/day: 0.00     Average packs/day: 0.3 packs/day for 65.0 years (21.5 ttl pk-yrs)     Types: Cigarettes     Start date: 10/5/1955     Last attempt to quit: 10/5/2020     Years since quittin.8    Smokeless tobacco: Never    Tobacco comments:     3/1/23--states smokes 1-2 cigarettes a day   Substance and Sexual Activity    Alcohol use: Yes     Alcohol/week: 2.0 standard drinks of alcohol     Types: 2 Shots of liquor per week      Comment: Daily, about 4 drinks per day (crown)    Drug use: No    Sexual activity: Not Currently     Review of Systems   Constitutional:  Positive for chills. Negative for fever.   HENT:  Negative for congestion.    Respiratory:  Positive for shortness of breath.    Cardiovascular:  Positive for chest pain and leg swelling (intermittent for years).   Gastrointestinal:  Positive for abdominal pain, nausea and vomiting.   Genitourinary:  Negative for dysuria and urgency.   Musculoskeletal:  Positive for back pain.   Skin:  Negative for wound.   Allergic/Immunologic: Negative for immunocompromised state.   Neurological:  Negative for seizures.   Psychiatric/Behavioral:          Mildly confused after morphine     Objective:     Vital Signs (Most Recent):  Temp: 97.9 °F (36.6 °C) (07/30/23 1810)  Pulse: 98 (07/30/23 2000)  Resp: (!) 21 (07/30/23 2000)  BP: (!) 164/73 (07/30/23 2000)  SpO2: (!) 92 % (07/30/23 1810) Vital Signs (24h Range):  Temp:  [97.9 °F (36.6 °C)] 97.9 °F (36.6 °C)  Pulse:  [89-98] 98  Resp:  [20-27] 21  SpO2:  [92 %] 92 %  BP: (145-164)/(73-77) 164/73     Weight: 61.2 kg (135 lb)  Body mass index is 23.91 kg/m².     Physical Exam  Vitals and nursing note reviewed.   Constitutional:       Appearance: She is ill-appearing.      Comments: Elderly female, frail, ill appearing, shaking, under the blankets   HENT:      Head: Normocephalic and atraumatic.      Mouth/Throat:      Mouth: Mucous membranes are moist.   Eyes:      General:         Right eye: No discharge.         Left eye: No discharge.      Comments: Wearing corrective lenses, senile arcus   Cardiovascular:      Rate and Rhythm: Regular rhythm. Tachycardia present.      Comments: Warm peripheries, mild pedal edema  Pulmonary:      Comments: On NC, no wheeze  Abdominal:      Comments: Healed previous surgical incision, tenderness to palpate diffuse, no peritoneal signs currently   Genitourinary:     Comments: No martines  Skin:     General: Skin  "is warm.   Neurological:      Comments: Sleeping/somnolent, does awaken with insistence but shortly back to sleep (just received morphine), [place], following simple commands all four extremities, + generalized weakness   Psychiatric:      Comments: cooperative                Significant Labs: BMP:   Recent Labs   Lab 07/30/23  1844   *      K 3.9      CO2 23   BUN 14   CREATININE 1.1   CALCIUM 9.6     CBC:   Recent Labs   Lab 07/30/23  1844   WBC 11.04   HGB 15.3   HCT 46.5        CMP:   Recent Labs   Lab 07/30/23  1844      K 3.9      CO2 23   *   BUN 14   CREATININE 1.1   CALCIUM 9.6   PROT 7.5   ALBUMIN 3.6   BILITOT 1.8*   ALKPHOS 120   *   *   ANIONGAP 9     Cardiac Markers: No results for input(s): "CKMB", "MYOGLOBIN", "BNP", "TROPISTAT" in the last 48 hours.  Coagulation: No results for input(s): "PT", "INR", "APTT" in the last 48 hours.  Magnesium: No results for input(s): "MG" in the last 48 hours.  POCT Glucose: No results for input(s): "POCTGLUCOSE" in the last 48 hours.  Troponin:   Recent Labs   Lab 07/30/23  1844   TROPONINIHS 7.2     TSH: No results for input(s): "TSH" in the last 4320 hours.  Urine Culture: No results for input(s): "LABURIN" in the last 48 hours.  Urine Studies: No results for input(s): "COLORU", "APPEARANCEUA", "PHUR", "SPECGRAV", "PROTEINUA", "GLUCUA", "KETONESU", "BILIRUBINUA", "OCCULTUA", "NITRITE", "UROBILINOGEN", "LEUKOCYTESUR", "RBCUA", "WBCUA", "BACTERIA", "SQUAMEPITHEL", "HYALINECASTS" in the last 48 hours.    Invalid input(s): "WRIGHTSUR"    Significant Imaging: I have reviewed all pertinent imaging results/findings within the past 24 hours.    X-Ray Chest AP Portable    Result Date: 7/30/2023  EXAMINATION: XR CHEST AP PORTABLE CLINICAL INDICATION: Female, 88 years old. Chest COMPARISON: None. FINDINGS: The heart and great vessels appear normal. There are some atherosclerotic changes of aorta. Both lungs are well " expanded and clear. No pleural effusion can be seen. No pneumothorax can be seen. IMPRESSION: No x-ray evidence of active or acute disease. Electronically signed by:  Marcela Escalante Jr., MD  7/30/2023 9:22 PM CDT Workstation: [id number]    CT Abdomen Pelvis With Contrast    Result Date: 7/30/2023  EXAMINATION: CT ABDOMEN PELVIS WITH CONTRAST CLINICAL INDICATION: Female, 88 years old. Epigastric pain TECHNIQUE: Helical CT scan examination of the abdomen and pelvis is performed from the domes of the diaphragm to the pubic symphysis with the administration of intravenous contrast material. CONTRAST: 75 mL of Omnipaque 300 IV. COMPARISON: None FINDINGS: Lower Chest: Visualized lung bases are clear other than hypoaeration changes. Heart is normal in size. No pericardial or pleural effusion. Liver: Normal in size and contour. No focal lesion. Bile Ducts: Common bile duct is  dilated. Gallbladder: There is marked dilation of the gallbladder and there may be pericolic cystic fluid. No definite gallstones can be seen. Pancreas: Pancreas is atrophic but demonstrates no other abnormality. Spleen: Normal in size and contour. Adrenals: Normal configuration. Kidneys and Ureters: Normal size and contour. No hydronephrosis. There are bilateral renal cysts. Bladder: Normal in appearance. Bowel: Stomach: Unremarkable. Small Intestine: Unremarkable. Appendix: No inflammatory changes. Colon: Unremarkable. Vessels: Abdominal aorta and inferior vena cava are normal in course and caliber. Reproductive Organs: Uterus is apparently surgically absent and no pelvic mass can be seen Lymph Nodes: No pathologic mesenteric or retroperitoneal lymph nodes. Peritoneum: No free air, free fluid, or fluid collection. Abdominal Wall: No hernia or mass. Musculoskeletal: No acute abnormality or suspicious bony lesion. There is considerable compression of the L1 vertebral body which appears chronic. There is some retropulsion of bone. Compression of the  superior endplate of T12 also appears to be chronic. There are degenerative disc changes at T12-L1 to L2-L3. No aggressive skeletal lesions can be seen. IMPRESSION: Dilation of the gallbladder and dilation of the common bile duct. There may be pericholecystic fluid. No gallstones can be seen but changes may represent  noncalculous acute cholecystitis. Small bilateral renal cysts. This exam was performed according to our departmental dose-optimization program which includes automated exposure control, adjustment of the mA and/or kV according to patient size and/or use of iterative reconstruction technique. Electronically signed by:  Marcela Escalante Jr., MD  7/30/2023 9:04 PM CDT Workstation: [id number]       Assessment/Plan:     * Acalculous cholecystitis  Presenting with lower chest/epigastric pain associated with nausea vomiting   Transaminitis with T bilirubin 1.8 with normal lipase with CT concerning for acalculous cholecystitis with dilatation of CBD   Abdomen is tender to palpate, no leukocytosis but left shift present, has had prior abdominal surgeries  NPO except for medication until evaluated by General surgery   IV fluid hydration   MRCP evaluate mild dilatation of CBD   P.r.n. antiemetic and analgesic as ordered, adjust as needed, monitor for over-sedation   Empiric antibiotics with piperacillin/tazobactam  Serial abdominal examination  General surgery consulted        Elevated LFTs  New elevation LFTs with current concern for cholecystitis   MRCP for evaluation  Trending CMP      Tachycardia  Sinus tach with sinus arrhythmia PVC   Likely in the setting of acute medical conditions, alcohol use, pain, infection   Keep potassium greater than 4, add on magnesium level  Remote telemetry monitoring      Daily consumption of alcohol  Daily alcohol consumption, 4 drinks of crown daily  Ordered banana bag   CIWA protocol with p.r.n. symptom driven Ativan and monitoring      Lactic acid acidosis  Lactic acid 3.2    Repeat status post fluid resuscitation IV antibiotics   Trend q.4 hours until less than 2      Pulmonary emphysema, unspecified emphysema type  As per chart review   Chronic smoker 2 pack per day, denies formal diagnosis of COPD and not on home inhalers   Continue supplemental oxygen, wean/adjust as needed  Scheduled breathing treatments      Chemotherapy-induced peripheral neuropathy  Noted chronic history of same, continue gabapentin, renal dose      Personal history of colon cancer, stage III  Noted prior history of stage III colorectal cancer, status post adjuvant chemotherapy   Followed by Dr. Granado/Renu    Smoker  Current smoker 2 pack per day   Nicotine patch ordered  Smoking cessation counseling as able      Stage 3a chronic kidney disease  Known history of CKD stage 3   Await UA with reflex urine culture  Renally dose all medications and avoid nephrotoxin drugs  Trending BMP      GERD (gastroesophageal reflux disease)  IV PPI therapy      Macrocytosis  Known history of macrocytosis, previously on B12 supplementation, daily alcohol consumption   Repeat B12 and folic acid level        VTE Risk Mitigation (From admission, onward)         Ordered     heparin (porcine) injection 5,000 Units  Every 8 hours         07/30/23 2229     IP VTE HIGH RISK PATIENT  Once         07/30/23 2229     Place sequential compression device  Until discontinued         07/30/23 2229                           Flavia Carroll MD  Department of Hospital Medicine  [facility] - Emergency Dept   34.8

## 2024-09-04 NOTE — PROGRESS NOTES
Subjective:      Patient ID: Kenyetta Andersen is a 89 y.o. female.    Chief Complaint: Injury and Pain of the Left Foot (Fall on 7/16.)    HPI  Patient follow up on her left 5th metatarsal fracture.  Pain is improving.  ROS      Objective:    Ortho Exam     Constitutional:   Patient is alert  and oriented in no acute distress  HEENT:  normocephalic atraumatic; PERRL EOMI  Neck:  Supple without adenopathy  Cardiovascular:  Normal rate and rhythm  Pulmonary:  Normal respiratory effort normal chest wall expansion  Abdominal:  Nonprotuberant nondistended  Musculoskeletal:  Patient has a minimal swelling mild tenderness over the lateral left forefoot  Neurological:  No focal defect; cranial nerves 2-12 grossly intact  Psychiatric/behavioral:  Mood and behavior normal      X-Ray Foot Complete Left  Narrative: EXAMINATION:  XR FOOT COMPLETE 3 VIEW LEFT    CLINICAL HISTORY:  .  Displaced fracture of fifth metatarsal bone, left foot, initial encounter for closed fracture    TECHNIQUE:  AP, lateral and oblique views of the left foot were performed.    COMPARISON:  None    FINDINGS:  There is hallux valgus and degenerative changes at the 1st metatarsophalangeal joint.  There is healing fracture of the distal head of the 5th metatarsal  noted.  There are hammertoes of digits 2 through 4.  Dorsal spurring is noted at the talonavicular joint and mid tarsal joints.  A small heel spur is noted.  An acute fracture is not seen.  Impression: Healing fracture of the distal head of the 5th metatarsal.  Hallux valgus with degenerative changes at the 1st metatarsophalangeal joint and hammertoes of digits 2 through 4.  Dorsal spurring at the talonavicular joint and mid tarsal joints with a small heel spur.    Electronically signed by: Nate Vicente MD  Date:    09/04/2024  Time:    10:50       My Radiographs Findings:    I have personally reviewed radiographs and concur with above findings    Assessment:       Encounter Diagnosis  "  Name Primary?    Closed displaced fracture of fifth metatarsal bone of left foot with routine healing, subsequent encounter Yes         Plan:       I have discussed medical condition treatment options with her at length.  She may begin to slowly advance activities as tolerated with the next 6 weeks.  Follow up at that time for repeat radiographs sooner if any questions or problems.        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    Anxiety     Arthritis     Escalante esophagus     Blood transfusion     Cataract     Done OU    Colon cancer 2009    Colon polyps 3/14/2017    GERD (gastroesophageal reflux disease)     Glaucoma 2/24/2012    Nuclear sclerosis 8/27/2012    Osteoporosis     Primary hypothyroidism 2/23/2020    Pseudophakia - Left Eye 2/24/2012     Past Surgical History:   Procedure Laterality Date    APPENDECTOMY      CATARACT EXTRACTION W/  INTRAOCULAR LENS IMPLANT Left     CATARACT EXTRACTION W/  INTRAOCULAR LENS IMPLANT Right     Dr Sawant    COLON SURGERY      resection    COLONOSCOPY N/A 3/14/2017    Procedure: COLONOSCOPY;  Surgeon: Killian Guerrier MD;  Location: [facility];  Service: Endoscopy;  Laterality: N/A;    COLONOSCOPY  03/14/2017    Dr. Guerrier: hemorrhoids, one colon polyp removed, "Patent functional end-to-end ileo-colonic anastomosis"with healthy mucosa; biopsy: hyperplastic polyp; repeat in 3 years for surveillance    COLONOSCOPY N/A 1/21/2020    Procedure: COLONOSCOPY;  Surgeon: Timo Darling MD;  Location: [facility];  Service: Endoscopy;  Laterality: N/A;    COLONOSCOPY N/A 7/8/2022    Procedure: COLONOSCOPY;  Surgeon: Anaid Wsahington MD;  Location: [facility];  Service: Endoscopy;  Laterality: N/A;    ECTOPIC PREGNANCY SURGERY      ERCP N/A 7/31/2023    Procedure: ERCP (ENDOSCOPIC RETROGRADE CHOLANGIOPANCREATOGRAPHY);  Surgeon: Nasim Cuadra MD;  Location: [facility] ENDO;  Service: Endoscopy;  Laterality: N/A;    ERCP N/A 8/1/2023    Procedure: " ERCP (ENDOSCOPIC RETROGRADE CHOLANGIOPANCREATOGRAPHY);  Surgeon: Олег Arreaga MD;  Location: [facility] (2ND FLR);  Service: Endoscopy;  Laterality: N/A;    ERCP N/A 12/12/2023    Procedure: ERCP (ENDOSCOPIC RETROGRADE CHOLANGIOPANCREATOGRAPHY);  Surgeon: Dimitri Anaya MD;  Location: [facility] (2ND FLR);  Service: Endoscopy;  Laterality: N/A;    ESOPHAGOGASTRODUODENOSCOPY N/A 5/16/2019    Procedure: EGD (ESOPHAGOGASTRODUODENOSCOPY);  Surgeon: Anaid Washington MD;  Location: [facility];  Service: Endoscopy;  Laterality: N/A;    ESOPHAGOGASTRODUODENOSCOPY N/A 7/11/2019    Procedure: EGD (ESOPHAGOGASTRODUODENOSCOPY);  Surgeon: Anaid Washington MD;  Location: [facility];  Service: Endoscopy;  Laterality: N/A;    ESOPHAGOGASTRODUODENOSCOPY N/A 2/5/2021    Procedure: EGD (ESOPHAGOGASTRODUODENOSCOPY);  Surgeon: Anaid Washington MD;  Location: [facility];  Service: Endoscopy;  Laterality: N/A;    ESOPHAGOGASTRODUODENOSCOPY N/A 11/11/2021    Procedure: EGD (ESOPHAGOGASTRODUODENOSCOPY);  Surgeon: Anaid Washington MD;  Location: [facility];  Service: Endoscopy;  Laterality: N/A;    ESOPHAGOGASTRODUODENOSCOPY N/A 7/8/2022    Procedure: EGD (ESOPHAGOGASTRODUODENOSCOPY);  Surgeon: Anaid Washington MD;  Location: [facility];  Service: Endoscopy;  Laterality: N/A;    ESOPHAGOGASTRODUODENOSCOPY N/A 4/12/2023    Procedure: EGD (ESOPHAGOGASTRODUODENOSCOPY);  Surgeon: Anaid Wahsington MD;  Location: [facility] ENDO;  Service: Endoscopy;  Laterality: N/A;    EYE SURGERY      bilateral cataracts    HYSTERECTOMY      complete    JOINT REPLACEMENT      Liban Knee    ROTATOR CUFF REPAIR Right     TOE SURGERY      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    UPPER GASTROINTESTINAL ENDOSCOPY  06/12/2017    Dr. Guerrier: gastritis and esophagitis, biopsy: chronic gastritis, negative for h pylori, esophageal- positive eosinophils, negative for barretts; repeat in 2 months    UPPER GASTROINTESTINAL ENDOSCOPY  07/27/2017    Dr. Guerrier          Current Outpatient Medications:     acetaminophen (TYLENOL) 650 MG TbSR, Take 650 mg by mouth every 8 (eight) hours as needed (pain)., Disp: , Rfl:     alendronate (FOSAMAX) 70 MG tablet, TAKE 1 TABLET BY MOUTH EVERY 7 DAYS. ON EMPTY STOMACH IN MORNING, REMAIN UPRIGHT FOR 30 MINUTES., Disp: 12 tablet, Rfl: 1    ascorbic acid, vitamin C, (VITAMIN C) 500 MG tablet, Take 500 mg by mouth once daily., Disp: , Rfl:     b complex vitamins capsule, Take 1 capsule by mouth once daily., Disp: , Rfl:     brimonidine 0.2% (ALPHAGAN) 0.2 % Drop, INSTILL 1 DROP INTO BOTH EYES 3 TIMES A DAY, Disp: 10 mL, Rfl: 6    calcium carbonate-magnesium hydroxide (ROLAIDS) 550-110 mg Chew, Take 1 tablet by mouth 2 (two) times daily as needed (heartburn)., Disp: , Rfl:     cyproheptadine (PERIACTIN) 4 mg tablet, Take 1 tablet (4 mg total) by mouth 3 (three) times daily as needed (appetite)., Disp: 50 tablet, Rfl: 3    dorzolamide-timolol 2-0.5% (COSOPT) 22.3-6.8 mg/mL ophthalmic solution, INSTILL 1 DROP INTO BOTH EYES TWICE A DAY, Disp: 10 mL, Rfl: 4    gabapentin (NEURONTIN) 300 MG capsule, TAKE 1 CAP IN THE AM, 1 CAP MIDDAY, AND 2 CAPS AT NIGHT, Disp: 360 capsule, Rfl: 3    latanoprost 0.005 % ophthalmic solution, INSTILL 1 DROP INTO BOTH EYES EVERY EVENING, Disp: 7.5 mL, Rfl: 3    mirtazapine (REMERON) 7.5 MG Tab, TAKE 1 TABLET BY MOUTH EVERY DAY IN THE EVENING, Disp: 90 tablet, Rfl: 0    omeprazole (PRILOSEC) 40 MG capsule, Take 1 capsule (40 mg total) by mouth every morning., Disp: 30 capsule, Rfl: 0    potassium chloride SA (K-DUR,KLOR-CON) 20 MEQ tablet, TAKE 1 TABLET BY MOUTH EVERY DAY, Disp: 90 tablet, Rfl: 3    RABEprazole (ACIPHEX) 20 mg tablet, Take 20 mg by mouth once daily., Disp: , Rfl:     Review of patient's allergies indicates:   Allergen Reactions    Ace inhibitors Edema     Other reaction(s): Angioedema    Codeine Hives       Family History   Problem Relation Name Age of Onset    Heart disease Mother          heart  attack    Heart disease Father      Heart disease Brother          MI    Parkinsonism Sister      Arthritis Sister      No Known Problems Brother      No Known Problems Brother      Amblyopia Neg Hx      Blindness Neg Hx      Cancer Neg Hx      Cataracts Neg Hx      Glaucoma Neg Hx      Hypertension Neg Hx      Macular degeneration Neg Hx      Retinal detachment Neg Hx      Strabismus Neg Hx      Stroke Neg Hx      Thyroid disease Neg Hx      Diabetes Neg Hx      Colon cancer Neg Hx      Crohn's disease Neg Hx      Esophageal cancer Neg Hx      Stomach cancer Neg Hx      Ulcerative colitis Neg Hx       Social History     Occupational History    Not on file   Tobacco Use    Smoking status: Every Day     Current packs/day: 0.00     Average packs/day: 0.3 packs/day for 65.0 years (21.5 ttl pk-yrs)     Types: Cigarettes     Start date: 10/5/1955     Last attempt to quit: 10/5/2020     Years since quitting: 3.9    Smokeless tobacco: Never    Tobacco comments:     3/1/23--states smokes 1-2 cigarettes a day   Substance and Sexual Activity    Alcohol use: Yes     Alcohol/week: 2.0 standard drinks of alcohol     Types: 2 Shots of liquor per week     Comment: Daily, about 4 drinks per day (crown)    Drug use: No    Sexual activity: Not Currently

## 2024-10-04 DIAGNOSIS — Z85.038 HISTORY OF COLON CANCER: ICD-10-CM

## 2024-10-04 RX ORDER — POTASSIUM CHLORIDE 20 MEQ/1
20 TABLET, EXTENDED RELEASE ORAL
Qty: 90 TABLET | Refills: 3 | Status: SHIPPED | OUTPATIENT
Start: 2024-10-04

## 2024-10-30 ENCOUNTER — HOSPITAL ENCOUNTER (OUTPATIENT)
Dept: RADIOLOGY | Facility: HOSPITAL | Age: 89
Discharge: HOME | End: 2024-10-30
Attending: INTERNAL MEDICINE
Payer: MEDICARE

## 2024-10-30 VITALS — WEIGHT: 135 LBS | BODY MASS INDEX: 23.92 KG/M2 | HEIGHT: 63 IN

## 2024-10-30 DIAGNOSIS — C18.2 MALIGNANT NEOPLASM OF ASCENDING COLON: ICD-10-CM

## 2024-10-30 DIAGNOSIS — K22.70 BARRETT'S ESOPHAGUS WITHOUT DYSPLASIA: ICD-10-CM

## 2024-10-30 LAB — POCT GLUCOSE: 114 MG/DL (ref 70–110)

## 2024-10-30 PROCEDURE — 82962 GLUCOSE BLOOD TEST: CPT | Mod: PO

## 2024-10-30 PROCEDURE — A9552 F18 FDG: HCPCS | Mod: PO | Performed by: INTERNAL MEDICINE

## 2024-10-30 PROCEDURE — 78815 PET IMAGE W/CT SKULL-THIGH: CPT | Mod: TC,PO

## 2024-10-30 PROCEDURE — 78815 PET IMAGE W/CT SKULL-THIGH: CPT | Mod: 26,PI,, | Performed by: RADIOLOGY

## 2024-10-30 RX ORDER — FLUDEOXYGLUCOSE F18 500 MCI/ML
10 INJECTION INTRAVENOUS
Status: DISCONTINUED | OUTPATIENT
Start: 2024-10-30 | End: 2024-10-30

## 2024-10-30 RX ORDER — FLUDEOXYGLUCOSE F18 500 MCI/ML
13 INJECTION INTRAVENOUS
Status: COMPLETED | OUTPATIENT
Start: 2024-10-30 | End: 2024-10-30

## 2024-10-30 RX ADMIN — FLUDEOXYGLUCOSE F-18 13 MILLICURIE: 500 INJECTION INTRAVENOUS at 10:10

## 2024-11-04 ENCOUNTER — OFFICE VISIT (OUTPATIENT)
Dept: HEMATOLOGY/ONCOLOGY | Facility: CLINIC | Age: 89
End: 2024-11-04
Payer: MEDICARE

## 2024-11-04 ENCOUNTER — TELEPHONE (OUTPATIENT)
Dept: FAMILY MEDICINE | Facility: CLINIC | Age: 89
End: 2024-11-04
Payer: MEDICARE

## 2024-11-04 VITALS
DIASTOLIC BLOOD PRESSURE: 67 MMHG | HEART RATE: 90 BPM | OXYGEN SATURATION: 94 % | WEIGHT: 126.13 LBS | BODY MASS INDEX: 22.35 KG/M2 | SYSTOLIC BLOOD PRESSURE: 136 MMHG | HEIGHT: 63 IN | TEMPERATURE: 96 F | RESPIRATION RATE: 16 BRPM

## 2024-11-04 DIAGNOSIS — D75.1 POLYCYTHEMIA: ICD-10-CM

## 2024-11-04 DIAGNOSIS — E53.8 B12 DEFICIENCY: Primary | ICD-10-CM

## 2024-11-04 DIAGNOSIS — Z85.038 HISTORY OF COLON CANCER: ICD-10-CM

## 2024-11-04 DIAGNOSIS — R59.0 MEDIASTINAL ADENOPATHY: ICD-10-CM

## 2024-11-04 PROCEDURE — 99999 PR PBB SHADOW E&M-EST. PATIENT-LVL IV: CPT | Mod: PBBFAC,HCNC,, | Performed by: INTERNAL MEDICINE

## 2024-11-04 PROCEDURE — 3288F FALL RISK ASSESSMENT DOCD: CPT | Mod: HCNC,CPTII,S$GLB, | Performed by: INTERNAL MEDICINE

## 2024-11-04 PROCEDURE — 1125F AMNT PAIN NOTED PAIN PRSNT: CPT | Mod: HCNC,CPTII,S$GLB, | Performed by: INTERNAL MEDICINE

## 2024-11-04 PROCEDURE — 1101F PT FALLS ASSESS-DOCD LE1/YR: CPT | Mod: HCNC,CPTII,S$GLB, | Performed by: INTERNAL MEDICINE

## 2024-11-04 PROCEDURE — 1157F ADVNC CARE PLAN IN RCRD: CPT | Mod: HCNC,CPTII,S$GLB, | Performed by: INTERNAL MEDICINE

## 2024-11-04 PROCEDURE — 99214 OFFICE O/P EST MOD 30 MIN: CPT | Mod: HCNC,S$GLB,, | Performed by: INTERNAL MEDICINE

## 2024-11-04 PROCEDURE — 1159F MED LIST DOCD IN RCRD: CPT | Mod: HCNC,CPTII,S$GLB, | Performed by: INTERNAL MEDICINE

## 2024-11-04 NOTE — TELEPHONE ENCOUNTER
Spoke with patient    Appt scheduled tomorrow with Alexsandra Miller         ----- Message from Rufina sent at 11/4/2024  1:25 PM CST -----  Type:  Same Day Appointment Request    Caller is requesting a same day appointment.  Caller declined first available appointment listed below.      Name of Caller:pt    When is the first available appointment?na    Symptoms:   leg swelling    Best Call Back Number: [phone]        Additional Information: Symptom: Leg Swelling - Not From Injury  Outcome: Schedule an urgent appointment (within 4 hours) or talk to a nurse or provider within 30 minutes.  Reason: Leg swelling on one side only        Please call Back to advise. Thanks!
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
## 2024-11-05 ENCOUNTER — OFFICE VISIT (OUTPATIENT)
Dept: FAMILY MEDICINE | Facility: CLINIC | Age: 89
End: 2024-11-05
Payer: MEDICARE

## 2024-11-05 ENCOUNTER — HOSPITAL ENCOUNTER (OUTPATIENT)
Dept: RADIOLOGY | Facility: CLINIC | Age: 89
Discharge: HOME | End: 2024-11-05
Attending: NURSE PRACTITIONER
Payer: MEDICARE

## 2024-11-05 VITALS
OXYGEN SATURATION: 96 % | HEART RATE: 92 BPM | HEIGHT: 63 IN | TEMPERATURE: 98 F | WEIGHT: 127 LBS | SYSTOLIC BLOOD PRESSURE: 120 MMHG | BODY MASS INDEX: 22.5 KG/M2 | DIASTOLIC BLOOD PRESSURE: 70 MMHG

## 2024-11-05 DIAGNOSIS — R05.9 COUGH, UNSPECIFIED TYPE: ICD-10-CM

## 2024-11-05 DIAGNOSIS — M79.605 LEFT LEG PAIN: Primary | ICD-10-CM

## 2024-11-05 PROCEDURE — 99999 PR PBB SHADOW E&M-EST. PATIENT-LVL V: CPT | Mod: PBBFAC,HCNC,, | Performed by: NURSE PRACTITIONER

## 2024-11-05 PROCEDURE — 1100F PTFALLS ASSESS-DOCD GE2>/YR: CPT | Mod: HCNC,CPTII,S$GLB, | Performed by: NURSE PRACTITIONER

## 2024-11-05 PROCEDURE — 1159F MED LIST DOCD IN RCRD: CPT | Mod: HCNC,CPTII,S$GLB, | Performed by: NURSE PRACTITIONER

## 2024-11-05 PROCEDURE — 1157F ADVNC CARE PLAN IN RCRD: CPT | Mod: HCNC,CPTII,S$GLB, | Performed by: NURSE PRACTITIONER

## 2024-11-05 PROCEDURE — 1125F AMNT PAIN NOTED PAIN PRSNT: CPT | Mod: HCNC,CPTII,S$GLB, | Performed by: NURSE PRACTITIONER

## 2024-11-05 PROCEDURE — 3288F FALL RISK ASSESSMENT DOCD: CPT | Mod: HCNC,CPTII,S$GLB, | Performed by: NURSE PRACTITIONER

## 2024-11-05 PROCEDURE — 99214 OFFICE O/P EST MOD 30 MIN: CPT | Mod: HCNC,S$GLB,, | Performed by: NURSE PRACTITIONER

## 2024-11-05 PROCEDURE — 1160F RVW MEDS BY RX/DR IN RCRD: CPT | Mod: HCNC,CPTII,S$GLB, | Performed by: NURSE PRACTITIONER

## 2024-11-05 PROCEDURE — 71046 X-RAY EXAM CHEST 2 VIEWS: CPT | Mod: TC,HCNC,FY,PO

## 2024-11-05 PROCEDURE — 71046 X-RAY EXAM CHEST 2 VIEWS: CPT | Mod: 26,HCNC,, | Performed by: RADIOLOGY

## 2024-11-05 RX ORDER — AZITHROMYCIN 250 MG/1
TABLET, FILM COATED ORAL
Qty: 6 TABLET | Refills: 0 | Status: SHIPPED | OUTPATIENT
Start: 2024-11-05 | End: 2024-11-10

## 2024-11-05 RX ORDER — PROMETHAZINE HYDROCHLORIDE AND DEXTROMETHORPHAN HYDROBROMIDE 6.25; 15 MG/5ML; MG/5ML
5 SYRUP ORAL EVERY 8 HOURS PRN
Qty: 118 ML | Refills: 0 | Status: SHIPPED | OUTPATIENT
Start: 2024-11-05 | End: 2024-11-15

## 2024-11-05 RX ORDER — BENZONATATE 100 MG/1
100 CAPSULE ORAL 3 TIMES DAILY PRN
Qty: 30 CAPSULE | Refills: 0 | Status: SHIPPED | OUTPATIENT
Start: 2024-11-05 | End: 2024-11-15

## 2024-11-05 NOTE — PROGRESS NOTES
Subjective:       Patient ID: Kenyetta Andersen is a 89 y.o. female.    Chief Complaint: left leg swelling and pain     HPI   88 y/o female patient with medical problems listed below presents for left upper and lower leg swelling and pain for one week. Patient walks with walker. Denies recent trauma to the affected area. States the pain is constant, worse on walking, associated with tingling and numbness. Denies chest pain, sob, fever, chills but endorses cough which is chronic off and on for 6 months. States had the left foot fracture from the fall in 7/2024. States taking tylenol with relief. States takes nyquil for chronic cough off and on with some relief but noted coughing is getting worse. Denies smoking. Denies hx of ashthma or copd.     Patient Active Problem List   Diagnosis    Chronic angle-closure glaucoma of both eyes    S/P TKR (total knee replacement)    Hypertriglyceridemia    Macrocytosis    B12 deficiency    Abdominal aortic atherosclerosis    GERD (gastroesophageal reflux disease)    Stage 3a chronic kidney disease    Bilateral renal cysts    Smoker    Osteopenia of multiple sites    BMI 27.0-27.9,adult    Calcified granuloma of lung    Personal history of colon cancer, stage III    Acid reflux    Escalante's esophagus    History of colon cancer    Chemotherapy-induced peripheral neuropathy    Primary hypothyroidism    Vitamin D deficiency    Primary insomnia    Stress incontinence of urine    Dysphagia    Hypokalemia    Pulmonary emphysema, unspecified emphysema type    Acalculous cholecystitis    Daily consumption of alcohol    Tachycardia    Elevated LFTs    Cigarette nicotine dependence without complication    Bacteremia due to Gram-negative bacteria    Debility    Multiple thyroid nodules      Review of patient's allergies indicates:   Allergen Reactions    Ace inhibitors Edema     Other reaction(s): Angioedema    Codeine Hives     Past Surgical History:   Procedure Laterality Date     "APPENDECTOMY      CATARACT EXTRACTION W/  INTRAOCULAR LENS IMPLANT Left     CATARACT EXTRACTION W/  INTRAOCULAR LENS IMPLANT Right     Dr Sawant    COLON SURGERY      resection    COLONOSCOPY N/A 3/14/2017    Procedure: COLONOSCOPY;  Surgeon: Killian Guerrier MD;  Location: [facility] ENDO;  Service: Endoscopy;  Laterality: N/A;    COLONOSCOPY  03/14/2017    Dr. Guerrier: hemorrhoids, one colon polyp removed, "Patent functional end-to-end ileo-colonic anastomosis"with healthy mucosa; biopsy: hyperplastic polyp; repeat in 3 years for surveillance    COLONOSCOPY N/A 1/21/2020    Procedure: COLONOSCOPY;  Surgeon: Timo Darling MD;  Location: [facility] ENDO;  Service: Endoscopy;  Laterality: N/A;    COLONOSCOPY N/A 7/8/2022    Procedure: COLONOSCOPY;  Surgeon: Anaid Washington MD;  Location: [facility];  Service: Endoscopy;  Laterality: N/A;    ECTOPIC PREGNANCY SURGERY      ERCP N/A 7/31/2023    Procedure: ERCP (ENDOSCOPIC RETROGRADE CHOLANGIOPANCREATOGRAPHY);  Surgeon: Nasim Cuadra MD;  Location: [facility];  Service: Endoscopy;  Laterality: N/A;    ERCP N/A 8/1/2023    Procedure: ERCP (ENDOSCOPIC RETROGRADE CHOLANGIOPANCREATOGRAPHY);  Surgeon: Олег Arreaga MD;  [facility]  Service: Endoscopy;  Laterality: N/A;    ERCP N/A 12/12/2023    Procedure: ERCP (ENDOSCOPIC RETROGRADE CHOLANGIOPANCREATOGRAPHY);  Surgeon: Dimitri Anaya MD;  Location: [facility] ENDO (2ND FLR);  Service: Endoscopy;  Laterality: N/A;    ESOPHAGOGASTRODUODENOSCOPY N/A 5/16/2019    Procedure: EGD (ESOPHAGOGASTRODUODENOSCOPY);  Surgeon: Anaid Washington MD;  Location: [facility] ENDO;  Service: Endoscopy;  Laterality: N/A;    ESOPHAGOGASTRODUODENOSCOPY N/A 7/11/2019    Procedure: EGD (ESOPHAGOGASTRODUODENOSCOPY);  Surgeon: Anaid Washington MD;  Location: [facility] ENDO;  Service: Endoscopy;  Laterality: N/A;    ESOPHAGOGASTRODUODENOSCOPY N/A 2/5/2021    Procedure: EGD (ESOPHAGOGASTRODUODENOSCOPY);  Surgeon: nAaid Washington MD;  " Location: [facility] ENDO;  Service: Endoscopy;  Laterality: N/A;    ESOPHAGOGASTRODUODENOSCOPY N/A 11/11/2021    Procedure: EGD (ESOPHAGOGASTRODUODENOSCOPY);  Surgeon: Anaid Washington MD;  Location: [facility] ENDO;  Service: Endoscopy;  Laterality: N/A;    ESOPHAGOGASTRODUODENOSCOPY N/A 7/8/2022    Procedure: EGD (ESOPHAGOGASTRODUODENOSCOPY);  Surgeon: Anaid Washington MD;  Location: [facility] ENDO;  Service: Endoscopy;  Laterality: N/A;    ESOPHAGOGASTRODUODENOSCOPY N/A 4/12/2023    Procedure: EGD (ESOPHAGOGASTRODUODENOSCOPY);  Surgeon: Anaid Washington MD;  Location: [facility] ENDO;  Service: Endoscopy;  Laterality: N/A;    EYE SURGERY      bilateral cataracts    HYSTERECTOMY      complete    JOINT REPLACEMENT      Liban Knee    ROTATOR CUFF REPAIR Right     TOE SURGERY      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    UPPER GASTROINTESTINAL ENDOSCOPY  06/12/2017    Dr. Guerrier: gastritis and esophagitis, biopsy: chronic gastritis, negative for h pylori, esophageal- positive eosinophils, negative for barretts; repeat in 2 months    UPPER GASTROINTESTINAL ENDOSCOPY  07/27/2017    Dr. Guerrier        Current Outpatient Medications:     acetaminophen (TYLENOL) 650 MG TbSR, Take 650 mg by mouth every 8 (eight) hours as needed (pain)., Disp: , Rfl:     alendronate (FOSAMAX) 70 MG tablet, TAKE 1 TABLET BY MOUTH EVERY 7 DAYS. ON EMPTY STOMACH IN MORNING, REMAIN UPRIGHT FOR 30 MINUTES., Disp: 12 tablet, Rfl: 1    ascorbic acid, vitamin C, (VITAMIN C) 500 MG tablet, Take 500 mg by mouth once daily., Disp: , Rfl:     b complex vitamins capsule, Take 1 capsule by mouth once daily., Disp: , Rfl:     brimonidine 0.2% (ALPHAGAN) 0.2 % Drop, INSTILL 1 DROP INTO BOTH EYES 3 TIMES A DAY, Disp: 10 mL, Rfl: 6    calcium carbonate-magnesium hydroxide (ROLAIDS) 550-110 mg Chew, Take 1 tablet by mouth 2 (two) times daily as needed (heartburn)., Disp: , Rfl:     cyproheptadine (PERIACTIN) 4 mg tablet, Take 1 tablet (4 mg total) by mouth 3 (three) times  "daily as needed (appetite)., Disp: 50 tablet, Rfl: 3    dorzolamide-timolol 2-0.5% (COSOPT) 22.3-6.8 mg/mL ophthalmic solution, INSTILL 1 DROP INTO BOTH EYES TWICE A DAY, Disp: 10 mL, Rfl: 4    gabapentin (NEURONTIN) 300 MG capsule, TAKE 1 CAP IN THE AM, 1 CAP MIDDAY, AND 2 CAPS AT NIGHT, Disp: 360 capsule, Rfl: 3    latanoprost 0.005 % ophthalmic solution, INSTILL 1 DROP INTO BOTH EYES EVERY EVENING, Disp: 7.5 mL, Rfl: 3    mirtazapine (REMERON) 7.5 MG Tab, TAKE 1 TABLET BY MOUTH EVERY DAY IN THE EVENING, Disp: 90 tablet, Rfl: 0    omeprazole (PRILOSEC) 40 MG capsule, Take 1 capsule (40 mg total) by mouth every morning., Disp: 30 capsule, Rfl: 0    potassium chloride SA (K-DUR,KLOR-CON) 20 MEQ tablet, TAKE 1 TABLET BY MOUTH EVERY DAY, Disp: 90 tablet, Rfl: 3    RABEprazole (ACIPHEX) 20 mg tablet, Take 20 mg by mouth once daily., Disp: , Rfl:     Review of Systems   Constitutional:  Negative for chills and fever.   Respiratory:  Positive for cough. Negative for shortness of breath.    Cardiovascular:  Negative for chest pain and palpitations.   Gastrointestinal:  Negative for abdominal pain.   Musculoskeletal:  Positive for gait problem.        Left leg pain   Neurological:  Negative for dizziness and headaches.       Objective:   /70 (BP Location: Right arm, Patient Position: Sitting)   Pulse 92   Temp 98.3 °F (36.8 °C) (Oral)   Ht 5' 3" (1.6 m)   Wt 57.6 kg (126 lb 15.8 oz)   SpO2 96%   BMI 22.49 kg/m²         Physical Exam  Constitutional:       General: She is not in acute distress.     Appearance: Normal appearance.   HENT:      Head: Atraumatic.   Cardiovascular:      Rate and Rhythm: Normal rate and regular rhythm.      Pulses: Normal pulses.      Heart sounds: Normal heart sounds.   Pulmonary:      Effort: Pulmonary effort is normal.      Breath sounds: Normal breath sounds.   Abdominal:      General: Abdomen is flat. Bowel sounds are normal.      Palpations: Abdomen is soft.   Musculoskeletal:   "       General: Tenderness present.      Left lower leg: Edema present.   Neurological:      Mental Status: She is oriented to person, place, and time.         Assessment:       1. Left leg pain    2. Cough, unspecified type        Plan:       1. Left leg pain (Primary)  - US Lower Extremity Veins Left; Future  - US Lower Extrem Arteries Left with CARLITOS (xpd); Future  - CBC Auto Differential; Future  - Comprehensive Metabolic Panel; Future  - B-TYPE NATRIURETIC PEPTIDE; Future  - D-DIMER, QUANTITATIVE; Future    2. Cough, unspecified type  - CBC Auto Differential; Future  - Comprehensive Metabolic Panel; Future  - B-TYPE NATRIURETIC PEPTIDE; Future  - D-DIMER, QUANTITATIVE; Future  - X-Ray Chest PA And Lateral; Future     Patient with be reevaluated in  follow up with pcp  or sooner chad Montes NP

## 2024-11-06 ENCOUNTER — TELEPHONE (OUTPATIENT)
Dept: FAMILY MEDICINE | Facility: CLINIC | Age: 89
End: 2024-11-06
Payer: MEDICARE

## 2024-11-06 NOTE — TELEPHONE ENCOUNTER
----- Message from Alexsandra Miller NP sent at 11/5/2024  6:52 PM CST -----  Chest x-ray shows slight changes in the lung tissue at the lower part of the left lung, which may suggest mild inflammation. I sent in rx of cough medications and azithromycin to the pharmacy. Please follow up with pulmonary as scheduled.

## 2024-11-07 ENCOUNTER — HOSPITAL ENCOUNTER (OUTPATIENT)
Facility: HOSPITAL | Age: 89
Discharge: HOME | End: 2024-11-08
Attending: EMERGENCY MEDICINE
Payer: MEDICARE

## 2024-11-07 ENCOUNTER — HOSPITAL ENCOUNTER (OUTPATIENT)
Dept: RADIOLOGY | Facility: HOSPITAL | Age: 89
Discharge: HOME | End: 2024-11-07
Attending: NURSE PRACTITIONER
Payer: MEDICARE

## 2024-11-07 DIAGNOSIS — R07.9 CHEST PAIN, UNSPECIFIED TYPE: Primary | ICD-10-CM

## 2024-11-07 DIAGNOSIS — I26.99 OTHER ACUTE PULMONARY EMBOLISM WITHOUT ACUTE COR PULMONALE: ICD-10-CM

## 2024-11-07 DIAGNOSIS — R07.9 CHEST PAIN, UNSPECIFIED TYPE: ICD-10-CM

## 2024-11-07 DIAGNOSIS — R07.9 CHEST PAIN: ICD-10-CM

## 2024-11-07 DIAGNOSIS — I26.99 PULMONARY EMBOLISM: ICD-10-CM

## 2024-11-07 DIAGNOSIS — I26.99 PULMONARY EMBOLISM, UNSPECIFIED CHRONICITY, UNSPECIFIED PULMONARY EMBOLISM TYPE, UNSPECIFIED WHETHER ACUTE COR PULMONALE PRESENT: Primary | ICD-10-CM

## 2024-11-07 DIAGNOSIS — M79.89 SWELLING OF LEFT LOWER EXTREMITY: ICD-10-CM

## 2024-11-07 DIAGNOSIS — R00.0 TACHYCARDIA: ICD-10-CM

## 2024-11-07 LAB
ALBUMIN SERPL BCP-MCNC: 2.8 G/DL (ref 3.5–5.2)
ALP SERPL-CCNC: 111 U/L (ref 40–150)
ALT SERPL W/O P-5'-P-CCNC: 9 U/L (ref 10–44)
ANION GAP SERPL CALC-SCNC: 10 MMOL/L (ref 8–16)
AORTIC ROOT ANNULUS: 2.62 CM
AORTIC VALVE CUSP SEPERATION: 1.64 CM
APICAL FOUR CHAMBER EJECTION FRACTION: 50 %
APICAL TWO CHAMBER EJECTION FRACTION: 59 %
ASCENDING AORTA: 2.55 CM
AST SERPL-CCNC: 17 U/L (ref 10–40)
AV INDEX (PROSTH): 0.79
AV MEAN GRADIENT: 4.4 MMHG
AV PEAK GRADIENT: 7.8 MMHG
AV VALVE AREA BY VELOCITY RATIO: 2.7 CM²
AV VALVE AREA: 2.7 CM²
AV VELOCITY RATIO: 0.79
BASOPHILS # BLD AUTO: 0.04 K/UL (ref 0–0.2)
BASOPHILS NFR BLD: 0.5 % (ref 0–1.9)
BILIRUB SERPL-MCNC: 0.4 MG/DL (ref 0.1–1)
BSA FOR ECHO PROCEDURE: 1.59 M2
BUN SERPL-MCNC: 15 MG/DL (ref 8–23)
CALCIUM SERPL-MCNC: 9.3 MG/DL (ref 8.7–10.5)
CHLORIDE SERPL-SCNC: 106 MMOL/L (ref 95–110)
CO2 SERPL-SCNC: 22 MMOL/L (ref 23–29)
CREAT SERPL-MCNC: 1 MG/DL (ref 0.5–1.4)
CV ECHO LV RWT: 0.4 CM
DIFFERENTIAL METHOD BLD: ABNORMAL
DOP CALC AO PEAK VEL: 1.4 M/S
DOP CALC AO VTI: 27.8 CM
DOP CALC LVOT AREA: 3.5 CM2
DOP CALC LVOT DIAMETER: 2.1 CM
DOP CALC LVOT PEAK VEL: 1.1 M/S
DOP CALC LVOT STROKE VOLUME: 76.2 CM3
DOP CALC MV VTI: 18.8 CM
DOP CALCLVOT PEAK VEL VTI: 22 CM
E WAVE DECELERATION TIME: 233.72 MSEC
E/A RATIO: 0.73
E/E' RATIO: 8.93 M/S
ECHO LV POSTERIOR WALL: 0.8 CM (ref 0.6–1.1)
EOSINOPHIL # BLD AUTO: 0.1 K/UL (ref 0–0.5)
EOSINOPHIL NFR BLD: 0.7 % (ref 0–8)
ERYTHROCYTE [DISTWIDTH] IN BLOOD BY AUTOMATED COUNT: 12.8 % (ref 11.5–14.5)
EST. GFR  (NO RACE VARIABLE): 55 ML/MIN/1.73 M^2
FRACTIONAL SHORTENING: 25 % (ref 28–44)
GLUCOSE SERPL-MCNC: 88 MG/DL (ref 70–110)
HCT VFR BLD AUTO: 44.1 % (ref 37–48.5)
HCV AB SERPL QL IA: NEGATIVE
HGB BLD-MCNC: 14.5 G/DL (ref 12–16)
HIV 1+2 AB+HIV1 P24 AG SERPL QL IA: NEGATIVE
IMM GRANULOCYTES # BLD AUTO: 0.02 K/UL (ref 0–0.04)
IMM GRANULOCYTES NFR BLD AUTO: 0.2 % (ref 0–0.5)
INTERVENTRICULAR SEPTUM: 0.8 CM (ref 0.6–1.1)
IVRT: 129.4 MSEC
LA MAJOR: 4.72 CM
LEFT ATRIUM AREA SYSTOLIC (APICAL 2 CHAMBER): 9.5 CM2
LEFT ATRIUM AREA SYSTOLIC (APICAL 4 CHAMBER): 14.25 CM2
LEFT ATRIUM VOLUME INDEX MOD: 15 ML/M2
LEFT ATRIUM VOLUME MOD: 23.89 ML
LEFT INTERNAL DIMENSION IN SYSTOLE: 3 CM (ref 2.1–4)
LEFT VENTRICLE DIASTOLIC VOLUME INDEX: 43.38 ML/M2
LEFT VENTRICLE DIASTOLIC VOLUME: 68.98 ML
LEFT VENTRICLE END DIASTOLIC VOLUME APICAL 2 CHAMBER: 29.24 ML
LEFT VENTRICLE END DIASTOLIC VOLUME APICAL 4 CHAMBER: 39.48 ML
LEFT VENTRICLE END SYSTOLIC VOLUME APICAL 2 CHAMBER: 16.1 ML
LEFT VENTRICLE END SYSTOLIC VOLUME APICAL 4 CHAMBER: 34.96 ML
LEFT VENTRICLE MASS INDEX: 58.8 G/M2
LEFT VENTRICLE SYSTOLIC VOLUME INDEX: 21.2 ML/M2
LEFT VENTRICLE SYSTOLIC VOLUME: 33.68 ML
LEFT VENTRICULAR INTERNAL DIMENSION IN DIASTOLE: 4 CM (ref 3.5–6)
LEFT VENTRICULAR MASS: 93.5 G
LV LATERAL E/E' RATIO: 7.44 M/S
LV SEPTAL E/E' RATIO: 11.17 M/S
LVED V (TEICH): 68.98 ML
LVES V (TEICH): 33.68 ML
LVOT MG: 2.78 MMHG
LVOT MV: 0.79 CM/S
LYMPHOCYTES # BLD AUTO: 2.5 K/UL (ref 1–4.8)
LYMPHOCYTES NFR BLD: 28.2 % (ref 18–48)
MCH RBC QN AUTO: 32.1 PG (ref 27–31)
MCHC RBC AUTO-ENTMCNC: 32.9 G/DL (ref 32–36)
MCV RBC AUTO: 98 FL (ref 82–98)
MONOCYTES # BLD AUTO: 0.8 K/UL (ref 0.3–1)
MONOCYTES NFR BLD: 8.6 % (ref 4–15)
MV MEAN GRADIENT: 2 MMHG
MV PEAK A VEL: 0.92 M/S
MV PEAK E VEL: 0.67 M/S
MV PEAK GRADIENT: 4 MMHG
MV STENOSIS PRESSURE HALF TIME: 69.85 MS
MV VALVE AREA BY CONTINUITY EQUATION: 4.05 CM2
MV VALVE AREA P 1/2 METHOD: 3.15 CM2
NEUTROPHILS # BLD AUTO: 5.5 K/UL (ref 1.8–7.7)
NEUTROPHILS NFR BLD: 61.8 % (ref 38–73)
NRBC BLD-RTO: 0 /100 WBC
OHS CV RV/LV RATIO: 0.73 CM
OHS LV EJECTION FRACTION SIMPSONS BIPLANE MOD: 53 %
PISA MRMAX VEL: 3.58 M/S
PISA TR MAX VEL: 3.56 M/S
PLATELET # BLD AUTO: 246 K/UL (ref 150–450)
PMV BLD AUTO: 9.7 FL (ref 9.2–12.9)
POTASSIUM SERPL-SCNC: 4 MMOL/L (ref 3.5–5.1)
PROT SERPL-MCNC: 6.9 G/DL (ref 6–8.4)
PV MV: 0.55 M/S
PV PEAK GRADIENT: 2 MMHG
PV PEAK VELOCITY: 0.76 M/S
RA MAJOR: 4.21 CM
RA PRESSURE ESTIMATED: 3 MMHG
RBC # BLD AUTO: 4.52 M/UL (ref 4–5.4)
RIGHT VENTRICLE DIASTOLIC BASEL DIMENSION: 2.9 CM
RIGHT VENTRICLE DIASTOLIC LENGTH: 4.9 CM
RIGHT VENTRICULAR END-DIASTOLIC DIMENSION: 2.5 CM
RIGHT VENTRICULAR LENGTH IN DIASTOLE (APICAL 4-CHAMBER VIEW): 4.9 CM
RV TB RVSP: 7 MMHG
RV TISSUE DOPPLER FREE WALL SYSTOLIC VELOCITY 1 (APICAL 4 CHAMBER VIEW): 18.41 CM/S
SODIUM SERPL-SCNC: 138 MMOL/L (ref 136–145)
STJ: 2.2 CM
TDI LATERAL: 0.09 M/S
TDI SEPTAL: 0.06 M/S
TDI: 0.08 M/S
TR MAX PG: 51 MMHG
TRICUSPID ANNULAR PLANE SYSTOLIC EXCURSION: 2.46 CM
TROPONIN I SERPL DL<=0.01 NG/ML-MCNC: 0.02 NG/ML (ref 0–0.03)
TV REST PULMONARY ARTERY PRESSURE: 54 MMHG
WBC # BLD AUTO: 8.83 K/UL (ref 3.9–12.7)
Z-SCORE OF LEFT VENTRICULAR DIMENSION IN END DIASTOLE: -1.26
Z-SCORE OF LEFT VENTRICULAR DIMENSION IN END SYSTOLE: 0.5

## 2024-11-07 PROCEDURE — 96372 THER/PROPH/DIAG INJ SC/IM: CPT

## 2024-11-07 PROCEDURE — 63600175 PHARM REV CODE 636 W HCPCS

## 2024-11-07 PROCEDURE — 85025 COMPLETE CBC W/AUTO DIFF WBC: CPT | Performed by: EMERGENCY MEDICINE

## 2024-11-07 PROCEDURE — 86803 HEPATITIS C AB TEST: CPT | Performed by: EMERGENCY MEDICINE

## 2024-11-07 PROCEDURE — 36415 COLL VENOUS BLD VENIPUNCTURE: CPT | Performed by: EMERGENCY MEDICINE

## 2024-11-07 PROCEDURE — 99900035 HC TECH TIME PER 15 MIN (STAT)

## 2024-11-07 PROCEDURE — 71275 CT ANGIOGRAPHY CHEST: CPT | Mod: 26,,, | Performed by: RADIOLOGY

## 2024-11-07 PROCEDURE — 93005 ELECTROCARDIOGRAM TRACING: CPT

## 2024-11-07 PROCEDURE — 87389 HIV-1 AG W/HIV-1&-2 AB AG IA: CPT | Performed by: EMERGENCY MEDICINE

## 2024-11-07 PROCEDURE — 94799 UNLISTED PULMONARY SVC/PX: CPT

## 2024-11-07 PROCEDURE — G0378 HOSPITAL OBSERVATION PER HR: HCPCS

## 2024-11-07 PROCEDURE — 99285 EMERGENCY DEPT VISIT HI MDM: CPT | Mod: 25

## 2024-11-07 PROCEDURE — 25000003 PHARM REV CODE 250

## 2024-11-07 PROCEDURE — 96372 THER/PROPH/DIAG INJ SC/IM: CPT | Performed by: EMERGENCY MEDICINE

## 2024-11-07 PROCEDURE — 80053 COMPREHEN METABOLIC PANEL: CPT | Performed by: EMERGENCY MEDICINE

## 2024-11-07 PROCEDURE — 25500020 PHARM REV CODE 255: Performed by: NURSE PRACTITIONER

## 2024-11-07 PROCEDURE — 94761 N-INVAS EAR/PLS OXIMETRY MLT: CPT

## 2024-11-07 PROCEDURE — 84484 ASSAY OF TROPONIN QUANT: CPT | Performed by: EMERGENCY MEDICINE

## 2024-11-07 PROCEDURE — 94760 N-INVAS EAR/PLS OXIMETRY 1: CPT

## 2024-11-07 PROCEDURE — 71275 CT ANGIOGRAPHY CHEST: CPT | Mod: TC

## 2024-11-07 PROCEDURE — 99214 OFFICE O/P EST MOD 30 MIN: CPT | Mod: ,,, | Performed by: INTERNAL MEDICINE

## 2024-11-07 PROCEDURE — 93010 ELECTROCARDIOGRAM REPORT: CPT | Mod: ,,, | Performed by: GENERAL PRACTICE

## 2024-11-07 PROCEDURE — 63600175 PHARM REV CODE 636 W HCPCS: Performed by: EMERGENCY MEDICINE

## 2024-11-07 RX ORDER — GLUCAGON 1 MG
1 KIT INJECTION
Status: DISCONTINUED | OUTPATIENT
Start: 2024-11-07 | End: 2024-11-08 | Stop reason: HOSPADM

## 2024-11-07 RX ORDER — HYDROCODONE BITARTRATE AND ACETAMINOPHEN 5; 325 MG/1; MG/1
1 TABLET ORAL EVERY 6 HOURS PRN
Status: DISCONTINUED | OUTPATIENT
Start: 2024-11-07 | End: 2024-11-08 | Stop reason: HOSPADM

## 2024-11-07 RX ORDER — SODIUM,POTASSIUM PHOSPHATES 280-250MG
2 POWDER IN PACKET (EA) ORAL
Status: DISCONTINUED | OUTPATIENT
Start: 2024-11-07 | End: 2024-11-08 | Stop reason: HOSPADM

## 2024-11-07 RX ORDER — AMOXICILLIN 250 MG
1 CAPSULE ORAL DAILY PRN
Status: DISCONTINUED | OUTPATIENT
Start: 2024-11-07 | End: 2024-11-08 | Stop reason: HOSPADM

## 2024-11-07 RX ORDER — LATANOPROST 50 UG/ML
1 SOLUTION/ DROPS OPHTHALMIC NIGHTLY
Status: DISCONTINUED | OUTPATIENT
Start: 2024-11-07 | End: 2024-11-08 | Stop reason: HOSPADM

## 2024-11-07 RX ORDER — IBUPROFEN 200 MG
16 TABLET ORAL
Status: DISCONTINUED | OUTPATIENT
Start: 2024-11-07 | End: 2024-11-08 | Stop reason: HOSPADM

## 2024-11-07 RX ORDER — ONDANSETRON HYDROCHLORIDE 2 MG/ML
4 INJECTION, SOLUTION INTRAVENOUS EVERY 6 HOURS PRN
Status: DISCONTINUED | OUTPATIENT
Start: 2024-11-07 | End: 2024-11-08 | Stop reason: HOSPADM

## 2024-11-07 RX ORDER — ACETAMINOPHEN 325 MG/1
650 TABLET ORAL EVERY 4 HOURS PRN
Status: DISCONTINUED | OUTPATIENT
Start: 2024-11-07 | End: 2024-11-08 | Stop reason: HOSPADM

## 2024-11-07 RX ORDER — BENZONATATE 100 MG/1
100 CAPSULE ORAL 3 TIMES DAILY PRN
Status: DISCONTINUED | OUTPATIENT
Start: 2024-11-07 | End: 2024-11-08 | Stop reason: HOSPADM

## 2024-11-07 RX ORDER — IBUPROFEN 200 MG
24 TABLET ORAL
Status: DISCONTINUED | OUTPATIENT
Start: 2024-11-07 | End: 2024-11-08 | Stop reason: HOSPADM

## 2024-11-07 RX ORDER — ASCORBIC ACID 500 MG
500 TABLET ORAL DAILY
Status: DISCONTINUED | OUTPATIENT
Start: 2024-11-08 | End: 2024-11-08 | Stop reason: HOSPADM

## 2024-11-07 RX ORDER — ENOXAPARIN SODIUM 100 MG/ML
1 INJECTION SUBCUTANEOUS EVERY 12 HOURS
Status: DISCONTINUED | OUTPATIENT
Start: 2024-11-07 | End: 2024-11-08 | Stop reason: HOSPADM

## 2024-11-07 RX ORDER — ENOXAPARIN SODIUM 100 MG/ML
1 INJECTION SUBCUTANEOUS
Status: COMPLETED | OUTPATIENT
Start: 2024-11-07 | End: 2024-11-07

## 2024-11-07 RX ORDER — SODIUM CHLORIDE 0.9 % (FLUSH) 0.9 %
10 SYRINGE (ML) INJECTION EVERY 12 HOURS PRN
Status: DISCONTINUED | OUTPATIENT
Start: 2024-11-07 | End: 2024-11-08 | Stop reason: HOSPADM

## 2024-11-07 RX ORDER — GABAPENTIN 300 MG/1
300 CAPSULE ORAL 3 TIMES DAILY
Status: DISCONTINUED | OUTPATIENT
Start: 2024-11-07 | End: 2024-11-08 | Stop reason: HOSPADM

## 2024-11-07 RX ORDER — DEXTROMETHORPHAN HYDROBROMIDE, GUAIFENESIN 5; 100 MG/5ML; MG/5ML
650 LIQUID ORAL EVERY 8 HOURS PRN
Status: DISCONTINUED | OUTPATIENT
Start: 2024-11-07 | End: 2024-11-07

## 2024-11-07 RX ORDER — PANTOPRAZOLE SODIUM 40 MG/1
40 TABLET, DELAYED RELEASE ORAL DAILY
Status: DISCONTINUED | OUTPATIENT
Start: 2024-11-08 | End: 2024-11-08 | Stop reason: HOSPADM

## 2024-11-07 RX ORDER — DORZOLAMIDE HYDROCHLORIDE AND TIMOLOL MALEATE 20; 5 MG/ML; MG/ML
1 SOLUTION/ DROPS OPHTHALMIC 2 TIMES DAILY
Status: DISCONTINUED | OUTPATIENT
Start: 2024-11-07 | End: 2024-11-08 | Stop reason: HOSPADM

## 2024-11-07 RX ORDER — ACETAMINOPHEN 325 MG/1
650 TABLET ORAL EVERY 8 HOURS PRN
Status: DISCONTINUED | OUTPATIENT
Start: 2024-11-07 | End: 2024-11-07

## 2024-11-07 RX ORDER — NALOXONE HCL 0.4 MG/ML
0.02 VIAL (ML) INJECTION
Status: DISCONTINUED | OUTPATIENT
Start: 2024-11-07 | End: 2024-11-08 | Stop reason: HOSPADM

## 2024-11-07 RX ORDER — CYPROHEPTADINE HYDROCHLORIDE 4 MG/1
4 TABLET ORAL 3 TIMES DAILY PRN
Status: DISCONTINUED | OUTPATIENT
Start: 2024-11-07 | End: 2024-11-08 | Stop reason: HOSPADM

## 2024-11-07 RX ORDER — LANOLIN ALCOHOL/MO/W.PET/CERES
800 CREAM (GRAM) TOPICAL
Status: DISCONTINUED | OUTPATIENT
Start: 2024-11-07 | End: 2024-11-08 | Stop reason: HOSPADM

## 2024-11-07 RX ORDER — POTASSIUM CHLORIDE 20 MEQ/1
20 TABLET, EXTENDED RELEASE ORAL DAILY
Status: DISCONTINUED | OUTPATIENT
Start: 2024-11-08 | End: 2024-11-08 | Stop reason: HOSPADM

## 2024-11-07 RX ORDER — TALC
6 POWDER (GRAM) TOPICAL NIGHTLY PRN
Status: DISCONTINUED | OUTPATIENT
Start: 2024-11-07 | End: 2024-11-08 | Stop reason: HOSPADM

## 2024-11-07 RX ORDER — ALUMINUM HYDROXIDE, MAGNESIUM HYDROXIDE, AND SIMETHICONE 1200; 120; 1200 MG/30ML; MG/30ML; MG/30ML
30 SUSPENSION ORAL 4 TIMES DAILY PRN
Status: DISCONTINUED | OUTPATIENT
Start: 2024-11-07 | End: 2024-11-08 | Stop reason: HOSPADM

## 2024-11-07 RX ADMIN — ENOXAPARIN SODIUM 60 MG: 100 INJECTION SUBCUTANEOUS at 12:11

## 2024-11-07 RX ADMIN — DORZOLAMIDE HYDROCHLORIDE AND TIMOLOL MALEATE 1 DROP: 20; 5 SOLUTION/ DROPS OPHTHALMIC at 08:11

## 2024-11-07 RX ADMIN — LATANOPROST 1 DROP: 50 SOLUTION/ DROPS OPHTHALMIC at 08:11

## 2024-11-07 RX ADMIN — ENOXAPARIN SODIUM 60 MG: 60 INJECTION SUBCUTANEOUS at 08:11

## 2024-11-07 RX ADMIN — IOHEXOL 100 ML: 350 INJECTION, SOLUTION INTRAVENOUS at 10:11

## 2024-11-07 NOTE — ED PROVIDER NOTES
"Encounter Date: 11/7/2024       History     Chief Complaint   Patient presents with    Blood clots     Pt states the doctor called her and toldher to come to the hospital bc she has two blood clots, one in each lung. Pt denies any symptoms at this time. She says she had the ct scan because she was having swelling in left leg and pain    Leg Pain     Left leg.     Patient presents complaining of CT with blood clots.  Patient states she saw her primary care doctor who ordered CTA of the chest secondary to positive D-dimer.  Patient notes she has had a swollen left leg.  At the worst symptoms are mild-to-moderate.  Nothing makes it better or worse.  Patient states she has a remote history of colon cancer.  She denies being short of breath.  She denies pleuritic pain.      Review of patient's allergies indicates:   Allergen Reactions    Ace inhibitors Edema     Other reaction(s): Angioedema    Codeine Hives     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    Anxiety     Arthritis     Escalante esophagus     Blood transfusion     Cataract     Done OU    Colon cancer 2009    Colon polyps 3/14/2017    GERD (gastroesophageal reflux disease)     Glaucoma 2/24/2012    Nuclear sclerosis 8/27/2012    Osteoporosis     Primary hypothyroidism 2/23/2020    Pseudophakia - Left Eye 2/24/2012     Past Surgical History:   Procedure Laterality Date    APPENDECTOMY      CATARACT EXTRACTION W/  INTRAOCULAR LENS IMPLANT Left     CATARACT EXTRACTION W/  INTRAOCULAR LENS IMPLANT Right     Dr Sawant    COLON SURGERY      resection    COLONOSCOPY N/A 3/14/2017    Procedure: COLONOSCOPY;  Surgeon: Killian Guerrier MD;  Location: [facility];  Service: Endoscopy;  Laterality: N/A;    COLONOSCOPY  03/14/2017    Dr. Guerrier: hemorrhoids, one colon polyp removed, "Patent functional end-to-end ileo-colonic anastomosis"with healthy mucosa; biopsy: hyperplastic polyp; repeat in 3 years for surveillance    COLONOSCOPY N/A " 1/21/2020    Procedure: COLONOSCOPY;  Surgeon: Timo Darling MD;  Location: [facility] ENDO;  Service: Endoscopy;  Laterality: N/A;    COLONOSCOPY N/A 7/8/2022    Procedure: COLONOSCOPY;  Surgeon: Anaid Washington MD;  Location: [facility] ENDO;  Service: Endoscopy;  Laterality: N/A;    ECTOPIC PREGNANCY SURGERY      ERCP N/A 7/31/2023    Procedure: ERCP (ENDOSCOPIC RETROGRADE CHOLANGIOPANCREATOGRAPHY);  Surgeon: Nasim Cuadra MD;  Location: [facility] ENDO;  Service: Endoscopy;  Laterality: N/A;    ERCP N/A 8/1/2023    Procedure: ERCP (ENDOSCOPIC RETROGRADE CHOLANGIOPANCREATOGRAPHY);  Surgeon: Олег Arreaga MD;  Location: [facility] ENDO (2ND FLR);  Service: Endoscopy;  Laterality: N/A;    ERCP N/A 12/12/2023    Procedure: ERCP (ENDOSCOPIC RETROGRADE CHOLANGIOPANCREATOGRAPHY);  Surgeon: Dimitri Anaya MD;  Location: [facility] ENDO (2ND FLR);  Service: Endoscopy;  Laterality: N/A;    ESOPHAGOGASTRODUODENOSCOPY N/A 5/16/2019    Procedure: EGD (ESOPHAGOGASTRODUODENOSCOPY);  Surgeon: Anaid Washington MD;  Location: [facility];  Service: Endoscopy;  Laterality: N/A;    ESOPHAGOGASTRODUODENOSCOPY N/A 7/11/2019    Procedure: EGD (ESOPHAGOGASTRODUODENOSCOPY);  Surgeon: Anaid Washington MD;  Location: [facility] ENDO;  Service: Endoscopy;  Laterality: N/A;    ESOPHAGOGASTRODUODENOSCOPY N/A 2/5/2021    Procedure: EGD (ESOPHAGOGASTRODUODENOSCOPY);  Surgeon: Anaid Washington MD;  Location: [facility] ENDO;  Service: Endoscopy;  Laterality: N/A;    ESOPHAGOGASTRODUODENOSCOPY N/A 11/11/2021    Procedure: EGD (ESOPHAGOGASTRODUODENOSCOPY);  Surgeon: Anaid Washington MD;  Location: [facility] ENDO;  Service: Endoscopy;  Laterality: N/A;    ESOPHAGOGASTRODUODENOSCOPY N/A 7/8/2022    Procedure: EGD (ESOPHAGOGASTRODUODENOSCOPY);  Surgeon: Anaid Washington MD;  Location: [facility];  Service: Endoscopy;  Laterality: N/A;    ESOPHAGOGASTRODUODENOSCOPY N/A 4/12/2023    Procedure: EGD (ESOPHAGOGASTRODUODENOSCOPY);  Surgeon: Anaid Washington MD;  Location:  [facility] ENDO;  Service: Endoscopy;  Laterality: N/A;    EYE SURGERY      bilateral cataracts    HYSTERECTOMY      complete    JOINT REPLACEMENT      Liban Knee    ROTATOR CUFF REPAIR Right     TOE SURGERY      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    UPPER GASTROINTESTINAL ENDOSCOPY  2017    Dr. Guerrier: gastritis and esophagitis, biopsy: chronic gastritis, negative for h pylori, esophageal- positive eosinophils, negative for barretts; repeat in 2 months    UPPER GASTROINTESTINAL ENDOSCOPY  2017    Dr. Guerrier     Family History   Problem Relation Name Age of Onset    Heart disease Mother          heart attack    Heart disease Father      Heart disease Brother          MI    Parkinsonism Sister      Arthritis Sister      No Known Problems Brother      No Known Problems Brother      Amblyopia Neg Hx      Blindness Neg Hx      Cancer Neg Hx      Cataracts Neg Hx      Glaucoma Neg Hx      Hypertension Neg Hx      Macular degeneration Neg Hx      Retinal detachment Neg Hx      Strabismus Neg Hx      Stroke Neg Hx      Thyroid disease Neg Hx      Diabetes Neg Hx      Colon cancer Neg Hx      Crohn's disease Neg Hx      Esophageal cancer Neg Hx      Stomach cancer Neg Hx      Ulcerative colitis Neg Hx       Social History     Tobacco Use    Smoking status: Every Day     Current packs/day: 0.00     Average packs/day: 0.3 packs/day for 65.0 years (21.5 ttl pk-yrs)     Types: Cigarettes     Start date: 10/5/1955     Last attempt to quit: 10/5/2020     Years since quittin.0    Smokeless tobacco: Never    Tobacco comments:     3/1/23--states smokes 1-2 cigarettes a day   Substance Use Topics    Alcohol use: Yes     Alcohol/week: 2.0 standard drinks of alcohol     Types: 2 Shots of liquor per week     Comment: Daily, about 4 drinks per day (crown)    Drug use: No     Review of Systems   All other systems reviewed and are negative.      Physical Exam     Initial Vitals [24 1129]   BP Pulse Resp Temp SpO2    (!) 144/69 97 18 98.1 °F (36.7 °C) 95 %      MAP       --         Physical Exam    Nursing note and vitals reviewed.  Constitutional: She appears well-developed and well-nourished.   Pleasant, polite   HENT:   Head: Normocephalic and atraumatic.   Eyes: EOM are normal.   Neck: Neck supple.   Normal range of motion.  Cardiovascular:  Normal rate, regular rhythm, normal heart sounds and intact distal pulses.           Pulmonary/Chest: Breath sounds normal. No respiratory distress.   Abdominal: Abdomen is soft.   Musculoskeletal:      Cervical back: Normal range of motion and neck supple.      Comments: Left leg is greater than the right.  She was neurovascularly intact.     Neurological: She is alert and oriented to person, place, and time.   Skin: Skin is warm and dry. Capillary refill takes less than 2 seconds.   Psychiatric: She has a normal mood and affect. Her behavior is normal. Judgment and thought content normal.         ED Course   Procedures  Labs Reviewed   HIV 1 / 2 ANTIBODY   HEPATITIS C ANTIBODY   CBC W/ AUTO DIFFERENTIAL   COMPREHENSIVE METABOLIC PANEL   TROPONIN I   B-TYPE NATRIURETIC PEPTIDE          Imaging Results    None          Medications   enoxaparin injection 60 mg (has no administration in time range)     Medical Decision Making  Patient was in no distress    Considerations include pulmonary embolism    Patient's CTA indeed does show sub segmental and bilateral pulmonary embolism.  She was not in distress.  There is no evidence of right heart strain by CT or by physical exam.  There is no JVD.  There is no tachypnea.  Patient ordered Lovenox from the emergency department and consulted hospital Medicine in stable condition.    Amount and/or Complexity of Data Reviewed  Labs: ordered.    Risk  Prescription drug management.  Decision regarding hospitalization.                                      Clinical Impression:  Final diagnoses:  [R07.9] Chest pain  [I26.99] Pulmonary embolism,  unspecified chronicity, unspecified pulmonary embolism type, unspecified whether acute cor pulmonale present (Primary)          ED Disposition Condition    Admit Stable                Francisco Ambrose MD  11/07/24 4091

## 2024-11-07 NOTE — H&P
[facility] Medicine  History & Physical    Patient Name: Kenyetta Andersen  MRN: [id number]  Patient Class: OP- Observation  Admission Date: 11/7/2024  Attending Physician: Horacio Puga, *   Primary Care Provider: Hever Arreola MD         Patient information was obtained from patient and ER records.     Subjective:     Principal Problem:Acute pulmonary embolism    Chief Complaint:   Chief Complaint   Patient presents with    Blood clots     Pt states the doctor called her and toldher to come to the hospital bc she has two blood clots, one in each lung. Pt denies any symptoms at this time. She says she had the ct scan because she was having swelling in left leg and pain    Leg Pain     Left leg.        HPI: This is a 89-year-old female with history significant for colon cancer, follows with Hematology Dr. Yessica Granado as outpatient, who presented to her primary care with left lower extremity pain and swelling.  D-dimers was ordered, found to be elevated, CT PE was ordered which showed pulmonary emboli involving segmental and subsegmental arteries of bilateral pulmonary arteries, more so in the right pulmonary artery, no right heart strain.    Of note, regarding history of colon cancer, recent PET scan did show mediastinal lymphadenopathy with some activity, was referred to pulmonology.    In the ED, CBC and BMP were within normal limits. Her vitals were stable, given the multiple PE, Lovenox was administered in the ED and Hospital Medicine was consulted.    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    Anxiety     Arthritis     Escalante esophagus     Blood transfusion     Cataract     Done OU    Colon cancer 2009    Colon polyps 3/14/2017    GERD (gastroesophageal reflux disease)     Glaucoma 2/24/2012    Nuclear sclerosis 8/27/2012    Osteoporosis     Primary hypothyroidism 2/23/2020    Pseudophakia - Left Eye 2/24/2012       Past  "Surgical History:   Procedure Laterality Date    APPENDECTOMY      CATARACT EXTRACTION W/  INTRAOCULAR LENS IMPLANT Left     CATARACT EXTRACTION W/  INTRAOCULAR LENS IMPLANT Right     Dr Sawant    COLON SURGERY      resection    COLONOSCOPY N/A 3/14/2017    Procedure: COLONOSCOPY;  Surgeon: Killian Guerrier MD;  Location: [facility] ENDO;  Service: Endoscopy;  Laterality: N/A;    COLONOSCOPY  03/14/2017    Dr. Guerrier: hemorrhoids, one colon polyp removed, "Patent functional end-to-end ileo-colonic anastomosis"with healthy mucosa; biopsy: hyperplastic polyp; repeat in 3 years for surveillance    COLONOSCOPY N/A 1/21/2020    Procedure: COLONOSCOPY;  Surgeon: Timo Darling MD;  Location: [facility] ENDO;  Service: Endoscopy;  Laterality: N/A;    COLONOSCOPY N/A 7/8/2022    Procedure: COLONOSCOPY;  Surgeon: Anaid Washington MD;  Location: [facility] ENDO;  Service: Endoscopy;  Laterality: N/A;    ECTOPIC PREGNANCY SURGERY      ERCP N/A 7/31/2023    Procedure: ERCP (ENDOSCOPIC RETROGRADE CHOLANGIOPANCREATOGRAPHY);  Surgeon: Nasim Cuadra MD;  Location: [facility];  Service: Endoscopy;  Laterality: N/A;    ERCP N/A 8/1/2023    Procedure: ERCP (ENDOSCOPIC RETROGRADE CHOLANGIOPANCREATOGRAPHY);  Surgeon: Олег Arreaga MD;  [facility]  Service: Endoscopy;  Laterality: N/A;    ERCP N/A 12/12/2023    Procedure: ERCP (ENDOSCOPIC RETROGRADE CHOLANGIOPANCREATOGRAPHY);  Surgeon: Dimitri Anaya MD;  Location: [facility] ENDO (2ND FLR);  Service: Endoscopy;  Laterality: N/A;    ESOPHAGOGASTRODUODENOSCOPY N/A 5/16/2019    Procedure: EGD (ESOPHAGOGASTRODUODENOSCOPY);  Surgeon: Anaid Washington MD;  Location: [facility] ENDO;  Service: Endoscopy;  Laterality: N/A;    ESOPHAGOGASTRODUODENOSCOPY N/A 7/11/2019    Procedure: EGD (ESOPHAGOGASTRODUODENOSCOPY);  Surgeon: Anaid Washington MD;  Location: [facility] ENDO;  Service: Endoscopy;  Laterality: N/A;    ESOPHAGOGASTRODUODENOSCOPY N/A 2/5/2021    Procedure: EGD " (ESOPHAGOGASTRODUODENOSCOPY);  Surgeon: Anaid Washington MD;  Location: [facility] ENDO;  Service: Endoscopy;  Laterality: N/A;    ESOPHAGOGASTRODUODENOSCOPY N/A 11/11/2021    Procedure: EGD (ESOPHAGOGASTRODUODENOSCOPY);  Surgeon: Anaid Washington MD;  Location: [facility] ENDO;  Service: Endoscopy;  Laterality: N/A;    ESOPHAGOGASTRODUODENOSCOPY N/A 7/8/2022    Procedure: EGD (ESOPHAGOGASTRODUODENOSCOPY);  Surgeon: Anaid Washington MD;  Location: [facility] ENDO;  Service: Endoscopy;  Laterality: N/A;    ESOPHAGOGASTRODUODENOSCOPY N/A 4/12/2023    Procedure: EGD (ESOPHAGOGASTRODUODENOSCOPY);  Surgeon: Anaid Washington MD;  Location: [facility] ENDO;  Service: Endoscopy;  Laterality: N/A;    EYE SURGERY      bilateral cataracts    HYSTERECTOMY      complete    JOINT REPLACEMENT      Liban Knee    ROTATOR CUFF REPAIR Right     TOE SURGERY      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    UPPER GASTROINTESTINAL ENDOSCOPY  06/12/2017    Dr. Guerrier: gastritis and esophagitis, biopsy: chronic gastritis, negative for h pylori, esophageal- positive eosinophils, negative for barretts; repeat in 2 months    UPPER GASTROINTESTINAL ENDOSCOPY  07/27/2017    Dr. Guerrier       Review of patient's allergies indicates:   Allergen Reactions    Ace inhibitors Edema     Other reaction(s): Angioedema    Codeine Hives       Current Facility-Administered Medications on File Prior to Encounter   Medication    [COMPLETED] iohexoL (OMNIPAQUE 350) injection 100 mL     Current Outpatient Medications on File Prior to Encounter   Medication Sig    acetaminophen (TYLENOL) 650 MG TbSR Take 650 mg by mouth every 8 (eight) hours as needed (pain).    ascorbic acid, vitamin C, (VITAMIN C) 500 MG tablet Take 500 mg by mouth once daily.    b complex vitamins capsule Take 1 capsule by mouth once daily.    benzonatate (TESSALON) 100 MG capsule Take 1 capsule (100 mg total) by mouth 3 (three) times daily as needed for Cough.    brimonidine 0.2% (ALPHAGAN) 0.2 % Drop  INSTILL 1 DROP INTO BOTH EYES 3 TIMES A DAY (Patient taking differently: Place 1 drop into both eyes 3 (three) times daily.)    calcium carbonate-magnesium hydroxide (ROLAIDS) 550-110 mg Chew Take 1 tablet by mouth 2 (two) times daily as needed (heartburn).    cyproheptadine (PERIACTIN) 4 mg tablet Take 1 tablet (4 mg total) by mouth 3 (three) times daily as needed (appetite).    dorzolamide-timolol 2-0.5% (COSOPT) 22.3-6.8 mg/mL ophthalmic solution INSTILL 1 DROP INTO BOTH EYES TWICE A DAY (Patient taking differently: Place 1 drop into both eyes 2 (two) times daily.)    gabapentin (NEURONTIN) 300 MG capsule TAKE 1 CAP IN THE AM, 1 CAP MIDDAY, AND 2 CAPS AT NIGHT (Patient taking differently: Take 300 mg by mouth 3 (three) times daily. TAKE 1 CAP IN THE AM, 1 CAP MIDDAY, AND 2 CAPS AT NIGHT)    latanoprost 0.005 % ophthalmic solution INSTILL 1 DROP INTO BOTH EYES EVERY EVENING (Patient taking differently: Place 1 drop into both eyes every evening.)    omeprazole (PRILOSEC) 40 MG capsule Take 1 capsule (40 mg total) by mouth every morning.    potassium chloride SA (K-DUR,KLOR-CON) 20 MEQ tablet TAKE 1 TABLET BY MOUTH EVERY DAY (Patient taking differently: Take 20 mEq by mouth once daily.)    promethazine-dextromethorphan (PROMETHAZINE-DM) 6.25-15 mg/5 mL Syrp Take 5 mLs by mouth every 8 (eight) hours as needed (cough).    alendronate (FOSAMAX) 70 MG tablet TAKE 1 TABLET BY MOUTH EVERY 7 DAYS. ON EMPTY STOMACH IN MORNING, REMAIN UPRIGHT FOR 30 MINUTES. (Patient taking differently: Take 70 mg by mouth every 7 days. SUNDAYS)    azithromycin (Z-VASILE) 250 MG tablet Take 2 tablets by mouth on day 1; Take 1 tablet by mouth on days 2-5 (Patient taking differently: Take 250 mg by mouth once daily. Take 2 tablets by mouth on day 1; Take 1 tablet by mouth on days 2-5)    [DISCONTINUED] mirtazapine (REMERON) 7.5 MG Tab TAKE 1 TABLET BY MOUTH EVERY DAY IN THE EVENING    [DISCONTINUED] RABEprazole (ACIPHEX) 20 mg tablet Take 20 mg  by mouth once daily.     Family History       Problem Relation (Age of Onset)    Arthritis Sister    Heart disease Mother, Father, Brother    No Known Problems Brother, Brother    Parkinsonism Sister          Tobacco Use    Smoking status: Every Day     Current packs/day: 0.00     Average packs/day: 0.3 packs/day for 65.0 years (21.5 ttl pk-yrs)     Types: Cigarettes     Start date: 10/5/1955     Last attempt to quit: 10/5/2020     Years since quittin.0    Smokeless tobacco: Never    Tobacco comments:     3/1/23--states smokes 1-2 cigarettes a day   Substance and Sexual Activity    Alcohol use: Yes     Alcohol/week: 2.0 standard drinks of alcohol     Types: 2 Shots of liquor per week     Comment: Daily, about 4 drinks per day (crown)    Drug use: No    Sexual activity: Not Currently     Review of Systems  Objective:     Vital Signs (Most Recent):  Temp: 97.9 °F (36.6 °C) (24 1532)  Pulse: 75 (24 1532)  Resp: 16 (24 153)  BP: (!) 159/76 (24 153)  SpO2: 96 % (24 153) Vital Signs (24h Range):  Temp:  [97.9 °F (36.6 °C)-98.4 °F (36.9 °C)] 97.9 °F (36.6 °C)  Pulse:  [70-97] 75  Resp:  [16-18] 16  SpO2:  [93 %-97 %] 96 %  BP: (108-159)/(58-76) 159/76     Weight: 57.2 kg (126 lb)  Body mass index is 22.32 kg/m².     Physical Exam  Constitutional:       Appearance: Normal appearance.   HENT:      Head: Normocephalic and atraumatic.      Mouth/Throat:      Mouth: Mucous membranes are moist.   Eyes:      Pupils: Pupils are equal, round, and reactive to light.   Cardiovascular:      Rate and Rhythm: Normal rate and regular rhythm.      Pulses: Normal pulses.   Pulmonary:      Effort: Pulmonary effort is normal. No respiratory distress.      Breath sounds: Normal breath sounds. No stridor. No wheezing, rhonchi or rales.   Abdominal:      General: Abdomen is flat. There is no distension.   Musculoskeletal:         General: Left Lower extremity swelling     Cervical back: Normal range of  motion.   Skin:     General: Skin is warm.   Neurological:      General: No focal deficit present.      Mental Status: She is alert and oriented to person, place, and time. Mental status is at baseline.   Psychiatric:         Mood and Affect: Mood normal.         Judgment: Judgment normal.              CRANIAL NERVES     CN III, IV, VI   Pupils are equal, round, and reactive to light.       Significant Labs: All pertinent labs within the past 24 hours have been reviewed.    Significant Imaging: I have reviewed all pertinent imaging results/findings within the past 24 hours.    Echo    Result Date: 11/7/2024    Left Ventricle: The left ventricle is normal in size. There is low normal systolic function. Biplane (2D) method of discs ejection fraction is 53%.   Right Ventricle: Normal right ventricular cavity size. Systolic function is normal.   Tricuspid Valve: There is mild to moderate regurgitation.   Pulmonary Artery: There is pulmonary hypertension. The estimated pulmonary artery systolic pressure is 54 mmHg.   IVC/SVC: Normal venous pressure at 3 mmHg.     CTA Chest Non-Coronary (PE Studies)    Result Date: 11/7/2024  CMS MANDATED QUALITY DATA - CT RADIATION - 436 All CT scans at this facility utilize dose modulation, iterative reconstruction, and/or weight based dosing when appropriate to reduce radiation dose to as low as reasonably achievable. EXAMINATION: CTA CHEST NON CORONARY (PE STUDIES) CLINICAL HISTORY: Pulmonary embolism (PE) suspected, positive D-dimer; Chest pain, unspecified TECHNIQUE: CT angiography of the chest with 100 mL Omnipaque 350. Maximum intensity projection coronal reformations were created at a separate workstation and stored in the patient's permanent medical record. COMPARISON: None. FINDINGS: There are multiple pulmonary emboli of segmental and subsegmental arteries supplying the right upper and lower lobes.  There is a small pulmonary artery involving subsegmental pulmonary artery  supplying the posterior left lower lobe.  Heart size is normal.  No right heart strain observed.  The thoracic aorta is normal caliber with calcified plaque formation.  No enlarged mediastinal lymph nodes or mass. Mild centrilobular emphysematous changes of the lungs noted.  Subpleural patchy ground-glass opacities noted of the right upper lobe and left lower lobe.  There is interstitial thickening of the lung bases.  The no pleural effusions. Pneumobilia noted.  The visualized abdominal viscera are otherwise unremarkable.  There are degenerative changes of the spine.  No acute osseous abnormality.     1. Pulmonary emboli noted involving segmental and subsegmental arteries of the bilateral pulmonary arteries, more so involving the right pulmonary arteries. 2. Patchy areas of subpleural ground-glass opacities noted involving the right upper lobe and left lower lobes, possibly reflecting infarcts or other infiltrate. 3. Mild centrilobular emphysematous lung disease and interstitial thickening of the lung bases. 4. Incidentally noted pneumobilia. I reported these findings to nurse practitioner Alexsandra Miller at 10:54. Electronically signed by: Dong Rodriguez Date:    11/07/2024 Time:    10:55    X-Ray Chest PA And Lateral    Result Date: 11/5/2024  EXAMINATION: XR CHEST PA AND LATERAL CLINICAL HISTORY: Cough, unspecified TECHNIQUE: PA and lateral views of the chest were performed. COMPARISON: 08/04/2023 also earlier exams including 07/30/2023, 07/14/2021 FINDINGS: The heart is not enlarged.  The cardiomediastinal silhouette is stable.  There are mild interstitial prominence in the left lung base.  There are no confluent infiltrates.  There is no pleural effusion.     Mild interstitial prominence in the left lung base appearing new compared to prior studies suggesting mild interstitial infiltrate.  No consolidation. Electronically signed by: Raisa Bermudez MD Date:    11/05/2024 Time:    10:41    NM PET CT FDG Skull Base to  Mid Thigh    Result Date: 10/30/2024  EXAMINATION: NM PET CT FDG SKULL BASE TO MID THIGH CLINICAL HISTORY: Hematologic malignancy, assess treatment response; Escalante's esophagus without dysplasia ascending colon cancer, history of colorectal cancer 2009, chemotherapy 2009, no radiation, colon resection. TECHNIQUE: Following IV administration of 13 mCi of F-18 labeled FDG into right wrist and a 60 minute delay, PET CT was performed from the vertex of the skull through the proximal thighs with an integrated PET CT scanner with image fusion. CT images were obtained to aid in attenuation correction and PET localization. The patient's serum glucose at the time of the exam was 114 mg/dL. COMPARISON: Abdominal MRI 07/31/2023, CT abdomen and pelvis 07/30/2023, CT chest abdomen and pelvis 04/27/2023 No prior PET-CT FINDINGS: CT imaging through the brain shows no acute intracranial pathology.  Cerebral atrophy with associated ventricular and sulcal enlargement.  Brain MRI would provide the most sensitive assessment for intracranial metastasis. No pathologically enlarged or hypermetabolic lymph nodes within the neck.  Atherosclerotic calcification of the carotid arteries bilaterally.  Asymmetric enlargement of the right thyroid lobe extending inferiorly into the superior mediastinum. Pleuroparenchymal scarring involving the lung bases bilaterally, left greater than right, stable compared to prior exams.  Small nodular airspace opacity with surrounding ground-glass attenuation in the posterior aspect of the right upper lobe, with SUV max 1.8.  No markedly FDG avid pulmonary nodule or mass.  No pleural effusion. Atherosclerotic calcification of the aorta and coronary arteries. 10 mm hypermetabolic right hilar lymph node on image 102 has SUV max of 4.2. 10 mm hypermetabolic left hilar lymph node on image 103 has SUV max of 6.6. Precarinal lymph node measuring 10 mm on image 98 has SUV max of 3.6. Calcified granuloma right hepatic  lobe.  Pneumobilia.  No intrahepatic or extrahepatic bile duct dilation.  No FDG avid hepatic lesion.  Gallbladder contains gas.  Spleen is absent.  Pancreas is unremarkable. Rounded focus of FDG avidity in the right upper quadrant near the glenys hepatis with SUV max of 4.1, potentially glenys hepatis or portacaval hypermetabolic lymph node, but not well visualized on corresponding non-contrast CT. No adrenal lesion.  Small bilateral renal cysts.  No FDG avid lesion.  Ureters are normal in caliber.  Urinary bladder is unremarkable. No evidence of acute pathology involving the gastrointestinal tract.  Duodenal diverticulum.  Aortoiliac atherosclerotic calcification.  Postoperative changes of the colon.  No FDG avid colonic mass lesion identified. Review of bone window images shows degenerative changes of the spine.  No acute/aggressive osseous lesion.     Hypermetabolic hilar and mediastinal lymph nodes are nonspecific.  Metastatic disease is possible. Isolated hypermetabolic focus in the glenys hepatis region, potentially a hypermetabolic glenys hepatis lymph node. Metastasis is not excluded. Small nodular focus of airspace disease with surrounding ground-glass attenuation posterior aspect right upper lobe, most likely infectious/inflammatory in nature.  Please correlate with pulmonary symptoms and follow-up chest imaging. Electronically signed by: Yohannes Granado Date:    10/30/2024 Time:    12:45  - pulls last radiology orders    Assessment/Plan:     * Acute pulmonary embolism  - Lovenox 1 mg/kg q.12h  - monitor respiratory status given multiple PE  -obtain 2D echocardiogram to look for any right heart strain  -hematology/oncology consult for further evaluation ?malignancy      Swelling of left lower extremity  - duplex venous ultrasound given her presentation left leg swelling and with multiple PE  - Lovenox as above        VTE Risk Mitigation (From admission, onward)           Ordered     enoxaparin injection 60 mg   Every 12 hours        Note to Pharmacy: Wt: 57.2 kg (126 lb)  Estimated Creatinine Clearance: 32.8 mL/min (based on SCr of 1 mg/dL).    11/07/24 1330     Reason for No Pharmacological VTE Prophylaxis  Once        Question:  Reasons:  Answer:  Physician Provided (leave comment)  Comment:  On treatment    11/07/24 1329     Reason for no Mechanical VTE Prophylaxis  Once        Question:  Reasons:  Answer:  Physician Provided (leave comment)  Comment:  DVT pending    11/07/24 1329     IP VTE HIGH RISK PATIENT  Once         11/07/24 1329                       On 11/07/2024, patient should be placed in hospital observation services under my care.        Echocardiogram completed.  Dr. Jimenez to interpret.    Horacio Puga MD  Department of Hospital Medicine  [facility]/Surg

## 2024-11-07 NOTE — ED NOTES
Tele box [id number] has been placed on patient and called to the monitor room. Upon transfer to room 207, patient is AAOx4, no cardiac or respiratory complications. No needs or questions at this time.

## 2024-11-07 NOTE — PLAN OF CARE
Pt arrived to unit from ER via w/c, pt is alert, able to answer simple questions and follow simple commands, +1 edema palpated to LLE, pt denies joint pain/discomfort at this time, ambulates with cane, will continue to monitor, observe and note any changes, safety maintain

## 2024-11-07 NOTE — HPI
This is a 89-year-old female with history significant for colon cancer, follows with Hematology Dr. Juan as outpatient who presented to her primary care with left lower extremity pain and swelling.  D-dimers was ordered, found to be elevated, CT PE was ordered which showed pulmonary emboli involving segmental and subsegmental arteries of bilateral pulmonary arteries, more so in the right pulmonary artery, no right heart strain.      In the ED, CBC and BMP were within normal limits. Her vitals were stable, given the multiple PE, Lovenox was administered in the ED and Hospital Medicine was consulted.

## 2024-11-07 NOTE — ED NOTES
Kenyetta Andersen presents to the ED with c/o per the request of her PCP for blood clots to bilateral lungs. Patient denies any chest pain or SOB. She reports that she is having some left leg pain and swelling and that was the reason for her visit. Patient is AAOx4. She uses a cane for ambulation at home. She reports that she lives by herself and still drives.   Mucous membranes are pink and moist. Skin is warm, dry and intact. .  JUSTUS SHAWS  Verified patient's name and date of birth.

## 2024-11-07 NOTE — ASSESSMENT & PLAN NOTE
- Lovenox 1 mg/kg q.12h  - monitor respiratory status given multiple PE  -obtain 2D echocardiogram to look for any right heart strain  -hematology/oncology consult for further evaluation

## 2024-11-07 NOTE — SUBJECTIVE & OBJECTIVE
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    Anxiety     Arthritis     Escalante esophagus     Blood transfusion     Cataract     Done OU    Colon cancer 2009    Colon polyps 3/14/2017    GERD (gastroesophageal reflux disease)     Glaucoma 2/24/2012    Nuclear sclerosis 8/27/2012    Osteoporosis     Primary hypothyroidism 2/23/2020    Pseudophakia - Left Eye 2/24/2012       Past Surgical History:   Procedure Laterality Date    APPENDECTOMY      CATARACT EXTRACTION W/  INTRAOCULAR LENS IMPLANT Left     CATARACT EXTRACTION W/  INTRAOCULAR LENS IMPLANT Right     Dr Sawant    COLON SURGERY      resection    COLONOSCOPY N/A 3/14/2017    Procedure: COLONOSCOPY;  Surgeon: Killian Guerrier MD;  Location: [facility];  Service: Endoscopy;  Laterality: N/A;    COLONOSCOPY  03/14/2017    Dr. Guerrier: hemorrhoids, one colon polyp removed, "Patent functional end-to-end ileo-colonic anastomosis"with healthy mucosa; biopsy: hyperplastic polyp; repeat in 3 years for surveillance    COLONOSCOPY N/A 1/21/2020    Procedure: COLONOSCOPY;  Surgeon: Timo Darling MD;  Location: [facility];  Service: Endoscopy;  Laterality: N/A;    COLONOSCOPY N/A 7/8/2022    Procedure: COLONOSCOPY;  Surgeon: Anaid Washington MD;  Location: [facility];  Service: Endoscopy;  Laterality: N/A;    ECTOPIC PREGNANCY SURGERY      ERCP N/A 7/31/2023    Procedure: ERCP (ENDOSCOPIC RETROGRADE CHOLANGIOPANCREATOGRAPHY);  Surgeon: Nasim Cuadra MD;  Location: [facility];  Service: Endoscopy;  Laterality: N/A;    ERCP N/A 8/1/2023    Procedure: ERCP (ENDOSCOPIC RETROGRADE CHOLANGIOPANCREATOGRAPHY);  Surgeon: Олег Arreaga MD;  Location: [facility] ([address] [place] [address]);  Service: Endoscopy;  Laterality: N/A;    ERCP N/A 12/12/2023    Procedure: ERCP (ENDOSCOPIC RETROGRADE CHOLANGIOPANCREATOGRAPHY);  Surgeon: Dimitri Anaya MD;  Location: [facility] ENDO ([facility]);  Service: Endoscopy;  Laterality: N/A;    ESOPHAGOGASTRODUODENOSCOPY N/A " 5/16/2019    Procedure: EGD (ESOPHAGOGASTRODUODENOSCOPY);  Surgeon: Anaid Washington MD;  Location: [facility] ENDO;  Service: Endoscopy;  Laterality: N/A;    ESOPHAGOGASTRODUODENOSCOPY N/A 7/11/2019    Procedure: EGD (ESOPHAGOGASTRODUODENOSCOPY);  Surgeon: Anaid Washington MD;  Location: [facility] ENDO;  Service: Endoscopy;  Laterality: N/A;    ESOPHAGOGASTRODUODENOSCOPY N/A 2/5/2021    Procedure: EGD (ESOPHAGOGASTRODUODENOSCOPY);  Surgeon: Anaid Washington MD;  Location: [facility] ENDO;  Service: Endoscopy;  Laterality: N/A;    ESOPHAGOGASTRODUODENOSCOPY N/A 11/11/2021    Procedure: EGD (ESOPHAGOGASTRODUODENOSCOPY);  Surgeon: Anaid Washington MD;  Location: [facility] ENDO;  Service: Endoscopy;  Laterality: N/A;    ESOPHAGOGASTRODUODENOSCOPY N/A 7/8/2022    Procedure: EGD (ESOPHAGOGASTRODUODENOSCOPY);  Surgeon: Anaid Washington MD;  Location: [facility] ENDO;  Service: Endoscopy;  Laterality: N/A;    ESOPHAGOGASTRODUODENOSCOPY N/A 4/12/2023    Procedure: EGD (ESOPHAGOGASTRODUODENOSCOPY);  Surgeon: Anaid Washington MD;  Location: [facility] ENDO;  Service: Endoscopy;  Laterality: N/A;    EYE SURGERY      bilateral cataracts    HYSTERECTOMY      complete    JOINT REPLACEMENT      Liban Knee    ROTATOR CUFF REPAIR Right     TOE SURGERY      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    UPPER GASTROINTESTINAL ENDOSCOPY  06/12/2017    Dr. Guerrier: gastritis and esophagitis, biopsy: chronic gastritis, negative for h pylori, esophageal- positive eosinophils, negative for barretts; repeat in 2 months    UPPER GASTROINTESTINAL ENDOSCOPY  07/27/2017    Dr. Guerrier       Review of patient's allergies indicates:   Allergen Reactions    Ace inhibitors Edema     Other reaction(s): Angioedema    Codeine Hives       Current Facility-Administered Medications on File Prior to Encounter   Medication    [COMPLETED] iohexoL (OMNIPAQUE 350) injection 100 mL     Current Outpatient Medications on File Prior to Encounter   Medication Sig    acetaminophen  (TYLENOL) 650 MG TbSR Take 650 mg by mouth every 8 (eight) hours as needed (pain).    ascorbic acid, vitamin C, (VITAMIN C) 500 MG tablet Take 500 mg by mouth once daily.    b complex vitamins capsule Take 1 capsule by mouth once daily.    benzonatate (TESSALON) 100 MG capsule Take 1 capsule (100 mg total) by mouth 3 (three) times daily as needed for Cough.    brimonidine 0.2% (ALPHAGAN) 0.2 % Drop INSTILL 1 DROP INTO BOTH EYES 3 TIMES A DAY (Patient taking differently: Place 1 drop into both eyes 3 (three) times daily.)    calcium carbonate-magnesium hydroxide (ROLAIDS) 550-110 mg Chew Take 1 tablet by mouth 2 (two) times daily as needed (heartburn).    cyproheptadine (PERIACTIN) 4 mg tablet Take 1 tablet (4 mg total) by mouth 3 (three) times daily as needed (appetite).    dorzolamide-timolol 2-0.5% (COSOPT) 22.3-6.8 mg/mL ophthalmic solution INSTILL 1 DROP INTO BOTH EYES TWICE A DAY (Patient taking differently: Place 1 drop into both eyes 2 (two) times daily.)    gabapentin (NEURONTIN) 300 MG capsule TAKE 1 CAP IN THE AM, 1 CAP MIDDAY, AND 2 CAPS AT NIGHT (Patient taking differently: Take 300 mg by mouth 3 (three) times daily. TAKE 1 CAP IN THE AM, 1 CAP MIDDAY, AND 2 CAPS AT NIGHT)    latanoprost 0.005 % ophthalmic solution INSTILL 1 DROP INTO BOTH EYES EVERY EVENING (Patient taking differently: Place 1 drop into both eyes every evening.)    omeprazole (PRILOSEC) 40 MG capsule Take 1 capsule (40 mg total) by mouth every morning.    potassium chloride SA (K-DUR,KLOR-CON) 20 MEQ tablet TAKE 1 TABLET BY MOUTH EVERY DAY (Patient taking differently: Take 20 mEq by mouth once daily.)    promethazine-dextromethorphan (PROMETHAZINE-DM) 6.25-15 mg/5 mL Syrp Take 5 mLs by mouth every 8 (eight) hours as needed (cough).    alendronate (FOSAMAX) 70 MG tablet TAKE 1 TABLET BY MOUTH EVERY 7 DAYS. ON EMPTY STOMACH IN MORNING, REMAIN UPRIGHT FOR 30 MINUTES. (Patient taking differently: Take 70 mg by mouth every 7 days. SUNDAYS)     azithromycin (Z-VASILE) 250 MG tablet Take 2 tablets by mouth on day 1; Take 1 tablet by mouth on days 2-5 (Patient taking differently: Take 250 mg by mouth once daily. Take 2 tablets by mouth on day 1; Take 1 tablet by mouth on days 2-5)    [DISCONTINUED] mirtazapine (REMERON) 7.5 MG Tab TAKE 1 TABLET BY MOUTH EVERY DAY IN THE EVENING    [DISCONTINUED] RABEprazole (ACIPHEX) 20 mg tablet Take 20 mg by mouth once daily.     Family History       Problem Relation (Age of Onset)    Arthritis Sister    Heart disease Mother, Father, Brother    No Known Problems Brother, Brother    Parkinsonism Sister          Tobacco Use    Smoking status: Every Day     Current packs/day: 0.00     Average packs/day: 0.3 packs/day for 65.0 years (21.5 ttl pk-yrs)     Types: Cigarettes     Start date: 10/5/1955     Last attempt to quit: 10/5/2020     Years since quittin.0    Smokeless tobacco: Never    Tobacco comments:     3/1/23--states smokes 1-2 cigarettes a day   Substance and Sexual Activity    Alcohol use: Yes     Alcohol/week: 2.0 standard drinks of alcohol     Types: 2 Shots of liquor per week     Comment: Daily, about 4 drinks per day (crown)    Drug use: No    Sexual activity: Not Currently     Review of Systems  Objective:     Vital Signs (Most Recent):  Temp: 97.9 °F (36.6 °C) (24 1532)  Pulse: 75 (24 1532)  Resp: 16 (24 1532)  BP: (!) 159/76 (24 1532)  SpO2: 96 % (24 1532) Vital Signs (24h Range):  Temp:  [97.9 °F (36.6 °C)-98.4 °F (36.9 °C)] 97.9 °F (36.6 °C)  Pulse:  [70-97] 75  Resp:  [16-18] 16  SpO2:  [93 %-97 %] 96 %  BP: (108-159)/(58-76) 159/76     Weight: 57.2 kg (126 lb)  Body mass index is 22.32 kg/m².     Physical Exam  Constitutional:       Appearance: Normal appearance.   HENT:      Head: Normocephalic and atraumatic.      Mouth/Throat:      Mouth: Mucous membranes are moist.   Eyes:      Pupils: Pupils are equal, round, and reactive to light.   Cardiovascular:      Rate and  Rhythm: Normal rate and regular rhythm.      Pulses: Normal pulses.   Pulmonary:      Effort: Pulmonary effort is normal. No respiratory distress.      Breath sounds: Normal breath sounds. No stridor. No wheezing, rhonchi or rales.   Abdominal:      General: Abdomen is flat. There is no distension.   Musculoskeletal:         General: No swelling.      Cervical back: Normal range of motion.   Skin:     General: Skin is warm.   Neurological:      General: No focal deficit present.      Mental Status: She is alert and oriented to person, place, and time. Mental status is at baseline.   Psychiatric:         Mood and Affect: Mood normal.         Judgment: Judgment normal.              CRANIAL NERVES     CN III, IV, VI   Pupils are equal, round, and reactive to light.       Significant Labs: All pertinent labs within the past 24 hours have been reviewed.    Significant Imaging: I have reviewed all pertinent imaging results/findings within the past 24 hours.    Echo    Result Date: 11/7/2024    Left Ventricle: The left ventricle is normal in size. There is low normal systolic function. Biplane (2D) method of discs ejection fraction is 53%.   Right Ventricle: Normal right ventricular cavity size. Systolic function is normal.   Tricuspid Valve: There is mild to moderate regurgitation.   Pulmonary Artery: There is pulmonary hypertension. The estimated pulmonary artery systolic pressure is 54 mmHg.   IVC/SVC: Normal venous pressure at 3 mmHg.     CTA Chest Non-Coronary (PE Studies)    Result Date: 11/7/2024  CMS MANDATED QUALITY DATA - CT RADIATION - 436 All CT scans at this facility utilize dose modulation, iterative reconstruction, and/or weight based dosing when appropriate to reduce radiation dose to as low as reasonably achievable. EXAMINATION: CTA CHEST NON CORONARY (PE STUDIES) CLINICAL HISTORY: Pulmonary embolism (PE) suspected, positive D-dimer; Chest pain, unspecified TECHNIQUE: CT angiography of the chest with 100  mL Omnipaque 350. Maximum intensity projection coronal reformations were created at a separate workstation and stored in the patient's permanent medical record. COMPARISON: None. FINDINGS: There are multiple pulmonary emboli of segmental and subsegmental arteries supplying the right upper and lower lobes.  There is a small pulmonary artery involving subsegmental pulmonary artery supplying the posterior left lower lobe.  Heart size is normal.  No right heart strain observed.  The thoracic aorta is normal caliber with calcified plaque formation.  No enlarged mediastinal lymph nodes or mass. Mild centrilobular emphysematous changes of the lungs noted.  Subpleural patchy ground-glass opacities noted of the right upper lobe and left lower lobe.  There is interstitial thickening of the lung bases.  The no pleural effusions. Pneumobilia noted.  The visualized abdominal viscera are otherwise unremarkable.  There are degenerative changes of the spine.  No acute osseous abnormality.     1. Pulmonary emboli noted involving segmental and subsegmental arteries of the bilateral pulmonary arteries, more so involving the right pulmonary arteries. 2. Patchy areas of subpleural ground-glass opacities noted involving the right upper lobe and left lower lobes, possibly reflecting infarcts or other infiltrate. 3. Mild centrilobular emphysematous lung disease and interstitial thickening of the lung bases. 4. Incidentally noted pneumobilia. I reported these findings to nurse practitioner Alexsandra Miller at 10:54. Electronically signed by: Dong Rodriguez Date:    11/07/2024 Time:    10:55    X-Ray Chest PA And Lateral    Result Date: 11/5/2024  EXAMINATION: XR CHEST PA AND LATERAL CLINICAL HISTORY: Cough, unspecified TECHNIQUE: PA and lateral views of the chest were performed. COMPARISON: 08/04/2023 also earlier exams including 07/30/2023, 07/14/2021 FINDINGS: The heart is not enlarged.  The cardiomediastinal silhouette is stable.  There are  mild interstitial prominence in the left lung base.  There are no confluent infiltrates.  There is no pleural effusion.     Mild interstitial prominence in the left lung base appearing new compared to prior studies suggesting mild interstitial infiltrate.  No consolidation. 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 TECHNIQUE: Following IV administration of 13 mCi of F-18 labeled FDG into right wrist and a 60 minute delay, PET CT was performed from the vertex of the skull through the proximal thighs with an integrated PET CT scanner with image fusion. CT images were obtained to aid in attenuation correction and PET localization. The patient's serum glucose at the time of the exam was 114 mg/dL. COMPARISON: Abdominal MRI 07/31/2023, CT abdomen and pelvis 07/30/2023, CT chest abdomen and pelvis 04/27/2023 No prior PET-CT FINDINGS: CT imaging through the brain shows no acute intracranial pathology.  Cerebral atrophy with associated ventricular and sulcal enlargement.  Brain MRI would provide the most sensitive assessment for intracranial metastasis. No pathologically enlarged or hypermetabolic lymph nodes within the neck.  Atherosclerotic calcification of the carotid arteries bilaterally.  Asymmetric enlargement of the right thyroid lobe extending inferiorly into the superior mediastinum. Pleuroparenchymal scarring involving the lung bases bilaterally, left greater than right, stable compared to prior exams.  Small nodular airspace opacity with surrounding ground-glass attenuation in the posterior aspect of the right upper lobe, with SUV max 1.8.  No  markedly FDG avid pulmonary nodule or mass.  No pleural effusion. Atherosclerotic calcification of the aorta and coronary arteries. 10 mm hypermetabolic right hilar lymph node on image 102 has SUV max of 4.2. 10 mm hypermetabolic left hilar lymph node on image 103 has SUV max of 6.6. Precarinal lymph node measuring 10 mm on image 98 has SUV max of 3.6. Calcified granuloma right hepatic lobe.  Pneumobilia.  No intrahepatic or extrahepatic bile duct dilation.  No FDG avid hepatic lesion.  Gallbladder contains gas.  Spleen is absent.  Pancreas is unremarkable. Rounded focus of FDG avidity in the right upper quadrant near the glenys hepatis with SUV max of 4.1, potentially glenys hepatis or portacaval hypermetabolic lymph node, but not well visualized on corresponding non-contrast CT. No adrenal lesion.  Small bilateral renal cysts.  No FDG avid lesion.  Ureters are normal in caliber.  Urinary bladder is unremarkable. No evidence of acute pathology involving the gastrointestinal tract.  Duodenal diverticulum.  Aortoiliac atherosclerotic calcification.  Postoperative changes of the colon.  No FDG avid colonic mass lesion identified. Review of bone window images shows degenerative changes of the spine.  No acute/aggressive osseous lesion.     Hypermetabolic hilar and mediastinal lymph nodes are nonspecific.  Metastatic disease is possible. Isolated hypermetabolic focus in the glenys hepatis region, potentially a hypermetabolic glenys hepatis lymph node. Metastasis is not excluded. Small nodular focus of airspace disease with surrounding ground-glass attenuation posterior aspect right upper lobe, most likely infectious/inflammatory in nature.  Please correlate with pulmonary symptoms and follow-up chest imaging. Electronically signed by: Yohannes Granado Date:    10/30/2024 Time:    12:45  - pulls last radiology orders

## 2024-11-08 VITALS
BODY MASS INDEX: 22.32 KG/M2 | RESPIRATION RATE: 18 BRPM | SYSTOLIC BLOOD PRESSURE: 136 MMHG | WEIGHT: 126 LBS | OXYGEN SATURATION: 96 % | TEMPERATURE: 98 F | HEART RATE: 70 BPM | HEIGHT: 63 IN | DIASTOLIC BLOOD PRESSURE: 68 MMHG

## 2024-11-08 LAB
ALBUMIN SERPL BCP-MCNC: 2.5 G/DL (ref 3.5–5.2)
ALP SERPL-CCNC: 100 U/L (ref 40–150)
ALT SERPL W/O P-5'-P-CCNC: 9 U/L (ref 10–44)
ANION GAP SERPL CALC-SCNC: 10 MMOL/L (ref 8–16)
AST SERPL-CCNC: 18 U/L (ref 10–40)
BASOPHILS # BLD AUTO: 0.06 K/UL (ref 0–0.2)
BASOPHILS NFR BLD: 0.7 % (ref 0–1.9)
BILIRUB SERPL-MCNC: 0.5 MG/DL (ref 0.1–1)
BUN SERPL-MCNC: 12 MG/DL (ref 8–23)
CALCIUM SERPL-MCNC: 9.3 MG/DL (ref 8.7–10.5)
CHLORIDE SERPL-SCNC: 110 MMOL/L (ref 95–110)
CO2 SERPL-SCNC: 22 MMOL/L (ref 23–29)
CREAT SERPL-MCNC: 0.8 MG/DL (ref 0.5–1.4)
DIFFERENTIAL METHOD BLD: ABNORMAL
EOSINOPHIL # BLD AUTO: 0.2 K/UL (ref 0–0.5)
EOSINOPHIL NFR BLD: 2.6 % (ref 0–8)
ERYTHROCYTE [DISTWIDTH] IN BLOOD BY AUTOMATED COUNT: 12.5 % (ref 11.5–14.5)
EST. GFR  (NO RACE VARIABLE): >60 ML/MIN/1.73 M^2
GLUCOSE SERPL-MCNC: 65 MG/DL (ref 70–110)
HCT VFR BLD AUTO: 41.7 % (ref 37–48.5)
HGB BLD-MCNC: 14 G/DL (ref 12–16)
IMM GRANULOCYTES # BLD AUTO: 0.03 K/UL (ref 0–0.04)
IMM GRANULOCYTES NFR BLD AUTO: 0.3 % (ref 0–0.5)
LYMPHOCYTES # BLD AUTO: 3 K/UL (ref 1–4.8)
LYMPHOCYTES NFR BLD: 34.8 % (ref 18–48)
MAGNESIUM SERPL-MCNC: 1.7 MG/DL (ref 1.6–2.6)
MCH RBC QN AUTO: 32.5 PG (ref 27–31)
MCHC RBC AUTO-ENTMCNC: 33.6 G/DL (ref 32–36)
MCV RBC AUTO: 97 FL (ref 82–98)
MONOCYTES # BLD AUTO: 0.9 K/UL (ref 0.3–1)
MONOCYTES NFR BLD: 10.1 % (ref 4–15)
NEUTROPHILS # BLD AUTO: 4.4 K/UL (ref 1.8–7.7)
NEUTROPHILS NFR BLD: 51.5 % (ref 38–73)
NRBC BLD-RTO: 0 /100 WBC
PLATELET # BLD AUTO: 257 K/UL (ref 150–450)
PMV BLD AUTO: 10.2 FL (ref 9.2–12.9)
POTASSIUM SERPL-SCNC: 4.5 MMOL/L (ref 3.5–5.1)
PROT SERPL-MCNC: 6.2 G/DL (ref 6–8.4)
RBC # BLD AUTO: 4.31 M/UL (ref 4–5.4)
SODIUM SERPL-SCNC: 142 MMOL/L (ref 136–145)
WBC # BLD AUTO: 8.61 K/UL (ref 3.9–12.7)

## 2024-11-08 PROCEDURE — 94760 N-INVAS EAR/PLS OXIMETRY 1: CPT

## 2024-11-08 PROCEDURE — 36415 COLL VENOUS BLD VENIPUNCTURE: CPT

## 2024-11-08 PROCEDURE — G0378 HOSPITAL OBSERVATION PER HR: HCPCS

## 2024-11-08 PROCEDURE — 96372 THER/PROPH/DIAG INJ SC/IM: CPT

## 2024-11-08 PROCEDURE — 85025 COMPLETE CBC W/AUTO DIFF WBC: CPT

## 2024-11-08 PROCEDURE — 83735 ASSAY OF MAGNESIUM: CPT

## 2024-11-08 PROCEDURE — 25000003 PHARM REV CODE 250

## 2024-11-08 PROCEDURE — 63600175 PHARM REV CODE 636 W HCPCS

## 2024-11-08 PROCEDURE — 80053 COMPREHEN METABOLIC PANEL: CPT

## 2024-11-08 PROCEDURE — 94761 N-INVAS EAR/PLS OXIMETRY MLT: CPT

## 2024-11-08 PROCEDURE — 97161 PT EVAL LOW COMPLEX 20 MIN: CPT

## 2024-11-08 RX ADMIN — OXYCODONE HYDROCHLORIDE AND ACETAMINOPHEN 500 MG: 500 TABLET ORAL at 10:11

## 2024-11-08 RX ADMIN — DORZOLAMIDE HYDROCHLORIDE AND TIMOLOL MALEATE 1 DROP: 20; 5 SOLUTION/ DROPS OPHTHALMIC at 10:11

## 2024-11-08 RX ADMIN — ENOXAPARIN SODIUM 60 MG: 60 INJECTION SUBCUTANEOUS at 10:11

## 2024-11-08 RX ADMIN — PANTOPRAZOLE SODIUM 40 MG: 40 TABLET, DELAYED RELEASE ORAL at 10:11

## 2024-11-08 RX ADMIN — POTASSIUM CHLORIDE 20 MEQ: 1500 TABLET, EXTENDED RELEASE ORAL at 10:11

## 2024-11-08 RX ADMIN — GABAPENTIN 300 MG: 300 CAPSULE ORAL at 10:11

## 2024-11-08 NOTE — ASSESSMENT & PLAN NOTE
-agree with current management with Lovenox   -can switch to oral NOAC when discharged  -await results of left lower extremity ultrasound  -unlikely to be caused by malignancy given that she just had a normal PET scan with some mediastinal lymphadenopathy

## 2024-11-08 NOTE — PLAN OF CARE
[facility]/Surg  Initial Discharge Assessment       Primary Care Provider: Hever Arreola MD    Admission Diagnosis: Pulmonary embolism, unspecified chronicity, unspecified pulmonary embolism type, unspecified whether acute cor pulmonale present [I26.99]    Admission Date: 11/7/2024  Expected Discharge Date: 11/8/2024    Spoke to pt at bedside to complete assessment. Pt lives at listed address alone, reports independence, drives self to appts. PCP [facility]. [facility]. jenniffer Kelly Denies hh/hd/coumadin. Pt said she will drive herself home at dc- family may need to pick her up.CM to follow    Transition of Care Barriers: None    Payor: [employer] MANAGED MEDICARE / Plan: [employer] MEDICARE HMO / Product Type: Capitation /     Extended Emergency Contact Information  Primary Emergency Contact: Granado (Won)Nalini  Address: [facility] [id number]           [place] [address] [facility]  Home Phone: [phone]  Mobile Phone: [phone]  Relation: Relative  Preferred language: English   needed? No  Secondary Emergency Contact: Tucker Murcia  Address: [address] [place] [address] [facility]  Home Phone: [phone]  Mobile Phone: [phone]  Relation: Brother  Preferred language: English   needed? No    Discharge Plan A: Home  Discharge Plan B: Home      [id number] [place] [facility] - [address]  [address]  [place] [address]  Phone: [phone] Fax: [phone]      Initial Assessment (most recent)       Adult Discharge Assessment - 11/08/24 1128          Discharge Assessment    Assessment Type Discharge Planning Assessment     Confirmed/corrected address, phone number and insurance Yes     Confirmed Demographics Correct on Facesheet     Source of Information patient     Communicated TIERA with patient/caregiver Yes     People in Home alone     Do you expect to return to your current living situation? Yes      Prior to hospitilization cognitive status: Unable to Assess     Current cognitive status: Alert/Oriented     Walking or Climbing Stairs Difficulty yes     Walking or Climbing Stairs ambulation difficulty, requires equipment     Dressing/Bathing Difficulty yes     Dressing/Bathing bathing difficulty, requires equipment     Equipment Currently Used at Home cane, straight;shower chair     Readmission within 30 days? No     Patient currently being followed by outpatient case management? No     Do you currently have service(s) that help you manage your care at home? No     Do you take prescription medications? Yes     Do you have prescription coverage? Yes     Coverage [employer]     Do you have any problems affording any of your prescribed medications? No     Is the patient taking medications as prescribed? yes     How do you get to doctors appointments? car, drives self;family or friend will provide     Are you on dialysis? No     Do you take coumadin? No     Discharge Plan A Home     Discharge Plan B Home     DME Needed Upon Discharge  none     Discharge Plan discussed with: Patient     Transition of Care Barriers None

## 2024-11-08 NOTE — PLAN OF CARE
[facility] - 1st visit Monday 11/11    Pt is clear for dc from        11/08/24 1525   Final Note   Assessment Type Final Discharge Note   Anticipated Discharge Disposition Home-Health   Hospital Resources/Appts/Education Provided Appointments scheduled and added to AVS;Post-Acute resouces added to AVS   Post-Acute Status   Post-Acute Authorization Home Health;Medications   Home Health Status Set-up Complete/Auth obtained   Medication Status Set-up complete/Auth obtained

## 2024-11-08 NOTE — DISCHARGE INSTRUCTIONS
Discharge Instructions, [facility] Medicine    Thank you for choosing [facility] for your medical care. The primary doctor who is taking care of you at the time of your discharge is Horacio Puga, *.     You were admitted to the hospital with Acute pulmonary embolism.     Please note your discharge instructions, including diet/activity restrictions, follow-up appointments, and medication changes.  If you have any questions about your medical issues, prescriptions, or any other questions, please feel free to contact the [facility] Medicine Dept at [phone] and we will help.    If you are previously with Home health, outpatient PT/OT or under a therapy program, you are cleared to return to those programs.    Please direct all long term medication refills and follow up to your primary care provider, Hever Arreola MD. Thank you again for letting us take care of your health care needs.      Please take eliquis as recommended.    Please read attached instructions to prevent fall.  It is very high-risk if you fall while you are on a blood thinner.  Physical therapy evaluated you and they mentioned you hour safe to discharge home, but please read all the attached fall prevention instructions.    Please follow up with the primary care doctor as outpatient     Please follow up with the hematologist/oncologist Dr. Granado as outpatient after your discharge    For any other worsening signs or symptoms, please call your doctor   For any other emergency, please call 911    In the future if you decide about the IVC filter, please follow up with vascular surgery, or talk to your doctor about this regarding the clot in your leg

## 2024-11-08 NOTE — ASSESSMENT & PLAN NOTE
- Duplex venous ultrasound showed occlusive thrombus in the left lower extremity with mobile thrombus in the common femoral vein proximally.    - Vascular surgery was consulted.  - Therapeutic Lovenox as above

## 2024-11-08 NOTE — CONSULTS
[facility]  Hematology/Oncology  Consult Note    Patient Name: Kenyetta Andersen  MRN: [id number]  Admission Date: 11/7/2024  Hospital Length of Stay: 0 days  Code Status: DNR   Attending Provider: Horacio Puga, *  Consulting Provider: Aurash Khoobehi, MD  Primary Care Physician: Hever Arreola MD  Principal Problem:Acute pulmonary embolism    Consults  Subjective:     HPI:  This is a 89-year-old female known to our service who sees Dr. Granado.  Has a history of colon cancer and sees her for this.  Recently had a PET scan that showed mediastinal lymphadenopathy with some activity on PET scan and was referred to pulmonology, which she has not yet seen.  She has now been admitted after a CT angio of the chest showed pulmonary emboli.  Patient denies any shortness of breath or chest pain.  She does state she has swelling in her left lower extremity.  She denies any previous history of clot.    No new subjective & objective note has been filed under this hospital service since the last note was generated.    Assessment/Plan:     * Acute pulmonary embolism  -agree with current management with Lovenox   -can switch to oral NOAC when discharged  -await results of left lower extremity ultrasound  -unlikely to be caused by malignancy given that she just had a normal PET scan with some mediastinal lymphadenopathy        Thank you for your consult. I will follow-up with patient. Please contact us if you have any additional questions.    Aurash Khoobehi, MD  Hematology/Oncology  [facility]/Surg

## 2024-11-08 NOTE — PLAN OF CARE
11/08/24 1128   ALARCON Message   Medicare Outpatient and Observation Notification regarding financial responsibility Explained to patient/caregiver;Signed/date by patient/caregiver   Date ALARCON was signed 11/08/24   Time ALARCON was signed 112

## 2024-11-08 NOTE — ASSESSMENT & PLAN NOTE
- Lovenox 1 mg/kg q.12h  - monitor respiratory status given multiple PE  - 2D ECHO did not show any right heart strain  - hematology/oncology consulted, appreciate their recommendations   - duplex venous ultrasound positive for DVT with a mobile thrombus in the left common femoral vein

## 2024-11-08 NOTE — PT/OT/SLP EVAL
"Physical Therapy Evaluation and Discharge Note    Patient Name:  Kenyetta Andersen   MRN:  [id number]    Recommendations:     Discharge Recommendations: No Therapy Indicated  Discharge Equipment Recommendations: none   Barriers to discharge: None    Assessment:     Kenyetta Andersen is a 89 y.o. female admitted with a medical diagnosis of Acute pulmonary embolism. Patient found supine in bed, agreeable to PT eval. She required SBA for supine to sit, and CGA for STS and bed to chair transfer. Ambulated x 250 ft with SC and CGA.  At this time, patient is functioning at their prior level of function and does not require further acute PT services.     Recent Surgery: * No surgery found *      Plan:     During this hospitalization, patient does not require further acute PT services.  Please re-consult if situation changes.      Subjective     Chief Complaint: hungry  Patient/Family Comments/goals: go home  Pain/Comfort:  Pain Rating 1: 0/10    Patients cultural, spiritual, Confucianism conflicts given the current situation:      Living Environment:  Currently lives alone in one story home with "several" steps to enter.  Prior to admission, patients level of function was modified independent.  Equipment used at home: cane, straight.  DME owned (not currently used):  none .  Upon discharge, patient will have assistance from family.    Objective:     Communicated with SHALINI Oliver prior to session.  Patient found supine with telemetry, bed alarm upon PT entry to room.    General Precautions: Standard, fall    Orthopedic Precautions:N/A   Braces: N/A  Respiratory Status: Room air    Exams:  RLE ROM: WFL  RLE Strength: 4/5  LLE ROM: WFL  LLE Strength: 4/5    Functional Mobility:  Bed Mobility:     Supine to Sit: stand by assistance  Transfers:     Sit to Stand:  contact guard assistance with straight cane  Bed to Chair: contact guard assistance with  straight cane  using  Stand Pivot  Gait: x 250 ft SC CGA    AM-PAC 6 CLICK " "MOBILITY  Total Score:        Treatment and Education:  Instructed in ther ex: ankle pumps and quad sets performed x 10 AROM BLE  Patient instructed to perform exercises to improve blood flow and strength in LEs      AM-PAC 6 CLICK MOBILITY  Total Score:      Patient left up in chair with all lines intact, call button in reach, chair alarm on, and nurse notified.    GOALS:   Multidisciplinary Problems       Physical Therapy Goals       Not on file                    History:     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    Anxiety     Arthritis     Escalante esophagus     Blood transfusion     Cataract     Done OU    Colon cancer 2009    Colon polyps 3/14/2017    GERD (gastroesophageal reflux disease)     Glaucoma 2/24/2012    Nuclear sclerosis 8/27/2012    Osteoporosis     Primary hypothyroidism 2/23/2020    Pseudophakia - Left Eye 2/24/2012       Past Surgical History:   Procedure Laterality Date    APPENDECTOMY      CATARACT EXTRACTION W/  INTRAOCULAR LENS IMPLANT Left     CATARACT EXTRACTION W/  INTRAOCULAR LENS IMPLANT Right     Dr Sawant    COLON SURGERY      resection    COLONOSCOPY N/A 3/14/2017    Procedure: COLONOSCOPY;  Surgeon: Killian Guerrier MD;  Location: [facility] ENDO;  Service: Endoscopy;  Laterality: N/A;    COLONOSCOPY  03/14/2017    Dr. Guerrier: hemorrhoids, one colon polyp removed, "Patent functional end-to-end ileo-colonic anastomosis"with healthy mucosa; biopsy: hyperplastic polyp; repeat in 3 years for surveillance    COLONOSCOPY N/A 1/21/2020    Procedure: COLONOSCOPY;  Surgeon: Timo Darling MD;  Location: [facility] ENDO;  Service: Endoscopy;  Laterality: N/A;    COLONOSCOPY N/A 7/8/2022    Procedure: COLONOSCOPY;  Surgeon: Anaid Washington MD;  Location: [facility] ENDO;  Service: Endoscopy;  Laterality: N/A;    ECTOPIC PREGNANCY SURGERY      ERCP N/A 7/31/2023    Procedure: ERCP (ENDOSCOPIC RETROGRADE CHOLANGIOPANCREATOGRAPHY);  Surgeon: Nasim Cuadra MD;  " Location: [facility] ENDO;  Service: Endoscopy;  Laterality: N/A;    ERCP N/A 8/1/2023    Procedure: ERCP (ENDOSCOPIC RETROGRADE CHOLANGIOPANCREATOGRAPHY);  Surgeon: Олег Arreaga MD;  Location: [facility] (2ND FLR);  Service: Endoscopy;  Laterality: N/A;    ERCP N/A 12/12/2023    Procedure: ERCP (ENDOSCOPIC RETROGRADE CHOLANGIOPANCREATOGRAPHY);  Surgeon: Dimitri Anaya MD;  Location: [facility] (2ND FLR);  Service: Endoscopy;  Laterality: N/A;    ESOPHAGOGASTRODUODENOSCOPY N/A 5/16/2019    Procedure: EGD (ESOPHAGOGASTRODUODENOSCOPY);  Surgeon: Anaid Washington MD;  Location: [facility];  Service: Endoscopy;  Laterality: N/A;    ESOPHAGOGASTRODUODENOSCOPY N/A 7/11/2019    Procedure: EGD (ESOPHAGOGASTRODUODENOSCOPY);  Surgeon: Anaid Washington MD;  Location: [facility];  Service: Endoscopy;  Laterality: N/A;    ESOPHAGOGASTRODUODENOSCOPY N/A 2/5/2021    Procedure: EGD (ESOPHAGOGASTRODUODENOSCOPY);  Surgeon: Anaid Washington MD;  Location: [facility];  Service: Endoscopy;  Laterality: N/A;    ESOPHAGOGASTRODUODENOSCOPY N/A 11/11/2021    Procedure: EGD (ESOPHAGOGASTRODUODENOSCOPY);  Surgeon: Anaid Washington MD;  Location: [facility] ENDO;  Service: Endoscopy;  Laterality: N/A;    ESOPHAGOGASTRODUODENOSCOPY N/A 7/8/2022    Procedure: EGD (ESOPHAGOGASTRODUODENOSCOPY);  Surgeon: Anaid Washington MD;  Location: [facility] ENDO;  Service: Endoscopy;  Laterality: N/A;    ESOPHAGOGASTRODUODENOSCOPY N/A 4/12/2023    Procedure: EGD (ESOPHAGOGASTRODUODENOSCOPY);  Surgeon: Anaid Washington MD;  Location: [facility] ENDO;  Service: Endoscopy;  Laterality: N/A;    EYE SURGERY      bilateral cataracts    HYSTERECTOMY      complete    JOINT REPLACEMENT      Liban Knee    ROTATOR CUFF REPAIR Right     TOE SURGERY      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    UPPER GASTROINTESTINAL ENDOSCOPY  06/12/2017    Dr. Guerrier: gastritis and esophagitis, biopsy: chronic gastritis, negative for h pylori, esophageal- positive eosinophils, negative for  haley; repeat in 2 months    UPPER GASTROINTESTINAL ENDOSCOPY  07/27/2017    Dr. Guerrier       Time Tracking:     PT Received On: 11/08/24  PT Start Time: 0938     PT Stop Time: 0950  PT Total Time (min): 12 min     Billable Minutes: Evaluation 12      11/08/2024

## 2024-11-08 NOTE — PLAN OF CARE
Provided pt with eliquis coupon  Pt chose [facility]- referral sent  Pt's physical address [id number] in [place]    [address] next available start of care is Tuesday. Referral extended to E[facility]   Hospital follow up and hematology appt added to avs         11/08/24 2498   Post-Acute Status   Post-Acute Authorization Home Health   Home Health Status Referrals Sent

## 2024-11-08 NOTE — SUBJECTIVE & OBJECTIVE
Interval History:  Seen and examined during a.m. rounds.  Hematology evaluated yesterday, mentioned less likely recurrent malignancy, recommended to continue Lovenox and switch to DOAC at discharge.    Duplex venous ultrasound showed occlusive thrombus in the left lower extremity with mobile thrombus in the common femoral vein proximally.  Vascular surgery was consulted.      Review of Systems  Objective:     Vital Signs (Most Recent):  Temp: 98.5 °F (36.9 °C) (11/08/24 0731)  Pulse: 73 (11/08/24 0732)  Resp: 18 (11/08/24 0731)  BP: 137/68 (11/08/24 0731)  SpO2: (!) 91 % (11/08/24 0732) Vital Signs (24h Range):  Temp:  [97.9 °F (36.6 °C)-98.5 °F (36.9 °C)] 98.5 °F (36.9 °C)  Pulse:  [70-97] 73  Resp:  [16-18] 18  SpO2:  [91 %-97 %] 91 %  BP: (108-159)/(58-85) 137/68     Weight: 57.2 kg (126 lb)  Body mass index is 22.32 kg/m².  No intake or output data in the 24 hours ending 11/08/24 0830      Physical Exam  Constitutional:       Appearance: Normal appearance.   HENT:      Head: Normocephalic and atraumatic.      Mouth/Throat:      Mouth: Mucous membranes are moist.   Eyes:      Pupils: Pupils are equal, round, and reactive to light.   Cardiovascular:      Rate and Rhythm: Normal rate and regular rhythm.      Pulses: Normal pulses.   Pulmonary:      Effort: Pulmonary effort is normal. No respiratory distress.      Breath sounds: Normal breath sounds. No stridor. No wheezing, rhonchi or rales.   Abdominal:      General: Abdomen is flat. There is no distension.   Musculoskeletal:         General: No swelling.      Cervical back: Normal range of motion.   Skin:     General: Skin is warm.   Neurological:      General: No focal deficit present.      Mental Status: She is alert and oriented to person, place, and time. Mental status is at baseline.   Psychiatric:         Mood and Affect: Mood normal.         Judgment: Judgment normal.             Significant Labs: All pertinent labs within the past 24 hours have been  reviewed.    Significant Imaging: I have reviewed all pertinent imaging results/findings within the past 24 hours.    US Lower Extremity Veins Bilateral    Result Date: 11/7/2024  EXAMINATION: US LOWER EXTREMITY VEINS BILATERAL CLINICAL HISTORY: Swelling, PE; TECHNIQUE: Duplex and color flow Doppler and dynamic compression was performed of the bilateral lower extremity veins was performed. COMPARISON: None FINDINGS: Right thigh veins: The common femoral, femoral, popliteal, upper greater saphenous, and deep femoral veins are patent and free of thrombus. The veins are normally compressible and have normal phasic flow and augmentation response. Right calf veins: The visualized calf veins are patent. Left thigh veins: Occlusive thrombus is noted in the common femoral, superficial femoropopliteal and posterior tibial vein on the left.  Mobile thrombus in the common femoral vein is noted. Left calf veins: The visualized calf veins are patent. Miscellaneous: None     Occlusive thrombus in the LEFT lower extremity venous system with mobile thrombus in the common femoral vein proximally. No evidence of deep venous thrombosis in right lower extremity. This report was flagged in Epic as abnormal. Electronically signed by: Nate Chávez Date:    11/07/2024 Time:    22:09    Echo    Result Date: 11/7/2024    Left Ventricle: The left ventricle is normal in size. There is low normal systolic function. Biplane (2D) method of discs ejection fraction is 53%.   Right Ventricle: Normal right ventricular cavity size. Systolic function is normal.   Tricuspid Valve: There is mild to moderate regurgitation.   Pulmonary Artery: There is pulmonary hypertension. The estimated pulmonary artery systolic pressure is 54 mmHg.   IVC/SVC: Normal venous pressure at 3 mmHg.     CTA Chest Non-Coronary (PE Studies)    Result Date: 11/7/2024  CMS MANDATED QUALITY DATA - CT RADIATION - 436 All CT scans at this facility utilize dose modulation,  iterative reconstruction, and/or weight based dosing when appropriate to reduce radiation dose to as low as reasonably achievable. EXAMINATION: CTA CHEST NON CORONARY (PE STUDIES) CLINICAL HISTORY: Pulmonary embolism (PE) suspected, positive D-dimer; Chest pain, unspecified TECHNIQUE: CT angiography of the chest with 100 mL Omnipaque 350. Maximum intensity projection coronal reformations were created at a separate workstation and stored in the patient's permanent medical record. COMPARISON: None. FINDINGS: There are multiple pulmonary emboli of segmental and subsegmental arteries supplying the right upper and lower lobes.  There is a small pulmonary artery involving subsegmental pulmonary artery supplying the posterior left lower lobe.  Heart size is normal.  No right heart strain observed.  The thoracic aorta is normal caliber with calcified plaque formation.  No enlarged mediastinal lymph nodes or mass. Mild centrilobular emphysematous changes of the lungs noted.  Subpleural patchy ground-glass opacities noted of the right upper lobe and left lower lobe.  There is interstitial thickening of the lung bases.  The no pleural effusions. Pneumobilia noted.  The visualized abdominal viscera are otherwise unremarkable.  There are degenerative changes of the spine.  No acute osseous abnormality.     1. Pulmonary emboli noted involving segmental and subsegmental arteries of the bilateral pulmonary arteries, more so involving the right pulmonary arteries. 2. Patchy areas of subpleural ground-glass opacities noted involving the right upper lobe and left lower lobes, possibly reflecting infarcts or other infiltrate. 3. Mild centrilobular emphysematous lung disease and interstitial thickening of the lung bases. 4. Incidentally noted pneumobilia. I reported these findings to nurse practitioner Alexsandra Miller at 10:54. Electronically signed by: Dong Rodriguez Date:    11/07/2024 Time:    10:55    X-Ray Chest PA And Lateral    Result  Date: 11/5/2024  EXAMINATION: XR CHEST PA AND LATERAL CLINICAL HISTORY: Cough, unspecified TECHNIQUE: PA and lateral views of the chest were performed. COMPARISON: 08/04/2023 also earlier exams including 07/30/2023, 07/14/2021 FINDINGS: The heart is not enlarged.  The cardiomediastinal silhouette is stable.  There are mild interstitial prominence in the left lung base.  There are no confluent infiltrates.  There is no pleural effusion.     Mild interstitial prominence in the left lung base appearing new compared to prior studies suggesting mild interstitial infiltrate.  No consolidation. 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 TECHNIQUE: Following IV administration of 13 mCi of F-18 labeled FDG into right wrist and a 60 minute delay, PET CT was performed from the vertex of the skull through the proximal thighs with an integrated PET CT scanner with image fusion. CT images were obtained to aid in attenuation correction and PET localization. The patient's serum glucose at the time of the exam was 114 mg/dL. COMPARISON: Abdominal MRI 07/31/2023, CT abdomen and pelvis 07/30/2023, CT chest abdomen and pelvis 04/27/2023 No prior PET-CT FINDINGS: CT imaging through the brain shows no acute intracranial pathology.  Cerebral atrophy with associated ventricular and sulcal enlargement.  Brain MRI would provide the most sensitive assessment for intracranial metastasis. No pathologically enlarged or hypermetabolic lymph nodes within the neck.  Atherosclerotic calcification of the carotid arteries bilaterally.  Asymmetric enlargement of the right thyroid lobe  extending inferiorly into the superior mediastinum. Pleuroparenchymal scarring involving the lung bases bilaterally, left greater than right, stable compared to prior exams.  Small nodular airspace opacity with surrounding ground-glass attenuation in the posterior aspect of the right upper lobe, with SUV max 1.8.  No markedly FDG avid pulmonary nodule or mass.  No pleural effusion. Atherosclerotic calcification of the aorta and coronary arteries. 10 mm hypermetabolic right hilar lymph node on image 102 has SUV max of 4.2. 10 mm hypermetabolic left hilar lymph node on image 103 has SUV max of 6.6. Precarinal lymph node measuring 10 mm on image 98 has SUV max of 3.6. Calcified granuloma right hepatic lobe.  Pneumobilia.  No intrahepatic or extrahepatic bile duct dilation.  No FDG avid hepatic lesion.  Gallbladder contains gas.  Spleen is absent.  Pancreas is unremarkable. Rounded focus of FDG avidity in the right upper quadrant near the glenys hepatis with SUV max of 4.1, potentially glenys hepatis or portacaval hypermetabolic lymph node, but not well visualized on corresponding non-contrast CT. No adrenal lesion.  Small bilateral renal cysts.  No FDG avid lesion.  Ureters are normal in caliber.  Urinary bladder is unremarkable. No evidence of acute pathology involving the gastrointestinal tract.  Duodenal diverticulum.  Aortoiliac atherosclerotic calcification.  Postoperative changes of the colon.  No FDG avid colonic mass lesion identified. Review of bone window images shows degenerative changes of the spine.  No acute/aggressive osseous lesion.     Hypermetabolic hilar and mediastinal lymph nodes are nonspecific.  Metastatic disease is possible. Isolated hypermetabolic focus in the glenys hepatis region, potentially a hypermetabolic glenys hepatis lymph node. Metastasis is not excluded. Small nodular focus of airspace disease with surrounding ground-glass attenuation posterior aspect right upper lobe, most likely  infectious/inflammatory in nature.  Please correlate with pulmonary symptoms and follow-up chest imaging. Electronically signed by: Yohannes Granado Date:    10/30/2024 Time:    12:45  - pulls last radiology orders

## 2024-11-08 NOTE — HPI
This is a 89-year-old female known to our service who sees Dr. Granado.  Has a history of colon cancer and sees her for this.  Recently had a PET scan that showed mediastinal lymphadenopathy with some activity on PET scan and was referred to pulmonology, which she has not yet seen.  She has now been admitted after a CT angio of the chest showed pulmonary emboli.  Patient denies any shortness of breath or chest pain.  She does state she has swelling in her left lower extremity.  She denies any previous history of clot.

## 2024-11-08 NOTE — DISCHARGE SUMMARY
[facility] Medicine  Discharge Summary      Patient Name: Kenyetta Andersen  MRN: [id number]  MILES: [id number]  Patient Class: OP- Observation  Admission Date: 11/7/2024  Hospital Length of Stay: 0 days  Discharge Date and Time: No discharge date for patient encounter.  Attending Physician: Horacio Puga, *   Discharging Provider: Horacio Puga MD  Primary Care Provider: Hever Arreola MD    Primary Care Team: Networked reference to record PCT     HPI:   This is a 89-year-old female with history significant for colon cancer, follows with Hematology Dr. Juan as outpatient who presented to her primary care with left lower extremity pain and swelling.  D-dimers was ordered, found to be elevated, CT PE was ordered which showed pulmonary emboli involving segmental and subsegmental arteries of bilateral pulmonary arteries, more so in the right pulmonary artery, no right heart strain.      In the ED, CBC and BMP were within normal limits. Her vitals were stable, given the multiple PE, Lovenox was administered in the ED and Hospital Medicine was consulted.    * No surgery found *      Hospital Course:   Patient was admitted for multiple segmental and subsegmental pulmonary embolism.  She was started on therapeutic Lovenox.  2D echocardiogram did not show any right heart strain.  Hematology/oncology consulted given her previous history of colon cancer and a recent PET scan showing mediastinal lymphadenopathy.  Recommended anticoagulation with NOAC at discharge.  For lower extremity swelling, duplex venous ultrasound showed occlusive thrombus in the left lower extremity with mobile thrombus in the common femoral vein proximally.  Vascular surgery was consulted.  Case discussed with Dr Frey and Dr Khoobehi, who mentioned given that her vitals are stable, she is asymptomatic, and she is stable on anticoagulation, they recommended we can hold off on IVC filter at this  time.  Discussed with the patient regarding IVC filter, she mentioned he would like to follow up as outpatient and for now continue anticoagulation.  Her CT PE incidentally picked up pneumobilia likely in the setting of her previous ERCP, but clinically she was asymptomatic and LFTs were within normal limits, discussed with GI on-call, expected finding with her previous procedures.  Physical therapy evaluated the patient-no therapy was indicated.  Clear instructions provided about Eliquis and fall precautions, and she was eager to go home.  Prescription sent to her pharmacy.  Hematology cleared her for discharge with outpatient follow up.      Goals of Care Treatment Preferences:  Code Status: DNR    Health care agent: Tucker Murcia and Nalini Granado  [facility] care agent number: [phone] / [phone]       [place]           SDOH Screening:  The patient was screened for food insecurity, housing instability, transportation needs, utility difficulties, and interpersonal safety. The social determinant(s) of health identified as a concern this admission are:  Food insecurity      Social Drivers of Health with Concerns     Food Insecurity: Food Insecurity Present (11/7/2024)        Consults:   Consults (From admission, onward)          Status Ordering Provider     Inpatient consult to Vascular Surgery  Once        Provider:  (Not yet assigned)    Acknowledged GUERDA VARGAS     Inpatient consult to Hematology Oncology  Once        Provider:  (Not yet assigned)    Acknowledged GUERDA VARGAS            Orthopedic  Swelling of left lower extremity  - Duplex venous ultrasound showed occlusive thrombus in the left lower extremity with mobile thrombus in the common femoral vein proximally.    - Vascular surgery was consulted.  - Therapeutic Lovenox as above      Other  * Acute pulmonary embolism  - Lovenox 1 mg/kg q.12h  - monitor respiratory status given multiple PE  - 2D ECHO did  not show any right heart strain  - hematology/oncology consulted, appreciate their recommendations   - duplex venous ultrasound positive for DVT with a mobile thrombus in the left common femoral vein      Final Active Diagnoses:    Diagnosis Date Noted POA    PRINCIPAL PROBLEM:  Acute pulmonary embolism [I26.99] 11/07/2024 Yes    Swelling of left lower extremity [M79.89] 11/07/2024 Yes      Problems Resolved During this Admission:       Discharged Condition: stable    Disposition: Home or Self Care    Follow Up:   Follow-up Information       Hever Arreola MD Follow up in 1 week(s).    Specialty: Family Medicine  Contact information:  [address]  [address]  [place] [address]  [phone]               Yessica Granado MD Follow up in 1 week(s).    Specialties: Hematology and Oncology, Hematology, Oncology  Contact information:  [address]  [address]  [place] [address]  [phone]                           Patient Instructions:      Ambulatory referral/consult to Home Health   Standing Status: Future   Referral Priority: Routine Referral Type: Home Health   Referral Reason: Specialty Services Required   Requested Specialty: Home Health Services   Number of Visits Requested: 1     Notify your health care provider if you experience any of the following:  temperature >100.4     Notify your health care provider if you experience any of the following:  persistent nausea and vomiting or diarrhea     Notify your health care provider if you experience any of the following:  severe uncontrolled pain     Notify your health care provider if you experience any of the following:  redness, tenderness, or signs of infection (pain, swelling, redness, odor or green/yellow discharge around incision site)     Notify your health care provider if you experience any of the following:  difficulty breathing or increased cough     Notify your health care provider if you experience any of the following:  severe persistent  headache     Notify your health care provider if you experience any of the following:  worsening rash     Notify your health care provider if you experience any of the following:  persistent dizziness, light-headedness, or visual disturbances     Notify your health care provider if you experience any of the following:  increased confusion or weakness     Notify your health care provider if you experience any of the following:     Activity as tolerated       Significant Diagnostic Studies:   US Lower Extremity Veins Bilateral    Result Date: 11/7/2024  EXAMINATION: US LOWER EXTREMITY VEINS BILATERAL CLINICAL HISTORY: Swelling, PE; TECHNIQUE: Duplex and color flow Doppler and dynamic compression was performed of the bilateral lower extremity veins was performed. COMPARISON: None FINDINGS: Right thigh veins: The common femoral, femoral, popliteal, upper greater saphenous, and deep femoral veins are patent and free of thrombus. The veins are normally compressible and have normal phasic flow and augmentation response. Right calf veins: The visualized calf veins are patent. Left thigh veins: Occlusive thrombus is noted in the common femoral, superficial femoropopliteal and posterior tibial vein on the left.  Mobile thrombus in the common femoral vein is noted. Left calf veins: The visualized calf veins are patent. Miscellaneous: None     Occlusive thrombus in the LEFT lower extremity venous system with mobile thrombus in the common femoral vein proximally. No evidence of deep venous thrombosis in right lower extremity. This report was flagged in Epic as abnormal. Electronically signed by: Nate Chávez Date:    11/07/2024 Time:    22:09    Echo    Result Date: 11/7/2024    Left Ventricle: The left ventricle is normal in size. There is low normal systolic function. Biplane (2D) method of discs ejection fraction is 53%.   Right Ventricle: Normal right ventricular cavity size. Systolic function is normal.   Tricuspid  Valve: There is mild to moderate regurgitation.   Pulmonary Artery: There is pulmonary hypertension. The estimated pulmonary artery systolic pressure is 54 mmHg.   IVC/SVC: Normal venous pressure at 3 mmHg.     CTA Chest Non-Coronary (PE Studies)    Result Date: 11/7/2024  CMS MANDATED QUALITY DATA - CT RADIATION - 436 All CT scans at this facility utilize dose modulation, iterative reconstruction, and/or weight based dosing when appropriate to reduce radiation dose to as low as reasonably achievable. EXAMINATION: CTA CHEST NON CORONARY (PE STUDIES) CLINICAL HISTORY: Pulmonary embolism (PE) suspected, positive D-dimer; Chest pain, unspecified TECHNIQUE: CT angiography of the chest with 100 mL Omnipaque 350. Maximum intensity projection coronal reformations were created at a separate workstation and stored in the patient's permanent medical record. COMPARISON: None. FINDINGS: There are multiple pulmonary emboli of segmental and subsegmental arteries supplying the right upper and lower lobes.  There is a small pulmonary artery involving subsegmental pulmonary artery supplying the posterior left lower lobe.  Heart size is normal.  No right heart strain observed.  The thoracic aorta is normal caliber with calcified plaque formation.  No enlarged mediastinal lymph nodes or mass. Mild centrilobular emphysematous changes of the lungs noted.  Subpleural patchy ground-glass opacities noted of the right upper lobe and left lower lobe.  There is interstitial thickening of the lung bases.  The no pleural effusions. Pneumobilia noted.  The visualized abdominal viscera are otherwise unremarkable.  There are degenerative changes of the spine.  No acute osseous abnormality.     1. Pulmonary emboli noted involving segmental and subsegmental arteries of the bilateral pulmonary arteries, more so involving the right pulmonary arteries. 2. Patchy areas of subpleural ground-glass opacities noted involving the right upper lobe and left  lower lobes, possibly reflecting infarcts or other infiltrate. 3. Mild centrilobular emphysematous lung disease and interstitial thickening of the lung bases. 4. Incidentally noted pneumobilia. I reported these findings to nurse practitioner Alexsandra Miller at 10:54. Electronically signed by: Dong Rodriguez Date:    11/07/2024 Time:    10:55    X-Ray Chest PA And Lateral    Result Date: 11/5/2024  EXAMINATION: XR CHEST PA AND LATERAL CLINICAL HISTORY: Cough, unspecified TECHNIQUE: PA and lateral views of the chest were performed. COMPARISON: 08/04/2023 also earlier exams including 07/30/2023, 07/14/2021 FINDINGS: The heart is not enlarged.  The cardiomediastinal silhouette is stable.  There are mild interstitial prominence in the left lung base.  There are no confluent infiltrates.  There is no pleural effusion.     Mild interstitial prominence in the left lung base appearing new compared to prior studies suggesting mild interstitial infiltrate.  No consolidation. 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 TECHNIQUE: Following IV administration of 13 mCi of F-18 labeled FDG into right wrist and a 60 minute delay, PET CT was performed from the vertex of the skull through the proximal thighs with an integrated PET CT scanner with image fusion. CT images were obtained to aid in attenuation correction and PET localization. The patient's serum glucose at the time of the exam was 114 mg/dL. COMPARISON: Abdominal MRI 07/31/2023, CT abdomen and pelvis 07/30/2023, CT chest abdomen and pelvis 04/27/2023 No prior PET-CT FINDINGS: CT imaging through the brain shows no  acute intracranial pathology.  Cerebral atrophy with associated ventricular and sulcal enlargement.  Brain MRI would provide the most sensitive assessment for intracranial metastasis. No pathologically enlarged or hypermetabolic lymph nodes within the neck.  Atherosclerotic calcification of the carotid arteries bilaterally.  Asymmetric enlargement of the right thyroid lobe extending inferiorly into the superior mediastinum. Pleuroparenchymal scarring involving the lung bases bilaterally, left greater than right, stable compared to prior exams.  Small nodular airspace opacity with surrounding ground-glass attenuation in the posterior aspect of the right upper lobe, with SUV max 1.8.  No markedly FDG avid pulmonary nodule or mass.  No pleural effusion. Atherosclerotic calcification of the aorta and coronary arteries. 10 mm hypermetabolic right hilar lymph node on image 102 has SUV max of 4.2. 10 mm hypermetabolic left hilar lymph node on image 103 has SUV max of 6.6. Precarinal lymph node measuring 10 mm on image 98 has SUV max of 3.6. Calcified granuloma right hepatic lobe.  Pneumobilia.  No intrahepatic or extrahepatic bile duct dilation.  No FDG avid hepatic lesion.  Gallbladder contains gas.  Spleen is absent.  Pancreas is unremarkable. Rounded focus of FDG avidity in the right upper quadrant near the glenys hepatis with SUV max of 4.1, potentially glenys hepatis or portacaval hypermetabolic lymph node, but not well visualized on corresponding non-contrast CT. No adrenal lesion.  Small bilateral renal cysts.  No FDG avid lesion.  Ureters are normal in caliber.  Urinary bladder is unremarkable. No evidence of acute pathology involving the gastrointestinal tract.  Duodenal diverticulum.  Aortoiliac atherosclerotic calcification.  Postoperative changes of the colon.  No FDG avid colonic mass lesion identified. Review of bone window images shows degenerative changes of the spine.  No acute/aggressive osseous lesion.      Hypermetabolic hilar and mediastinal lymph nodes are nonspecific.  Metastatic disease is possible. Isolated hypermetabolic focus in the glenys hepatis region, potentially a hypermetabolic glenys hepatis lymph node. Metastasis is not excluded. Small nodular focus of airspace disease with surrounding ground-glass attenuation posterior aspect right upper lobe, most likely infectious/inflammatory in nature.  Please correlate with pulmonary symptoms and follow-up chest imaging. Electronically signed by: Yohannes Granado Date:    10/30/2024 Time:    12:45  - pulls last radiology orders      Pending Diagnostic Studies:       Procedure Component Value Units Date/Time    BNP [[id number]] Collected: 11/07/24 1207    Order Status: Sent Lab Status: No result     Specimen: Blood            Medications:  Reconciled Home Medications:      Medication List        START taking these medications      apixaban 5 mg Tab  Commonly known as: ELIQUIS  Take 10 mg BID for one week, up-to 11/14/2024, Starting 11/15/2024 take 5 mg BID            CHANGE how you take these medications      brimonidine 0.2% 0.2 % Drop  Commonly known as: ALPHAGAN  INSTILL 1 DROP INTO BOTH EYES 3 TIMES A DAY  What changed: See the new instructions.     gabapentin 300 MG capsule  Commonly known as: NEURONTIN  TAKE 1 CAP IN THE AM, 1 CAP MIDDAY, AND 2 CAPS AT NIGHT  What changed:   how much to take  how to take this  when to take this            CONTINUE taking these medications      acetaminophen 650 MG Tbsr  Commonly known as: TYLENOL  Take 650 mg by mouth every 8 (eight) hours as needed (pain).     alendronate 70 MG tablet  Commonly known as: FOSAMAX  TAKE 1 TABLET BY MOUTH EVERY 7 DAYS. ON EMPTY STOMACH IN MORNING, REMAIN UPRIGHT FOR 30 MINUTES.     ascorbic acid (vitamin C) 500 MG tablet  Commonly known as: VITAMIN C  Take 500 mg by mouth once daily.     azithromycin 250 MG tablet  Commonly known as: Z-VASILE  Take 2 tablets by mouth on day 1; Take 1 tablet by  mouth on days 2-5     b complex vitamins capsule  Take 1 capsule by mouth once daily.     benzonatate 100 MG capsule  Commonly known as: TESSALON  Take 1 capsule (100 mg total) by mouth 3 (three) times daily as needed for Cough.     calcium carbonate-magnesium hydroxide 550-110 mg Chew  Commonly known as: ROLAIDS  Take 1 tablet by mouth 2 (two) times daily as needed (heartburn).     cyproheptadine 4 mg tablet  Commonly known as: PERIACTIN  Take 1 tablet (4 mg total) by mouth 3 (three) times daily as needed (appetite).     dorzolamide-timolol 2-0.5% 22.3-6.8 mg/mL ophthalmic solution  Commonly known as: COSOPT  INSTILL 1 DROP INTO BOTH EYES TWICE A DAY     latanoprost 0.005 % ophthalmic solution  INSTILL 1 DROP INTO BOTH EYES EVERY EVENING     omeprazole 40 MG capsule  Commonly known as: PRILOSEC  Take 1 capsule (40 mg total) by mouth every morning.     potassium chloride SA 20 MEQ tablet  Commonly known as: K-DUR,KLOR-CON  TAKE 1 TABLET BY MOUTH EVERY DAY     promethazine-dextromethorphan 6.25-15 mg/5 mL Syrp  Commonly known as: PROMETHAZINE-DM  Take 5 mLs by mouth every 8 (eight) hours as needed (cough).              Indwelling Lines/Drains at time of discharge:   Lines/Drains/Airways       None                   Time spent on the discharge of patient: 40 minutes         Horacio Puga MD  Department of Hospital Medicine  [facility]/Surg

## 2024-11-08 NOTE — HOSPITAL COURSE
Patient was admitted for multiple segmental and subsegmental pulmonary embolism.  She was started on therapeutic Lovenox.  2D echocardiogram did not show any right heart strain.  Hematology/oncology consulted given her previous history of colon cancer and a recent PET scan showing mediastinal lymphadenopathy.  Recommended anticoagulation with NOAC at discharge.  For lower extremity swelling, duplex venous ultrasound showed occlusive thrombus in the left lower extremity with mobile thrombus in the common femoral vein proximally.  Vascular surgery was consulted.  Case discussed with Dr Frey and Dr Khoobehi, who mentioned given that her vitals are stable, she is asymptomatic, and she is stable on anticoagulation, they recommended we can hold off on IVC filter at this time.  Discussed with the patient regarding IVC filter, she mentioned he would like to follow up as outpatient and for now continue anticoagulation.  Her CT PE incidentally picked up pneumobilia likely in the setting of her previous ERCP, but clinically she was asymptomatic and LFTs were within normal limits, discussed with GI on-call, expected finding with her previous procedures.  Physical therapy evaluated the patient-no therapy was indicated.  Clear instructions provided about Eliquis and fall precautions, and she was eager to go home.  Prescription sent to her pharmacy.  Hematology cleared her for discharge with outpatient follow up.

## 2024-11-08 NOTE — PLAN OF CARE
Eliquis $27 and in stock at [facility]. Will provide pt with coupon to get 1st 30 days free.  Will meet with pt at bedside to discuss HH       11/08/24 4291   Post-Acute Status   Post-Acute Authorization Medications;Home Health   Medication Status Set-up complete/Auth obtained

## 2024-11-09 DIAGNOSIS — M85.89 OSTEOPENIA OF MULTIPLE SITES: ICD-10-CM

## 2024-11-09 NOTE — TELEPHONE ENCOUNTER
Care Due:                  Date            Visit Type   Department     Provider  --------------------------------------------------------------------------------                                EP -                              PRIMARY      [facility] FAMILY  Last Visit: 02-      CARE ([facility])   PRACTICE       Hever Arreola                                           [facility] FAMILY  Next Visit: 12-      [facility]       Reinier Mejia                                                            Last  Test          Frequency    Reason                     Performed    Due Date  --------------------------------------------------------------------------------    Phosphate...  12 months..  alendronate..............  08- 07-    [facility] Embedded Care Due Messages. Reference number: [id number].   11/09/2024 9:14:32 AM CST

## 2024-11-11 ENCOUNTER — PATIENT OUTREACH (OUTPATIENT)
Dept: ADMINISTRATIVE | Facility: CLINIC | Age: 89
End: 2024-11-11
Payer: MEDICARE

## 2024-11-11 LAB
OHS QRS DURATION: 80 MS
OHS QTC CALCULATION: 492 MS

## 2024-11-11 RX ORDER — ALENDRONATE SODIUM 70 MG/1
TABLET ORAL
Qty: 12 TABLET | Refills: 1 | Status: SHIPPED | OUTPATIENT
Start: 2024-11-11

## 2024-11-11 NOTE — TELEPHONE ENCOUNTER
Refill Routing Note   Medication(s) are not appropriate for processing by [facility] for the following reason(s):        Outside of protocol    [facility] action(s):  Route             Appointments  past 12m or future 3m with PCP    Date Provider   Last Visit   2/27/2024 Hever Arreola MD   Next Visit   Visit date not found Hever Arreola MD   ED visits in past 90 days: 0        Note composed:10:13 AM 11/11/2024

## 2024-11-11 NOTE — PROGRESS NOTES
C3 nurse spoke with Kenyetta Andersen for a TCC post hospital discharge follow up call. The patient has a scheduled HOSFU appointment with Alexsandra Miller on 11/15/2024 @ 1030.

## 2024-11-14 ENCOUNTER — LAB VISIT (OUTPATIENT)
Dept: LAB | Facility: HOSPITAL | Age: 89
End: 2024-11-14
Attending: FAMILY MEDICINE
Payer: MEDICARE

## 2024-11-14 DIAGNOSIS — I26.99 IATROGENIC PULMONARY EMBOLISM AND INFARCTION: ICD-10-CM

## 2024-11-14 DIAGNOSIS — I51.9 MYXEDEMA HEART DISEASE: ICD-10-CM

## 2024-11-14 DIAGNOSIS — E03.9 MYXEDEMA HEART DISEASE: ICD-10-CM

## 2024-11-14 DIAGNOSIS — M79.89 SWELLING OF LIMB: ICD-10-CM

## 2024-11-14 DIAGNOSIS — I26.99 IATROGENIC PULMONARY EMBOLISM AND INFARCTION: Primary | ICD-10-CM

## 2024-11-14 DIAGNOSIS — T81.718A IATROGENIC PULMONARY EMBOLISM AND INFARCTION: ICD-10-CM

## 2024-11-14 DIAGNOSIS — T81.718A IATROGENIC PULMONARY EMBOLISM AND INFARCTION: Primary | ICD-10-CM

## 2024-11-14 LAB
ALBUMIN SERPL BCP-MCNC: 3 G/DL (ref 3.5–5.2)
ALP SERPL-CCNC: 99 U/L (ref 40–150)
ALT SERPL W/O P-5'-P-CCNC: 8 U/L (ref 10–44)
ANION GAP SERPL CALC-SCNC: 10 MMOL/L (ref 8–16)
AST SERPL-CCNC: 20 U/L (ref 10–40)
BASOPHILS # BLD AUTO: 0.04 K/UL (ref 0–0.2)
BASOPHILS NFR BLD: 0.5 % (ref 0–1.9)
BILIRUB SERPL-MCNC: 0.4 MG/DL (ref 0.1–1)
BUN SERPL-MCNC: 12 MG/DL (ref 8–23)
CALCIUM SERPL-MCNC: 9.7 MG/DL (ref 8.7–10.5)
CHLORIDE SERPL-SCNC: 105 MMOL/L (ref 95–110)
CO2 SERPL-SCNC: 24 MMOL/L (ref 23–29)
CREAT SERPL-MCNC: 0.9 MG/DL (ref 0.5–1.4)
DIFFERENTIAL METHOD BLD: ABNORMAL
EOSINOPHIL # BLD AUTO: 0.1 K/UL (ref 0–0.5)
EOSINOPHIL NFR BLD: 1.6 % (ref 0–8)
ERYTHROCYTE [DISTWIDTH] IN BLOOD BY AUTOMATED COUNT: 12.7 % (ref 11.5–14.5)
EST. GFR  (NO RACE VARIABLE): >60 ML/MIN/1.73 M^2
GLUCOSE SERPL-MCNC: 95 MG/DL (ref 70–110)
HCT VFR BLD AUTO: 43.5 % (ref 37–48.5)
HGB BLD-MCNC: 15.7 G/DL (ref 12–16)
IMM GRANULOCYTES # BLD AUTO: 0.02 K/UL (ref 0–0.04)
IMM GRANULOCYTES NFR BLD AUTO: 0.3 % (ref 0–0.5)
LYMPHOCYTES # BLD AUTO: 2.8 K/UL (ref 1–4.8)
LYMPHOCYTES NFR BLD: 35.9 % (ref 18–48)
MCH RBC QN AUTO: 35.4 PG (ref 27–31)
MCHC RBC AUTO-ENTMCNC: 36.1 G/DL (ref 32–36)
MCV RBC AUTO: 98 FL (ref 82–98)
MONOCYTES # BLD AUTO: 0.7 K/UL (ref 0.3–1)
MONOCYTES NFR BLD: 8.5 % (ref 4–15)
NEUTROPHILS # BLD AUTO: 4.2 K/UL (ref 1.8–7.7)
NEUTROPHILS NFR BLD: 53.2 % (ref 38–73)
NRBC BLD-RTO: 0 /100 WBC
PLATELET # BLD AUTO: 310 K/UL (ref 150–450)
PMV BLD AUTO: 10.8 FL (ref 9.2–12.9)
POTASSIUM SERPL-SCNC: 3.6 MMOL/L (ref 3.5–5.1)
PROT SERPL-MCNC: 7.3 G/DL (ref 6–8.4)
RBC # BLD AUTO: 4.43 M/UL (ref 4–5.4)
SODIUM SERPL-SCNC: 139 MMOL/L (ref 136–145)
WBC # BLD AUTO: 7.88 K/UL (ref 3.9–12.7)

## 2024-11-14 PROCEDURE — 85025 COMPLETE CBC W/AUTO DIFF WBC: CPT | Mod: HCNC | Performed by: FAMILY MEDICINE

## 2024-11-14 PROCEDURE — 80053 COMPREHEN METABOLIC PANEL: CPT | Mod: HCNC | Performed by: FAMILY MEDICINE

## 2024-11-15 ENCOUNTER — LAB VISIT (OUTPATIENT)
Dept: LAB | Facility: HOSPITAL | Age: 89
End: 2024-11-15
Attending: INTERNAL MEDICINE
Payer: MEDICARE

## 2024-11-15 ENCOUNTER — OFFICE VISIT (OUTPATIENT)
Dept: FAMILY MEDICINE | Facility: CLINIC | Age: 89
End: 2024-11-15
Payer: MEDICARE

## 2024-11-15 VITALS
HEIGHT: 63 IN | SYSTOLIC BLOOD PRESSURE: 128 MMHG | WEIGHT: 123 LBS | DIASTOLIC BLOOD PRESSURE: 80 MMHG | OXYGEN SATURATION: 92 % | BODY MASS INDEX: 21.79 KG/M2 | HEART RATE: 74 BPM | TEMPERATURE: 98 F

## 2024-11-15 DIAGNOSIS — D75.1 POLYCYTHEMIA: ICD-10-CM

## 2024-11-15 DIAGNOSIS — M79.89 SWELLING OF LEFT LOWER EXTREMITY: ICD-10-CM

## 2024-11-15 DIAGNOSIS — Z09 HOSPITAL DISCHARGE FOLLOW-UP: Primary | ICD-10-CM

## 2024-11-15 DIAGNOSIS — I26.99 OTHER ACUTE PULMONARY EMBOLISM WITHOUT ACUTE COR PULMONALE: ICD-10-CM

## 2024-11-15 DIAGNOSIS — Z85.038 PERSONAL HISTORY OF COLON CANCER, STAGE III: ICD-10-CM

## 2024-11-15 LAB
BASOPHILS # BLD AUTO: 0.04 K/UL (ref 0–0.2)
BASOPHILS NFR BLD: 0.5 % (ref 0–1.9)
DIFFERENTIAL METHOD BLD: ABNORMAL
EOSINOPHIL # BLD AUTO: 0.1 K/UL (ref 0–0.5)
EOSINOPHIL NFR BLD: 1.5 % (ref 0–8)
ERYTHROCYTE [DISTWIDTH] IN BLOOD BY AUTOMATED COUNT: 12.5 % (ref 11.5–14.5)
HCT VFR BLD AUTO: 48 % (ref 37–48.5)
HGB BLD-MCNC: 15.7 G/DL (ref 12–16)
IMM GRANULOCYTES # BLD AUTO: 0.01 K/UL (ref 0–0.04)
IMM GRANULOCYTES NFR BLD AUTO: 0.1 % (ref 0–0.5)
LYMPHOCYTES # BLD AUTO: 2.4 K/UL (ref 1–4.8)
LYMPHOCYTES NFR BLD: 30.4 % (ref 18–48)
MCH RBC QN AUTO: 31.8 PG (ref 27–31)
MCHC RBC AUTO-ENTMCNC: 32.7 G/DL (ref 32–36)
MCV RBC AUTO: 97 FL (ref 82–98)
MONOCYTES # BLD AUTO: 0.8 K/UL (ref 0.3–1)
MONOCYTES NFR BLD: 9.6 % (ref 4–15)
NEUTROPHILS # BLD AUTO: 4.6 K/UL (ref 1.8–7.7)
NEUTROPHILS NFR BLD: 57.9 % (ref 38–73)
NRBC BLD-RTO: 0 /100 WBC
PLATELET # BLD AUTO: 289 K/UL (ref 150–450)
PMV BLD AUTO: 9.7 FL (ref 9.2–12.9)
RBC # BLD AUTO: 4.94 M/UL (ref 4–5.4)
WBC # BLD AUTO: 7.99 K/UL (ref 3.9–12.7)

## 2024-11-15 PROCEDURE — 36415 COLL VENOUS BLD VENIPUNCTURE: CPT | Mod: HCNC,PO | Performed by: INTERNAL MEDICINE

## 2024-11-15 PROCEDURE — 99999 PR PBB SHADOW E&M-EST. PATIENT-LVL IV: CPT | Mod: PBBFAC,HCNC,, | Performed by: NURSE PRACTITIONER

## 2024-11-15 PROCEDURE — 85025 COMPLETE CBC W/AUTO DIFF WBC: CPT | Mod: HCNC,PO | Performed by: INTERNAL MEDICINE

## 2024-11-15 NOTE — PROGRESS NOTES
"Subjective:       Patient ID: Kenyetta Andersen is a 89 y.o. female.    Chief Complaint: hospital follow up     HPI   88 y/o female patient with medical problems listed below presents for hospital discharge follow up.     Patient Class: OP- Observation [facility]/Surg   Admission Date: 11/7/2024  Hospital Length of Stay: 0 days  Discharge Date and Time: No discharge date for patient encounter.  Attending Physician: Horacio Puga, *   Discharging Provider: Horacio Puga MD  Primary Care Provider: Hever Arreola MD    "This is a 89-year-old female with history significant for colon cancer, follows with Hematology Dr. Juan as outpatient who presented to her primary care with left lower extremity pain and swelling.  D-dimers was ordered, found to be elevated, CT PE was ordered which showed pulmonary emboli involving segmental and subsegmental arteries of bilateral pulmonary arteries, more so in the right pulmonary artery, no right heart strain.       In the ED, CBC and BMP were within normal limits. Her vitals were stable, given the multiple PE, Lovenox was administered in the ED and Hospital Medicine was consulted.    Patient was admitted for multiple segmental and subsegmental pulmonary embolism.  She was started on therapeutic Lovenox.  2D echocardiogram did not show any right heart strain.  Hematology/oncology consulted given her previous history of colon cancer and a recent PET scan showing mediastinal lymphadenopathy.  Recommended anticoagulation with NOAC at discharge.  For lower extremity swelling, duplex venous ultrasound showed occlusive thrombus in the left lower extremity with mobile thrombus in the common femoral vein proximally.  Vascular surgery was consulted.  Case discussed with Dr Frey and Dr Khoobehi, who mentioned given that her vitals are stable, she is asymptomatic, and she is stable on anticoagulation, they recommended we can hold off on IVC filter at " "this time.  Discussed with the patient regarding IVC filter, she mentioned he would like to follow up as outpatient and for now continue anticoagulation.  Her CT PE incidentally picked up pneumobilia likely in the setting of her previous ERCP, but clinically she was asymptomatic and LFTs were within normal limits, discussed with GI on-call, expected finding with her previous procedures.  Physical therapy evaluated the patient-no therapy was indicated.  Clear instructions provided about Eliquis and fall precautions, and she was eager to go home.  Prescription sent to her pharmacy.  Hematology cleared her for discharge with outpatient follow up."     Orthopedic  Swelling of left lower extremity  - Duplex venous ultrasound showed occlusive thrombus in the left lower extremity with mobile thrombus in the common femoral vein proximally.    - Vascular surgery was consulted.  - Therapeutic Lovenox as above        Other  * Acute pulmonary embolism  - Lovenox 1 mg/kg q.12h  - monitor respiratory status given multiple PE  - 2D ECHO did not show any right heart strain  - hematology/oncology consulted, appreciate their recommendations   - duplex venous ultrasound positive for DVT with a mobile thrombus in the left common femoral vein    START taking these medications       apixaban 5 mg Tab  Commonly known as: ELIQUIS  Take 10 mg BID for one week, up-to 11/14/2024, Starting 11/15/2024 take 5 mg BID                CHANGE how you take these medications       brimonidine 0.2% 0.2 % Drop  Commonly known as: ALPHAGAN  INSTILL 1 DROP INTO BOTH EYES 3 TIMES A DAY  What changed: See the new instructions.      gabapentin 300 MG capsule  Commonly known as: NEURONTIN  TAKE 1 CAP IN THE AM, 1 CAP MIDDAY, AND 2 CAPS AT NIGHT  What changed:   how much to take  how to take this  when to take this                CONTINUE taking these medications       acetaminophen 650 MG Tbsr  Commonly known as: TYLENOL  Take 650 mg by mouth every 8 (eight) " hours as needed (pain).      alendronate 70 MG tablet  Commonly known as: FOSAMAX  TAKE 1 TABLET BY MOUTH EVERY 7 DAYS. ON EMPTY STOMACH IN MORNING, REMAIN UPRIGHT FOR 30 MINUTES.      ascorbic acid (vitamin C) 500 MG tablet  Commonly known as: VITAMIN C  Take 500 mg by mouth once daily.      azithromycin 250 MG tablet  Commonly known as: Z-VASILE  Take 2 tablets by mouth on day 1; Take 1 tablet by mouth on days 2-5      b complex vitamins capsule  Take 1 capsule by mouth once daily.      benzonatate 100 MG capsule  Commonly known as: TESSALON  Take 1 capsule (100 mg total) by mouth 3 (three) times daily as needed for Cough.      calcium carbonate-magnesium hydroxide 550-110 mg Chew  Commonly known as: ROLAIDS  Take 1 tablet by mouth 2 (two) times daily as needed (heartburn).      cyproheptadine 4 mg tablet  Commonly known as: PERIACTIN  Take 1 tablet (4 mg total) by mouth 3 (three) times daily as needed (appetite).      dorzolamide-timolol 2-0.5% 22.3-6.8 mg/mL ophthalmic solution  Commonly known as: COSOPT  INSTILL 1 DROP INTO BOTH EYES TWICE A DAY      latanoprost 0.005 % ophthalmic solution  INSTILL 1 DROP INTO BOTH EYES EVERY EVENING      omeprazole 40 MG capsule  Commonly known as: PRILOSEC  Take 1 capsule (40 mg total) by mouth every morning.      potassium chloride SA 20 MEQ tablet  Commonly known as: K-DUR,KLOR-CON  TAKE 1 TABLET BY MOUTH EVERY DAY      promethazine-dextromethorphan 6.25-15 mg/5 mL Syrp  Commonly known as: PROMETHAZINE-DM  Take 5 mLs by mouth every 8 (eight) hours as needed (cough).     Transitional Care Note    Family and/or Caretaker present at visit?  No.  Diagnostic tests reviewed/disposition: No diagnosic tests pending after this hospitalization.  Disease/illness education: YES  Home health/community services discussion/referrals: Patient has home health established at [email or web address] .   Establishment or re-establishment of referral orders for community resources: No other necessary  community resources.   Discussion with other health care providers: No discussion with other health care providers necessary.      Patient states has been taking eliquis daily since after hospital discharge. Stats had taken eliquis 10 mg bid daily till yesterday and started taking 5 mg daily since today. She has HH twice a day. States left leg pain and swelling is better. Denies cough, chest pain, sob.     Labs reviewed from 11/2024    Patient Active Problem List   Diagnosis    Chronic angle-closure glaucoma of both eyes    S/P TKR (total knee replacement)    Hypertriglyceridemia    Macrocytosis    B12 deficiency    Abdominal aortic atherosclerosis    GERD (gastroesophageal reflux disease)    Stage 3a chronic kidney disease    Bilateral renal cysts    Smoker    Osteopenia of multiple sites    BMI 27.0-27.9,adult    Calcified granuloma of lung    Personal history of colon cancer, stage III    Acid reflux    Escalante's esophagus    History of colon cancer    Chemotherapy-induced peripheral neuropathy    Primary hypothyroidism    Vitamin D deficiency    Primary insomnia    Stress incontinence of urine    Dysphagia    Hypokalemia    Pulmonary emphysema, unspecified emphysema type    Acalculous cholecystitis    Daily consumption of alcohol    Tachycardia    Elevated LFTs    Cigarette nicotine dependence without complication    Bacteremia due to Gram-negative bacteria    Debility    Multiple thyroid nodules    Acute pulmonary embolism    Swelling of left lower extremity      Review of patient's allergies indicates:   Allergen Reactions    Ace inhibitors Edema     Other reaction(s): Angioedema    Codeine Hives     Past Surgical History:   Procedure Laterality Date    APPENDECTOMY      CATARACT EXTRACTION W/  INTRAOCULAR LENS IMPLANT Left     CATARACT EXTRACTION W/  INTRAOCULAR LENS IMPLANT Right     Dr Sawant    COLON SURGERY      resection    COLONOSCOPY N/A 3/14/2017    Procedure: COLONOSCOPY;  Surgeon: Killian Guerrier  "MD;  Location: [facility];  Service: Endoscopy;  Laterality: N/A;    COLONOSCOPY  03/14/2017    Dr. Guerrier: hemorrhoids, one colon polyp removed, "Patent functional end-to-end ileo-colonic anastomosis"with healthy mucosa; biopsy: hyperplastic polyp; repeat in 3 years for surveillance    COLONOSCOPY N/A 1/21/2020    Procedure: COLONOSCOPY;  Surgeon: Timo Darling MD;  Location: [facility] ENDO;  Service: Endoscopy;  Laterality: N/A;    COLONOSCOPY N/A 7/8/2022    Procedure: COLONOSCOPY;  Surgeon: Anaid Washington MD;  Location: [facility] ENDO;  Service: Endoscopy;  Laterality: N/A;    ECTOPIC PREGNANCY SURGERY      ERCP N/A 7/31/2023    Procedure: ERCP (ENDOSCOPIC RETROGRADE CHOLANGIOPANCREATOGRAPHY);  Surgeon: Nasim Cuadra MD;  Location: [facility];  Service: Endoscopy;  Laterality: N/A;    ERCP N/A 8/1/2023    Procedure: ERCP (ENDOSCOPIC RETROGRADE CHOLANGIOPANCREATOGRAPHY);  Surgeon: Олег Arreaga MD;  [facility]  Service: Endoscopy;  Laterality: N/A;    ERCP N/A 12/12/2023    Procedure: ERCP (ENDOSCOPIC RETROGRADE CHOLANGIOPANCREATOGRAPHY);  Surgeon: Dimitri Anaya MD;  [facility]  Service: Endoscopy;  Laterality: N/A;    ESOPHAGOGASTRODUODENOSCOPY N/A 5/16/2019    Procedure: EGD (ESOPHAGOGASTRODUODENOSCOPY);  Surgeon: Anaid Washington MD;  Location: [facility];  Service: Endoscopy;  Laterality: N/A;    ESOPHAGOGASTRODUODENOSCOPY N/A 7/11/2019    Procedure: EGD (ESOPHAGOGASTRODUODENOSCOPY);  Surgeon: Anaid Washington MD;  Location: [facility] ENDO;  Service: Endoscopy;  Laterality: N/A;    ESOPHAGOGASTRODUODENOSCOPY N/A 2/5/2021    Procedure: EGD (ESOPHAGOGASTRODUODENOSCOPY);  Surgeon: Anaid Washington MD;  Location: [facility] ENDO;  Service: Endoscopy;  Laterality: N/A;    ESOPHAGOGASTRODUODENOSCOPY N/A 11/11/2021    Procedure: EGD (ESOPHAGOGASTRODUODENOSCOPY);  Surgeon: Anaid Washington MD;  Location: [facility];  Service: Endoscopy;  Laterality: N/A;    " ESOPHAGOGASTRODUODENOSCOPY N/A 7/8/2022    Procedure: EGD (ESOPHAGOGASTRODUODENOSCOPY);  Surgeon: Anaid Washington MD;  Location: [facility];  Service: Endoscopy;  Laterality: N/A;    ESOPHAGOGASTRODUODENOSCOPY N/A 4/12/2023    Procedure: EGD (ESOPHAGOGASTRODUODENOSCOPY);  Surgeon: Anaid Washington MD;  Location: [facility] ENDO;  Service: Endoscopy;  Laterality: N/A;    EYE SURGERY      bilateral cataracts    HYSTERECTOMY      complete    JOINT REPLACEMENT      Liban Knee    ROTATOR CUFF REPAIR Right     TOE SURGERY      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    UPPER GASTROINTESTINAL ENDOSCOPY  06/12/2017    Dr. Guerrier: gastritis and esophagitis, biopsy: chronic gastritis, negative for h pylori, esophageal- positive eosinophils, negative for barretts; repeat in 2 months    UPPER GASTROINTESTINAL ENDOSCOPY  07/27/2017    Dr. Guerrier        Current Outpatient Medications:     acetaminophen (TYLENOL) 650 MG TbSR, Take 650 mg by mouth every 8 (eight) hours as needed (pain)., Disp: , Rfl:     alendronate (FOSAMAX) 70 MG tablet, TAKE 1 TABLET BY MOUTH EVERY 7 DAYS. ON EMPTY STOMACH IN MORNING, REMAIN UPRIGHT FOR 30 MINUTES., Disp: 12 tablet, Rfl: 1    apixaban (ELIQUIS) 5 mg Tab, Take 10 mg BID for one week, up-to 11/14/2024, Starting 11/15/2024 take 5 mg BID, Disp: 40 tablet, Rfl: 1    ascorbic acid, vitamin C, (VITAMIN C) 500 MG tablet, Take 500 mg by mouth once daily., Disp: , Rfl:     b complex vitamins capsule, Take 1 capsule by mouth once daily., Disp: , Rfl:     benzonatate (TESSALON) 100 MG capsule, Take 1 capsule (100 mg total) by mouth 3 (three) times daily as needed for Cough., Disp: 30 capsule, Rfl: 0    brimonidine 0.2% (ALPHAGAN) 0.2 % Drop, INSTILL 1 DROP INTO BOTH EYES 3 TIMES A DAY, Disp: 10 mL, Rfl: 6    calcium carbonate-magnesium hydroxide (ROLAIDS) 550-110 mg Chew, Take 1 tablet by mouth 2 (two) times daily as needed (heartburn)., Disp: , Rfl:     cyproheptadine (PERIACTIN) 4 mg tablet, Take 1 tablet  "(4 mg total) by mouth 3 (three) times daily as needed (appetite)., Disp: 50 tablet, Rfl: 3    dorzolamide-timolol 2-0.5% (COSOPT) 22.3-6.8 mg/mL ophthalmic solution, INSTILL 1 DROP INTO BOTH EYES TWICE A DAY, Disp: 10 mL, Rfl: 4    gabapentin (NEURONTIN) 300 MG capsule, TAKE 1 CAP IN THE AM, 1 CAP MIDDAY, AND 2 CAPS AT NIGHT, Disp: 360 capsule, Rfl: 3    latanoprost 0.005 % ophthalmic solution, INSTILL 1 DROP INTO BOTH EYES EVERY EVENING, Disp: 7.5 mL, Rfl: 3    omeprazole (PRILOSEC) 40 MG capsule, Take 1 capsule (40 mg total) by mouth every morning., Disp: 30 capsule, Rfl: 0    potassium chloride SA (K-DUR,KLOR-CON) 20 MEQ tablet, TAKE 1 TABLET BY MOUTH EVERY DAY, Disp: 90 tablet, Rfl: 3    promethazine-dextromethorphan (PROMETHAZINE-DM) 6.25-15 mg/5 mL Syrp, Take 5 mLs by mouth every 8 (eight) hours as needed (cough)., Disp: 118 mL, Rfl: 0    Review of Systems   Constitutional:  Negative for chills and fever.   Respiratory:  Negative for cough and shortness of breath.    Cardiovascular:  Negative for chest pain and palpitations.   Gastrointestinal:  Negative for abdominal pain.   Neurological:  Negative for dizziness and headaches.       Objective:   /80 (BP Location: Right arm, Patient Position: Sitting)   Pulse 74   Temp 97.8 °F (36.6 °C) (Oral)   Ht 5' 3" (1.6 m)   Wt 55.8 kg (123 lb 0.3 oz)   SpO2 (!) 92%   BMI 21.79 kg/m²         Physical Exam  Constitutional:       General: She is not in acute distress.     Appearance: Normal appearance.   HENT:      Head: Atraumatic.   Cardiovascular:      Rate and Rhythm: Normal rate and regular rhythm.      Pulses: Normal pulses.      Heart sounds: Normal heart sounds.   Pulmonary:      Effort: Pulmonary effort is normal.      Breath sounds: Normal breath sounds.   Abdominal:      General: Abdomen is flat. Bowel sounds are normal.      Palpations: Abdomen is soft.   Neurological:      Mental Status: She is oriented to person, place, and time.       "   Assessment:       1. Hospital discharge follow-up    2. Other acute pulmonary embolism without acute cor pulmonale    3. Swelling of left lower extremity    4. Personal history of colon cancer, stage III        Plan:       1. Other acute pulmonary embolism without acute cor pulmonale  - Continue with eliquis   - Continue with HH   - Continue to follow up with hematology oncology as scheduled on 11/19/2024    2. Swelling of left lower extremity  - Symptoms improving     3. Personal history of colon cancer, stage III    4. Hospital discharge follow-up (Primary)    Patient with be reevaluated in  as scheduled  or sooner chad Montes NP

## 2024-11-25 ENCOUNTER — OFFICE VISIT (OUTPATIENT)
Dept: HEMATOLOGY/ONCOLOGY | Facility: CLINIC | Age: 89
End: 2024-11-25
Payer: MEDICARE

## 2024-11-25 VITALS
SYSTOLIC BLOOD PRESSURE: 113 MMHG | OXYGEN SATURATION: 95 % | TEMPERATURE: 97 F | BODY MASS INDEX: 22.43 KG/M2 | HEIGHT: 63 IN | RESPIRATION RATE: 16 BRPM | DIASTOLIC BLOOD PRESSURE: 59 MMHG | HEART RATE: 75 BPM | WEIGHT: 126.56 LBS

## 2024-11-25 DIAGNOSIS — Z85.038 HISTORY OF COLON CANCER: Primary | ICD-10-CM

## 2024-11-25 DIAGNOSIS — E53.8 B12 DEFICIENCY: ICD-10-CM

## 2024-11-25 PROCEDURE — 99999 PR PBB SHADOW E&M-EST. PATIENT-LVL III: CPT | Mod: PBBFAC,HCNC,, | Performed by: INTERNAL MEDICINE
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
## 2024-11-25 NOTE — Clinical Note
[facility]28 that's a long wait, pt also got admitted with PE in the mean time and we are worried about a mlignancy. Please see if this appt can be moved up asa and I want to see her after. If they are doing a bx then with bx results

## 2024-11-27 ENCOUNTER — TELEPHONE (OUTPATIENT)
Dept: HEMATOLOGY/ONCOLOGY | Facility: CLINIC | Age: 89
End: 2024-11-27
Payer: MEDICARE

## 2024-11-27 NOTE — TELEPHONE ENCOUNTER
----- Message from Med Assistant Julee sent at 11/27/2024  3:27 PM CST -----  Regarding: RE: Appointment  Yes ma'am, Dr Vigil is only here 2 half days a week and that is the first available on 12/24.      Thanks  ----- Message -----  From: Pema Rodriguez RN  Sent: 11/26/2024   4:19 PM CST  To: Musa Somers Staff  Subject: Appointment                                      Good Afternoon,   Dr Granado is inquiring if the earliest available new patient appointment is 12/28 that she is scheduled for. She said there is concern for malignancy and would like her seen asap so we can begin treatment accordingly.   Thanks so much for time and efforts!  Pema Rodriguez, RN

## 2024-12-05 ENCOUNTER — TELEPHONE (OUTPATIENT)
Dept: HEMATOLOGY/ONCOLOGY | Facility: CLINIC | Age: 89
End: 2024-12-05
Payer: MEDICARE

## 2024-12-05 NOTE — TELEPHONE ENCOUNTER
Called pt regarding her appointment with pulmonology. She states that she never received a call from that office. I gave her the details for that appointment. No further questions.     ----- Message from Shawna sent at 12/5/2024  9:57 AM CST -----  Regarding: CB  Pt has not heard anything back about the lung specialist and is expecting a CB    [phone]

## 2024-12-06 ENCOUNTER — OFFICE VISIT (OUTPATIENT)
Dept: FAMILY MEDICINE | Facility: CLINIC | Age: 89
End: 2024-12-06
Payer: MEDICARE

## 2024-12-06 VITALS
BODY MASS INDEX: 22.86 KG/M2 | RESPIRATION RATE: 16 BRPM | HEIGHT: 63 IN | OXYGEN SATURATION: 96 % | HEART RATE: 91 BPM | TEMPERATURE: 98 F | DIASTOLIC BLOOD PRESSURE: 70 MMHG | WEIGHT: 129 LBS | SYSTOLIC BLOOD PRESSURE: 110 MMHG

## 2024-12-06 DIAGNOSIS — N18.31 STAGE 3A CHRONIC KIDNEY DISEASE: ICD-10-CM

## 2024-12-06 DIAGNOSIS — E78.1 HYPERTRIGLYCERIDEMIA: ICD-10-CM

## 2024-12-06 DIAGNOSIS — R79.9 ABNORMAL BLOOD FINDING: Primary | ICD-10-CM

## 2024-12-06 DIAGNOSIS — M85.89 OSTEOPENIA OF MULTIPLE SITES: ICD-10-CM

## 2024-12-06 PROCEDURE — 99999 PR PBB SHADOW E&M-EST. PATIENT-LVL III: CPT | Mod: PBBFAC,HCNC,, | Performed by: FAMILY MEDICINE

## 2024-12-06 RX ORDER — ALENDRONATE SODIUM 70 MG/1
70 TABLET ORAL
Qty: 12 TABLET | Refills: 1 | Status: SHIPPED | OUTPATIENT
Start: 2024-12-06

## 2024-12-06 NOTE — PROGRESS NOTES
"  Subjective:   Patient ID: Kenyetta Andersen is a 89 y.o. female     Chief Complaint:Establish Care      Here for checkup      Review of Systems   Constitutional:  Negative for chills and fever.   HENT:  Negative for sore throat and trouble swallowing.    Respiratory:  Negative for cough and shortness of breath.    Cardiovascular:  Negative for chest pain and leg swelling.   Gastrointestinal:  Negative for abdominal distention and abdominal pain.   Genitourinary:  Negative for dysuria and flank pain.   Musculoskeletal:  Negative for arthralgias and back pain.   Skin:  Negative for color change and pallor.   Neurological:  Negative for weakness and headaches.   Psychiatric/Behavioral:  Negative for agitation and confusion.      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    Anxiety     Arthritis     Escalante esophagus     Blood transfusion     Cataract     Done OU    Colon cancer 2009    Colon polyps 3/14/2017    GERD (gastroesophageal reflux disease)     Glaucoma 2/24/2012    Nuclear sclerosis 8/27/2012    Osteoporosis     Primary hypothyroidism 2/23/2020    Pseudophakia - Left Eye 2/24/2012     Past Surgical History:   Procedure Laterality Date    APPENDECTOMY      CATARACT EXTRACTION W/  INTRAOCULAR LENS IMPLANT Left     CATARACT EXTRACTION W/  INTRAOCULAR LENS IMPLANT Right     Dr Sawant    COLON SURGERY      resection    COLONOSCOPY N/A 3/14/2017    Procedure: COLONOSCOPY;  Surgeon: Killian Guerrier MD;  Location: [facility];  Service: Endoscopy;  Laterality: N/A;    COLONOSCOPY  03/14/2017    Dr. Guerrier: hemorrhoids, one colon polyp removed, "Patent functional end-to-end ileo-colonic anastomosis"with healthy mucosa; biopsy: hyperplastic polyp; repeat in 3 years for surveillance    COLONOSCOPY N/A 1/21/2020    Procedure: COLONOSCOPY;  Surgeon: Timo Darling MD;  Location: [facility];  Service: Endoscopy;  Laterality: N/A;    COLONOSCOPY N/A 7/8/2022    Procedure: COLONOSCOPY; "  Surgeon: Anaid Washington MD;  Location: [facility] ENDO;  Service: Endoscopy;  Laterality: N/A;    ECTOPIC PREGNANCY SURGERY      ERCP N/A 7/31/2023    Procedure: ERCP (ENDOSCOPIC RETROGRADE CHOLANGIOPANCREATOGRAPHY);  Surgeon: Nasim Cuadra MD;  Location: [facility] ENDO;  Service: Endoscopy;  Laterality: N/A;    ERCP N/A 8/1/2023    Procedure: ERCP (ENDOSCOPIC RETROGRADE CHOLANGIOPANCREATOGRAPHY);  Surgeon: Олег Arreaga MD;  Location: [facility] (2ND FLR);  Service: Endoscopy;  Laterality: N/A;    ERCP N/A 12/12/2023    Procedure: ERCP (ENDOSCOPIC RETROGRADE CHOLANGIOPANCREATOGRAPHY);  Surgeon: Dimitri Anaya MD;  Location: [facility] ENDO (2ND FLR);  Service: Endoscopy;  Laterality: N/A;    ESOPHAGOGASTRODUODENOSCOPY N/A 5/16/2019    Procedure: EGD (ESOPHAGOGASTRODUODENOSCOPY);  Surgeon: Anaid Washington MD;  Location: [facility];  Service: Endoscopy;  Laterality: N/A;    ESOPHAGOGASTRODUODENOSCOPY N/A 7/11/2019    Procedure: EGD (ESOPHAGOGASTRODUODENOSCOPY);  Surgeon: Anaid Washington MD;  Location: [facility];  Service: Endoscopy;  Laterality: N/A;    ESOPHAGOGASTRODUODENOSCOPY N/A 2/5/2021    Procedure: EGD (ESOPHAGOGASTRODUODENOSCOPY);  Surgeon: Anaid Washington MD;  Location: [facility] ENDO;  Service: Endoscopy;  Laterality: N/A;    ESOPHAGOGASTRODUODENOSCOPY N/A 11/11/2021    Procedure: EGD (ESOPHAGOGASTRODUODENOSCOPY);  Surgeon: Anaid Washington MD;  Location: [facility] ENDO;  Service: Endoscopy;  Laterality: N/A;    ESOPHAGOGASTRODUODENOSCOPY N/A 7/8/2022    Procedure: EGD (ESOPHAGOGASTRODUODENOSCOPY);  Surgeon: Anaid Washington MD;  Location: [facility] ENDO;  Service: Endoscopy;  Laterality: N/A;    ESOPHAGOGASTRODUODENOSCOPY N/A 4/12/2023    Procedure: EGD (ESOPHAGOGASTRODUODENOSCOPY);  Surgeon: Anaid Washington MD;  Location: [facility];  Service: Endoscopy;  Laterality: N/A;    EYE SURGERY      bilateral cataracts    HYSTERECTOMY      complete    JOINT REPLACEMENT      Liban Knee    ROTATOR CUFF REPAIR Right      TOE SURGERY      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    UPPER GASTROINTESTINAL ENDOSCOPY  06/12/2017    Dr. Guerrier: gastritis and esophagitis, biopsy: chronic gastritis, negative for h pylori, esophageal- positive eosinophils, negative for barretts; repeat in 2 months    UPPER GASTROINTESTINAL ENDOSCOPY  07/27/2017    Dr. Guerrier     Objective:     Vitals:    12/06/24 0904   BP: 110/70   Pulse: 91   Resp: 16   Temp: 97.9 °F (36.6 °C)     Body mass index is 22.85 kg/m².  Physical Exam  Vitals and nursing note reviewed.   Constitutional:       Appearance: She is well-developed.   HENT:      Head: Normocephalic and atraumatic.   Eyes:      General: No scleral icterus.     Conjunctiva/sclera: Conjunctivae normal.   Cardiovascular:      Heart sounds: No murmur heard.  Pulmonary:      Effort: Pulmonary effort is normal. No respiratory distress.   Musculoskeletal:         General: No deformity. Normal range of motion.      Cervical back: Normal range of motion and neck supple.   Skin:     Coloration: Skin is not pale.      Findings: No rash.   Neurological:      Mental Status: She is alert and oriented to person, place, and time.   Psychiatric:         Behavior: Behavior normal.         Thought Content: Thought content normal.         Judgment: Judgment normal.       Assessment:     1. Abnormal blood finding    2. Osteopenia of multiple sites    3. Stage 3a chronic kidney disease    4. Hypertriglyceridemia      Plan:   Abnormal blood finding  -     Hemoglobin A1C; Future; Expected date: 12/06/2024  -     Lipid Panel; Future; Expected date: 12/06/2024    Osteopenia of multiple sites  -     alendronate (FOSAMAX) 70 MG tablet; Take 1 tablet (70 mg total) by mouth every 7 days.  Dispense: 12 tablet; Refill: 1  -     Comprehensive Metabolic Panel; Future; Expected date: 12/06/2024  -     Hemoglobin A1C; Future; Expected date: 12/06/2024  -     Lipid Panel; Future; Expected date: 12/06/2024  -     CBC Auto Differential;  Future; Expected date: 12/06/2024    Stage 3a chronic kidney disease  -     Comprehensive Metabolic Panel; Future; Expected date: 12/06/2024    Hypertriglyceridemia  -     Lipid Panel; Future; Expected date: 12/06/2024            Total time spent of Greater than 30 minutes minutes on the day of the visit.This includes face to face time and preparing to see the patient, obtaining and reviewing separately obtained history, documenting clinical information in the electronic or other health record, independently interpreting results and communicating results to the patient/family/caregiver, or care coordinator.    Established patient with me has been instructed that must see me at least 1 time yearly (every 365 days) for refills of medications. Seeing other providers in this clinic is fine but expectation is to see me yearly.    Reinier Mejia MD  12/06/2024    Portions of this note have been dictated with NIDA Das

## 2024-12-13 NOTE — TELEPHONE ENCOUNTER
Patient requesting refill of Apixaban 5 mg.  Confirmed patient is taking this medication twice daily.  Please advise. Thank you.     LR--11/8/24       LOV--12/6/24       FOV--6/6/25

## 2024-12-13 NOTE — TELEPHONE ENCOUNTER
Fadumo Chen Reinier Staff     ----- Message from Fadumo sent at 12/13/2024  2:02 PM CST -----  Type:  RX Refill Request    Who Called: PT    Refill or New Rx:Refill    RX Name and Strength: Disp Refills Start End   apixaban (ELIQUIS) 5 mg Tab 40 tablet 1 11/8/2024 -   Sig: Take 10 mg BID for one week, up-to 11/14/2024, Starting 11/15/2024 take 5 mg BID   Sent to pharmacy as: apixaban (ELIQUIS) 5 mg Tab   E-Prescribing Status: Receipt confirmed by     How is the patient currently taking it? (ex. 1XDay):see above    Is this a 30 day or 90 day RX:see above    Preferred Pharmacy with phone number:   [id number] - [place] - [address]  [place] [address]  Phone: [phone] Fax: [phone]    Local or Mail Order:local    Ordering Provider:    Would the patient rather a call back or a response via [facility]? call    Best Call Back Number:[phone]       Additional Information:  Please call back to advise. Thank you!

## 2024-12-14 DIAGNOSIS — H40.1131 PRIMARY OPEN ANGLE GLAUCOMA (POAG) OF BOTH EYES, MILD STAGE: ICD-10-CM

## 2024-12-17 RX ORDER — LATANOPROST 50 UG/ML
SOLUTION/ DROPS OPHTHALMIC
Qty: 7.5 ML | Refills: 3 | Status: SHIPPED | OUTPATIENT
Start: 2024-12-17

## 2024-12-24 ENCOUNTER — OFFICE VISIT (OUTPATIENT)
Dept: PULMONOLOGY | Facility: CLINIC | Age: 89
End: 2024-12-24
Payer: MEDICARE

## 2024-12-24 VITALS
WEIGHT: 136.56 LBS | DIASTOLIC BLOOD PRESSURE: 64 MMHG | HEART RATE: 76 BPM | BODY MASS INDEX: 24.2 KG/M2 | OXYGEN SATURATION: 94 % | SYSTOLIC BLOOD PRESSURE: 120 MMHG | HEIGHT: 63 IN

## 2024-12-24 DIAGNOSIS — R59.0 MEDIASTINAL ADENOPATHY: ICD-10-CM

## 2024-12-24 PROCEDURE — 1159F MED LIST DOCD IN RCRD: CPT | Mod: HCNC,CPTII,S$GLB, | Performed by: STUDENT IN AN ORGANIZED HEALTH CARE EDUCATION/TRAINING PROGRAM

## 2024-12-24 PROCEDURE — 1101F PT FALLS ASSESS-DOCD LE1/YR: CPT | Mod: HCNC,CPTII,S$GLB, | Performed by: STUDENT IN AN ORGANIZED HEALTH CARE EDUCATION/TRAINING PROGRAM

## 2024-12-24 PROCEDURE — 1157F ADVNC CARE PLAN IN RCRD: CPT | Mod: HCNC,CPTII,S$GLB, | Performed by: STUDENT IN AN ORGANIZED HEALTH CARE EDUCATION/TRAINING PROGRAM

## 2024-12-24 PROCEDURE — 99205 OFFICE O/P NEW HI 60 MIN: CPT | Mod: HCNC,S$GLB,, | Performed by: STUDENT IN AN ORGANIZED HEALTH CARE EDUCATION/TRAINING PROGRAM

## 2024-12-24 PROCEDURE — 3288F FALL RISK ASSESSMENT DOCD: CPT | Mod: HCNC,CPTII,S$GLB, | Performed by: STUDENT IN AN ORGANIZED HEALTH CARE EDUCATION/TRAINING PROGRAM

## 2024-12-24 PROCEDURE — 99999 PR PBB SHADOW E&M-EST. PATIENT-LVL III: CPT | Mod: PBBFAC,HCNC,, | Performed by: STUDENT IN AN ORGANIZED HEALTH CARE EDUCATION/TRAINING PROGRAM

## 2024-12-24 NOTE — PROGRESS NOTES
"12/24/2024    Kenyetta Andersen  New Patient History and Physical    Chief Complaint   Patient presents with    Pulmonary Nodules       HPI:Ms Andersen is an 89 year old woman with hx of colon cancer, current 2 pack per day smoker, who presents with abnormal chest imaging.     Hx of colorectal cancer, treated with chemotherapy- tolerated it well.   She was found to have abnoraml PET CT following chemo and was referred for EBUS evaluation.     She has been losing weight more recently gained a couple pounds. No hemoptysis. No fevers. No nights sweats.     Quit smoking- after she discovered the blood clot in her lungs in November.     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    Anxiety     Arthritis     Escalante esophagus     Blood transfusion     Cataract     Done OU    Colon cancer 2009    Colon polyps 3/14/2017    GERD (gastroesophageal reflux disease)     Glaucoma 2/24/2012    Nuclear sclerosis 8/27/2012    Osteoporosis     Primary hypothyroidism 2/23/2020    Pseudophakia - Left Eye 2/24/2012         Past Surgical History:   Procedure Laterality Date    APPENDECTOMY      CATARACT EXTRACTION W/  INTRAOCULAR LENS IMPLANT Left     CATARACT EXTRACTION W/  INTRAOCULAR LENS IMPLANT Right     Dr Sawant    COLON SURGERY      resection    COLONOSCOPY N/A 3/14/2017    Procedure: COLONOSCOPY;  Surgeon: Killian Guerrier MD;  Location: [facility];  Service: Endoscopy;  Laterality: N/A;    COLONOSCOPY  03/14/2017    Dr. Guerrier: hemorrhoids, one colon polyp removed, "Patent functional end-to-end ileo-colonic anastomosis"with healthy mucosa; biopsy: hyperplastic polyp; repeat in 3 years for surveillance    COLONOSCOPY N/A 1/21/2020    Procedure: COLONOSCOPY;  Surgeon: Timo Darling MD;  Location: [facility] ENDO;  Service: Endoscopy;  Laterality: " N/A;    COLONOSCOPY N/A 7/8/2022    Procedure: COLONOSCOPY;  Surgeon: Anaid Washington MD;  Location: [facility] ENDO;  Service: Endoscopy;  Laterality: N/A;    ECTOPIC PREGNANCY SURGERY      ERCP N/A 7/31/2023    Procedure: ERCP (ENDOSCOPIC RETROGRADE CHOLANGIOPANCREATOGRAPHY);  Surgeon: Nasim Cuadra MD;  Location: [facility] ENDO;  Service: Endoscopy;  Laterality: N/A;    ERCP N/A 8/1/2023    Procedure: ERCP (ENDOSCOPIC RETROGRADE CHOLANGIOPANCREATOGRAPHY);  Surgeon: Олег Arreaga MD;  Location: [facility] ENDO (2ND FLR);  Service: Endoscopy;  Laterality: N/A;    ERCP N/A 12/12/2023    Procedure: ERCP (ENDOSCOPIC RETROGRADE CHOLANGIOPANCREATOGRAPHY);  Surgeon: Dimitri Anaya MD;  Location: [facility] ENDO (2ND FLR);  Service: Endoscopy;  Laterality: N/A;    ESOPHAGOGASTRODUODENOSCOPY N/A 5/16/2019    Procedure: EGD (ESOPHAGOGASTRODUODENOSCOPY);  Surgeon: Anaid Washington MD;  Location: [facility] ENDO;  Service: Endoscopy;  Laterality: N/A;    ESOPHAGOGASTRODUODENOSCOPY N/A 7/11/2019    Procedure: EGD (ESOPHAGOGASTRODUODENOSCOPY);  Surgeon: Anaid Washington MD;  Location: [facility] ENDO;  Service: Endoscopy;  Laterality: N/A;    ESOPHAGOGASTRODUODENOSCOPY N/A 2/5/2021    Procedure: EGD (ESOPHAGOGASTRODUODENOSCOPY);  Surgeon: Anaid Washington MD;  Location: [facility] ENDO;  Service: Endoscopy;  Laterality: N/A;    ESOPHAGOGASTRODUODENOSCOPY N/A 11/11/2021    Procedure: EGD (ESOPHAGOGASTRODUODENOSCOPY);  Surgeon: Anaid Washington MD;  Location: [facility] ENDO;  Service: Endoscopy;  Laterality: N/A;    ESOPHAGOGASTRODUODENOSCOPY N/A 7/8/2022    Procedure: EGD (ESOPHAGOGASTRODUODENOSCOPY);  Surgeon: Anaid Washington MD;  Location: [facility] ENDO;  Service: Endoscopy;  Laterality: N/A;    ESOPHAGOGASTRODUODENOSCOPY N/A 4/12/2023    Procedure: EGD (ESOPHAGOGASTRODUODENOSCOPY);  Surgeon: Anaid Washington MD;  Location: [facility];  Service: Endoscopy;  Laterality: N/A;    EYE SURGERY      bilateral cataracts    HYSTERECTOMY      complete    JOINT  REPLACEMENT      Liban Knee    ROTATOR CUFF REPAIR Right     TOE SURGERY      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    UPPER GASTROINTESTINAL ENDOSCOPY  2017    Dr. Guerrier: gastritis and esophagitis, biopsy: chronic gastritis, negative for h pylori, esophageal- positive eosinophils, negative for barretts; repeat in 2 months    UPPER GASTROINTESTINAL ENDOSCOPY  2017    Dr. Guerrier     Social History     Tobacco Use    Smoking status: Former     Current packs/day: 0.00     Average packs/day: 0.3 packs/day for 65.0 years (21.5 ttl pk-yrs)     Types: Cigarettes     Start date: 10/5/1955     Quit date: 10/5/2020     Years since quittin.2    Smokeless tobacco: Never    Tobacco comments:     3/1/23--states smokes 1-2 cigarettes a day   Substance Use Topics    Alcohol use: Yes     Alcohol/week: 2.0 standard drinks of alcohol     Types: 2 Shots of liquor per week     Comment: Daily, about 4 drinks per day (crown)    Drug use: No     Family History   Problem Relation Name Age of Onset    Heart disease Mother          heart attack    Heart disease Father      Heart disease Brother          MI    Parkinsonism Sister      Arthritis Sister      No Known Problems Brother      No Known Problems Brother      Amblyopia Neg Hx      Blindness Neg Hx      Cancer Neg Hx      Cataracts Neg Hx      Glaucoma Neg Hx      Hypertension Neg Hx      Macular degeneration Neg Hx      Retinal detachment Neg Hx      Strabismus Neg Hx      Stroke Neg Hx      Thyroid disease Neg Hx      Diabetes Neg Hx      Colon cancer Neg Hx      Crohn's disease Neg Hx      Esophageal cancer Neg Hx      Stomach cancer Neg Hx      Ulcerative colitis Neg Hx       Review of patient's allergies indicates:   Allergen Reactions    Ace inhibitors Edema     Other reaction(s): Angioedema    Codeine Hives       Performance Status:The patient's activity level is no limits with regular activity.  She does some exerciess at home with PT exercises - not every  day. She can walk 100 yard on flat surface without stopping.     Review of Systems:   Review of Systems   Constitutional:  Positive for weight loss. Negative for chills, fever and malaise/fatigue.   HENT:  Negative for congestion, sinus pain and sore throat.    Eyes:  Negative for blurred vision and pain.   Respiratory:  Positive for shortness of breath. Negative for cough and wheezing.    Cardiovascular:  Negative for chest pain, palpitations, orthopnea, claudication and leg swelling.   Gastrointestinal:  Negative for abdominal pain, constipation, diarrhea, heartburn, melena, nausea and vomiting.   Genitourinary:  Negative for dysuria, frequency, hematuria and urgency.   Skin:  Negative for itching and rash.   Neurological:  Negative for dizziness, seizures, loss of consciousness and headaches.   Endo/Heme/Allergies:  Negative for environmental allergies and polydipsia. Does not bruise/bleed easily.   Psychiatric/Behavioral:  Negative for depression. The patient is not nervous/anxious.         Exam:  Physical Exam  Vitals reviewed.   Constitutional:       General: She is not in acute distress.     Appearance: She is well-developed. She is not diaphoretic.   HENT:      Head: Normocephalic and atraumatic.      Mouth/Throat:      Pharynx: No oropharyngeal exudate or posterior oropharyngeal erythema.   Eyes:      General: No scleral icterus.     Pupils: Pupils are equal, round, and reactive to light.   Neck:      Vascular: No JVD.   Cardiovascular:      Rate and Rhythm: Normal rate and regular rhythm.      Heart sounds: Normal heart sounds. No murmur heard.  Pulmonary:      Effort: Pulmonary effort is normal. No respiratory distress.      Breath sounds: Normal breath sounds. No wheezing.   Abdominal:      General: Bowel sounds are normal. There is no distension.      Palpations: Abdomen is soft.      Tenderness: There is no abdominal tenderness.   Musculoskeletal:         General: No swelling.      Cervical back:  Normal range of motion and neck supple. No rigidity.   Skin:     General: Skin is warm and dry.      Capillary Refill: Capillary refill takes less than 2 seconds.      Coloration: Skin is not pale.      Findings: No rash.   Neurological:      General: No focal deficit present.      Mental Status: She is alert and oriented to person, place, and time.      Cranial Nerves: No cranial nerve deficit.      Motor: No weakness or abnormal muscle tone.          Radiographs (ct chest and cxr) reviewed: results reviewed     Labs reviewed     Lab Results   Component Value Date    WBC 7.99 11/15/2024    HGB 15.7 11/15/2024    HCT 48.0 11/15/2024    MCV 97 11/15/2024     11/15/2024       CMP  Sodium   Date Value Ref Range Status   11/14/2024 139 136 - 145 mmol/L Final     Potassium   Date Value Ref Range Status   11/14/2024 3.6 3.5 - 5.1 mmol/L Final     Chloride   Date Value Ref Range Status   11/14/2024 105 95 - 110 mmol/L Final     CO2   Date Value Ref Range Status   11/14/2024 24 23 - 29 mmol/L Final     Glucose   Date Value Ref Range Status   11/14/2024 95 70 - 110 mg/dL Final     BUN   Date Value Ref Range Status   11/14/2024 12 8 - 23 mg/dL Final     Creatinine   Date Value Ref Range Status   11/14/2024 0.9 0.5 - 1.4 mg/dL Final     Calcium   Date Value Ref Range Status   11/14/2024 9.7 8.7 - 10.5 mg/dL Final     Total Protein   Date Value Ref Range Status   11/14/2024 7.3 6.0 - 8.4 g/dL Final     Albumin   Date Value Ref Range Status   11/14/2024 3.0 (L) 3.5 - 5.2 g/dL Final     Total Bilirubin   Date Value Ref Range Status   11/14/2024 0.4 0.1 - 1.0 mg/dL Final     Comment:     For infants and newborns, interpretation of results should be based  on gestational age, weight and in agreement with clinical  observations.    Premature Infant recommended reference ranges:  Up to 24 hours.............<8.0 mg/dL  Up to 48 hours............<12.0 mg/dL  3-5 days..................<15.0 mg/dL  6-29 days.................<15.0  mg/dL       Alkaline Phosphatase   Date Value Ref Range Status   11/14/2024 99 40 - 150 U/L Final     AST   Date Value Ref Range Status   11/14/2024 20 10 - 40 U/L Final     ALT   Date Value Ref Range Status   11/14/2024 8 (L) 10 - 44 U/L Final     Anion Gap   Date Value Ref Range Status   11/14/2024 10 8 - 16 mmol/L Final     eGFR   Date Value Ref Range Status   11/14/2024 >60.0 >60 mL/min/1.73 m^2 Final         PFT were not done.       Plan:  Clinical impression is apparently straight forward and impression with management as below.    Long discussion. Due to age and comorbidities, risk of pursuing biopsy may be higher in this particular case. She is very interested in finding out what these abnormalities are and would pursue treatment for it.     Will plan on EBUS on Jan 15th.     She is on eliquis- will need to be held for 48 hours. Last dose of eliquis should be Sunday January the 12th.   No meds for diabetes or weight loss.   Will need PFTs      Problem List Items Addressed This Visit    None  Visit Diagnoses       Mediastinal adenopathy                No follow-ups on file.    Discussed with patient above for education the following:      There are no Patient Instructions on file for this visit.

## 2024-12-24 NOTE — H&P (VIEW-ONLY)
"12/24/2024    Kenyetta Andersen  New Patient History and Physical    Chief Complaint   Patient presents with    Pulmonary Nodules       HPI:Ms Andersen is an 89 year old woman with hx of colon cancer, current 2 pack per day smoker, who presents with abnormal chest imaging.     Hx of colorectal cancer, treated with chemotherapy- tolerated it well.   She was found to have abnoraml PET CT following chemo and was referred for EBUS evaluation.     She has been losing weight more recently gained a couple pounds. No hemoptysis. No fevers. No nights sweats.     Quit smoking- after she discovered the blood clot in her lungs in November.     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    Anxiety     Arthritis     Escalante esophagus     Blood transfusion     Cataract     Done OU    Colon cancer 2009    Colon polyps 3/14/2017    GERD (gastroesophageal reflux disease)     Glaucoma 2/24/2012    Nuclear sclerosis 8/27/2012    Osteoporosis     Primary hypothyroidism 2/23/2020    Pseudophakia - Left Eye 2/24/2012         Past Surgical History:   Procedure Laterality Date    APPENDECTOMY      CATARACT EXTRACTION W/  INTRAOCULAR LENS IMPLANT Left     CATARACT EXTRACTION W/  INTRAOCULAR LENS IMPLANT Right     Dr Sawant    COLON SURGERY      resection    COLONOSCOPY N/A 3/14/2017    Procedure: COLONOSCOPY;  Surgeon: Killian Guerrier MD;  Location: [facility];  Service: Endoscopy;  Laterality: N/A;    COLONOSCOPY  03/14/2017    Dr. Guerrier: hemorrhoids, one colon polyp removed, "Patent functional end-to-end ileo-colonic anastomosis"with healthy mucosa; biopsy: hyperplastic polyp; repeat in 3 years for surveillance    COLONOSCOPY N/A 1/21/2020    Procedure: COLONOSCOPY;  Surgeon: Timo Darling MD;  Location: [facility] ENDO;  Service: Endoscopy;  Laterality: " N/A;    COLONOSCOPY N/A 7/8/2022    Procedure: COLONOSCOPY;  Surgeon: Anaid Washington MD;  Location: [facility] ENDO;  Service: Endoscopy;  Laterality: N/A;    ECTOPIC PREGNANCY SURGERY      ERCP N/A 7/31/2023    Procedure: ERCP (ENDOSCOPIC RETROGRADE CHOLANGIOPANCREATOGRAPHY);  Surgeon: Nasim Cuadra MD;  Location: [facility] ENDO;  Service: Endoscopy;  Laterality: N/A;    ERCP N/A 8/1/2023    Procedure: ERCP (ENDOSCOPIC RETROGRADE CHOLANGIOPANCREATOGRAPHY);  Surgeon: Олег Arreaga MD;  Location: [facility] ENDO (2ND FLR);  Service: Endoscopy;  Laterality: N/A;    ERCP N/A 12/12/2023    Procedure: ERCP (ENDOSCOPIC RETROGRADE CHOLANGIOPANCREATOGRAPHY);  Surgeon: Dimitri Anaya MD;  Location: [facility] ENDO (2ND FLR);  Service: Endoscopy;  Laterality: N/A;    ESOPHAGOGASTRODUODENOSCOPY N/A 5/16/2019    Procedure: EGD (ESOPHAGOGASTRODUODENOSCOPY);  Surgeon: Anaid Washington MD;  Location: [facility] ENDO;  Service: Endoscopy;  Laterality: N/A;    ESOPHAGOGASTRODUODENOSCOPY N/A 7/11/2019    Procedure: EGD (ESOPHAGOGASTRODUODENOSCOPY);  Surgeon: Anaid Washington MD;  Location: [facility] ENDO;  Service: Endoscopy;  Laterality: N/A;    ESOPHAGOGASTRODUODENOSCOPY N/A 2/5/2021    Procedure: EGD (ESOPHAGOGASTRODUODENOSCOPY);  Surgeon: Anaid Washington MD;  Location: [facility] ENDO;  Service: Endoscopy;  Laterality: N/A;    ESOPHAGOGASTRODUODENOSCOPY N/A 11/11/2021    Procedure: EGD (ESOPHAGOGASTRODUODENOSCOPY);  Surgeon: Anaid Washington MD;  Location: [facility] ENDO;  Service: Endoscopy;  Laterality: N/A;    ESOPHAGOGASTRODUODENOSCOPY N/A 7/8/2022    Procedure: EGD (ESOPHAGOGASTRODUODENOSCOPY);  Surgeon: Anaid Washington MD;  Location: [facility] ENDO;  Service: Endoscopy;  Laterality: N/A;    ESOPHAGOGASTRODUODENOSCOPY N/A 4/12/2023    Procedure: EGD (ESOPHAGOGASTRODUODENOSCOPY);  Surgeon: Anaid Washington MD;  Location: [facility];  Service: Endoscopy;  Laterality: N/A;    EYE SURGERY      bilateral cataracts    HYSTERECTOMY      complete    JOINT  REPLACEMENT      Liban Knee    ROTATOR CUFF REPAIR Right     TOE SURGERY      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    UPPER GASTROINTESTINAL ENDOSCOPY  2017    Dr. Guerrier: gastritis and esophagitis, biopsy: chronic gastritis, negative for h pylori, esophageal- positive eosinophils, negative for barretts; repeat in 2 months    UPPER GASTROINTESTINAL ENDOSCOPY  2017    Dr. Guerrier     Social History     Tobacco Use    Smoking status: Former     Current packs/day: 0.00     Average packs/day: 0.3 packs/day for 65.0 years (21.5 ttl pk-yrs)     Types: Cigarettes     Start date: 10/5/1955     Quit date: 10/5/2020     Years since quittin.2    Smokeless tobacco: Never    Tobacco comments:     3/1/23--states smokes 1-2 cigarettes a day   Substance Use Topics    Alcohol use: Yes     Alcohol/week: 2.0 standard drinks of alcohol     Types: 2 Shots of liquor per week     Comment: Daily, about 4 drinks per day (crown)    Drug use: No     Family History   Problem Relation Name Age of Onset    Heart disease Mother          heart attack    Heart disease Father      Heart disease Brother          MI    Parkinsonism Sister      Arthritis Sister      No Known Problems Brother      No Known Problems Brother      Amblyopia Neg Hx      Blindness Neg Hx      Cancer Neg Hx      Cataracts Neg Hx      Glaucoma Neg Hx      Hypertension Neg Hx      Macular degeneration Neg Hx      Retinal detachment Neg Hx      Strabismus Neg Hx      Stroke Neg Hx      Thyroid disease Neg Hx      Diabetes Neg Hx      Colon cancer Neg Hx      Crohn's disease Neg Hx      Esophageal cancer Neg Hx      Stomach cancer Neg Hx      Ulcerative colitis Neg Hx       Review of patient's allergies indicates:   Allergen Reactions    Ace inhibitors Edema     Other reaction(s): Angioedema    Codeine Hives       Performance Status:The patient's activity level is no limits with regular activity.  She does some exerciess at home with PT exercises - not every  day. She can walk 100 yard on flat surface without stopping.     Review of Systems:   Review of Systems   Constitutional:  Positive for weight loss. Negative for chills, fever and malaise/fatigue.   HENT:  Negative for congestion, sinus pain and sore throat.    Eyes:  Negative for blurred vision and pain.   Respiratory:  Positive for shortness of breath. Negative for cough and wheezing.    Cardiovascular:  Negative for chest pain, palpitations, orthopnea, claudication and leg swelling.   Gastrointestinal:  Negative for abdominal pain, constipation, diarrhea, heartburn, melena, nausea and vomiting.   Genitourinary:  Negative for dysuria, frequency, hematuria and urgency.   Skin:  Negative for itching and rash.   Neurological:  Negative for dizziness, seizures, loss of consciousness and headaches.   Endo/Heme/Allergies:  Negative for environmental allergies and polydipsia. Does not bruise/bleed easily.   Psychiatric/Behavioral:  Negative for depression. The patient is not nervous/anxious.         Exam:  Physical Exam  Vitals reviewed.   Constitutional:       General: She is not in acute distress.     Appearance: She is well-developed. She is not diaphoretic.   HENT:      Head: Normocephalic and atraumatic.      Mouth/Throat:      Pharynx: No oropharyngeal exudate or posterior oropharyngeal erythema.   Eyes:      General: No scleral icterus.     Pupils: Pupils are equal, round, and reactive to light.   Neck:      Vascular: No JVD.   Cardiovascular:      Rate and Rhythm: Normal rate and regular rhythm.      Heart sounds: Normal heart sounds. No murmur heard.  Pulmonary:      Effort: Pulmonary effort is normal. No respiratory distress.      Breath sounds: Normal breath sounds. No wheezing.   Abdominal:      General: Bowel sounds are normal. There is no distension.      Palpations: Abdomen is soft.      Tenderness: There is no abdominal tenderness.   Musculoskeletal:         General: No swelling.      Cervical back:  Normal range of motion and neck supple. No rigidity.   Skin:     General: Skin is warm and dry.      Capillary Refill: Capillary refill takes less than 2 seconds.      Coloration: Skin is not pale.      Findings: No rash.   Neurological:      General: No focal deficit present.      Mental Status: She is alert and oriented to person, place, and time.      Cranial Nerves: No cranial nerve deficit.      Motor: No weakness or abnormal muscle tone.          Radiographs (ct chest and cxr) reviewed: results reviewed     Labs reviewed     Lab Results   Component Value Date    WBC 7.99 11/15/2024    HGB 15.7 11/15/2024    HCT 48.0 11/15/2024    MCV 97 11/15/2024     11/15/2024       CMP  Sodium   Date Value Ref Range Status   11/14/2024 139 136 - 145 mmol/L Final     Potassium   Date Value Ref Range Status   11/14/2024 3.6 3.5 - 5.1 mmol/L Final     Chloride   Date Value Ref Range Status   11/14/2024 105 95 - 110 mmol/L Final     CO2   Date Value Ref Range Status   11/14/2024 24 23 - 29 mmol/L Final     Glucose   Date Value Ref Range Status   11/14/2024 95 70 - 110 mg/dL Final     BUN   Date Value Ref Range Status   11/14/2024 12 8 - 23 mg/dL Final     Creatinine   Date Value Ref Range Status   11/14/2024 0.9 0.5 - 1.4 mg/dL Final     Calcium   Date Value Ref Range Status   11/14/2024 9.7 8.7 - 10.5 mg/dL Final     Total Protein   Date Value Ref Range Status   11/14/2024 7.3 6.0 - 8.4 g/dL Final     Albumin   Date Value Ref Range Status   11/14/2024 3.0 (L) 3.5 - 5.2 g/dL Final     Total Bilirubin   Date Value Ref Range Status   11/14/2024 0.4 0.1 - 1.0 mg/dL Final     Comment:     For infants and newborns, interpretation of results should be based  on gestational age, weight and in agreement with clinical  observations.    Premature Infant recommended reference ranges:  Up to 24 hours.............<8.0 mg/dL  Up to 48 hours............<12.0 mg/dL  3-5 days..................<15.0 mg/dL  6-29 days.................<15.0  mg/dL       Alkaline Phosphatase   Date Value Ref Range Status   11/14/2024 99 40 - 150 U/L Final     AST   Date Value Ref Range Status   11/14/2024 20 10 - 40 U/L Final     ALT   Date Value Ref Range Status   11/14/2024 8 (L) 10 - 44 U/L Final     Anion Gap   Date Value Ref Range Status   11/14/2024 10 8 - 16 mmol/L Final     eGFR   Date Value Ref Range Status   11/14/2024 >60.0 >60 mL/min/1.73 m^2 Final         PFT were not done.       Plan:  Clinical impression is apparently straight forward and impression with management as below.    Long discussion. Due to age and comorbidities, risk of pursuing biopsy may be higher in this particular case. She is very interested in finding out what these abnormalities are and would pursue treatment for it.     Will plan on EBUS on Jan 15th.     She is on eliquis- will need to be held for 48 hours. Last dose of eliquis should be Sunday January the 12th.   No meds for diabetes or weight loss.   Will need PFTs      Problem List Items Addressed This Visit    None  Visit Diagnoses       Mediastinal adenopathy                No follow-ups on file.    Discussed with patient above for education the following:      There are no Patient Instructions on file for this visit.

## 2024-12-26 ENCOUNTER — TELEPHONE (OUTPATIENT)
Dept: PODIATRY | Facility: CLINIC | Age: 89
End: 2024-12-26
Payer: MEDICARE

## 2024-12-26 ENCOUNTER — EXTERNAL HOME HEALTH (OUTPATIENT)
Dept: HOME HEALTH SERVICES | Facility: HOSPITAL | Age: 89
End: 2024-12-26
Payer: MEDICARE

## 2024-12-26 NOTE — TELEPHONE ENCOUNTER
----- Message from Apryl sent at 12/26/2024  4:50 PM CST -----  Type:  Patient Returning Call    Who Called:  pt  Who Left Message for Patient:  nicho   Does the patient know what this is regarding?:  no  Best Call Back Number:  [phone] (home)     Additional Information:  please advise

## 2024-12-26 NOTE — TELEPHONE ENCOUNTER
----- Message from Tamara sent at 12/26/2024 11:20 AM CST -----  Contact: pt  Type:  Sooner Apoointment Request    Caller is requesting a sooner appointment.  Caller declined first available appointment listed below.  Caller will not accept being placed on the waitlist and is requesting a message be sent to doctor.    Name of Caller: pt    When is the first available appointment? Jan 08    Symptoms: left foot pain and swollen - Fx last July    Would the patient rather a call back or a response via [facility]?  Call back    Best Call Back Number: [phone]    Additional Information: is hoping to get in with anyone in office      Please call to advise    Thanks

## 2024-12-27 ENCOUNTER — DOCUMENT SCAN (OUTPATIENT)
Dept: HOME HEALTH SERVICES | Facility: HOSPITAL | Age: 89
End: 2024-12-27
Payer: MEDICARE

## 2024-12-27 DIAGNOSIS — C18.2 MALIGNANT NEOPLASM OF ASCENDING COLON: Primary | ICD-10-CM

## 2024-12-27 RX ORDER — ENOXAPARIN SODIUM 100 MG/ML
80 INJECTION SUBCUTANEOUS DAILY
Qty: 6 EACH | Refills: 1 | Status: SHIPPED | OUTPATIENT
Start: 2024-12-27

## 2024-12-31 ENCOUNTER — DOCUMENT SCAN (OUTPATIENT)
Dept: HOME HEALTH SERVICES | Facility: HOSPITAL | Age: 89
End: 2024-12-31
Payer: MEDICARE

## 2025-01-02 ENCOUNTER — HOSPITAL ENCOUNTER (OUTPATIENT)
Dept: PULMONOLOGY | Facility: HOSPITAL | Age: 89
Discharge: HOME | End: 2025-01-02
Attending: STUDENT IN AN ORGANIZED HEALTH CARE EDUCATION/TRAINING PROGRAM
Payer: MEDICARE

## 2025-01-02 DIAGNOSIS — R59.0 MEDIASTINAL ADENOPATHY: ICD-10-CM

## 2025-01-02 PROCEDURE — 94729 DIFFUSING CAPACITY: CPT | Performed by: CLINIC/CENTER

## 2025-01-02 PROCEDURE — 94727 GAS DIL/WSHOT DETER LNG VOL: CPT | Performed by: CLINIC/CENTER

## 2025-01-02 PROCEDURE — 94799 UNLISTED PULMONARY SVC/PX: CPT | Mod: ,,, | Performed by: INTERNAL MEDICINE

## 2025-01-02 PROCEDURE — 94060 EVALUATION OF WHEEZING: CPT | Performed by: CLINIC/CENTER

## 2025-01-03 ENCOUNTER — TELEPHONE (OUTPATIENT)
Dept: HEMATOLOGY/ONCOLOGY | Facility: CLINIC | Age: 89
End: 2025-01-03

## 2025-01-03 LAB
DLCO SINGLE BREATH LLN: 12.44
DLCO SINGLE BREATH PRE REF: 25 %
DLCO SINGLE BREATH REF: 18.17
DLCOC SBVA LLN: 2.37
DLCOC SBVA REF: 3.81
DLCOC SINGLE BREATH LLN: 12.44
DLCOC SINGLE BREATH REF: 18.17
DLCOVA LLN: 2.37
DLCOVA PRE REF: 38.3 %
DLCOVA PRE: 1.46 ML/(MIN*MMHG*L) (ref 2.37–5.25)
DLCOVA REF: 3.81
ERVN2 LLN: -16449.62
ERVN2 PRE REF: 73 %
ERVN2 PRE: 0.28 L (ref -16449.62–16450.38)
ERVN2 REF: 0.38
FEF 25 75 CHG: 35.6 %
FEF 25 75 LLN: 0.56
FEF 25 75 POST REF: 51.5 %
FEF 25 75 PRE REF: 38 %
FEF 25 75 REF: 1.96
FET100 CHG: 2.4 %
FEV1 CHG: 9.8 %
FEV1 FVC CHG: 4.5 %
FEV1 FVC LLN: 61
FEV1 FVC POST REF: 96.5 %
FEV1 FVC PRE REF: 92.3 %
FEV1 FVC REF: 77
FEV1 LLN: 0.94
FEV1 POST REF: 84.8 %
FEV1 PRE REF: 77.2 %
FEV1 REF: 1.46
FRCN2 LLN: 1.85
FRCN2 PRE REF: 73.1 %
FRCN2 REF: 2.67
FVC CHG: 5 %
FVC LLN: 1.26
FVC POST REF: 86.4 %
FVC PRE REF: 82.3 %
FVC REF: 1.93
IVC PRE: 1.76 L (ref 1.26–2.65)
IVC SINGLE BREATH LLN: 1.26
IVC SINGLE BREATH PRE REF: 91.2 %
IVC SINGLE BREATH REF: 1.93
PEF CHG: -20.1 %
PEF LLN: 1.07
PEF POST REF: 64 %
PEF PRE REF: 80.1 %
PEF REF: 3.01
POST FEF 25 75: 1.01 L/S (ref 0.56–3.36)
POST FET 100: 6.49 SEC
POST FEV1 FVC: 74.1 % (ref 61.46–90.25)
POST FEV1: 1.24 L (ref 0.94–1.95)
POST FVC: 1.67 L (ref 1.26–2.65)
POST PEF: 1.93 L/S (ref 1.07–4.96)
PRE DLCO: 4.54 ML/(MIN*MMHG) (ref 12.44–23.91)
PRE FEF 25 75: 0.75 L/S (ref 0.56–3.36)
PRE FET 100: 6.34 SEC
PRE FEV1 FVC: 70.88 % (ref 61.46–90.25)
PRE FEV1: 1.12 L (ref 0.94–1.95)
PRE FRC N2: 1.95 L (ref 1.85–3.49)
PRE FVC: 1.59 L (ref 1.26–2.65)
PRE PEF: 2.42 L/S (ref 1.07–4.96)
RVN2 LLN: 1.71
RVN2 PRE REF: 73.1 %
RVN2 PRE: 1.67 L (ref 1.71–2.86)
RVN2 REF: 2.29
RVN2TLCN2 LLN: 38.95
RVN2TLCN2 PRE REF: 103.9 %
RVN2TLCN2 PRE: 50.43 % (ref 38.95–58.13)
RVN2TLCN2 REF: 48.54
TLCN2 LLN: 3.78
TLCN2 PRE REF: 69.5 %
TLCN2 PRE: 3.31 L (ref 3.78–5.76)
TLCN2 REF: 4.77
VA PRE: 3.11 L (ref 4.62–4.62)
VA SINGLE BREATH LLN: 4.62
VA SINGLE BREATH PRE REF: 67.4 %
VA SINGLE BREATH REF: 4.62
VCMAXN2 LLN: 1.26
VCMAXN2 PRE REF: 85.2 %
VCMAXN2 PRE: 1.64 L (ref 1.26–2.65)
VCMAXN2 REF: 1.93

## 2025-01-03 NOTE — TELEPHONE ENCOUNTER
"Called Calista back. She states that her left leg has 3+ pitting edema and the pt said that this has been ongoing "for quite sometime." She states that the back of her calf was not warm so she didn't feel like she had any reason to send the patient to the ED. I told her that in Loy's last visit, that wasn't documented and unilateral edema in any extremity that is atraumatic is cause for concern and she should be evaluated.     [facility] Staff- [place]  Caller: Home Health Nurse.  - Calista (Today,  3:45 PM)  Type: Needs Medical Advice         Who Called:Home Health Nurse.  - Calista  Best Call Back Number: [phone]  Additional Information: Requesting a call back regarding  plus 3 edema lower left extremity  via Home Health Nurse.  Please Advise- Thank you  "

## 2025-01-06 ENCOUNTER — TELEPHONE (OUTPATIENT)
Dept: HEMATOLOGY/ONCOLOGY | Facility: CLINIC | Age: 89
End: 2025-01-06
Payer: MEDICARE

## 2025-01-06 NOTE — TELEPHONE ENCOUNTER
Called pt and told her that lovenox instructions are here at the office for her.     ----- Message from SHALINI Jacome sent at 1/6/2025  1:21 PM CST -----    ----- Message -----  From: Lu Graves  Sent: 1/6/2025   1:21 PM CST  To: [facility] Staff    Pt would like a call back, she rcvd Lovenox and would like instructions.     [phone]

## 2025-01-07 ENCOUNTER — TELEPHONE (OUTPATIENT)
Dept: FAMILY MEDICINE | Facility: CLINIC | Age: 89
End: 2025-01-07

## 2025-01-07 ENCOUNTER — HOSPITAL ENCOUNTER (OUTPATIENT)
Dept: RADIOLOGY | Facility: HOSPITAL | Age: 89
Discharge: HOME | End: 2025-01-07
Payer: MEDICARE

## 2025-01-07 ENCOUNTER — OFFICE VISIT (OUTPATIENT)
Dept: FAMILY MEDICINE | Facility: CLINIC | Age: 89
End: 2025-01-07
Payer: MEDICARE

## 2025-01-07 VITALS
SYSTOLIC BLOOD PRESSURE: 134 MMHG | BODY MASS INDEX: 24.38 KG/M2 | TEMPERATURE: 98 F | WEIGHT: 137.56 LBS | HEIGHT: 63 IN | HEART RATE: 91 BPM | OXYGEN SATURATION: 95 % | DIASTOLIC BLOOD PRESSURE: 70 MMHG

## 2025-01-07 DIAGNOSIS — Z86.711 HISTORY OF PULMONARY EMBOLISM: ICD-10-CM

## 2025-01-07 DIAGNOSIS — R60.0 LOWER LEG EDEMA: Primary | ICD-10-CM

## 2025-01-07 DIAGNOSIS — R60.0 LOWER LEG EDEMA: ICD-10-CM

## 2025-01-07 PROCEDURE — 1159F MED LIST DOCD IN RCRD: CPT | Mod: CPTII,S$GLB,,

## 2025-01-07 PROCEDURE — 1101F PT FALLS ASSESS-DOCD LE1/YR: CPT | Mod: CPTII,S$GLB,,

## 2025-01-07 PROCEDURE — 1157F ADVNC CARE PLAN IN RCRD: CPT | Mod: CPTII,S$GLB,,

## 2025-01-07 PROCEDURE — 93970 EXTREMITY STUDY: CPT | Mod: TC

## 2025-01-07 PROCEDURE — 1125F AMNT PAIN NOTED PAIN PRSNT: CPT | Mod: CPTII,S$GLB,,

## 2025-01-07 PROCEDURE — 99999 PR PBB SHADOW E&M-EST. PATIENT-LVL IV: CPT | Mod: PBBFAC,,,

## 2025-01-07 PROCEDURE — 93970 EXTREMITY STUDY: CPT | Mod: 26,,, | Performed by: RADIOLOGY

## 2025-01-07 PROCEDURE — 3288F FALL RISK ASSESSMENT DOCD: CPT | Mod: CPTII,S$GLB,,

## 2025-01-07 PROCEDURE — 99214 OFFICE O/P EST MOD 30 MIN: CPT | Mod: S$GLB,,,

## 2025-01-07 RX ORDER — ENOXAPARIN SODIUM 100 MG/ML
80 INJECTION SUBCUTANEOUS DAILY
Start: 2025-01-07

## 2025-01-07 RX ORDER — POTASSIUM CHLORIDE 20 MEQ/1
20 TABLET, EXTENDED RELEASE ORAL DAILY
Qty: 90 TABLET | Refills: 0 | Status: SHIPPED | OUTPATIENT
Start: 2025-01-07 | End: 2025-04-07

## 2025-01-07 RX ORDER — FUROSEMIDE 20 MG/1
20 TABLET ORAL DAILY
Qty: 7 TABLET | Refills: 0 | Status: SHIPPED | OUTPATIENT
Start: 2025-01-07 | End: 2025-01-14

## 2025-01-07 NOTE — PROGRESS NOTES
[facility]     Subjective:       Patient ID:  [id number]     Chief Complaint: Edema    Keneytta Andersen is a 89 y.o. female with a past medical history significant for PE, colon cancer, CKD, osteopenia, and mediastinal adenopathy who presents to the clinic for lower extremity edema with L>R x 2 weeks.     Patient complains of edema in both legs with left being worst than the right. Of note, patient recently with PE on 11/07/24 in addition to an occlusive thrombus in the left lower extremity seen on US. Patient admitted to the hospital at this time. She currently complains of bilateral leg pain which she attributes to the swelling, however states that left is worst than the right. She additionally complains of a numbness and tingling feeling in the legs. She denies any claudication symptoms or cold sensation to the legs or feet. She reports taking eliquis daily. She also denies any recent trauma of the leg.    Of note, patient will be undergoing endobronchial ultrasound in which she will hold eliquis for two days. She states that they will be switching her to lovenox in the interim. She denies any current shortness of breath, chest pain, or chest palpations.     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    Anxiety     Arthritis     Escalante esophagus     Blood transfusion     Cataract     Done OU    Colon cancer 2009    Colon polyps 3/14/2017    GERD (gastroesophageal reflux disease)     Glaucoma 2/24/2012    Nuclear sclerosis 8/27/2012    Osteoporosis     Primary hypothyroidism 2/23/2020    Pseudophakia - Left Eye 2/24/2012      Review of patient's allergies indicates:   Allergen Reactions    Ace inhibitors Edema     Other reaction(s): Angioedema    Codeine Hives       Lab Results   Component Value Date    WBC 7.99 11/15/2024    HGB 15.7 11/15/2024    HCT 48.0 11/15/2024     11/15/2024    CHOL 184 07/01/2022    TRIG 74 07/01/2022    HDL 65 07/01/2022    ALT 8  (L) 11/14/2024    AST 20 11/14/2024     11/14/2024    K 3.6 11/14/2024     11/14/2024    CREATININE 0.9 11/14/2024    BUN 12 11/14/2024    CO2 24 11/14/2024    TSH 0.490 07/30/2023    INR 1.1 07/31/2023    HGBA1C 5.5 07/01/2022       Review of Systems   Constitutional:  Negative for chills, fatigue and fever.   Respiratory:  Negative for cough and shortness of breath.    Cardiovascular:  Positive for leg swelling. Negative for chest pain and palpitations.   Neurological:  Negative for dizziness, syncope and weakness.         US Lower Extremity Veins Bilateral 11/07/2024    Impression:  Occlusive thrombus in the LEFT lower extremity venous system with mobile thrombus in the common femoral vein proximally.    No evidence of deep venous thrombosis in right lower extremity.    Objective:      Physical Exam  Constitutional:       General: She is not in acute distress.     Appearance: Normal appearance. She is not toxic-appearing.   HENT:      Head: Normocephalic and atraumatic.      Nose: Nose normal.   Cardiovascular:      Rate and Rhythm: Normal rate and regular rhythm.      Pulses: Normal pulses.      Heart sounds: No murmur heard.     No friction rub.   Pulmonary:      Effort: Pulmonary effort is normal. No respiratory distress.      Breath sounds: Normal breath sounds. No wheezing.   Musculoskeletal:      Cervical back: Normal range of motion.      Right lower leg: Tenderness present. Edema present.      Left lower leg: Tenderness present. Edema present.      Comments: Edema appreciated in both legs, however L worst than R   Some tenderness with palpation especially of L leg.   Warmth appreciated in left  leg.    Skin:     General: Skin is warm and dry.   Neurological:      General: No focal deficit present.      Mental Status: She is alert and oriented to person, place, and time.   Psychiatric:         Mood and Affect: Mood normal.           Assessment:       1. Lower leg edema    2. History of  pulmonary embolism          Plan:       Kenyetta was seen today for edema.    Diagnoses and all orders for this visit    - Reviewed subjective complaints of bilateral edema, however exam consistent with swelling and warmth of left leg. Patient with history of DVT in left leg. She is currently on eliquis. Will repeat LE US and obtain labs to assess edema. Recommendations pending results.   - considered repeat CTA however patient asymptomatic and vital signs are stable.   - Return precautions discussed with patient including chest pain, shortness of breath, or worsening of current symptoms.     Lower leg edema  -     US Lower Extremity Veins Bilateral; Future  -     Comprehensive Metabolic Panel; Future  -     CBC Auto Differential; Future  -     B-TYPE NATRIURETIC PEPTIDE; Future  -     Protein / creatinine ratio, urine; Future    History of pulmonary embolism       -    Followed by heme/onc. Patient without symptoms and vital signs currently stable.        -    Continue eliquis as prescribed        Follow up for if symptoms are not improved.    Ayah Cope PA-C  Family Medicine Physician Assistant     Future Appointments       Date Provider Specialty Appt Notes    1/17/2025  Ophthalmology 24-2 sitafast & oct nerve // ninh to see    5/30/2025  Lab labs    6/6/2025 Yahaira Anand PA-C Family Medicine 6 month f/u    12/12/2025 Reinier Mejia MD Family Medicine 12 month f/u             All of your core healthy metrics are met.     I spent a total of 30 minutes on the day of the visit.This includes face to face time and non-face to face time preparing to see the patient (eg, review of tests), obtaining and/or reviewing separately obtained history, documenting clinical information in the electronic or other health record, independently interpreting results and communicating results to the patient/family/caregiver, or care coordinator.

## 2025-01-07 NOTE — TELEPHONE ENCOUNTER
----- Message from Ayah Cope PA-C sent at 1/7/2025  1:14 PM CST -----  Hi Ms. Kumari,     I reviewed your lab results and ultrasound. Your BNP, a cardiac marker, is slightly elevated, which may be contributing to the swelling. The ultrasound shows a chronic venous clot, also contributing to the swelling in your left leg. I'm prescribing Lasix (a fluid pill) to reduce the swelling and address the elevated cardiac marker. Take this medication once daily for 7 days. Since Lasix can lower electrolytes, I'm also prescribing a potassium supplement to take once daily.    Please follow up next week to assess improvement of symptoms. I recommend obtaining labs before the appointment to check your electrolytes. My staff can schedule this for you. In the meantime, I recommend using compression stockings and elevating your legs whenever possible.    Please let me know if you have any further questions or concerns.     Ayah Cope PA-C

## 2025-01-11 ENCOUNTER — ANESTHESIA EVENT (OUTPATIENT)
Dept: SURGERY | Facility: HOSPITAL | Age: 89
End: 2025-01-11
Payer: MEDICARE

## 2025-01-11 NOTE — ANESTHESIA PREPROCEDURE EVALUATION
01/11/2025  Kenyetta Andersen is a 89 y.o., female.      Tobacco Use:  The patient  reports that she quit smoking about 4 years ago. Her smoking use included cigarettes. She started smoking about 69 years ago. She has a 21.5 pack-year smoking history. She has never used smokeless tobacco.     Results for orders placed or performed during the hospital encounter of 11/07/24   EKG 12-lead    Collection Time: 11/07/24 11:53 AM   Result Value Ref Range    QRS Duration 80 ms    [facility] QTC Calculation 492 ms    Narrative    Test Reason : R07.9,    Vent. Rate : 091 BPM     Atrial Rate : 091 BPM     P-R Int : 150 ms          QRS Dur : 080 ms      QT Int : 400 ms       P-R-T Axes : 078 067 037 degrees     QTc Int : 492 ms    Sinus rhythm with occasional Premature ventricular complexes  T wave abnormality, consider anterior ischemia  Statement not found (#1146)  Abnormal ECG  When compared with ECG of 30-JUL-2023 18:17,  The axis Shifted right  Inverted T waves have replaced nonspecific T wave abnormality in Anterior  leads  Nonspecific T wave abnormality, improved in Lateral leads  QT has lengthened  Confirmed by Karina RAINEY, Erlin GIRARD ([id number]) on 11/11/2024 10:29:46 PM    Referred By: [email or web address]   SELF           Confirmed By:Erlin Collins MD               Lab Results   Component Value Date    WBC 7.50 01/07/2025    HGB 12.3 01/07/2025    HCT 38.1 01/07/2025    MCV 97 01/07/2025     01/07/2025     BMP  Lab Results   Component Value Date     01/07/2025    K 4.1 01/07/2025     01/07/2025    CO2 27 01/07/2025    BUN 12 01/07/2025    CREATININE 0.9 01/07/2025    CALCIUM 8.8 01/07/2025    ANIONGAP 7 (L) 01/07/2025    GLU 94 01/07/2025    GLU 95 11/14/2024    GLU 65 (L) 11/08/2024       No results found for this or any previous visit.        Pre-op Assessment    I have reviewed the Patient Summary Reports.      I have reviewed the Nursing Notes. I have reviewed the NPO Status.   I have reviewed the Medications.     Review of Systems  Anesthesia Hx:  No problems with previous Anesthesia             Denies Family Hx of Anesthesia complications.    Denies Personal Hx of Anesthesia complications.                    Social:  Alcohol Use, Smoker       Hematology/Oncology:                   Hematology Comments: Remote hx of DVT, on chronic anticoagulation      --  Cancer in past history:              surgery and chemotherapy   Oncology Comments: Colon cancer     EENT/Dental:  EENT/Dental Normal  Eyes: Visual Impairment   Has Bilateral and S/P Extraction - Bilateral Catarract        Eye Disease:    Glaucoma: Narrow angle                Cardiovascular:  Cardiovascular Normal                  ECG has been reviewed.                            Pulmonary:   COPD, mild      Pulmonary emphysema - no inhaler or home oxygen use                Renal/:  Chronic Renal Disease, CKD   Bilateral renal cysts     Kidney Function/Disease, Chronic Kidney Disease (CKD) , CKD Stage III (GFR 30-59)            Hepatic/GI:     GERD   Escalante esophagus    Dysphagia             Musculoskeletal:  Arthritis        Bone Disorders:    Osteopenia, Osteoporosis        Neurological:    Neuromuscular Disease,             Peripheral Neuropathy                          Endocrine:   Hypothyroidism          Dermatological:  Skin Normal    Psych:   anxiety  Sleep Disorder and Insomnia.       Sleep Disorder and Insomnia.        Physical Exam  General: Well nourished, Cooperative, Alert and Oriented    Airway:  Mallampati: III   Mouth Opening: Normal  TM Distance: > 6 cm  Tongue: Normal  Neck ROM: Extension Decreased    Dental:  Dentures    Chest/Lungs:  Clear to auscultation, Normal Respiratory Rate    Heart:  Rate: Normal  Rhythm: Regular Rhythm        Anesthesia Plan  Type of Anesthesia, risks & benefits discussed:    Anesthesia Type: Gen ETT  Intra-op Monitoring  Plan: Standard ASA Monitors  Post Op Pain Control Plan:   (medical reason for not using multimodal pain management)  Induction:  IV and rapid sequence  Airway Plan: Video, Post-Induction  Informed Consent: Informed consent signed with the Patient and all parties understand the risks and agree with anesthesia plan.  All questions answered.   ASA Score: 3 Emergent  Anesthesia Plan Notes:   GETA  IV tylenol  Zofran Pepcid    Ready For Surgery From Anesthesia Perspective.     .

## 2025-01-14 ENCOUNTER — LAB VISIT (OUTPATIENT)
Dept: LAB | Facility: HOSPITAL | Age: 89
End: 2025-01-14
Payer: MEDICARE

## 2025-01-14 ENCOUNTER — OFFICE VISIT (OUTPATIENT)
Dept: FAMILY MEDICINE | Facility: CLINIC | Age: 89
End: 2025-01-14
Payer: MEDICARE

## 2025-01-14 VITALS
BODY MASS INDEX: 24.42 KG/M2 | HEIGHT: 63 IN | TEMPERATURE: 98 F | OXYGEN SATURATION: 97 % | HEART RATE: 84 BPM | DIASTOLIC BLOOD PRESSURE: 60 MMHG | SYSTOLIC BLOOD PRESSURE: 118 MMHG | WEIGHT: 137.81 LBS

## 2025-01-14 DIAGNOSIS — R79.89 ELEVATED BRAIN NATRIURETIC PEPTIDE (BNP) LEVEL: ICD-10-CM

## 2025-01-14 DIAGNOSIS — R60.0 LOWER LEG EDEMA: ICD-10-CM

## 2025-01-14 DIAGNOSIS — I82.512 CHRONIC DEEP VEIN THROMBOSIS (DVT) OF FEMORAL VEIN OF LEFT LOWER EXTREMITY: Primary | ICD-10-CM

## 2025-01-14 LAB
ANION GAP SERPL CALC-SCNC: 7 MMOL/L (ref 8–16)
BNP SERPL-MCNC: 117 PG/ML (ref 0–99)
BUN SERPL-MCNC: 17 MG/DL (ref 8–23)
CALCIUM SERPL-MCNC: 9.2 MG/DL (ref 8.7–10.5)
CHLORIDE SERPL-SCNC: 107 MMOL/L (ref 95–110)
CO2 SERPL-SCNC: 26 MMOL/L (ref 23–29)
CREAT SERPL-MCNC: 1 MG/DL (ref 0.5–1.4)
EST. GFR  (NO RACE VARIABLE): 54.2 ML/MIN/1.73 M^2
GLUCOSE SERPL-MCNC: 67 MG/DL (ref 70–110)
POTASSIUM SERPL-SCNC: 4.3 MMOL/L (ref 3.5–5.1)
SODIUM SERPL-SCNC: 140 MMOL/L (ref 136–145)

## 2025-01-14 PROCEDURE — 1160F RVW MEDS BY RX/DR IN RCRD: CPT | Mod: CPTII,S$GLB,,

## 2025-01-14 PROCEDURE — 1159F MED LIST DOCD IN RCRD: CPT | Mod: CPTII,S$GLB,,

## 2025-01-14 PROCEDURE — 99999 PR PBB SHADOW E&M-EST. PATIENT-LVL V: CPT | Mod: PBBFAC,,,

## 2025-01-14 PROCEDURE — 1101F PT FALLS ASSESS-DOCD LE1/YR: CPT | Mod: CPTII,S$GLB,,

## 2025-01-14 PROCEDURE — 80048 BASIC METABOLIC PNL TOTAL CA: CPT

## 2025-01-14 PROCEDURE — 83880 ASSAY OF NATRIURETIC PEPTIDE: CPT

## 2025-01-14 PROCEDURE — 36415 COLL VENOUS BLD VENIPUNCTURE: CPT | Mod: PO

## 2025-01-14 PROCEDURE — 1157F ADVNC CARE PLAN IN RCRD: CPT | Mod: CPTII,S$GLB,,

## 2025-01-14 PROCEDURE — 1126F AMNT PAIN NOTED NONE PRSNT: CPT | Mod: CPTII,S$GLB,,

## 2025-01-14 PROCEDURE — 3288F FALL RISK ASSESSMENT DOCD: CPT | Mod: CPTII,S$GLB,,

## 2025-01-14 PROCEDURE — 99213 OFFICE O/P EST LOW 20 MIN: CPT | Mod: S$GLB,,,

## 2025-01-14 RX ORDER — FUROSEMIDE 20 MG/1
20 TABLET ORAL ONCE AS NEEDED
Qty: 60 TABLET | Refills: 0 | Status: SHIPPED | OUTPATIENT
Start: 2025-01-14

## 2025-01-14 NOTE — PROGRESS NOTES
[facility]     Subjective:       Patient ID:  [id number]     Chief Complaint: Edema    Kenyetta Andersen is a 89 y.o. female with a past medical history significant for PE, colon cancer, CKD, osteopenia, and mediastinal adenopathy who presents to the clinic for follow up.     Patient was seen in the clinic on 1/07 for lower extremity edema, more significant in the left than the right. Imaging consistent with chronic DVT in the left femoral vein. She was prescribed lasix daily for 7 days with potassium supplementation. She reports improvement in leg swelling, although residual swelling persists in the left leg. She also notes some improvement in pain. She denies experiencing chest pain or shortness of breath.     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    Anxiety     Arthritis     Escalante esophagus     Blood transfusion     Cataract     Done OU    Colon cancer 2009    Colon polyps 3/14/2017    GERD (gastroesophageal reflux disease)     Glaucoma 2/24/2012    Nuclear sclerosis 8/27/2012    Osteoporosis     Primary hypothyroidism 2/23/2020    Pseudophakia - Left Eye 2/24/2012      Review of patient's allergies indicates:   Allergen Reactions    Ace inhibitors Edema     Other reaction(s): Angioedema    Codeine Hives       Lab Results   Component Value Date    WBC 7.50 01/07/2025    HGB 12.3 01/07/2025    HCT 38.1 01/07/2025     01/07/2025    CHOL 184 07/01/2022    TRIG 74 07/01/2022    HDL 65 07/01/2022    ALT 9 (L) 01/07/2025    AST 20 01/07/2025     01/07/2025    K 4.1 01/07/2025     01/07/2025    CREATININE 0.9 01/07/2025    BUN 12 01/07/2025    CO2 27 01/07/2025    TSH 0.490 07/30/2023    INR 1.1 07/31/2023    HGBA1C 5.5 07/01/2022       Review of Systems   Constitutional:  Negative for chills and fever.   Respiratory:  Negative for shortness of breath.    Cardiovascular:  Positive for leg swelling. Negative for chest pain.         Objective:       Physical Exam  Constitutional:       General: She is not in acute distress.     Appearance: Normal appearance. She is not toxic-appearing.   HENT:      Head: Normocephalic and atraumatic.      Nose: Nose normal.   Cardiovascular:      Rate and Rhythm: Normal rate and regular rhythm.      Pulses: Normal pulses.      Heart sounds: No murmur heard.     No friction rub.      Comments: Improved LE edema, however L still moderately swollen. No pain elicited with palpation.   Pulmonary:      Effort: Pulmonary effort is normal. No respiratory distress.      Breath sounds: Normal breath sounds. No wheezing.   Musculoskeletal:      Cervical back: Normal range of motion.      Right lower leg: No edema.      Left lower leg: Edema present.   Skin:     General: Skin is warm and dry.   Neurological:      General: No focal deficit present.      Mental Status: She is alert and oriented to person, place, and time.   Psychiatric:         Mood and Affect: Mood normal.           Assessment:       1. 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 Elevated brain natriuretic peptide (BNP) level    3. Lower leg edema          Plan:       Kenyetta was seen today for edema.    Diagnoses and all orders for this visit:    - Assessed complaints of edema, subjective and objective improvement, however left still moderately swollen. Discussed with patient that this will likely be a chronic issue as she has a chronic DVT.   - Discussed possible referral to vascular, patient amendable.   - Will prescribe lasix PRN for swelling. Discussed using potassium supplementation when she takes lasix   - BNP slightly elevated on prior labs. Will repeat today to assess if improved with lasix. Will continue to monitor.     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     Lower leg edema  -     furosemide (LASIX) 20 MG tablet; Take 1 tablet (20 mg total) by mouth 1 (one) time if needed (lower leg swelling).  -     Advised patient to take potassium supplementation when she is taking the lasix.        Follow up for if symptoms are not improved.    Ayah Cope PA-C  Family Medicine Physician Assistant     Future Appointments       Date Provider Specialty Appt Notes    1/14/2025  Lab nonfasting    1/17/2025  Ophthalmology 24-2 sitafast & oct nerve // ninh to see    5/30/2025  Lab labs    6/6/2025 Yahaira Anand PA-C Family Medicine 6 month f/u    12/12/2025 Reinier Mejia MD Family Medicine 12 month f/u             All of your core healthy metrics are met.       I spent a total of 20 minutes on the day of the visit.This includes face to face time and non-face to face time preparing to see the patient (eg, review of tests), obtaining and/or reviewing separately obtained history, documenting clinical information in the electronic or other health record, independently interpreting results and communicating results to the patient/family/caregiver, or care coordinator.

## 2025-01-15 ENCOUNTER — HOSPITAL ENCOUNTER (OUTPATIENT)
Facility: HOSPITAL | Age: 89
Discharge: HOME | End: 2025-01-15
Attending: STUDENT IN AN ORGANIZED HEALTH CARE EDUCATION/TRAINING PROGRAM | Admitting: STUDENT IN AN ORGANIZED HEALTH CARE EDUCATION/TRAINING PROGRAM
Payer: MEDICARE

## 2025-01-15 ENCOUNTER — TELEPHONE (OUTPATIENT)
Dept: FAMILY MEDICINE | Facility: CLINIC | Age: 89
End: 2025-01-15
Payer: MEDICARE

## 2025-01-15 ENCOUNTER — ANESTHESIA (OUTPATIENT)
Dept: SURGERY | Facility: HOSPITAL | Age: 89
End: 2025-01-15
Payer: MEDICARE

## 2025-01-15 VITALS
HEART RATE: 87 BPM | TEMPERATURE: 97 F | DIASTOLIC BLOOD PRESSURE: 73 MMHG | SYSTOLIC BLOOD PRESSURE: 164 MMHG | RESPIRATION RATE: 18 BRPM | OXYGEN SATURATION: 96 %

## 2025-01-15 DIAGNOSIS — R59.0 MEDIASTINAL ADENOPATHY: ICD-10-CM

## 2025-01-15 PROCEDURE — D9220A PRA ANESTHESIA: Mod: ANES,,, | Performed by: ANESTHESIOLOGY

## 2025-01-15 PROCEDURE — 27202053 HC NEEDLE BIOPSY EUS: Performed by: STUDENT IN AN ORGANIZED HEALTH CARE EDUCATION/TRAINING PROGRAM

## 2025-01-15 PROCEDURE — 25000003 PHARM REV CODE 250

## 2025-01-15 PROCEDURE — 31624 DX BRONCHOSCOPE/LAVAGE: CPT | Performed by: STUDENT IN AN ORGANIZED HEALTH CARE EDUCATION/TRAINING PROGRAM

## 2025-01-15 PROCEDURE — 37000008 HC ANESTHESIA 1ST 15 MINUTES: Performed by: STUDENT IN AN ORGANIZED HEALTH CARE EDUCATION/TRAINING PROGRAM

## 2025-01-15 PROCEDURE — 31653 BRONCH EBUS SAMPLNG 3/> NODE: CPT | Mod: ,,, | Performed by: STUDENT IN AN ORGANIZED HEALTH CARE EDUCATION/TRAINING PROGRAM

## 2025-01-15 PROCEDURE — D9220A PRA ANESTHESIA: Mod: CRNA,,,

## 2025-01-15 PROCEDURE — 31653 BRONCH EBUS SAMPLNG 3/> NODE: CPT | Performed by: STUDENT IN AN ORGANIZED HEALTH CARE EDUCATION/TRAINING PROGRAM

## 2025-01-15 PROCEDURE — 37000009 HC ANESTHESIA EA ADD 15 MINS: Performed by: STUDENT IN AN ORGANIZED HEALTH CARE EDUCATION/TRAINING PROGRAM

## 2025-01-15 PROCEDURE — 63600175 PHARM REV CODE 636 W HCPCS

## 2025-01-15 PROCEDURE — 31624 DX BRONCHOSCOPE/LAVAGE: CPT | Mod: 51,,, | Performed by: STUDENT IN AN ORGANIZED HEALTH CARE EDUCATION/TRAINING PROGRAM

## 2025-01-15 RX ORDER — PROPOFOL 10 MG/ML
VIAL (ML) INTRAVENOUS
Status: DISCONTINUED | OUTPATIENT
Start: 2025-01-15 | End: 2025-01-15

## 2025-01-15 RX ORDER — SUCCINYLCHOLINE CHLORIDE 20 MG/ML
INJECTION INTRAMUSCULAR; INTRAVENOUS
Status: DISCONTINUED | OUTPATIENT
Start: 2025-01-15 | End: 2025-01-15

## 2025-01-15 RX ORDER — MEPERIDINE HYDROCHLORIDE 50 MG/ML
12.5 INJECTION INTRAMUSCULAR; INTRAVENOUS; SUBCUTANEOUS EVERY 10 MIN PRN
Status: DISCONTINUED | OUTPATIENT
Start: 2025-01-15 | End: 2025-01-15 | Stop reason: HOSPADM

## 2025-01-15 RX ORDER — PROPOFOL 10 MG/ML
VIAL (ML) INTRAVENOUS CONTINUOUS PRN
Status: DISCONTINUED | OUTPATIENT
Start: 2025-01-15 | End: 2025-01-15

## 2025-01-15 RX ORDER — DIPHENHYDRAMINE HYDROCHLORIDE 50 MG/ML
25 INJECTION INTRAMUSCULAR; INTRAVENOUS EVERY 6 HOURS PRN
Status: DISCONTINUED | OUTPATIENT
Start: 2025-01-15 | End: 2025-01-15 | Stop reason: HOSPADM

## 2025-01-15 RX ORDER — GLUCAGON 1 MG
1 KIT INJECTION
Status: DISCONTINUED | OUTPATIENT
Start: 2025-01-15 | End: 2025-01-15 | Stop reason: HOSPADM

## 2025-01-15 RX ORDER — SODIUM CHLORIDE, SODIUM LACTATE, POTASSIUM CHLORIDE, CALCIUM CHLORIDE 600; 310; 30; 20 MG/100ML; MG/100ML; MG/100ML; MG/100ML
INJECTION, SOLUTION INTRAVENOUS CONTINUOUS PRN
Status: DISCONTINUED | OUTPATIENT
Start: 2025-01-15 | End: 2025-01-15

## 2025-01-15 RX ORDER — ONDANSETRON HYDROCHLORIDE 2 MG/ML
4 INJECTION, SOLUTION INTRAVENOUS DAILY PRN
Status: DISCONTINUED | OUTPATIENT
Start: 2025-01-15 | End: 2025-01-15 | Stop reason: HOSPADM

## 2025-01-15 RX ORDER — ROCURONIUM BROMIDE 10 MG/ML
INJECTION, SOLUTION INTRAVENOUS
Status: DISCONTINUED | OUTPATIENT
Start: 2025-01-15 | End: 2025-01-15

## 2025-01-15 RX ORDER — FENTANYL CITRATE 50 UG/ML
25 INJECTION, SOLUTION INTRAMUSCULAR; INTRAVENOUS EVERY 5 MIN PRN
Status: DISCONTINUED | OUTPATIENT
Start: 2025-01-15 | End: 2025-01-15 | Stop reason: HOSPADM

## 2025-01-15 RX ORDER — LIDOCAINE HYDROCHLORIDE 20 MG/ML
INJECTION INTRAVENOUS
Status: DISCONTINUED | OUTPATIENT
Start: 2025-01-15 | End: 2025-01-15

## 2025-01-15 RX ORDER — ONDANSETRON HYDROCHLORIDE 2 MG/ML
INJECTION, SOLUTION INTRAVENOUS
Status: DISCONTINUED | OUTPATIENT
Start: 2025-01-15 | End: 2025-01-15

## 2025-01-15 RX ORDER — DEXAMETHASONE SODIUM PHOSPHATE 4 MG/ML
INJECTION, SOLUTION INTRA-ARTICULAR; INTRALESIONAL; INTRAMUSCULAR; INTRAVENOUS; SOFT TISSUE
Status: DISCONTINUED | OUTPATIENT
Start: 2025-01-15 | End: 2025-01-15

## 2025-01-15 RX ORDER — OXYCODONE HYDROCHLORIDE 5 MG/1
5 TABLET ORAL
Status: DISCONTINUED | OUTPATIENT
Start: 2025-01-15 | End: 2025-01-15 | Stop reason: HOSPADM

## 2025-01-15 RX ORDER — FAMOTIDINE 10 MG/ML
INJECTION INTRAVENOUS
Status: DISCONTINUED | OUTPATIENT
Start: 2025-01-15 | End: 2025-01-15

## 2025-01-15 RX ADMIN — FAMOTIDINE 20 MG: 10 INJECTION, SOLUTION INTRAVENOUS at 08:01

## 2025-01-15 RX ADMIN — ROCURONIUM BROMIDE 5 MG: 10 INJECTION, SOLUTION INTRAVENOUS at 08:01

## 2025-01-15 RX ADMIN — PROPOFOL 100 MG: 10 INJECTION, EMULSION INTRAVENOUS at 08:01

## 2025-01-15 RX ADMIN — LIDOCAINE HYDROCHLORIDE 100 MG: 20 INJECTION, SOLUTION INTRAVENOUS at 08:01

## 2025-01-15 RX ADMIN — DEXAMETHASONE SODIUM PHOSPHATE 8 MG: 4 INJECTION, SOLUTION INTRAMUSCULAR; INTRAVENOUS at 08:01

## 2025-01-15 RX ADMIN — SUGAMMADEX 200 MG: 100 INJECTION, SOLUTION INTRAVENOUS at 09:01

## 2025-01-15 RX ADMIN — ONDANSETRON 4 MG: 2 INJECTION INTRAMUSCULAR; INTRAVENOUS at 08:01

## 2025-01-15 RX ADMIN — PROPOFOL 100 MCG/KG/MIN: 10 INJECTION, EMULSION INTRAVENOUS at 08:01

## 2025-01-15 RX ADMIN — SODIUM CHLORIDE, SODIUM LACTATE, POTASSIUM CHLORIDE, AND CALCIUM CHLORIDE: .6; .31; .03; .02 INJECTION, SOLUTION INTRAVENOUS at 08:01

## 2025-01-15 RX ADMIN — Medication 120 MG: at 08:01

## 2025-01-15 NOTE — ANESTHESIA PROCEDURE NOTES
Intubation    Date/Time: 1/15/2025 8:21 AM    Performed by: Katya Gary CRNA  Authorized by: Paul Islas Jr., MD    Intubation:     Induction:  Intravenous    Intubated:  Postinduction    Mask Ventilation:  Easy mask    Attempts:  1    Attempted By:  CRNA    Method of Intubation:  Video laryngoscopy    Blade:  Vicente 3    Laryngeal View Grade: Grade I - full view of cords      Difficult Airway Encountered?: No      Complications:  None    Airway Device:  Oral endotracheal tube    Airway Device Size:  9.0    Style/Cuff Inflation:  Cuffed (inflated to minimal occlusive pressure)    Tube secured:  20    Secured at:  The lips    Placement Verified By:  Capnometry    Complicating Factors:  None    Findings Post-Intubation:  BS equal bilateral and atraumatic/condition of teeth unchanged

## 2025-01-15 NOTE — PROCEDURES
EBUS/BRONCHOSCOPY PROCEDURE    Indication: Mediastinal lymphadenopathy     Consent: The benefits, risks, and alternatives of the procedure were discussed, and informed consent was obtained from the patient.    Intra-procedural medications: See nurse note. The patient underwent the procedure with general anesthesia; see anesthesiology records for further details.    Time Out: A time out was performed prior initiation of the procedure.    Procedure: The bronchoscope was passed with ease through the ET tube. The patient tolerated the procedure well. The views were adequate.    Findings:  Video Bronchoscopy  *Overall:   First the EBUS scope was inserted the airway to evaluate for station 11R which was 9.4 mm in size, 4 passes were made with a 25 gauge needle.    Then the the scope was advanced to station 7 which was 19 mm in size, 3 passes were made with a 25 gauge needle.    Then the EBUS scope was advanced to station 11R which was 7.5 mm in size, 1 pass was made with a 25 gauge needle.    Then the EBUS scope was removed and the diagnostic scope was reinserted the airway.  There was no evidence of ongoing bleeding or oozing.  Both the right and left-sided airways were inspected down to subsegmental levels.  All airways were patent without evidence of endobronchial lesions mucosal abnormalities secretions or erythema.    A bronchial wash was performed in the left lower lobe, 80 cc of sterile normal saline was instilled, 30 cc was returned and was mostly clear mixed with mucus.    Unplanned Events:  *There were no unplanned events. The patient's vital signs remained normal throughout the procedure.  *ETT was removed in the procedure room.  *Patient was sent in stable condition to recovery area.    Estimated Blood Loss: Minimal.    Recommendations/Plans:  Follow up in pulmonary clinic with me as scheduled.    Yonis Vigil MD  Pulmonary and Critical Care Medicine  [facility]  01/15/2025  9:29 AM

## 2025-01-15 NOTE — TELEPHONE ENCOUNTER
----- Message from Ayah Cope PA-C sent at 1/15/2025  8:04 AM CST -----  Hi Ms. Kumari,     I reviewed your lab results. Cardiac marker is improved and electrolytes are within normal limits. Kidney function slightly declined from prior labs. I recommend staying hydrated. It could likely be secondary to the lasix and fluid depletion. I would recommend continuing the lasix as needed for the swelling as discussed. Please let me know if you have any further questions.     Ayah Cope PA-C

## 2025-01-15 NOTE — ANESTHESIA POSTPROCEDURE EVALUATION
Anesthesia Post Evaluation    Patient: Kenyetta Andersen    Procedure(s) Performed: Procedure(s) (LRB):  ENDOBRONCHIAL ULTRASOUND (EBUS) (N/A)    Final Anesthesia Type: general      Patient location during evaluation: PACU  Patient participation: Yes- Able to Participate  Level of consciousness: awake and alert and oriented  Post-procedure vital signs: reviewed and stable  Pain management: adequate  Airway patency: patent    PONV status at discharge: No PONV  Anesthetic complications: no      Cardiovascular status: blood pressure returned to baseline and hemodynamically stable  Respiratory status: unassisted, spontaneous ventilation and room air  Hydration status: euvolemic  Follow-up not needed.              Vitals Value Taken Time   /73 01/15/25 1001   Temp 36.1 °C (97 °F) 01/15/25 0940   Pulse 89 01/15/25 1013   Resp 28 01/15/25 1013   SpO2 92 % 01/15/25 1013   Vitals shown include unfiled device data.      No case tracking events are documented in the log.      Pain/Amanda Score: Amanda Score: 9 (1/15/2025 10:00 AM)

## 2025-01-15 NOTE — DISCHARGE SUMMARY
[facility]  Discharge Note  Short Stay    Procedure(s) (LRB):  ENDOBRONCHIAL ULTRASOUND (EBUS) (N/A)      OUTCOME: Patient tolerated treatment/procedure well without complication and is now ready for discharge.    DISPOSITION: Home or Self Care    FINAL DIAGNOSIS:  <principal problem not specified>    FOLLOWUP: In clinic    DISCHARGE INSTRUCTIONS:  No discharge procedures on file.      Clinical Reference Documents Added to Patient Instructions         Document    ENDOBRONCHIAL ULTRASOUND (ENGLISH)            TIME SPENT ON DISCHARGE: 5 minutes

## 2025-01-15 NOTE — TRANSFER OF CARE
Anesthesia Transfer of Care Note    Patient: Kenyetta Andersen    Procedure(s) Performed: Procedure(s) (LRB):  ENDOBRONCHIAL ULTRASOUND (EBUS) (N/A)    Patient location: PACU    Anesthesia Type: general    Transport from OR: Transported from OR on 2-3 L/min O2 by NC with adequate spontaneous ventilation    Post pain: adequate analgesia    Post assessment: no apparent anesthetic complications and tolerated procedure well    Post vital signs: stable    Level of consciousness: awake    Nausea/Vomiting: no nausea/vomiting    Complications: none    Transfer of care protocol was followed      Last vitals: Visit Vitals  BP (!) 155/65 (BP Location: Right arm, Patient Position: Lying)   Pulse 71   Temp 36.1 °C (96.9 °F) (Temporal)   Resp 18   SpO2 98%   Breastfeeding No

## 2025-01-16 ENCOUNTER — TELEPHONE (OUTPATIENT)
Dept: FAMILY MEDICINE | Facility: CLINIC | Age: 89
End: 2025-01-16
Payer: MEDICARE

## 2025-01-17 ENCOUNTER — TELEPHONE (OUTPATIENT)
Dept: PULMONOLOGY | Facility: CLINIC | Age: 89
End: 2025-01-17
Payer: MEDICARE

## 2025-01-17 ENCOUNTER — CLINICAL SUPPORT (OUTPATIENT)
Dept: OPHTHALMOLOGY | Facility: CLINIC | Age: 89
End: 2025-01-17
Payer: MEDICARE

## 2025-01-17 ENCOUNTER — OFFICE VISIT (OUTPATIENT)
Dept: OPTOMETRY | Facility: CLINIC | Age: 89
End: 2025-01-17
Payer: MEDICARE

## 2025-01-17 DIAGNOSIS — H52.7 REFRACTIVE ERROR: ICD-10-CM

## 2025-01-17 DIAGNOSIS — H40.1131 PRIMARY OPEN ANGLE GLAUCOMA (POAG) OF BOTH EYES, MILD STAGE: ICD-10-CM

## 2025-01-17 DIAGNOSIS — Z96.1 PSEUDOPHAKIA: ICD-10-CM

## 2025-01-17 DIAGNOSIS — H40.1131 PRIMARY OPEN ANGLE GLAUCOMA (POAG) OF BOTH EYES, MILD STAGE: Primary | ICD-10-CM

## 2025-01-17 PROCEDURE — 92014 COMPRE OPH EXAM EST PT 1/>: CPT | Mod: S$GLB,,, | Performed by: OPTOMETRIST

## 2025-01-17 PROCEDURE — 1126F AMNT PAIN NOTED NONE PRSNT: CPT | Mod: CPTII,S$GLB,, | Performed by: OPTOMETRIST

## 2025-01-17 PROCEDURE — 99999 PR PBB SHADOW E&M-EST. PATIENT-LVL III: CPT | Mod: PBBFAC,,, | Performed by: OPTOMETRIST

## 2025-01-17 PROCEDURE — 3288F FALL RISK ASSESSMENT DOCD: CPT | Mod: CPTII,S$GLB,, | Performed by: OPTOMETRIST

## 2025-01-17 PROCEDURE — 1159F MED LIST DOCD IN RCRD: CPT | Mod: CPTII,S$GLB,, | Performed by: OPTOMETRIST

## 2025-01-17 PROCEDURE — 1157F ADVNC CARE PLAN IN RCRD: CPT | Mod: CPTII,S$GLB,, | Performed by: OPTOMETRIST

## 2025-01-17 PROCEDURE — 1160F RVW MEDS BY RX/DR IN RCRD: CPT | Mod: CPTII,S$GLB,, | Performed by: OPTOMETRIST

## 2025-01-17 PROCEDURE — 1101F PT FALLS ASSESS-DOCD LE1/YR: CPT | Mod: CPTII,S$GLB,, | Performed by: OPTOMETRIST

## 2025-01-17 RX ORDER — BRIMONIDINE TARTRATE 2 MG/ML
1 SOLUTION/ DROPS OPHTHALMIC 3 TIMES DAILY
Qty: 10 ML | Refills: 6 | Status: SHIPPED | OUTPATIENT
Start: 2025-01-17

## 2025-01-17 RX ORDER — DORZOLAMIDE HYDROCHLORIDE AND TIMOLOL MALEATE 20; 5 MG/ML; MG/ML
1 SOLUTION/ DROPS OPHTHALMIC 2 TIMES DAILY
Qty: 10 ML | Refills: 4 | Status: SHIPPED | OUTPATIENT
Start: 2025-01-17

## 2025-01-17 RX ORDER — LATANOPROST 50 UG/ML
1 SOLUTION/ DROPS OPHTHALMIC NIGHTLY
Qty: 7.5 ML | Refills: 3 | Status: SHIPPED | OUTPATIENT
Start: 2025-01-17

## 2025-01-17 NOTE — PROGRESS NOTES
24-2 HVF done. OCT nerve done.        Assessment /Plan     For exam results, see Encounter Report.    There are no diagnoses linked to this encounter.

## 2025-01-17 NOTE — PROGRESS NOTES
HPI    Pt states no changes noticed with vision  No complaints of dryness or related symptoms    (+)occasional headaches    (+)Cosopt OU BID  (+)Brimonidine OU TID  (+)Latanoprost QHS OU    (-)FOL/floaters   Last edited by Dave Contreras, OD on 1/17/2025 10:57 AM.            Assessment /Plan     For exam results, see Encounter Report.    Primary open angle glaucoma (POAG) of both eyes, mild stage  -     latanoprost 0.005 % ophthalmic solution; Place 1 drop into both eyes every evening. INSTILL 1 DROP INTO BOTH EYES IN THE EVENING  Strength: 0.005 %  Dispense: 7.5 mL; Refill: 3  -     dorzolamide-timolol 2-0.5% (COSOPT) 22.3-6.8 mg/mL ophthalmic solution; Place 1 drop into both eyes 2 (two) times daily.  Dispense: 10 mL; Refill: 4  -     brimonidine 0.2% (ALPHAGAN) 0.2 % Drop; Place 1 drop into both eyes 3 (three) times daily. INSTILL 1 DROP INTO BOTH EYES 3 TIMES A DAY  Strength: 0.2 %  Dispense: 10 mL; Refill: 6    Pseudophakia    Refractive error      1. Primary open angle glaucoma (POAG) of both eyes, mild stage (Primary)  Tmax 23 / 21    / 594    Reviewed HVF / OCT results today -- stable  OCT --  OD borderline G / ONL TS  OS no rnfl thinning    HVF --   OD inferior defects  OS inferior defects    IOP stable with use of drops -- target low to mid teens  Continue gtts:  Cosopt bid OU   Alphagan tid OU   Latanoprost qpm OU  Refilled drops today    RTC in 6 months for IOP check     - latanoprost 0.005 % ophthalmic solution; Place 1 drop into both eyes every evening. INSTILL 1 DROP INTO BOTH EYES IN THE EVENING  Strength: 0.005 %  Dispense: 7.5 mL; Refill: 3  - dorzolamide-timolol 2-0.5% (COSOPT) 22.3-6.8 mg/mL ophthalmic solution; Place 1 drop into both eyes 2 (two) times daily.  Dispense: 10 mL; Refill: 4  - brimonidine 0.2% (ALPHAGAN) 0.2 % Drop; Place 1 drop into both eyes 3 (three) times daily. INSTILL 1 DROP INTO BOTH EYES 3 TIMES A DAY  Strength: 0.2 %  Dispense: 10 mL; Refill: 6    2. Pseudophakia  S/p  cataract extraction  Observe     3. Refractive error  Doing well with current specs  Declines update at this time

## 2025-01-22 ENCOUNTER — EXTERNAL HOME HEALTH (OUTPATIENT)
Dept: HOME HEALTH SERVICES | Facility: HOSPITAL | Age: 89
End: 2025-01-22
Payer: MEDICARE

## 2025-01-30 ENCOUNTER — OFFICE VISIT (OUTPATIENT)
Dept: PULMONOLOGY | Facility: CLINIC | Age: 89
End: 2025-01-30
Payer: MEDICARE

## 2025-01-30 VITALS
SYSTOLIC BLOOD PRESSURE: 113 MMHG | WEIGHT: 137.81 LBS | BODY MASS INDEX: 24.42 KG/M2 | HEIGHT: 63 IN | OXYGEN SATURATION: 96 % | HEART RATE: 75 BPM | DIASTOLIC BLOOD PRESSURE: 62 MMHG

## 2025-01-30 DIAGNOSIS — R59.0 MEDIASTINAL ADENOPATHY: Primary | ICD-10-CM

## 2025-01-30 DIAGNOSIS — J84.9 INTERSTITIAL PULMONARY DISEASE, UNSPECIFIED: ICD-10-CM

## 2025-01-30 DIAGNOSIS — J84.10 GRANULOMATOUS LUNG DISEASE: ICD-10-CM

## 2025-01-30 PROCEDURE — 1101F PT FALLS ASSESS-DOCD LE1/YR: CPT | Mod: CPTII,S$GLB,, | Performed by: STUDENT IN AN ORGANIZED HEALTH CARE EDUCATION/TRAINING PROGRAM

## 2025-01-30 PROCEDURE — 1157F ADVNC CARE PLAN IN RCRD: CPT | Mod: CPTII,S$GLB,, | Performed by: STUDENT IN AN ORGANIZED HEALTH CARE EDUCATION/TRAINING PROGRAM

## 2025-01-30 PROCEDURE — 99214 OFFICE O/P EST MOD 30 MIN: CPT | Mod: S$GLB,,, | Performed by: STUDENT IN AN ORGANIZED HEALTH CARE EDUCATION/TRAINING PROGRAM

## 2025-01-30 PROCEDURE — 3288F FALL RISK ASSESSMENT DOCD: CPT | Mod: CPTII,S$GLB,, | Performed by: STUDENT IN AN ORGANIZED HEALTH CARE EDUCATION/TRAINING PROGRAM

## 2025-01-30 PROCEDURE — 99999 PR PBB SHADOW E&M-EST. PATIENT-LVL III: CPT | Mod: PBBFAC,,, | Performed by: STUDENT IN AN ORGANIZED HEALTH CARE EDUCATION/TRAINING PROGRAM

## 2025-01-30 PROCEDURE — 1159F MED LIST DOCD IN RCRD: CPT | Mod: CPTII,S$GLB,, | Performed by: STUDENT IN AN ORGANIZED HEALTH CARE EDUCATION/TRAINING PROGRAM

## 2025-01-30 NOTE — PROGRESS NOTES
"1/30/2025    Kenyetta Andersen  Patient follow up     Chief Complaint   Patient presents with    Pulmonary Nodules       HPI:Ms Andersen is an 89 year old woman with hx of colon cancer, current 2 pack per day smoker, who presents with abnormal chest imaging.     Hx of colorectal cancer, treated with chemotherapy- tolerated it well.   She was found to have abnoraml PET CT following chemo and was referred for EBUS evaluation.     She has been losing weight more recently gained a couple pounds. No hemoptysis. No fevers. No nights sweats.     Quit smoking- after she discovered the blood clot in her lungs in November.     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   Worked at the school     Today:     Here to review biopsy results- path was all neg for cancer and shows non necrotizing granulomas and histiocytes     Breathing is fine no issues  She gets around ok and is self reliant.   She used to ACE-I and had  rash from that so stopoped.  No other unsuual rashes. No change sin vision.     PFSH:  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    Anxiety     Arthritis     Escalante esophagus     Blood transfusion     Cataract     Done OU    Colon cancer 2009    Colon polyps 3/14/2017    GERD (gastroesophageal reflux disease)     Glaucoma 2/24/2012    Nuclear sclerosis 8/27/2012    Osteoporosis     Primary hypothyroidism 2/23/2020    Pseudophakia - Left Eye 2/24/2012         Past Surgical History:   Procedure Laterality Date    APPENDECTOMY      CATARACT EXTRACTION W/  INTRAOCULAR LENS IMPLANT Left     CATARACT EXTRACTION W/  INTRAOCULAR LENS IMPLANT Right     Dr Sawant    COLON SURGERY      resection    COLONOSCOPY N/A 3/14/2017    Procedure: COLONOSCOPY;  Surgeon: Killian Guerrier MD;  Location: [facility];  Service: Endoscopy;  Laterality: N/A;    COLONOSCOPY  03/14/2017    Dr. Guerrier: hemorrhoids, one colon polyp removed, "Patent functional end-to-end ileo-colonic " "anastomosis"with healthy mucosa; biopsy: hyperplastic polyp; repeat in 3 years for surveillance    COLONOSCOPY N/A 1/21/2020    Procedure: COLONOSCOPY;  Surgeon: Timo Darling MD;  Location: [facility];  Service: Endoscopy;  Laterality: N/A;    COLONOSCOPY N/A 7/8/2022    Procedure: COLONOSCOPY;  Surgeon: Anaid Washington MD;  Location: [facility] ENDO;  Service: Endoscopy;  Laterality: N/A;    ECTOPIC PREGNANCY SURGERY      ENDOBRONCHIAL ULTRASOUND N/A 1/15/2025    Procedure: ENDOBRONCHIAL ULTRASOUND (EBUS);  Surgeon: Yonis Vigil MD;  Location: [facility];  Service: Pulmonary;  Laterality: N/A;    ERCP N/A 7/31/2023    Procedure: ERCP (ENDOSCOPIC RETROGRADE CHOLANGIOPANCREATOGRAPHY);  Surgeon: Nasim Cuadra MD;  Location: [facility];  Service: Endoscopy;  Laterality: N/A;    ERCP N/A 8/1/2023    Procedure: ERCP (ENDOSCOPIC RETROGRADE CHOLANGIOPANCREATOGRAPHY);  Surgeon: Олег Arreaga MD;  [facility]  Service: Endoscopy;  Laterality: N/A;    ERCP N/A 12/12/2023    Procedure: ERCP (ENDOSCOPIC RETROGRADE CHOLANGIOPANCREATOGRAPHY);  Surgeon: Dimitri Anaya MD;  [facility]  Service: Endoscopy;  Laterality: N/A;    ESOPHAGOGASTRODUODENOSCOPY N/A 5/16/2019    Procedure: EGD (ESOPHAGOGASTRODUODENOSCOPY);  Surgeon: Anaid Washington MD;  Location: [facility];  Service: Endoscopy;  Laterality: N/A;    ESOPHAGOGASTRODUODENOSCOPY N/A 7/11/2019    Procedure: EGD (ESOPHAGOGASTRODUODENOSCOPY);  Surgeon: Anaid Washington MD;  Location: [facility] ENDO;  Service: Endoscopy;  Laterality: N/A;    ESOPHAGOGASTRODUODENOSCOPY N/A 2/5/2021    Procedure: EGD (ESOPHAGOGASTRODUODENOSCOPY);  Surgeon: Anaid Washington MD;  Location: [facility] ENDO;  Service: Endoscopy;  Laterality: N/A;    ESOPHAGOGASTRODUODENOSCOPY N/A 11/11/2021    Procedure: EGD (ESOPHAGOGASTRODUODENOSCOPY);  Surgeon: Anaid Washington MD;  Location: [facility];  Service: Endoscopy;  Laterality: N/A;    " ESOPHAGOGASTRODUODENOSCOPY N/A 2022    Procedure: EGD (ESOPHAGOGASTRODUODENOSCOPY);  Surgeon: Anaid Washington MD;  Location: [facility] ENDO;  Service: Endoscopy;  Laterality: N/A;    ESOPHAGOGASTRODUODENOSCOPY N/A 2023    Procedure: EGD (ESOPHAGOGASTRODUODENOSCOPY);  Surgeon: Anaid Washington MD;  Location: [facility] ENDO;  Service: Endoscopy;  Laterality: N/A;    EYE SURGERY      bilateral cataracts    HYSTERECTOMY      complete    JOINT REPLACEMENT      Liban Knee    ROTATOR CUFF REPAIR Right     TOE SURGERY      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    UPPER GASTROINTESTINAL ENDOSCOPY  2017    Dr. Guerrier: gastritis and esophagitis, biopsy: chronic gastritis, negative for h pylori, esophageal- positive eosinophils, negative for barretts; repeat in 2 months    UPPER GASTROINTESTINAL ENDOSCOPY  2017    Dr. Guerrier     Social History     Tobacco Use    Smoking status: Former     Current packs/day: 0.00     Average packs/day: 0.3 packs/day for 65.0 years (21.5 ttl pk-yrs)     Types: Cigarettes     Start date: 10/5/1955     Quit date: 10/5/2020     Years since quittin.3    Smokeless tobacco: Never    Tobacco comments:     3/1/23--states smokes 1-2 cigarettes a day   Substance Use Topics    Alcohol use: Not Currently     Alcohol/week: 2.0 standard drinks of alcohol     Types: 2 Shots of liquor per week    Drug use: No     Family History   Problem Relation Name Age of Onset    Heart disease Mother          heart attack    Heart disease Father      Heart disease Brother          MI    Parkinsonism Sister      Arthritis Sister      No Known Problems Brother      No Known Problems Brother      Amblyopia Neg Hx      Blindness Neg Hx      Cancer Neg Hx      Cataracts Neg Hx      Glaucoma Neg Hx      Hypertension Neg Hx      Macular degeneration Neg Hx      Retinal detachment Neg Hx      Strabismus Neg Hx      Stroke Neg Hx      Thyroid disease Neg Hx      Diabetes Neg Hx      Colon cancer Neg Hx       Crohn's disease Neg Hx      Esophageal cancer Neg Hx      Stomach cancer Neg Hx      Ulcerative colitis Neg Hx       Review of patient's allergies indicates:   Allergen Reactions    Ace inhibitors Edema     Other reaction(s): Angioedema    Codeine Hives       Performance Status:The patient's activity level is no limits with regular activity.  She does some exerciess at home with PT exercises - not every day. She can walk 100 yard on flat surface without stopping.     Review of Systems:   Review of Systems   Constitutional:  Positive for weight loss. Negative for chills, fever and malaise/fatigue.   HENT:  Negative for congestion, sinus pain and sore throat.    Eyes:  Negative for blurred vision and pain.   Respiratory:  Positive for shortness of breath. Negative for cough and wheezing.    Cardiovascular:  Negative for chest pain, palpitations, orthopnea, claudication and leg swelling.   Gastrointestinal:  Negative for abdominal pain, constipation, diarrhea, heartburn, melena, nausea and vomiting.   Genitourinary:  Negative for dysuria, frequency, hematuria and urgency.   Skin:  Negative for itching and rash.   Neurological:  Negative for dizziness, seizures, loss of consciousness and headaches.   Endo/Heme/Allergies:  Negative for environmental allergies and polydipsia. Does not bruise/bleed easily.   Psychiatric/Behavioral:  Negative for depression. The patient is not nervous/anxious.         Exam:  Physical Exam  Vitals reviewed.   Constitutional:       General: She is not in acute distress.     Appearance: She is well-developed. She is not diaphoretic.   HENT:      Head: Normocephalic and atraumatic.      Mouth/Throat:      Pharynx: No oropharyngeal exudate or posterior oropharyngeal erythema.   Eyes:      General: No scleral icterus.     Pupils: Pupils are equal, round, and reactive to light.   Neck:      Vascular: No JVD.   Cardiovascular:      Rate and Rhythm: Normal rate and regular rhythm.      Heart sounds:  Normal heart sounds. No murmur heard.  Pulmonary:      Effort: Pulmonary effort is normal. No respiratory distress.      Breath sounds: Normal breath sounds. No wheezing.   Abdominal:      General: Bowel sounds are normal. There is no distension.      Palpations: Abdomen is soft.      Tenderness: There is no abdominal tenderness.   Musculoskeletal:         General: No swelling.      Cervical back: Normal range of motion and neck supple. No rigidity.   Skin:     General: Skin is warm and dry.      Capillary Refill: Capillary refill takes less than 2 seconds.      Coloration: Skin is not pale.      Findings: No rash.   Neurological:      General: No focal deficit present.      Mental Status: She is alert and oriented to person, place, and time.      Cranial Nerves: No cranial nerve deficit.      Motor: No weakness or abnormal muscle tone.          Radiographs (ct chest and cxr) reviewed: results reviewed     Labs reviewed     Lab Results   Component Value Date    WBC 7.50 01/07/2025    HGB 12.3 01/07/2025    HCT 38.1 01/07/2025    MCV 97 01/07/2025     01/07/2025       CMP  Sodium   Date Value Ref Range Status   01/14/2025 140 136 - 145 mmol/L Final     Potassium   Date Value Ref Range Status   01/14/2025 4.3 3.5 - 5.1 mmol/L Final     Chloride   Date Value Ref Range Status   01/14/2025 107 95 - 110 mmol/L Final     CO2   Date Value Ref Range Status   01/14/2025 26 23 - 29 mmol/L Final     Glucose   Date Value Ref Range Status   01/14/2025 67 (L) 70 - 110 mg/dL Final     BUN   Date Value Ref Range Status   01/14/2025 17 8 - 23 mg/dL Final     Creatinine   Date Value Ref Range Status   01/14/2025 1.0 0.5 - 1.4 mg/dL Final     Calcium   Date Value Ref Range Status   01/14/2025 9.2 8.7 - 10.5 mg/dL Final     Total Protein   Date Value Ref Range Status   01/07/2025 6.8 6.0 - 8.4 g/dL Final     Albumin   Date Value Ref Range Status   01/07/2025 3.4 (L) 3.5 - 5.2 g/dL Final     Total Bilirubin   Date Value Ref Range  Status   01/07/2025 0.4 0.1 - 1.0 mg/dL Final     Comment:     For infants and newborns, interpretation of results should be based  on gestational age, weight and in agreement with clinical  observations.    Premature Infant recommended reference ranges:  Up to 24 hours.............<8.0 mg/dL  Up to 48 hours............<12.0 mg/dL  3-5 days..................<15.0 mg/dL  6-29 days.................<15.0 mg/dL       Alkaline Phosphatase   Date Value Ref Range Status   01/07/2025 74 55 - 135 U/L Final     AST   Date Value Ref Range Status   01/07/2025 20 10 - 40 U/L Final     ALT   Date Value Ref Range Status   01/07/2025 9 (L) 10 - 44 U/L Final     Anion Gap   Date Value Ref Range Status   01/14/2025 7 (L) 8 - 16 mmol/L Final     eGFR   Date Value Ref Range Status   01/14/2025 54.2 (A) >60 mL/min/1.73 m^2 Final         PFT were not done.       Plan:  Clinical impression is apparently straight forward and impression with management as below.    Negative for malignancy.   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   Overall stable lung disease and clinically she is doing well at home     Continue monitoring for now.   Repeat CT chest in 6 months.       Problem List Items Addressed This Visit    None        No follow-ups on file.    Discussed with patient above for education the following:      There are no Patient Instructions on file for this visit.

## 2025-02-03 DIAGNOSIS — C18.2 MALIGNANT NEOPLASM OF ASCENDING COLON: ICD-10-CM

## 2025-02-03 DIAGNOSIS — E53.8 B12 DEFICIENCY: Primary | ICD-10-CM

## 2025-03-05 ENCOUNTER — LAB VISIT (OUTPATIENT)
Dept: LAB | Facility: HOSPITAL | Age: 89
End: 2025-03-05
Attending: INTERNAL MEDICINE
Payer: MEDICARE

## 2025-03-05 DIAGNOSIS — E53.8 B12 DEFICIENCY: ICD-10-CM

## 2025-03-05 DIAGNOSIS — C18.2 MALIGNANT NEOPLASM OF ASCENDING COLON: ICD-10-CM

## 2025-03-05 LAB
ALBUMIN SERPL BCP-MCNC: 3.7 G/DL (ref 3.5–5.2)
ALP SERPL-CCNC: 73 U/L (ref 55–135)
ALT SERPL W/O P-5'-P-CCNC: 7 U/L (ref 10–44)
ANION GAP SERPL CALC-SCNC: 4 MMOL/L (ref 8–16)
AST SERPL-CCNC: 16 U/L (ref 10–40)
BASOPHILS # BLD AUTO: 0.02 K/UL (ref 0–0.2)
BASOPHILS NFR BLD: 0.3 % (ref 0–1.9)
BILIRUB SERPL-MCNC: 0.3 MG/DL (ref 0.1–1)
BUN SERPL-MCNC: 14 MG/DL (ref 8–23)
CALCIUM SERPL-MCNC: 9 MG/DL (ref 8.7–10.5)
CHLORIDE SERPL-SCNC: 107 MMOL/L (ref 95–110)
CO2 SERPL-SCNC: 28 MMOL/L (ref 23–29)
CREAT SERPL-MCNC: 1 MG/DL (ref 0.5–1.4)
DIFFERENTIAL METHOD BLD: ABNORMAL
EOSINOPHIL # BLD AUTO: 0.3 K/UL (ref 0–0.5)
EOSINOPHIL NFR BLD: 3.8 % (ref 0–8)
ERYTHROCYTE [DISTWIDTH] IN BLOOD BY AUTOMATED COUNT: 13.9 % (ref 11.5–14.5)
EST. GFR  (NO RACE VARIABLE): 54.2 ML/MIN/1.73 M^2
GLUCOSE SERPL-MCNC: 81 MG/DL (ref 70–110)
HCT VFR BLD AUTO: 37.2 % (ref 37–48.5)
HGB BLD-MCNC: 12.2 G/DL (ref 12–16)
IMM GRANULOCYTES # BLD AUTO: 0.01 K/UL (ref 0–0.04)
IMM GRANULOCYTES NFR BLD AUTO: 0.2 % (ref 0–0.5)
LYMPHOCYTES # BLD AUTO: 3.4 K/UL (ref 1–4.8)
LYMPHOCYTES NFR BLD: 51.8 % (ref 18–48)
MCH RBC QN AUTO: 31.5 PG (ref 27–31)
MCHC RBC AUTO-ENTMCNC: 32.8 G/DL (ref 32–36)
MCV RBC AUTO: 96 FL (ref 82–98)
MONOCYTES # BLD AUTO: 0.6 K/UL (ref 0.3–1)
MONOCYTES NFR BLD: 8.6 % (ref 4–15)
NEUTROPHILS # BLD AUTO: 2.3 K/UL (ref 1.8–7.7)
NEUTROPHILS NFR BLD: 35.3 % (ref 38–73)
NRBC BLD-RTO: 0 /100 WBC
PLATELET # BLD AUTO: 216 K/UL (ref 150–450)
PMV BLD AUTO: 9.3 FL (ref 9.2–12.9)
POTASSIUM SERPL-SCNC: 3.7 MMOL/L (ref 3.5–5.1)
PROT SERPL-MCNC: 6.7 G/DL (ref 6–8.4)
RBC # BLD AUTO: 3.87 M/UL (ref 4–5.4)
SODIUM SERPL-SCNC: 139 MMOL/L (ref 136–145)
VIT B12 SERPL-MCNC: >1500 PG/ML (ref 210–950)
WBC # BLD AUTO: 6.53 K/UL (ref 3.9–12.7)

## 2025-03-05 PROCEDURE — 85025 COMPLETE CBC W/AUTO DIFF WBC: CPT | Performed by: INTERNAL MEDICINE

## 2025-03-05 PROCEDURE — 80053 COMPREHEN METABOLIC PANEL: CPT | Performed by: INTERNAL MEDICINE

## 2025-03-05 PROCEDURE — 82607 VITAMIN B-12: CPT | Performed by: INTERNAL MEDICINE

## 2025-03-05 PROCEDURE — 36415 COLL VENOUS BLD VENIPUNCTURE: CPT | Performed by: INTERNAL MEDICINE

## 2025-03-07 ENCOUNTER — OFFICE VISIT (OUTPATIENT)
Dept: HEMATOLOGY/ONCOLOGY | Facility: CLINIC | Age: 89
End: 2025-03-07
Payer: MEDICARE

## 2025-03-07 VITALS
HEIGHT: 63 IN | DIASTOLIC BLOOD PRESSURE: 70 MMHG | WEIGHT: 142.63 LBS | BODY MASS INDEX: 25.27 KG/M2 | SYSTOLIC BLOOD PRESSURE: 154 MMHG | TEMPERATURE: 99 F | OXYGEN SATURATION: 97 % | HEART RATE: 81 BPM

## 2025-03-07 DIAGNOSIS — E03.9 PRIMARY HYPOTHYROIDISM: ICD-10-CM

## 2025-03-07 DIAGNOSIS — C18.2 MALIGNANT NEOPLASM OF ASCENDING COLON: ICD-10-CM

## 2025-03-07 DIAGNOSIS — Z85.038 HISTORY OF COLON CANCER: ICD-10-CM

## 2025-03-07 DIAGNOSIS — E53.8 B12 DEFICIENCY: Primary | ICD-10-CM

## 2025-03-07 DIAGNOSIS — N18.31 STAGE 3A CHRONIC KIDNEY DISEASE: ICD-10-CM

## 2025-03-07 PROCEDURE — 99999 PR PBB SHADOW E&M-EST. PATIENT-LVL III: CPT | Mod: PBBFAC,,, | Performed by: INTERNAL MEDICINE
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
## 2025-05-17 DIAGNOSIS — G62.9 NEUROPATHY: Primary | ICD-10-CM

## 2025-05-19 RX ORDER — GABAPENTIN 300 MG/1
CAPSULE ORAL
Qty: 120 CAPSULE | Refills: 0 | Status: SHIPPED | OUTPATIENT
Start: 2025-05-19 | End: 2025-05-21

## 2025-05-21 ENCOUNTER — OFFICE VISIT (OUTPATIENT)
Dept: NEUROLOGY | Facility: CLINIC | Age: 89
End: 2025-05-21
Payer: MEDICARE

## 2025-05-21 ENCOUNTER — TELEPHONE (OUTPATIENT)
Dept: NEUROLOGY | Facility: CLINIC | Age: 89
End: 2025-05-21

## 2025-05-21 VITALS
BODY MASS INDEX: 26.59 KG/M2 | WEIGHT: 150.13 LBS | HEART RATE: 79 BPM | DIASTOLIC BLOOD PRESSURE: 78 MMHG | SYSTOLIC BLOOD PRESSURE: 142 MMHG

## 2025-05-21 DIAGNOSIS — T45.1X5A CHEMOTHERAPY-INDUCED PERIPHERAL NEUROPATHY: Primary | ICD-10-CM

## 2025-05-21 DIAGNOSIS — G62.0 CHEMOTHERAPY-INDUCED PERIPHERAL NEUROPATHY: Primary | ICD-10-CM

## 2025-05-21 PROCEDURE — 1126F AMNT PAIN NOTED NONE PRSNT: CPT | Mod: CPTII,S$GLB,, | Performed by: NURSE PRACTITIONER

## 2025-05-21 PROCEDURE — 1159F MED LIST DOCD IN RCRD: CPT | Mod: CPTII,S$GLB,, | Performed by: NURSE PRACTITIONER

## 2025-05-21 PROCEDURE — 1160F RVW MEDS BY RX/DR IN RCRD: CPT | Mod: CPTII,S$GLB,, | Performed by: NURSE PRACTITIONER

## 2025-05-21 PROCEDURE — 1157F ADVNC CARE PLAN IN RCRD: CPT | Mod: CPTII,S$GLB,, | Performed by: NURSE PRACTITIONER

## 2025-05-21 PROCEDURE — 99214 OFFICE O/P EST MOD 30 MIN: CPT | Mod: S$GLB,,, | Performed by: NURSE PRACTITIONER

## 2025-05-21 PROCEDURE — 1101F PT FALLS ASSESS-DOCD LE1/YR: CPT | Mod: CPTII,S$GLB,, | Performed by: NURSE PRACTITIONER

## 2025-05-21 PROCEDURE — 3288F FALL RISK ASSESSMENT DOCD: CPT | Mod: CPTII,S$GLB,, | Performed by: NURSE PRACTITIONER

## 2025-05-21 PROCEDURE — 99999 PR PBB SHADOW E&M-EST. PATIENT-LVL IV: CPT | Mod: PBBFAC,,, | Performed by: NURSE PRACTITIONER

## 2025-05-21 RX ORDER — PREGABALIN 50 MG/1
50 CAPSULE ORAL 2 TIMES DAILY
Qty: 60 CAPSULE | Refills: 1 | Status: CANCELLED | OUTPATIENT
Start: 2025-05-21 | End: 2025-11-19

## 2025-05-21 RX ORDER — POTASSIUM CHLORIDE 20 MEQ/1
20 TABLET, EXTENDED RELEASE ORAL ONCE
COMMUNITY
Start: 2025-04-04

## 2025-05-21 RX ORDER — PREGABALIN 50 MG/1
50 CAPSULE ORAL 2 TIMES DAILY
Qty: 60 CAPSULE | Refills: 1 | Status: SHIPPED | OUTPATIENT
Start: 2025-05-21 | End: 2025-11-19

## 2025-05-21 NOTE — ASSESSMENT & PLAN NOTE
Patient is a 89 y/o female that presents for neuropathy follow up.   Received adjuvant folfox post colorectal CA dx ~ 2009 and has suffered neuropathy symptoms ever since.   She reports only numbness and tingling to her feet and hands. She denies burning sensation, pain or weakness.   She has been maintained on Gabapentin for many years.  Gabapentin dose previously increased to 300, 300, 600 with not much change reported   Wean off Gabapentin and start Lyrica  Educated patient again on the role of medications and how this does not aid with numbness but rather helps with tingling, pain etc.   Encouraged OTC creams and supplements for breakthrough symptoms   Compound cream did not offer benefit

## 2025-05-21 NOTE — PROGRESS NOTES
NEUROLOGY  Outpatient Follow Up    [facility]  [address] [facility] [address], [place] [address]  [phone] (office) / [phone] (fax)    Patient Name:  Kenyetta Andersen  :  1935  MR #:  [id number]  Acct #:  [id number]    Date of Neurology Visit: 2025  Name of Provider: BEATRIZ Clarke    Other Physicians:  Reinier Mejia MD (Primary Care Physician); Yessica Granado MD (Referring)      Chief Complaint: Peripheral Neuropathy      History of Present Illness (HPI):  Kenyetta Andersen is a right handed 86 y.o. female.    Patient is here today management of Neuropathy. She has had this for about 12 years after receiving chemotherapy. She had stage III colorectal carcinoma in  and received adjuvant folfox. She reports symptoms are to her hands and feet and feel numb. There is no burning pain but she does report tingling. It is constant.   Her feet feel as though they are slippery. She may walk out of slippers and not even know it. She denies recent falls. She is maintained on Gabapentin and believes she is currenlty taking 600 mg TID but reports that her prescription was recently changed. She states she has been on this medication for ~ 5 years and doesn't report much aid. She has never tried any creams.        Interval Hx 3/18/2022:   Patient is here today for neuropathy follow up. She still reports constant tingling. Her Gabapentin is dosed at 100 mg TID and is unsure why her Gabapentin was decreased. She does not find this dose aids her well but also denies any side effects. She denies recent falls.       Interval Hx 2022:  Patient is here today for neuropathy follow up. She reports that she is taking gabapentin 300 mg TID. She still reports numbness but pain is mostly controlled.  She denies any side effects to this medications. She reports numbness to her fingertip and a swelling feeling to her feet but they are not swollen. She denies significant pain  (burning, tingling, pins/needles) or weakness. She may have intermittent tingling to her feet at night. She did try OTC cream but doesn't believe it offered aid.      Interval Hx 6/17/2022:  She is here today for neuropathy follow up. She is taking Gabapentin 300, 300, 600 mg. She reports ongoing tingling to the fingertips but no significant pain and no weakness. She denies recent falls. Overall, the increased dose of Gabapentin at night has helped. She feels as though her symptoms are stable and controlled.     Interval Hx 5/10/2023:  Patient is here today for neuropathy. She reports stability overall on Gabapentin. She continues to report mild tingling to feet and hands that is not painful. She is not interested in increasing the Gabapentin.     Interval Hx 5/8/2024:  Patient is here today for neuropathy. She continues to report constant numbness and tingling to both feet and both hands. She is maintained on Gabapentin 300, 300, 600 and tolerating it well. She denies pain.     Interval Hx 5/21/2025:  Patient is here today for neuropathy follow up. She states her neuropathy is stable. She continues to report constant numbness and tingling to both feet and both hands.   She is maintained on Gabapentin 300, 300, 600 and tolerating it well. She also uses hempvana but doesn't notice much of a change. Symptoms do not wake her night but they are bothersome during the day. No falls. No pain.         Past Medical, Surgical, Family & Social History:   Reviewed and updated.    Home Medications:     Current Outpatient Medications:     acetaminophen (TYLENOL) 650 MG TbSR, Take 650 mg by mouth every 8 (eight) hours as needed (pain)., Disp: , Rfl:     alendronate (FOSAMAX) 70 MG tablet, Take 1 tablet (70 mg total) by mouth every 7 days. (Patient taking differently: Take 70 mg by mouth every 7 days. Fridays), Disp: 12 tablet, Rfl: 1    apixaban (ELIQUIS) 5 mg Tab, Take 5 mg BID., Disp: 60 tablet, Rfl: 5    ascorbic acid, vitamin  C, (VITAMIN C) 500 MG tablet, Take 500 mg by mouth once daily., Disp: , Rfl:     b complex vitamins capsule, Take 1 capsule by mouth once daily., Disp: , Rfl:     brimonidine 0.2% (ALPHAGAN) 0.2 % Drop, Place 1 drop into both eyes 3 (three) times daily. INSTILL 1 DROP INTO BOTH EYES 3 TIMES A DAY Strength: 0.2 %, Disp: 10 mL, Rfl: 6    calcium carbonate-magnesium hydroxide (ROLAIDS) 550-110 mg Chew, Take 1 tablet by mouth 2 (two) times daily as needed (heartburn)., Disp: , Rfl:     cyanocobalamin (VITAMIN B-12) 1000 MCG tablet, Take 1,000 mcg by mouth once daily., Disp: , Rfl:     cyproheptadine (PERIACTIN) 4 mg tablet, Take 1 tablet (4 mg total) by mouth 3 (three) times daily as needed (appetite)., Disp: 50 tablet, Rfl: 3    dorzolamide-timolol 2-0.5% (COSOPT) 22.3-6.8 mg/mL ophthalmic solution, Place 1 drop into both eyes 2 (two) times daily., Disp: 10 mL, Rfl: 4    enoxaparin (LOVENOX) 80 mg/0.8 mL Syrg, Inject 0.8 mLs (80 mg total) into the skin once daily., Disp: , Rfl:     furosemide (LASIX) 20 MG tablet, Take 1 tablet (20 mg total) by mouth 1 (one) time if needed (lower leg swelling)., Disp: 60 tablet, Rfl: 0    latanoprost 0.005 % ophthalmic solution, Place 1 drop into both eyes every evening. INSTILL 1 DROP INTO BOTH EYES IN THE EVENING Strength: 0.005 %, Disp: 7.5 mL, Rfl: 3    omeprazole (PRILOSEC) 40 MG capsule, Take 1 capsule (40 mg total) by mouth every morning., Disp: 30 capsule, Rfl: 0    potassium chloride SA (K-DUR,KLOR-CON) 20 MEQ tablet, Take 20 mEq by mouth once., Disp: , Rfl:     vitamin D (VITAMIN D3) 1000 units Tab, Take 1,000 Units by mouth once daily., Disp: , Rfl:     pregabalin (LYRICA) 50 MG capsule, Take 1 capsule (50 mg total) by mouth 2 (two) times daily., Disp: 60 capsule, Rfl: 1    Physical Examination:  BP (!) 142/78 (BP Location: Left arm, Patient Position: Sitting)   Pulse 79   Wt 68.1 kg (150 lb 2.1 oz)   BMI 26.59 kg/m²     GENERAL:  General appearance: Well, non-toxic  appearing.  No apparent distress.  Neck: supple.  .     MENTAL STATUS:  Alertness, attention span & concentration: normal.  Language: normal.  Orientation to self, place & time:  normal.  Memory, recent & remote: normal.  Fund of knowledge: normal.        SPEECH:  Clear and fluent.  Follows complex commands.     CRANIAL NERVES:  Cranial Nerves II-XII were examined.  II - Visual fields: normal.  III, IV, VI: PERRL, EOMI, No ptosis, No nystagmus.  V - Facial sensation: normal.  VII - Face symmetry & mobility: symmetric  VIII - Hearing: normal.  IX, X - Palate: normal   XI - Shoulder shrug: normal.  XII - Tongue protrusion: midline     GROSS MOTOR:  Gait & station: non focal; mildly antalgic  Tone: normal.  Abnormal movements: none.  Finger-nose: normal.  Rapid alternating movements: normal.  Pronator drift: normal        MUSCLE STRENGTH:      RIGHT      LEFT   5 Biceps 5   5 Triceps 5   5 Forearm.Pr. 5           4+ Iliopsoas flex    4+   5 Hip Abduct 5   5 Hip Adduct 5   5 Quads 5   5 Hams 5   5 Dorsiflex 5   5 Plantar Flex 5      REFLEXES:    RIGHT Reflex    LEFT   2+ Biceps 2+   2+ Brachiorad. 2+           2+ Patellar 2+      SENSORY:  Light touch: Normal throughout.   Sharp touch: hyperintense to both feet> both hands  Vibration: no vibratory sensation to big toe left foot  Temperature: Normal throughout.   Joint Position: Normal throughout.  Proprioception: abnormal to left foot             Diagnostic Data Reviewed:      Lab Results   Component Value Date    WBC 6.53 03/05/2025    HGB 12.2 03/05/2025    HCT 37.2 03/05/2025    MCV 96 03/05/2025     03/05/2025        CMP  Sodium   Date Value Ref Range Status   03/05/2025 139 136 - 145 mmol/L Final     Potassium   Date Value Ref Range Status   03/05/2025 3.7 3.5 - 5.1 mmol/L Final     Chloride   Date Value Ref Range Status   03/05/2025 107 95 - 110 mmol/L Final     CO2   Date Value Ref Range Status   03/05/2025 28 23 - 29 mmol/L Final     Glucose   Date Value  Ref Range Status   03/05/2025 81 70 - 110 mg/dL Final     BUN   Date Value Ref Range Status   03/05/2025 14 8 - 23 mg/dL Final     Creatinine   Date Value Ref Range Status   03/05/2025 1.0 0.5 - 1.4 mg/dL Final     Calcium   Date Value Ref Range Status   03/05/2025 9.0 8.7 - 10.5 mg/dL Final     Total Protein   Date Value Ref Range Status   03/05/2025 6.7 6.0 - 8.4 g/dL Final     Albumin   Date Value Ref Range Status   03/05/2025 3.7 3.5 - 5.2 g/dL Final     Total Bilirubin   Date Value Ref Range Status   03/05/2025 0.3 0.1 - 1.0 mg/dL Final     Comment:     For infants and newborns, interpretation of results should be based  on gestational age, weight and in agreement with clinical  observations.    Premature Infant recommended reference ranges:  Up to 24 hours.............<8.0 mg/dL  Up to 48 hours............<12.0 mg/dL  3-5 days..................<15.0 mg/dL  6-29 days.................<15.0 mg/dL       Alkaline Phosphatase   Date Value Ref Range Status   03/05/2025 73 55 - 135 U/L Final     AST   Date Value Ref Range Status   03/05/2025 16 10 - 40 U/L Final     ALT   Date Value Ref Range Status   03/05/2025 7 (L) 10 - 44 U/L Final     Anion Gap   Date Value Ref Range Status   03/05/2025 4 (L) 8 - 16 mmol/L Final     eGFR   Date Value Ref Range Status   03/05/2025 54.2 (A) >60 mL/min/1.73 m^2 Final                   Assessment and Plan:      Problem List Items Addressed This Visit          Neuro    Chemotherapy-induced peripheral neuropathy - Primary    Current Assessment & Plan   Patient is a 89 y/o female that presents for neuropathy follow up.   Received adjuvant folfox post colorectal CA dx ~ 2009 and has suffered neuropathy symptoms ever since.   She reports only numbness and tingling to her feet and hands. She denies burning sensation, pain or weakness.   She has been maintained on Gabapentin for many years.  Gabapentin dose previously increased to 300, 300, 600 with not much change reported   Wean off  Gabapentin and start Lyrica  Educated patient again on the role of medications and how this does not aid with numbness but rather helps with tingling, pain etc.   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          The patient will return to clinic in 1-2 weeks for med check     All questions were answered and patient is comfortable with the plan.         Thank you very much for the opportunity to assist in this patient's care.    If you have any questions or concerns, please do not hesitate to contact me at any time.      Sincerely,     BEATRIZ Clarke  [facility] [place]

## 2025-05-21 NOTE — PATIENT INSTRUCTIONS
Stop taking midday dose of Gabapentin.    - only take 300 in AM and 300 at night x 5 days.   On Monday take 300 mg in AM only x 3 days then stop.  On Thursday May 29th start Lyrica 50 mg twice a day.

## 2025-05-21 NOTE — TELEPHONE ENCOUNTER
----- Message from Sheri sent at 5/21/2025 11:26 AM CDT -----  Contact: PT  Type:  Sooner Apoointment RequestCaller is requesting a sooner appointment.  Caller declined first available appointment listed below.  Caller will not accept being placed on the waitlist and is requesting a message be sent to doctor.Name of Caller:PT When is the first available appointment?JULY 2025Symptoms: neuropathy follow up within 2 weeks - pt is not available on 06/09/25Would the patient rather a call back or a response via [facility]? CALL Best Call Back Number:[phone] Information: THANK YOU

## 2025-05-30 ENCOUNTER — RESULTS FOLLOW-UP (OUTPATIENT)
Dept: FAMILY MEDICINE | Facility: CLINIC | Age: 89
End: 2025-05-30

## 2025-05-30 ENCOUNTER — LAB VISIT (OUTPATIENT)
Dept: LAB | Facility: HOSPITAL | Age: 89
End: 2025-05-30
Attending: FAMILY MEDICINE
Payer: MEDICARE

## 2025-05-30 DIAGNOSIS — N18.31 STAGE 3A CHRONIC KIDNEY DISEASE: ICD-10-CM

## 2025-05-30 DIAGNOSIS — R79.9 ABNORMAL BLOOD FINDING: ICD-10-CM

## 2025-05-30 DIAGNOSIS — M85.89 OSTEOPENIA OF MULTIPLE SITES: ICD-10-CM

## 2025-05-30 DIAGNOSIS — E78.1 HYPERTRIGLYCERIDEMIA: ICD-10-CM

## 2025-05-30 LAB
ABSOLUTE EOSINOPHIL (SMH): 0.24 K/UL
ABSOLUTE MONOCYTE (SMH): 0.64 K/UL (ref 0.3–1)
ABSOLUTE NEUTROPHIL COUNT (SMH): 4.8 K/UL (ref 1.8–7.7)
ALBUMIN SERPL-MCNC: 3.5 G/DL (ref 3.5–5.2)
ALP SERPL-CCNC: 117 UNIT/L (ref 55–135)
ALT SERPL-CCNC: 6 UNIT/L (ref 10–44)
ANION GAP (SMH): 8 MMOL/L (ref 8–16)
AST SERPL-CCNC: 16 UNIT/L (ref 10–40)
BASOPHILS # BLD AUTO: 0.03 K/UL
BASOPHILS NFR BLD AUTO: 0.4 %
BILIRUB SERPL-MCNC: 0.5 MG/DL (ref 0.1–1)
BUN SERPL-MCNC: 14 MG/DL (ref 8–23)
CALCIUM SERPL-MCNC: 9.1 MG/DL (ref 8.7–10.5)
CHLORIDE SERPL-SCNC: 108 MMOL/L (ref 95–110)
CHOLEST SERPL-MCNC: 171 MG/DL (ref 120–199)
CHOLEST/HDLC SERPL: 1.9 {RATIO} (ref 2–5)
CO2 SERPL-SCNC: 25 MMOL/L (ref 23–29)
CREAT SERPL-MCNC: 1 MG/DL (ref 0.5–1.4)
EAG (SMH): 126 MG/DL (ref 68–131)
ERYTHROCYTE [DISTWIDTH] IN BLOOD BY AUTOMATED COUNT: 12.9 % (ref 11.5–14.5)
GFR SERPLBLD CREATININE-BSD FMLA CKD-EPI: 54 ML/MIN/1.73/M2
GLUCOSE SERPL-MCNC: 96 MG/DL (ref 70–110)
HBA1C MFR BLD: 6 % (ref 4.5–6.2)
HCT VFR BLD AUTO: 40.9 % (ref 37–48.5)
HDLC SERPL-MCNC: 92 MG/DL (ref 40–75)
HDLC SERPL: 53.8 % (ref 20–50)
HGB BLD-MCNC: 13.2 GM/DL (ref 12–16)
IMM GRANULOCYTES # BLD AUTO: 0.02 K/UL (ref 0–0.04)
IMM GRANULOCYTES NFR BLD AUTO: 0.3 % (ref 0–0.5)
LDLC SERPL CALC-MCNC: 67 MG/DL (ref 63–159)
LYMPHOCYTES # BLD AUTO: 2.24 K/UL (ref 1–4.8)
MCH RBC QN AUTO: 30.9 PG (ref 27–31)
MCHC RBC AUTO-ENTMCNC: 32.3 G/DL (ref 32–36)
MCV RBC AUTO: 96 FL (ref 82–98)
NONHDLC SERPL-MCNC: 79 MG/DL
NUCLEATED RBC (/100WBC) (SMH): 0 /100 WBC
PLATELET # BLD AUTO: 212 K/UL (ref 150–450)
PMV BLD AUTO: 9.8 FL (ref 9.2–12.9)
POTASSIUM SERPL-SCNC: 3.9 MMOL/L (ref 3.5–5.1)
PROT SERPL-MCNC: 6.8 GM/DL (ref 6–8.4)
RBC # BLD AUTO: 4.27 M/UL (ref 4–5.4)
RELATIVE EOSINOPHIL (SMH): 3 % (ref 0–8)
RELATIVE LYMPHOCYTE (SMH): 28.2 % (ref 18–48)
RELATIVE MONOCYTE (SMH): 8.1 % (ref 4–15)
RELATIVE NEUTROPHIL (SMH): 60 % (ref 38–73)
SODIUM SERPL-SCNC: 141 MMOL/L (ref 136–145)
TRIGL SERPL-MCNC: 60 MG/DL (ref 30–150)
WBC # BLD AUTO: 7.95 K/UL (ref 3.9–12.7)

## 2025-05-30 PROCEDURE — 83036 HEMOGLOBIN GLYCOSYLATED A1C: CPT

## 2025-05-30 PROCEDURE — 80061 LIPID PANEL: CPT

## 2025-05-30 PROCEDURE — 36415 COLL VENOUS BLD VENIPUNCTURE: CPT

## 2025-05-30 PROCEDURE — 85025 COMPLETE CBC W/AUTO DIFF WBC: CPT

## 2025-05-30 PROCEDURE — 82947 ASSAY GLUCOSE BLOOD QUANT: CPT

## 2025-06-02 ENCOUNTER — TELEPHONE (OUTPATIENT)
Dept: HEMATOLOGY/ONCOLOGY | Facility: CLINIC | Age: 89
End: 2025-06-02
Payer: MEDICARE

## 2025-06-03 DIAGNOSIS — R60.0 LOWER LEG EDEMA: ICD-10-CM

## 2025-06-03 RX ORDER — FUROSEMIDE 20 MG/1
TABLET ORAL
Qty: 60 TABLET | Refills: 0 | OUTPATIENT
Start: 2025-06-03

## 2025-06-05 ENCOUNTER — OFFICE VISIT (OUTPATIENT)
Dept: FAMILY MEDICINE | Facility: CLINIC | Age: 89
End: 2025-06-05
Payer: MEDICARE

## 2025-06-05 VITALS
HEART RATE: 83 BPM | WEIGHT: 147.69 LBS | TEMPERATURE: 98 F | DIASTOLIC BLOOD PRESSURE: 60 MMHG | BODY MASS INDEX: 26.17 KG/M2 | OXYGEN SATURATION: 94 % | HEIGHT: 63 IN | SYSTOLIC BLOOD PRESSURE: 136 MMHG

## 2025-06-05 DIAGNOSIS — N18.31 STAGE 3A CHRONIC KIDNEY DISEASE: ICD-10-CM

## 2025-06-05 DIAGNOSIS — R60.0 LOWER LEG EDEMA: ICD-10-CM

## 2025-06-05 DIAGNOSIS — R73.03 PREDIABETES: Primary | ICD-10-CM

## 2025-06-05 DIAGNOSIS — I82.512 CHRONIC DEEP VEIN THROMBOSIS (DVT) OF FEMORAL VEIN OF LEFT LOWER EXTREMITY: ICD-10-CM

## 2025-06-05 PROCEDURE — 99999 PR PBB SHADOW E&M-EST. PATIENT-LVL IV: CPT | Mod: PBBFAC,,,

## 2025-06-05 PROCEDURE — 1160F RVW MEDS BY RX/DR IN RCRD: CPT | Mod: CPTII,S$GLB,,

## 2025-06-05 PROCEDURE — 1101F PT FALLS ASSESS-DOCD LE1/YR: CPT | Mod: CPTII,S$GLB,,

## 2025-06-05 PROCEDURE — 1157F ADVNC CARE PLAN IN RCRD: CPT | Mod: CPTII,S$GLB,,

## 2025-06-05 PROCEDURE — 1159F MED LIST DOCD IN RCRD: CPT | Mod: CPTII,S$GLB,,

## 2025-06-05 PROCEDURE — G2211 COMPLEX E/M VISIT ADD ON: HCPCS | Mod: S$GLB,,,

## 2025-06-05 PROCEDURE — 1126F AMNT PAIN NOTED NONE PRSNT: CPT | Mod: CPTII,S$GLB,,

## 2025-06-05 PROCEDURE — 3288F FALL RISK ASSESSMENT DOCD: CPT | Mod: CPTII,S$GLB,,

## 2025-06-05 PROCEDURE — 99214 OFFICE O/P EST MOD 30 MIN: CPT | Mod: S$GLB,,,

## 2025-06-05 RX ORDER — FUROSEMIDE 20 MG/1
20 TABLET ORAL ONCE AS NEEDED
Qty: 60 TABLET | Refills: 0 | Status: SHIPPED | OUTPATIENT
Start: 2025-06-05

## 2025-06-06 ENCOUNTER — LAB VISIT (OUTPATIENT)
Dept: LAB | Facility: HOSPITAL | Age: 89
End: 2025-06-06
Attending: INTERNAL MEDICINE
Payer: MEDICARE

## 2025-06-06 DIAGNOSIS — E53.8 B12 DEFICIENCY: ICD-10-CM

## 2025-06-06 DIAGNOSIS — N18.31 STAGE 3A CHRONIC KIDNEY DISEASE: ICD-10-CM

## 2025-06-06 DIAGNOSIS — C18.2 MALIGNANT NEOPLASM OF ASCENDING COLON: ICD-10-CM

## 2025-06-06 LAB
ABSOLUTE EOSINOPHIL (SMH): 0.31 K/UL
ABSOLUTE MONOCYTE (SMH): 0.73 K/UL (ref 0.3–1)
ABSOLUTE NEUTROPHIL COUNT (SMH): 4 K/UL (ref 1.8–7.7)
ALBUMIN SERPL-MCNC: 3.5 G/DL (ref 3.5–5.2)
ALP SERPL-CCNC: 100 UNIT/L (ref 55–135)
ALT SERPL-CCNC: 7 UNIT/L (ref 10–44)
ANION GAP (SMH): 6 MMOL/L (ref 8–16)
AST SERPL-CCNC: 17 UNIT/L (ref 10–40)
BASOPHILS # BLD AUTO: 0.03 K/UL
BASOPHILS NFR BLD AUTO: 0.4 %
BILIRUB SERPL-MCNC: 0.6 MG/DL (ref 0.1–1)
BUN SERPL-MCNC: 13 MG/DL (ref 8–23)
CALCIUM SERPL-MCNC: 9.2 MG/DL (ref 8.7–10.5)
CARCINOEMBRYONIC ANTIGEN (SMH): 3.1 NG/ML
CHLORIDE SERPL-SCNC: 109 MMOL/L (ref 95–110)
CO2 SERPL-SCNC: 28 MMOL/L (ref 23–29)
CREAT SERPL-MCNC: 1.1 MG/DL (ref 0.5–1.4)
ERYTHROCYTE [DISTWIDTH] IN BLOOD BY AUTOMATED COUNT: 13.3 % (ref 11.5–14.5)
FERRITIN SERPL-MCNC: 187 NG/ML (ref 20–300)
GFR SERPLBLD CREATININE-BSD FMLA CKD-EPI: 48 ML/MIN/1.73/M2
GLUCOSE SERPL-MCNC: 99 MG/DL (ref 70–110)
HCT VFR BLD AUTO: 40.2 % (ref 37–48.5)
HGB BLD-MCNC: 13 GM/DL (ref 12–16)
IMM GRANULOCYTES # BLD AUTO: 0.02 K/UL (ref 0–0.04)
IMM GRANULOCYTES NFR BLD AUTO: 0.2 % (ref 0–0.5)
IRON SATN MFR SERPL: 32 % (ref 20–50)
IRON SERPL-MCNC: 82 UG/DL (ref 30–160)
LYMPHOCYTES # BLD AUTO: 3.06 K/UL (ref 1–4.8)
MCH RBC QN AUTO: 30.7 PG (ref 27–31)
MCHC RBC AUTO-ENTMCNC: 32.3 G/DL (ref 32–36)
MCV RBC AUTO: 95 FL (ref 82–98)
NUCLEATED RBC (/100WBC) (SMH): 0 /100 WBC
PLATELET # BLD AUTO: 184 K/UL (ref 150–450)
PMV BLD AUTO: 9.6 FL (ref 9.2–12.9)
POTASSIUM SERPL-SCNC: 4.3 MMOL/L (ref 3.5–5.1)
PROT SERPL-MCNC: 6.9 GM/DL (ref 6–8.4)
RBC # BLD AUTO: 4.24 M/UL (ref 4–5.4)
RELATIVE EOSINOPHIL (SMH): 3.8 % (ref 0–8)
RELATIVE LYMPHOCYTE (SMH): 37.6 % (ref 18–48)
RELATIVE MONOCYTE (SMH): 9 % (ref 4–15)
RELATIVE NEUTROPHIL (SMH): 49 % (ref 38–73)
SODIUM SERPL-SCNC: 143 MMOL/L (ref 136–145)
TIBC SERPL-MCNC: 259 UG/DL (ref 250–450)
TRANSFERRIN SERPL-MCNC: 185 MG/DL (ref 200–375)
VIT B12 SERPL-MCNC: >2000 PG/ML (ref 210–950)
WBC # BLD AUTO: 8.13 K/UL (ref 3.9–12.7)

## 2025-06-06 PROCEDURE — 36415 COLL VENOUS BLD VENIPUNCTURE: CPT

## 2025-06-06 PROCEDURE — 82728 ASSAY OF FERRITIN: CPT

## 2025-06-06 PROCEDURE — 82607 VITAMIN B-12: CPT

## 2025-06-06 PROCEDURE — 85025 COMPLETE CBC W/AUTO DIFF WBC: CPT

## 2025-06-06 PROCEDURE — 82378 CARCINOEMBRYONIC ANTIGEN: CPT

## 2025-06-06 PROCEDURE — 83540 ASSAY OF IRON: CPT

## 2025-06-06 PROCEDURE — 82040 ASSAY OF SERUM ALBUMIN: CPT

## 2025-06-09 ENCOUNTER — OFFICE VISIT (OUTPATIENT)
Dept: NEUROLOGY | Facility: CLINIC | Age: 89
End: 2025-06-09
Payer: MEDICARE

## 2025-06-09 ENCOUNTER — OFFICE VISIT (OUTPATIENT)
Dept: HEMATOLOGY/ONCOLOGY | Facility: CLINIC | Age: 89
End: 2025-06-09
Payer: MEDICARE

## 2025-06-09 VITALS
HEART RATE: 67 BPM | DIASTOLIC BLOOD PRESSURE: 67 MMHG | HEIGHT: 63 IN | BODY MASS INDEX: 25.8 KG/M2 | WEIGHT: 145.63 LBS | SYSTOLIC BLOOD PRESSURE: 125 MMHG

## 2025-06-09 VITALS
HEART RATE: 73 BPM | WEIGHT: 144.81 LBS | HEIGHT: 63 IN | TEMPERATURE: 98 F | OXYGEN SATURATION: 95 % | RESPIRATION RATE: 18 BRPM | BODY MASS INDEX: 25.66 KG/M2 | DIASTOLIC BLOOD PRESSURE: 64 MMHG | SYSTOLIC BLOOD PRESSURE: 130 MMHG

## 2025-06-09 DIAGNOSIS — R59.0 MEDIASTINAL ADENOPATHY: ICD-10-CM

## 2025-06-09 DIAGNOSIS — G62.0 CHEMOTHERAPY-INDUCED PERIPHERAL NEUROPATHY: Primary | ICD-10-CM

## 2025-06-09 DIAGNOSIS — T45.1X5A CHEMOTHERAPY-INDUCED PERIPHERAL NEUROPATHY: Primary | ICD-10-CM

## 2025-06-09 DIAGNOSIS — C18.2 MALIGNANT NEOPLASM OF ASCENDING COLON: ICD-10-CM

## 2025-06-09 DIAGNOSIS — R07.1 CHEST PAIN ON BREATHING: Primary | ICD-10-CM

## 2025-06-09 DIAGNOSIS — E53.8 B12 DEFICIENCY: ICD-10-CM

## 2025-06-09 PROCEDURE — 1101F PT FALLS ASSESS-DOCD LE1/YR: CPT | Mod: CPTII,S$GLB,, | Performed by: NURSE PRACTITIONER

## 2025-06-09 PROCEDURE — 99999 PR PBB SHADOW E&M-EST. PATIENT-LVL III: CPT | Mod: PBBFAC,,, | Performed by: NURSE PRACTITIONER

## 2025-06-09 PROCEDURE — 99214 OFFICE O/P EST MOD 30 MIN: CPT | Mod: S$GLB,,, | Performed by: INTERNAL MEDICINE

## 2025-06-09 PROCEDURE — 1159F MED LIST DOCD IN RCRD: CPT | Mod: CPTII,S$GLB,, | Performed by: NURSE PRACTITIONER

## 2025-06-09 PROCEDURE — 1101F PT FALLS ASSESS-DOCD LE1/YR: CPT | Mod: CPTII,S$GLB,, | Performed by: INTERNAL MEDICINE

## 2025-06-09 PROCEDURE — 1126F AMNT PAIN NOTED NONE PRSNT: CPT | Mod: CPTII,S$GLB,, | Performed by: NURSE PRACTITIONER

## 2025-06-09 PROCEDURE — 1157F ADVNC CARE PLAN IN RCRD: CPT | Mod: CPTII,S$GLB,, | Performed by: NURSE PRACTITIONER

## 2025-06-09 PROCEDURE — 3288F FALL RISK ASSESSMENT DOCD: CPT | Mod: CPTII,S$GLB,, | Performed by: INTERNAL MEDICINE

## 2025-06-09 PROCEDURE — 1159F MED LIST DOCD IN RCRD: CPT | Mod: CPTII,S$GLB,, | Performed by: INTERNAL MEDICINE

## 2025-06-09 PROCEDURE — 3288F FALL RISK ASSESSMENT DOCD: CPT | Mod: CPTII,S$GLB,, | Performed by: NURSE PRACTITIONER

## 2025-06-09 PROCEDURE — 1126F AMNT PAIN NOTED NONE PRSNT: CPT | Mod: CPTII,S$GLB,, | Performed by: INTERNAL MEDICINE

## 2025-06-09 PROCEDURE — 1157F ADVNC CARE PLAN IN RCRD: CPT | Mod: CPTII,S$GLB,, | Performed by: INTERNAL MEDICINE

## 2025-06-09 PROCEDURE — 99214 OFFICE O/P EST MOD 30 MIN: CPT | Mod: S$GLB,,, | Performed by: NURSE PRACTITIONER

## 2025-06-09 PROCEDURE — 1160F RVW MEDS BY RX/DR IN RCRD: CPT | Mod: CPTII,S$GLB,, | Performed by: NURSE PRACTITIONER

## 2025-06-09 PROCEDURE — 99999 PR PBB SHADOW E&M-EST. PATIENT-LVL IV: CPT | Mod: PBBFAC,,, | Performed by: INTERNAL MEDICINE

## 2025-06-09 NOTE — ASSESSMENT & PLAN NOTE
Patient is a 89 y/o female that presents for neuropathy follow up.   Received adjuvant folfox post colorectal CA dx ~ 2009 and has suffered neuropathy symptoms ever since.   Prev reports of continued numbness and tingling to her feet and hands. She denies burning sensation, pain or weakness.   She has been maintained on Gabapentin for many years.   - this was recently weaned  Lyrica has offered more symptomatic relief   - cont 50 mg BID  Educated patient again on the role of medications and how this does not aid with numbness but rather helps with tingling, pain etc.   Encouraged continued use of OTC creams and supplements for breakthrough symptoms   Compound cream did not offer benefit

## 2025-06-09 NOTE — PROGRESS NOTES
NEUROLOGY  Outpatient Follow Up    [facility]  [address], [place] [address]  [phone] (office) / [phone] (fax)    Patient Name:  Kenyetta Andersen  :  1935  MR #:  [id number]  Acct #:  [id number]    Date of Neurology Visit: 2025  Name of Provider: BEATRIZ Clarke    Other Physicians:  Reinier Mejia MD (Primary Care Physician); No ref. provider found (Referring)      Chief Complaint: Peripheral Neuropathy and Follow-up      History of Present Illness (HPI):  Kenyetta Andersen is a right handed 86 y.o. female.    Patient is here today management of Neuropathy. She has had this for about 12 years after receiving chemotherapy. She had stage III colorectal carcinoma in  and received adjuvant folfox. She reports symptoms are to her hands and feet and feel numb. There is no burning pain but she does report tingling. It is constant.   Her feet feel as though they are slippery. She may walk out of slippers and not even know it. She denies recent falls. She is maintained on Gabapentin and believes she is currenlty taking 600 mg TID but reports that her prescription was recently changed. She states she has been on this medication for ~ 5 years and doesn't report much aid. She has never tried any creams.        Interval Hx 3/18/2022:   Patient is here today for neuropathy follow up. She still reports constant tingling. Her Gabapentin is dosed at 100 mg TID and is unsure why her Gabapentin was decreased. She does not find this dose aids her well but also denies any side effects. She denies recent falls.       Interval Hx 2022:  Patient is here today for neuropathy follow up. She reports that she is taking gabapentin 300 mg TID. She still reports numbness but pain is mostly controlled.  She denies any side effects to this medications. She reports numbness to her fingertip and a swelling feeling to her feet but they are not swollen. She denies  significant pain (burning, tingling, pins/needles) or weakness. She may have intermittent tingling to her feet at night. She did try OTC cream but doesn't believe it offered aid.      Interval Hx 6/17/2022:  She is here today for neuropathy follow up. She is taking Gabapentin 300, 300, 600 mg. She reports ongoing tingling to the fingertips but no significant pain and no weakness. She denies recent falls. Overall, the increased dose of Gabapentin at night has helped. She feels as though her symptoms are stable and controlled.     Interval Hx 5/10/2023:  Patient is here today for neuropathy. She reports stability overall on Gabapentin. She continues to report mild tingling to feet and hands that is not painful. She is not interested in increasing the Gabapentin.     Interval Hx 5/8/2024:  Patient is here today for neuropathy. She continues to report constant numbness and tingling to both feet and both hands. She is maintained on Gabapentin 300, 300, 600 and tolerating it well. She denies pain.     Interval Hx 5/21/2025:  Patient is here today for neuropathy follow up. She states her neuropathy is stable. She continues to report constant numbness and tingling to both feet and both hands.   She is maintained on Gabapentin 300, 300, 600 and tolerating it well. She also uses hempvana but doesn't notice much of a change. Symptoms do not wake her night but they are bothersome during the day. No falls. No pain.       Interval Hx 6/9/2025:  Patient is here today for neuropathy follow up. Since last evaluated she weaned off Gabapentin without issue and now on Lyrica 50 mg BID. She believes this offers more aid than the Gabapentin did. She also still uses OTC creams. She believes the tingling is much improved. She denies falls or side effects of the Lyrica.             Past Medical, Surgical, Family & Social History:   Reviewed and updated.    Home Medications:     Current Outpatient Medications:     acetaminophen (TYLENOL) 650  MG TbSR, Take 650 mg by mouth every 8 (eight) hours as needed (pain)., Disp: , Rfl:     alendronate (FOSAMAX) 70 MG tablet, Take 1 tablet (70 mg total) by mouth every 7 days. (Patient taking differently: Take 70 mg by mouth every 7 days. Takes on Sunday morning), Disp: 12 tablet, Rfl: 1    apixaban (ELIQUIS) 5 mg Tab, Take 5 mg BID., Disp: 60 tablet, Rfl: 5    ascorbic acid, vitamin C, (VITAMIN C) 500 MG tablet, Take 500 mg by mouth once daily., Disp: , Rfl:     b complex vitamins capsule, Take 1 capsule by mouth once daily., Disp: , Rfl:     brimonidine 0.2% (ALPHAGAN) 0.2 % Drop, Place 1 drop into both eyes 3 (three) times daily. INSTILL 1 DROP INTO BOTH EYES 3 TIMES A DAY Strength: 0.2 %, Disp: 10 mL, Rfl: 6    calcium carbonate-magnesium hydroxide (ROLAIDS) 550-110 mg Chew, Take 1 tablet by mouth 2 (two) times daily as needed (heartburn)., Disp: , Rfl:     cyanocobalamin (VITAMIN B-12) 1000 MCG tablet, Take 1,000 mcg by mouth once daily., Disp: , Rfl:     cyproheptadine (PERIACTIN) 4 mg tablet, Take 1 tablet (4 mg total) by mouth 3 (three) times daily as needed (appetite)., Disp: 50 tablet, Rfl: 3    dorzolamide-timolol 2-0.5% (COSOPT) 22.3-6.8 mg/mL ophthalmic solution, Place 1 drop into both eyes 2 (two) times daily., Disp: 10 mL, Rfl: 4    enoxaparin (LOVENOX) 80 mg/0.8 mL Syrg, Inject 0.8 mLs (80 mg total) into the skin once daily., Disp: , Rfl:     furosemide (LASIX) 20 MG tablet, Take 1 tablet (20 mg total) by mouth 1 (one) time if needed (lower leg swelling)., Disp: 60 tablet, Rfl: 0    latanoprost 0.005 % ophthalmic solution, Place 1 drop into both eyes every evening. INSTILL 1 DROP INTO BOTH EYES IN THE EVENING Strength: 0.005 %, Disp: 7.5 mL, Rfl: 3    omeprazole (PRILOSEC) 40 MG capsule, Take 1 capsule (40 mg total) by mouth every morning., Disp: 30 capsule, Rfl: 0    potassium chloride SA (K-DUR,KLOR-CON) 20 MEQ tablet, Take 20 mEq by mouth once., Disp: , Rfl:     pregabalin (LYRICA) 50 MG capsule,  "Take 1 capsule (50 mg total) by mouth 2 (two) times daily., Disp: 60 capsule, Rfl: 1    vitamin D (VITAMIN D3) 1000 units Tab, Take 1,000 Units by mouth once daily., Disp: , Rfl:     Physical Examination:  /67 (BP Location: Left arm, Patient Position: Sitting)   Pulse 67   Ht 5' 3" (1.6 m)   Wt 66 kg (145 lb 9.8 oz)   BMI 25.79 kg/m²     GENERAL:  General appearance: Well, non-toxic appearing.  No apparent distress.  Neck: supple.  .     MENTAL STATUS:  Alertness, attention span & concentration: normal.  Language: normal.  Orientation to self, place & time:  normal.  Memory, recent & remote: normal.  Fund of knowledge: normal.        SPEECH:  Clear and fluent.  Follows complex commands.     CRANIAL NERVES:  Cranial Nerves II-XII were examined.  II - Visual fields: normal.  III, IV, VI: PERRL, EOMI, No ptosis, No nystagmus.  V - Facial sensation: normal.  VII - Face symmetry & mobility: symmetric  VIII - Hearing: normal.  IX, X - Palate: normal   XI - Shoulder shrug: normal.  XII - Tongue protrusion: midline     GROSS MOTOR:  Gait & station: non focal; mildly antalgic  Tone: normal.  Abnormal movements: none.  Finger-nose: normal.  Rapid alternating movements: normal.  Pronator drift: normal        MUSCLE STRENGTH:      RIGHT      LEFT   5 Biceps 5   5 Triceps 5   5 Forearm.Pr. 5           4+ Iliopsoas flex    4+   5 Hip Abduct 5   5 Hip Adduct 5   5 Quads 5   5 Hams 5   5 Dorsiflex 5   5 Plantar Flex 5      REFLEXES:    RIGHT Reflex    LEFT   2+ Biceps 2+   2+ Brachiorad. 2+           2+ Patellar 2+      SENSORY:  Light touch: Normal throughout.   Sharp touch: hyperintense to both feet> both hands  Vibration: no vibratory sensation to big toe left foot  Temperature: Normal throughout.   Joint Position: Normal throughout.  Proprioception: abnormal to left foot             Diagnostic Data Reviewed:      Lab Results   Component Value Date    WBC 8.13 06/06/2025    HGB 13.0 06/06/2025    HCT 40.2 06/06/2025    " MCV 95 06/06/2025     06/06/2025        CMP  Sodium   Date Value Ref Range Status   06/06/2025 143 136 - 145 mmol/L Final     Potassium   Date Value Ref Range Status   06/06/2025 4.3 3.5 - 5.1 mmol/L Final     Chloride   Date Value Ref Range Status   06/06/2025 109 95 - 110 mmol/L Final     CO2   Date Value Ref Range Status   06/06/2025 28 23 - 29 mmol/L Final     Glucose   Date Value Ref Range Status   06/06/2025 99 70 - 110 mg/dL Final     BUN   Date Value Ref Range Status   06/06/2025 13 8 - 23 mg/dL Final     Creatinine   Date Value Ref Range Status   06/06/2025 1.1 0.5 - 1.4 mg/dL Final     Calcium   Date Value Ref Range Status   06/06/2025 9.2 8.7 - 10.5 mg/dL Final     Protein Total   Date Value Ref Range Status   06/06/2025 6.9 6.0 - 8.4 gm/dL Final     Albumin   Date Value Ref Range Status   06/06/2025 3.5 3.5 - 5.2 g/dL Final     Bilirubin Total   Date Value Ref Range Status   06/06/2025 0.6 0.1 - 1.0 mg/dL Final     Comment:     For infants and newborns, interpretation of results should be based   on gestational age, weight and in agreement with clinical   observations.    Premature Infant recommended reference ranges:   0-24 hours:  <8.0 mg/dL   24-48 hours: <12.0 mg/dL   3-5 days:    <15.0 mg/dL   6-29 days:   <15.0 mg/dL     ALP   Date Value Ref Range Status   06/06/2025 100 55 - 135 unit/L Final     AST   Date Value Ref Range Status   06/06/2025 17 10 - 40 unit/L Final     ALT   Date Value Ref Range Status   06/06/2025 7 (L) 10 - 44 unit/L Final     Anion Gap   Date Value Ref Range Status   06/06/2025 6 (L) 8 - 16 mmol/L Final     eGFR   Date Value Ref Range Status   06/06/2025 48 (L) >60 mL/min/1.73/m2 Final   03/05/2025 54.2 (A) >60 mL/min/1.73 m^2 Final                   Assessment and Plan:      Problem List Items Addressed This Visit          Neuro    Chemotherapy-induced peripheral neuropathy - Primary    Current Assessment & Plan   Patient is a 89 y/o female that presents for neuropathy  follow up.   Received adjuvant folfox post colorectal CA dx ~ 2009 and has suffered neuropathy symptoms ever since.   Prev reports of continued numbness and tingling to her feet and hands. She denies burning sensation, pain or weakness.   She has been maintained on Gabapentin for many years.   - this was recently weaned  Lyrica has offered more symptomatic relief   - cont 50 mg BID  Educated patient again on the role of medications and how this does not aid with numbness but rather helps with tingling, pain etc.   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          The patient will return to clinic in 12 mos    All questions were answered and patient is comfortable with the plan.         Thank you very much for the opportunity to assist in this patient's care.    If you have any questions or concerns, please do not hesitate to contact me at any time.      Sincerely,     BEATRIZ Clarke  [facility] [place]
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
## 2025-07-15 RX ORDER — APIXABAN 5 MG/1
TABLET, FILM COATED ORAL
Qty: 60 TABLET | Refills: 5 | Status: SHIPPED | OUTPATIENT
Start: 2025-07-15

## 2025-07-15 NOTE — TELEPHONE ENCOUNTER
Patient states she is not taking Lovenox.  Confirmed with patient she would like to have this medication removed from her current medication list.

## 2025-07-15 NOTE — TELEPHONE ENCOUNTER
Care Due:                  Date            Visit Type   Department     Provider  --------------------------------------------------------------------------------                                             [facility]  Last Visit: 12-      [facility]          MERCED Mejia                               -                              PRIMARY      [facility]  Next Visit: 12-      CARE ([facility])   MEDICINE       Reinier Mejia                                                            Last  Test          Frequency    Reason                     Performed    Due Date  --------------------------------------------------------------------------------    Phosphate...  12 months..  alendronate..............  08- 07-    [facility] Embedded Care Due Messages. Reference number: [id number].   7/15/2025 12:15:24 AM CDT

## 2025-07-16 ENCOUNTER — OFFICE VISIT (OUTPATIENT)
Dept: OPTOMETRY | Facility: CLINIC | Age: 89
End: 2025-07-16
Payer: MEDICARE

## 2025-07-16 DIAGNOSIS — H57.9 ITCHY EYES: ICD-10-CM

## 2025-07-16 DIAGNOSIS — H04.123 DRY EYE SYNDROME, BILATERAL: ICD-10-CM

## 2025-07-16 DIAGNOSIS — H40.1131 PRIMARY OPEN ANGLE GLAUCOMA (POAG) OF BOTH EYES, MILD STAGE: Primary | ICD-10-CM

## 2025-07-16 PROCEDURE — 3288F FALL RISK ASSESSMENT DOCD: CPT | Mod: CPTII,S$GLB,, | Performed by: OPTOMETRIST

## 2025-07-16 PROCEDURE — G2211 COMPLEX E/M VISIT ADD ON: HCPCS | Mod: S$GLB,,, | Performed by: OPTOMETRIST

## 2025-07-16 PROCEDURE — 99213 OFFICE O/P EST LOW 20 MIN: CPT | Mod: S$GLB,,, | Performed by: OPTOMETRIST

## 2025-07-16 PROCEDURE — 1126F AMNT PAIN NOTED NONE PRSNT: CPT | Mod: CPTII,S$GLB,, | Performed by: OPTOMETRIST

## 2025-07-16 PROCEDURE — 1159F MED LIST DOCD IN RCRD: CPT | Mod: CPTII,S$GLB,, | Performed by: OPTOMETRIST

## 2025-07-16 PROCEDURE — 1160F RVW MEDS BY RX/DR IN RCRD: CPT | Mod: CPTII,S$GLB,, | Performed by: OPTOMETRIST

## 2025-07-16 PROCEDURE — 1101F PT FALLS ASSESS-DOCD LE1/YR: CPT | Mod: CPTII,S$GLB,, | Performed by: OPTOMETRIST

## 2025-07-16 PROCEDURE — 99999 PR PBB SHADOW E&M-EST. PATIENT-LVL III: CPT | Mod: PBBFAC,,, | Performed by: OPTOMETRIST

## 2025-07-16 PROCEDURE — 1157F ADVNC CARE PLAN IN RCRD: CPT | Mod: CPTII,S$GLB,, | Performed by: OPTOMETRIST

## 2025-07-16 RX ORDER — BRIMONIDINE TARTRATE 2 MG/ML
1 SOLUTION/ DROPS OPHTHALMIC 3 TIMES DAILY
Qty: 10 ML | Refills: 6 | Status: SHIPPED | OUTPATIENT
Start: 2025-07-16

## 2025-07-16 RX ORDER — DORZOLAMIDE HYDROCHLORIDE AND TIMOLOL MALEATE 20; 5 MG/ML; MG/ML
1 SOLUTION/ DROPS OPHTHALMIC 2 TIMES DAILY
Qty: 10 ML | Refills: 4 | Status: SHIPPED | OUTPATIENT
Start: 2025-07-16

## 2025-07-16 NOTE — PROGRESS NOTES
HPI    Pt states no changes in vision  Some itchiness lately with the eyes    (+)cosopt BID OU  (+)alphagan BID OU  (+)latanoprost qpm OU    Last edited by Gianna Ortega on 7/16/2025 10:26 AM.            Assessment /Plan     For exam results, see Encounter Report.    Primary open angle glaucoma (POAG) of both eyes, mild stage  -     brimonidine 0.2% (ALPHAGAN) 0.2 % Drop; Place 1 drop into both eyes 3 (three) times daily.  Dispense: 10 mL; Refill: 6  -     dorzolamide-timolol 2-0.5% (COSOPT) 22.3-6.8 mg/mL ophthalmic solution; Place 1 drop into both eyes 2 (two) times daily.  Dispense: 10 mL; Refill: 4    Dry eye syndrome, bilateral    Itchy eyes      1. Primary open angle glaucoma (POAG) of both eyes, mild stage (Primary)  Tmax 23 / 21    / 594    Reviewed HVF / OCT results -- 01/2025    IOP stable with use of drops -- target low to mid teens  Continue gtts:  Cosopt bid OU   Alphagan tid OU   Latanoprost qpm OU  Refilled drops today    Visit today is associated with current or anticipated ongoing medical care related to this patients single serious condition/complex condition (Primary open angle glaucoma of both eyes, mild stage)     RTC in 6 months for HVF 24-2 SF / OCT RNFL / DFE    - brimonidine 0.2% (ALPHAGAN) 0.2 % Drop; Place 1 drop into both eyes 3 (three) times daily.  Dispense: 10 mL; Refill: 6  - dorzolamide-timolol 2-0.5% (COSOPT) 22.3-6.8 mg/mL ophthalmic solution; Place 1 drop into both eyes 2 (two) times daily.  Dispense: 10 mL; Refill: 4    2. Dry eye syndrome, bilateral  Continue otc artificial tears 2-4 times a day prn    3. Itchy eyes  Recommend otc Pataday prn for itching

## 2025-07-21 ENCOUNTER — TELEPHONE (OUTPATIENT)
Facility: CLINIC | Age: 89
End: 2025-07-21
Payer: MEDICARE

## 2025-07-25 ENCOUNTER — LAB VISIT (OUTPATIENT)
Dept: LAB | Facility: HOSPITAL | Age: 89
End: 2025-07-25
Attending: INTERNAL MEDICINE
Payer: MEDICARE

## 2025-07-25 DIAGNOSIS — E53.8 B12 DEFICIENCY: ICD-10-CM

## 2025-07-25 DIAGNOSIS — R07.1 CHEST PAIN ON BREATHING: ICD-10-CM

## 2025-07-25 DIAGNOSIS — C18.2 MALIGNANT NEOPLASM OF ASCENDING COLON: ICD-10-CM

## 2025-07-25 DIAGNOSIS — R59.0 MEDIASTINAL ADENOPATHY: ICD-10-CM

## 2025-07-25 LAB
ABSOLUTE EOSINOPHIL (SMH): 0.43 K/UL
ABSOLUTE MONOCYTE (SMH): 0.88 K/UL (ref 0.3–1)
ABSOLUTE NEUTROPHIL COUNT (SMH): 4.3 K/UL (ref 1.8–7.7)
ALBUMIN SERPL-MCNC: 3.2 G/DL (ref 3.5–5.2)
ALP SERPL-CCNC: 86 UNIT/L (ref 40–150)
ALT SERPL-CCNC: <8 UNIT/L (ref 10–44)
ANION GAP (SMH): 7 MMOL/L (ref 8–16)
AST SERPL-CCNC: 29 UNIT/L (ref 11–45)
BASOPHILS # BLD AUTO: 0.04 K/UL
BASOPHILS NFR BLD AUTO: 0.5 %
BILIRUB SERPL-MCNC: 0.6 MG/DL (ref 0.1–1)
BUN SERPL-MCNC: 12 MG/DL (ref 8–23)
CALCIUM SERPL-MCNC: 8.8 MG/DL (ref 8.7–10.5)
CHLORIDE SERPL-SCNC: 110 MMOL/L (ref 95–110)
CO2 SERPL-SCNC: 23 MMOL/L (ref 23–29)
CREAT SERPL-MCNC: 1.1 MG/DL (ref 0.5–1.4)
ERYTHROCYTE [DISTWIDTH] IN BLOOD BY AUTOMATED COUNT: 13.6 % (ref 11.5–14.5)
GFR SERPLBLD CREATININE-BSD FMLA CKD-EPI: 48 ML/MIN/1.73/M2
GLUCOSE SERPL-MCNC: 101 MG/DL (ref 70–110)
HCT VFR BLD AUTO: 39 % (ref 37–48.5)
HGB BLD-MCNC: 12.7 GM/DL (ref 12–16)
IMM GRANULOCYTES # BLD AUTO: 0.02 K/UL (ref 0–0.04)
IMM GRANULOCYTES NFR BLD AUTO: 0.2 % (ref 0–0.5)
LYMPHOCYTES # BLD AUTO: 3.12 K/UL (ref 1–4.8)
MCH RBC QN AUTO: 31.4 PG (ref 27–31)
MCHC RBC AUTO-ENTMCNC: 32.6 G/DL (ref 32–36)
MCV RBC AUTO: 96 FL (ref 82–98)
NUCLEATED RBC (/100WBC) (SMH): 0 /100 WBC
PLATELET # BLD AUTO: 181 K/UL (ref 150–450)
PMV BLD AUTO: 10.2 FL (ref 9.2–12.9)
POTASSIUM SERPL-SCNC: 3.9 MMOL/L (ref 3.5–5.1)
PROT SERPL-MCNC: 6.6 GM/DL (ref 6–8.4)
RBC # BLD AUTO: 4.05 M/UL (ref 4–5.4)
RELATIVE EOSINOPHIL (SMH): 4.9 % (ref 0–8)
RELATIVE LYMPHOCYTE (SMH): 35.5 % (ref 18–48)
RELATIVE MONOCYTE (SMH): 10 % (ref 4–15)
RELATIVE NEUTROPHIL (SMH): 48.9 % (ref 38–73)
SODIUM SERPL-SCNC: 140 MMOL/L (ref 136–145)
WBC # BLD AUTO: 8.8 K/UL (ref 3.9–12.7)

## 2025-07-25 PROCEDURE — 85025 COMPLETE CBC W/AUTO DIFF WBC: CPT

## 2025-07-25 PROCEDURE — 36415 COLL VENOUS BLD VENIPUNCTURE: CPT

## 2025-07-25 PROCEDURE — 80053 COMPREHEN METABOLIC PANEL: CPT

## 2025-07-28 ENCOUNTER — HOSPITAL ENCOUNTER (OUTPATIENT)
Dept: RADIOLOGY | Facility: HOSPITAL | Age: 89
Discharge: HOME | End: 2025-07-28
Attending: INTERNAL MEDICINE
Payer: MEDICARE

## 2025-07-28 DIAGNOSIS — R07.1 CHEST PAIN ON BREATHING: ICD-10-CM

## 2025-07-28 PROCEDURE — 71275 CT ANGIOGRAPHY CHEST: CPT | Mod: 26,,, | Performed by: RADIOLOGY

## 2025-07-28 PROCEDURE — 71275 CT ANGIOGRAPHY CHEST: CPT | Mod: TC,PO

## 2025-07-28 PROCEDURE — 25500020 PHARM REV CODE 255: Mod: PO | Performed by: INTERNAL MEDICINE

## 2025-07-28 RX ADMIN — IOHEXOL 100 ML: 350 INJECTION, SOLUTION INTRAVENOUS at 03:07

## 2025-07-29 ENCOUNTER — TELEPHONE (OUTPATIENT)
Dept: PULMONOLOGY | Facility: CLINIC | Age: 89
End: 2025-07-29
Payer: MEDICARE

## 2025-07-29 ENCOUNTER — PATIENT MESSAGE (OUTPATIENT)
Facility: CLINIC | Age: 89
End: 2025-07-29
Payer: MEDICARE

## 2025-07-29 ENCOUNTER — OFFICE VISIT (OUTPATIENT)
Dept: HEMATOLOGY/ONCOLOGY | Facility: CLINIC | Age: 89
End: 2025-07-29
Payer: MEDICARE

## 2025-07-29 VITALS
TEMPERATURE: 98 F | HEIGHT: 63 IN | DIASTOLIC BLOOD PRESSURE: 70 MMHG | OXYGEN SATURATION: 95 % | HEART RATE: 85 BPM | SYSTOLIC BLOOD PRESSURE: 127 MMHG | WEIGHT: 147.06 LBS | BODY MASS INDEX: 26.06 KG/M2 | RESPIRATION RATE: 18 BRPM

## 2025-07-29 DIAGNOSIS — Z85.038 HISTORY OF COLON CANCER: ICD-10-CM

## 2025-07-29 DIAGNOSIS — I27.82 CHRONIC PULMONARY EMBOLISM, UNSPECIFIED PULMONARY EMBOLISM TYPE, UNSPECIFIED WHETHER ACUTE COR PULMONALE PRESENT: ICD-10-CM

## 2025-07-29 DIAGNOSIS — N18.31 STAGE 3A CHRONIC KIDNEY DISEASE: ICD-10-CM

## 2025-07-29 DIAGNOSIS — E04.2 MULTIPLE THYROID NODULES: ICD-10-CM

## 2025-07-29 DIAGNOSIS — D50.0 IRON DEFICIENCY ANEMIA DUE TO CHRONIC BLOOD LOSS: ICD-10-CM

## 2025-07-29 DIAGNOSIS — E53.8 B12 DEFICIENCY: Primary | ICD-10-CM

## 2025-07-29 PROCEDURE — G2211 COMPLEX E/M VISIT ADD ON: HCPCS | Mod: S$GLB,,, | Performed by: INTERNAL MEDICINE

## 2025-07-29 PROCEDURE — 3288F FALL RISK ASSESSMENT DOCD: CPT | Mod: CPTII,S$GLB,, | Performed by: INTERNAL MEDICINE

## 2025-07-29 PROCEDURE — 1159F MED LIST DOCD IN RCRD: CPT | Mod: CPTII,S$GLB,, | Performed by: INTERNAL MEDICINE

## 2025-07-29 PROCEDURE — 1125F AMNT PAIN NOTED PAIN PRSNT: CPT | Mod: CPTII,S$GLB,, | Performed by: INTERNAL MEDICINE

## 2025-07-29 PROCEDURE — 99999 PR PBB SHADOW E&M-EST. PATIENT-LVL IV: CPT | Mod: PBBFAC,,, | Performed by: INTERNAL MEDICINE

## 2025-07-29 PROCEDURE — 1157F ADVNC CARE PLAN IN RCRD: CPT | Mod: CPTII,S$GLB,, | Performed by: INTERNAL MEDICINE

## 2025-07-29 PROCEDURE — 99214 OFFICE O/P EST MOD 30 MIN: CPT | Mod: S$GLB,,, | Performed by: INTERNAL MEDICINE

## 2025-07-29 PROCEDURE — 1101F PT FALLS ASSESS-DOCD LE1/YR: CPT | Mod: CPTII,S$GLB,, | Performed by: INTERNAL MEDICINE

## 2025-07-29 NOTE — TELEPHONE ENCOUNTER
Lmom that thurs 7/31 appt has been moved to tues 8/5 since provider is no longer in the office on thurs and to contact the office if day/time doesn't work
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
## 2025-08-05 ENCOUNTER — OFFICE VISIT (OUTPATIENT)
Dept: PULMONOLOGY | Facility: CLINIC | Age: 89
End: 2025-08-05
Payer: MEDICARE

## 2025-08-05 VITALS
WEIGHT: 147.06 LBS | BODY MASS INDEX: 26.06 KG/M2 | HEIGHT: 63 IN | OXYGEN SATURATION: 96 % | SYSTOLIC BLOOD PRESSURE: 122 MMHG | DIASTOLIC BLOOD PRESSURE: 65 MMHG | HEART RATE: 75 BPM

## 2025-08-05 DIAGNOSIS — M85.89 OSTEOPENIA OF MULTIPLE SITES: ICD-10-CM

## 2025-08-05 DIAGNOSIS — R05.8 UPPER AIRWAY COUGH SYNDROME: Primary | ICD-10-CM

## 2025-08-05 DIAGNOSIS — J84.10 GRANULOMATOUS LUNG DISEASE: ICD-10-CM

## 2025-08-05 PROCEDURE — 1157F ADVNC CARE PLAN IN RCRD: CPT | Mod: CPTII,S$GLB,, | Performed by: STUDENT IN AN ORGANIZED HEALTH CARE EDUCATION/TRAINING PROGRAM

## 2025-08-05 PROCEDURE — 99214 OFFICE O/P EST MOD 30 MIN: CPT | Mod: S$GLB,,, | Performed by: STUDENT IN AN ORGANIZED HEALTH CARE EDUCATION/TRAINING PROGRAM

## 2025-08-05 PROCEDURE — 1126F AMNT PAIN NOTED NONE PRSNT: CPT | Mod: CPTII,S$GLB,, | Performed by: STUDENT IN AN ORGANIZED HEALTH CARE EDUCATION/TRAINING PROGRAM

## 2025-08-05 PROCEDURE — 1101F PT FALLS ASSESS-DOCD LE1/YR: CPT | Mod: CPTII,S$GLB,, | Performed by: STUDENT IN AN ORGANIZED HEALTH CARE EDUCATION/TRAINING PROGRAM

## 2025-08-05 PROCEDURE — 3288F FALL RISK ASSESSMENT DOCD: CPT | Mod: CPTII,S$GLB,, | Performed by: STUDENT IN AN ORGANIZED HEALTH CARE EDUCATION/TRAINING PROGRAM

## 2025-08-05 PROCEDURE — 1159F MED LIST DOCD IN RCRD: CPT | Mod: CPTII,S$GLB,, | Performed by: STUDENT IN AN ORGANIZED HEALTH CARE EDUCATION/TRAINING PROGRAM

## 2025-08-05 PROCEDURE — 99999 PR PBB SHADOW E&M-EST. PATIENT-LVL III: CPT | Mod: PBBFAC,,, | Performed by: STUDENT IN AN ORGANIZED HEALTH CARE EDUCATION/TRAINING PROGRAM

## 2025-08-05 RX ORDER — ALENDRONATE SODIUM 70 MG/1
70 TABLET ORAL
Qty: 12 TABLET | Refills: 1 | Status: SHIPPED | OUTPATIENT
Start: 2025-08-05

## 2025-08-05 NOTE — PROGRESS NOTES
8/5/2025    Kenyetta Andersen  Patient follow up     No chief complaint on file.      HPI:Ms Andersen is an 89 year old woman with hx of colon cancer, current 2 pack per day smoker, who presents with abnormal chest imaging.     Hx of colorectal cancer, treated with chemotherapy- tolerated it well.   She was found to have abnoraml PET CT following chemo and was referred for EBUS evaluation.     She has been losing weight more recently gained a couple pounds. No hemoptysis. No fevers. No nights sweats.     Quit smoking- after she discovered the blood clot in her lungs in November.     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   Worked at the school    path was all neg for cancer and shows non necrotizing granulomas and histiocytes     Today:     Had a bad cough for the last several months, she is dripping in the back of her throat very frequently   Breathing is ok- limiting her time outside due to heat. Goes to [employer] and gets chores done at the house.   She lives in [place] and she drives herself but she avoids the highway.     PFSH:  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    Anxiety     Arthritis     Escalante esophagus     Blood transfusion     Cataract     Done OU    Colon cancer 2009    Colon polyps 3/14/2017    GERD (gastroesophageal reflux disease)     Glaucoma 2/24/2012    Nuclear sclerosis 8/27/2012    Osteoporosis     Primary hypothyroidism 2/23/2020    Pseudophakia - Left Eye 2/24/2012         Past Surgical History:   Procedure Laterality Date    APPENDECTOMY      CATARACT EXTRACTION W/  INTRAOCULAR LENS IMPLANT Left     CATARACT EXTRACTION W/  INTRAOCULAR LENS IMPLANT Right     Dr Sawant    COLON SURGERY      resection    COLONOSCOPY N/A 3/14/2017    Procedure: COLONOSCOPY;  Surgeon: Killian Guerrier MD;  Location: [facility];  Service: Endoscopy;  Laterality: N/A;    COLONOSCOPY  03/14/2017    Dr. Guerrier: hemorrhoids, one colon polyp  "removed, "Patent functional end-to-end ileo-colonic anastomosis"with healthy mucosa; biopsy: hyperplastic polyp; repeat in 3 years for surveillance    COLONOSCOPY N/A 1/21/2020    Procedure: COLONOSCOPY;  Surgeon: Timo Darling MD;  Location: [facility];  Service: Endoscopy;  Laterality: N/A;    COLONOSCOPY N/A 7/8/2022    Procedure: COLONOSCOPY;  Surgeon: Anaid Washington MD;  Location: [facility] ENDO;  Service: Endoscopy;  Laterality: N/A;    ECTOPIC PREGNANCY SURGERY      ENDOBRONCHIAL ULTRASOUND N/A 1/15/2025    Procedure: ENDOBRONCHIAL ULTRASOUND (EBUS);  Surgeon: Yonis Vigil MD;  Location: [facility];  Service: Pulmonary;  Laterality: N/A;    ERCP N/A 7/31/2023    Procedure: ERCP (ENDOSCOPIC RETROGRADE CHOLANGIOPANCREATOGRAPHY);  Surgeon: Nasim Cuadra MD;  Location: [facility];  Service: Endoscopy;  Laterality: N/A;    ERCP N/A 8/1/2023    Procedure: ERCP (ENDOSCOPIC RETROGRADE CHOLANGIOPANCREATOGRAPHY);  Surgeon: Олег Arreaga MD;  Location: [facility] (2ND FLR);  Service: Endoscopy;  Laterality: N/A;    ERCP N/A 12/12/2023    Procedure: ERCP (ENDOSCOPIC RETROGRADE CHOLANGIOPANCREATOGRAPHY);  Surgeon: Dimitri Anaya MD;  Location: [facility] ENDO (2ND FLR);  Service: Endoscopy;  Laterality: N/A;    ESOPHAGOGASTRODUODENOSCOPY N/A 5/16/2019    Procedure: EGD (ESOPHAGOGASTRODUODENOSCOPY);  Surgeon: Anaid Washington MD;  Location: [facility] ENDO;  Service: Endoscopy;  Laterality: N/A;    ESOPHAGOGASTRODUODENOSCOPY N/A 7/11/2019    Procedure: EGD (ESOPHAGOGASTRODUODENOSCOPY);  Surgeon: Anaid Washington MD;  Location: [facility] ENDO;  Service: Endoscopy;  Laterality: N/A;    ESOPHAGOGASTRODUODENOSCOPY N/A 2/5/2021    Procedure: EGD (ESOPHAGOGASTRODUODENOSCOPY);  Surgeon: Anaid Washington MD;  Location: [facility] ENDO;  Service: Endoscopy;  Laterality: N/A;    ESOPHAGOGASTRODUODENOSCOPY N/A 11/11/2021    Procedure: EGD (ESOPHAGOGASTRODUODENOSCOPY);  Surgeon: Anaid Washington MD;  Location: [facility];  Service: " Endoscopy;  Laterality: N/A;    ESOPHAGOGASTRODUODENOSCOPY N/A 2022    Procedure: EGD (ESOPHAGOGASTRODUODENOSCOPY);  Surgeon: Anaid Washington MD;  Location: [facility] ENDO;  Service: Endoscopy;  Laterality: N/A;    ESOPHAGOGASTRODUODENOSCOPY N/A 2023    Procedure: EGD (ESOPHAGOGASTRODUODENOSCOPY);  Surgeon: Anaid Washington MD;  Location: [facility] ENDO;  Service: Endoscopy;  Laterality: N/A;    EYE SURGERY      bilateral cataracts    HYSTERECTOMY      complete    JOINT REPLACEMENT      Liban Knee    ROTATOR CUFF REPAIR Right     TOE SURGERY      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    UPPER GASTROINTESTINAL ENDOSCOPY  2017    Dr. Guerrier: gastritis and esophagitis, biopsy: chronic gastritis, negative for h pylori, esophageal- positive eosinophils, negative for barretts; repeat in 2 months    UPPER GASTROINTESTINAL ENDOSCOPY  2017    Dr. Guerrier     Social History     Tobacco Use    Smoking status: Former     Current packs/day: 0.00     Average packs/day: 0.3 packs/day for 65.0 years (21.5 ttl pk-yrs)     Types: Cigarettes     Start date: 10/5/1955     Quit date: 10/5/2020     Years since quittin.8    Smokeless tobacco: Never    Tobacco comments:     3/1/23--states smokes 1-2 cigarettes a day   Substance Use Topics    Alcohol use: Not Currently     Alcohol/week: 2.0 standard drinks of alcohol     Types: 2 Shots of liquor per week    Drug use: No     Family History   Problem Relation Name Age of Onset    Heart disease Mother          heart attack    Heart disease Father      Heart disease Brother          MI    Parkinsonism Sister      Arthritis Sister      No Known Problems Brother      No Known Problems Brother      Amblyopia Neg Hx      Blindness Neg Hx      Cancer Neg Hx      Cataracts Neg Hx      Glaucoma Neg Hx      Hypertension Neg Hx      Macular degeneration Neg Hx      Retinal detachment Neg Hx      Strabismus Neg Hx      Stroke Neg Hx      Thyroid disease Neg Hx      Diabetes Neg Hx       Colon cancer Neg Hx      Crohn's disease Neg Hx      Esophageal cancer Neg Hx      Stomach cancer Neg Hx      Ulcerative colitis Neg Hx       Review of patient's allergies indicates:   Allergen Reactions    Ace inhibitors Edema     Other reaction(s): Angioedema    Codeine Hives       Performance Status:The patient's activity level is no limits with regular activity.  She does some exerciess at home with PT exercises - not every day. She can walk 100 yard on flat surface without stopping.     Review of Systems:   Review of Systems   Constitutional:  Positive for weight loss. Negative for chills, fever and malaise/fatigue.   HENT:  Negative for congestion, sinus pain and sore throat.    Eyes:  Negative for blurred vision and pain.   Respiratory:  Positive for shortness of breath. Negative for cough and wheezing.    Cardiovascular:  Negative for chest pain, palpitations, orthopnea, claudication and leg swelling.   Gastrointestinal:  Negative for abdominal pain, constipation, diarrhea, heartburn, melena, nausea and vomiting.   Genitourinary:  Negative for dysuria, frequency, hematuria and urgency.   Skin:  Negative for itching and rash.   Neurological:  Negative for dizziness, seizures, loss of consciousness and headaches.   Endo/Heme/Allergies:  Negative for environmental allergies and polydipsia. Does not bruise/bleed easily.   Psychiatric/Behavioral:  Negative for depression. The patient is not nervous/anxious.         Exam:  Physical Exam  Vitals reviewed.   Constitutional:       General: She is not in acute distress.     Appearance: She is well-developed. She is not diaphoretic.   HENT:      Head: Normocephalic and atraumatic.      Mouth/Throat:      Pharynx: No oropharyngeal exudate or posterior oropharyngeal erythema.   Eyes:      General: No scleral icterus.     Pupils: Pupils are equal, round, and reactive to light.   Neck:      Vascular: No JVD.   Cardiovascular:      Rate and Rhythm: Normal rate and  regular rhythm.      Heart sounds: Normal heart sounds. No murmur heard.  Pulmonary:      Effort: Pulmonary effort is normal. No respiratory distress.      Breath sounds: Normal breath sounds. No wheezing.   Abdominal:      General: Bowel sounds are normal. There is no distension.      Palpations: Abdomen is soft.      Tenderness: There is no abdominal tenderness.   Musculoskeletal:         General: No swelling.      Cervical back: Normal range of motion and neck supple. No rigidity.   Skin:     General: Skin is warm and dry.      Capillary Refill: Capillary refill takes less than 2 seconds.      Coloration: Skin is not pale.      Findings: No rash.   Neurological:      General: No focal deficit present.      Mental Status: She is alert and oriented to person, place, and time.      Cranial Nerves: No cranial nerve deficit.      Motor: No weakness or abnormal muscle tone.          Radiographs (ct chest and cxr) reviewed: results reviewed     Labs reviewed     Lab Results   Component Value Date    WBC 8.80 07/25/2025    HGB 12.7 07/25/2025    HCT 39.0 07/25/2025    MCV 96 07/25/2025     07/25/2025       CMP  Sodium   Date Value Ref Range Status   07/25/2025 140 136 - 145 mmol/L Final     Potassium   Date Value Ref Range Status   07/25/2025 3.9 3.5 - 5.1 mmol/L Final     Chloride   Date Value Ref Range Status   07/25/2025 110 95 - 110 mmol/L Final     CO2   Date Value Ref Range Status   07/25/2025 23 23 - 29 mmol/L Final     Glucose   Date Value Ref Range Status   07/25/2025 101 70 - 110 mg/dL Final     BUN   Date Value Ref Range Status   07/25/2025 12 8 - 23 mg/dL Final     Creatinine   Date Value Ref Range Status   07/25/2025 1.1 0.5 - 1.4 mg/dL Final     Calcium   Date Value Ref Range Status   07/25/2025 8.8 8.7 - 10.5 mg/dL Final     Protein Total   Date Value Ref Range Status   07/25/2025 6.6 6.0 - 8.4 gm/dL Final     Albumin   Date Value Ref Range Status   07/25/2025 3.2 (L) 3.5 - 5.2 g/dL Final      Bilirubin Total   Date Value Ref Range Status   07/25/2025 0.6 0.1 - 1.0 mg/dL Final     Comment:     For infants and newborns, interpretation of results should be based   on gestational age, weight and in agreement with clinical   observations.    Premature Infant recommended reference ranges:   0-24 hours:  <8.0 mg/dL   24-48 hours: <12.0 mg/dL   3-5 days:    <15.0 mg/dL   6-29 days:   <15.0 mg/dL     ALP   Date Value Ref Range Status   07/25/2025 86 40 - 150 unit/L Final     AST   Date Value Ref Range Status   07/25/2025 29 11 - 45 unit/L Final     ALT   Date Value Ref Range Status   07/25/2025 <8 (L) 10 - 44 unit/L Final     Anion Gap   Date Value Ref Range Status   07/25/2025 7 (L) 8 - 16 mmol/L Final     eGFR   Date Value Ref Range Status   07/25/2025 48 (L) >60 mL/min/1.73/m2 Final   03/05/2025 54.2 (A) >60 mL/min/1.73 m^2 Final         PFT were not done.             Plan:  Clinical impression is apparently straight forward and impression with management as below.    Negative for malignancy.   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   Overall stable lung disease and clinically she is doing well at home     CT looks pretty stable to me. There is some question about CTEPH and she does have possible pulmonary hpyertension on her last ECHO. I doubt she would be a candidate for thromboendarterecotomy.     We will need to repeat ECHO to see if this is affected her dyspnea however I think she is unlikley to benefit from it as we are not going to chance our management. She should continue eliquis.  Otherwise CTA looks stable.       Regarding postnasal drip- will plan on daily netipot followed by flonase and antihistamine.     Problem List Items Addressed This Visit    None        No follow-ups on file.    Discussed with patient above for education the following:      There are no Patient Instructions on file for this visit.

## 2025-08-05 NOTE — TELEPHONE ENCOUNTER
Care Due:                  Date            Visit Type   Department     Provider  --------------------------------------------------------------------------------                                             [facility]  Last Visit: 12-      [facility]          MERCED Mejia                               -                              PRIMARY      [facility]  Next Visit: 12-      CARE ([facility])   MEDICINE       Reinier Mejia                                                            Last  Test          Frequency    Reason                     Performed    Due Date  --------------------------------------------------------------------------------    Phosphate...  12 months..  alendronate..............  Not Found    Overdue    [employer] Embedded Care Due Messages. Reference number: [id number].   8/05/2025 12:15:56 AM CDT

## 2025-08-05 NOTE — PATIENT INSTRUCTIONS
Please use daily flonase spray and antihistamine- such as Zyrtec, Xyzal or any second generation antihistamine of your choice.     In the morning use a neti pot, keep chin tucked in, and rinse each nostril, after using use your flonase. Be sure to use clean water either boiled and cooled or filtered water.

## 2025-08-28 DIAGNOSIS — T45.1X5A CHEMOTHERAPY-INDUCED PERIPHERAL NEUROPATHY: ICD-10-CM

## 2025-08-28 DIAGNOSIS — G62.0 CHEMOTHERAPY-INDUCED PERIPHERAL NEUROPATHY: ICD-10-CM

## 2025-08-28 RX ORDER — PREGABALIN 50 MG/1
50 CAPSULE ORAL 2 TIMES DAILY
Qty: 60 CAPSULE | Refills: 1 | Status: SHIPPED | OUTPATIENT
Start: 2025-08-28

## (undated) DEVICE — SYR DISP LL 5CC

## (undated) DEVICE — ITEM INACTIVATED - ERP

## (undated) DEVICE — VISCOAT 0.5ML

## (undated) DEVICE — DRESSING TRANS 4X4 TEGADERM

## (undated) DEVICE — SOL WATER STRL IRR 1000ML

## (undated) DEVICE — CARTRIDGE LENS D

## (undated) DEVICE — TIP PHACO 45 DEGREE KELMAN

## (undated) DEVICE — NDL HYPODERMIC BLUNT 18G 1.5IN

## (undated) DEVICE — KNIFE SLIT PHACO 2.4MM

## (undated) DEVICE — CYSTOTOME IRRIG 24G 13MM

## (undated) DEVICE — SLEEVE ULTRA INFUSION

## (undated) DEVICE — SEE L#120831

## (undated) DEVICE — SYS LABEL CORRECT MED

## (undated) DEVICE — GLOVE BIOGEL ECLIPSE SZ 7

## (undated) DEVICE — SYR 10CC LUER LOCK

## (undated) DEVICE — SOL SOD HYLR VISC INJ .85

## (undated) DEVICE — PACK CUSTOM EYE KIT

## (undated) DEVICE — KNIFE OPT SFTY SD PRT 1.15MM20

## (undated) DEVICE — NDL 25G X 1IN NON SAFETY

## (undated) DEVICE — SYR LUER LOCK 1CC

## (undated) DEVICE — SHIELD EYE PLASTIC 3100G

## (undated) DEVICE — SOL IRR BSS OPHTH 500ML STRL

## (undated) DEVICE — DRAPE OPTHALMIC W/POUCH